# Patient Record
Sex: FEMALE | Race: WHITE | NOT HISPANIC OR LATINO | Employment: OTHER | URBAN - METROPOLITAN AREA
[De-identification: names, ages, dates, MRNs, and addresses within clinical notes are randomized per-mention and may not be internally consistent; named-entity substitution may affect disease eponyms.]

---

## 2017-07-25 ENCOUNTER — HOSPITAL ENCOUNTER (OUTPATIENT)
Facility: MEDICAL CENTER | Age: 52
End: 2017-07-25
Attending: PHYSICIAN ASSISTANT
Payer: MEDICARE

## 2017-07-25 ENCOUNTER — OFFICE VISIT (OUTPATIENT)
Dept: URGENT CARE | Facility: CLINIC | Age: 52
End: 2017-07-25
Payer: MEDICARE

## 2017-07-25 VITALS
OXYGEN SATURATION: 97 % | WEIGHT: 228 LBS | DIASTOLIC BLOOD PRESSURE: 78 MMHG | TEMPERATURE: 99.1 F | BODY MASS INDEX: 36.64 KG/M2 | SYSTOLIC BLOOD PRESSURE: 134 MMHG | HEART RATE: 90 BPM | RESPIRATION RATE: 20 BRPM | HEIGHT: 66 IN

## 2017-07-25 DIAGNOSIS — R30.0 DYSURIA: ICD-10-CM

## 2017-07-25 DIAGNOSIS — R09.81 SINUS CONGESTION: ICD-10-CM

## 2017-07-25 DIAGNOSIS — R35.0 URINARY FREQUENCY: ICD-10-CM

## 2017-07-25 DIAGNOSIS — T36.95XA ANTIBIOTIC-INDUCED YEAST INFECTION: ICD-10-CM

## 2017-07-25 DIAGNOSIS — B37.9 ANTIBIOTIC-INDUCED YEAST INFECTION: ICD-10-CM

## 2017-07-25 LAB
APPEARANCE UR: NORMAL
BILIRUB UR STRIP-MCNC: NEGATIVE MG/DL
COLOR UR AUTO: YELLOW
GLUCOSE UR STRIP.AUTO-MCNC: NEGATIVE MG/DL
KETONES UR STRIP.AUTO-MCNC: NEGATIVE MG/DL
LEUKOCYTE ESTERASE UR QL STRIP.AUTO: NEGATIVE
NITRITE UR QL STRIP.AUTO: NEGATIVE
PH UR STRIP.AUTO: 6 [PH] (ref 5–8)
PROT UR QL STRIP: NORMAL MG/DL
RBC UR QL AUTO: NORMAL
SP GR UR STRIP.AUTO: 1.02
UROBILINOGEN UR STRIP-MCNC: NEGATIVE MG/DL

## 2017-07-25 PROCEDURE — 87086 URINE CULTURE/COLONY COUNT: CPT

## 2017-07-25 PROCEDURE — 81002 URINALYSIS NONAUTO W/O SCOPE: CPT | Performed by: PHYSICIAN ASSISTANT

## 2017-07-25 PROCEDURE — 99204 OFFICE O/P NEW MOD 45 MIN: CPT | Performed by: PHYSICIAN ASSISTANT

## 2017-07-25 RX ORDER — FLUTICASONE PROPIONATE 50 MCG
2 SPRAY, SUSPENSION (ML) NASAL DAILY
Qty: 16 G | Refills: 0 | Status: SHIPPED | OUTPATIENT
Start: 2017-07-25 | End: 2018-09-17

## 2017-07-25 RX ORDER — CEPHALEXIN 500 MG/1
500 CAPSULE ORAL 3 TIMES DAILY
Qty: 21 CAP | Refills: 0 | Status: SHIPPED | OUTPATIENT
Start: 2017-07-25 | End: 2017-08-01

## 2017-07-25 RX ORDER — FLUCONAZOLE 150 MG/1
TABLET ORAL
Qty: 2 TAB | Refills: 0 | Status: SHIPPED | OUTPATIENT
Start: 2017-07-25 | End: 2017-08-28

## 2017-07-25 ASSESSMENT — ENCOUNTER SYMPTOMS
COUGH: 1
WHEEZING: 0
ABDOMINAL PAIN: 0
BACK PAIN: 0
POLYDIPSIA: 0
SPUTUM PRODUCTION: 1
NAUSEA: 0
VOMITING: 0
CHILLS: 0
HEADACHES: 1
FLANK PAIN: 0
SORE THROAT: 1
FEVER: 1
SHORTNESS OF BREATH: 0
DIARRHEA: 0

## 2017-07-25 NOTE — MR AVS SNAPSHOT
"        Sol Kern   2017 2:45 PM   Office Visit   MRN: [id number]    Department:  [facility]   Dept Phone:  [phone]    Description:  Female : 1965   Provider:  Cordell Skinner PA-C           Reason for Visit     Dysuria x1.5week, burns to urinate, frequent urination, bloated, fatigue      Allergies as of 2017     Allergen Noted Reactions    Sulfa Drugs 2017       Other Drug 2017       Any of the \"cets\" (percocet)    Vicodin [Hydrocodone-Acetaminophen] 2017         You were diagnosed with     Dysuria   [788.1.ICD-9-CM]       Urinary frequency   [788.41.ICD-9-CM]       Antibiotic-induced yeast infection   [547806]         Vital Signs     Blood Pressure Pulse Temperature Respirations Height Weight    134/78 mmHg 90 37.3 °C (99.1 °F) 20 1.676 m (5' 5.98\") 103.42 kg (228 lb)    Body Mass Index Oxygen Saturation                36.82 kg/m2 97%          Basic Information     Date Of Birth Sex Race Ethnicity Preferred Language    1965 Female White Non- English      Health Maintenance     Patient has no pending health maintenance at this time      Current Immunizations     No immunizations on file.      Below and/or attached are the medications your provider expects you to take. Review all of your home medications and newly ordered medications with your provider and/or pharmacist. Follow medication instructions as directed by your provider and/or pharmacist. Please keep your medication list with you and share with your provider. Update the information when medications are discontinued, doses are changed, or new medications (including over-the-counter products) are added; and carry medication information at all times in the event of emergency situations     Allergies:  SULFA DRUGS - (reactions not documented)     OTHER DRUG - (reactions not documented)     VICODIN - (reactions not documented)               Medications  Valid as of: 2017 -  3:22 PM   "    Generic Name Brand Name Tablet Size Instructions for use    Cephalexin (Cap) KEFLEX 500 MG Take 1 Cap by mouth 3 times a day for 7 days.        Fluconazole (Tab) DIFLUCAN 150 MG Take one tab PO day #1, wait 4 days, if still w/ s/sx take 2nd tab PO        Fluticasone Propionate (Suspension) FLONASE 50 MCG/ACT Spray 2 Sprays in nose every day.        .                 Medicines prescribed today were sent to:     [id number] - [place] - [address] [place] [address]    Phone: [phone] Fax: [phone]    Open 24 Hours?: No      Medication refill instructions:       If your prescription bottle indicates you have medication refills left, it is not necessary to call your provider’s office. Please contact your pharmacy and they will refill your medication.    If your prescription bottle indicates you do not have any refills left, you may request refills at any time through one of the following ways: The online [employer] system (except Urgent Care), by calling your provider’s office, or by asking your pharmacy to contact your provider’s office with a refill request. Medication refills are processed only during regular business hours and may not be available until the next business day. Your provider may request additional information or to have a follow-up visit with you prior to refilling your medication.   *Please Note: Medication refills are assigned a new Rx number when refilled electronically. Your pharmacy may indicate that no refills were authorized even though a new prescription for the same medication is available at the pharmacy. Please request the medicine by name with the pharmacy before contacting your provider for a refill.        Your To Do List     Future Labs/Procedures Complete By Expires    URINE CULTURE(NEW)  As directed 7/25/2018      Instructions      Urinary Tract Infection  A urinary tract infection (UTI) can occur any place along the urinary tract. The tract includes the  kidneys, ureters, bladder, and urethra. A type of germ called bacteria often causes a UTI. UTIs are often helped with antibiotic medicine.   HOME CARE   · If given, take antibiotics as told by your doctor. Finish them even if you start to feel better.  · Drink enough fluids to keep your pee (urine) clear or pale yellow.  · Avoid tea, drinks with caffeine, and bubbly (carbonated) drinks.  · Pee often. Avoid holding your pee in for a long time.  · Pee before and after having sex (intercourse).  · Wipe from front to back after you poop (bowel movement) if you are a woman. Use each tissue only once.  GET HELP RIGHT AWAY IF:   · You have back pain.  · You have lower belly (abdominal) pain.  · You have chills.  · You feel sick to your stomach (nauseous).  · You throw up (vomit).  · Your burning or discomfort with peeing does not go away.  · You have a fever.  · Your symptoms are not better in 3 days.  MAKE SURE YOU:   · Understand these instructions.  · Will watch your condition.  · Will get help right away if you are not doing well or get worse.     This information is not intended to replace advice given to you by your health care provider. Make sure you discuss any questions you have with your health care provider.     Document Released: 06/05/2009 Document Revised: 01/08/2016 Document Reviewed: 07/18/2013  [employer] Interactive Patient Education ©2016 Elsevier Inc.            CalAmp Access Code: [email or web address]  Expires: 8/24/2017  3:22 PM    CalAmp  A secure, online tool to manage your health information     [employer] CalAmp® is a secure, online tool that connects you to your personalized health information from the privacy of your home -- day or night - making it very easy for you to manage your healthcare. Once the activation process is completed, you can even access your medical information using the CalAmp sivan, which is available for free in the Apple Sivan store or Google Play store.     CalAmp  provides the following levels of access (as shown below):   My Chart Features   [facility] Doctor [facility]  Specialists [facility]  Urgent  Care Non-[facility]  Primary Care  Doctor   Email your healthcare team securely and privately 24/7 X X X    Manage appointments: schedule your next appointment; view details of past/upcoming appointments X      Request prescription refills. X      View recent personal medical records, including lab and immunizations X X X X   View health record, including health history, allergies, medications X X X X   Read reports about your outpatient visits, procedures, consult and ER notes X X X X   See your discharge summary, which is a recap of your hospital and/or ER visit that includes your diagnosis, lab results, and care plan. X X       How to register for [employer]:  1. Go to  [email or web address].  2. Click on the Sign Up Now box, which takes you to the New Member Sign Up page. You will need to provide the following information:  a. Enter your [employer] Access Code exactly as it appears at the top of this page. (You will not need to use this code after you’ve completed the sign-up process. If you do not sign up before the expiration date, you must request a new code.)   b. Enter your date of birth.   c. Enter your home email address.   d. Click Submit, and follow the next screen’s instructions.  3. Create a [employer] Vapor ID. This will be your [employer] login ID and cannot be changed, so think of one that is secure and easy to remember.  4. Create a [employer] password. You can change your password at any time.  5. Enter your Password Reset Question and Answer. This can be used at a later time if you forget your password.   6. Enter your e-mail address. This allows you to receive e-mail notifications when new information is available in [employer] Just Vapor.  7. Click Sign Up. You can now view your health information.    For assistance activating your [employer] V[employer] account, call [phone]

## 2017-07-25 NOTE — PROGRESS NOTES
"Subjective:      Sol Kern is a 52 y.o. female who presents with Dysuria            Dysuria   Associated symptoms include frequency, hematuria and urgency. Pertinent negatives include no chills, flank pain, nausea or vomiting.   dysuria, denies discharge, last one week of discomfort, freq urination, night time w/ increased urination, incomplete voiding, PMH of UTI, feels similar/ classic for her. S/p hysterectomy. Notes mildly cloudy urine. Tried otc cran apple juice    Also note sinus congestion and yellow colored mucous, sinus pressure pain, denies chills, possible subj fever, denies sore throat/ear pain/cough, c/o HA, feels out of it, sinus infection, denies asthma, PMH of sinusutis, denies PMH of bronchitis/pnuemonia, does have some seasonal allerg - recently moved to area - bad allerg now - no tx thus far.     Review of Systems   Constitutional: Positive for fever ( subj). Negative for chills.   HENT: Positive for congestion and sore throat. Negative for ear pain.    Respiratory: Positive for cough and sputum production. Negative for shortness of breath and wheezing.    Gastrointestinal: Negative for nausea, vomiting, abdominal pain and diarrhea.   Genitourinary: Positive for dysuria, urgency, frequency and hematuria. Negative for flank pain.   Musculoskeletal: Negative for back pain.   Skin: Negative for rash.   Neurological: Positive for headaches.   Endo/Heme/Allergies: Positive for environmental allergies. Negative for polydipsia.     PMH:  has no past medical history on file.  MEDS: No current outpatient prescriptions on file.  ALLERGIES:   Allergies   Allergen Reactions   • Sulfa Drugs    • Other Drug      Any of the \"cets\" (percocet)   • Vicodin [Hydrocodone-Acetaminophen]      SURGHX: No past surgical history on file.  SOCHX:    FH: Family history was reviewed, no pertinent findings to report       Objective:     /78 mmHg  Pulse 90  Temp(Src) 37.3 °C (99.1 °F)  Resp 20  Ht 1.676 m (5' " "5.98\")  Wt 103.42 kg (228 lb)  BMI 36.82 kg/m2  SpO2 97%     Physical Exam   Constitutional: She is oriented to person, place, and time. She appears well-developed and well-nourished. No distress.   HENT:   Head: Normocephalic and atraumatic.   Right Ear: External ear normal. Tympanic membrane is bulging. Tympanic membrane is not erythematous.   Left Ear: External ear normal. Tympanic membrane is bulging. Tympanic membrane is not erythematous.   Nose: Right sinus exhibits maxillary sinus tenderness. Right sinus exhibits no frontal sinus tenderness. Left sinus exhibits maxillary sinus tenderness. Left sinus exhibits no frontal sinus tenderness.   Mouth/Throat: Uvula is midline and mucous membranes are normal. Posterior oropharyngeal erythema ( PND) present. No oropharyngeal exudate, posterior oropharyngeal edema or tonsillar abscesses.   Eyes: Conjunctivae and lids are normal. Right eye exhibits no discharge. Left eye exhibits no discharge. No scleral icterus.   Neck: Neck supple.   Pulmonary/Chest: Effort normal. No respiratory distress.   Abdominal: Soft. Normal appearance. There is tenderness ( very mild suprapubic) in the suprapubic area. There is no rigidity, no guarding and no CVA tenderness.   Musculoskeletal: Normal range of motion.   Lymphadenopathy:     She has cervical adenopathy.   Neurological: She is alert and oriented to person, place, and time. She is not disoriented.   Skin: Skin is warm and dry. She is not diaphoretic. No erythema. No pallor.   Psychiatric: Her speech is normal and behavior is normal.   Nursing note and vitals reviewed.         Would like abx for sinusitis and UTI today (sulfa allergy)     Assessment/Plan:     1. Dysuria  Supportive care is reviewed with patient/caregiver - recommend to push PO fluids and electrolytes, nsaids/tylenol, rest, full course of abx, probiotics w/ abx, azo/cran, observe for resolution  Return to clinic with lack of resolution or progression of " symptoms.  Will call if change of abx is indicated by urine cx      Only fill diflucan if s/sx progress  Nsaids/tylenol, netti pot/saline irrig, humidifier in home, flonase, ponaris, antihistamines    - POCT Urinalysis  - cephALEXin (KEFLEX) 500 MG Cap; Take 1 Cap by mouth 3 times a day for 7 days.  Dispense: 21 Cap; Refill: 0  - URINE CULTURE(NEW); Future  - fluticasone (FLONASE) 50 MCG/ACT nasal spray; Spray 2 Sprays in nose every day.  Dispense: 16 g; Refill: 0    2. Urinary frequency    - POCT Urinalysis  - cephALEXin (KEFLEX) 500 MG Cap; Take 1 Cap by mouth 3 times a day for 7 days.  Dispense: 21 Cap; Refill: 0  - URINE CULTURE(NEW); Future  - fluticasone (FLONASE) 50 MCG/ACT nasal spray; Spray 2 Sprays in nose every day.  Dispense: 16 g; Refill: 0    3. Antibiotic-induced yeast infection  - fluconazole (DIFLUCAN) 150 MG tablet; Take one tab PO day #1, wait 4 days, if still w/ s/sx take 2nd tab PO  Dispense: 2 Tab; Refill: 0

## 2017-07-25 NOTE — PATIENT INSTRUCTIONS
Urinary Tract Infection  A urinary tract infection (UTI) can occur any place along the urinary tract. The tract includes the kidneys, ureters, bladder, and urethra. A type of germ called bacteria often causes a UTI. UTIs are often helped with antibiotic medicine.   HOME CARE   · If given, take antibiotics as told by your doctor. Finish them even if you start to feel better.  · Drink enough fluids to keep your pee (urine) clear or pale yellow.  · Avoid tea, drinks with caffeine, and bubbly (carbonated) drinks.  · Pee often. Avoid holding your pee in for a long time.  · Pee before and after having sex (intercourse).  · Wipe from front to back after you poop (bowel movement) if you are a woman. Use each tissue only once.  GET HELP RIGHT AWAY IF:   · You have back pain.  · You have lower belly (abdominal) pain.  · You have chills.  · You feel sick to your stomach (nauseous).  · You throw up (vomit).  · Your burning or discomfort with peeing does not go away.  · You have a fever.  · Your symptoms are not better in 3 days.  MAKE SURE YOU:   · Understand these instructions.  · Will watch your condition.  · Will get help right away if you are not doing well or get worse.     This information is not intended to replace advice given to you by your health care provider. Make sure you discuss any questions you have with your health care provider.     Document Released: 06/05/2009 Document Revised: 01/08/2016 Document Reviewed: 07/18/2013  [employer] Interactive Patient Education ©2016 [employer]

## 2017-07-28 LAB
BACTERIA UR CULT: ABNORMAL
BACTERIA UR CULT: ABNORMAL
SIGNIFICANT IND 70042: ABNORMAL
SOURCE SOURCE: ABNORMAL

## 2017-08-28 ENCOUNTER — OFFICE VISIT (OUTPATIENT)
Dept: MEDICAL GROUP | Facility: PHYSICIAN GROUP | Age: 52
End: 2017-08-28
Payer: MEDICARE

## 2017-08-28 VITALS
SYSTOLIC BLOOD PRESSURE: 102 MMHG | OXYGEN SATURATION: 91 % | TEMPERATURE: 98.3 F | RESPIRATION RATE: 16 BRPM | WEIGHT: 230 LBS | HEIGHT: 65 IN | DIASTOLIC BLOOD PRESSURE: 72 MMHG | HEART RATE: 95 BPM | BODY MASS INDEX: 38.32 KG/M2

## 2017-08-28 DIAGNOSIS — M54.2 NECK PAIN: ICD-10-CM

## 2017-08-28 DIAGNOSIS — F51.01 PRIMARY INSOMNIA: ICD-10-CM

## 2017-08-28 DIAGNOSIS — E66.9 OBESITY (BMI 35.0-39.9 WITHOUT COMORBIDITY): ICD-10-CM

## 2017-08-28 DIAGNOSIS — L20.84 INTRINSIC ECZEMA: ICD-10-CM

## 2017-08-28 DIAGNOSIS — G89.29 CHRONIC MIDLINE LOW BACK PAIN WITH RIGHT-SIDED SCIATICA: ICD-10-CM

## 2017-08-28 DIAGNOSIS — G47.33 OSA ON CPAP: ICD-10-CM

## 2017-08-28 DIAGNOSIS — M54.41 CHRONIC MIDLINE LOW BACK PAIN WITH RIGHT-SIDED SCIATICA: ICD-10-CM

## 2017-08-28 DIAGNOSIS — R21 RASH: ICD-10-CM

## 2017-08-28 DIAGNOSIS — Z88.9 MULTIPLE ALLERGIES: ICD-10-CM

## 2017-08-28 PROCEDURE — 99214 OFFICE O/P EST MOD 30 MIN: CPT | Performed by: INTERNAL MEDICINE

## 2017-08-28 RX ORDER — DIAZEPAM 10 MG/1
10 TABLET ORAL EVERY 6 HOURS PRN
COMMUNITY
End: 2017-08-28

## 2017-08-28 RX ORDER — TRAZODONE HYDROCHLORIDE 50 MG/1
50 TABLET ORAL NIGHTLY PRN
Qty: 30 TAB | Refills: 3 | Status: SHIPPED | OUTPATIENT
Start: 2017-08-28 | End: 2018-09-17

## 2017-08-28 RX ORDER — ZOLPIDEM TARTRATE 10 MG/1
10 TABLET ORAL NIGHTLY PRN
COMMUNITY
End: 2019-03-05 | Stop reason: SDUPTHER

## 2017-08-28 RX ORDER — CYCLOBENZAPRINE HCL 10 MG
10 TABLET ORAL 3 TIMES DAILY PRN
COMMUNITY
End: 2019-03-05

## 2017-08-28 RX ORDER — TRIAMCINOLONE ACETONIDE 1 MG/G
1 CREAM TOPICAL 2 TIMES DAILY
Qty: 1 TUBE | Refills: 0 | Status: SHIPPED | OUTPATIENT
Start: 2017-08-28 | End: 2018-09-17

## 2017-08-28 RX ORDER — DIAZEPAM 10 MG/1
10 TABLET ORAL
Qty: 30 TAB | Refills: 0 | Status: SHIPPED | OUTPATIENT
Start: 2017-08-28 | End: 2019-03-05

## 2017-08-28 ASSESSMENT — PAIN SCALES - GENERAL: PAINLEVEL: 5=MODERATE PAIN

## 2017-08-28 ASSESSMENT — PATIENT HEALTH QUESTIONNAIRE - PHQ9: CLINICAL INTERPRETATION OF PHQ2 SCORE: 0

## 2017-08-28 NOTE — ASSESSMENT & PLAN NOTE
She tells me that she tries to eat healthy, last month it has been tough because of the move. She tells me that she tries to have portion control. Not very good with

## 2017-08-28 NOTE — ASSESSMENT & PLAN NOTE
Started 4 months ago, got better after using neosporin, but still flares. There is erythematous with white flakes in her cresta. No fissures.

## 2017-08-28 NOTE — ASSESSMENT & PLAN NOTE
More in the Winter. She reports feeling congested on her head, running noise, headache. Cough sometimes. Stable. Uses flonase prn. Advise saline nasal spray BID.

## 2017-08-28 NOTE — ASSESSMENT & PLAN NOTE
Chronic. She tells me that she has chronic back pain due to DJD, posture. She has kyphosis. She has had MRI (4 years ago), epidural injections. She cannot use narcotic. She describes as spastic pain.she uses flexeril and valium and seems that's the only regimen that works for her. She tells me that she was evaluated by surgeon and she was not a surgical candidate. Will reevaluate imaging at next apt.

## 2017-08-28 NOTE — ASSESSMENT & PLAN NOTE
She tells me that she moves a lot at night. She has trouble falling a sleep and staying asleep. She is on Ambien, which she takes it only 3 times per week.  Discuss in length in regards to sleep hygiene, sleeping the same time, waking up at the same time, avoid electronics on the room, keeping the room dark, avoid exercising late, have a bath before sleeping. cogntitive therapy. Avoid stimulants as caffeine late afternoon. Discuss in length in regards to side effects of ambien as substance control. I reviewed side effects as memory loss for long term, addiction, tolerance. I advised to avoid alcohol, narcotic meds, benzo if taking ambien. Avoid driving if taking ambien.   Because she is on valium, I advised to try sth else for insomnia as trazodone. She agreed with the plan

## 2017-08-28 NOTE — LETTER
August 28, 2017        Sol Valentinn  [id number]  [place] [address]      Current Outpatient Prescriptions   Medication Sig Dispense Refill   • cyclobenzaprine (FLEXERIL) 10 MG Tab Take 10 mg by mouth 3 times a day as needed.     • zolpidem (AMBIEN) 10 MG Tab Take 10 mg by mouth at bedtime as needed for Sleep.     • diazepam (VALIUM) 10 MG tablet Take 1 Tab by mouth 1 time daily as needed. 30 Tab 0   • trazodone (DESYREL) 50 MG Tab Take 1 Tab by mouth at bedtime as needed for Sleep. 30 Tab 3   • triamcinolone acetonide (KENALOG) 0.1 % Cream Apply 1 Applicator to affected area(s) 2 times a day. 1 Tube 0   • fluticasone (FLONASE) 50 MCG/ACT nasal spray Spray 2 Sprays in nose every day. 16 g 0     No current facility-administered medications for this visit.

## 2017-08-29 NOTE — ASSESSMENT & PLAN NOTE
Neck pain, dull ache, sometimes associated with numbness and tingling on her hands. No trauma. MRI done in the past. Managed with pain management in the past

## 2017-08-29 NOTE — PROGRESS NOTES
New Patient to Establish    Reason to establish: rash, pain management     Sol Kern is a 52 y.o. female here today for evaluation and management of:    Primary insomnia  She tells me that she moves a lot at night. She has trouble falling a sleep and staying asleep. She is on Ambien, which she takes it only 3 times per week.  Discuss in length in regards to sleep hygiene, sleeping the same time, waking up at the same time, avoid electronics on the room, keeping the room dark, avoid exercising late, have a bath before sleeping. cogntitive therapy. Avoid stimulants as caffeine late afternoon. Discuss in length in regards to side effects of ambien as substance control. I reviewed side effects as memory loss for long term, addiction, tolerance. I advised to avoid alcohol, narcotic meds, benzo if taking ambien. Avoid driving if taking ambien.   Because she is on valium, I advised to try sth else for insomnia as trazodone. She agreed with the plan         Chronic midline low back pain with right-sided sciatica  Chronic. She tells me that she has chronic back pain due to DJD, posture. She has kyphosis. She has had MRI (4 years ago), epidural injections. She cannot use narcotic. She describes as spastic pain.she uses flexeril and valium and seems that's the only regimen that works for her. She tells me that she was evaluated by surgeon and she was not a surgical candidate. Will reevaluate imaging at next apt.     Rash  Started 4 months ago, got better after using neosporin, but still flares. There is erythematous with white flakes in her cresta. No fissures.     Multiple allergies  More in the Winter. She reports feeling congested on her head, running noise, headache. Cough sometimes. Stable. Uses flonase prn. Advise saline nasal spray BID.     Obesity (BMI 35.0-39.9 without comorbidity) ([facility])  She tells me that she tries to eat healthy, last month it has been tough because of the move. She tells me that she tries to  have portion control. Not very good with     YOANA on CPAP  Not very compliant with CPAP machine. Encourage compliance     Neck pain  Neck pain, dull ache, sometimes associated with numbness and tingling on her hands. No trauma. MRI done in the past. Managed with pain management in the past     Intrinsic eczema  As per above       Past Medical History:   Diagnosis Date   • Allergy    • Back pain    • Hypertension    • Neck pain    • Obesity        Current Outpatient Prescriptions   Medication Sig Dispense Refill   • cyclobenzaprine (FLEXERIL) 10 MG Tab Take 10 mg by mouth 3 times a day as needed.     • zolpidem (AMBIEN) 10 MG Tab Take 10 mg by mouth at bedtime as needed for Sleep.     • diazepam (VALIUM) 10 MG tablet Take 1 Tab by mouth 1 time daily as needed. 30 Tab 0   • trazodone (DESYREL) 50 MG Tab Take 1 Tab by mouth at bedtime as needed for Sleep. 30 Tab 3   • triamcinolone acetonide (KENALOG) 0.1 % Cream Apply 1 Applicator to affected area(s) 2 times a day. 1 Tube 0   • fluticasone (FLONASE) 50 MCG/ACT nasal spray Spray 2 Sprays in nose every day. 16 g 0     No current facility-administered medications for this visit.        Allergies as of 08/28/2017 - Reviewed 08/28/2017   Allergen Reaction Noted   • Sulfa drugs  07/25/2017   • Other drug  07/25/2017   • Vicodin [hydrocodone-acetaminophen]  07/25/2017       Social History     Social History   • Marital status:      Spouse name: N/A   • Number of children: N/A   • Years of education: N/A     Occupational History   • Not on file.     Social History Main Topics   • Smoking status: Never Smoker   • Smokeless tobacco: Never Used   • Alcohol use No   • Drug use: No   • Sexual activity: Yes     Partners: Male     Other Topics Concern   • Not on file     Social History Narrative   • No narrative on file       Family History   Problem Relation Age of Onset   • Cancer Mother      ovarian cancer   • Hypertension Mother    • Heart Disease Father    • Diabetes Neg  "Hx    • Alcohol/Drug Neg Hx        Past Surgical History:   Procedure Laterality Date   • ENDOMETRIAL ABLATION     • KNEE ARTHROSCOPY     • PRIMARY C SECTION      2        ROS: All systems reviewed are negative except for HPI      /72   Pulse 95   Temp 36.8 °C (98.3 °F)   Resp 16   Ht 1.651 m (5' 5\")   Wt 104.3 kg (230 lb)   SpO2 91%   Breastfeeding? No   BMI 38.27 kg/m²     Physical Exam  General:  Alert and oriented, No apparent distress.  Eyes: Pupils equal and reactive. No scleral icterus. EOMI  Throat: Clear no erythema or exudates noted. Oral mucosa moist, oral dental intact  Neck: Supple. No cervical or supraclavicular lymphadenopathy noted. Thyroid not enlarged, she has very huge cirmcumferential neck measurement, Mallampati score 3.  Lungs: normal effort,  Clear to auscultation  Cardiovascular: Regular rate and rhythm. No murmurs, rubs or gallops, pulses intact   Abdomen:  Soft, +BS, no tenderness. No rebound or guarding noted. No hepato or splenomegaly   Extremities: + clubbing, no cyanosis or edema.  Neuro: cranial nerves intact, sensation intact   Muscle skeletal:   SPINE: No significant spinal curvature on forward bend. Mild tenderness in paraspinous muscles lumbar spine without current spasm. SLT impaired bilateral , only 30 degree. bilaterally. Strength 5/5 proximal and distal LE.  No pain with stress of SI. Full hip ROM. Poor hamstring flexibility. No symptoms with axial loading.     Skin: Clear. No rash or suspicious skin lesions noted.  Other:       Assessment and Plan    1. Obesity (BMI 35.0-39.9 without comorbidity) (HCC)  She has plethoric red face, which she tells me that it is because rosacea. She has mild buffalo hump.   She tells me that she tries to look at her diet. I would like cushing syndrome, hypothyroidism as etiology of her age   - Patient identified as having weight management issue.  Appropriate orders and counseling given.  - CBC WITH DIFFERENTIAL; Future  - " COMP METABOLIC PANEL; Future  - LIPID PROFILE; Future  - TSH WITH REFLEX TO FT4; Future  - CORTISOL STIMULATION X3 SPECIMENS; Future    2. Primary insomnia  Discuss with pt as per above, trial of trazodone.   - CBC WITH DIFFERENTIAL; Future  - COMP METABOLIC PANEL; Future    3. YOANA on CPAP  Encourage compliance     4. Chronic midline low back pain with right-sided sciatica  5. Neck pain  Imaging at next apt   Substance control signed today  Urine drug test today   - COMP METABOLIC PANEL; Future  - CONTROLLED SUBSTANCE TREATMENT AGREEMENT  - [facility] PAIN MANAGEMENT SCREEN; Future      6. Multiple allergies  flonase prn   - CBC WITH DIFFERENTIAL; Future    7. Rash  8. Intrinsic eczema  Steroid cream prn   - triamcinolone acetonide (KENALOG) 0.1 % Cream; Apply 1 Applicator to affected area(s) 2 times a day.  Dispense: 1 Tube; Refill: 0      Followup: Return in about 4 weeks (around 9/25/2017) for Long.    Signed by: Deb Wood M.D.

## 2017-09-13 ENCOUNTER — HOSPITAL ENCOUNTER (OUTPATIENT)
Dept: LAB | Facility: MEDICAL CENTER | Age: 52
End: 2017-09-13
Attending: INTERNAL MEDICINE
Payer: MEDICARE

## 2017-09-13 DIAGNOSIS — F51.01 PRIMARY INSOMNIA: ICD-10-CM

## 2017-09-13 DIAGNOSIS — G89.29 CHRONIC MIDLINE LOW BACK PAIN WITH RIGHT-SIDED SCIATICA: ICD-10-CM

## 2017-09-13 DIAGNOSIS — Z88.9 MULTIPLE ALLERGIES: ICD-10-CM

## 2017-09-13 DIAGNOSIS — E66.9 OBESITY (BMI 35.0-39.9 WITHOUT COMORBIDITY): ICD-10-CM

## 2017-09-13 DIAGNOSIS — M54.41 CHRONIC MIDLINE LOW BACK PAIN WITH RIGHT-SIDED SCIATICA: ICD-10-CM

## 2017-09-13 LAB
ALBUMIN SERPL BCP-MCNC: 3.7 G/DL (ref 3.2–4.9)
ALBUMIN/GLOB SERPL: 1.2 G/DL
ALP SERPL-CCNC: 84 U/L (ref 30–99)
ALT SERPL-CCNC: 24 U/L (ref 2–50)
ANION GAP SERPL CALC-SCNC: 7 MMOL/L (ref 0–11.9)
AST SERPL-CCNC: 20 U/L (ref 12–45)
BASOPHILS # BLD AUTO: 0.5 % (ref 0–1.8)
BASOPHILS # BLD: 0.05 K/UL (ref 0–0.12)
BILIRUB SERPL-MCNC: 0.5 MG/DL (ref 0.1–1.5)
BUN SERPL-MCNC: 13 MG/DL (ref 8–22)
CALCIUM SERPL-MCNC: 9.2 MG/DL (ref 8.5–10.5)
CHLORIDE SERPL-SCNC: 108 MMOL/L (ref 96–112)
CHOLEST SERPL-MCNC: 187 MG/DL (ref 100–199)
CO2 SERPL-SCNC: 24 MMOL/L (ref 20–33)
CREAT SERPL-MCNC: 0.73 MG/DL (ref 0.5–1.4)
EOSINOPHIL # BLD AUTO: 0.2 K/UL (ref 0–0.51)
EOSINOPHIL NFR BLD: 2.1 % (ref 0–6.9)
ERYTHROCYTE [DISTWIDTH] IN BLOOD BY AUTOMATED COUNT: 45.4 FL (ref 35.9–50)
GFR SERPL CREATININE-BSD FRML MDRD: >60 ML/MIN/1.73 M 2
GLOBULIN SER CALC-MCNC: 3.2 G/DL (ref 1.9–3.5)
GLUCOSE SERPL-MCNC: 66 MG/DL (ref 65–99)
HCT VFR BLD AUTO: 48.7 % (ref 37–47)
HDLC SERPL-MCNC: 40 MG/DL
HGB BLD-MCNC: 16.3 G/DL (ref 12–16)
IMM GRANULOCYTES # BLD AUTO: 0.04 K/UL (ref 0–0.11)
IMM GRANULOCYTES NFR BLD AUTO: 0.4 % (ref 0–0.9)
LDLC SERPL CALC-MCNC: 119 MG/DL
LYMPHOCYTES # BLD AUTO: 2.75 K/UL (ref 1–4.8)
LYMPHOCYTES NFR BLD: 28.4 % (ref 22–41)
MCH RBC QN AUTO: 31.3 PG (ref 27–33)
MCHC RBC AUTO-ENTMCNC: 33.5 G/DL (ref 33.6–35)
MCV RBC AUTO: 93.5 FL (ref 81.4–97.8)
MONOCYTES # BLD AUTO: 0.7 K/UL (ref 0–0.85)
MONOCYTES NFR BLD AUTO: 7.2 % (ref 0–13.4)
NEUTROPHILS # BLD AUTO: 5.96 K/UL (ref 2–7.15)
NEUTROPHILS NFR BLD: 61.4 % (ref 44–72)
NRBC # BLD AUTO: 0 K/UL
NRBC BLD AUTO-RTO: 0 /100 WBC
PLATELET # BLD AUTO: 310 K/UL (ref 164–446)
PMV BLD AUTO: 10 FL (ref 9–12.9)
POTASSIUM SERPL-SCNC: 3.7 MMOL/L (ref 3.6–5.5)
PROT SERPL-MCNC: 6.9 G/DL (ref 6–8.2)
RBC # BLD AUTO: 5.21 M/UL (ref 4.2–5.4)
SODIUM SERPL-SCNC: 139 MMOL/L (ref 135–145)
TRIGL SERPL-MCNC: 139 MG/DL (ref 0–149)
TSH SERPL DL<=0.005 MIU/L-ACNC: 1 UIU/ML (ref 0.3–3.7)
WBC # BLD AUTO: 9.7 K/UL (ref 4.8–10.8)

## 2017-09-13 PROCEDURE — 85025 COMPLETE CBC W/AUTO DIFF WBC: CPT

## 2017-09-13 PROCEDURE — 84443 ASSAY THYROID STIM HORMONE: CPT

## 2017-09-13 PROCEDURE — 80061 LIPID PANEL: CPT

## 2017-09-13 PROCEDURE — 80053 COMPREHEN METABOLIC PANEL: CPT

## 2017-09-13 PROCEDURE — 36415 COLL VENOUS BLD VENIPUNCTURE: CPT

## 2017-09-14 NOTE — PROGRESS NOTES
Please let the patient know that the labs look good. We can discuss more at her next appointment. Thank you  Deb Wood M.D.

## 2017-11-21 ENCOUNTER — OFFICE VISIT (OUTPATIENT)
Dept: URGENT CARE | Facility: CLINIC | Age: 52
End: 2017-11-21
Payer: MEDICARE

## 2017-11-21 VITALS
WEIGHT: 228 LBS | HEART RATE: 86 BPM | SYSTOLIC BLOOD PRESSURE: 114 MMHG | BODY MASS INDEX: 37.99 KG/M2 | TEMPERATURE: 97.7 F | DIASTOLIC BLOOD PRESSURE: 70 MMHG | HEIGHT: 65 IN | OXYGEN SATURATION: 96 %

## 2017-11-21 DIAGNOSIS — J06.9 VIRAL UPPER RESPIRATORY ILLNESS: ICD-10-CM

## 2017-11-21 DIAGNOSIS — L03.818 CELLULITIS OF OTHER SPECIFIED SITE: ICD-10-CM

## 2017-11-21 PROCEDURE — 99214 OFFICE O/P EST MOD 30 MIN: CPT | Performed by: PHYSICIAN ASSISTANT

## 2017-11-21 RX ORDER — DOXYCYCLINE 100 MG/1
100 TABLET ORAL 2 TIMES DAILY
Qty: 14 TAB | Refills: 0 | Status: SHIPPED | OUTPATIENT
Start: 2017-11-21 | End: 2018-09-17

## 2017-11-21 RX ORDER — ONDANSETRON 4 MG/1
4 TABLET, ORALLY DISINTEGRATING ORAL EVERY 8 HOURS PRN
Qty: 15 TAB | Refills: 0 | Status: SHIPPED | OUTPATIENT
Start: 2017-11-21 | End: 2017-11-28

## 2017-11-21 ASSESSMENT — ENCOUNTER SYMPTOMS
CHILLS: 0
COUGH: 1
MYALGIAS: 1
VOMITING: 0
SORE THROAT: 1
FEVER: 0
WEIGHT LOSS: 0
RHINORRHEA: 1
EYES NEGATIVE: 1
ABDOMINAL PAIN: 0
NAUSEA: 1
NEUROLOGICAL NEGATIVE: 1

## 2017-11-21 NOTE — PROGRESS NOTES
"Subjective:      Sol Kern is a 52 y.o. female who presents with Cough (congestion x1week ) and Bug Bite (poss tick bite on L breast )            Cough   This is a new problem. Episode onset: 1 week ago. The problem has been unchanged. The problem occurs constantly. The cough is non-productive. Associated symptoms include myalgias, nasal congestion, postnasal drip, rhinorrhea and a sore throat. Pertinent negatives include no chest pain, chills, ear congestion, ear pain, fever or weight loss. Exacerbated by: talking. She has tried OTC cough suppressant for the symptoms. The treatment provided mild relief.   Left Breast Skin Change:  Patient developed a sore of the left breast a couple weeks ago while traveling in [place].  Patient mentions possible tick bite but did not see a tick.  Skin wound got worse over the past couple of weeks, slight improvement today.  Patient states last mammogram was 2+ years ago.      Review of Systems   Constitutional: Negative for chills, fever, malaise/fatigue and weight loss.   HENT: Positive for congestion, postnasal drip, rhinorrhea and sore throat. Negative for ear pain.    Eyes: Negative.    Respiratory: Positive for cough.    Cardiovascular: Negative for chest pain.   Gastrointestinal: Positive for nausea. Negative for abdominal pain and vomiting.   Musculoskeletal: Positive for myalgias.   Neurological: Negative.           Objective:     /70   Pulse 86   Temp 36.5 °C (97.7 °F)   Ht 1.651 m (5' 5\")   Wt 103.4 kg (228 lb)   SpO2 96%   BMI 37.94 kg/m²      Physical Exam   Constitutional: She is oriented to person, place, and time. She appears well-developed and well-nourished.   HENT:   Head: Normocephalic and atraumatic.   Right Ear: External ear normal.   Left Ear: External ear normal.   Mouth/Throat: Oropharynx is clear and moist.   Eyes: Conjunctivae and EOM are normal. Pupils are equal, round, and reactive to light.   Neck: Normal range of motion. Neck supple. "   Cardiovascular: Normal rate, regular rhythm and normal heart sounds.    Pulmonary/Chest: Effort normal and breath sounds normal.   Neurological: She is alert and oriented to person, place, and time.   Skin:   8cm area of erythema/ecchymosis of the left medial breast at 7 o'clock with central healing ulcerative lesion.  No induration, erythematous streaking or drainage at the time of exam.  No underlying palpable mass.     Psychiatric: She has a normal mood and affect. Her behavior is normal. Judgment and thought content normal.   Nursing note and vitals reviewed.              Assessment/Plan:     1. Cellulitis of other specified site  Will treat cellulitis Doxycycline as patient mentions possible tick exposure while in [place].  No additional areas of bullseye rash.  Patient mentions associated nausea, could be secondary to post nasal drainage but I have also discussed with patient that this could be a sign of severe cellulitis.  Infection appears limited to the left breast but I have asked her to monitor her symptoms closely.  She is over-due for a mammogram, I have placed an order for screening mammogram due to skin lesion of the breast, she is to follow-up with her PCP for results.  Monitor symptoms and follow-up if symptoms change, get worse or new symptoms develop.    - ondansetron (ZOFRAN ODT) 4 MG TABLET DISPERSIBLE; Take 1 Tab by mouth every 8 hours as needed for up to 7 days.  Dispense: 15 Tab; Refill: 0  - doxycycline monohydrate (ADOXA) 100 MG tablet; Take 1 Tab by mouth 2 times a day.  Dispense: 14 Tab; Refill: 0  - [id number]; Future    2. Viral upper respiratory illness  Rest, fluids.  OTC medication as needed.

## 2018-03-05 ENCOUNTER — PATIENT OUTREACH (OUTPATIENT)
Dept: HEALTH INFORMATION MANAGEMENT | Facility: OTHER | Age: 53
End: 2018-03-05

## 2018-03-05 ENCOUNTER — TELEPHONE (OUTPATIENT)
Dept: MEDICAL GROUP | Facility: PHYSICIAN GROUP | Age: 53
End: 2018-03-05

## 2018-03-05 DIAGNOSIS — Z12.11 SCREENING FOR COLON CANCER: ICD-10-CM

## 2018-06-05 NOTE — PROGRESS NOTES
Outcome: Left Message to schedule awv    Please transfer to Patient Outreach Team at [phone] when patient returns call.    WebIZ Checked & Epic Updated:  no    HealthConnect Verified: yes    Attempt # 1  scp outreach project

## 2018-09-17 ENCOUNTER — HOSPITAL ENCOUNTER (OUTPATIENT)
Dept: LAB | Facility: MEDICAL CENTER | Age: 53
End: 2018-09-17
Attending: FAMILY MEDICINE
Payer: MEDICARE

## 2018-09-17 ENCOUNTER — HOSPITAL ENCOUNTER (OUTPATIENT)
Dept: RADIOLOGY | Facility: MEDICAL CENTER | Age: 53
End: 2018-09-17
Attending: FAMILY MEDICINE
Payer: MEDICARE

## 2018-09-17 ENCOUNTER — OFFICE VISIT (OUTPATIENT)
Dept: MEDICAL GROUP | Facility: MEDICAL CENTER | Age: 53
End: 2018-09-17
Payer: MEDICARE

## 2018-09-17 ENCOUNTER — HOSPITAL ENCOUNTER (OUTPATIENT)
Facility: MEDICAL CENTER | Age: 53
End: 2018-09-17
Attending: FAMILY MEDICINE
Payer: MEDICARE

## 2018-09-17 VITALS
HEART RATE: 78 BPM | BODY MASS INDEX: 38.15 KG/M2 | OXYGEN SATURATION: 94 % | DIASTOLIC BLOOD PRESSURE: 82 MMHG | WEIGHT: 229 LBS | HEIGHT: 65 IN | SYSTOLIC BLOOD PRESSURE: 128 MMHG | RESPIRATION RATE: 16 BRPM | TEMPERATURE: 97.9 F

## 2018-09-17 DIAGNOSIS — Z12.31 ENCOUNTER FOR SCREENING MAMMOGRAM FOR MALIGNANT NEOPLASM OF BREAST: ICD-10-CM

## 2018-09-17 DIAGNOSIS — G47.33 OSA ON CPAP: ICD-10-CM

## 2018-09-17 DIAGNOSIS — F51.01 PRIMARY INSOMNIA: ICD-10-CM

## 2018-09-17 DIAGNOSIS — L57.0 ACTINIC KERATOSES: ICD-10-CM

## 2018-09-17 DIAGNOSIS — R31.29 MICROSCOPIC HEMATURIA: ICD-10-CM

## 2018-09-17 DIAGNOSIS — R30.0 DYSURIA: ICD-10-CM

## 2018-09-17 LAB
ALBUMIN SERPL BCP-MCNC: 4.2 G/DL (ref 3.2–4.9)
ALBUMIN/GLOB SERPL: 1.4 G/DL
ALP SERPL-CCNC: 74 U/L (ref 30–99)
ALT SERPL-CCNC: 27 U/L (ref 2–50)
ANION GAP SERPL CALC-SCNC: 6 MMOL/L (ref 0–11.9)
APPEARANCE UR: NORMAL
AST SERPL-CCNC: 23 U/L (ref 12–45)
BASOPHILS # BLD AUTO: 0.7 % (ref 0–1.8)
BASOPHILS # BLD: 0.07 K/UL (ref 0–0.12)
BILIRUB SERPL-MCNC: 0.5 MG/DL (ref 0.1–1.5)
BILIRUB UR STRIP-MCNC: NORMAL MG/DL
BUN SERPL-MCNC: 10 MG/DL (ref 8–22)
CALCIUM SERPL-MCNC: 9.3 MG/DL (ref 8.5–10.5)
CHLORIDE SERPL-SCNC: 105 MMOL/L (ref 96–112)
CO2 SERPL-SCNC: 25 MMOL/L (ref 20–33)
COLOR UR AUTO: NORMAL
CREAT SERPL-MCNC: 0.76 MG/DL (ref 0.5–1.4)
EOSINOPHIL # BLD AUTO: 0.26 K/UL (ref 0–0.51)
EOSINOPHIL NFR BLD: 2.5 % (ref 0–6.9)
ERYTHROCYTE [DISTWIDTH] IN BLOOD BY AUTOMATED COUNT: 47.8 FL (ref 35.9–50)
GLOBULIN SER CALC-MCNC: 3.1 G/DL (ref 1.9–3.5)
GLUCOSE SERPL-MCNC: 84 MG/DL (ref 65–99)
GLUCOSE UR STRIP.AUTO-MCNC: NORMAL MG/DL
HCT VFR BLD AUTO: 53.9 % (ref 37–47)
HGB BLD-MCNC: 17.5 G/DL (ref 12–16)
IMM GRANULOCYTES # BLD AUTO: 0.04 K/UL (ref 0–0.11)
IMM GRANULOCYTES NFR BLD AUTO: 0.4 % (ref 0–0.9)
KETONES UR STRIP.AUTO-MCNC: NORMAL MG/DL
LEUKOCYTE ESTERASE UR QL STRIP.AUTO: NORMAL
LYMPHOCYTES # BLD AUTO: 2.8 K/UL (ref 1–4.8)
LYMPHOCYTES NFR BLD: 26.8 % (ref 22–41)
MCH RBC QN AUTO: 30.9 PG (ref 27–33)
MCHC RBC AUTO-ENTMCNC: 32.5 G/DL (ref 33.6–35)
MCV RBC AUTO: 95.1 FL (ref 81.4–97.8)
MONOCYTES # BLD AUTO: 0.62 K/UL (ref 0–0.85)
MONOCYTES NFR BLD AUTO: 5.9 % (ref 0–13.4)
NEUTROPHILS # BLD AUTO: 6.64 K/UL (ref 2–7.15)
NEUTROPHILS NFR BLD: 63.7 % (ref 44–72)
NITRITE UR QL STRIP.AUTO: NORMAL
NRBC # BLD AUTO: 0 K/UL
NRBC BLD-RTO: 0 /100 WBC
PH UR STRIP.AUTO: 5.5 [PH] (ref 5–8)
PLATELET # BLD AUTO: 326 K/UL (ref 164–446)
PMV BLD AUTO: 9.7 FL (ref 9–12.9)
POTASSIUM SERPL-SCNC: 4.1 MMOL/L (ref 3.6–5.5)
PROT SERPL-MCNC: 7.3 G/DL (ref 6–8.2)
PROT UR QL STRIP: NORMAL MG/DL
RBC # BLD AUTO: 5.67 M/UL (ref 4.2–5.4)
RBC UR QL AUTO: NORMAL
SODIUM SERPL-SCNC: 136 MMOL/L (ref 135–145)
SP GR UR STRIP.AUTO: 1.02
UROBILINOGEN UR STRIP-MCNC: 0.2 MG/DL
WBC # BLD AUTO: 10.4 K/UL (ref 4.8–10.8)

## 2018-09-17 PROCEDURE — 80053 COMPREHEN METABOLIC PANEL: CPT

## 2018-09-17 PROCEDURE — 81002 URINALYSIS NONAUTO W/O SCOPE: CPT | Performed by: FAMILY MEDICINE

## 2018-09-17 PROCEDURE — 87186 SC STD MICRODIL/AGAR DIL: CPT

## 2018-09-17 PROCEDURE — 87086 URINE CULTURE/COLONY COUNT: CPT

## 2018-09-17 PROCEDURE — 17003 DESTRUCT PREMALG LES 2-14: CPT | Performed by: FAMILY MEDICINE

## 2018-09-17 PROCEDURE — 17000 DESTRUCT PREMALG LESION: CPT | Performed by: FAMILY MEDICINE

## 2018-09-17 PROCEDURE — 99213 OFFICE O/P EST LOW 20 MIN: CPT | Mod: 25 | Performed by: FAMILY MEDICINE

## 2018-09-17 PROCEDURE — 87077 CULTURE AEROBIC IDENTIFY: CPT

## 2018-09-17 PROCEDURE — 77067 SCR MAMMO BI INCL CAD: CPT

## 2018-09-17 PROCEDURE — 36415 COLL VENOUS BLD VENIPUNCTURE: CPT

## 2018-09-17 PROCEDURE — 85025 COMPLETE CBC W/AUTO DIFF WBC: CPT

## 2018-09-17 NOTE — PROGRESS NOTES
"cc:  UTI    Subjective:     Sol Kern is a 53 y.o. female presenting with a couple weeks of increased frequency of urination.  She is also complaining of lower back pain for the last 4 days.  This pain is different than her sciatica.  She denies any fevers or chills.  She denies any blood in her urine.  She is not on her period and mentions she has had an endometrial ablation.    She is also complaining of a right chin lesion for the last 4 months.  This is nontender and does not itch.  She has removed it herself but it has grown back.    She does also have a history of polyps and did have her initial colonoscopy 5 years ago.  She denies any abdominal pain or any blood in her stools.  She is due for another one and already has a referral.      Review of systems:  See above and all others are reviewed and negative.      Current Outpatient Prescriptions:   •  cyclobenzaprine (FLEXERIL) 10 MG Tab, Take 10 mg by mouth 3 times a day as needed., Disp: , Rfl:   •  zolpidem (AMBIEN) 10 MG Tab, Take 10 mg by mouth at bedtime as needed for Sleep., Disp: , Rfl:   •  diazepam (VALIUM) 10 MG tablet, Take 1 Tab by mouth 1 time daily as needed., Disp: 30 Tab, Rfl: 0    Allergies, past medical history, past surgical history, family history, social history reviewed and updated    Objective:     Vitals: /82   Pulse 78   Temp 36.6 °C (97.9 °F)   Resp 16   Ht 1.65 m (5' 4.96\")   Wt 103.9 kg (229 lb)   SpO2 94%   BMI 38.15 kg/m²   General: Alert, pleasant, NAD  HEENT: Normocephalic.  PERRL, EOMI, no icterus or pallor.  Conjunctivae and lids normal. External ears normal.  Heart: Regular rate and rhythm.  S1 and S2 normal.  No murmurs appreciated.  Respiratory: Normal respiratory effort.  Clear to auscultation bilaterally.  Abdomen: Non-distended, soft  Skin: Warm, dry, no rashes.  Right chin lesion which is nonpruritic and nontender, scaly papule approx 3mm in diameter.  Scaly papule on left upper cheek. "   Musculoskeletal: Gait is normal.  Moves all extremities well.  No CVA tenderness.  Psych:  Affect/mood is normal, judgement is good, memory is intact, grooming is appropriate.      CRYOTHERAPY:  Discussed risks and benefits of cryotherapy. Patient verbally agreed. 3 applications of cryotherapy were applied to the chin lesion and the left cheek lesion. Patient tolerated procedure well. Aftercare instructions given.      Assessment/Plan:     Sol was seen today for follow-up.    Diagnoses and all orders for this visit:    Microscopic hematuria   Dysuria   patient denies having a history of hematuria.  However there was blood seen on her urine exam. We will go ahead and send the urine for culture and schedule a CT of the abdomen and pelvis.  Will likely refer to urology after  -     URINE CULTURE(NEW); Future  -     CT-ABDOMEN-PELVIS WITH; Future  -     CBC WITH DIFFERENTIAL; Future  -     COMP METABOLIC PANEL; Future    Actinic keratoses  Cryo applied today to the two lesions    YOANA on CPAP   - encouraged to wear her CPAP at nighttime.    Primary insomnia   -Continue with her Ambien as needed.    Encounter for screening mammogram for malignant neoplasm of breast  -     MA-SCREENING MAMMO BILAT W/TOMOSYNTHESIS W/CAD; Future      Pt left before getting her flu shot      Return in about 2 months (around 11/17/2018) for routine follow up.  Recommended that she follow up with her regular provider in the near future for her other chronic issues

## 2018-09-18 DIAGNOSIS — R31.29 MICROSCOPIC HEMATURIA: ICD-10-CM

## 2018-09-18 DIAGNOSIS — R30.0 DYSURIA: ICD-10-CM

## 2018-09-20 LAB
BACTERIA UR CULT: ABNORMAL
BACTERIA UR CULT: ABNORMAL
SIGNIFICANT IND 70042: ABNORMAL
SITE SITE: ABNORMAL
SOURCE SOURCE: ABNORMAL

## 2018-09-20 RX ORDER — NITROFURANTOIN 25; 75 MG/1; MG/1
100 CAPSULE ORAL 2 TIMES DAILY
Qty: 10 CAP | Refills: 0 | Status: SHIPPED | OUTPATIENT
Start: 2018-09-20 | End: 2018-09-25

## 2018-09-21 RX ORDER — FLUCONAZOLE 150 MG/1
150 TABLET ORAL DAILY
Qty: 1 TAB | Refills: 1 | Status: SHIPPED | OUTPATIENT
Start: 2018-09-21 | End: 2018-09-22

## 2018-09-24 ENCOUNTER — TELEPHONE (OUTPATIENT)
Dept: MEDICAL GROUP | Facility: MEDICAL CENTER | Age: 53
End: 2018-09-24

## 2018-09-24 DIAGNOSIS — N64.9 BREAST LESION: ICD-10-CM

## 2018-09-26 ENCOUNTER — HOSPITAL ENCOUNTER (OUTPATIENT)
Dept: RADIOLOGY | Facility: MEDICAL CENTER | Age: 53
End: 2018-09-26
Attending: FAMILY MEDICINE
Payer: MEDICARE

## 2018-09-26 DIAGNOSIS — R31.29 MICROSCOPIC HEMATURIA: ICD-10-CM

## 2018-09-26 PROCEDURE — 700117 HCHG RX CONTRAST REV CODE 255: Performed by: FAMILY MEDICINE

## 2018-09-26 PROCEDURE — 74178 CT ABD&PLV WO CNTR FLWD CNTR: CPT

## 2018-09-26 RX ADMIN — IOHEXOL 100 ML: 350 INJECTION, SOLUTION INTRAVENOUS at 14:13

## 2018-09-27 PROBLEM — R31.29 MICROSCOPIC HEMATURIA: Status: ACTIVE | Noted: 2018-09-27

## 2018-10-02 ENCOUNTER — HOSPITAL ENCOUNTER (OUTPATIENT)
Dept: RADIOLOGY | Facility: MEDICAL CENTER | Age: 53
End: 2018-10-02
Attending: FAMILY MEDICINE
Payer: MEDICARE

## 2018-10-02 DIAGNOSIS — N64.9 BREAST LESION: ICD-10-CM

## 2018-10-02 PROCEDURE — 76642 ULTRASOUND BREAST LIMITED: CPT | Mod: RT

## 2018-10-02 PROCEDURE — 77065 DX MAMMO INCL CAD UNI: CPT | Mod: RT

## 2018-10-03 ENCOUNTER — HOSPITAL ENCOUNTER (OUTPATIENT)
Dept: RADIOLOGY | Facility: MEDICAL CENTER | Age: 53
End: 2018-10-03

## 2018-10-23 ENCOUNTER — PATIENT OUTREACH (OUTPATIENT)
Dept: HEALTH INFORMATION MANAGEMENT | Facility: OTHER | Age: 53
End: 2018-10-23

## 2018-10-23 NOTE — PROGRESS NOTES
Outcome: Requested call back    Please transfer to Patient Outreach Team at [phone] when patient returns call.    WebIZ Checked & Epic Updated:  yes  MMR   Tdap   Zoster David ([employer]     HealthConnect Verified: yes  Effective Date: 1/1/2018   Attempt # 1

## 2018-11-02 NOTE — PROGRESS NOTES
Outcome: Call back Tuesday or Wednesday     Please transfer to Patient Outreach Team at [phone] when patient returns call.    Attempt # 2

## 2018-11-08 NOTE — PROGRESS NOTES
1. Attempt #:3   Patient has an appointment on Tuesday for her colonoscopy, she's waitng to receive her kit in the mail. She also wants to see obgyn I let specialty schedulers to call her back.    2. HealthConnect Verified: no    3. Verify PCP: yes    4. Review Care Team: yes    · 5. WebIZ Checked & Epic Updated:no    6. Reviewed/Updated the following with patient:       •   Communication Preference Obtained? YES       •   Preferred Pharmacy? YES       •   Preferred Lab? YES       •   Family History (document living status of immediate family members and if + hx of cancer, diabetes, hypertension, hyperlipidemia, heart attack, stroke) YES    7. Annual Wellness Visit Scheduling  · Scheduling Status:Scheduled     8. Care Gap Scheduling (Attempt to Schedule EACH Overdue Care Gap!)     Health Maintenance Due   Topic Date Due   • Annual Wellness Visit  1965   • PAP SMEAR  03/15/1984   • COLONOSCOPY  03/15/2013   • IMM INFLUENZA (1) 09/01/2018        Scheduled patient for Annual Wellness Visit  Pended orders for Colonoscopy/FIT & pap     9. DxO Labs Activation: already active    10. DxO Labs Sivan: no    11. Virtual Visits: no    12. Opt In to Text Messages: yes    13. Patient was advised: “This is a free wellness visit. The provider will screen for medical conditions to help you stay healthy. If you have other concerns to address you may be asked to discuss these at a separate visit or there may be an additional fee.”     14. Patient was informed to arrive 15 min prior to their scheduled appointment and bring in their medication bottles.

## 2019-01-14 ENCOUNTER — TELEPHONE (OUTPATIENT)
Dept: MEDICAL GROUP | Facility: PHYSICIAN GROUP | Age: 54
End: 2019-01-14

## 2019-01-23 ENCOUNTER — APPOINTMENT (OUTPATIENT)
Dept: MEDICAL GROUP | Facility: PHYSICIAN GROUP | Age: 54
End: 2019-01-23
Payer: MEDICARE

## 2019-02-19 ENCOUNTER — HOSPITAL ENCOUNTER (EMERGENCY)
Facility: MEDICAL CENTER | Age: 54
End: 2019-02-19
Attending: EMERGENCY MEDICINE
Payer: MEDICARE

## 2019-02-19 ENCOUNTER — APPOINTMENT (OUTPATIENT)
Dept: RADIOLOGY | Facility: MEDICAL CENTER | Age: 54
End: 2019-02-19
Attending: EMERGENCY MEDICINE
Payer: MEDICARE

## 2019-02-19 VITALS
DIASTOLIC BLOOD PRESSURE: 102 MMHG | OXYGEN SATURATION: 93 % | HEART RATE: 68 BPM | SYSTOLIC BLOOD PRESSURE: 180 MMHG | WEIGHT: 231.92 LBS | BODY MASS INDEX: 38.64 KG/M2 | RESPIRATION RATE: 20 BRPM | TEMPERATURE: 97.9 F

## 2019-02-19 DIAGNOSIS — R05.9 COUGH: ICD-10-CM

## 2019-02-19 DIAGNOSIS — J06.9 UPPER RESPIRATORY TRACT INFECTION, UNSPECIFIED TYPE: ICD-10-CM

## 2019-02-19 LAB
ALBUMIN SERPL BCP-MCNC: 4.5 G/DL (ref 3.2–4.9)
ALBUMIN/GLOB SERPL: 1.4 G/DL
ALP SERPL-CCNC: 86 U/L (ref 30–99)
ALT SERPL-CCNC: 22 U/L (ref 2–50)
ANION GAP SERPL CALC-SCNC: 10 MMOL/L (ref 0–11.9)
AST SERPL-CCNC: 18 U/L (ref 12–45)
BASOPHILS # BLD AUTO: 0.3 % (ref 0–1.8)
BASOPHILS # BLD: 0.05 K/UL (ref 0–0.12)
BILIRUB SERPL-MCNC: 0.4 MG/DL (ref 0.1–1.5)
BUN SERPL-MCNC: 13 MG/DL (ref 8–22)
CALCIUM SERPL-MCNC: 10 MG/DL (ref 8.5–10.5)
CHLORIDE SERPL-SCNC: 106 MMOL/L (ref 96–112)
CO2 SERPL-SCNC: 25 MMOL/L (ref 20–33)
CREAT SERPL-MCNC: 0.76 MG/DL (ref 0.5–1.4)
D DIMER PPP IA.FEU-MCNC: <0.4 UG/ML (FEU) (ref 0–0.5)
EKG IMPRESSION: NORMAL
EOSINOPHIL # BLD AUTO: 0.25 K/UL (ref 0–0.51)
EOSINOPHIL NFR BLD: 1.7 % (ref 0–6.9)
ERYTHROCYTE [DISTWIDTH] IN BLOOD BY AUTOMATED COUNT: 45.7 FL (ref 35.9–50)
GLOBULIN SER CALC-MCNC: 3.2 G/DL (ref 1.9–3.5)
GLUCOSE SERPL-MCNC: 118 MG/DL (ref 65–99)
HCT VFR BLD AUTO: 54.1 % (ref 37–47)
HGB BLD-MCNC: 18.2 G/DL (ref 12–16)
IMM GRANULOCYTES # BLD AUTO: 0.06 K/UL (ref 0–0.11)
IMM GRANULOCYTES NFR BLD AUTO: 0.4 % (ref 0–0.9)
LYMPHOCYTES # BLD AUTO: 3.51 K/UL (ref 1–4.8)
LYMPHOCYTES NFR BLD: 24.5 % (ref 22–41)
MCH RBC QN AUTO: 31.9 PG (ref 27–33)
MCHC RBC AUTO-ENTMCNC: 33.6 G/DL (ref 33.6–35)
MCV RBC AUTO: 94.7 FL (ref 81.4–97.8)
MONOCYTES # BLD AUTO: 1.12 K/UL (ref 0–0.85)
MONOCYTES NFR BLD AUTO: 7.8 % (ref 0–13.4)
NEUTROPHILS # BLD AUTO: 9.36 K/UL (ref 2–7.15)
NEUTROPHILS NFR BLD: 65.3 % (ref 44–72)
NRBC # BLD AUTO: 0 K/UL
NRBC BLD-RTO: 0 /100 WBC
PLATELET # BLD AUTO: 334 K/UL (ref 164–446)
PMV BLD AUTO: 9.6 FL (ref 9–12.9)
POTASSIUM SERPL-SCNC: 3.7 MMOL/L (ref 3.6–5.5)
PROT SERPL-MCNC: 7.7 G/DL (ref 6–8.2)
RBC # BLD AUTO: 5.71 M/UL (ref 4.2–5.4)
SODIUM SERPL-SCNC: 141 MMOL/L (ref 135–145)
WBC # BLD AUTO: 14.4 K/UL (ref 4.8–10.8)

## 2019-02-19 PROCEDURE — 700111 HCHG RX REV CODE 636 W/ 250 OVERRIDE (IP): Performed by: EMERGENCY MEDICINE

## 2019-02-19 PROCEDURE — 71045 X-RAY EXAM CHEST 1 VIEW: CPT

## 2019-02-19 PROCEDURE — 80053 COMPREHEN METABOLIC PANEL: CPT

## 2019-02-19 PROCEDURE — 93005 ELECTROCARDIOGRAM TRACING: CPT | Performed by: EMERGENCY MEDICINE

## 2019-02-19 PROCEDURE — 85379 FIBRIN DEGRADATION QUANT: CPT

## 2019-02-19 PROCEDURE — 99285 EMERGENCY DEPT VISIT HI MDM: CPT

## 2019-02-19 PROCEDURE — 85025 COMPLETE CBC W/AUTO DIFF WBC: CPT

## 2019-02-19 PROCEDURE — 700102 HCHG RX REV CODE 250 W/ 637 OVERRIDE(OP): Performed by: EMERGENCY MEDICINE

## 2019-02-19 PROCEDURE — A9270 NON-COVERED ITEM OR SERVICE: HCPCS | Performed by: EMERGENCY MEDICINE

## 2019-02-19 PROCEDURE — 36415 COLL VENOUS BLD VENIPUNCTURE: CPT

## 2019-02-19 PROCEDURE — 93005 ELECTROCARDIOGRAM TRACING: CPT

## 2019-02-19 RX ORDER — ALBUTEROL SULFATE 90 UG/1
2 AEROSOL, METERED RESPIRATORY (INHALATION) EVERY 6 HOURS PRN
Qty: 8.5 G | Refills: 0 | Status: SHIPPED | OUTPATIENT
Start: 2019-02-19 | End: 2019-02-19

## 2019-02-19 RX ORDER — AZITHROMYCIN 250 MG/1
500 TABLET, FILM COATED ORAL ONCE
Status: COMPLETED | OUTPATIENT
Start: 2019-02-19 | End: 2019-02-19

## 2019-02-19 RX ORDER — IBUPROFEN 600 MG/1
600 TABLET ORAL ONCE
Status: COMPLETED | OUTPATIENT
Start: 2019-02-19 | End: 2019-02-19

## 2019-02-19 RX ORDER — ONDANSETRON 4 MG/1
4 TABLET, ORALLY DISINTEGRATING ORAL ONCE
Status: COMPLETED | OUTPATIENT
Start: 2019-02-19 | End: 2019-02-19

## 2019-02-19 RX ORDER — ALBUTEROL SULFATE 90 UG/1
2 AEROSOL, METERED RESPIRATORY (INHALATION) EVERY 6 HOURS PRN
Qty: 8.5 G | Refills: 0 | Status: SHIPPED
Start: 2019-02-19 | End: 2019-03-05

## 2019-02-19 RX ORDER — IBUPROFEN 800 MG/1
800 TABLET ORAL EVERY 8 HOURS PRN
Qty: 30 TAB | Refills: 0 | Status: ON HOLD
Start: 2019-02-19 | End: 2021-11-12

## 2019-02-19 RX ORDER — ONDANSETRON 4 MG/1
4 TABLET, ORALLY DISINTEGRATING ORAL ONCE
Qty: 10 TAB | Refills: 0 | Status: SHIPPED
Start: 2019-02-19 | End: 2019-02-19

## 2019-02-19 RX ORDER — GUAIFENESIN, PSEUDOEPHEDRINE HYDROCHLORIDE 600; 60 MG/1; MG/1
1 TABLET, EXTENDED RELEASE ORAL EVERY 12 HOURS
Qty: 15 TAB | Refills: 0 | Status: SHIPPED
Start: 2019-02-19 | End: 2019-03-05

## 2019-02-19 RX ORDER — AZITHROMYCIN 250 MG/1
TABLET, FILM COATED ORAL
Qty: 6 TAB | Refills: 0 | Status: SHIPPED
Start: 2019-02-19 | End: 2019-03-05

## 2019-02-19 RX ORDER — BENZONATATE 100 MG/1
100 CAPSULE ORAL 3 TIMES DAILY PRN
Qty: 60 CAP | Refills: 0 | Status: SHIPPED
Start: 2019-02-19 | End: 2019-03-05

## 2019-02-19 RX ADMIN — AZITHROMYCIN 500 MG: 250 TABLET, FILM COATED ORAL at 23:03

## 2019-02-19 RX ADMIN — ONDANSETRON 4 MG: 4 TABLET, ORALLY DISINTEGRATING ORAL at 23:03

## 2019-02-19 RX ADMIN — IBUPROFEN 600 MG: 600 TABLET ORAL at 23:03

## 2019-02-20 NOTE — ED NOTES
Pt attached to monitor, talking on cell phone unable to assess at this time. No acute distress apparent

## 2019-02-20 NOTE — ED PROVIDER NOTES
"ED Provider Note    CHIEF COMPLAINT  Chief Complaint   Patient presents with   • Cough     x 2 weeks, tan mucus   • Shortness of Breath     worse the last 2 days   • Headache     today   • Nausea     today       HPI  Sol Kern is a 53 y.o. female here for evaluation of cough and congestion.  The patient states that this is been ongoing over the last few weeks, and is not getting any better.  She has sinus congestion, ear pain, and a nonproductive cough.  Patient denies any current shortness of breath.  She denies any chest pain.  She denies any abdominal pain.  She states that she recently traveled a couple of weeks ago back from [place], and states that this is when she thought her symptoms started.  She denies any fever or chills.  She has no ill contacts.  Nothing alleviates or exacerbates her symptoms.  She has not seen a doctor for this yet.    PAST MEDICAL HISTORY   has a past medical history of Allergy; Back pain; Hypertension; Neck pain; and Obesity.    SOCIAL HISTORY  Social History     Social History Main Topics   • Smoking status: Former Smoker     Quit date: 1/1/2012   • Smokeless tobacco: Never Used   • Alcohol use No   • Drug use: No   • Sexual activity: Yes     Partners: Male      Comment:        Family History  No bleeding disorders    SURGICAL HISTORY   has a past surgical history that includes endometrial ablation; primary c section; knee arthroscopy; foot surgery (Right); and carpal tunnel release (Right).    CURRENT MEDICATIONS  Home Medications     Reviewed by Sonali Robertson R.N. (Registered Nurse) on 02/19/19 at 1851  Med List Status: Partial   Medication Last Dose Status   cyclobenzaprine (FLEXERIL) 10 MG Tab prn Active   diazepam (VALIUM) 10 MG tablet  Active   zolpidem (AMBIEN) 10 MG Tab 2/17/2019 Active                ALLERGIES  Allergies   Allergen Reactions   • Sulfa Drugs    • Other Drug      Any of the \"cets\" (percocet)   • Vicodin [Hydrocodone-Acetaminophen]  "       REVIEW OF SYSTEMS  See HPI for further details. Review of systems as above, otherwise all other systems are negative.     PHYSICAL EXAM  Constitutional: Well developed, well nourished. No acute distress.  HEENT: Normocephalic, atraumatic. Posterior pharynx clear and moist.  Bilateral TMs clear  Eyes:  EOMI. Normal sclera.  Neck: Supple, Full range of motion, nontender.  Chest/Pulmonary: clear to ausculation. Symmetrical expansion.   Cardio: Regular rate and rhythm with no murmur.   Abdomen: Soft, nontender. No peritoneal signs. No guarding. No palpable masses.  Musculoskeletal: No deformity, no edema, neurovascular intact.   Neuro: Clear speech, appropriate, cooperative, cranial nerves II-XII grossly intact.  Psych: Normal mood and affect    Results for orders placed or performed during the hospital encounter of 02/19/19   CBC WITH DIFFERENTIAL   Result Value Ref Range    WBC 14.4 (H) 4.8 - 10.8 K/uL    RBC 5.71 (H) 4.20 - 5.40 M/uL    Hemoglobin 18.2 (H) 12.0 - 16.0 g/dL    Hematocrit 54.1 (H) 37.0 - 47.0 %    MCV 94.7 81.4 - 97.8 fL    MCH 31.9 27.0 - 33.0 pg    MCHC 33.6 33.6 - 35.0 g/dL    RDW 45.7 35.9 - 50.0 fL    Platelet Count 334 164 - 446 K/uL    MPV 9.6 9.0 - 12.9 fL    Neutrophils-Polys 65.30 44.00 - 72.00 %    Lymphocytes 24.50 22.00 - 41.00 %    Monocytes 7.80 0.00 - 13.40 %    Eosinophils 1.70 0.00 - 6.90 %    Basophils 0.30 0.00 - 1.80 %    Immature Granulocytes 0.40 0.00 - 0.90 %    Nucleated RBC 0.00 /100 WBC    Neutrophils (Absolute) 9.36 (H) 2.00 - 7.15 K/uL    Lymphs (Absolute) 3.51 1.00 - 4.80 K/uL    Monos (Absolute) 1.12 (H) 0.00 - 0.85 K/uL    Eos (Absolute) 0.25 0.00 - 0.51 K/uL    Baso (Absolute) 0.05 0.00 - 0.12 K/uL    Immature Granulocytes (abs) 0.06 0.00 - 0.11 K/uL    NRBC (Absolute) 0.00 K/uL   COMP METABOLIC PANEL   Result Value Ref Range    Sodium 141 135 - 145 mmol/L    Potassium 3.7 3.6 - 5.5 mmol/L    Chloride 106 96 - 112 mmol/L    Co2 25 20 - 33 mmol/L    Anion Gap 10.0  0.0 - 11.9    Glucose 118 (H) 65 - 99 mg/dL    Bun 13 8 - 22 mg/dL    Creatinine 0.76 0.50 - 1.40 mg/dL    Calcium 10.0 8.5 - 10.5 mg/dL    AST(SGOT) 18 12 - 45 U/L    ALT(SGPT) 22 2 - 50 U/L    Alkaline Phosphatase 86 30 - 99 U/L    Total Bilirubin 0.4 0.1 - 1.5 mg/dL    Albumin 4.5 3.2 - 4.9 g/dL    Total Protein 7.7 6.0 - 8.2 g/dL    Globulin 3.2 1.9 - 3.5 g/dL    A-G Ratio 1.4 g/dL   ESTIMATED GFR   Result Value Ref Range    GFR If African American >60 >60 mL/min/1.73 m 2    GFR If Non African American >60 >60 mL/min/1.73 m 2   D-DIMER   Result Value Ref Range    D-Dimer Screen <0.40 0.00 - 0.50 ug/mL (FEU)   EKG   Result Value Ref Range    Report       [facility] Emergency Dept.    Test Date:  2019  Pt Name:    BRITNEY URBAN                   Department: ER  MRN:        [id number]                      Room:  Gender:     Female                       Technician: [id number]  :        1965                   Requested By:ER TRIAGE PROTOCOL  Order #:    [id number]                    Reading MD:    Measurements  Intervals                                Axis  Rate:       93                           P:          46  UT:         156                          QRS:        1  QRSD:       78                           T:          19  QT:         352  QTc:        438    Interpretive Statements  SINUS RHYTHM  BASELINE WANDER IN LEAD(S) II  No previous ECG available for comparison        DX-CHEST-LIMITED (1 VIEW)   Final Result         1.  No acute cardiopulmonary disease.        PROCEDURES     MEDICAL RECORD  I have reviewed patient's medical record and pertinent results are listed above.    COURSE & MEDICAL DECISION MAKING  I have reviewed any medical record information, laboratory studies and radiographic results as noted above.    10:57 PM  The pt is nontoxic appearing, comfortable, but has had persistent symptoms for two weeks.  I will start her on a z pack, and she will return here for any other  issues or concerns.      If you have had any blood pressure issues while here in the emergency department, please see your doctor for a further evaluation or work up.    Differential diagnoses include but not limited to: URI, PE, pneumonia    This patient presents with cough and URI.  At this time, I have counseled the patient/family regarding their medications, pain control, and follow up.  They will continue their medications, if any, as prescribed.  They will return immediately for any worsening symptoms and/or any other medical concerns.  They will see their doctor, or contact the doctor provided, in 1-2 days for follow up.       FINAL IMPRESSION  1. Cough    2. Upper respiratory tract infection, unspecified type       Electronically signed by: Eren De La Rosa, 2/19/2019 10:53 PM

## 2019-02-20 NOTE — ED TRIAGE NOTES
.  Chief Complaint   Patient presents with   • Cough     x 2 weeks, tan mucus   • Shortness of Breath     worse the last 2 days   • Headache     today   • Nausea     today   mask placed at triage. Pt having worsening cough and sob, nausea without vomiting. Pt hypertensive at triage. New onset headache.

## 2019-02-20 NOTE — ED NOTES
Pt resting in lobby.  No changes at this time.  Pt updated on place in line and wait time.  Pt verbalizes understanding.  V/S reassessed.

## 2019-03-05 ENCOUNTER — OFFICE VISIT (OUTPATIENT)
Dept: MEDICAL GROUP | Facility: PHYSICIAN GROUP | Age: 54
End: 2019-03-05
Payer: MEDICARE

## 2019-03-05 VITALS
TEMPERATURE: 98 F | HEART RATE: 88 BPM | HEIGHT: 64 IN | WEIGHT: 234 LBS | SYSTOLIC BLOOD PRESSURE: 144 MMHG | BODY MASS INDEX: 39.95 KG/M2 | RESPIRATION RATE: 14 BRPM | OXYGEN SATURATION: 96 % | DIASTOLIC BLOOD PRESSURE: 86 MMHG

## 2019-03-05 DIAGNOSIS — L40.9 PSORIASIS: ICD-10-CM

## 2019-03-05 DIAGNOSIS — I10 ESSENTIAL HYPERTENSION: ICD-10-CM

## 2019-03-05 DIAGNOSIS — E66.9 OBESITY (BMI 35.0-39.9 WITHOUT COMORBIDITY): ICD-10-CM

## 2019-03-05 DIAGNOSIS — G47.33 OSA ON CPAP: ICD-10-CM

## 2019-03-05 DIAGNOSIS — M54.2 NECK PAIN: ICD-10-CM

## 2019-03-05 DIAGNOSIS — R31.29 MICROSCOPIC HEMATURIA: ICD-10-CM

## 2019-03-05 DIAGNOSIS — E55.9 VITAMIN D DEFICIENCY: ICD-10-CM

## 2019-03-05 DIAGNOSIS — Z01.419 ENCOUNTER FOR GYNECOLOGICAL EXAMINATION: ICD-10-CM

## 2019-03-05 DIAGNOSIS — G89.29 CHRONIC MIDLINE LOW BACK PAIN WITH RIGHT-SIDED SCIATICA: ICD-10-CM

## 2019-03-05 DIAGNOSIS — M54.41 CHRONIC MIDLINE LOW BACK PAIN WITH RIGHT-SIDED SCIATICA: ICD-10-CM

## 2019-03-05 DIAGNOSIS — F51.01 PRIMARY INSOMNIA: ICD-10-CM

## 2019-03-05 DIAGNOSIS — Z23 NEED FOR VACCINATION: ICD-10-CM

## 2019-03-05 DIAGNOSIS — N64.9 BREAST LESION: ICD-10-CM

## 2019-03-05 DIAGNOSIS — Z12.31 ENCOUNTER FOR SCREENING MAMMOGRAM FOR BREAST CANCER: ICD-10-CM

## 2019-03-05 PROBLEM — R21 RASH: Status: RESOLVED | Noted: 2017-08-28 | Resolved: 2019-03-05

## 2019-03-05 PROCEDURE — 90472 IMMUNIZATION ADMIN EACH ADD: CPT | Performed by: INTERNAL MEDICINE

## 2019-03-05 PROCEDURE — 90732 PPSV23 VACC 2 YRS+ SUBQ/IM: CPT | Performed by: INTERNAL MEDICINE

## 2019-03-05 PROCEDURE — 90715 TDAP VACCINE 7 YRS/> IM: CPT | Performed by: INTERNAL MEDICINE

## 2019-03-05 PROCEDURE — G0009 ADMIN PNEUMOCOCCAL VACCINE: HCPCS | Performed by: INTERNAL MEDICINE

## 2019-03-05 PROCEDURE — 8041 PR SCP AHA: Performed by: INTERNAL MEDICINE

## 2019-03-05 PROCEDURE — 99215 OFFICE O/P EST HI 40 MIN: CPT | Mod: 25 | Performed by: INTERNAL MEDICINE

## 2019-03-05 RX ORDER — TRIAMCINOLONE ACETONIDE 1 MG/G
1 CREAM TOPICAL 2 TIMES DAILY
Qty: 1 TUBE | Refills: 0 | Status: SHIPPED | OUTPATIENT
Start: 2019-03-05 | End: 2019-10-22

## 2019-03-05 RX ORDER — ZOLPIDEM TARTRATE 10 MG/1
10 TABLET ORAL NIGHTLY PRN
Qty: 30 TAB | Refills: 0 | Status: SHIPPED | OUTPATIENT
Start: 2019-03-05 | End: 2019-06-03

## 2019-03-05 RX ORDER — HYDROCHLOROTHIAZIDE 12.5 MG/1
12.5 TABLET ORAL DAILY
Qty: 30 TAB | Refills: 3 | Status: SHIPPED | OUTPATIENT
Start: 2019-03-05 | End: 2019-03-12

## 2019-03-05 ASSESSMENT — PATIENT HEALTH QUESTIONNAIRE - PHQ9: CLINICAL INTERPRETATION OF PHQ2 SCORE: 0

## 2019-03-05 NOTE — ASSESSMENT & PLAN NOTE
She is asking for another sleep studies. She believes her current machine is not working properly for her. She does not feel refresh in the morning.

## 2019-03-05 NOTE — ASSESSMENT & PLAN NOTE
She has itchy, dry, scaly tissue between gluteal fold. It well marketed. It is typical for psoriasis. Father with similar lesion

## 2019-03-05 NOTE — ASSESSMENT & PLAN NOTE
She has chronic low back pain.  She has been seen by pain specialist at [place]. She was placed on flexeril and valium from them. She cannot take narcotic. Only medications that work are those two. She takes ambien for sleep. She does not take medications together, and valium with flexeril she takes them only as needed. She was told she has scoliosis, which cause her muscle cramps

## 2019-03-05 NOTE — ASSESSMENT & PLAN NOTE
Chronic, stable. Intermittent. Neck pain, dull ache, sometimes associated with numbness and tingling on her hands. No trauma. MRI done in the past. Managed with pain management in the past

## 2019-03-05 NOTE — PROGRESS NOTES
Subjective:     Sol Kern is a 53 y.o. female here today for referral, hypertension, repeat mammogram, skin lesions  and Annual Health Assessment.    Breast lesion  She did have an MRI. Reports did not show any lesions. But Dr. Torres and pt reports a lesion on the right one. She was advise another review of mammogram in 6 months. She denies changes, Unintentional weight loss, night sweats    Chronic midline low back pain with right-sided sciatica  She has chronic low back pain.  She has been seen by pain specialist at [place]. She was placed on flexeril and valium from them. She cannot take narcotic. Only medications that work are those two. She takes ambien for sleep. She does not take medications together, and valium with flexeril she takes them only as needed. She was told she has scoliosis, which cause her muscle cramps     Microscopic hematuria  From last lab work. She denies gross bleeding. No anemia from recent CBC. Continue to monitor     Neck pain  Chronic, stable. Intermittent. Neck pain, dull ache, sometimes associated with numbness and tingling on her hands. No trauma. MRI done in the past. Managed with pain management in the past     Obesity (BMI 35.0-39.9 without comorbidity) ([facility])  Counseling for a small portion, balanced diet, exercising for3-5 times per week.      YOANA on CPAP  She is asking for another sleep studies. She believes her current machine is not working properly for her. She does not feel refresh in the morning.     Primary insomnia  She tells me that she moves a lot at night. She has trouble falling a sleep and staying asleep. She is on Ambien, which she takes it only 3 times per week.  she had tried trazodone, and other medications, but she has interaction. Discuss in length in regards to sleep hygiene, sleeping the same time, waking up at the same time, avoid electronics on the room, keeping the room dark, avoid exercising late, have a bath before sleeping. cogntitive therapy.  Avoid stimulants as caffeine late afternoon. Discuss in length in regards to side effects of ambien as substance control. I reviewed side effects as memory loss for long term, addiction, tolerance. I advised to avoid alcohol, narcotic meds, benzo if taking ambien. Avoid driving if taking ambien.     Psoriasis  She has itchy, dry, scaly tissue between gluteal fold. It well marketed. It is typical for psoriasis. Father with similar lesion     Vitamin D deficiency  On supplement, continue to monitor        Health Maintenance Summary                Annual Wellness Visit Overdue 1965     IMM DTaP/Tdap/Td Vaccine Overdue 3/15/1982     PAP SMEAR Overdue 3/15/1984     IMM ZOSTER VACCINES Overdue 3/15/2013     MAMMOGRAM Next Due 10/2/2019      Done 10/2/2018 MA-MAMMO DIAGNOSTIC UNILAT W/TOMOSYNTHESIS W/O CAD     Patient has more history with this topic...    COLONOSCOPY Next Due 11/13/2023      Done 11/13/2018 REFERRAL TO GI FOR COLONOSCOPY           Annual Health Assessment Questions:     1.  Are you currently engaging in any exercise or physical activity? No    2.  How would you describe your mood or emotional well-being today? good    3.  Have you had any falls in the last year? No    4.  Have you noticed any problems with your balance or had difficulty walking? No    5.  In the last six months have you experienced any leakage of urine? Yes    6. DPA/Advanced Directive: Patient does not have an Advanced Directive.  A packet and workshop information was given on Advanced Directives.    Current medicines (including changes today)  Current Outpatient Prescriptions   Medication Sig Dispense Refill   • triamcinolone acetonide (KENALOG) 0.1 % Cream Apply 1 Application to affected area(s) 2 times a day. 1 Tube 0   • zolpidem (AMBIEN) 10 MG Tab Take 1 Tab by mouth at bedtime as needed for Sleep for up to 90 days. 30 Tab 0   • hydroCHLOROthiazide (HYDRODIURIL) 12.5 MG tablet Take 1 Tab by mouth every day. 30 Tab 3   •  "ibuprofen (MOTRIN) 800 MG Tab Take 1 Tab by mouth every 8 hours as needed. 30 Tab 0     No current facility-administered medications for this visit.        She  has a past medical history of Allergy; Back pain; Hypertension; Neck pain; and Obesity.    Sulfa drugs; Other drug; and Vicodin [hydrocodone-acetaminophen]    She  reports that she quit smoking about 7 years ago. She has never used smokeless tobacco. She reports that she does not drink alcohol or use drugs.  Counseling given: Not Answered      ROS   All systems reviewed are negative except for HPI       Objective:     Physical Exam:  Blood pressure 144/86, pulse 88, temperature 36.7 °C (98 °F), temperature source Temporal, resp. rate 14, height 1.626 m (5' 4\"), weight 106.1 kg (234 lb), SpO2 96 %, not currently breastfeeding. Body mass index is 40.17 kg/m².   Constitutional: Alert, no distress.  Skin: Warm, dry, good turgor, no rashes in visible areas. Psoriasis lesion as per above  Eye: Equal, round and reactive, conjunctiva clear, lids normal.  ENMT: Lips without lesions, good dentition, oropharynx clear.  Neck: Trachea midline, no masses, no thyromegaly. No cervical or supraclavicular lymphadenopathy.  Respiratory: Unlabored respiratory effort, lungs clear to auscultation, no wheezes, no rhonchi.  Cardiovascular: Normal S1, S2, no murmur, no edema.  Abdomen: Soft, non-tender, no masses, no hepatosplenomegaly.  Psych: Alert and oriented x3, normal affect and mood.    Assessment and Plan:     1. Obesity (BMI 35.0-39.9 without comorbidity)  Counseling as per above. Continue small portion, healthy diet. Continue to monitor   - CBC WITH DIFFERENTIAL; Future    2. YOANA on CPAP  Follow up with sleep studies   - REFERRAL TO SLEEP STUDIES    3. Primary insomnia  Refill provided on ambien. I did discuss with pt in length about interaction, side effects.,  I did advise to not mix with other substance control. I did advise to follow up with PCP if she needs it for " longer. UDS at next apt if she is requesting   - zolpidem (AMBIEN) 10 MG Tab; Take 1 Tab by mouth at bedtime as needed for Sleep for up to 90 days.  Dispense: 30 Tab; Refill: 0  - Comp Metabolic Panel; Future    4. Microscopic hematuria  Repeat UA for evaluation. Consider renal US and referral to urology if persistent   - CBC WITH DIFFERENTIAL; Future  - URINALYSIS; Future    5. Chronic midline low back pain with right-sided sciatica  Follow up with pain specialist   - Comp Metabolic Panel; Future    6. Breast lesion  I did review report. Order for repeat one on place   - MA-SCREEN MAMMO W/CAD-BILAT; Future    7. Encounter for screening mammogram for breast cancer  - MA-SCREEN MAMMO W/CAD-BILAT; Future    8. Psoriasis  Steroid cream trial. Follow up with dermatology per pt request.   - REFERRAL TO DERMATOLOGY  - triamcinolone acetonide (KENALOG) 0.1 % Cream; Apply 1 Application to affected area(s) 2 times a day.  Dispense: 1 Tube; Refill: 0    9. Encounter for gynecological examination  Follow up with GYN. She is concerned for ovarian cancer, because her mother had ovarian cancer   - REFERRAL TO GYNECOLOGY    10. Need for vaccination  - Tdap Vaccine =>8YO IM  - Pneumococal Polysaccharide Vaccine 23-Valent =>3YO SQ/IM    11. Essential hypertension  I will start patient on hydrochlorothiazide 2.5 mg daily.  Lab work to follow.  Follow up in 2 weeks  - Comp Metabolic Panel; Future  - Lipid Profile; Future    12. Vitamin D deficiency  continue supplements.  Continue to monitor  - VITAMIN D,25 HYDROXY; Future    13. Neck pain  Chronic, stable     Total 42 minutes face-to-face time spent with patient, with greater than 50% of the total time discussing patient's issues and symptoms as listed above in assessment and plan, as well as managing coordination of care for future evaluation and treatment.      Discussion today about general wellness and lifestyle habits:    · Engage in regular physical activity and social  activities.  · Prevent falls and reduce trip hazards; using ambulatory aides, hearing and vision testing if appropriate.  · Steps to improve urinary incontinence.  · Advanced care planning.    Follow-Up: Return in about 2 weeks (around 3/19/2019), or if symptoms worsen or fail to improve, for Short.         PLEASE NOTE: This dictation was created using voice recognition software. I have made every reasonable attempt to correct obvious errors, but I expect that there are errors of grammar and possibly content that I did not discover before finalizing the note.

## 2019-03-05 NOTE — ASSESSMENT & PLAN NOTE
She tells me that she moves a lot at night. She has trouble falling a sleep and staying asleep. She is on Ambien, which she takes it only 3 times per week.  she had tried trazodone, and other medications, but she has interaction. Discuss in length in regards to sleep hygiene, sleeping the same time, waking up at the same time, avoid electronics on the room, keeping the room dark, avoid exercising late, have a bath before sleeping. cogntitive therapy. Avoid stimulants as caffeine late afternoon. Discuss in length in regards to side effects of ambien as substance control. I reviewed side effects as memory loss for long term, addiction, tolerance. I advised to avoid alcohol, narcotic meds, benzo if taking ambien. Avoid driving if taking ambien.

## 2019-03-05 NOTE — ASSESSMENT & PLAN NOTE
She did have an MRI. Reports did not show any lesions. But Dr. Torres and pt reports a lesion on the right one. She was advise another review of mammogram in 6 months. She denies changes, Unintentional weight loss, night sweats

## 2019-03-11 ENCOUNTER — TELEPHONE (OUTPATIENT)
Dept: MEDICAL GROUP | Facility: PHYSICIAN GROUP | Age: 54
End: 2019-03-11

## 2019-03-12 RX ORDER — HYDROCHLOROTHIAZIDE 25 MG/1
25 TABLET ORAL DAILY
Qty: 90 TAB | Refills: 1 | Status: SHIPPED | OUTPATIENT
Start: 2019-03-12 | End: 2019-03-29

## 2019-03-12 NOTE — TELEPHONE ENCOUNTER
Pt called and states that her BP is continued being high,    150 /89  162/92    Pt had chest pain today. Across the whole chest. Advised pt to go to the ED. Pt states understanding.     Today pt would like to know if you would like to change her medication?

## 2019-03-12 NOTE — TELEPHONE ENCOUNTER
Please advise patient that I increase hydrochlorothiazide to 25 mg daily.  I did send a new prescription at [facility]. Please advise patient to continue checking blood pressure let us know.  Please advise to keep apt as scheduled   Thank you  Deb Wood M.D.

## 2019-03-19 ENCOUNTER — HOSPITAL ENCOUNTER (OUTPATIENT)
Dept: LAB | Facility: MEDICAL CENTER | Age: 54
End: 2019-03-19
Attending: INTERNAL MEDICINE
Payer: MEDICARE

## 2019-03-19 ENCOUNTER — OFFICE VISIT (OUTPATIENT)
Dept: MEDICAL GROUP | Facility: PHYSICIAN GROUP | Age: 54
End: 2019-03-19
Payer: MEDICARE

## 2019-03-19 VITALS
SYSTOLIC BLOOD PRESSURE: 122 MMHG | BODY MASS INDEX: 38.93 KG/M2 | DIASTOLIC BLOOD PRESSURE: 68 MMHG | OXYGEN SATURATION: 93 % | TEMPERATURE: 98 F | RESPIRATION RATE: 14 BRPM | WEIGHT: 228 LBS | HEART RATE: 92 BPM | HEIGHT: 64 IN

## 2019-03-19 DIAGNOSIS — I10 ESSENTIAL HYPERTENSION: ICD-10-CM

## 2019-03-19 DIAGNOSIS — M54.41 CHRONIC MIDLINE LOW BACK PAIN WITH RIGHT-SIDED SCIATICA: ICD-10-CM

## 2019-03-19 DIAGNOSIS — G89.29 CHRONIC MIDLINE LOW BACK PAIN WITH RIGHT-SIDED SCIATICA: ICD-10-CM

## 2019-03-19 DIAGNOSIS — E66.9 OBESITY (BMI 35.0-39.9 WITHOUT COMORBIDITY): ICD-10-CM

## 2019-03-19 DIAGNOSIS — F51.01 PRIMARY INSOMNIA: ICD-10-CM

## 2019-03-19 DIAGNOSIS — E55.9 VITAMIN D DEFICIENCY: ICD-10-CM

## 2019-03-19 DIAGNOSIS — M54.2 NECK PAIN: ICD-10-CM

## 2019-03-19 DIAGNOSIS — R31.29 MICROSCOPIC HEMATURIA: ICD-10-CM

## 2019-03-19 LAB
25(OH)D3 SERPL-MCNC: 10 NG/ML (ref 30–100)
ALBUMIN SERPL BCP-MCNC: 4.4 G/DL (ref 3.2–4.9)
ALBUMIN/GLOB SERPL: 1.3 G/DL
ALP SERPL-CCNC: 85 U/L (ref 30–99)
ALT SERPL-CCNC: 32 U/L (ref 2–50)
ANION GAP SERPL CALC-SCNC: 9 MMOL/L (ref 0–11.9)
APPEARANCE UR: ABNORMAL
AST SERPL-CCNC: 23 U/L (ref 12–45)
BACTERIA #/AREA URNS HPF: ABNORMAL /HPF
BASOPHILS # BLD AUTO: 0.8 % (ref 0–1.8)
BASOPHILS # BLD: 0.09 K/UL (ref 0–0.12)
BILIRUB SERPL-MCNC: 0.4 MG/DL (ref 0.1–1.5)
BILIRUB UR QL STRIP.AUTO: NEGATIVE
BUN SERPL-MCNC: 14 MG/DL (ref 8–22)
CALCIUM SERPL-MCNC: 10.1 MG/DL (ref 8.5–10.5)
CHLORIDE SERPL-SCNC: 101 MMOL/L (ref 96–112)
CHOLEST SERPL-MCNC: 208 MG/DL (ref 100–199)
CO2 SERPL-SCNC: 29 MMOL/L (ref 20–33)
COLOR UR: YELLOW
CREAT SERPL-MCNC: 0.76 MG/DL (ref 0.5–1.4)
EOSINOPHIL # BLD AUTO: 0.19 K/UL (ref 0–0.51)
EOSINOPHIL NFR BLD: 1.8 % (ref 0–6.9)
EPI CELLS #/AREA URNS HPF: ABNORMAL /HPF
ERYTHROCYTE [DISTWIDTH] IN BLOOD BY AUTOMATED COUNT: 45.3 FL (ref 35.9–50)
FASTING STATUS PATIENT QL REPORTED: NORMAL
GLOBULIN SER CALC-MCNC: 3.3 G/DL (ref 1.9–3.5)
GLUCOSE SERPL-MCNC: 91 MG/DL (ref 65–99)
GLUCOSE UR STRIP.AUTO-MCNC: NEGATIVE MG/DL
HCT VFR BLD AUTO: 53.8 % (ref 37–47)
HDLC SERPL-MCNC: 44 MG/DL
HGB BLD-MCNC: 18.2 G/DL (ref 12–16)
HYALINE CASTS #/AREA URNS LPF: ABNORMAL /LPF
IMM GRANULOCYTES # BLD AUTO: 0.06 K/UL (ref 0–0.11)
IMM GRANULOCYTES NFR BLD AUTO: 0.6 % (ref 0–0.9)
KETONES UR STRIP.AUTO-MCNC: NEGATIVE MG/DL
LDLC SERPL CALC-MCNC: 130 MG/DL
LEUKOCYTE ESTERASE UR QL STRIP.AUTO: NEGATIVE
LYMPHOCYTES # BLD AUTO: 2.77 K/UL (ref 1–4.8)
LYMPHOCYTES NFR BLD: 25.7 % (ref 22–41)
MCH RBC QN AUTO: 31.9 PG (ref 27–33)
MCHC RBC AUTO-ENTMCNC: 33.8 G/DL (ref 33.6–35)
MCV RBC AUTO: 94.4 FL (ref 81.4–97.8)
MICRO URNS: ABNORMAL
MONOCYTES # BLD AUTO: 0.8 K/UL (ref 0–0.85)
MONOCYTES NFR BLD AUTO: 7.4 % (ref 0–13.4)
NEUTROPHILS # BLD AUTO: 6.87 K/UL (ref 2–7.15)
NEUTROPHILS NFR BLD: 63.7 % (ref 44–72)
NITRITE UR QL STRIP.AUTO: NEGATIVE
NRBC # BLD AUTO: 0 K/UL
NRBC BLD-RTO: 0 /100 WBC
PH UR STRIP.AUTO: 6 [PH]
PLATELET # BLD AUTO: 329 K/UL (ref 164–446)
PMV BLD AUTO: 10 FL (ref 9–12.9)
POTASSIUM SERPL-SCNC: 3.2 MMOL/L (ref 3.6–5.5)
PROT SERPL-MCNC: 7.7 G/DL (ref 6–8.2)
PROT UR QL STRIP: NEGATIVE MG/DL
RBC # BLD AUTO: 5.7 M/UL (ref 4.2–5.4)
RBC # URNS HPF: ABNORMAL /HPF
RBC UR QL AUTO: ABNORMAL
SODIUM SERPL-SCNC: 139 MMOL/L (ref 135–145)
SP GR UR STRIP.AUTO: 1.02
TRIGL SERPL-MCNC: 168 MG/DL (ref 0–149)
UROBILINOGEN UR STRIP.AUTO-MCNC: 0.2 MG/DL
WBC # BLD AUTO: 10.8 K/UL (ref 4.8–10.8)
WBC #/AREA URNS HPF: ABNORMAL /HPF

## 2019-03-19 PROCEDURE — 82306 VITAMIN D 25 HYDROXY: CPT

## 2019-03-19 PROCEDURE — 80053 COMPREHEN METABOLIC PANEL: CPT

## 2019-03-19 PROCEDURE — 99214 OFFICE O/P EST MOD 30 MIN: CPT | Performed by: INTERNAL MEDICINE

## 2019-03-19 PROCEDURE — 85025 COMPLETE CBC W/AUTO DIFF WBC: CPT

## 2019-03-19 PROCEDURE — 80061 LIPID PANEL: CPT

## 2019-03-19 PROCEDURE — 36415 COLL VENOUS BLD VENIPUNCTURE: CPT

## 2019-03-19 PROCEDURE — 81001 URINALYSIS AUTO W/SCOPE: CPT

## 2019-03-19 NOTE — PROGRESS NOTES
Subjective:   Sol Kern is a 54 y.o. female here today for hypertension, medication management, headache     54-year-old female past medical history of hypertension, obesity, insomnia, sleep apnea on CPAP machine, chronic neck pain low back pain presented to follow-up in regards to hypertension. At last apt BP was elevated. I did start her on HCTZ which she is taking 25 mg daily. She is tolerating well. No side effects. BP well managed on current regime.  She denies chest pain, shortness of breath, leg swelling, blurry vision, palpitations.BP well managed at home too.   She reports that lately she is having more headaches.  She has chronic neck pain.  Denies exacerbation of her neck pain or trauma.  She reports more of the tension headache at the end of the day not associated with aura.  The pain is not exacerbated by noise or light. She takes ibuprofen prn. She is on menopause. She has had history of migraine early in life  She has not been able to get lab work.     Current medicines (including changes today)  Current Outpatient Prescriptions   Medication Sig Dispense Refill   • hydroCHLOROthiazide (HYDRODIURIL) 25 MG Tab Take 1 Tab by mouth every day. 90 Tab 1   • ibuprofen (MOTRIN) 800 MG Tab Take 1 Tab by mouth every 8 hours as needed. 30 Tab 0   • triamcinolone acetonide (KENALOG) 0.1 % Cream Apply 1 Application to affected area(s) 2 times a day. 1 Tube 0   • zolpidem (AMBIEN) 10 MG Tab Take 1 Tab by mouth at bedtime as needed for Sleep for up to 90 days. 30 Tab 0     No current facility-administered medications for this visit.      She  has a past medical history of Allergy; Back pain; Hypertension; Neck pain; and Obesity.    Current Outpatient Prescriptions   Medication Sig Dispense Refill   • hydroCHLOROthiazide (HYDRODIURIL) 25 MG Tab Take 1 Tab by mouth every day. 90 Tab 1   • ibuprofen (MOTRIN) 800 MG Tab Take 1 Tab by mouth every 8 hours as needed. 30 Tab 0   • triamcinolone acetonide (KENALOG) 0.1  "% Cream Apply 1 Application to affected area(s) 2 times a day. 1 Tube 0   • zolpidem (AMBIEN) 10 MG Tab Take 1 Tab by mouth at bedtime as needed for Sleep for up to 90 days. 30 Tab 0     No current facility-administered medications for this visit.        Allergies as of 2019 - Reviewed 2019   Allergen Reaction Noted   • Sulfa drugs  2017   • Other drug  2017   • Vicodin [hydrocodone-acetaminophen]  2017       Social History     Social History   • Marital status:      Spouse name: N/A   • Number of children: N/A   • Years of education: N/A     Occupational History   • Not on file.     Social History Main Topics   • Smoking status: Former Smoker     Quit date: 2012   • Smokeless tobacco: Never Used   • Alcohol use No   • Drug use: No   • Sexual activity: Yes     Partners: Male      Comment:      Other Topics Concern   • Not on file     Social History Narrative   • No narrative on file        Family History   Problem Relation Age of Onset   • Cancer Mother         ovarian cancer / malanoma   • Hypertension Mother    • Other Mother         THYROID   • Heart Disease Father         Heart Attack   • Heart Attack Father    • Other Father         psoriasis   • Hyperlipidemia Brother    • Other Brother         psoriasis   • Heart Attack Brother         massive heart attack    • Other Other         psoriasis   • Diabetes Neg Hx    • Alcohol/Drug Neg Hx        Past Surgical History:   Procedure Laterality Date   • CARPAL TUNNEL RELEASE Right    • ENDOMETRIAL ABLATION     • FOOT SURGERY Right    • KNEE ARTHROSCOPY     • PRIMARY C SECTION      2        ROS   All systems reviewed are negative except for HPI       Objective:     Blood pressure 122/68, pulse 92, temperature 36.7 °C (98 °F), temperature source Temporal, resp. rate 14, height 1.626 m (5' 4\"), weight 103.4 kg (228 lb), SpO2 93 %, not currently breastfeeding. Body mass index is 39.14 kg/m².   Physical " Exam:  Constitutional: Alert, no distress.  Skin: Warm, dry, good turgor, no rashes in visible areas.  Eye: Equal, round and reactive, conjunctiva clear, lids normal.  ENMT: Lips without lesions, good dentition, oropharynx clear.  Neck: Trachea midline, no masses, no thyromegaly. No cervical or supraclavicular lymphadenopathy  Respiratory: Unlabored respiratory effort, lungs clear to auscultation, no wheezes, no ronchi.  Cardiovascular: Normal S1, S2, no murmur, no edema.  Abdomen: Soft, non-tender, no masses, no hepatosplenomegaly.  Psych: Alert and oriented x3, normal affect and mood.        Assessment and Plan:   The following treatment plan was discussed    1. Essential hypertension  Continue same treatment.  Continue to monitor.  Lab work to follow-up    2. Microscopic hematuria  Lab work to follow-up.    3. Obesity (BMI 35.0-39.9 without comorbidity)  She has lost 6lbs from last apt. Continue to monitor. Counseling for a small portion, balanced diet, exercising for3-5 times per week.    4. Vitamin D deficiency  Lab work is pending. Continue to monitor     5. Chronic midline low back pain with right-sided sciatica  Stable. She still has intermittent back pain. See previous note. She has been seen by specialist at [place].  Placed on Flexeril and Valium because it is only combination that works for her    6. Neck pain  Chronic . Continue to monitor.       Followup: Return in about 3 months (around 6/19/2019), or if symptoms worsen or fail to improve, for Short.

## 2019-03-22 ENCOUNTER — TELEPHONE (OUTPATIENT)
Dept: MEDICAL GROUP | Facility: PHYSICIAN GROUP | Age: 54
End: 2019-03-22

## 2019-03-22 NOTE — TELEPHONE ENCOUNTER
----- Message from Deb Wood M.D. sent at 3/22/2019  7:09 AM PDT -----  Please advised patient I reviewed lab results. There is still blood in urine. Vitamin D is very low. Potassium is low too. Please schedule an office visit.  Thank you  Deb Wood M.D.

## 2019-03-22 NOTE — TELEPHONE ENCOUNTER
Phone Number Called: [phone] (home)     Message: Pt notified of results and appointment scheduled.     Left Message for patient to call back: N\A

## 2019-03-26 ENCOUNTER — APPOINTMENT (OUTPATIENT)
Dept: MEDICAL GROUP | Facility: PHYSICIAN GROUP | Age: 54
End: 2019-03-26
Payer: MEDICARE

## 2019-03-29 ENCOUNTER — OFFICE VISIT (OUTPATIENT)
Dept: MEDICAL GROUP | Facility: PHYSICIAN GROUP | Age: 54
End: 2019-03-29
Payer: MEDICARE

## 2019-03-29 VITALS
DIASTOLIC BLOOD PRESSURE: 78 MMHG | TEMPERATURE: 97.4 F | BODY MASS INDEX: 39.2 KG/M2 | RESPIRATION RATE: 16 BRPM | SYSTOLIC BLOOD PRESSURE: 130 MMHG | HEART RATE: 92 BPM | OXYGEN SATURATION: 93 % | WEIGHT: 229.6 LBS | HEIGHT: 64 IN

## 2019-03-29 DIAGNOSIS — I10 ESSENTIAL HYPERTENSION: ICD-10-CM

## 2019-03-29 DIAGNOSIS — E66.9 OBESITY (BMI 35.0-39.9 WITHOUT COMORBIDITY): ICD-10-CM

## 2019-03-29 DIAGNOSIS — E87.6 HYPOKALEMIA: ICD-10-CM

## 2019-03-29 DIAGNOSIS — R31.29 MICROSCOPIC HEMATURIA: ICD-10-CM

## 2019-03-29 DIAGNOSIS — E55.9 VITAMIN D DEFICIENCY: ICD-10-CM

## 2019-03-29 PROCEDURE — 99215 OFFICE O/P EST HI 40 MIN: CPT | Performed by: INTERNAL MEDICINE

## 2019-03-29 RX ORDER — ERGOCALCIFEROL 1.25 MG/1
50000 CAPSULE ORAL
Qty: 6 CAP | Refills: 0 | Status: SHIPPED | OUTPATIENT
Start: 2019-03-29 | End: 2019-05-07 | Stop reason: SDUPTHER

## 2019-03-29 RX ORDER — TRIAMTERENE AND HYDROCHLOROTHIAZIDE 37.5; 25 MG/1; MG/1
1 CAPSULE ORAL EVERY MORNING
Qty: 90 CAP | Refills: 3 | Status: SHIPPED | OUTPATIENT
Start: 2019-03-29 | End: 2019-05-14 | Stop reason: SINTOL

## 2019-03-30 NOTE — ASSESSMENT & PLAN NOTE
Review of UA showed again moderate blood. She has no symptoms of UTI. Present in the past. She has had CT abdomen 2018, renal small cyst. She is a former smoker.  She denies unintentional weight loss, night sweats, gross bleeding, lymphadenopathy

## 2019-03-30 NOTE — PROGRESS NOTES
Subjective:   Sol Kern is a 54 y.o. female here today for hypertension, lab work review, hematuria    Essential hypertension  BP better controled on HCTZ, however, she has hypokalemia from lab work.she denies chest pain, shortness of breath, leg swelling, blurry vision lightheadedness    Hypokalemia  As per above, asymptomatic at this time    Microscopic hematuria  Review of UA showed again moderate blood. She has no symptoms of UTI. Present in the past. She has had CT abdomen 2018, renal small cyst. She is a former smoker.  She denies unintentional weight loss, night sweats, gross bleeding, lymphadenopathy    Obesity (BMI 35.0-39.9 without comorbidity) ([facility])  Counseling for a small portion, balanced diet, exercising for3-5 times per week.      Vitamin D deficiency  Vitamin D is very low. She did start supplement couple of days ago.        Current medicines (including changes today)  Current Outpatient Prescriptions   Medication Sig Dispense Refill   • ergocalciferol (DRISDOL) 80699 UNIT capsule Take 1 Cap by mouth every 7 days. 6 Cap 0   • triamterene/hctz (MAXZIDE-25/DYAZIDE) 37.5-25 MG Cap Take 1 Cap by mouth every morning. 90 Cap 3   • triamcinolone acetonide (KENALOG) 0.1 % Cream Apply 1 Application to affected area(s) 2 times a day. 1 Tube 0   • zolpidem (AMBIEN) 10 MG Tab Take 1 Tab by mouth at bedtime as needed for Sleep for up to 90 days. 30 Tab 0   • ibuprofen (MOTRIN) 800 MG Tab Take 1 Tab by mouth every 8 hours as needed. 30 Tab 0     No current facility-administered medications for this visit.      She  has a past medical history of Allergy; Back pain; Hypertension; Neck pain; and Obesity.    Current Outpatient Prescriptions   Medication Sig Dispense Refill   • ergocalciferol (DRISDOL) 69549 UNIT capsule Take 1 Cap by mouth every 7 days. 6 Cap 0   • triamterene/hctz (MAXZIDE-25/DYAZIDE) 37.5-25 MG Cap Take 1 Cap by mouth every morning. 90 Cap 3   • triamcinolone acetonide (KENALOG) 0.1 % Cream  "Apply 1 Application to affected area(s) 2 times a day. 1 Tube 0   • zolpidem (AMBIEN) 10 MG Tab Take 1 Tab by mouth at bedtime as needed for Sleep for up to 90 days. 30 Tab 0   • ibuprofen (MOTRIN) 800 MG Tab Take 1 Tab by mouth every 8 hours as needed. 30 Tab 0     No current facility-administered medications for this visit.        Allergies as of 2019 - Reviewed 2019   Allergen Reaction Noted   • Sulfa drugs  2017   • Other drug  2017   • Vicodin [hydrocodone-acetaminophen]  2017       Social History     Social History   • Marital status:      Spouse name: N/A   • Number of children: N/A   • Years of education: N/A     Occupational History   • Not on file.     Social History Main Topics   • Smoking status: Former Smoker     Quit date: 2012   • Smokeless tobacco: Never Used   • Alcohol use No   • Drug use: No   • Sexual activity: Yes     Partners: Male      Comment:      Other Topics Concern   • Not on file     Social History Narrative   • No narrative on file        Family History   Problem Relation Age of Onset   • Cancer Mother         ovarian cancer / malanoma   • Hypertension Mother    • Other Mother         THYROID   • Heart Disease Father         Heart Attack   • Heart Attack Father    • Other Father         psoriasis   • Hyperlipidemia Brother    • Other Brother         psoriasis   • Heart Attack Brother         massive heart attack    • Other Other         psoriasis   • Diabetes Neg Hx    • Alcohol/Drug Neg Hx        Past Surgical History:   Procedure Laterality Date   • CARPAL TUNNEL RELEASE Right    • ENDOMETRIAL ABLATION     • FOOT SURGERY Right    • KNEE ARTHROSCOPY     • PRIMARY C SECTION      2        ROS   All systems reviewed are negative except for HPI       Objective:     Blood pressure 130/78, pulse 92, temperature 36.3 °C (97.4 °F), temperature source Temporal, resp. rate 16, height 1.626 m (5' 4\"), weight 104.1 kg (229 lb 9.6 oz), SpO2 " 93 %, not currently breastfeeding. Body mass index is 39.41 kg/m².   Physical Exam:  Constitutional: Alert, no distress.  Skin: Warm, dry, good turgor, no rashes in visible areas.  Eye: Equal, round and reactive, conjunctiva clear, lids normal.  ENMT: Lips without lesions, good dentition, oropharynx clear.  Neck: Trachea midline, no masses, no thyromegaly. No cervical or supraclavicular lymphadenopathy  Respiratory: Unlabored respiratory effort, lungs clear to auscultation, no wheezes, no ronchi.  Cardiovascular: Normal S1, S2, no murmur, no edema.  Abdomen: Soft, non-tender, no masses, no hepatosplenomegaly.  Psych: Alert and oriented x3, normal affect and mood.        Assessment and Plan:   The following treatment plan was discussed    1. Vitamin D deficiency  I will start supplement high dose ergocalciferol 50,000 units weekly for 6-week.  Continue to monitor.  Continue over-the-counter supplement 1000 units daily.  - ergocalciferol (DRISDOL) 17001 UNIT capsule; Take 1 Cap by mouth every 7 days.  Dispense: 6 Cap; Refill: 0    2. Essential hypertension  I will switch patient from hydrochlorothiazide to triamterene/hydrochlorothiazide because of hypokalemia. Continue to monitor   - Basic Metabolic Panel; Future    3. Hypokalemia  Continue to monitor. Repeat lab work in 3 weeks   - Basic Metabolic Panel; Future    4. Obesity (BMI 35.0-39.9 without comorbidity)  Counseling as per above   - Patient identified as having weight management issue.  Appropriate orders and counseling given.    5. Microscopic hematuria  Possible due to renal cyst?? History of smoking?? Possible she need US for further eval. I will defer further test to urology   - REFERRAL TO UROLOGY    Total 40 minutes face-to-face time spent with patient, with greater than 50% of the total time discussing patient's issues and symptoms as listed above in assessment and plan, as well as managing coordination of care for future evaluation and  treatment.    Followup: Return in about 6 months (around 9/29/2019), or if symptoms worsen or fail to improve, for Short.

## 2019-03-30 NOTE — ASSESSMENT & PLAN NOTE
BP better controled on HCTZ, however, she has hypokalemia from lab work.she denies chest pain, shortness of breath, leg swelling, blurry vision lightheadedness

## 2019-04-01 ENCOUNTER — HOSPITAL ENCOUNTER (OUTPATIENT)
Dept: RADIOLOGY | Facility: MEDICAL CENTER | Age: 54
End: 2019-04-01
Attending: INTERNAL MEDICINE
Payer: MEDICARE

## 2019-04-01 DIAGNOSIS — N64.9 BREAST LESION: ICD-10-CM

## 2019-04-01 PROCEDURE — G0279 TOMOSYNTHESIS, MAMMO: HCPCS | Mod: RT

## 2019-04-16 ENCOUNTER — HOSPITAL ENCOUNTER (OUTPATIENT)
Dept: LAB | Facility: MEDICAL CENTER | Age: 54
End: 2019-04-16
Attending: INTERNAL MEDICINE
Payer: MEDICARE

## 2019-04-16 DIAGNOSIS — E87.6 HYPOKALEMIA: ICD-10-CM

## 2019-04-16 DIAGNOSIS — I10 ESSENTIAL HYPERTENSION: ICD-10-CM

## 2019-04-16 LAB
ANION GAP SERPL CALC-SCNC: 8 MMOL/L (ref 0–11.9)
BUN SERPL-MCNC: 14 MG/DL (ref 8–22)
CALCIUM SERPL-MCNC: 9.7 MG/DL (ref 8.5–10.5)
CHLORIDE SERPL-SCNC: 102 MMOL/L (ref 96–112)
CO2 SERPL-SCNC: 28 MMOL/L (ref 20–33)
CREAT SERPL-MCNC: 0.74 MG/DL (ref 0.5–1.4)
FASTING STATUS PATIENT QL REPORTED: NORMAL
GLUCOSE SERPL-MCNC: 96 MG/DL (ref 65–99)
POTASSIUM SERPL-SCNC: 3.1 MMOL/L (ref 3.6–5.5)
SODIUM SERPL-SCNC: 138 MMOL/L (ref 135–145)

## 2019-04-16 PROCEDURE — 36415 COLL VENOUS BLD VENIPUNCTURE: CPT

## 2019-04-16 PROCEDURE — 80048 BASIC METABOLIC PNL TOTAL CA: CPT

## 2019-04-19 ENCOUNTER — TELEPHONE (OUTPATIENT)
Dept: MEDICAL GROUP | Facility: PHYSICIAN GROUP | Age: 54
End: 2019-04-19

## 2019-04-19 DIAGNOSIS — E87.6 HYPOKALEMIA: ICD-10-CM

## 2019-04-19 RX ORDER — POTASSIUM CHLORIDE 750 MG/1
10 TABLET, FILM COATED, EXTENDED RELEASE ORAL DAILY
Qty: 90 TAB | Refills: 1 | Status: SHIPPED | OUTPATIENT
Start: 2019-04-19 | End: 2019-05-14

## 2019-04-22 ENCOUNTER — TELEPHONE (OUTPATIENT)
Dept: MEDICAL GROUP | Facility: PHYSICIAN GROUP | Age: 54
End: 2019-04-22

## 2019-04-22 DIAGNOSIS — E87.6 HYPOKALEMIA: ICD-10-CM

## 2019-04-22 NOTE — TELEPHONE ENCOUNTER
----- Message from Deb Wood M.D. sent at 4/19/2019  8:16 PM PDT -----  Please let patient know that potassium still low even that we did switch diuretic.  I will start a potassium supplement 10 M EQ daily. Prescription sent to local pharmacy.  Again hypokalemia is due to diuretic. We will continue to monitor.  Advised patient to keep appointment with Dr. Lyn as scheduled in August  Thank you  Deb Wood M.D.

## 2019-04-22 NOTE — TELEPHONE ENCOUNTER
Phone Number Called: [phone] (home)       Message: Called and spoke to patient. No other questions at this time. Lm     Left Message for patient to call back: no

## 2019-04-22 NOTE — TELEPHONE ENCOUNTER
Phone Number Called: [phone] (home)       Message: Called and spoke to patient . Patient wanted to know if she can do another blood test in a month. Patient wants to check if its is under control at that time. Please Advise. Lm     Left Message for patient to call back: no

## 2019-05-07 DIAGNOSIS — E55.9 VITAMIN D DEFICIENCY: ICD-10-CM

## 2019-05-07 RX ORDER — ERGOCALCIFEROL 1.25 MG/1
50000 CAPSULE ORAL
Qty: 6 CAP | Refills: 0 | Status: SHIPPED | OUTPATIENT
Start: 2019-05-07 | End: 2019-10-22

## 2019-05-14 ENCOUNTER — OFFICE VISIT (OUTPATIENT)
Dept: MEDICAL GROUP | Facility: PHYSICIAN GROUP | Age: 54
End: 2019-05-14
Payer: MEDICARE

## 2019-05-14 VITALS
BODY MASS INDEX: 39.98 KG/M2 | RESPIRATION RATE: 16 BRPM | DIASTOLIC BLOOD PRESSURE: 80 MMHG | HEIGHT: 64 IN | SYSTOLIC BLOOD PRESSURE: 130 MMHG | OXYGEN SATURATION: 95 % | TEMPERATURE: 99.4 F | HEART RATE: 92 BPM | WEIGHT: 234.2 LBS

## 2019-05-14 DIAGNOSIS — Z23 NEED FOR VACCINATION: ICD-10-CM

## 2019-05-14 DIAGNOSIS — I10 ESSENTIAL HYPERTENSION: Primary | ICD-10-CM

## 2019-05-14 DIAGNOSIS — R07.9 CHEST PAIN, UNSPECIFIED TYPE: ICD-10-CM

## 2019-05-14 PROBLEM — M25.569 KNEE PAIN: Status: RESOLVED | Noted: 2019-05-14 | Resolved: 2019-05-14

## 2019-05-14 PROBLEM — M25.569 KNEE PAIN: Status: ACTIVE | Noted: 2019-05-14

## 2019-05-14 PROCEDURE — 99214 OFFICE O/P EST MOD 30 MIN: CPT | Mod: 25 | Performed by: FAMILY MEDICINE

## 2019-05-14 PROCEDURE — 90471 IMMUNIZATION ADMIN: CPT | Performed by: FAMILY MEDICINE

## 2019-05-14 PROCEDURE — 90707 MMR VACCINE SC: CPT | Performed by: FAMILY MEDICINE

## 2019-05-14 RX ORDER — LISINOPRIL 10 MG/1
10 TABLET ORAL DAILY
Qty: 30 TAB | Refills: 1 | Status: SHIPPED | OUTPATIENT
Start: 2019-05-14 | End: 2019-06-05 | Stop reason: SDUPTHER

## 2019-05-14 RX ORDER — ALBUTEROL SULFATE 90 UG/1
AEROSOL, METERED RESPIRATORY (INHALATION)
Refills: 0 | COMMUNITY
Start: 2019-03-14 | End: 2019-06-19 | Stop reason: SDUPTHER

## 2019-05-14 NOTE — ASSESSMENT & PLAN NOTE
This is a chronic condition.  Current Meds: triamterene/HCTZ 37.5-25 mg daily  Side effects: feels sluggish (not new), more winded, knee pain (intermittent), tingling in legs  Home BP Los/90s (off med 1 day); 120s/80s when on medication  Associated symptoms: + chest pain - heavy, + sob/winded, + fluttering    She hasn't had potassium in the last 1.5 days and the knees are feeling a little bit better.

## 2019-05-14 NOTE — PROGRESS NOTES
Subjective:     CC: f/u BP medication     HPI:   Sol presents today with hypertension.    Essential hypertension  This is a chronic condition.  Current Meds: triamterene/HCTZ 37.5-25 mg daily  Side effects: feels sluggish (not new), more winded, knee pain (intermittent), tingling in legs  Home BP Los/90s (off med 1 day); 120s/80s when on medication  Associated symptoms: + chest pain - heavy, + sob/winded, + fluttering    She hasn't had potassium in the last 1.5 days and the knees are feeling a little bit better.        Past Medical History:   Diagnosis Date   • Allergy    • Back pain    • Hypertension    • Neck pain    • Obesity        Social History   Substance Use Topics   • Smoking status: Former Smoker     Quit date: 2012   • Smokeless tobacco: Never Used   • Alcohol use No       Current Outpatient Prescriptions Ordered in [facility]   Medication Sig Dispense Refill   • lisinopril (PRINIVIL) 10 MG Tab Take 1 Tab by mouth every day. 30 Tab 1   • ergocalciferol (DRISDOL) 57946 UNIT capsule Take 1 Cap by mouth every 7 days. 6 Cap 0   • albuterol 108 (90 Base) MCG/ACT Aero Soln inhalation aerosol INHALE 2 PUFFS BY MOUTH EVERY 6 HOURS AS NEEDED FOR SHORTNESS OF BREATH  0   • triamcinolone acetonide (KENALOG) 0.1 % Cream Apply 1 Application to affected area(s) 2 times a day. (Patient not taking: Reported on 2019) 1 Tube 0   • zolpidem (AMBIEN) 10 MG Tab Take 1 Tab by mouth at bedtime as needed for Sleep for up to 90 days. 30 Tab 0   • ibuprofen (MOTRIN) 800 MG Tab Take 1 Tab by mouth every 8 hours as needed. 30 Tab 0     No current Epic-ordered facility-administered medications on file.        Allergies:  Sulfa drugs; Other drug; and Vicodin [hydrocodone-acetaminophen]    Health Maintenance: Completed    ROS:  Gen: no fevers/chill  Eyes: no changes in vision  Pulm: + sob  CV: + chest pain  : no dysuria      Objective:       Exam:  /80 (BP Location: Right arm, Patient Position: Sitting, BP Cuff Size:  "Adult)   Pulse 92   Temp 37.4 °C (99.4 °F) (Temporal)   Resp 16   Ht 1.626 m (5' 4\")   Wt 106.2 kg (234 lb 3.2 oz)   SpO2 95%   Breastfeeding? No   BMI 40.20 kg/m²  Body mass index is 40.2 kg/m².    Gen: Alert and oriented, No apparent distress.  Neck: Neck is supple without lymphadenopathy.  Lungs: Normal effort, CTA bilaterally, no wheezes, rhonchi, or rales  CV: Regular rate and rhythm. No murmurs, rubs, or gallops.  Chest: Mild TTP over left breast  Ext: No clubbing, cyanosis, edema.    Assessment & Plan:     54 y.o. female with the following -     1. Essential hypertension  This is a chronic condition, stable.  Her prior PCP started her on triamterene/hydrochlorothiazide at her last appointment because she was getting some hypokalemia.  Today she reports that she is feeling sluggish, more winded, getting knee pain and tingling in the legs.  She reports that off the medication her blood pressure was 130/90s but on the medication is 120/80s at home.  She also gets some heaviness or chest pressure, shortness of breath or feeling winded, and sometimes heart fluttering in the chest.  After discussion, she would like to try different blood pressure medication to see if she does not get the side effects.  - lisinopril (PRINIVIL) 10 MG Tab; Take 1 Tab by mouth every day.  Dispense: 30 Tab; Refill: 1  -Keep home blood pressure log    2. Chest pain, unspecified type  This is a new condition.  She reports that she is been getting chest heaviness or chest pressure.  Sometimes is a fairly with shortness of breath but sometimes it is not.  She has not noticed any diaphoresis.  She does get some heart fluttering.  She has a family history of heart attack or her father had an MI in her older brother recently  from an MI.  Her exam did show some mild tenderness to palpation over the left breast which is the location of the chest heaviness/pressure.  This is reassuring but with her family history and her risk factors " we have decided to do a stress test to evaluate further.  She cannot tolerate a treadmill stress test due to her knee pain and significant back pain I cannot guarantee should be able to make it through the whole test.  - EC-ECHOCARDIOGRAM DOBUTAMINE REST/STRESS W/O CONT; Future    3. Need for vaccination  She was vaccinated before 1968 and it is a CDC's recommendation anyone vaccinated before 1968 get a booster MMR.  The booster is being provided today.  - MMR SQ    Return in about 4 weeks (around 6/11/2019) for f/u BP.    Please note that this dictation was created using voice recognition software. I have made every reasonable attempt to correct obvious errors, but I expect that there are errors of grammar and possibly content that I did not discover before finalizing the note.

## 2019-05-15 ENCOUNTER — HOSPITAL ENCOUNTER (OUTPATIENT)
Dept: LAB | Facility: MEDICAL CENTER | Age: 54
End: 2019-05-15
Attending: FAMILY MEDICINE
Payer: MEDICARE

## 2019-05-15 DIAGNOSIS — E87.6 HYPOKALEMIA: ICD-10-CM

## 2019-05-15 LAB
ANION GAP SERPL CALC-SCNC: 9 MMOL/L (ref 0–11.9)
BUN SERPL-MCNC: 13 MG/DL (ref 8–22)
CALCIUM SERPL-MCNC: 9.7 MG/DL (ref 8.5–10.5)
CHLORIDE SERPL-SCNC: 103 MMOL/L (ref 96–112)
CO2 SERPL-SCNC: 28 MMOL/L (ref 20–33)
CREAT SERPL-MCNC: 0.73 MG/DL (ref 0.5–1.4)
FASTING STATUS PATIENT QL REPORTED: NORMAL
GLUCOSE SERPL-MCNC: 91 MG/DL (ref 65–99)
POTASSIUM SERPL-SCNC: 3.9 MMOL/L (ref 3.6–5.5)
SODIUM SERPL-SCNC: 140 MMOL/L (ref 135–145)

## 2019-05-15 PROCEDURE — 80048 BASIC METABOLIC PNL TOTAL CA: CPT

## 2019-05-15 PROCEDURE — 36415 COLL VENOUS BLD VENIPUNCTURE: CPT

## 2019-06-05 DIAGNOSIS — I10 ESSENTIAL HYPERTENSION: ICD-10-CM

## 2019-06-05 RX ORDER — LISINOPRIL 10 MG/1
10 TABLET ORAL DAILY
Qty: 90 TAB | Refills: 3 | Status: SHIPPED | OUTPATIENT
Start: 2019-06-05 | End: 2019-06-19

## 2019-06-05 NOTE — TELEPHONE ENCOUNTER
Was the patient seen in the last year in this department? Yes    Does patient have an active prescription for medications requested? No     Received Request Via: Pharmacy     90 day request - patient traveling

## 2019-06-17 ENCOUNTER — TELEPHONE (OUTPATIENT)
Dept: MEDICAL GROUP | Facility: PHYSICIAN GROUP | Age: 54
End: 2019-06-17

## 2019-06-17 NOTE — TELEPHONE ENCOUNTER
Received refill request for hctz 25mg.  Rx was d/c on 3/29/19 due to hypokalemia.      HARRISI sent to pharmacy [facility]/pharmacy #[id number] - [place], [place] - [phone] (Fax).

## 2019-06-18 ENCOUNTER — TELEPHONE (OUTPATIENT)
Dept: MEDICAL GROUP | Facility: PHYSICIAN GROUP | Age: 54
End: 2019-06-18

## 2019-06-18 NOTE — TELEPHONE ENCOUNTER
ESTABLISHED PATIENT PRE-VISIT PLANNING     Patient was NOT contacted to complete PVP.     Note: Patient will not be contacted if there is no indication to call.     1.  Reviewed notes from the last few office visits within the medical group: Yes    2.  If any orders were placed at last visit or intended to be done for this visit (i.e. 6 mos follow-up), do we have Results/Consult Notes?        •  Labs - Labs ordered, completed on 05/15/19 and results are in chart.   Note: If patient appointment is for lab review and patient did not complete labs, check with provider if OK to reschedule patient until labs completed.       •  Imaging - Imaging ordered, NOT completed. Patient advised to complete prior to next appointment.       •  Referrals - Referral ordered, patient has NOT been seen.    3. Is this appointment scheduled as a Hospital Follow-Up? No    4.  Immunizations were updated in Epic using WebIZ?: Epic matches WebIZ       •  Web Iz Recommendations: TD, VARICELLA (Chicken Pox)  and SHINGRIX (Shingles)    5.  Patient is due for the following Health Maintenance Topics:   Health Maintenance Due   Topic Date Due   • Annual Wellness Visit  1965   • HEPATITIS C SCREENING  1965   • PAP SMEAR  03/15/1984   • IMM ZOSTER VACCINES (1 of 2) 03/15/2013       - Patient has completed FLU, PNEUMOVAX (PPSV23) and TDAP Immunization(s) per WebIZ. Chart has been updated.    6. Orders for overdue Health Maintenance topics pended in Pre-Charting? NO    7.  AHA (MDX) form printed for Provider? No, already completed    8.  Patient was NOT informed to arrive 15 min prior to their scheduled appointment and bring in their medication bottles.

## 2019-06-19 ENCOUNTER — OFFICE VISIT (OUTPATIENT)
Dept: MEDICAL GROUP | Facility: PHYSICIAN GROUP | Age: 54
End: 2019-06-19
Payer: MEDICARE

## 2019-06-19 VITALS
HEIGHT: 64 IN | TEMPERATURE: 98 F | DIASTOLIC BLOOD PRESSURE: 66 MMHG | RESPIRATION RATE: 16 BRPM | BODY MASS INDEX: 39.4 KG/M2 | WEIGHT: 230.8 LBS | HEART RATE: 80 BPM | SYSTOLIC BLOOD PRESSURE: 108 MMHG

## 2019-06-19 DIAGNOSIS — L40.9 PSORIASIS: ICD-10-CM

## 2019-06-19 DIAGNOSIS — I10 ESSENTIAL HYPERTENSION: Primary | ICD-10-CM

## 2019-06-19 PROCEDURE — 99214 OFFICE O/P EST MOD 30 MIN: CPT | Performed by: FAMILY MEDICINE

## 2019-06-19 RX ORDER — ASPIRIN 81 MG/1
81 TABLET, CHEWABLE ORAL DAILY
COMMUNITY
End: 2019-10-29

## 2019-06-19 RX ORDER — LISINOPRIL 5 MG/1
5 TABLET ORAL DAILY
Qty: 30 TAB | Refills: 1 | Status: SHIPPED | OUTPATIENT
Start: 2019-06-19 | End: 2019-07-11 | Stop reason: SDUPTHER

## 2019-06-19 RX ORDER — ALBUTEROL SULFATE 90 UG/1
AEROSOL, METERED RESPIRATORY (INHALATION)
Qty: 2 INHALER | Refills: 2 | Status: SHIPPED | OUTPATIENT
Start: 2019-06-19 | End: 2019-10-22 | Stop reason: SDUPTHER

## 2019-06-19 NOTE — PROGRESS NOTES
"Subjective:     CC: f/u BP    HPI:   Sol presents today with hypertension.    Essential hypertension  This is a chronic condition. Medication changed to lisinopril due to side effects at last appointment. She hasn't had her lisinopril the last 2 days.   Current Meds: lisinopril 10 qd  Side effects: \"little tired\", legs tingling but better  Home BP Los-140/60s-90s  Associated symptoms: no cp, no sob        Past Medical History:   Diagnosis Date   • Allergy    • Back pain    • Hypertension    • Neck pain    • Obesity        Social History   Substance Use Topics   • Smoking status: Former Smoker     Quit date: 2012   • Smokeless tobacco: Never Used   • Alcohol use No       Current Outpatient Prescriptions Ordered in [facility]   Medication Sig Dispense Refill   • lisinopril (PRINIVIL) 5 MG Tab Take 1 Tab by mouth every day. 30 Tab 1   • aspirin (ASA) 81 MG Chew Tab chewable tablet Take 81 mg by mouth every day.     • albuterol 108 (90 Base) MCG/ACT Aero Soln inhalation aerosol INHALE 2 PUFFS BY MOUTH EVERY 6 HOURS AS NEEDED FOR SHORTNESS OF BREATH 2 Inhaler 2   • ergocalciferol (DRISDOL) 11011 UNIT capsule Take 1 Cap by mouth every 7 days. 6 Cap 0   • triamcinolone acetonide (KENALOG) 0.1 % Cream Apply 1 Application to affected area(s) 2 times a day. 1 Tube 0   • ibuprofen (MOTRIN) 800 MG Tab Take 1 Tab by mouth every 8 hours as needed. 30 Tab 0     No current Epic-ordered facility-administered medications on file.        Allergies:  Sulfa drugs; Other drug; and Vicodin [hydrocodone-acetaminophen]    Health Maintenance: Completed    ROS:  Gen: no fevers/chills  Pulm: no sob  CV: no chest pain  Neuro: + headaches - chronic, unchanged    Objective:     Exam:  /66 (BP Location: Right arm, Patient Position: Sitting, BP Cuff Size: Large adult)   Pulse 80   Temp 36.7 °C (98 °F) (Temporal)   Resp 16   Ht 1.626 m (5' 4\")   Wt 104.7 kg (230 lb 12.8 oz)   Breastfeeding? No   BMI 39.62 kg/m²  Body mass index is " "39.62 kg/m².    Gen: Alert and oriented, No apparent distress.  Neck: Neck is supple without lymphadenopathy.  Lungs: Normal effort, CTA bilaterally, no wheezes, rhonchi, or rales  CV: Regular rate and rhythm. No murmurs, rubs, or gallops.  Ext: No clubbing, cyanosis, edema.      Assessment & Plan:     54 y.o. female with the following -     1. Essential hypertension  This is a chronic condition, stable.  At last appointment we started lisinopril instead of the triamterene/hydrochlorothiazide because she developed side effects (sluggish, winded, and tingling in legs).  Today she reports that she is tolerating lisinopril much better but she still feels \"a little tired\".  She also still gets tingling in the legs but it is also better.  Her home blood pressure log is ranging 90s-140s/60s-90s.  She actually has not had the lisinopril for the last 2 days her blood pressure in the office is 108/66.  Based on this and some low readings in her home log we will decrease her lisinopril.  -Change lisinopril to 5 mg daily  -Continue home blood pressure log    2. Psoriasis  She would like a new referral to dermatology to be placed to a different practice.  - REFERRAL TO DERMATOLOGY    Return in about 4 weeks (around 7/17/2019) for f/u BP.    Please note that this dictation was created using voice recognition software. I have made every reasonable attempt to correct obvious errors, but I expect that there are errors of grammar and possibly content that I did not discover before finalizing the note.      "

## 2019-06-19 NOTE — ASSESSMENT & PLAN NOTE
"This is a chronic condition. Medication changed to lisinopril due to side effects at last appointment. She hasn't had her lisinopril the last 2 days.   Current Meds: lisinopril 10 qd  Side effects: \"little tired\", legs tingling but better  Home BP Los-140/60s-90s  Associated symptoms: no cp, no sob    "

## 2019-07-11 RX ORDER — LISINOPRIL 5 MG/1
TABLET ORAL
Qty: 30 TAB | Refills: 0 | Status: SHIPPED | OUTPATIENT
Start: 2019-07-11 | End: 2019-08-03 | Stop reason: SDUPTHER

## 2019-07-12 ENCOUNTER — TELEPHONE (OUTPATIENT)
Dept: MEDICAL GROUP | Facility: PHYSICIAN GROUP | Age: 54
End: 2019-07-12

## 2019-07-12 NOTE — TELEPHONE ENCOUNTER
ESTABLISHED PATIENT PRE-VISIT PLANNING     Patient was NOT contacted to complete PVP.     Note: Patient will not be contacted if there is no indication to call.     1.  Reviewed notes from the last few office visits within the medical group: Yes    2.  If any orders were placed at last visit or intended to be done for this visit (i.e. 6 mos follow-up), do we have Results/Consult Notes?        •  Labs - Labs were not ordered at last office visit.   Note: If patient appointment is for lab review and patient did not complete labs, check with provider if OK to reschedule patient until labs completed.       •  Imaging - Imaging ordered, NOT completed. Patient advised to complete prior to next appointment.       •  Referrals - Referral ordered, patient has NOT been seen.    3. Is this appointment scheduled as a Hospital Follow-Up? No    4.  Immunizations were updated in Epic using WebIZ?: Epic matches WebIZ       •  Web Iz Recommendations: VARICELLA (Chicken Pox)  and SHINGRIX (Shingles)    5.  Patient is due for the following Health Maintenance Topics:   Health Maintenance Due   Topic Date Due   • Annual Wellness Visit  1965   • HEPATITIS C SCREENING  1965   • PAP SMEAR  03/15/1984       - Patient has completed FLU, PNEUMOVAX (PPSV23) and TDAP Immunization(s) per WebIZ. Chart has been updated.    6. Orders for overdue Health Maintenance topics pended in Pre-Charting? NO    7.  AHA (MDX) form printed for Provider? No, already completed    8.  Patient was NOT informed to arrive 15 min prior to their scheduled appointment and bring in their medication bottles.

## 2019-07-30 ENCOUNTER — APPOINTMENT (OUTPATIENT)
Dept: MEDICAL GROUP | Facility: PHYSICIAN GROUP | Age: 54
End: 2019-07-30
Payer: MEDICARE

## 2019-08-05 RX ORDER — LISINOPRIL 5 MG/1
TABLET ORAL
Qty: 30 TAB | Refills: 0 | Status: SHIPPED | OUTPATIENT
Start: 2019-08-05 | End: 2019-08-27

## 2019-08-26 ENCOUNTER — APPOINTMENT (OUTPATIENT)
Dept: MEDICAL GROUP | Facility: PHYSICIAN GROUP | Age: 54
End: 2019-08-26
Payer: MEDICARE

## 2019-08-27 RX ORDER — LISINOPRIL 5 MG/1
TABLET ORAL
Qty: 30 TAB | Refills: 0 | Status: SHIPPED | OUTPATIENT
Start: 2019-08-27 | End: 2019-10-29

## 2019-09-13 ENCOUNTER — TELEPHONE (OUTPATIENT)
Dept: MEDICAL GROUP | Facility: PHYSICIAN GROUP | Age: 54
End: 2019-09-13

## 2019-09-17 ENCOUNTER — APPOINTMENT (OUTPATIENT)
Dept: MEDICAL GROUP | Facility: PHYSICIAN GROUP | Age: 54
End: 2019-09-17
Payer: MEDICARE

## 2019-10-21 ENCOUNTER — APPOINTMENT (RX ONLY)
Dept: URBAN - METROPOLITAN AREA CLINIC 31 | Facility: CLINIC | Age: 54
Setting detail: DERMATOLOGY
End: 2019-10-21

## 2019-10-21 DIAGNOSIS — L81.4 OTHER MELANIN HYPERPIGMENTATION: ICD-10-CM

## 2019-10-21 DIAGNOSIS — L40.0 PSORIASIS VULGARIS: ICD-10-CM

## 2019-10-21 DIAGNOSIS — L82.1 OTHER SEBORRHEIC KERATOSIS: ICD-10-CM

## 2019-10-21 DIAGNOSIS — L72.8 OTHER FOLLICULAR CYSTS OF THE SKIN AND SUBCUTANEOUS TISSUE: ICD-10-CM

## 2019-10-21 DIAGNOSIS — B07.8 OTHER VIRAL WARTS: ICD-10-CM

## 2019-10-21 DIAGNOSIS — D18.0 HEMANGIOMA: ICD-10-CM

## 2019-10-21 DIAGNOSIS — D22 MELANOCYTIC NEVI: ICD-10-CM

## 2019-10-21 DIAGNOSIS — L85.3 XEROSIS CUTIS: ICD-10-CM

## 2019-10-21 DIAGNOSIS — L91.8 OTHER HYPERTROPHIC DISORDERS OF THE SKIN: ICD-10-CM

## 2019-10-21 PROBLEM — D22.5 MELANOCYTIC NEVI OF TRUNK: Status: ACTIVE | Noted: 2019-10-21

## 2019-10-21 PROBLEM — D22.72 MELANOCYTIC NEVI OF LEFT LOWER LIMB, INCLUDING HIP: Status: ACTIVE | Noted: 2019-10-21

## 2019-10-21 PROBLEM — D18.01 HEMANGIOMA OF SKIN AND SUBCUTANEOUS TISSUE: Status: ACTIVE | Noted: 2019-10-21

## 2019-10-21 PROBLEM — D22.61 MELANOCYTIC NEVI OF RIGHT UPPER LIMB, INCLUDING SHOULDER: Status: ACTIVE | Noted: 2019-10-21

## 2019-10-21 PROBLEM — I10 ESSENTIAL (PRIMARY) HYPERTENSION: Status: ACTIVE | Noted: 2019-10-21

## 2019-10-21 PROBLEM — D22.71 MELANOCYTIC NEVI OF RIGHT LOWER LIMB, INCLUDING HIP: Status: ACTIVE | Noted: 2019-10-21

## 2019-10-21 PROBLEM — D22.62 MELANOCYTIC NEVI OF LEFT UPPER LIMB, INCLUDING SHOULDER: Status: ACTIVE | Noted: 2019-10-21

## 2019-10-21 PROBLEM — D48.5 NEOPLASM OF UNCERTAIN BEHAVIOR OF SKIN: Status: ACTIVE | Noted: 2019-10-21

## 2019-10-21 PROCEDURE — 11102 TANGNTL BX SKIN SINGLE LES: CPT

## 2019-10-21 PROCEDURE — 99203 OFFICE O/P NEW LOW 30 MIN: CPT | Mod: 25

## 2019-10-21 PROCEDURE — ? PRESCRIPTION

## 2019-10-21 PROCEDURE — ? ADDITIONAL NOTES

## 2019-10-21 PROCEDURE — ? BENIGN DESTRUCTION COSMETIC

## 2019-10-21 PROCEDURE — ? TREATMENT REGIMEN

## 2019-10-21 PROCEDURE — ? BIOPSY BY SHAVE METHOD

## 2019-10-21 PROCEDURE — ? COUNSELING

## 2019-10-21 RX ORDER — TRIAMCINOLONE ACETONIDE 1 MG/G
OINTMENT TOPICAL
Qty: 1 | Refills: 3 | Status: ERX | COMMUNITY
Start: 2019-10-21

## 2019-10-21 RX ADMIN — TRIAMCINOLONE ACETONIDE: 1 OINTMENT TOPICAL at 21:59

## 2019-10-21 ASSESSMENT — LOCATION DETAILED DESCRIPTION DERM
LOCATION DETAILED: LEFT CLAVICULAR NECK
LOCATION DETAILED: RIGHT RIB CAGE
LOCATION DETAILED: RIGHT INFERIOR LATERAL NECK
LOCATION DETAILED: INFERIOR THORACIC SPINE
LOCATION DETAILED: RIGHT MID-UPPER BACK
LOCATION DETAILED: LEFT INFERIOR LATERAL NECK
LOCATION DETAILED: RIGHT SUPERIOR UPPER BACK
LOCATION DETAILED: RIGHT CHIN
LOCATION DETAILED: LEFT INFERIOR MEDIAL MIDBACK
LOCATION DETAILED: RIGHT INFERIOR UPPER BACK
LOCATION DETAILED: LEFT ULNAR DORSAL HAND
LOCATION DETAILED: RIGHT LATERAL SUPERIOR CHEST
LOCATION DETAILED: LEFT INFERIOR MEDIAL MALAR CHEEK
LOCATION DETAILED: LEFT ANTERIOR DISTAL THIGH
LOCATION DETAILED: LEFT ANTERIOR PROXIMAL UPPER ARM
LOCATION DETAILED: RIGHT PROXIMAL POSTERIOR UPPER ARM
LOCATION DETAILED: RIGHT MEDIAL SUPERIOR CHEST
LOCATION DETAILED: LEFT PROXIMAL DORSAL FOREARM
LOCATION DETAILED: LEFT PROXIMAL CALF
LOCATION DETAILED: RIGHT DISTAL POSTERIOR THIGH
LOCATION DETAILED: RIGHT ANTERIOR PROXIMAL UPPER ARM
LOCATION DETAILED: SUPERIOR LUMBAR SPINE
LOCATION DETAILED: INFERIOR LUMBAR SPINE
LOCATION DETAILED: SUPERIOR THORACIC SPINE
LOCATION DETAILED: RIGHT CLAVICULAR NECK
LOCATION DETAILED: RIGHT INFERIOR ANTERIOR NECK
LOCATION DETAILED: LEFT INFERIOR ANTERIOR NECK
LOCATION DETAILED: EPIGASTRIC SKIN
LOCATION DETAILED: RIGHT SUPERIOR MEDIAL MIDBACK
LOCATION DETAILED: RIGHT MEDIAL BREAST 4-5:00 REGION
LOCATION DETAILED: RIGHT ULNAR DORSAL HAND
LOCATION DETAILED: RIGHT ANTERIOR PROXIMAL THIGH
LOCATION DETAILED: RIGHT PROXIMAL DORSAL FOREARM
LOCATION DETAILED: STERNAL NOTCH

## 2019-10-21 ASSESSMENT — LOCATION ZONE DERM
LOCATION ZONE: FACE
LOCATION ZONE: ARM
LOCATION ZONE: TRUNK
LOCATION ZONE: HAND
LOCATION ZONE: NECK
LOCATION ZONE: LEG

## 2019-10-21 ASSESSMENT — LOCATION SIMPLE DESCRIPTION DERM
LOCATION SIMPLE: LOWER BACK
LOCATION SIMPLE: LEFT CHEEK
LOCATION SIMPLE: RIGHT UPPER ARM
LOCATION SIMPLE: UPPER BACK
LOCATION SIMPLE: LEFT ANTERIOR NECK
LOCATION SIMPLE: LEFT UPPER ARM
LOCATION SIMPLE: LEFT HAND
LOCATION SIMPLE: CHEST
LOCATION SIMPLE: LEFT THIGH
LOCATION SIMPLE: RIGHT POSTERIOR UPPER ARM
LOCATION SIMPLE: LEFT FOREARM
LOCATION SIMPLE: RIGHT LOWER BACK
LOCATION SIMPLE: CHIN
LOCATION SIMPLE: RIGHT POSTERIOR THIGH
LOCATION SIMPLE: RIGHT UPPER BACK
LOCATION SIMPLE: LEFT CALF
LOCATION SIMPLE: LEFT LOWER BACK
LOCATION SIMPLE: RIGHT THIGH
LOCATION SIMPLE: RIGHT ANTERIOR NECK
LOCATION SIMPLE: ABDOMEN
LOCATION SIMPLE: RIGHT BREAST
LOCATION SIMPLE: RIGHT HAND
LOCATION SIMPLE: RIGHT FOREARM

## 2019-10-21 NOTE — PROCEDURE: BIOPSY BY SHAVE METHOD
Electrodesiccation Text: The wound bed was treated with electrodesiccation after the biopsy was performed.
Biopsy Type: H and E
Depth Of Biopsy: dermis
Consent: Written consent was obtained and risks were reviewed including but not limited to scarring, infection, bleeding, scabbing, incomplete removal, nerve damage and allergy to anesthesia.
Type Of Destruction Used: Curettage
X Size Of Lesion In Cm: 0
Notification Instructions: Patient will be notified of biopsy results. However, patient instructed to call the office if not contacted within 2 weeks.
Size Of Lesion In Cm: 0.4
Bill For Surgical Tray: no
Dressing: bandage
Electrodesiccation And Curettage Text: The wound bed was treated with electrodesiccation and curettage after the biopsy was performed.
Anesthesia Volume In Cc: 0.5
Biopsy Method: Dermablade
Billing Type: Third-Party Bill
Curettage Text: The wound bed was treated with curettage after the biopsy was performed.
Detail Level: Simple
Hemostasis: Drysol and Electrocautery
Silver Nitrate Text: The wound bed was treated with silver nitrate after the biopsy was performed.
Was A Bandage Applied: Yes
Cryotherapy Text: The wound bed was treated with cryotherapy after the biopsy was performed.
Anesthesia Type: 1% lidocaine with epinephrine
Wound Care: Aquaphor
Lab Facility: 
Lab: [id number]
Post-Care Instructions: I reviewed with the patient in detail post-care instructions. Patient is to keep the biopsy site dry overnight, and then apply bacitracin twice daily until healed. Patient may apply hydrogen peroxide soaks to remove any crusting.

## 2019-10-21 NOTE — PROCEDURE: ADDITIONAL NOTES
Detail Level: Detailed
Additional Notes: Excision with MD if removal is desired.
Additional Notes: Discussed possible mild seb derm. \\nRecommend pt begin with gentle hydrating cleanser and moisturizer BID. Contact our office in event of persistent or worsening sx. \\nSamples given: CeraVe Hydrating Cleanser and Lotion.
Detail Level: Simple
Additional Notes: Cosmetic fees were reviewed with patient prior to treatment. Patient voiced understanding and agreed to pay for this cosmetic service in full at time of appointment. The patient voiced understanding that these charges are for cosmetic treatment and that any medical assessment or treatment rendered during the visit is a wholly separate charge. Further, patient acknowledges that this is a cosmetic treatment and not covered by insurance. Further, risks of non-resolution, scarring, bleeding, pain, hypo/hyperpigmentation were reviewed with patient prior to procedure and he/she opted to proceed with treatment, accepting these risks.

## 2019-10-21 NOTE — PROCEDURE: BENIGN DESTRUCTION COSMETIC
Post-Care Instructions: I reviewed with the patient in detail post-care instructions. Patient is to wear sunprotection, and avoid picking at any of the treated lesions. Pt may apply Vaseline to crusted or scabbing areas.
Price (Use Numbers Only, No Special Characters Or $): 125
Anesthesia Volume In Cc: 0.5
Detail Level: Detailed
Consent: The patient's consent was obtained including but not limited to risks of crusting, scabbing, blistering, scarring, darker or lighter pigmentary change, recurrence, incomplete removal and infection.

## 2019-10-22 ENCOUNTER — OFFICE VISIT (OUTPATIENT)
Dept: MEDICAL GROUP | Facility: PHYSICIAN GROUP | Age: 54
End: 2019-10-22
Payer: MEDICARE

## 2019-10-22 VITALS
OXYGEN SATURATION: 93 % | SYSTOLIC BLOOD PRESSURE: 122 MMHG | TEMPERATURE: 98.1 F | DIASTOLIC BLOOD PRESSURE: 80 MMHG | BODY MASS INDEX: 39.09 KG/M2 | WEIGHT: 229 LBS | HEART RATE: 88 BPM | RESPIRATION RATE: 14 BRPM | HEIGHT: 64 IN

## 2019-10-22 DIAGNOSIS — R09.82 POSTNASAL DRIP: ICD-10-CM

## 2019-10-22 DIAGNOSIS — I10 ESSENTIAL HYPERTENSION: ICD-10-CM

## 2019-10-22 DIAGNOSIS — F51.01 PRIMARY INSOMNIA: ICD-10-CM

## 2019-10-22 DIAGNOSIS — Z23 NEED FOR VACCINATION: ICD-10-CM

## 2019-10-22 PROCEDURE — 90686 IIV4 VACC NO PRSV 0.5 ML IM: CPT | Performed by: FAMILY MEDICINE

## 2019-10-22 PROCEDURE — G0008 ADMIN INFLUENZA VIRUS VAC: HCPCS | Performed by: FAMILY MEDICINE

## 2019-10-22 PROCEDURE — 99214 OFFICE O/P EST MOD 30 MIN: CPT | Mod: 25 | Performed by: FAMILY MEDICINE

## 2019-10-22 RX ORDER — TRIAMCINOLONE ACETONIDE 1 MG/G
1 OINTMENT TOPICAL
COMMUNITY
Start: 2019-10-21 | End: 2022-01-31

## 2019-10-22 RX ORDER — LISINOPRIL 10 MG/1
10 TABLET ORAL
Refills: 3 | COMMUNITY
Start: 2019-09-12 | End: 2019-10-22

## 2019-10-22 RX ORDER — ALBUTEROL SULFATE 90 UG/1
AEROSOL, METERED RESPIRATORY (INHALATION)
Qty: 2 INHALER | Refills: 2 | Status: SHIPPED | OUTPATIENT
Start: 2019-10-22 | End: 2020-10-02 | Stop reason: SDUPTHER

## 2019-10-22 RX ORDER — ZOLPIDEM TARTRATE 10 MG/1
10 TABLET ORAL NIGHTLY PRN
Qty: 30 TAB | Refills: 0 | Status: SHIPPED | OUTPATIENT
Start: 2019-10-22 | End: 2022-07-15

## 2019-10-22 NOTE — ASSESSMENT & PLAN NOTE
This is a chronic condition.  She has trouble falling and staying asleep.  She was given Ambien by her previous provider in the last prescription was in March, 2019 and requires a refill today.  She is tried trazodone and other indications but had side effects.  Her last dose of Ambien was last night.  She is due for a drug screen.

## 2019-10-22 NOTE — PATIENT INSTRUCTIONS
For your cough/postnasal drip:  - sleep with a humidifier  - I want you to use Boy Med Sinus Rinses at least twice a day  - flonase 1 puff per nostril daily  - take zyrtec every day (not zyrtec-D)

## 2019-10-22 NOTE — ASSESSMENT & PLAN NOTE
This is a chronic condition.  Current Meds: lisinopril 5 mg  Side effects: none     She hasn't been taking the medication every day. Some days when she checks her BP, it isn't elevated and she doesn't take the medication. On average, she is taking it 2-3x/week.

## 2019-10-22 NOTE — PROGRESS NOTES
Subjective:     CC: BP, postnasal drip    HPI:   Sol presents today with     Essential hypertension  This is a chronic condition.  Current Meds: lisinopril 5 mg  Side effects: none     She hasn't been taking the medication every day. Some days when she checks her BP, it isn't elevated and she doesn't take the medication. On average, she is taking it 2-3x/week.       Postnasal drip  This is a chronic condition.  Onset: several years  [place]  She finds that she wake every morning with dry mouth and middle of the night with dry mouth. She has nasal congestion and postnasal drip. She has to clear the throat a lot with some cough.   She uses albuterol as needed for cough and is using it a couple times/week. It does help with her cough.     She doesn't sleep with a humidifier.    Primary insomnia  This is a chronic condition.  She has trouble falling and staying asleep.  She was given Ambien by her previous provider in the last prescription was in  and requires a refill today.  She is tried trazodone and other indications but had side effects.  Her last dose of Ambien was last night.  She is due for a drug screen.      Past Medical History:   Diagnosis Date   • Allergy    • Back pain    • Hypertension    • Neck pain    • Obesity        Social History     Tobacco Use   • Smoking status: Former Smoker     Last attempt to quit: 2012     Years since quittin.8   • Smokeless tobacco: Never Used   Substance Use Topics   • Alcohol use: No   • Drug use: No       Current Outpatient Medications Ordered in Epic   Medication Sig Dispense Refill   • triamcinolone acetonide (KENALOG) 0.1 % Ointment      • albuterol 108 (90 Base) MCG/ACT Aero Soln inhalation aerosol INHALE 2 PUFFS BY MOUTH EVERY 6 HOURS AS NEEDED FOR SHORTNESS OF BREATH 2 Inhaler 2   • zolpidem (AMBIEN) 10 MG Tab Take 1 Tab by mouth at bedtime as needed for Sleep for up to 30 days. 30 Tab 0   • lisinopril (PRINIVIL) 5 MG Tab  "TAKE 1 TABLET BY MOUTH EVERY DAY 30 Tab 0   • aspirin (ASA) 81 MG Chew Tab chewable tablet Take 81 mg by mouth every day.     • ibuprofen (MOTRIN) 800 MG Tab Take 1 Tab by mouth every 8 hours as needed. 30 Tab 0     No current Epic-ordered facility-administered medications on file.        Allergies:  Sulfa drugs; Codeine; Other drug; and Vicodin [hydrocodone-acetaminophen]    Health Maintenance: Completed    ROS:  Gen: no fevers/chills  Pulm: no sob  CV: no chest pain    Objective:     Exam:  /80 (BP Location: Left arm, Patient Position: Sitting, BP Cuff Size: Adult)   Pulse 88   Temp 36.7 °C (98.1 °F) (Temporal)   Resp 14   Ht 1.626 m (5' 4\")   Wt 103.9 kg (229 lb)   SpO2 93%   BMI 39.31 kg/m²  Body mass index is 39.31 kg/m².    Gen: Alert and oriented, No apparent distress.  Neck: Neck is supple without lymphadenopathy.  Lungs: Normal effort, CTA bilaterally, no wheezes, rhonchi, or rales  CV: Regular rate and rhythm. No murmurs, rubs, or gallops.  Ext: No clubbing, cyanosis, edema.    Assessment & Plan:     54 y.o. female with the following -     1. Essential hypertension  This is a chronic condition.  She reports that she had an ER visit earlier this year with elevated blood pressure and it led to this road of adjusting blood pressure medications.  She is currently taking lisinopril 5 mg 2 to 3 days/week.  She notes that when she checks her blood pressure and is not elevated she will not take the medication.  Sometimes she feels better off the medication.  Therefore, I wonder if she does not actually require blood pressure medication and were bringing her blood pressure too low which makes her feel poorly.  Therefore, after discussion we decided to stop the medication.  -Stop lisinopril  -Check blood pressure daily  -Reevaluate need for blood pressure medication at next visit    2. Postnasal drip  This is a chronic condition, new to me.  She states for several years she has had chronic postnasal " drip no matter what the season or time of year.  [place]  She wakes up every morning and wakes up in the middle night with a dry mouth.  She has nasal congestion with nasal drip where she has to clear her throat a lot with some cough.  At one point she was prescribed albuterol for the cough and it does help but is never been diagnosed with asthma or had lung function testing done.  She reports that she uses albuterol just a couple times a week and it does help with the cough.  Therefore, I wonder if there is an allergic component leading to her postnasal drip and cough which is why the albuterol helps with the cough.  She is not sleeping with humidifier which is probably worsening the dry mouth sensation.  -Sleep humidifier  -Twice daily sinus rinses  -Flonase once daily  -Zyrtec once daily    3. Primary insomnia  This is a chronic condition, stable.  For years she has had trouble falling and staying asleep.  She is tried multiple medications in the past without relief or she developed side effects.  She is on Ambien and she uses it sparingly.  Her last prescription by her prior PCP was in March, 2019 and this is the first refill she is required since then.  She does want a refill today.  She is due for urine drug screen which was collected.  Informed consent and controlled substance treatment agreement filled out today and scanned into the chart.Obtained and reviewed patient utilization report from [facility] pharmacy database on 10/22/2019 3:16 PM  prior to writing prescription for controlled substance II, III or IV per [place] bill . Based on assessment of the report, the prescription is medically necessary.   - Pain Management Screen; Future  - Controlled Substance Treatment Agreement  - zolpidem (AMBIEN) 10 MG Tab; Take 1 Tab by mouth at bedtime as needed for Sleep for up to 30 days.  Dispense: 30 Tab; Refill: 0    4. Need for vaccination  - Influenza Vaccine Quad  Injection (PF)    Return in about 4 weeks (around 11/19/2019) for f/u BP, postnasal drip.    Please note that this dictation was created using voice recognition software. I have made every reasonable attempt to correct obvious errors, but I expect that there are errors of grammar and possibly content that I did not discover before finalizing the note.

## 2019-10-22 NOTE — ASSESSMENT & PLAN NOTE
This is a chronic condition.  Onset: several years  [place]  She finds that she wake every morning with dry mouth and middle of the night with dry mouth. She has nasal congestion and postnasal drip. She has to clear the throat a lot with some cough.   She uses albuterol as needed for cough and is using it a couple times/week. It does help with her cough.     She doesn't sleep with a humidifier.

## 2019-10-28 ENCOUNTER — TELEPHONE (OUTPATIENT)
Dept: MEDICAL GROUP | Facility: PHYSICIAN GROUP | Age: 54
End: 2019-10-28

## 2019-10-28 NOTE — TELEPHONE ENCOUNTER
----- Message from Veronica Lyn M.D. sent at 10/28/2019  2:26 PM PDT -----  Please call with the following results  - the drug screen did show ambien in her system  - it is also showing diazepam in her system. This not on her medication list and it hasn't been prescribed to her in the state of [place] for the last 2 years. Where did she get this medicine from?

## 2019-10-28 NOTE — TELEPHONE ENCOUNTER
Phone Number Called: [phone] (home)     Call outcome: spoke to patient regarding message below    Message: I spoke with patient and she states that she took diazepam 3-4 days before UDS that was prescribed in [place] by pain management MD  whom she no longer sees.  States this relaxes her as she has problems with her back and lower legs as it keeps her up at night.  She does not sleep a full 8 hours and this has been able to help her. States she has about 10 pills left and if PCP would like to speak with her personally she would be happy to discuss.

## 2019-10-28 NOTE — TELEPHONE ENCOUNTER
Phone Number Called: [phone] (home)     Call outcome: left message for patient to call back regarding message below    Message: LVM to call back.

## 2019-10-28 NOTE — TELEPHONE ENCOUNTER
Phone Number Called: [phone] (home)     Call outcome: spoke to patient regarding message below    Message: Spoke with patient and let them know Veronica Lyn M.D.'s message.  Patient requested appt to discuss further - appt scheduled for 10/29/19 @ 3 PM with .

## 2019-10-28 NOTE — TELEPHONE ENCOUNTER
Phone Number Called: [phone] (home)     Call outcome: left message for patient to call back regarding message below    Message: results

## 2019-10-29 ENCOUNTER — OFFICE VISIT (OUTPATIENT)
Dept: MEDICAL GROUP | Facility: PHYSICIAN GROUP | Age: 54
End: 2019-10-29
Payer: MEDICARE

## 2019-10-29 VITALS
TEMPERATURE: 98.4 F | SYSTOLIC BLOOD PRESSURE: 130 MMHG | OXYGEN SATURATION: 93 % | HEART RATE: 86 BPM | RESPIRATION RATE: 16 BRPM | BODY MASS INDEX: 39.64 KG/M2 | HEIGHT: 64 IN | WEIGHT: 232.2 LBS | DIASTOLIC BLOOD PRESSURE: 86 MMHG

## 2019-10-29 DIAGNOSIS — F51.01 PRIMARY INSOMNIA: Primary | ICD-10-CM

## 2019-10-29 DIAGNOSIS — G89.29 CHRONIC MIDLINE LOW BACK PAIN WITH RIGHT-SIDED SCIATICA: ICD-10-CM

## 2019-10-29 DIAGNOSIS — M54.41 CHRONIC MIDLINE LOW BACK PAIN WITH RIGHT-SIDED SCIATICA: ICD-10-CM

## 2019-10-29 PROCEDURE — 99213 OFFICE O/P EST LOW 20 MIN: CPT | Performed by: FAMILY MEDICINE

## 2019-10-29 RX ORDER — DIAZEPAM 10 MG/1
10 TABLET ORAL
COMMUNITY
End: 2023-10-13 | Stop reason: SDUPTHER

## 2019-10-29 NOTE — ASSESSMENT & PLAN NOTE
This is a chronic condition. She has a h/o scoliosis that causes significant back pain and some radiculopathy. She has been to PT without much relief. She saw [facility] and was told to avoid surgery unless she cannot walk because the surgery is very invasive. She saw a pain management specialist in [place] (Dr. Stephon Alvarado in [place]) who finally narrowed her regimen down to diazepam as needed. She reports she has tried multiple non-controlled substances but they either didn't work or she didn't tolerate them. She uses it maybe once/month. She hasn't any medications filled in [place] for the last 2 years.

## 2019-10-29 NOTE — PROGRESS NOTES
Subjective:     CC: discuss insomnia and meds    HPI:   Sol presents today with     Primary insomnia  This is a chronic condition.  She reports having trouble falling and staying asleep.  At a previous appointment I prescribed her Ambien.  We got a urine drug screen that showed diazepam in her system.  We were not aware that she had this medication. She had received this medication from her pain management physician. She has some leftover,  diazepam (it was filled in 2017). She had used it instead of ambien the night she took it.     Chronic midline low back pain with right-sided sciatica  This is a chronic condition. She has a h/o scoliosis that causes significant back pain and some radiculopathy. She has been to PT without much relief. She saw [facility] and was told to avoid surgery unless she cannot walk because the surgery is very invasive. She saw a pain management specialist in [place] (Dr. Stephon Alvarado in [place]) who finally narrowed her regimen down to diazepam as needed. She reports she has tried multiple non-controlled substances but they either didn't work or she didn't tolerate them. She uses it maybe once/month. She hasn't any medications filled in [place] for the last year.       Past Medical History:   Diagnosis Date   • Allergy    • Back pain    • Hypertension    • Neck pain    • Obesity        Social History     Tobacco Use   • Smoking status: Former Smoker     Last attempt to quit: 2012     Years since quittin.8   • Smokeless tobacco: Never Used   Substance Use Topics   • Alcohol use: No   • Drug use: No       Current Outpatient Medications Ordered in Epic   Medication Sig Dispense Refill   • diazepam (VALIUM) 10 MG tablet Take 10 mg by mouth 1 time daily as needed (muscle spasms, back pain).     • triamcinolone acetonide (KENALOG) 0.1 % Ointment Apply 1 Application to affected area(s) every 48 hours.     • albuterol 108 (90 Base) MCG/ACT Aero Soln inhalation aerosol INHALE 2  "PUFFS BY MOUTH EVERY 6 HOURS AS NEEDED FOR SHORTNESS OF BREATH 2 Inhaler 2   • zolpidem (AMBIEN) 10 MG Tab Take 1 Tab by mouth at bedtime as needed for Sleep for up to 30 days. 30 Tab 0   • ibuprofen (MOTRIN) 800 MG Tab Take 1 Tab by mouth every 8 hours as needed. 30 Tab 0     No current Epic-ordered facility-administered medications on file.        Allergies:  Sulfa drugs; Codeine; Other drug; and Vicodin [hydrocodone-acetaminophen]    Health Maintenance: deferred until next appointment    ROS:  Gen: no fevers/chills  Pulm: no sob  CV: no chest pain    Objective:     Exam:  /86 (BP Location: Left arm, Patient Position: Sitting, BP Cuff Size: Large adult)   Pulse 86   Temp 36.9 °C (98.4 °F) (Temporal)   Resp 16   Ht 1.626 m (5' 4\")   Wt 105.3 kg (232 lb 3.2 oz)   SpO2 93%   BMI 39.86 kg/m²  Body mass index is 39.86 kg/m².    Constitutional: Alert, no distress, well-groomed.  Skin: Warm, dry, good turgor, no rashes in visible areas.  Eye: Equal, round and reactive, conjunctiva clear, lids normal.  ENMT: Lips without lesions, good dentition, moist mucous membranes.  Neck: Trachea midline, no masses, no thyromegaly.  Respiratory: Unlabored respiratory effort, no cough.  MSK: Normal gait, moves all extremities.  Neuro: Grossly non-focal.   Psych: Alert and oriented x3, normal affect and mood.    Assessment & Plan:     54 y.o. female with the following -     1. Primary insomnia  This is a chronic condition, stable.  She reports having trouble falling and staying asleep.  At a previous appointment I had prescribed Ambien and we got a urine drug screen.  The urine injection showed diazepam in her system.  I was not aware she was taking diazepam.  She is here today to explain that it came from her pain management physician for her back pain.  She is using diazepam from a prescription was filled in June, 2017 and uses it maybe once a month.  She reports that she never uses it with the Ambien together and the " Ambien is used infrequently as well.  -Continue Ambien as needed for sleep    2. Chronic midline low back pain with right-sided sciatica  This is a chronic condition, stable.  She has a long-standing history of chronic back pain related to scoliosis.  She saw a specialist at [facility] for about 5 years to manage the pain.  She was told to avoid surgery unless she cannot walk because the surgery is so invasive.  She then transferred her care to a different pain management specialist in [place], [place], Dr. Stephon Alvarado.  She reports with him she had tried multiple noncontrolled substances but the either did not work for her pain or she could not tolerate them.  She was on many different medications but eventually her regimen was narrowed down to diazepam only as needed.  She has used Flexeril in the past and might be willing to try Flexeril before we wean diazepam in the future.  She reports that she has not had any prescriptions from [place] in the last 2 years.  -When she is low on diazepam she will come back and we will figure out our next steps      Return in about 7 weeks (around 12/16/2019) for f/u BP, postnasal drip.    Please note that this dictation was created using voice recognition software. I have made every reasonable attempt to correct obvious errors, but I expect that there are errors of grammar and possibly content that I did not discover before finalizing the note.

## 2019-10-29 NOTE — ASSESSMENT & PLAN NOTE
This is a chronic condition.  She reports having trouble falling and staying asleep.  At a previous appointment I prescribed her Ambien.  We got a urine drug screen that showed diazepam in her system.  We were not aware that she had this medication. She had received this medication from her pain management physician. She has some leftover,  diazepam (it was filled in 2017). She had used it instead of ambien the night she took it.

## 2019-11-13 ENCOUNTER — HOSPITAL ENCOUNTER (OUTPATIENT)
Dept: CARDIOLOGY | Facility: MEDICAL CENTER | Age: 54
End: 2019-11-13
Attending: FAMILY MEDICINE
Payer: MEDICARE

## 2019-11-13 DIAGNOSIS — R07.9 CHEST PAIN, UNSPECIFIED TYPE: ICD-10-CM

## 2019-11-13 LAB
LV EJECT FRACT  99904: 60
LV EJECT FRACT  99904: 60

## 2019-11-13 PROCEDURE — 700117 HCHG RX CONTRAST REV CODE 255: Performed by: FAMILY MEDICINE

## 2019-11-13 PROCEDURE — 93350 STRESS TTE ONLY: CPT

## 2019-11-13 PROCEDURE — 700111 HCHG RX REV CODE 636 W/ 250 OVERRIDE (IP)

## 2019-11-13 PROCEDURE — 93350 STRESS TTE ONLY: CPT | Mod: 26 | Performed by: INTERNAL MEDICINE

## 2019-11-13 RX ORDER — NITROGLYCERIN 0.4 MG/1
TABLET SUBLINGUAL
Status: COMPLETED
Start: 2019-11-13 | End: 2019-11-13

## 2019-11-13 RX ORDER — LABETALOL HYDROCHLORIDE 5 MG/ML
INJECTION, SOLUTION INTRAVENOUS
Status: COMPLETED
Start: 2019-11-13 | End: 2019-11-13

## 2019-11-13 RX ADMIN — HUMAN ALBUMIN MICROSPHERES AND PERFLUTREN 3 ML: 10; .22 INJECTION, SOLUTION INTRAVENOUS at 19:50

## 2019-11-13 RX ADMIN — DOBUTAMINE HYDROCHLORIDE: 100 INJECTION INTRAVENOUS at 14:15

## 2019-11-13 NOTE — PROGRESS NOTES
Dobutamine Stress Test    PRE-PROCEDURE:   Clinical History: Completed  Medical Diagnosis: Chest Pain  Pertinent Previous Physical Findings: Obese  History of Symptoms: Discomfort (Chest, Jaw, Arm, Back)  Recent Health: None  Other Habits: Caffeine, occasional tobacco use  Family History: Not Applicable  NPO Status: 0930   Medications Taken Day of Test: None  Consent: Completed  Time Out: Completed    ASSESSMENT:  Neuro: Within Normal Limits  Pulse Rate: Regular   Resting Blood Pressure: 150/55  Lung Auscultation: Clear to Auscultation, Good Aeration, No Wheezes, Rales or Rhonchi  Palpation and Auscultation of Carotid Arteries: done  Auscultation of Heart Sounds: Within Normal Limits  Palpation and Inspection of Lower Extremities: No Edema    CONTRAINDICATIONS: None    PROCEDURE PREPARATION:  IV started: # 20 gauge IV started in R Antecubital   Explanation of procedure to patient: done  Crash cart: present  Labetalol, atropine, and nitro at bedside: Present    PRE-PROCEDURE:  HR: 79  BP: 150/88    5 mcg at 3 minutes:  HR:77  BP:152/91    10 mcg at 6 minutes:  HR:99  BP: 146/84    20 mcg at 9 minutes:  HR: 128  BP: 122/74    30 mcg at 12 minutes:  HR: 117  BP: 120/61    40 mcg at 15 minutes:(Stopped at 15 minutes)  HR: 140  BP: 123/61      Please refer to MAR for medications.     POST PROCEDURE (RECOVERY):  HR: 98  BP: 126/74  IV Discontinued: Completed    TEST TERMINATION: Achieved 85% of Maximum Predicted Heart Rate. Patient tolerated test well. No complaints of chest pain.

## 2019-11-15 ENCOUNTER — HOSPITAL ENCOUNTER (OUTPATIENT)
Dept: RADIOLOGY | Facility: MEDICAL CENTER | Age: 54
End: 2019-11-15
Attending: FAMILY MEDICINE
Payer: MEDICARE

## 2019-11-15 DIAGNOSIS — N63.10 BREAST MASS, RIGHT: ICD-10-CM

## 2019-11-15 DIAGNOSIS — Z12.31 SCREENING MAMMOGRAM FOR HIGH-RISK PATIENT: ICD-10-CM

## 2019-11-15 PROCEDURE — G0279 TOMOSYNTHESIS, MAMMO: HCPCS

## 2019-11-15 PROCEDURE — 76642 ULTRASOUND BREAST LIMITED: CPT | Mod: RT

## 2019-11-21 ENCOUNTER — HOSPITAL ENCOUNTER (OUTPATIENT)
Dept: RADIOLOGY | Facility: MEDICAL CENTER | Age: 54
End: 2019-11-21
Attending: FAMILY MEDICINE
Payer: MEDICARE

## 2019-11-21 DIAGNOSIS — R92.8 ABNORMAL FINDING ON BREAST IMAGING: ICD-10-CM

## 2019-11-21 LAB — PATHOLOGY CONSULT NOTE: NORMAL

## 2019-11-21 PROCEDURE — 19083 BX BREAST 1ST LESION US IMAG: CPT

## 2019-11-21 PROCEDURE — 88305 TISSUE EXAM BY PATHOLOGIST: CPT

## 2019-11-22 ENCOUNTER — TELEPHONE (OUTPATIENT)
Dept: RADIOLOGY | Facility: MEDICAL CENTER | Age: 54
End: 2019-11-22

## 2019-11-27 ENCOUNTER — OFFICE VISIT (OUTPATIENT)
Dept: URGENT CARE | Facility: MEDICAL CENTER | Age: 54
End: 2019-11-27
Payer: MEDICARE

## 2019-11-27 VITALS
OXYGEN SATURATION: 96 % | RESPIRATION RATE: 20 BRPM | SYSTOLIC BLOOD PRESSURE: 130 MMHG | DIASTOLIC BLOOD PRESSURE: 78 MMHG | TEMPERATURE: 98.7 F | HEART RATE: 87 BPM

## 2019-11-27 DIAGNOSIS — L03.313 CELLULITIS OF CHEST WALL: Primary | ICD-10-CM

## 2019-11-27 DIAGNOSIS — R11.0 NAUSEA: ICD-10-CM

## 2019-11-27 DIAGNOSIS — Z98.890 H/O RIGHT BREAST BIOPSY: ICD-10-CM

## 2019-11-27 PROCEDURE — 99214 OFFICE O/P EST MOD 30 MIN: CPT | Mod: 25 | Performed by: PHYSICIAN ASSISTANT

## 2019-11-27 PROCEDURE — 96372 THER/PROPH/DIAG INJ SC/IM: CPT | Performed by: PHYSICIAN ASSISTANT

## 2019-11-27 RX ORDER — ONDANSETRON 4 MG/1
4 TABLET, ORALLY DISINTEGRATING ORAL EVERY 8 HOURS PRN
Qty: 10 TAB | Refills: 0 | Status: SHIPPED | OUTPATIENT
Start: 2019-11-27 | End: 2019-11-30

## 2019-11-27 RX ORDER — METHYLPREDNISOLONE 4 MG/1
TABLET ORAL
Qty: 21 TAB | Refills: 0 | Status: SHIPPED | OUTPATIENT
Start: 2019-11-27 | End: 2019-12-09

## 2019-11-27 RX ORDER — CEPHALEXIN 500 MG/1
500 CAPSULE ORAL 4 TIMES DAILY
Qty: 28 CAP | Refills: 0 | Status: SHIPPED | OUTPATIENT
Start: 2019-11-27 | End: 2019-12-04

## 2019-11-27 RX ORDER — TRAMADOL HYDROCHLORIDE 50 MG/1
50-100 TABLET ORAL EVERY 6 HOURS PRN
Qty: 20 TAB | Refills: 0 | Status: SHIPPED | OUTPATIENT
Start: 2019-11-27 | End: 2019-12-02

## 2019-11-27 NOTE — LETTER
November 27, 2019                             To Whom it May Concern:    Kayleen Kern was seen in my clinic on 11/27/2019. She may return to school on 11/28.    If you have any questions or concerns, please don't hesitate to call.        Sincerely,           Alok Richards P.A.-C.  Electronically Signed

## 2019-11-27 NOTE — PROGRESS NOTES
"Subjective:      Pt is a 54 y.o. female who presents with right breast cellulitis          HPI  This is a new problem. Pt notes right breast mass biopsy last week and placement of \"titanium stent marker\" and now notes redness and swelling or biopsy site x 3 days with pain and nausea. Pt notes biopsy results were benign. Pt has not taken any Rx medications for this condition. Pt states the pain is a 7/10, aching in nature and worse at night. Pt denies CP, SOB, diarrhea, paresthesias, headaches, dizziness, change in vision, hives, or other joint pain. The pt's medication list, problem list, and allergies have been evaluated and reviewed during today's visit.    PMH:  Past Medical History:   Diagnosis Date   • Allergy    • Back pain    • Hypertension    • Neck pain    • Obesity        PSH:  Past Surgical History:   Procedure Laterality Date   • CARPAL TUNNEL RELEASE Right    • ENDOMETRIAL ABLATION     • FOOT SURGERY Right    • KNEE ARTHROSCOPY     • PRIMARY C SECTION      2        Fam Hx:    family history includes Cancer in her mother; Heart Attack in her brother and father; Heart Disease in her brother, father, maternal grandfather, and mother; Hyperlipidemia in her brother; Hypertension in her mother; Other in her brother, father, mother, and another family member.  Family Status   Relation Name Status   • Mo     • Fa     • Bro     • Bro  Alive   • OTHER Niece Alive   • MGFa  (Not Specified)   • Neg Hx  (Not Specified)       Soc HX:  Social History     Socioeconomic History   • Marital status:      Spouse name: Not on file   • Number of children: Not on file   • Years of education: Not on file   • Highest education level: Not on file   Occupational History   • Not on file   Social Needs   • Financial resource strain: Not on file   • Food insecurity:     Worry: Not on file     Inability: Not on file   • Transportation needs:     Medical: Not on file     Non-medical: Not on file "   Tobacco Use   • Smoking status: Former Smoker     Packs/day: 0.00     Last attempt to quit: 2012     Years since quittin.9   • Smokeless tobacco: Never Used   Substance and Sexual Activity   • Alcohol use: No   • Drug use: No   • Sexual activity: Yes     Partners: Male     Comment:    Lifestyle   • Physical activity:     Days per week: Not on file     Minutes per session: Not on file   • Stress: Not on file   Relationships   • Social connections:     Talks on phone: Not on file     Gets together: Not on file     Attends Tenriism service: Not on file     Active member of club or organization: Not on file     Attends meetings of clubs or organizations: Not on file     Relationship status: Not on file   • Intimate partner violence:     Fear of current or ex partner: Not on file     Emotionally abused: Not on file     Physically abused: Not on file     Forced sexual activity: Not on file   Other Topics Concern   • Not on file   Social History Narrative   • Not on file         Medications:    Current Outpatient Medications:   •  cephALEXin (KEFLEX) 500 MG Cap, Take 1 Cap by mouth 4 times a day for 7 days., Disp: 28 Cap, Rfl: 0  •  methylPREDNISolone (MEDROL DOSEPAK) 4 MG Tablet Therapy Pack, Follow schedule on package instructions., Disp: 21 Tab, Rfl: 0  •  tramadol (ULTRAM) 50 MG Tab, Take 1-2 Tabs by mouth every 6 hours as needed for Moderate Pain or Severe Pain for up to 5 days., Disp: 20 Tab, Rfl: 0  •  ondansetron (ZOFRAN ODT) 4 MG TABLET DISPERSIBLE, Take 1 Tab by mouth every 8 hours as needed for up to 3 days., Disp: 10 Tab, Rfl: 0  •  diazepam (VALIUM) 10 MG tablet, Take 10 mg by mouth 1 time daily as needed (muscle spasms, back pain)., Disp: , Rfl:   •  triamcinolone acetonide (KENALOG) 0.1 % Ointment, Apply 1 Application to affected area(s) every 48 hours., Disp: , Rfl:   •  albuterol 108 (90 Base) MCG/ACT Aero Soln inhalation aerosol, INHALE 2 PUFFS BY MOUTH EVERY 6 HOURS AS NEEDED FOR  SHORTNESS OF BREATH, Disp: 2 Inhaler, Rfl: 2  •  ibuprofen (MOTRIN) 800 MG Tab, Take 1 Tab by mouth every 8 hours as needed., Disp: 30 Tab, Rfl: 0    Current Facility-Administered Medications:   •  cefTRIAXone (ROCEPHIN) 1 g, lidocaine (XYLOCAINE) 1 % 3.6 mL for IM use, 1 g, Intramuscular, Once, Alok Richards P.A.-C.      Allergies:  Sulfa drugs; Codeine; Other drug; and Vicodin [hydrocodone-acetaminophen]    ROS  Constitutional: Negative for fever, chills and malaise/fatigue.   HENT: Negative for congestion and sore throat.    Eyes: Negative for blurred vision, double vision and photophobia.   Respiratory: Negative for cough and shortness of breath.  Cardiovascular: Negative for chest pain and palpitations.   Gastrointestinal: POS nausea, NEG vomiting, abdominal pain, diarrhea and constipation.   Genitourinary: Negative for dysuria and flank pain.   Musculoskeletal: Negative for joint pain and myalgias.   Skin: +right breast biopsy site infection  Neurological: Negative for dizziness, tingling and headaches.   Endo/Heme/Allergies: Does not bruise/bleed easily.   Psychiatric/Behavioral: Negative for depression. The patient is not nervous/anxious.           Objective:     Vitals:    11/27/19 1549   BP: 130/78   Pulse: 87   Resp: 20   Temp: 37.1 °C (98.7 °F)   SpO2: 96%           Physical Exam  Skin:     General: Skin is warm.      Capillary Refill: Capillary refill takes less than 2 seconds.      Findings: Erythema present.                   Constitutional: PT is oriented to person, place, and time. PT appears well-developed and well-nourished. No distress.   HENT:   Head: Normocephalic and atraumatic.   Mouth/Throat: Oropharynx is clear and moist. No oropharyngeal exudate.   Eyes: Conjunctivae normal and EOM are normal. Pupils are equal, round, and reactive to light.   Neck: Normal range of motion. Neck supple. No thyromegaly present.   Cardiovascular: Normal rate, regular rhythm, normal heart sounds and intact  distal pulses.  Exam reveals no gallop and no friction rub.    No murmur heard.  Pulmonary/Chest: Effort normal and breath sounds normal. No respiratory distress. PT has no wheezes. PT has no rales. Pt exhibits no tenderness.   Abdominal: Soft. Bowel sounds are normal. PT exhibits no distension and no mass. There is no tenderness. There is no rebound and no guarding.   Musculoskeletal: Normal range of motion. PT exhibits no edema and no tenderness.   Neurological: PT is alert and oriented to person, place, and time. PT has normal reflexes. No cranial nerve deficit.       Psychiatric: PT has a normal mood and affect. PT behavior is normal. Judgment and thought content normal.        Assessment/Plan:       1. Cellulitis of chest wall    - cefTRIAXone (ROCEPHIN) 1 g, lidocaine (XYLOCAINE) 1 % 3.6 mL for IM use  - cephALEXin (KEFLEX) 500 MG Cap; Take 1 Cap by mouth 4 times a day for 7 days.  Dispense: 28 Cap; Refill: 0  - methylPREDNISolone (MEDROL DOSEPAK) 4 MG Tablet Therapy Pack; Follow schedule on package instructions.  Dispense: 21 Tab; Refill: 0  - Consent for Opiate Prescription  - tramadol (ULTRAM) 50 MG Tab; Take 1-2 Tabs by mouth every 6 hours as needed for Moderate Pain or Severe Pain for up to 5 days.  Dispense: 20 Tab; Refill: 0    2. H/O right breast biopsy    - methylPREDNISolone (MEDROL DOSEPAK) 4 MG Tablet Therapy Pack; Follow schedule on package instructions.  Dispense: 21 Tab; Refill: 0    3. Nausea    - ondansetron (ZOFRAN ODT) 4 MG TABLET DISPERSIBLE; Take 1 Tab by mouth every 8 hours as needed for up to 3 days.  Dispense: 10 Tab; Refill: 0      Rest, fluids encouraged.  AVS with medical info given.  Pt was in full understanding and agreement with the plan.  Differential diagnosis, natural history, supportive care, and indications for immediate follow-up discussed. All questions answered. Patient agrees with the plan of care.  Follow-up as needed if symptoms worsen or fail to improve to PCP, Urgent  care or Emergency Room.

## 2019-12-09 ENCOUNTER — OFFICE VISIT (OUTPATIENT)
Dept: MEDICAL GROUP | Facility: PHYSICIAN GROUP | Age: 54
End: 2019-12-09
Payer: MEDICARE

## 2019-12-09 VITALS
WEIGHT: 231.6 LBS | OXYGEN SATURATION: 96 % | HEART RATE: 84 BPM | BODY MASS INDEX: 39.54 KG/M2 | DIASTOLIC BLOOD PRESSURE: 88 MMHG | RESPIRATION RATE: 16 BRPM | SYSTOLIC BLOOD PRESSURE: 152 MMHG | HEIGHT: 64 IN | TEMPERATURE: 97.9 F

## 2019-12-09 DIAGNOSIS — N64.9 BREAST LESION: Primary | ICD-10-CM

## 2019-12-09 DIAGNOSIS — L03.818 CELLULITIS OF OTHER SPECIFIED SITE: ICD-10-CM

## 2019-12-09 PROCEDURE — 99214 OFFICE O/P EST MOD 30 MIN: CPT | Performed by: FAMILY MEDICINE

## 2019-12-09 RX ORDER — CLINDAMYCIN HYDROCHLORIDE 300 MG/1
300 CAPSULE ORAL 4 TIMES DAILY
Qty: 28 CAP | Refills: 0 | Status: SHIPPED | OUTPATIENT
Start: 2019-12-09 | End: 2019-12-16 | Stop reason: SDUPTHER

## 2019-12-09 RX ORDER — FLUCONAZOLE 150 MG/1
150 TABLET ORAL ONCE
Qty: 1 TAB | Refills: 0 | Status: SHIPPED | OUTPATIENT
Start: 2019-12-09 | End: 2019-12-09

## 2019-12-09 NOTE — PROGRESS NOTES
"Subjective:     CC: breast issue    HPI:   Sol presents today with     Breast lesion  She recently had a biopsy taken and developed cellulitis of her breast.  She was seen in urgent care on 2019 and given ceftriaxone in the office as well as a Keflex prescription of methylprednisolone.  She was told by the urgent care physician that the marker was left behind and that is usually removed and that it may continue to cause issues. She did develop a \"ball\" which sounds like a possible abscess. It is better but still present. She continues to get sharp pain. It is better after the antibiotic but still oozing, especially at night.       Past Medical History:   Diagnosis Date   • Allergy    • Back pain    • Hypertension    • Neck pain    • Obesity        Social History     Tobacco Use   • Smoking status: Former Smoker     Packs/day: 0.00     Last attempt to quit: 2012     Years since quittin.9   • Smokeless tobacco: Never Used   Substance Use Topics   • Alcohol use: No   • Drug use: No       Current Outpatient Medications Ordered in Epic   Medication Sig Dispense Refill   • clindamycin (CLEOCIN) 300 MG Cap Take 1 Cap by mouth 4 times a day for 7 days. 28 Cap 0   • fluconazole (DIFLUCAN) 150 MG tablet Take 1 Tab by mouth Once for 1 dose. 1 Tab 0   • diazepam (VALIUM) 10 MG tablet Take 10 mg by mouth 1 time daily as needed (muscle spasms, back pain).     • triamcinolone acetonide (KENALOG) 0.1 % Ointment Apply 1 Application to affected area(s) every 48 hours.     • albuterol 108 (90 Base) MCG/ACT Aero Soln inhalation aerosol INHALE 2 PUFFS BY MOUTH EVERY 6 HOURS AS NEEDED FOR SHORTNESS OF BREATH 2 Inhaler 2   • ibuprofen (MOTRIN) 800 MG Tab Take 1 Tab by mouth every 8 hours as needed. 30 Tab 0     No current Epic-ordered facility-administered medications on file.        Allergies:  Sulfa drugs; Codeine; Other drug; and Vicodin [hydrocodone-acetaminophen]    Health Maintenance: deferred for future " "appointment    ROS:  Gen: no fevers/chills  Pulm: no sob  CV: no chest pain    Objective:     Exam:  /88 (BP Location: Right arm, Patient Position: Sitting, BP Cuff Size: Adult)   Pulse 84   Temp 36.6 °C (97.9 °F) (Temporal)   Resp 16   Ht 1.626 m (5' 4\")   Wt 105.1 kg (231 lb 9.6 oz)   SpO2 96%   BMI 39.75 kg/m²  Body mass index is 39.75 kg/m².    Constitutional: Alert, no distress, well-groomed.  Skin: Warm, dry, good turgor, no rashes in visible areas.  Eye: Equal, round and reactive, conjunctiva clear, lids normal.  ENMT: Lips without lesions, good dentition, moist mucous membranes.  Neck: Trachea midline, no masses, no thyromegaly.  Respiratory: Unlabored respiratory effort, no cough.  Breast:  Bilaterally symmetrical, no nipple discharge. R breast with dusky skin color by biopsy site and area of induration. No fluctuance noted and no drainage noted.  MSK: Normal gait, moves all extremities.  Neuro: Grossly non-focal.   Psych: Alert and oriented x3, normal affect and mood.    A chaperone was offered to the patient during today's exam. Chaperone name: Serena Gooden was present.      Assessment & Plan:     54 y.o. female with the following -     1. Breast lesion  2. Cellulitis of other specified site  This is a new condition.  She had a biopsy of a breast mass in late November, 2019.  On 11/21/2019 she was called by radiology with the pathology results and at that time she had no issues.  She was then seen in urgent care on 11/20 7/19 foot appears to be cellulitis of the biopsy site in her right breast.  She stated that she developed a big \"ball\" over the breast that was extremely painful.  She was treated with ceftriaxone in the office and then given prescriptions for Keflex and methylprednisolone.  She reports that significantly improved but she continues to have pain over the area and some drainage periodically when she wakes up in the morning.  On exam I could not elicit any drainage I could not " culture the fluid.  She does however have dusky skin color changes over the area with an induration.  We will try different antibiotic to see if it helps completely resolve the situation.  -Clindamycin 300 mg 4 times daily x7 days  -If no improvement may need to consider surgery referral or ultrasound to evaluate further    Return in about 1 week (around 12/16/2019) for f/u breast infection.    Please note that this dictation was created using voice recognition software. I have made every reasonable attempt to correct obvious errors, but I expect that there are errors of grammar and possibly content that I did not discover before finalizing the note.

## 2019-12-09 NOTE — ASSESSMENT & PLAN NOTE
"She recently had a biopsy taken and developed cellulitis of her breast.  She was seen in urgent care on 11/27/2019 and given ceftriaxone in the office as well as a Keflex prescription of methylprednisolone.  She was told by the urgent care physician that the marker was left behind and that is usually removed and that it may continue to cause issues. She did develop a \"ball\" which sounds like a possible abscess. It is better but still present. She continues to get sharp pain. It is better after the antibiotic but still oozing, especially at night.   "

## 2019-12-13 ENCOUNTER — APPOINTMENT (OUTPATIENT)
Dept: MEDICAL GROUP | Facility: PHYSICIAN GROUP | Age: 54
End: 2019-12-13
Payer: MEDICARE

## 2019-12-16 ENCOUNTER — OFFICE VISIT (OUTPATIENT)
Dept: MEDICAL GROUP | Facility: PHYSICIAN GROUP | Age: 54
End: 2019-12-16
Payer: MEDICARE

## 2019-12-16 VITALS
OXYGEN SATURATION: 92 % | WEIGHT: 233.2 LBS | RESPIRATION RATE: 16 BRPM | HEART RATE: 66 BPM | TEMPERATURE: 98.6 F | DIASTOLIC BLOOD PRESSURE: 74 MMHG | BODY MASS INDEX: 39.81 KG/M2 | HEIGHT: 64 IN | SYSTOLIC BLOOD PRESSURE: 124 MMHG

## 2019-12-16 DIAGNOSIS — L03.818 CELLULITIS OF OTHER SPECIFIED SITE: ICD-10-CM

## 2019-12-16 PROBLEM — L03.90 CELLULITIS: Status: ACTIVE | Noted: 2018-09-24

## 2019-12-16 PROCEDURE — 99214 OFFICE O/P EST MOD 30 MIN: CPT | Performed by: FAMILY MEDICINE

## 2019-12-16 RX ORDER — CLINDAMYCIN HYDROCHLORIDE 300 MG/1
300 CAPSULE ORAL 4 TIMES DAILY
Qty: 20 CAP | Refills: 0 | Status: SHIPPED | OUTPATIENT
Start: 2019-12-18 | End: 2019-12-23

## 2019-12-16 NOTE — ASSESSMENT & PLAN NOTE
This is an acute condition.  I saw her a week ago after she had finished a Keflex prescription for cellulitis of her right breast.  She continued to have discharge with induration so I gave her clindamycin. So no longer having discharge but still indurated. It is now only uncomfortable. She denies any fevers/chills.

## 2019-12-16 NOTE — PROGRESS NOTES
"Subjective:     CC: f/u cellulitis    HPI:   Sol presents today with     Cellulitis  This is an acute condition.  I saw her a week ago after she had finished a Keflex prescription for cellulitis of her right breast.  She continued to have discharge with induration so I gave her clindamycin. So no longer having discharge but still indurated. It is now only uncomfortable. She denies any fevers/chills.      Past Medical History:   Diagnosis Date   • Allergy    • Back pain    • Hypertension    • Neck pain    • Obesity        Social History     Tobacco Use   • Smoking status: Former Smoker     Packs/day: 0.00     Last attempt to quit: 2012     Years since quittin.9   • Smokeless tobacco: Never Used   Substance Use Topics   • Alcohol use: No   • Drug use: No       Current Outpatient Medications Ordered in Epic   Medication Sig Dispense Refill   • [START ON 2019] clindamycin (CLEOCIN) 300 MG Cap Take 1 Cap by mouth 4 times a day for 5 days. 20 Cap 0   • diazepam (VALIUM) 10 MG tablet Take 10 mg by mouth 1 time daily as needed (muscle spasms, back pain).     • triamcinolone acetonide (KENALOG) 0.1 % Ointment Apply 1 Application to affected area(s) every 48 hours.     • albuterol 108 (90 Base) MCG/ACT Aero Soln inhalation aerosol INHALE 2 PUFFS BY MOUTH EVERY 6 HOURS AS NEEDED FOR SHORTNESS OF BREATH 2 Inhaler 2   • ibuprofen (MOTRIN) 800 MG Tab Take 1 Tab by mouth every 8 hours as needed. 30 Tab 0     No current Epic-ordered facility-administered medications on file.        Allergies:  Sulfa drugs; Codeine; Other drug; and Vicodin [hydrocodone-acetaminophen]    Health Maintenance: deferred for next appt    ROS:  Gen: no fevers/chills  Pulm: no sob  CV: no chest pain  GI: no nausea/vomiting, + diarrhea - antibiotic associated    Objective:     Exam:  /74 (BP Location: Left arm, Patient Position: Sitting, BP Cuff Size: Adult)   Pulse 66   Temp 37 °C (98.6 °F) (Temporal)   Resp 16   Ht 1.626 m (5' 4\") "   Wt 105.8 kg (233 lb 3.2 oz)   SpO2 92%   BMI 40.03 kg/m²  Body mass index is 40.03 kg/m².    Constitutional: Alert, no distress, well-groomed.  Skin: Warm, dry, good turgor, no rashes in visible areas.  Eye: Equal, round and reactive, conjunctiva clear, lids normal.  ENMT: Lips without lesions, good dentition, moist mucous membranes.  Neck: Trachea midline, no masses, no thyromegaly.  Chest: Area of erythema on right breast smaller and induration is smaller and softer.  Respiratory: Unlabored respiratory effort, no cough.  MSK: Normal gait, moves all extremities.  Neuro: Grossly non-focal.   Psych: Alert and oriented x3, normal affect and mood.    Assessment & Plan:     54 y.o. female with the following -     1. Cellulitis of other specified site  This is an acute condition.  I saw her a week ago with acute cellulitis of the right breast following a breast biopsy.  She had already completed a course of Keflex but continued to have an induration and drainage.  I gave her clindamycin and today she reports that she no longer has any drainage.  On exam the area of erythema induration is smaller and the induration also feels slightly softer.  I think she will continue to improve but because she is leaving on vacation a couple weeks I did provide a printed prescription for 5 more days of clindamycin that she can fill if she feels like is not getting better while on vacation.  Additionally, she mentions that she did started getting some diarrhea which I think is antibiotic associated diarrhea.  I strongly recommended she take a probiotic.  I also gave her strict urgent care/ER precautions for symptoms of C. difficile colitis.    Return if symptoms worsen or fail to improve.    Please note that this dictation was created using voice recognition software. I have made every reasonable attempt to correct obvious errors, but I expect that there are errors of grammar and possibly content that I did not discover before  finalizing the note.

## 2019-12-18 ENCOUNTER — APPOINTMENT (OUTPATIENT)
Dept: MEDICAL GROUP | Facility: PHYSICIAN GROUP | Age: 54
End: 2019-12-18
Payer: MEDICARE

## 2020-02-11 ASSESSMENT — ENCOUNTER SYMPTOMS: SLEEP DISTURBANCE: 1

## 2020-02-18 ENCOUNTER — APPOINTMENT (OUTPATIENT)
Dept: SLEEP MEDICINE | Facility: MEDICAL CENTER | Age: 55
End: 2020-02-18
Payer: MEDICARE

## 2020-03-31 DIAGNOSIS — I10 ESSENTIAL HYPERTENSION: ICD-10-CM

## 2020-03-31 RX ORDER — LISINOPRIL 10 MG/1
TABLET ORAL
Qty: 100 TAB | Refills: 0 | OUTPATIENT
Start: 2020-03-31

## 2020-04-09 ENCOUNTER — PATIENT OUTREACH (OUTPATIENT)
Dept: HEALTH INFORMATION MANAGEMENT | Facility: OTHER | Age: 55
End: 2020-04-09

## 2020-04-09 NOTE — PROGRESS NOTES
Outcome: Left Message- Confirm PCP     Please transfer to Patient Outreach Team at [phone] when patient returns call.    [facility] Verified: yes    Attempt # 1

## 2020-05-12 ENCOUNTER — HOSPITAL ENCOUNTER (OUTPATIENT)
Dept: LAB | Facility: MEDICAL CENTER | Age: 55
End: 2020-05-12
Attending: FAMILY MEDICINE
Payer: MEDICARE

## 2020-05-12 DIAGNOSIS — R30.0 DYSURIA: ICD-10-CM

## 2020-05-12 DIAGNOSIS — F51.01 PRIMARY INSOMNIA: ICD-10-CM

## 2020-05-12 LAB
APPEARANCE UR: CLEAR
BACTERIA #/AREA URNS HPF: ABNORMAL /HPF
BILIRUB UR QL STRIP.AUTO: NEGATIVE
CAOX CRY #/AREA URNS HPF: ABNORMAL /HPF
COLOR UR: YELLOW
EPI CELLS #/AREA URNS HPF: NEGATIVE /HPF
GLUCOSE UR STRIP.AUTO-MCNC: 500 MG/DL
HYALINE CASTS #/AREA URNS LPF: ABNORMAL /LPF
KETONES UR STRIP.AUTO-MCNC: NEGATIVE MG/DL
LEUKOCYTE ESTERASE UR QL STRIP.AUTO: NEGATIVE
MICRO URNS: ABNORMAL
NITRITE UR QL STRIP.AUTO: NEGATIVE
PH UR STRIP.AUTO: 5.5 [PH] (ref 5–8)
PROT UR QL STRIP: NEGATIVE MG/DL
RBC # URNS HPF: ABNORMAL /HPF
RBC UR QL AUTO: ABNORMAL
SP GR UR STRIP.AUTO: 1.02
UROBILINOGEN UR STRIP.AUTO-MCNC: 0.2 MG/DL
WBC #/AREA URNS HPF: ABNORMAL /HPF

## 2020-05-12 PROCEDURE — 81001 URINALYSIS AUTO W/SCOPE: CPT

## 2020-05-12 RX ORDER — DIAZEPAM 10 MG/1
10 TABLET ORAL
Qty: 30 TAB | Refills: 0 | OUTPATIENT
Start: 2020-05-12

## 2020-05-12 RX ORDER — ZOLPIDEM TARTRATE 10 MG/1
10 TABLET ORAL NIGHTLY PRN
Qty: 30 TAB | Refills: 0 | OUTPATIENT
Start: 2020-05-12 | End: 2020-06-11

## 2020-05-12 NOTE — TELEPHONE ENCOUNTER
----- Message from oSl Kern sent at 5/12/2020  4:00 PM PDT -----  Regarding: RE: Non-Urgent Medical Question  Contact: [phone]  Thanks Dr Lyn. I will need Zolpidem 10 mg and Diazpam 10 mg. I am on my way to do lab test now and I'll see you tomorrow online.  Thank you,  Sol

## 2020-05-12 NOTE — TELEPHONE ENCOUNTER
Received request via: Patient    Was the patient seen in the last year in this department? Yes    Does the patient have an active prescription (recently filled or refills available) for medication(s) requested? No          BRITNEY URBAN     Age: 55   demographics   Data as of: 05/12/2020         NARCOTIC 110    SEDATIVE 100    STIMULANT 000    NARxSCORES can range from 000 to 999. The first two digits represent the composite percentile risk based on an overall analysis of prescription drug use. The third digit represents the number of active prescriptions. The distribution of scores in the population is such that approximately 75% fall below 200, 95% fall below 500 and 99% fall below 650. The information on this report is not warranted as accurate or complete. This report is based on the search criteria supplied and the data entered by the dispensing pharmacy. For more information about any prescription, please contact the dispensing pharmacy or the prescriber. NARxSCORES and Reports are intended to aid, not replace medical decision making. None of the information presented should be used as sole justification for providing or refusing to provide medications.   [id number]  Rx Graph    Grayed out drugs could not be included in score calculations.  [x]  Narcotic  [x]  Buprenorphine  [x]  Sedative  [x]  Stimulant  [x]  Other    All PrescribersPrescribers3 - Alok Richards2 - Veronica Lyn1 - Deb [id number]  Morphine MgEq (MME)  [id number]  Buprenorphine mg  [id number]  Lorazepam MgEq (LME)  [id number]  *Per CDC guidance, the MME conversion factors prescribed or provided as part of the medication-assisted treatment for opioid use disorder should not be used to benchmark against dosage thresholds meant for opioids prescribed for pain. Buprenorphine products have no agreed upon morphine equivalency, and as partial opioid  agonists, are not expected to be associated with overdose risk in the same dose-dependent manner as doses for full agonist opioids. MME = morphine milligram equivalents. LME = Lorazepam milligram equivalents. MG = dose in milligrams.   Data Analysis                                                                                                             Summary  Total Prescriptions:    3    Total Prescribers:    3    Total Pharmacies:    1    Narcotics* (excluding Buprenorphine)  Current Qty:   0   Current MME/day:   0.00   30 Day Avg MME/day:   0.00   Sedatives*  Current Qty:   0   Current LME/day:   0.00   30 Day Avg LME/day:   0.00   Buprenorphine*  Current Qty:   0   Current mg/day:   0.00   30 Day Avg mg/day:   0.00   *Highlighted drugs could not be included in score calculations   Prescriptions  Total Prescriptions: 3    Total Private Pay: 1    Fill Date ID   Written Drug Qty Days Prescriber Rx # Pharmacy Refill   Daily Dose* Pymt Type      11/27/2019  1   11/27/2019  Tramadol Hcl 50 MG Tablet  20.00 5 Da Sy   [id number]   [place] (1317)   0  20.00 MME  Medicare   [place]   10/24/2019  1   10/22/2019  Zolpidem Tartrate 10 MG Tablet  30.00 30 Steven Be   [id number]   [place] (1317)   0  0.50 LME  Medicare   [place]   03/05/2019  1   03/05/2019  Zolpidem Tartrate 10 MG Tablet  30.00 90 So Ziu   [id number]   [place] (1317)   0  0.17 LME  Private Pay   [place]   *Per CDC guidance, the MME conversion factors prescribed or provided as part of the medication-assisted treatment for opioid use disorder should not be used to benchmark against dosage thresholds meant for opioids prescribed for pain. Buprenorphine products have no agreed upon morphine equivalency, and as partial opioid agonists, are not expected to be associated with overdose risk in the same dose-dependent manner as doses for full agonist opioids. MME = morphine milligram equivalents. LME = Lorazepam milligram equivalents. MG = dose in milligrams.   Providers  Total Providers: 3    Name Address City State Zipcode Phone   Deb Israel [address]   [place] [address]    Veronica Lyn [address]   [place] [address]    Alok Richards [address] [facility] [address]   [place] [address]    Pharmacies  Total Pharmacies: 1   Name Address City State Zipcode Phone   [facility] [phone] [place] [address] [phone]   This Graph is Interactive  Patient Demographics                      Score History  Drug Details  Prescriber Details

## 2020-05-13 ENCOUNTER — TELEMEDICINE (OUTPATIENT)
Dept: MEDICAL GROUP | Facility: PHYSICIAN GROUP | Age: 55
End: 2020-05-13
Payer: MEDICARE

## 2020-05-13 VITALS
SYSTOLIC BLOOD PRESSURE: 164 MMHG | HEIGHT: 64 IN | BODY MASS INDEX: 39.78 KG/M2 | DIASTOLIC BLOOD PRESSURE: 105 MMHG | WEIGHT: 233 LBS | TEMPERATURE: 96.7 F

## 2020-05-13 DIAGNOSIS — M54.41 CHRONIC MIDLINE LOW BACK PAIN WITH RIGHT-SIDED SCIATICA: ICD-10-CM

## 2020-05-13 DIAGNOSIS — E11.9 TYPE 2 DIABETES MELLITUS WITHOUT COMPLICATION, WITHOUT LONG-TERM CURRENT USE OF INSULIN (HCC): ICD-10-CM

## 2020-05-13 DIAGNOSIS — R30.0 DYSURIA: Primary | ICD-10-CM

## 2020-05-13 DIAGNOSIS — F51.01 PRIMARY INSOMNIA: ICD-10-CM

## 2020-05-13 DIAGNOSIS — G89.29 CHRONIC MIDLINE LOW BACK PAIN WITH RIGHT-SIDED SCIATICA: ICD-10-CM

## 2020-05-13 PROBLEM — L03.90 CELLULITIS: Status: RESOLVED | Noted: 2018-09-24 | Resolved: 2020-05-13

## 2020-05-13 PROCEDURE — 99214 OFFICE O/P EST MOD 30 MIN: CPT | Mod: 95,CR | Performed by: FAMILY MEDICINE

## 2020-05-13 PROCEDURE — 8041 PR SCP AHA: Performed by: FAMILY MEDICINE

## 2020-05-13 RX ORDER — ZOLPIDEM TARTRATE 10 MG/1
10 TABLET ORAL NIGHTLY PRN
COMMUNITY
Start: 2014-01-01 | End: 2020-05-13 | Stop reason: SDUPTHER

## 2020-05-13 RX ORDER — ZOLPIDEM TARTRATE 10 MG/1
10 TABLET ORAL NIGHTLY PRN
Qty: 30 TAB | Refills: 1 | Status: SHIPPED | OUTPATIENT
Start: 2020-05-13 | End: 2021-09-15 | Stop reason: SDUPTHER

## 2020-05-13 RX ORDER — CYCLOBENZAPRINE HCL 5 MG
5-10 TABLET ORAL 3 TIMES DAILY PRN
Qty: 90 TAB | Refills: 1 | Status: SHIPPED | OUTPATIENT
Start: 2020-05-13 | End: 2023-09-28

## 2020-05-13 ASSESSMENT — FIBROSIS 4 INDEX: FIB4 SCORE: 0.7

## 2020-05-13 NOTE — ASSESSMENT & PLAN NOTE
"This is a new condition. She has noticed in the last 2 weeks she has noticed an intermittent of pungent odor and sometimes \"specks\". No blood. She is having increased urgency. There is a little bit of burning. She does have increased frequency and polydipsia.  "

## 2020-05-13 NOTE — PROGRESS NOTES
"Telemedicine Visit: Established Patient     This encounter was conducted via Zoom .   Verbal consent was obtained. Patient's identity was verified.    Subjective:   CC: urine issue, med refill  Sol Kern is a 55 y.o. female presenting for evaluation and management of:    Primary insomnia  This is a chronic condition.  She reports having trouble falling and staying asleep.  At a previous appointment I prescribed her Ambien.  Her last prescription was in 2019 and her last dose was ~2 weeks ago. She uses 10 mg as needed. It does help her sleep. She would like a refill today.      Dysuria  This is a new condition. She has noticed in the last 2 weeks she has noticed an intermittent of pungent odor and sometimes \"specks\". No blood. She is having increased urgency. There is a little bit of burning. She does have increased frequency and polydipsia.    Chronic midline low back pain with right-sided sciatica  This is a chronic condition.  She has a history of scoliosis that causes significant back pain and some radiculopathy.  She has been to PT in the past without much relief.  She did see [facility] and was told to avoid surgery unless she cannot walk as the surgery is invasive.  She did follow the pain management specialist in [place] and would use diazepam as needed for the pain.  She has tried Flexeril in the past and has found it helpful for her so she is willing to use that instead of diazepam for now.  She does note that for the last 3 days she has had trouble with walking because her legs and back feel very stiff which makes walking painful and slow.  If she sits it feels okay.        ROS   Denies any recent fevers or chills. No nausea or vomiting. No chest pains or shortness of breath.     Allergies   Allergen Reactions   • Sulfa Drugs      Reaction unknown    • Codeine Vomiting and Nausea   • Other Drug Vomiting and Nausea     Any of the \"cets\" (percocet)   • Vicodin [Hydrocodone-Acetaminophen] Vomiting and " Nausea       Current medicines (including changes today)  Current Outpatient Medications   Medication Sig Dispense Refill   • zolpidem (AMBIEN) 10 MG Tab Take 1 Tab by mouth at bedtime as needed for up to 30 days. 30 Tab 1   • cyclobenzaprine (FLEXERIL) 5 MG tablet Take 1-2 Tabs by mouth 3 times a day as needed. 90 Tab 1   • metFORMIN (GLUCOPHAGE) 500 MG Tab Take 2 Tabs by mouth 2 times a day, with meals. 120 Tab 1   • diazepam (VALIUM) 10 MG tablet Take 10 mg by mouth 1 time daily as needed (muscle spasms, back pain).     • triamcinolone acetonide (KENALOG) 0.1 % Ointment Apply 1 Application to affected area(s) every 48 hours.     • albuterol 108 (90 Base) MCG/ACT Aero Soln inhalation aerosol INHALE 2 PUFFS BY MOUTH EVERY 6 HOURS AS NEEDED FOR SHORTNESS OF BREATH 2 Inhaler 2   • ibuprofen (MOTRIN) 800 MG Tab Take 1 Tab by mouth every 8 hours as needed. 30 Tab 0     No current facility-administered medications for this visit.        Patient Active Problem List    Diagnosis Date Noted   • Dysuria 05/13/2020   • Postnasal drip 10/22/2019   • Hypokalemia 03/29/2019   • Essential hypertension 03/19/2019   • Vitamin D deficiency 03/05/2019   • Microscopic hematuria 09/27/2018   • Obesity (BMI 35.0-39.9 without comorbidity) 08/28/2017   • Primary insomnia 08/28/2017   • YOANA on CPAP 08/28/2017   • Chronic midline low back pain with right-sided sciatica 08/28/2017   • Neck pain 08/28/2017   • Multiple allergies 08/28/2017   • Psoriasis 08/28/2017       Family History   Problem Relation Age of Onset   • Cancer Mother         ovarian cancer / malanoma   • Hypertension Mother    • Other Mother         THYROID   • Heart Disease Mother         valvular disease   • Heart Disease Father         Heart Attack   • Heart Attack Father    • Other Father         psoriasis   • Hyperlipidemia Brother    • Other Brother         psoriasis   • Heart Disease Brother         MI   • Heart Attack Brother         massive heart attack    • Other  "Other         psoriasis   • Heart Disease Maternal Grandfather         MI   • Diabetes Neg Hx    • Alcohol/Drug Neg Hx        She  has a past medical history of Allergy, Back pain, Hypertension, Neck pain, and Obesity.  She  has a past surgical history that includes endometrial ablation; primary c section; knee arthroscopy; foot surgery (Right); and carpal tunnel release (Right).       Objective:   Vitals obtained by patient:  Blood pressure: 164/105, Temp: 96.7, Height: 5' 4\" and Weight: 233 lbs    Physical Exam:  Constitutional: Alert, no distress, well-groomed.  Skin: No rashes in visible areas.  Eye: Round. Conjunctiva clear, lids normal. No icterus.   ENMT: Lips pink without lesions, good dentition, moist mucous membranes. Phonation normal.  Neck: No masses, no thyromegaly. Moves freely without pain.  CV: Blood pressure as reported by patient  Respiratory: Unlabored respiratory effort, no cough or audible wheeze  Psych: Alert and oriented x3, normal affect and mood.       Assessment and Plan:   The following treatment plan was discussed:     1. Dysuria  This is a new condition.  For last 10 days she has had dysuria, increased frequency, increased urgency.  No vaginal discharge, fever/chills, nausea/vomiting.  Urinalysis shows no leukoesterase, nitrites, white blood cells but there was moderate bacteria so urine culture is pending.  We will treat based on urine culture.    2. Primary insomnia  This is a chronic condition, stable.  She has trouble falling and staying asleep and her last prescription of Ambien was filled in October, 2019.  She states her last dose was approximately 2 weeks ago.  She will use 10 mg as needed which does help her sleep and she would like a refill today which was provided.  Informed consent and controlled substance treatment agreement on file.  Urine drug screen up-to-date.Obtained and reviewed patient utilization report from [facility] pharmacy database on 5/13/2020 9:14 AM  prior " to writing prescription for controlled substance II, III or IV per [place] bill . Based on assessment of the report, the prescription is medically necessary.   - zolpidem (AMBIEN) 10 MG Tab; Take 1 Tab by mouth at bedtime as needed for up to 30 days.  Dispense: 30 Tab; Refill: 1    3. Chronic midline low back pain with right-sided sciatica  This is a chronic condition, unchanged.  She has chronic back pain secondary to sciatica and radiculopathy.  She has followed with specialist at [facility] and pain management.  She is to use diazepam as needed approximately once a month for muscle spasms and pain.  We did discuss that we like to avoid diazepam as much as possible and she agrees so she is willing to try Flexeril.  She thinks it did help her in the past so I prescribed Flexeril today.    4. Type 2 diabetes mellitus without complication, without long-term current use of insulin (HCC)  This is a new, presumptive diagnosis based on urinalysis.  Urinalysis done yesterday does show 500 glucose.  I suspect that this is the true source for her urinary symptoms but we will await the urine culture results.  I have ordered hemoglobin A1c so we can evaluate how uncontrolled her diabetes is and see if insulin is required.  In the meantime, she will start metformin.  -Start metformin 500 mg twice daily and can increase to 1000 mg twice daily after a week  - HEMOGLOBIN A1C; Future    Follow-up: No follow-ups on file.             Annual Health Assessment Questions:    1.  Are you currently engaging in any exercise or physical activity? No    2.  How would you describe your mood or emotional well-being today? good    3.  Have you had any falls in the last year? No    4.  Have you noticed any problems with your balance or had difficulty walking? No    5.  In the last six months have you experienced any leakage of urine? Yes    6. DPA/Advanced Directive: Patient does not have an Advanced Directive.  A packet and workshop  information was given on Advanced Directives. Will provide at next appt

## 2020-05-13 NOTE — ASSESSMENT & PLAN NOTE
This is a chronic condition.  She reports having trouble falling and staying asleep.  At a previous appointment I prescribed her Ambien.  Her last prescription was in 2019 and her last dose was ~2 weeks ago. She uses 10 mg as needed. It does help her sleep. She would like a refill today.

## 2020-05-13 NOTE — ASSESSMENT & PLAN NOTE
This is a chronic condition.  She has a history of scoliosis that causes significant back pain and some radiculopathy.  She has been to PT in the past without much relief.  She did see [facility] and was told to avoid surgery unless she cannot walk as the surgery is invasive.  She did follow the pain management specialist in [place] and would use diazepam as needed for the pain.  She has tried Flexeril in the past and has found it helpful for her so she is willing to use that instead of diazepam for now.  She does note that for the last 3 days she has had trouble with walking because her legs and back feel very stiff which makes walking painful and slow.  If she sits it feels okay.

## 2020-05-14 ENCOUNTER — HOSPITAL ENCOUNTER (OUTPATIENT)
Dept: LAB | Facility: MEDICAL CENTER | Age: 55
End: 2020-05-14
Attending: FAMILY MEDICINE
Payer: MEDICARE

## 2020-05-14 DIAGNOSIS — E11.9 TYPE 2 DIABETES MELLITUS WITHOUT COMPLICATION, WITHOUT LONG-TERM CURRENT USE OF INSULIN (HCC): ICD-10-CM

## 2020-05-14 PROCEDURE — 83036 HEMOGLOBIN GLYCOSYLATED A1C: CPT

## 2020-05-14 PROCEDURE — 36415 COLL VENOUS BLD VENIPUNCTURE: CPT

## 2020-05-15 LAB
EST. AVERAGE GLUCOSE BLD GHB EST-MCNC: 157 MG/DL
HBA1C MFR BLD: 7.1 % (ref 0–5.6)

## 2020-05-26 PROBLEM — Z87.891 FORMER SMOKER: Status: ACTIVE | Noted: 2020-05-26

## 2020-09-23 ENCOUNTER — TELEPHONE (OUTPATIENT)
Dept: MEDICAL GROUP | Facility: PHYSICIAN GROUP | Age: 55
End: 2020-09-23

## 2020-09-23 NOTE — TELEPHONE ENCOUNTER
ESTABLISHED PATIENT PRE-VISIT PLANNING     Patient was NOT contacted to complete PVP.     Note: Patient will not be contacted if there is no indication to call.     1.  Reviewed notes from the last few office visits within the medical group: Yes    2.  If any orders were placed at last visit or intended to be done for this visit (i.e. 6 mos follow-up), do we have Results/Consult Notes?        •  Labs - Labs ordered, completed on 05/14/2020 and results are in chart.   Note: If patient appointment is for lab review and patient did not complete labs, check with provider if OK to reschedule patient until labs completed.       •  Imaging - Imaging was not ordered at last office visit.       •  Referrals - No referrals were ordered at last office visit.    3. Is this appointment scheduled as a Hospital Follow-Up? No    4.  Immunizations were updated in Epic using WebIZ?: Epic matches WebIZ       •  Web Iz Recommendations: FLU and HEPATITIS B    5.  Patient is due for the following Health Maintenance Topics:   Health Maintenance Due   Topic Date Due   • Annual Wellness Visit  1965   • HEPATITIS C SCREENING  1965   • URINE ACR / MICROALBUMIN  1965   • DIABETES MONOFILAMENT / LE EXAM  1965   • RETINAL SCREENING  03/15/1981   • IMM HEP B VACCINE (1 of 3 - Risk 3-dose series) 03/15/1982   • PAP SMEAR  03/15/1984   • IMM ZOSTER VACCINES (1 of 2) 03/15/2013   • FASTING LIPID PROFILE  03/19/2020   • SERUM CREATININE  05/15/2020   • IMM INFLUENZA (1) 09/01/2020       - Patient plans to schedule appointment for Annual Wellness Visit (AWV) and Retinal Eye Exam.    6. Orders for overdue Health Maintenance topics pended in Pre-Charting? N\A    7.  AHA (MDX) form printed for Provider? YES    8.  Patient was NOT informed to arrive 15 min prior to their scheduled appointment and bring in their medication bottles.

## 2020-10-01 ENCOUNTER — HOSPITAL ENCOUNTER (OUTPATIENT)
Dept: LAB | Facility: MEDICAL CENTER | Age: 55
End: 2020-10-01
Attending: FAMILY MEDICINE
Payer: MEDICARE

## 2020-10-01 ENCOUNTER — OFFICE VISIT (OUTPATIENT)
Dept: MEDICAL GROUP | Facility: PHYSICIAN GROUP | Age: 55
End: 2020-10-01
Payer: MEDICARE

## 2020-10-01 VITALS
TEMPERATURE: 97.5 F | BODY MASS INDEX: 37.39 KG/M2 | SYSTOLIC BLOOD PRESSURE: 130 MMHG | WEIGHT: 219 LBS | RESPIRATION RATE: 16 BRPM | HEIGHT: 64 IN | DIASTOLIC BLOOD PRESSURE: 84 MMHG | HEART RATE: 75 BPM | OXYGEN SATURATION: 98 %

## 2020-10-01 DIAGNOSIS — F51.01 PRIMARY INSOMNIA: ICD-10-CM

## 2020-10-01 DIAGNOSIS — E11.9 TYPE 2 DIABETES MELLITUS WITHOUT COMPLICATION, WITHOUT LONG-TERM CURRENT USE OF INSULIN (HCC): ICD-10-CM

## 2020-10-01 DIAGNOSIS — E66.9 OBESITY (BMI 35.0-39.9 WITHOUT COMORBIDITY): ICD-10-CM

## 2020-10-01 DIAGNOSIS — Z11.59 NEED FOR HEPATITIS C SCREENING TEST: ICD-10-CM

## 2020-10-01 DIAGNOSIS — F13.20 SEDATIVE HYPNOTIC OR ANXIOLYTIC DEPENDENCE (HCC): ICD-10-CM

## 2020-10-01 DIAGNOSIS — E11.9 TYPE 2 DIABETES MELLITUS WITHOUT COMPLICATION, WITHOUT LONG-TERM CURRENT USE OF INSULIN (HCC): Primary | ICD-10-CM

## 2020-10-01 DIAGNOSIS — Z23 NEED FOR VACCINATION: ICD-10-CM

## 2020-10-01 LAB
ALBUMIN SERPL BCP-MCNC: 4.1 G/DL (ref 3.2–4.9)
ALBUMIN/GLOB SERPL: 1.4 G/DL
ALP SERPL-CCNC: 85 U/L (ref 30–99)
ALT SERPL-CCNC: 22 U/L (ref 2–50)
ANION GAP SERPL CALC-SCNC: 11 MMOL/L (ref 7–16)
AST SERPL-CCNC: 16 U/L (ref 12–45)
BILIRUB SERPL-MCNC: 0.3 MG/DL (ref 0.1–1.5)
BUN SERPL-MCNC: 9 MG/DL (ref 8–22)
CALCIUM SERPL-MCNC: 9.5 MG/DL (ref 8.5–10.5)
CHLORIDE SERPL-SCNC: 104 MMOL/L (ref 96–112)
CHOLEST SERPL-MCNC: 198 MG/DL (ref 100–199)
CO2 SERPL-SCNC: 23 MMOL/L (ref 20–33)
CREAT SERPL-MCNC: 0.68 MG/DL (ref 0.5–1.4)
FASTING STATUS PATIENT QL REPORTED: NORMAL
GLOBULIN SER CALC-MCNC: 2.9 G/DL (ref 1.9–3.5)
GLUCOSE SERPL-MCNC: 80 MG/DL (ref 65–99)
HDLC SERPL-MCNC: 47 MG/DL
LDLC SERPL CALC-MCNC: 125 MG/DL
POTASSIUM SERPL-SCNC: 3.9 MMOL/L (ref 3.6–5.5)
PROT SERPL-MCNC: 7 G/DL (ref 6–8.2)
SODIUM SERPL-SCNC: 138 MMOL/L (ref 135–145)
TRIGL SERPL-MCNC: 132 MG/DL (ref 0–149)

## 2020-10-01 PROCEDURE — 83036 HEMOGLOBIN GLYCOSYLATED A1C: CPT

## 2020-10-01 PROCEDURE — 90686 IIV4 VACC NO PRSV 0.5 ML IM: CPT | Performed by: FAMILY MEDICINE

## 2020-10-01 PROCEDURE — 80061 LIPID PANEL: CPT

## 2020-10-01 PROCEDURE — 82043 UR ALBUMIN QUANTITATIVE: CPT

## 2020-10-01 PROCEDURE — G0472 HEP C SCREEN HIGH RISK/OTHER: HCPCS

## 2020-10-01 PROCEDURE — 82570 ASSAY OF URINE CREATININE: CPT

## 2020-10-01 PROCEDURE — G0008 ADMIN INFLUENZA VIRUS VAC: HCPCS | Performed by: FAMILY MEDICINE

## 2020-10-01 PROCEDURE — 99214 OFFICE O/P EST MOD 30 MIN: CPT | Mod: 25 | Performed by: FAMILY MEDICINE

## 2020-10-01 PROCEDURE — 90472 IMMUNIZATION ADMIN EACH ADD: CPT | Performed by: FAMILY MEDICINE

## 2020-10-01 PROCEDURE — 36415 COLL VENOUS BLD VENIPUNCTURE: CPT

## 2020-10-01 PROCEDURE — 90750 HZV VACC RECOMBINANT IM: CPT | Performed by: FAMILY MEDICINE

## 2020-10-01 PROCEDURE — 80053 COMPREHEN METABOLIC PANEL: CPT

## 2020-10-01 RX ORDER — ZOLPIDEM TARTRATE 10 MG/1
10 TABLET ORAL NIGHTLY PRN
Qty: 30 TAB | Refills: 0 | Status: SHIPPED | OUTPATIENT
Start: 2020-10-01 | End: 2020-10-31

## 2020-10-01 ASSESSMENT — FIBROSIS 4 INDEX: FIB4 SCORE: 0.7

## 2020-10-01 NOTE — ASSESSMENT & PLAN NOTE
This is a chronic condition. She had been drinking a lot of soda and eating fruit pops. She reports she stopped a lot of her sugar intake. She did start a keto diet, not last few weeks due to travel, but still trying.  Current medications: none - stopped metformin due to diarrhea  Last A1c: 7.1% (5/2020)  Last Microalb/Cr ratio: due  Last diabetic foot exam: due  Last retinal eye exam: due  ACEi/ARB: TBD based on microalbumin/Cr ratio  Statin: no - The 10-year ASCVD risk score (Scrantonjim NATHAN Jr., et al., 2013) is: 6%  Aspirin: no  Concomitant HTN: yes - at goal  Nightly foot checks:

## 2020-10-01 NOTE — ASSESSMENT & PLAN NOTE
This is a chronic condition.  Max weight: 233 lbs (5/2020)  Current weight: 219 lbs  Weight change: -14 lbs  Diet: keto diet  Exercise: none

## 2020-10-01 NOTE — PROGRESS NOTES
Subjective:     CC: f/u DM    HPI:   Sol presents today with     Diabetes (HCC)  This is a chronic condition. She had been drinking a lot of soda and eating fruit pops. She reports she stopped a lot of her sugar intake. She did start a keto diet, not last few weeks due to travel, but still trying.  Current medications: none - stopped metformin due to diarrhea  Last A1c: 7.1% (5/2020)  Last Microalb/Cr ratio: due  Last diabetic foot exam: due  Last retinal eye exam: due  ACEi/ARB: TBD based on microalbumin/Cr ratio  Statin: no - The 10-year ASCVD risk score (Hamiltonjim NATHAN JrKellie, et al., 2013) is: 6%  Aspirin: no  Concomitant HTN: yes - at goal  Nightly foot checks:      Obesity (BMI 35.0-39.9 without comorbidity) ([facility])  This is a chronic condition.  Max weight:  Current weight:   Weight change: -14 lbs  Diet: keto diet  Exercise:      Primary insomnia  This is a chronic condition.  She reports having trouble falling and staying asleep.  She use ambien 10 mg as needed. It does help her sleep. She would like a refill today. Her last fill in 5/2020 was for 30 tablets.     Is the medication improving the patient’s symptoms: Yes  Any adverse effects: No    Alcohol or illicit drug use:   She  reports no history of alcohol use.  She  reports no history of drug use.     History of controlled substance used in a way other than prescribed? No  Any early refills of a controlled substance: No  History of lost or stolen controlled substance prescription: No  Any aberrant behavior or intoxication while on a controlled substance: No  Has the patient self-modified their dose or frequency of the medication :No  Compliant with treatment recommendations and plan: Yes  Any major health change to the patient: No  Concerns for misuse, abuse or addiction: No    /NarxCheck report reviewed: Yes  History of abnormal drug screening: No          Past Medical History:   Diagnosis Date   • Allergy    • Back pain    • Hypertension    • Neck pain    •  "Obesity        Social History     Tobacco Use   • Smoking status: Former Smoker     Packs/day: 0.00     Quit date: 2012     Years since quittin.7   • Smokeless tobacco: Never Used   Substance Use Topics   • Alcohol use: No   • Drug use: No       Current Outpatient Medications Ordered in Epic   Medication Sig Dispense Refill   • zolpidem (AMBIEN) 10 MG Tab Take 1 Tab by mouth at bedtime as needed for Sleep for up to 30 days. 30 Tab 0   • cyclobenzaprine (FLEXERIL) 5 MG tablet Take 1-2 Tabs by mouth 3 times a day as needed. 90 Tab 1   • diazepam (VALIUM) 10 MG tablet Take 10 mg by mouth 1 time daily as needed (muscle spasms, back pain).     • triamcinolone acetonide (KENALOG) 0.1 % Ointment Apply 1 Application to affected area(s) every 48 hours.     • albuterol 108 (90 Base) MCG/ACT Aero Soln inhalation aerosol INHALE 2 PUFFS BY MOUTH EVERY 6 HOURS AS NEEDED FOR SHORTNESS OF BREATH 2 Inhaler 2   • ibuprofen (MOTRIN) 800 MG Tab Take 1 Tab by mouth every 8 hours as needed. 30 Tab 0     No current Epic-ordered facility-administered medications on file.        Allergies:  Sulfa drugs, Codeine, Other drug, and Vicodin [hydrocodone-acetaminophen]    Health Maintenance: Completed    ROS:  Gen: no fevers/chills  Pulm: no sob  CV: no chest pain    Objective:     Exam:  /84 (BP Location: Right arm, Patient Position: Sitting, BP Cuff Size: Adult)   Pulse 75   Temp 36.4 °C (97.5 °F)   Resp 16   Ht 1.626 m (5' 4\")   Wt 99.3 kg (219 lb)   SpO2 98%   BMI 37.59 kg/m²  Body mass index is 37.59 kg/m².    Gen: Alert and oriented, No apparent distress.  Neck: Neck is supple without lymphadenopathy.  Lungs: Normal effort, CTA bilaterally, no wheezes, rhonchi, or rales  CV: Regular rate and rhythm. No murmurs, rubs, or gallops.  Ext: No clubbing, cyanosis, edema.  Diabetic foot exam: No lesions or calluses noted. 2+ pedal pulses. Sensation intact with 10 out of 10 on monofilament test.    Assessment & Plan:     55 y.o. " female with the following -     1. Type 2 diabetes mellitus without complication, without long-term current use of insulin (HCC)  This is a new condition.  Labs in May, 2020 showed an elevated hemoglobin A1c of 7.1%.  I started her on metformin but she stopped because it caused significant diarrhea.  In the meantime, she has made some drastic changes to her diet.  She went on a keto diet and cut out sugar and carbohydrates entirely.  She has had some weight loss due to this.  Therefore, she wonders if she still needs a medication which I think is a reasonable question.  She is due for retinal eye exam and I recommended she make an eye appointment.  Diabetic foot exam was performed today within normal limits.  We will hold off on starting an ACE inhibitor until we check her microalbumin to creatinine ratio.  We will hold off on starting a statin until we check her cholesterol values.  - Diabetic Monofilament Lower Extremity Exam  - Comp Metabolic Panel; Future  - HEMOGLOBIN A1C; Future  - Lipid Profile; Future  - MICROALBUMIN CREAT RATIO URINE (LAB COLLECT); Future    2. Obesity (BMI 35.0-39.9 without comorbidity)  This is a chronic condition, improved.  Since her new diagnosis of diabetes she switched to a keto diet and has cut out all carbohydrates.  As a result, she has lost 14 pounds since her last appointment, which is more than 5% of her body weight.  She has not been exercising at all but states that she is going to get back on her palatine bike.  - Patient identified as having weight management issue.  Appropriate orders and counseling given.    3. Primary insomnia  4. Sedative hypnotic or anxiolytic dependence (HCC)  This is a chronic condition, stable.  She continues to have difficulty with sleep will use Ambien as needed to help her sleep.  She states that she wished it would keep her sleep longer but she is tried multiple other medications and this works the best.  Her last fill was in May, 2020 with 30  tablets.  She would like a refill today.  Informed consent and controlled substance treatment agreement was updated today.  Urine drug screen was ordered.Obtained and reviewed patient utilization report from [facility] pharmacy database on 10/1/2020 11:51 AM  prior to writing prescription for controlled substance II, III or IV per [place] bill . Based on assessment of the report, the prescription is medically necessary.   - Pain Management Screen; Future  - Controlled Substance Treatment Agreement  - zolpidem (AMBIEN) 10 MG Tab; Take 1 Tab by mouth at bedtime as needed for Sleep for up to 30 days.  Dispense: 30 Tab; Refill: 0    5. Need for hepatitis C screening test  She qualifies for hepatitis C screening per CDC guidelines.  - HCV Scrn ( [id number] 1xLife); Future    6. Need for vaccination  - Influenza Vaccine Quad Injection (PF)  - Shingles Vaccine (Shingrix)      Return in about 4 weeks (around 10/29/2020) for f/u labs.    Please note that this dictation was created using voice recognition software. I have made every reasonable attempt to correct obvious errors, but I expect that there are errors of grammar and possibly content that I did not discover before finalizing the note.

## 2020-10-01 NOTE — ASSESSMENT & PLAN NOTE
This is a chronic condition.  She reports having trouble falling and staying asleep.  She use ambien 10 mg as needed. It does help her sleep. She would like a refill today. Her last fill in 5/2020 was for 30 tablets.     Is the medication improving the patient’s symptoms: Yes  Any adverse effects: No    Alcohol or illicit drug use:   She  reports no history of alcohol use.  She  reports no history of drug use.     History of controlled substance used in a way other than prescribed? No  Any early refills of a controlled substance: No  History of lost or stolen controlled substance prescription: No  Any aberrant behavior or intoxication while on a controlled substance: No  Has the patient self-modified their dose or frequency of the medication :No  Compliant with treatment recommendations and plan: Yes  Any major health change to the patient: No  Concerns for misuse, abuse or addiction: No    /NarxCheck report reviewed: Yes  History of abnormal drug screening: No

## 2020-10-02 LAB
CREAT UR-MCNC: 117.16 MG/DL
EST. AVERAGE GLUCOSE BLD GHB EST-MCNC: 137 MG/DL
HBA1C MFR BLD: 6.4 % (ref 0–5.6)
HCV AB SER QL: NORMAL
MICROALBUMIN UR-MCNC: <1.2 MG/DL
MICROALBUMIN/CREAT UR: NORMAL MG/G (ref 0–30)

## 2020-10-02 RX ORDER — ALBUTEROL SULFATE 90 UG/1
AEROSOL, METERED RESPIRATORY (INHALATION)
Qty: 2 EACH | Refills: 2 | Status: SHIPPED | OUTPATIENT
Start: 2020-10-02 | End: 2021-12-15 | Stop reason: SDUPTHER

## 2020-11-09 ENCOUNTER — HOSPITAL ENCOUNTER (OUTPATIENT)
Dept: LAB | Facility: MEDICAL CENTER | Age: 55
End: 2020-11-09
Attending: FAMILY MEDICINE
Payer: MEDICARE

## 2020-11-09 PROCEDURE — 80307 DRUG TEST PRSMV CHEM ANLYZR: CPT

## 2020-11-12 LAB
6MAM UR QL: NOT DETECTED
7AMINOCLONAZEPAM UR QL: NOT DETECTED
A-OH ALPRAZ UR QL: NOT DETECTED
ALPRAZ UR QL: NOT DETECTED
AMPHET UR QL SCN: NOT DETECTED
ANNOTATION COMMENT IMP: NORMAL
ANNOTATION COMMENT IMP: NORMAL
BARBITURATES UR QL: NOT DETECTED
BUPRENORPHINE UR QL: NOT DETECTED
BZE UR QL: NOT DETECTED
CARBOXYTHC UR QL: NOT DETECTED
CARISOPRODOL UR QL: NOT DETECTED
CLONAZEPAM UR QL: NOT DETECTED
CODEINE UR QL: NOT DETECTED
DIAZEPAM UR QL: NOT DETECTED
ETHYL GLUCURONIDE UR QL: NOT DETECTED
FENTANYL UR QL: NOT DETECTED
HYDROCODONE UR QL: NOT DETECTED
HYDROMORPHONE UR QL: NOT DETECTED
LORAZEPAM UR QL: NOT DETECTED
MDA UR QL: NOT DETECTED
MDEA UR QL: NOT DETECTED
MDMA UR QL: NOT DETECTED
MEPERIDINE UR QL: NOT DETECTED
METHADONE UR QL: NOT DETECTED
METHAMPHET UR QL: NOT DETECTED
MIDAZOLAM UR QL SCN: NOT DETECTED
MORPHINE UR QL: NOT DETECTED
NORBUPRENORPHINE UR QL CFM: NOT DETECTED
NORDIAZEPAM UR QL: NOT DETECTED
NORFENTANYL UR QL: NOT DETECTED
NORHYDROCODONE UR QL CFM: NOT DETECTED
NOROXYCODONE UR QL CFM: NOT DETECTED
NOROXYMORPH CO100 Q0458: NOT DETECTED
OXAZEPAM UR QL: NOT DETECTED
OXYCODONE UR QL: NOT DETECTED
OXYMORPHONE UR QL: NOT DETECTED
PATHOLOGY STUDY: NORMAL
PCP UR QL: NOT DETECTED
PHENTERMINE UR QL: NOT DETECTED
PPAA UR QL: NOT DETECTED
PROPOXYPH UR QL: NOT DETECTED
SERVICE CMNT-IMP: NORMAL
TAPENADOL OSULF CO200 Q0473: NOT DETECTED
TAPENTADOL UR QL SCN: NOT DETECTED
TEMAZEPAM UR QL: NOT DETECTED
TRAMADOL UR QL: NOT DETECTED
ZOLPIDEM UR QL: NOT DETECTED

## 2021-03-15 DIAGNOSIS — Z23 NEED FOR VACCINATION: ICD-10-CM

## 2021-03-19 ENCOUNTER — IMMUNIZATION (OUTPATIENT)
Dept: FAMILY PLANNING/WOMEN'S HEALTH CLINIC | Facility: IMMUNIZATION CENTER | Age: 56
End: 2021-03-19
Attending: INTERNAL MEDICINE
Payer: MEDICARE

## 2021-03-19 DIAGNOSIS — Z23 NEED FOR VACCINATION: ICD-10-CM

## 2021-03-19 DIAGNOSIS — Z23 ENCOUNTER FOR VACCINATION: Primary | ICD-10-CM

## 2021-03-19 PROCEDURE — 91301 MODERNA SARS-COV-2 VACCINE: CPT

## 2021-03-19 PROCEDURE — 0011A MODERNA SARS-COV-2 VACCINE: CPT

## 2021-04-17 ENCOUNTER — IMMUNIZATION (OUTPATIENT)
Dept: FAMILY PLANNING/WOMEN'S HEALTH CLINIC | Facility: IMMUNIZATION CENTER | Age: 56
End: 2021-04-17
Attending: INTERNAL MEDICINE
Payer: MEDICARE

## 2021-04-17 DIAGNOSIS — Z23 ENCOUNTER FOR VACCINATION: Primary | ICD-10-CM

## 2021-04-17 PROCEDURE — 0012A MODERNA SARS-COV-2 VACCINE: CPT | Performed by: INTERNAL MEDICINE

## 2021-04-17 PROCEDURE — 91301 MODERNA SARS-COV-2 VACCINE: CPT | Performed by: INTERNAL MEDICINE

## 2021-04-26 ENCOUNTER — PATIENT MESSAGE (OUTPATIENT)
Dept: HEALTH INFORMATION MANAGEMENT | Facility: OTHER | Age: 56
End: 2021-04-26

## 2021-04-27 ENCOUNTER — TELEPHONE (OUTPATIENT)
Dept: MEDICAL GROUP | Facility: PHYSICIAN GROUP | Age: 56
End: 2021-04-27

## 2021-04-27 NOTE — TELEPHONE ENCOUNTER
ESTABLISHED PATIENT PRE-VISIT PLANNING     Patient was NOT contacted to complete PVP.     Note: Patient will not be contacted if there is no indication to call.     1.  Reviewed notes from the last few office visits within the medical group: Yes    2.  If any orders were placed at last visit or intended to be done for this visit (i.e. 6 mos follow-up), do we have Results/Consult Notes?         •  Labs - Labs ordered, completed on 10/01/2020 and results are in chart.  Note: If patient appointment is for lab review and patient did not complete labs, check with provider if OK to reschedule patient until labs completed.       •  Imaging - Imaging was not ordered at last office visit.       •  Referrals - No referrals were ordered at last office visit.    3. Is this appointment scheduled as a Hospital Follow-Up? No    4.  Immunizations were updated in Epic using Reconcile Outside Information activity? Yes    5.  Patient is due for the following Health Maintenance Topics:   Health Maintenance Due   Topic Date Due   • Annual Wellness Visit  Never done   • RETINAL SCREENING  Never done   • PAP SMEAR  Never done   • MAMMOGRAM  11/15/2020   • IMM ZOSTER VACCINES (2 of 2) 11/26/2020   • A1C SCREENING  04/01/2021       6.  AHA (Pulse8) form printed for Provider? Email sent to [facility] requesting form

## 2021-05-10 ENCOUNTER — NON-PROVIDER VISIT (OUTPATIENT)
Dept: MEDICAL GROUP | Facility: PHYSICIAN GROUP | Age: 56
End: 2021-05-10
Payer: MEDICARE

## 2021-05-10 DIAGNOSIS — Z23 NEED FOR VACCINATION: ICD-10-CM

## 2021-05-10 PROCEDURE — 90750 HZV VACC RECOMBINANT IM: CPT | Performed by: FAMILY MEDICINE

## 2021-05-10 PROCEDURE — 90471 IMMUNIZATION ADMIN: CPT | Performed by: FAMILY MEDICINE

## 2021-05-10 NOTE — PROGRESS NOTES
"Sol Kern is a 56 y.o. female here for a non-provider visit for:   SHINGRIX (Shingles)    Reason for immunization: continue or complete series started at the office  Immunization records indicate need for vaccine: Yes, confirmed with Epic  Minimum interval has been met for this vaccine: Yes  ABN completed: Not Indicated    Order and dose verified by: Eboni MUNOZ Dated  10/31/2019 was given to patient: Yes  All IAC Questionnaire questions were answered \"No.\"    Patient tolerated injection and no adverse effects were observed or reported: Yes    Pt scheduled for next dose in series: No      Pt did state during MA visit, that she thinks she has hemorrhoids and would like a refill on her ambien.  Advised pt that she would need an appt with her provider for both these issues as it is out of my scope of practice. Pt stated understanding and an appt was made for her to see her PCP prior to the end of the appt.       "

## 2021-08-20 ENCOUNTER — TELEPHONE (OUTPATIENT)
Dept: MEDICAL GROUP | Facility: PHYSICIAN GROUP | Age: 56
End: 2021-08-20

## 2021-08-20 NOTE — TELEPHONE ENCOUNTER
Phone Number Called: [phone] (home)     Call outcome: Did not leave a detailed message. Requested patient to call back.    Message: VM box full, patient needs 6 mo follow up, med check

## 2021-08-23 NOTE — TELEPHONE ENCOUNTER
Phone Number Called: [phone] (home)      Call outcome:  Unable to LVM - voicemail box is full.

## 2021-09-15 ENCOUNTER — OFFICE VISIT (OUTPATIENT)
Dept: MEDICAL GROUP | Facility: PHYSICIAN GROUP | Age: 56
End: 2021-09-15
Payer: MEDICARE

## 2021-09-15 ENCOUNTER — HOSPITAL ENCOUNTER (OUTPATIENT)
Dept: LAB | Facility: MEDICAL CENTER | Age: 56
End: 2021-09-15
Attending: FAMILY MEDICINE
Payer: MEDICARE

## 2021-09-15 VITALS
SYSTOLIC BLOOD PRESSURE: 140 MMHG | OXYGEN SATURATION: 94 % | DIASTOLIC BLOOD PRESSURE: 86 MMHG | TEMPERATURE: 98.3 F | BODY MASS INDEX: 39.34 KG/M2 | HEART RATE: 84 BPM | RESPIRATION RATE: 16 BRPM | HEIGHT: 64 IN | WEIGHT: 230.4 LBS

## 2021-09-15 DIAGNOSIS — G25.81 RESTLESS LEG SYNDROME: ICD-10-CM

## 2021-09-15 DIAGNOSIS — D49.3 NEOPLASM OF UNSPECIFIED BEHAVIOR OF BREAST: ICD-10-CM

## 2021-09-15 DIAGNOSIS — N63.0 BREAST LUMP: ICD-10-CM

## 2021-09-15 DIAGNOSIS — N63.21 UNSPECIFIED LUMP IN THE LEFT BREAST, UPPER OUTER QUADRANT: ICD-10-CM

## 2021-09-15 DIAGNOSIS — F51.01 PRIMARY INSOMNIA: ICD-10-CM

## 2021-09-15 DIAGNOSIS — G62.9 NEUROPATHY: ICD-10-CM

## 2021-09-15 LAB — FERRITIN SERPL-MCNC: 194 NG/ML (ref 10–291)

## 2021-09-15 PROCEDURE — 36415 COLL VENOUS BLD VENIPUNCTURE: CPT

## 2021-09-15 PROCEDURE — 99214 OFFICE O/P EST MOD 30 MIN: CPT | Performed by: FAMILY MEDICINE

## 2021-09-15 PROCEDURE — 82728 ASSAY OF FERRITIN: CPT

## 2021-09-15 RX ORDER — ZOLPIDEM TARTRATE 10 MG/1
10 TABLET ORAL NIGHTLY PRN
Qty: 30 TABLET | Refills: 0 | Status: SHIPPED | OUTPATIENT
Start: 2021-09-15 | End: 2022-12-09

## 2021-09-15 ASSESSMENT — PATIENT HEALTH QUESTIONNAIRE - PHQ9: CLINICAL INTERPRETATION OF PHQ2 SCORE: 0

## 2021-09-15 NOTE — ASSESSMENT & PLAN NOTE
This is a chronic condition, stable.  She continues to have trouble falling and staying asleep.  She would like a refill of Ambien which has not been filled in 11 months.  Informed consent and controlled substance treatment agreement on file.Obtained and reviewed patient utilization report from [facility] pharmacy database on 9/15/2021 1:23 PM  prior to writing prescription for controlled substance II, III or IV per [place] bill . Based on assessment of the report, the prescription is medically necessary.

## 2021-09-15 NOTE — PROGRESS NOTES
Subjective:     CC: breast lump, ambien refill    HPI:   Sol presents today with    Problem   Breast Lump    Onset: 1.5 weeks  Location: L breast  Quality: lump  Duration: constant  Timing: seems smaller during the day than at night  Associated symptoms: no nipple drainage, no skin changes, +discomfort, no weight loss, no night sweats       Neuropathy    She reports being diagnosed with neuropathy in legs and arms a couple decades ago at [facility]. However, she has noticed whenever she lays in bed, the symptoms in her legs get worse. She has noticed with different beds, she doesn't notice the issue. She will get tingling down to toes. She gets sensation she has to move and if she gets out of bed it significantly improves.      Primary Insomnia    This is a chronic condition.  She reports having trouble falling and staying asleep.  She use ambien 10 mg as needed. It does help her sleep. She would like a refill today. Her last fill in 10/2020 was for 30 tablets. She never uses it with the diazepam.     Is the medication improving the patient’s symptoms: Yes  Any adverse effects: No    Alcohol or illicit drug use:   She  reports no history of alcohol use.  She  reports no history of drug use.     History of controlled substance used in a way other than prescribed? No  Any early refills of a controlled substance: No  History of lost or stolen controlled substance prescription: No  Any aberrant behavior or intoxication while on a controlled substance: No  Has the patient self-modified their dose or frequency of the medication :No  Compliant with treatment recommendations and plan: Yes  Any major health change to the patient: No  Concerns for misuse, abuse or addiction: No    /NarxCheck report reviewed: Yes  History of abnormal drug screening: No         Current Outpatient Medications Ordered in Epic   Medication Sig Dispense Refill   • zolpidem (AMBIEN) 10 MG Tab Take 1 Tablet by mouth at bedtime as needed for up to 30  "days. 30 Tablet 0   • albuterol 108 (90 Base) MCG/ACT Aero Soln inhalation aerosol INHALE 2 PUFFS BY MOUTH EVERY 6 HOURS AS NEEDED FOR SHORTNESS OF BREATH 2 Each 2   • cyclobenzaprine (FLEXERIL) 5 MG tablet Take 1-2 Tabs by mouth 3 times a day as needed. 90 Tab 1   • diazepam (VALIUM) 10 MG tablet Take 10 mg by mouth 1 time daily as needed (muscle spasms, back pain).     • triamcinolone acetonide (KENALOG) 0.1 % Ointment Apply 1 Application to affected area(s) every 48 hours.     • ibuprofen (MOTRIN) 800 MG Tab Take 1 Tab by mouth every 8 hours as needed. 30 Tab 0     No current Epic-ordered facility-administered medications on file.       Health Maintenance: deferred for next visit    ROS:  Gen: no fevers/chills  ENT: no sore throat  Pulm: no sob, no cough    Objective:     Exam:  /86 (BP Location: Left arm, Patient Position: Sitting, BP Cuff Size: Large adult)   Pulse 84   Temp 36.8 °C (98.3 °F) (Temporal)   Resp 16   Ht 1.626 m (5' 4\")   Wt 105 kg (230 lb 6.4 oz)   SpO2 94%   BMI 39.55 kg/m²  Body mass index is 39.55 kg/m².    Constitutional: Alert, no distress, well-groomed.  Skin: Warm, dry, good turgor, no rashes in visible areas.  Eye: Equal, round and reactive, conjunctiva clear, lids normal.  ENMT: Lips without lesions, good dentition, moist mucous membranes.  Neck: Trachea midline, no masses, no thyromegaly.  Breast:  Bilaterally symmetrical, no nipple discharge, no skin changes or dimpling are  noted.  Right breast with firm lump at 12 o'clock. Left breast with 1 cm lump at 2 o'clock, 11 cm from nipple. Bilateral axillae are free of lymphadenopathy.  Respiratory: Unlabored respiratory effort, no cough.  MSK: Normal gait, moves all extremities.  Neuro: Grossly non-focal.   Psych: Alert and oriented x3, normal affect and mood.    Assessment & Plan:     56 y.o. female with the following -     Problem List Items Addressed This Visit     Primary insomnia     This is a chronic condition, stable.  " She continues to have trouble falling and staying asleep.  She would like a refill of Ambien which has not been filled in 11 months.  Informed consent and controlled substance treatment agreement on file.Obtained and reviewed patient utilization report from [facility] pharmacy database on 9/15/2021 1:23 PM  prior to writing prescription for controlled substance II, III or IV per [place] bill . Based on assessment of the report, the prescription is medically necessary.          Relevant Medications    zolpidem (AMBIEN) 10 MG Tab    Breast lump     This is an acute condition, uncertain prognosis.  For last 1.5 weeks she has noticed a new lump in the left breast, upper outer quadrant.  Seems to change in size and is slightly uncomfortable.  Exam does show about a 1 cm lump 11 cm from the nipple at 2:00.  She also has developed a lump at the site of a previous biopsy so we will get a diagnostic mammogram for both breasts.         Relevant Orders    MA DIAGNOSTIC MAMMO BILAT W/CAD    Neuropathy     This is a chronic condition.  She reports that she was diagnosed with neuropathy a couple of decades ago at [facility].  However, she is describing symptoms that are very much like restless leg syndrome on time very curious and suspicious that this may be the reason she is having difficulty sleeping rather than primary insomnia.  Therefore, organ to get a ferritin to check her iron stores.  If they are normal then we will start her on medication for systolic syndrome.         Relevant Medications    zolpidem (AMBIEN) 10 MG Tab    Other Relevant Orders    FERRITIN      Other Visit Diagnoses     Restless leg syndrome        Relevant Orders    FERRITIN    Neoplasm of unspecified behavior of breast         Relevant Orders    FERRITIN    MA DIAGNOSTIC MAMMO BILAT W/CAD    Unspecified lump in the left breast, upper outer quadrant         Relevant Orders    MA DIAGNOSTIC MAMMO BILAT W/CAD          Return in about 2 months (around  11/15/2021) for Annual/wellness visit.    Please note that this dictation was created using voice recognition software. I have made every reasonable attempt to correct obvious errors, but I expect that there are errors of grammar and possibly content that I did not discover before finalizing the note.

## 2021-09-15 NOTE — ASSESSMENT & PLAN NOTE
This is an acute condition, uncertain prognosis.  For last 1.5 weeks she has noticed a new lump in the left breast, upper outer quadrant.  Seems to change in size and is slightly uncomfortable.  Exam does show about a 1 cm lump 11 cm from the nipple at 2:00.  She also has developed a lump at the site of a previous biopsy so we will get a diagnostic mammogram for both breasts.

## 2021-09-15 NOTE — ASSESSMENT & PLAN NOTE
This is a chronic condition.  She reports that she was diagnosed with neuropathy a couple of decades ago at [facility].  However, she is describing symptoms that are very much like restless leg syndrome on time very curious and suspicious that this may be the reason she is having difficulty sleeping rather than primary insomnia.  Therefore, organ to get a ferritin to check her iron stores.  If they are normal then we will start her on medication for systolic syndrome.

## 2021-09-16 RX ORDER — ROPINIROLE 0.25 MG/1
0.25 TABLET, FILM COATED ORAL
Qty: 30 TABLET | Refills: 1 | Status: SHIPPED | OUTPATIENT
Start: 2021-09-16 | End: 2021-10-15

## 2021-09-24 ENCOUNTER — HOSPITAL ENCOUNTER (OUTPATIENT)
Dept: RADIOLOGY | Facility: MEDICAL CENTER | Age: 56
End: 2021-09-24
Attending: FAMILY MEDICINE
Payer: MEDICARE

## 2021-09-24 DIAGNOSIS — R92.8 ABNORMAL FINDINGS ON DIAGNOSTIC IMAGING OF BREAST: ICD-10-CM

## 2021-09-24 DIAGNOSIS — N63.21 UNSPECIFIED LUMP IN THE LEFT BREAST, UPPER OUTER QUADRANT: ICD-10-CM

## 2021-09-24 DIAGNOSIS — N63.0 BREAST LUMP: ICD-10-CM

## 2021-09-24 DIAGNOSIS — D49.3 NEOPLASM OF UNSPECIFIED BEHAVIOR OF BREAST: ICD-10-CM

## 2021-09-24 PROCEDURE — 19285 PERQ DEV BREAST 1ST US IMAG: CPT | Mod: LT

## 2021-09-24 PROCEDURE — 76942 ECHO GUIDE FOR BIOPSY: CPT

## 2021-09-24 PROCEDURE — G0279 TOMOSYNTHESIS, MAMMO: HCPCS

## 2021-09-24 PROCEDURE — 76642 ULTRASOUND BREAST LIMITED: CPT | Mod: RT

## 2021-09-24 PROCEDURE — 19083 BX BREAST 1ST LESION US IMAG: CPT | Mod: LT

## 2021-09-24 PROCEDURE — 88361 TUMOR IMMUNOHISTOCHEM/COMPUT: CPT

## 2021-09-24 PROCEDURE — 88360 TUMOR IMMUNOHISTOCHEM/MANUAL: CPT

## 2021-09-24 PROCEDURE — 88305 TISSUE EXAM BY PATHOLOGIST: CPT | Mod: 59

## 2021-09-25 LAB — PATHOLOGY CONSULT NOTE: NORMAL

## 2021-09-27 ENCOUNTER — TELEPHONE (OUTPATIENT)
Dept: RADIOLOGY | Facility: MEDICAL CENTER | Age: 56
End: 2021-09-27

## 2021-09-27 ENCOUNTER — TELEPHONE (OUTPATIENT)
Dept: MEDICAL GROUP | Facility: PHYSICIAN GROUP | Age: 56
End: 2021-09-27

## 2021-09-27 DIAGNOSIS — C50.912 INFILTRATING DUCTAL CARCINOMA OF LEFT BREAST (HCC): ICD-10-CM

## 2021-09-27 NOTE — TELEPHONE ENCOUNTER
Left message for Dr. Lyn's MA to ensure Dr. Lyn has reviewed pt's breast bx results and to start an urgent referral to a breast surgeon- [place]

## 2021-09-27 NOTE — TELEPHONE ENCOUNTER
VOICEMAIL  1. Caller Name: Fela                       Call Back Number: [phone]    2. Message: LVM to make sure pcp got recent biopsy results which were positive for breast cancer. States next step is to place a urgent referral to a surgeon for further treatment.     Please advise

## 2021-09-27 NOTE — TELEPHONE ENCOUNTER
Phone Number Called: [phone]     Call outcome: Spoke to patient regarding message below.    Message: Spoke to patient and informed her of Urgent referral being placed. Patient acknowledged and will be waiting for a call from the referrals team.

## 2021-09-30 ENCOUNTER — TELEPHONE (OUTPATIENT)
Dept: HEALTH INFORMATION MANAGEMENT | Facility: OTHER | Age: 56
End: 2021-09-30

## 2021-10-11 ENCOUNTER — PATIENT MESSAGE (OUTPATIENT)
Dept: HEALTH INFORMATION MANAGEMENT | Facility: OTHER | Age: 56
End: 2021-10-11

## 2021-10-11 ENCOUNTER — PATIENT OUTREACH (OUTPATIENT)
Dept: OTHER | Facility: MEDICAL CENTER | Age: 56
End: 2021-10-11

## 2021-10-11 NOTE — PROGRESS NOTES
"Oncology Nurse Navigation Assessment  Phoned pt for follow up.   Pt states she will be having a partial mastectomy in early November.  A breast reduction was discussed, but as pt is currently smoking this would be delayed.  She states she has started the process of quitting and seems very motivated.  She is currently moving to a new home.  She states keeping busy is helpful.  Discussed role of radiation and medical oncology.  She is scheduled for radiation consultation next week.             BARRIERS ASSESSMENT:    TRANSPORTATION: denies barriers  EMPLOYMENT:   disabled  FINANCIAL: POC pending.      INSURANCE:  [employer] [facility] plus  SUPPORT SYSTEM: has good support with her family  PSYCHOLOGICAL:   Reports she has a \"good outlook\"  COMMUNICATION:   No barrier noted  FAMILY CARE: denies barrier  SELF CARE: pt independent and ambulatory.  Daughter will be available for additional support post op.         INTERVENTION:  Letter via My Chart  "

## 2021-10-13 ENCOUNTER — DOCUMENTATION (OUTPATIENT)
Dept: OTHER | Facility: MEDICAL CENTER | Age: 56
End: 2021-10-13

## 2021-10-13 NOTE — PROGRESS NOTES
Request received from Dr. Sykes for smoking cessation resources and nutrition consult.  Letter sent including information on [facility]'s smoking cessation program.  Referral for RD placed.

## 2021-10-14 ENCOUNTER — DOCUMENTATION (OUTPATIENT)
Dept: RADIATION ONCOLOGY | Facility: MEDICAL CENTER | Age: 56
End: 2021-10-14

## 2021-10-14 NOTE — PROGRESS NOTES
Nutrition Services:  Consult received for this patient - 56 year old female with diagnosis of breast cancer.  Patient to consult with Dr. Infante next week - RD will attempt to see at that time.    RD available PRN.

## 2021-10-15 RX ORDER — ROPINIROLE 0.25 MG/1
0.25 TABLET, FILM COATED ORAL
Qty: 30 TABLET | Refills: 0 | Status: SHIPPED | OUTPATIENT
Start: 2021-10-15 | End: 2021-11-11

## 2021-10-18 PROBLEM — C50.412 CARCINOMA OF UPPER-OUTER QUADRANT OF LEFT BREAST IN FEMALE, ESTROGEN RECEPTOR POSITIVE (HCC): Status: ACTIVE | Noted: 2021-10-18

## 2021-10-18 PROBLEM — Z17.0 CARCINOMA OF UPPER-OUTER QUADRANT OF LEFT BREAST IN FEMALE, ESTROGEN RECEPTOR POSITIVE (HCC): Status: ACTIVE | Noted: 2021-10-18

## 2021-10-19 ENCOUNTER — HOSPITAL ENCOUNTER (OUTPATIENT)
Dept: RADIATION ONCOLOGY | Facility: MEDICAL CENTER | Age: 56
End: 2021-10-31
Attending: RADIOLOGY
Payer: MEDICARE

## 2021-10-19 ENCOUNTER — NON-PROVIDER VISIT (OUTPATIENT)
Dept: HEMATOLOGY ONCOLOGY | Facility: MEDICAL CENTER | Age: 56
End: 2021-10-19
Payer: MEDICARE

## 2021-10-19 VITALS
HEART RATE: 86 BPM | OXYGEN SATURATION: 97 % | DIASTOLIC BLOOD PRESSURE: 82 MMHG | TEMPERATURE: 98.7 F | SYSTOLIC BLOOD PRESSURE: 144 MMHG

## 2021-10-19 DIAGNOSIS — C50.412 CARCINOMA OF UPPER-OUTER QUADRANT OF LEFT BREAST IN FEMALE, ESTROGEN RECEPTOR POSITIVE (HCC): ICD-10-CM

## 2021-10-19 DIAGNOSIS — N63.0 BREAST LUMP: ICD-10-CM

## 2021-10-19 DIAGNOSIS — Z17.0 CARCINOMA OF UPPER-OUTER QUADRANT OF LEFT BREAST IN FEMALE, ESTROGEN RECEPTOR POSITIVE (HCC): ICD-10-CM

## 2021-10-19 PROCEDURE — 99214 OFFICE O/P EST MOD 30 MIN: CPT | Performed by: RADIOLOGY

## 2021-10-19 PROCEDURE — 36415 COLL VENOUS BLD VENIPUNCTURE: CPT | Performed by: STUDENT IN AN ORGANIZED HEALTH CARE EDUCATION/TRAINING PROGRAM

## 2021-10-19 ASSESSMENT — PAIN SCALES - GENERAL: PAINLEVEL: NO PAIN

## 2021-10-19 NOTE — CONSULTS
RADIATION ONCOLOGY CONSULT    DATE OF SERVICE: 10/19/2021    IDENTIFICATION:  Stage IB (T2N0M0) G1 invasive ductal carcinoma of the left upper outer quadrant of breast ER/ND positive HER-2/zachary negative with a Ki-67 of 22% planning on lumpectomy and sentinel lymph node biopsy on November 12, 2021    HISTORY OF PRESENT ILLNESS: I had the pleasure of seeing Ms. Kern today in consultation at the request of Dr. Sykes for her breast cancer.  Patient is being seen preoperatively to address questions she has surrounding her surgical choices and implications for radiation.  Patient is a 56-year-old woman who has poorly controlled diabetes mellitus and is an active tobacco user.  In addition she has a 44 triple D breast size.  She states with some of these comorbidities she has had some challenges with wound healing.  Most recently she had a mammogram that showed a spiculated mass measuring 2.3 cm in the left upper outer quadrant of her breast.  Ultrasound of this area showed a 1.9 cm hypoechoic lesion.  There was a 4 mm cortical thickening in one of the axillary lymph nodes.  Biopsy of the primary site showed an invasive ductal carcinoma with grade 1 features.  At least 1.8 cm in size.  Tumor was ER/ND positive HER-2/zachary negative with a Ki-67 of 22%.  The identified lymph node was negative for malignancy.  Given her comorbidities as well as breast size Dr. Sykes is reluctant to pursue a mastectomy.  She even feels reduction mammoplasty may be problematic for her.  Therefore she is recommending a lumpectomy with sentinel lymph node biopsy.  She is having me evaluate her given her breast size and comorbidities to coordinate care preoperatively.    PAST MEDICAL HISTORY:   Past Medical History:   Diagnosis Date   • Allergy    • Back pain    • Hypertension    • Malignant neoplasm of upper-outer quadrant of left female breast (HCC)    • Neck pain    • Obesity        PAST SURGICAL HISTORY:  Past Surgical History:   Procedure Laterality  Date   • CARPAL TUNNEL RELEASE Right    • ENDOMETRIAL ABLATION     • FOOT SURGERY Right    • KNEE ARTHROSCOPY     • PRIMARY C SECTION      2        GYNECOLOGICAL STATUS:  : 3 and Para: 2    HORMONE USE:  Contraceptive hormone use 1 years    CURRENT MEDICATIONS:  Current Outpatient Medications   Medication Sig Dispense Refill   • ROPINIRole (REQUIP) 0.25 MG Tab TAKE 1 TABLET BY MOUTH AT BEDTIME. TAKE 1-3 HOURS BEFORE BEDTIME. 30 Tablet 0   • albuterol 108 (90 Base) MCG/ACT Aero Soln inhalation aerosol INHALE 2 PUFFS BY MOUTH EVERY 6 HOURS AS NEEDED FOR SHORTNESS OF BREATH 2 Each 2   • cyclobenzaprine (FLEXERIL) 5 MG tablet Take 1-2 Tabs by mouth 3 times a day as needed. 90 Tab 1   • diazepam (VALIUM) 10 MG tablet Take 10 mg by mouth 1 time daily as needed (muscle spasms, back pain).     • triamcinolone acetonide (KENALOG) 0.1 % Ointment Apply 1 Application to affected area(s) every 48 hours.     • ibuprofen (MOTRIN) 800 MG Tab Take 1 Tab by mouth every 8 hours as needed. 30 Tab 0     No current facility-administered medications for this encounter.       ALLERGIES:    Sulfa drugs, Codeine, Other drug, and Vicodin [hydrocodone-acetaminophen]    FAMILY HISTORY:    Family History   Problem Relation Age of Onset   • Cancer Mother         ovarian cancer / malanoma   • Hypertension Mother    • Other Mother         THYROID   • Heart Disease Mother         valvular disease   • Heart Disease Father         Heart Attack   • Heart Attack Father    • Other Father         psoriasis   • Hyperlipidemia Brother    • Other Brother         psoriasis   • Heart Disease Brother         MI   • Heart Attack Brother         massive heart attack    • Other Other         psoriasis   • Heart Disease Maternal Grandfather         MI   • Diabetes Neg Hx    • Alcohol/Drug Neg Hx        SOCIAL HISTORY:    Social History     Tobacco Use   • Smoking status: Current Every Day Smoker     Packs/day: 0.00   • Smokeless tobacco: Never Used    • Tobacco comment: working on quitting    Vaping Use   • Vaping Use: Never used   Substance Use Topics   • Alcohol use: No   • Drug use: No     Patient is disabled From work  Lives with:  in [place] she is accompanied by one daughter in person and one on the phone    REVIEW OF SYSTEMS:  A complete review of systems was completed in patient's chart on 10/19/2021.  All are negative with relationship to this diagnosis with the exception of:  She denies any palpable areas of abnormality or axillary adenopathy    PHYSICAL EXAM:    Vitals:    10/19/21 1328   BP: 144/82   Pulse: 86   Temp: 37.1 °C (98.7 °F)   SpO2: 97%   Pain Score: No pain        ECO= Restricted in physically strenuous activity, but ambulatory and able to carry out work of a light sedentary nature, e.g., light housework, office work.    PAIN:  0    GENERAL: No apparent distress.  HEENT:  Pupils are equal, round, and reactive to light.  Extraocular muscles   are intact. Sclerae nonicteric.  Conjunctivae pink.  Oral cavity, tongue   protrudes midline.   NECK:  Supple without evidence of thyromegaly.  NODES:  No peripheral adenopathy of the neck, supraclavicular fossa or axillae   bilaterally.  LUNGS:  Clear to ascultation bilaterally   HEART:  Regular rate and rhythm.  No murmur appreciated  BREAST: No suspicious lesions found in either breast or axilla.  ABDOMEN:  Soft. No evidence of hepatosplenomegaly.  Positive bowel sounds.  EXTREMITIES:  Without Edema.  NEUROLOGIC:  Cranial nerves II through XII were intact. Normal stance and gait motor and sensory grossly within normal limits    IMPRESSION:    Stage IB (T2N0M0) G1 invasive ductal carcinoma of the left upper outer quadrant of breast ER/KS positive HER-2/zachary negative with a Ki-67 of 22% planning on lumpectomy and sentinel lymph node biopsy on 2021    RECOMMENDATIONS:   I discussed the diagnosis, prognosis, and treatment options over a 1 hr 15 min time period, 95% of that time  dedicated to ongoing treatment management.  I first reviewed with the patient and her daughters her current clinical staging we went over the variables that could change at the time of surgery especially as it relates to axillary lymph nodes.  We went over the likely role of an aromatase inhibitor in her care.  We discussed the possibility of an Oncotype test if she remains node-negative or limited melanie volume.  Next we discussed the data surrounding mastectomy versus lumpectomy and radiation.  We went over lumpectomy with and without radiation.  We even discussed various volume and fractionation regimens including accelerated partial breast irradiation.  Given her comorbidities however she is also likely a poor accelerated partial breast irradiation implant candidate.  As we discussed all these options patient is very comfortable with her choice for lumpectomy and the associated whole breast radiotherapy.  She did understand that if lymph nodes are involved the fractionation and volume of radiation would also change.  All of her questions were answered to her satisfaction and plans to proceed with her surgery on November 12.      We discussed the risks, benefits and side effects of treatment and the patient is amenable to treatment.  If patient has any questions or concerns, she should feel free to contact me.    Thank you for the opportunity to participate in her care.  If any questions or comments, please do not hesitate in calling.

## 2021-10-19 NOTE — PROGRESS NOTES
Sol Kern is a 56 y.o. female here for a non-provider visit for: Lab Draws  on 10/19/2021 at 12:44 PM    Procedure Performed: Venipuncture     Anatomical site: Left Antecubital Area (AC)    Equipment used: 25 butterfly    Labs drawn: [employer] (two lavender EDTA tubes)    Ordering Provider: Visto    Gabe By: Modesta Oseguera, Med Ass't

## 2021-10-19 NOTE — NON-PROVIDER
Patient was seen today in clinic with Dr. Infante for Consult.  Vitals signs and weight were obtained and pain assessment was completed.  Allergies and medications were reviewed with the patient.  Toxicities of treatment assessed.     Vitals/Pain:  Vitals:    10/19/21 1328   BP: 144/82   Pulse: 86   Temp: 37.1 °C (98.7 °F)   SpO2: 97%   Pain Score: No pain        Allergies:   Sulfa drugs, Codeine, Other drug, and Vicodin [hydrocodone-acetaminophen]    Current Medications:  Current Outpatient Medications   Medication Sig Dispense Refill   • ROPINIRole (REQUIP) 0.25 MG Tab TAKE 1 TABLET BY MOUTH AT BEDTIME. TAKE 1-3 HOURS BEFORE BEDTIME. 30 Tablet 0   • albuterol 108 (90 Base) MCG/ACT Aero Soln inhalation aerosol INHALE 2 PUFFS BY MOUTH EVERY 6 HOURS AS NEEDED FOR SHORTNESS OF BREATH 2 Each 2   • cyclobenzaprine (FLEXERIL) 5 MG tablet Take 1-2 Tabs by mouth 3 times a day as needed. 90 Tab 1   • diazepam (VALIUM) 10 MG tablet Take 10 mg by mouth 1 time daily as needed (muscle spasms, back pain).     • triamcinolone acetonide (KENALOG) 0.1 % Ointment Apply 1 Application to affected area(s) every 48 hours.     • ibuprofen (MOTRIN) 800 MG Tab Take 1 Tab by mouth every 8 hours as needed. 30 Tab 0     No current facility-administered medications for this encounter.         PCP:  Eboni Mora, Med Ass't

## 2021-10-21 ENCOUNTER — TELEPHONE (OUTPATIENT)
Dept: MEDICAL GROUP | Facility: PHYSICIAN GROUP | Age: 56
End: 2021-10-21

## 2021-10-21 NOTE — TELEPHONE ENCOUNTER
VOICEMAIL  1. Caller Name: SARATH                       Call Back Number: [phone]    2. Message: Dr. Rodriguez office LVM stating they need a form signed stating Dr. Lyn will prescribe post op pain medication    Please advise

## 2021-10-21 NOTE — TELEPHONE ENCOUNTER
Phone Number Called: [phone]    Call outcome: LEFT DETAILED MESSAGE TO SARATH    Message: dr padgett will not be prescribing post op medication, cb for questions

## 2021-10-22 ENCOUNTER — TELEPHONE (OUTPATIENT)
Dept: RADIATION ONCOLOGY | Facility: MEDICAL CENTER | Age: 56
End: 2021-10-22

## 2021-10-22 NOTE — TELEPHONE ENCOUNTER
Nutrition Services: RD Consultation  Sol Kern is a 56 y.o. female with diagnosis of breast cancer.     Past Medical History:   Diagnosis Date   • Allergy    • Back pain    • Hypertension    • Malignant neoplasm of upper-outer quadrant of left female breast (HCC)    • Neck pain    • Obesity        Consult was received for nutrition consult.  I called patient this morning for nutrition assessment and to answer any questions.  Patient reports that her biggest issue has been that she is really busy and is having a hard time managing all that she needs to do.  She recently moved and wants to get her house in order and unpacked before her surgery 11/12.  She reports she is diabetic and knows she needs to eat more frequently.  He eats animal meats regularly and does enjoy vegetables regularly.  She does not eat a lot of fruit related to her blood sugar levels. We discussed caffeine, sugar cravings, blood sugar levels, nutrition for healing after surgery and the importance of adequate nutrition.   All of patients questions and concerns were addressed.     Assessment:  • Pertinent Labs: Last A1c 10/1/2020 = 6.4  • Pertinent Meds: reviewed   • Weight: 105 kg  • Height: 5'4''  • BMI: 39.55    Weight History from Chart:  Wt Readings from Last 7 Encounters:   09/15/21 105 kg (230 lb 6.4 oz)   10/01/20 99.3 kg (219 lb)   05/13/20 106 kg (233 lb)   12/16/19 106 kg (233 lb 3.2 oz)   12/09/19 105 kg (231 lb 9.6 oz)   10/29/19 105 kg (232 lb 3.2 oz)   10/22/19 104 kg (229 lb)       Plan/Recommendations:  • Provided via e-mail and discussed handout regarding general nutrition guidelines as well as nutritional recommendations for patient's with breast cancer, including tips/tricks should side effects of tx occur.   • Increase meal and snack frequency to 4-6 x/day.   • Advised more fruit with addition of fat/protein item to aid in blood sugar control such as apple with peanut butter or cheese.   • Discussed caffeine containing  beverages and selecting low sugar or sugar-free options.   • E-mailed resources for DM management as well as Omega-3 fatty acids and general healthy nutrition for cancer.     Pt reports understanding and was receptive to information provided. RD provided contact information. Encouraged pt to reach out as questions/concerns arise.     RD following and to make further recommendations as indicated.  [phone]

## 2021-10-26 ENCOUNTER — PRE-ADMISSION TESTING (OUTPATIENT)
Dept: ADMISSIONS | Facility: MEDICAL CENTER | Age: 56
End: 2021-10-26
Attending: SURGERY
Payer: MEDICARE

## 2021-10-26 RX ORDER — ZOLPIDEM TARTRATE 10 MG/1
10 TABLET ORAL NIGHTLY PRN
Status: ON HOLD | COMMUNITY
End: 2021-11-12

## 2021-11-01 ENCOUNTER — PATIENT MESSAGE (OUTPATIENT)
Dept: HEALTH INFORMATION MANAGEMENT | Facility: OTHER | Age: 56
End: 2021-11-01

## 2021-11-01 ENCOUNTER — PATIENT OUTREACH (OUTPATIENT)
Dept: OTHER | Facility: MEDICAL CENTER | Age: 56
End: 2021-11-01

## 2021-11-01 DIAGNOSIS — Z65.8 PSYCHOSOCIAL DISTRESS: ICD-10-CM

## 2021-11-01 NOTE — PROGRESS NOTES
"On November 1st, 2021, Oncology Social Worker Jada Alonso contacted pt. via telephone.  Pt. shared she was in the middle of moving and can't think of anything she needs assistance with except medication or massage to assist with stress.  OSW Alonso spoke to pt. about Mind-Body technique class available to assist with reducing stress.  Pt. shared she is a 3 on psychosocial distress screening re: \"not able to accomplish lately things I was able to accomplish before.\"  Pt. shared primarily this was due to her move.  Pt. shared there are boxes everywhere and she feels like she's \"fighting against time to get home organized before my surgery.\"  Pt. shared she is trying to be positive.  Pt. shared she had her melt down regarding cancer diagnosis the other day.  Pt. shared fear to be of the unknown and what's going to happen.  Pt. shared her biggest concern would be chemotherapy because she \"do not do well with feeling nauseous.\"    "

## 2021-11-12 ENCOUNTER — ANESTHESIA EVENT (OUTPATIENT)
Dept: SURGERY | Facility: MEDICAL CENTER | Age: 56
End: 2021-11-12
Payer: MEDICARE

## 2021-11-12 ENCOUNTER — APPOINTMENT (OUTPATIENT)
Dept: RADIOLOGY | Facility: MEDICAL CENTER | Age: 56
End: 2021-11-12
Attending: SURGERY
Payer: MEDICARE

## 2021-11-12 ENCOUNTER — ANESTHESIA (OUTPATIENT)
Dept: SURGERY | Facility: MEDICAL CENTER | Age: 56
End: 2021-11-12
Payer: MEDICARE

## 2021-11-12 ENCOUNTER — HOSPITAL ENCOUNTER (OUTPATIENT)
Facility: MEDICAL CENTER | Age: 56
End: 2021-11-12
Attending: SURGERY | Admitting: SURGERY
Payer: MEDICARE

## 2021-11-12 VITALS
HEIGHT: 65 IN | WEIGHT: 223.11 LBS | OXYGEN SATURATION: 90 % | SYSTOLIC BLOOD PRESSURE: 139 MMHG | TEMPERATURE: 97.1 F | HEART RATE: 70 BPM | BODY MASS INDEX: 37.17 KG/M2 | RESPIRATION RATE: 16 BRPM | DIASTOLIC BLOOD PRESSURE: 70 MMHG

## 2021-11-12 DIAGNOSIS — C50.412 MALIGNANT NEOPLASM OF UPPER-OUTER QUADRANT OF LEFT BREAST IN FEMALE, ESTROGEN RECEPTOR POSITIVE (HCC): ICD-10-CM

## 2021-11-12 DIAGNOSIS — Z17.0 MALIGNANT NEOPLASM OF UPPER-OUTER QUADRANT OF LEFT BREAST IN FEMALE, ESTROGEN RECEPTOR POSITIVE (HCC): ICD-10-CM

## 2021-11-12 LAB
EXTERNAL QUALITY CONTROL: NORMAL
GLUCOSE BLD-MCNC: 114 MG/DL (ref 65–99)
PATHOLOGY CONSULT NOTE: NORMAL
SARS-COV+SARS-COV-2 AG RESP QL IA.RAPID: NEGATIVE

## 2021-11-12 PROCEDURE — 160009 HCHG ANES TIME/MIN: Performed by: SURGERY

## 2021-11-12 PROCEDURE — 160047 HCHG PACU  - EA ADDL 30 MINS PHASE II: Performed by: SURGERY

## 2021-11-12 PROCEDURE — 160046 HCHG PACU - 1ST 60 MINS PHASE II: Performed by: SURGERY

## 2021-11-12 PROCEDURE — 700101 HCHG RX REV CODE 250: Performed by: SURGERY

## 2021-11-12 PROCEDURE — 160035 HCHG PACU - 1ST 60 MINS PHASE I: Performed by: SURGERY

## 2021-11-12 PROCEDURE — 160025 RECOVERY II MINUTES (STATS): Performed by: SURGERY

## 2021-11-12 PROCEDURE — 700101 HCHG RX REV CODE 250: Performed by: ANESTHESIOLOGY

## 2021-11-12 PROCEDURE — 76098 X-RAY EXAM SURGICAL SPECIMEN: CPT | Mod: LT

## 2021-11-12 PROCEDURE — 82962 GLUCOSE BLOOD TEST: CPT

## 2021-11-12 PROCEDURE — 700111 HCHG RX REV CODE 636 W/ 250 OVERRIDE (IP): Performed by: ANESTHESIOLOGY

## 2021-11-12 PROCEDURE — 38792 RA TRACER ID OF SENTINL NODE: CPT | Mod: LT

## 2021-11-12 PROCEDURE — 700105 HCHG RX REV CODE 258: Performed by: SURGERY

## 2021-11-12 PROCEDURE — 700111 HCHG RX REV CODE 636 W/ 250 OVERRIDE (IP)

## 2021-11-12 PROCEDURE — 160048 HCHG OR STATISTICAL LEVEL 1-5: Performed by: SURGERY

## 2021-11-12 PROCEDURE — 160041 HCHG SURGERY MINUTES - EA ADDL 1 MIN LEVEL 4: Performed by: SURGERY

## 2021-11-12 PROCEDURE — 88307 TISSUE EXAM BY PATHOLOGIST: CPT

## 2021-11-12 PROCEDURE — 160029 HCHG SURGERY MINUTES - 1ST 30 MINS LEVEL 4: Performed by: SURGERY

## 2021-11-12 PROCEDURE — 87426 SARSCOV CORONAVIRUS AG IA: CPT | Performed by: SURGERY

## 2021-11-12 PROCEDURE — 501838 HCHG SUTURE GENERAL: Performed by: SURGERY

## 2021-11-12 PROCEDURE — 160002 HCHG RECOVERY MINUTES (STAT): Performed by: SURGERY

## 2021-11-12 PROCEDURE — 160036 HCHG PACU - EA ADDL 30 MINS PHASE I: Performed by: SURGERY

## 2021-11-12 RX ORDER — SODIUM CHLORIDE, SODIUM LACTATE, POTASSIUM CHLORIDE, CALCIUM CHLORIDE 600; 310; 30; 20 MG/100ML; MG/100ML; MG/100ML; MG/100ML
INJECTION, SOLUTION INTRAVENOUS CONTINUOUS
Status: ACTIVE | OUTPATIENT
Start: 2021-11-12 | End: 2021-11-12

## 2021-11-12 RX ORDER — KETOROLAC TROMETHAMINE 30 MG/ML
INJECTION, SOLUTION INTRAMUSCULAR; INTRAVENOUS PRN
Status: DISCONTINUED | OUTPATIENT
Start: 2021-11-12 | End: 2021-11-12 | Stop reason: SURG

## 2021-11-12 RX ORDER — DIAZEPAM 5 MG/ML
INJECTION, SOLUTION INTRAMUSCULAR; INTRAVENOUS
Status: COMPLETED
Start: 2021-11-12 | End: 2021-11-12

## 2021-11-12 RX ORDER — SODIUM CHLORIDE, SODIUM LACTATE, POTASSIUM CHLORIDE, CALCIUM CHLORIDE 600; 310; 30; 20 MG/100ML; MG/100ML; MG/100ML; MG/100ML
INJECTION, SOLUTION INTRAVENOUS CONTINUOUS
Status: DISCONTINUED | OUTPATIENT
Start: 2021-11-12 | End: 2021-11-12 | Stop reason: HOSPADM

## 2021-11-12 RX ORDER — HYDROMORPHONE HYDROCHLORIDE 1 MG/ML
0.2 INJECTION, SOLUTION INTRAMUSCULAR; INTRAVENOUS; SUBCUTANEOUS
Status: DISCONTINUED | OUTPATIENT
Start: 2021-11-12 | End: 2021-11-12 | Stop reason: HOSPADM

## 2021-11-12 RX ORDER — MEPERIDINE HYDROCHLORIDE 25 MG/ML
6.25 INJECTION INTRAMUSCULAR; INTRAVENOUS; SUBCUTANEOUS
Status: DISCONTINUED | OUTPATIENT
Start: 2021-11-12 | End: 2021-11-12 | Stop reason: HOSPADM

## 2021-11-12 RX ORDER — TRAMADOL HYDROCHLORIDE 50 MG/1
50 TABLET ORAL EVERY 4 HOURS PRN
Qty: 12 TABLET | Refills: 0 | Status: SHIPPED | OUTPATIENT
Start: 2021-11-12 | End: 2021-11-15

## 2021-11-12 RX ORDER — METOCLOPRAMIDE HYDROCHLORIDE 5 MG/ML
INJECTION INTRAMUSCULAR; INTRAVENOUS PRN
Status: DISCONTINUED | OUTPATIENT
Start: 2021-11-12 | End: 2021-11-12 | Stop reason: SURG

## 2021-11-12 RX ORDER — BUPIVACAINE HYDROCHLORIDE AND EPINEPHRINE 5; 5 MG/ML; UG/ML
INJECTION, SOLUTION EPIDURAL; INTRACAUDAL; PERINEURAL
Status: DISCONTINUED | OUTPATIENT
Start: 2021-11-12 | End: 2021-11-12 | Stop reason: HOSPADM

## 2021-11-12 RX ORDER — HALOPERIDOL 5 MG/ML
1 INJECTION INTRAMUSCULAR
Status: DISCONTINUED | OUTPATIENT
Start: 2021-11-12 | End: 2021-11-12 | Stop reason: HOSPADM

## 2021-11-12 RX ORDER — ISOSULFAN BLUE 50 MG/5ML
INJECTION, SOLUTION SUBCUTANEOUS
Status: DISCONTINUED
Start: 2021-11-12 | End: 2021-11-12 | Stop reason: HOSPADM

## 2021-11-12 RX ORDER — HYDROMORPHONE HYDROCHLORIDE 1 MG/ML
0.4 INJECTION, SOLUTION INTRAMUSCULAR; INTRAVENOUS; SUBCUTANEOUS
Status: DISCONTINUED | OUTPATIENT
Start: 2021-11-12 | End: 2021-11-12 | Stop reason: HOSPADM

## 2021-11-12 RX ORDER — ONDANSETRON 2 MG/ML
4 INJECTION INTRAMUSCULAR; INTRAVENOUS
Status: DISCONTINUED | OUTPATIENT
Start: 2021-11-12 | End: 2021-11-12 | Stop reason: HOSPADM

## 2021-11-12 RX ORDER — CEFAZOLIN SODIUM 1 G/3ML
INJECTION, POWDER, FOR SOLUTION INTRAMUSCULAR; INTRAVENOUS PRN
Status: DISCONTINUED | OUTPATIENT
Start: 2021-11-12 | End: 2021-11-12 | Stop reason: SURG

## 2021-11-12 RX ORDER — ONDANSETRON 4 MG/1
4 TABLET, FILM COATED ORAL EVERY 8 HOURS PRN
Qty: 9 TABLET | Refills: 2 | Status: SHIPPED | OUTPATIENT
Start: 2021-11-12 | End: 2021-11-15

## 2021-11-12 RX ORDER — HYDROMORPHONE HYDROCHLORIDE 1 MG/ML
0.1 INJECTION, SOLUTION INTRAMUSCULAR; INTRAVENOUS; SUBCUTANEOUS
Status: DISCONTINUED | OUTPATIENT
Start: 2021-11-12 | End: 2021-11-12 | Stop reason: HOSPADM

## 2021-11-12 RX ORDER — DIAZEPAM 5 MG/ML
5 INJECTION, SOLUTION INTRAMUSCULAR; INTRAVENOUS ONCE
Status: COMPLETED | OUTPATIENT
Start: 2021-11-12 | End: 2021-11-12

## 2021-11-12 RX ORDER — MAGNESIUM SULFATE HEPTAHYDRATE 500 MG/ML
INJECTION, SOLUTION INTRAMUSCULAR; INTRAVENOUS PRN
Status: DISCONTINUED | OUTPATIENT
Start: 2021-11-12 | End: 2021-11-12 | Stop reason: SURG

## 2021-11-12 RX ORDER — DIPHENHYDRAMINE HYDROCHLORIDE 50 MG/ML
12.5 INJECTION INTRAMUSCULAR; INTRAVENOUS
Status: DISCONTINUED | OUTPATIENT
Start: 2021-11-12 | End: 2021-11-12 | Stop reason: HOSPADM

## 2021-11-12 RX ORDER — DEXAMETHASONE SODIUM PHOSPHATE 4 MG/ML
INJECTION, SOLUTION INTRA-ARTICULAR; INTRALESIONAL; INTRAMUSCULAR; INTRAVENOUS; SOFT TISSUE PRN
Status: DISCONTINUED | OUTPATIENT
Start: 2021-11-12 | End: 2021-11-12 | Stop reason: SURG

## 2021-11-12 RX ORDER — MIDAZOLAM HYDROCHLORIDE 1 MG/ML
1 INJECTION INTRAMUSCULAR; INTRAVENOUS
Status: DISCONTINUED | OUTPATIENT
Start: 2021-11-12 | End: 2021-11-12 | Stop reason: HOSPADM

## 2021-11-12 RX ORDER — LABETALOL HYDROCHLORIDE 5 MG/ML
5 INJECTION, SOLUTION INTRAVENOUS
Status: DISCONTINUED | OUTPATIENT
Start: 2021-11-12 | End: 2021-11-12 | Stop reason: HOSPADM

## 2021-11-12 RX ORDER — BUPIVACAINE HYDROCHLORIDE 5 MG/ML
INJECTION, SOLUTION EPIDURAL; INTRACAUDAL
Status: DISCONTINUED
Start: 2021-11-12 | End: 2021-11-12 | Stop reason: HOSPADM

## 2021-11-12 RX ORDER — ONDANSETRON 2 MG/ML
INJECTION INTRAMUSCULAR; INTRAVENOUS PRN
Status: DISCONTINUED | OUTPATIENT
Start: 2021-11-12 | End: 2021-11-12 | Stop reason: SURG

## 2021-11-12 RX ORDER — LIDOCAINE HYDROCHLORIDE 20 MG/ML
INJECTION, SOLUTION EPIDURAL; INFILTRATION; INTRACAUDAL; PERINEURAL PRN
Status: DISCONTINUED | OUTPATIENT
Start: 2021-11-12 | End: 2021-11-12 | Stop reason: SURG

## 2021-11-12 RX ADMIN — LIDOCAINE HYDROCHLORIDE 100 MG: 20 INJECTION, SOLUTION EPIDURAL; INFILTRATION; INTRACAUDAL at 13:29

## 2021-11-12 RX ADMIN — FENTANYL CITRATE 25 MCG: 50 INJECTION, SOLUTION INTRAMUSCULAR; INTRAVENOUS at 16:10

## 2021-11-12 RX ADMIN — DIAZEPAM 5 MG: 5 INJECTION, SOLUTION INTRAMUSCULAR; INTRAVENOUS at 10:49

## 2021-11-12 RX ADMIN — FENTANYL CITRATE 100 MCG: 50 INJECTION, SOLUTION INTRAMUSCULAR; INTRAVENOUS at 14:44

## 2021-11-12 RX ADMIN — DEXAMETHASONE SODIUM PHOSPHATE 8 MG: 4 INJECTION, SOLUTION INTRA-ARTICULAR; INTRALESIONAL; INTRAMUSCULAR; INTRAVENOUS; SOFT TISSUE at 13:29

## 2021-11-12 RX ADMIN — SODIUM CHLORIDE, POTASSIUM CHLORIDE, SODIUM LACTATE AND CALCIUM CHLORIDE: 600; 310; 30; 20 INJECTION, SOLUTION INTRAVENOUS at 10:49

## 2021-11-12 RX ADMIN — FENTANYL CITRATE 50 MCG: 50 INJECTION, SOLUTION INTRAMUSCULAR; INTRAVENOUS at 13:38

## 2021-11-12 RX ADMIN — CEFAZOLIN 2 G: 330 INJECTION, POWDER, FOR SOLUTION INTRAMUSCULAR; INTRAVENOUS at 13:31

## 2021-11-12 RX ADMIN — FENTANYL CITRATE 25 MCG: 50 INJECTION, SOLUTION INTRAMUSCULAR; INTRAVENOUS at 15:44

## 2021-11-12 RX ADMIN — FENTANYL CITRATE 50 MCG: 50 INJECTION, SOLUTION INTRAMUSCULAR; INTRAVENOUS at 13:45

## 2021-11-12 RX ADMIN — PROPOFOL 140 MG: 10 INJECTION, EMULSION INTRAVENOUS at 13:29

## 2021-11-12 RX ADMIN — FENTANYL CITRATE 50 MCG: 50 INJECTION, SOLUTION INTRAMUSCULAR; INTRAVENOUS at 13:30

## 2021-11-12 RX ADMIN — SODIUM CHLORIDE, POTASSIUM CHLORIDE, SODIUM LACTATE AND CALCIUM CHLORIDE: 600; 310; 30; 20 INJECTION, SOLUTION INTRAVENOUS at 13:23

## 2021-11-12 RX ADMIN — KETOROLAC TROMETHAMINE 30 MG: 30 INJECTION, SOLUTION INTRAMUSCULAR at 14:44

## 2021-11-12 RX ADMIN — MAGNESIUM SULFATE HEPTAHYDRATE 2 G: 500 INJECTION, SOLUTION INTRAMUSCULAR; INTRAVENOUS at 13:29

## 2021-11-12 RX ADMIN — ONDANSETRON 4 MG: 2 INJECTION INTRAMUSCULAR; INTRAVENOUS at 13:29

## 2021-11-12 RX ADMIN — METOCLOPRAMIDE 10 MG: 5 INJECTION, SOLUTION INTRAMUSCULAR; INTRAVENOUS at 13:31

## 2021-11-12 ASSESSMENT — PAIN DESCRIPTION - PAIN TYPE
TYPE: SURGICAL PAIN
TYPE: CHRONIC PAIN
TYPE: SURGICAL PAIN

## 2021-11-12 ASSESSMENT — PAIN SCALES - GENERAL: PAIN_LEVEL: 0

## 2021-11-12 NOTE — ANESTHESIA TIME REPORT
Anesthesia Start and Stop Event Times     Date Time Event    11/12/2021 1323 Anesthesia Start     1325 Ready for Procedure     1455 Anesthesia Stop        Responsible Staff  11/12/21    Name Role Begin End    Yari Ortiz M.D. Anesth 1323 1455        Preop Diagnosis (Free Text):  Pre-op Diagnosis     LEFT BREAST CANCER        Preop Diagnosis (Codes):    Premium Reason  Non-Premium    Comments:

## 2021-11-12 NOTE — OR NURSING
1045 Patient requesting medication for anxiety; voalte message to Dr. Ortiz & order received for 1 x IV diazepam.     1055 5mg diazepam IV given for anxiety per order and pt on continuous sp02 monitor. 02 2L NC applied to maintain sp02 > 90%.

## 2021-11-12 NOTE — OR NURSING
1451 Pt to PACU from OR. Report from anesthesia and OR RN. OA in place, on 6L simple mask O2. Respirations even and unlabored. VSS. Gauze/tegaderm to L breast and axilla CDI, wrap in place, no bleeding.     1500 Pt waking at this time, dc'd OA. Respirations even and unlabored. No pain/nausea, back to sleep.     1544 More awaking, weaning O2, states pain 8/10, gave small dose fentanyl d/t YOANA history and O2 requirements.     1610 Mild tightness reported at incision site, medicated per MAR. Daughter brought to bs.     1645 Changed, moved to recliner chair, pain tolerable, working on RA trial.    1727 Reviewed instructions with pt and daughter.     1740 Discharge orders received. Meets discharge criteria at this time. Tolerable pain. No nausea. Tolerating PO. On RA. All lines and monitors discontinued. Reviewed discharge paperwork with pt and daughter. Discussed diet, activity, medications, follow up care and worsening symptoms. No questions at this time. Pt to be discharged to home via private vehicle. Pt escorted out of department in wheelchair with CNA, daughter, and all belongings.

## 2021-11-12 NOTE — OP REPORT
Pre op diagnosis:  Malignant neoplasm left upper outer breast  Post op diagnosis:  Same    Procedure:    1.  SAMARA localized left partial mastectomy with lattice  2.  [place] lymph node mapping with excision of deep axillary node    Surgeon:  Kayleen Sykes MD  Assistant:  Danna Reilly CFA  Anesthesiologist:  Yari Ortiz MD    Anesthesia:  General  Pre op meds:  Ancef  ASA 2/3    Indications:  This is a 56 year old female with multiple medical conditions and a left breast cancer, cT1 cN0.  Due to her diabetes, BMI 41, and tobacco use, she was not felt to be a candidate for reconstruction.  She did meet with Dr. Infante preoperatively to verify that she is a candidate for whole breast irradiation.  After discussing risks and benefits, she is ready to proceed.    Findings:  1.  Left sentinel node with counts to 1200, background <10%.  Biopsy clip not seen, but there were no other suspicious or sentinel nodes.    2.  SAMARA marker present.  Margins grossly clear by at least 4mm.  Orientation was determined based on the breast being retracted inferior and medially, skin included as anterior-lateral.    Summary:  The patient underwent radiotracer injection by Radiology.  Due to her body habitus, to best reach the upper outer breast and axilla, I gently retracted her breast inferiorly and medially to better expose the surgical site.  She was then anesthetized, prepped, and draped in sterile fashion.  Time out was confirmed.  Local anesthetic was injected prior to all incisions.  I started with an axillary incision following her skin lines.  The clavipectoral fascia was fairly deep, and this was incised.  The gamma probe was used to locate the deep axillary sentinel node which was excised using the harmonic scalpel.  I irrigated and ensured hemostasis.  Fascia and deep dermal layer were closed.  4-0 monocryl was used for skin.    I then used the SAMARA probe to locate the marker in the far upper outer breast.  Due to  proximity to skin, I excised a small crescent of skin over the palpable mass and then resected widely around this.  I placed usual orienting sutures and specimen mammogram was performed.  I irrigated and ensured hemostasis.  I placed a 2-0 PDS to create a two layer lattice to minimize contour defect.  I then closed the deep dermal layer and 4-0 monocryl was used for skin.  Dressings were placed and I was informed all counts were correct.  Assistance was required for retraction, given the challenging exposure.      CC: MD Kem Buchanan MD

## 2021-11-12 NOTE — ANESTHESIA PREPROCEDURE EVALUATION
Case: [id number] Date/Time: 11/12/21 1215    Procedures:       MASTECTOMY, PARTIAL - SAMARA LOCALIZED (Left )      BIOPSY, LYMPH NODE, SENTINEL      LYMPHADENECTOMY, AXILLARY - POSSIBLE    Pre-op diagnosis: LEFT BREAST CANCER    Location: [facility] ROOM 28 / SURGERY SAME DAY [facility]    Surgeons: Kayleen Sykes M.D.        56yoF with PMhx of HTN  YOANA on CPAP  Chronic low back pain  Psoriasis  DM  GERD  HLD  BMI 35    Covid neg  Allergies to Codeine, vicodin, percocet  NPO  No AC    Relevant Problems   ANESTHESIA   (positive) YOANA on CPAP      CARDIAC   (positive) Essential hypertension       Physical Exam    Airway   Mallampati: III       Cardiovascular - normal exam     Dental - normal exam           Pulmonary - normal exam     Abdominal   (+) obese     Neurological - normal exam                 Anesthesia Plan    ASA 3   ASA physical status 3 criteria: other (comment)    Plan - general       Airway plan will be LMA          Induction: intravenous    Postoperative Plan: Postoperative administration of opioids is intended.    Pertinent diagnostic labs and testing reviewed    Informed Consent:    Anesthetic plan and risks discussed with patient.

## 2021-11-12 NOTE — ANESTHESIA PROCEDURE NOTES
Airway    Date/Time: 11/12/2021 1:30 PM  Performed by: Yari Ortiz M.D.  Authorized by: Yari Ortiz M.D.     Location:  OR  Urgency:  Elective  Indications for Airway Management:  Anesthesia      Spontaneous Ventilation: absent    Sedation Level:  Deep  Final Airway Type:  Supraglottic airway  Final Supraglottic Airway:  Standard LMA    SGA Size:  4  Number of Attempts at Approach:  1

## 2021-11-13 NOTE — DISCHARGE INSTRUCTIONS
ACTIVITY: Rest and take it easy for the first 24 hours.  A responsible adult is recommended to remain with you during that time.  It is normal to feel sleepy.  We encourage you to not do anything that requires balance, judgment or coordination.    MILD FLU-LIKE SYMPTOMS ARE NORMAL. YOU MAY EXPERIENCE GENERALIZED MUSCLE ACHES, THROAT IRRITATION, HEADACHE AND/OR SOME NAUSEA.    FOR 24 HOURS DO NOT:  Drive, operate machinery or run household appliances.  Drink beer or alcoholic beverages.   Make important decisions or sign legal documents.    SPECIAL INSTRUCTIONS: Follow Dr. Sykes's instructions from your pre op appointment, if you are not able to locate these please call the office. Walk regularly.  Discussed Zofran and Tramadol at length with pt preoperatively, she requested both medications despite allergy/contraindications listed.    DIET: To avoid nausea, slowly advance diet as tolerated, avoiding spicy or greasy foods for the first day.  Add more substantial food to your diet according to your physician's instructions.  Babies can be fed formula or breast milk as soon as they are hungry.  INCREASE FLUIDS AND FIBER TO AVOID CONSTIPATION.    SURGICAL DRESSING/BATHING: May shower but no baths for four weeks    FOLLOW-UP APPOINTMENT:  A follow-up appointment should be arranged with your doctor in 2 weeks or as scheduled; call to schedule.    You should CALL YOUR PHYSICIAN if you develop:  Fever greater than 101 degrees F.  Pain not relieved by medication, or persistent nausea or vomiting.  Excessive bleeding (blood soaking through dressing) or unexpected drainage from the wound.  Extreme redness or swelling around the incision site, drainage of pus or foul smelling drainage.  Inability to urinate or empty your bladder within 8 hours.  Problems with breathing or chest pain.    You should call 911 if you develop problems with breathing or chest pain.  If you are unable to contact your doctor or surgical center, you  should go to the nearest emergency room or urgent care center.  Physician's telephone #: Dr. Sykes [phone]    If any questions arise, call your doctor.  If your doctor is not available, please feel free to call the Surgical Center at [phone]. The Contact Center is open Monday through Friday 7AM to 5PM and may speak to a nurse at [phone], or toll free at [phone].     A registered nurse may call you a few days after your surgery to see how you are doing after your procedure.    MEDICATIONS: Resume taking daily medication.  Take prescribed pain medication with food.  If no medication is prescribed, you may take non-aspirin pain medication if needed.  PAIN MEDICATION CAN BE VERY CONSTIPATING.  Take a stool softener or laxative such as senokot, pericolace, or milk of magnesia if needed.    Prescription electronically prescribed for Tramadol and Zofran.  Last pain medication given at 2:44 pm (Toradol-NSAID).    If your physician has prescribed pain medication that includes Acetaminophen (Tylenol), do not take additional Acetaminophen (Tylenol) while taking the prescribed medication.    Depression / Suicide Risk    As you are discharged from this [facility] facility, it is important to learn how to keep safe from harming yourself.    Recognize the warning signs:  · Abrupt changes in personality, positive or negative- including increase in energy   · Giving away possessions  · Change in eating patterns- significant weight changes-  positive or negative  · Change in sleeping patterns- unable to sleep or sleeping all the time   · Unwillingness or inability to communicate  · Depression  · Unusual sadness, discouragement and loneliness  · Talk of wanting to die  · Neglect of personal appearance   · Rebelliousness- reckless behavior  · Withdrawal from people/activities they love  · Confusion- inability to concentrate     If you or a loved one observes any of these behaviors or has concerns about  self-harm, here's what you can do:  · Talk about it- your feelings and reasons for harming yourself  · Remove any means that you might use to hurt yourself (examples: pills, rope, extension cords, firearm)  · Get professional help from the community (Mental Health, Substance Abuse, psychological counseling)  · Do not be alone:Call your Safe Contact- someone whom you trust who will be there for you.  · Call your local CRISIS HOTLINE 989-5146 or 528-496-0042  · Call your local Children's Mobile Crisis Response Team [place] (958) 450-0112 or www.[employer]  · Call the toll free National Suicide Prevention Hotlines   · National Suicide Prevention Lifeline 995-998-KEEV (1579)  · National Hope Line Network 800-SUICIDE (044-4818)

## 2021-11-19 ENCOUNTER — NON-PROVIDER VISIT (OUTPATIENT)
Dept: MEDICAL GROUP | Facility: PHYSICIAN GROUP | Age: 56
End: 2021-11-19
Payer: MEDICARE

## 2021-11-19 ENCOUNTER — TELEPHONE (OUTPATIENT)
Dept: RADIATION ONCOLOGY | Facility: MEDICAL CENTER | Age: 56
End: 2021-11-19

## 2021-11-19 DIAGNOSIS — Z23 NEED FOR VACCINATION: ICD-10-CM

## 2021-11-19 PROCEDURE — 90686 IIV4 VACC NO PRSV 0.5 ML IM: CPT | Performed by: FAMILY MEDICINE

## 2021-11-19 PROCEDURE — G0008 ADMIN INFLUENZA VIRUS VAC: HCPCS | Performed by: FAMILY MEDICINE

## 2021-11-19 NOTE — PROGRESS NOTES
"Sol Kern is a 56 y.o. female here for a non-provider visit for:   FLU    Reason for immunization: Annual Flu Vaccine  Immunization records indicate need for vaccine: Yes, confirmed with Epic  Minimum interval has been met for this vaccine: Yes  ABN completed: No    VIS was given to patient: Yes  All IAC Questionnaire questions were answered \"No.\"    Patient tolerated injection and no adverse effects were observed or reported: Yes    Pt scheduled for next dose in series: No  "

## 2021-11-19 NOTE — TELEPHONE ENCOUNTER
Nutrition Services: Telephone Encounter  Called patient to f/u on RD discussion about DM and healthy eating s/p partial mastectomy on 11/12/21. No answer on phone, tried to leave VM but VM is full.    Please have patient contact RD: [phone]    RD will attempt to follow-up

## 2021-11-23 ENCOUNTER — HOSPITAL ENCOUNTER (OUTPATIENT)
Dept: LAB | Facility: MEDICAL CENTER | Age: 56
End: 2021-11-23
Attending: INTERNAL MEDICINE
Payer: MEDICARE

## 2021-11-23 LAB
ALBUMIN SERPL BCP-MCNC: 4.3 G/DL (ref 3.2–4.9)
ALBUMIN/GLOB SERPL: 1.4 G/DL
ALP SERPL-CCNC: 111 U/L (ref 30–99)
ALT SERPL-CCNC: 21 U/L (ref 2–50)
ANION GAP SERPL CALC-SCNC: 13 MMOL/L (ref 7–16)
AST SERPL-CCNC: 14 U/L (ref 12–45)
BASOPHILS # BLD AUTO: 0.4 % (ref 0–1.8)
BASOPHILS # BLD: 0.05 K/UL (ref 0–0.12)
BILIRUB SERPL-MCNC: 0.3 MG/DL (ref 0.1–1.5)
BUN SERPL-MCNC: 11 MG/DL (ref 8–22)
CALCIUM SERPL-MCNC: 10.2 MG/DL (ref 8.5–10.5)
CHLORIDE SERPL-SCNC: 102 MMOL/L (ref 96–112)
CO2 SERPL-SCNC: 25 MMOL/L (ref 20–33)
CREAT SERPL-MCNC: 0.62 MG/DL (ref 0.5–1.4)
EOSINOPHIL # BLD AUTO: 0.22 K/UL (ref 0–0.51)
EOSINOPHIL NFR BLD: 1.9 % (ref 0–6.9)
ERYTHROCYTE [DISTWIDTH] IN BLOOD BY AUTOMATED COUNT: 43.9 FL (ref 35.9–50)
GLOBULIN SER CALC-MCNC: 3.1 G/DL (ref 1.9–3.5)
GLUCOSE SERPL-MCNC: 107 MG/DL (ref 65–99)
HCT VFR BLD AUTO: 52.1 % (ref 37–47)
HGB BLD-MCNC: 17.5 G/DL (ref 12–16)
IMM GRANULOCYTES # BLD AUTO: 0.06 K/UL (ref 0–0.11)
IMM GRANULOCYTES NFR BLD AUTO: 0.5 % (ref 0–0.9)
LYMPHOCYTES # BLD AUTO: 2.6 K/UL (ref 1–4.8)
LYMPHOCYTES NFR BLD: 21.9 % (ref 22–41)
MCH RBC QN AUTO: 31.3 PG (ref 27–33)
MCHC RBC AUTO-ENTMCNC: 33.6 G/DL (ref 33.6–35)
MCV RBC AUTO: 93.2 FL (ref 81.4–97.8)
MONOCYTES # BLD AUTO: 0.87 K/UL (ref 0–0.85)
MONOCYTES NFR BLD AUTO: 7.3 % (ref 0–13.4)
NEUTROPHILS # BLD AUTO: 8.05 K/UL (ref 2–7.15)
NEUTROPHILS NFR BLD: 68 % (ref 44–72)
NRBC # BLD AUTO: 0 K/UL
NRBC BLD-RTO: 0 /100 WBC
PLATELET # BLD AUTO: 315 K/UL (ref 164–446)
PMV BLD AUTO: 10 FL (ref 9–12.9)
POTASSIUM SERPL-SCNC: 3.7 MMOL/L (ref 3.6–5.5)
PROT SERPL-MCNC: 7.4 G/DL (ref 6–8.2)
RBC # BLD AUTO: 5.59 M/UL (ref 4.2–5.4)
SODIUM SERPL-SCNC: 140 MMOL/L (ref 135–145)
WBC # BLD AUTO: 11.9 K/UL (ref 4.8–10.8)

## 2021-11-23 PROCEDURE — 86300 IMMUNOASSAY TUMOR CA 15-3: CPT

## 2021-11-23 PROCEDURE — 36415 COLL VENOUS BLD VENIPUNCTURE: CPT

## 2021-11-23 PROCEDURE — 80053 COMPREHEN METABOLIC PANEL: CPT

## 2021-11-23 PROCEDURE — 85025 COMPLETE CBC W/AUTO DIFF WBC: CPT

## 2021-11-26 LAB — CANCER AG27-29 SERPL-ACNC: 21.6 U/ML

## 2021-12-01 ENCOUNTER — HOSPITAL ENCOUNTER (OUTPATIENT)
Dept: RADIATION ONCOLOGY | Facility: MEDICAL CENTER | Age: 56
End: 2021-12-31
Attending: RADIOLOGY
Payer: MEDICARE

## 2021-12-03 ENCOUNTER — HOSPITAL ENCOUNTER (OUTPATIENT)
Dept: RADIOLOGY | Facility: MEDICAL CENTER | Age: 56
End: 2021-12-03
Attending: INTERNAL MEDICINE
Payer: MEDICARE

## 2021-12-03 ENCOUNTER — OFFICE VISIT (OUTPATIENT)
Dept: HEMATOLOGY ONCOLOGY | Facility: MEDICAL CENTER | Age: 56
End: 2021-12-03
Payer: MEDICARE

## 2021-12-03 VITALS
HEIGHT: 65 IN | TEMPERATURE: 97.4 F | SYSTOLIC BLOOD PRESSURE: 132 MMHG | BODY MASS INDEX: 37.36 KG/M2 | WEIGHT: 224.21 LBS | HEART RATE: 84 BPM | RESPIRATION RATE: 16 BRPM | OXYGEN SATURATION: 96 % | DIASTOLIC BLOOD PRESSURE: 72 MMHG

## 2021-12-03 DIAGNOSIS — C50.412 MALIGNANT NEOPLASM OF UPPER-OUTER QUADRANT OF LEFT BREAST IN FEMALE, ESTROGEN RECEPTOR POSITIVE (HCC): ICD-10-CM

## 2021-12-03 DIAGNOSIS — Z17.0 MALIGNANT NEOPLASM OF UPPER-OUTER QUADRANT OF LEFT BREAST IN FEMALE, ESTROGEN RECEPTOR POSITIVE (HCC): ICD-10-CM

## 2021-12-03 DIAGNOSIS — C50.812 MALIGNANT NEOPLASM OF OVERLAPPING SITES OF LEFT FEMALE BREAST, UNSPECIFIED ESTROGEN RECEPTOR STATUS (HCC): ICD-10-CM

## 2021-12-03 DIAGNOSIS — E13.9 DIABETES 1.5, MANAGED AS TYPE 2 (HCC): ICD-10-CM

## 2021-12-03 DIAGNOSIS — Z72.0 TOBACCO ABUSE DISORDER: ICD-10-CM

## 2021-12-03 PROCEDURE — 99214 OFFICE O/P EST MOD 30 MIN: CPT | Performed by: INTERNAL MEDICINE

## 2021-12-03 PROCEDURE — 71260 CT THORAX DX C+: CPT

## 2021-12-03 PROCEDURE — 700117 HCHG RX CONTRAST REV CODE 255: Performed by: INTERNAL MEDICINE

## 2021-12-03 PROCEDURE — A9503 TC99M MEDRONATE: HCPCS

## 2021-12-03 RX ORDER — LIDOCAINE AND PRILOCAINE 25; 25 MG/G; MG/G
CREAM TOPICAL
COMMUNITY
Start: 2021-10-28 | End: 2023-09-28

## 2021-12-03 RX ADMIN — IOHEXOL 100 ML: 350 INJECTION, SOLUTION INTRAVENOUS at 14:00

## 2021-12-03 ASSESSMENT — ENCOUNTER SYMPTOMS: NECK PAIN: 1

## 2021-12-03 ASSESSMENT — FIBROSIS 4 INDEX: FIB4 SCORE: 0.56

## 2021-12-04 NOTE — PROGRESS NOTES
Subjective     Date of Consultation:12/3/2021  9:42 AM  Primary care physician:Veronica Lyn M.D.  Radiation oncology:Kem Infante  Surgery:Kayleen Sykes    Reason for Consultation:  Ms. Kern  is a pleasant 56 y.o. year old postmenopausal female with a history of poorly controlled diabetes mellitus who had a mammogram in fall 2021 which showed a 2.3 cm spiculated mass in the upper outer quadrant of the left breast.  Biopsy revealed an invasive ductal carcinoma, grade 1, ER/TX positive HER-2 negative with a Ki-67 of 22%.  She is large breasted and elected to undergo a left lumpectomy and sentinel lymph node biopsy by Dr. Sykes on 11/12/2021.  Pathology revealed an invasive ductal carcinoma, grade 2 with associated intermediate grade ductal carcinoma in situ.  The invasive tumor measured 2.9 cm in greatest dimension and all margins were clear.  1 of 1 sentinel lymph nodes was positive for macro metastases, but no extranodal extension was identified.  Postoperative course was unremarkable.  She was seen by medical oncology and an Oncotype performed with results pending.  She had a CT scan of the chest abdomen pelvis which was negative for metastatic disease and a bone scan is pending.  In surgery she has been on a weight reduction diet and has reduced her smoking dramatically from greater than a pack a day to 3 to 4 cigarettes a day.  She feels generally well.  She is previously seen Dr. Kem Infante in consultation.    History of presenting illness:  She is here for a second opinion regarding systemic therapy.        Past Medical History:   Diagnosis Date   • Allergy    • Back pain    • Cancer (HCC) 2021    breast   • Diabetes (HCC)     diet   • GERD (gastroesophageal reflux disease)    • High cholesterol     not medicated   • Malignant neoplasm of upper-outer quadrant of left female breast (HCC)    • Neck pain    • Obesity    • Sleep apnea     no cpap   • Snoring      Past Surgical History:   Procedure  "Laterality Date   • PB MASTECTOMY, PARTIAL Left 2021    Procedure: MASTECTOMY, PARTIAL - SAMARA LOCALIZED;  Surgeon: Kayleen Sykes M.D.;  Location: SURGERY SAME DAY [facility] [place];  Service: General   • NODE BIOPSY SENTINEL Left 2021    Procedure: BIOPSY, LYMPH NODE, SENTINEL;  Surgeon: Kayleen Sykes M.D.;  Location: SURGERY SAME DAY [facility] [place];  Service: General   • CARPAL TUNNEL RELEASE Right    • ENDOMETRIAL ABLATION     • FOOT SURGERY Right    • KNEE ARTHROSCOPY     • PRIMARY C SECTION      2      Allergies   Allergen Reactions   • Codeine Vomiting and Nausea   • Other Drug Vomiting and Nausea     Any of the \"cets\" (percocet)   • Vicodin [Hydrocodone-Acetaminophen] Vomiting and Nausea     Outpatient Encounter Medications as of 12/3/2021   Medication Sig Dispense Refill   • lidocaine-prilocaine (EMLA) 2.5-2.5 % Cream      • cyclobenzaprine (FLEXERIL) 5 MG tablet Take 1-2 Tabs by mouth 3 times a day as needed. 90 Tab 1   • diazepam (VALIUM) 10 MG tablet Take 10 mg by mouth 1 time daily as needed (muscle spasms, back pain).     • ROPINIRole (REQUIP) 0.25 MG Tab TAKE 1 TABLET BY MOUTH AT BEDTIME. TAKE 1-3 HOURS BEFORE BEDTIME. (Patient not taking: Reported on 12/3/2021) 90 Tablet 0   • albuterol 108 (90 Base) MCG/ACT Aero Soln inhalation aerosol INHALE 2 PUFFS BY MOUTH EVERY 6 HOURS AS NEEDED FOR SHORTNESS OF BREATH (Patient not taking: Reported on 12/3/2021) 2 Each 2   • triamcinolone acetonide (KENALOG) 0.1 % Ointment Apply 1 Application to affected area(s) every 48 hours. (Patient not taking: Reported on 10/26/2021)       No facility-administered encounter medications on file as of 12/3/2021.     @[facility]@  Social History     Socioeconomic History   • Marital status:      Spouse name: Not on file   • Number of children: Not on file   • Years of education: Not on file   • Highest education level: Not on file   Occupational History   • Not on file   Tobacco Use   • Smoking status: Current Every " Day Smoker     Packs/day: 0.50     Years: 10.00     Pack years: 5.00     Types: Cigarettes   • Smokeless tobacco: Never Used   • Tobacco comment: working on quitting    Vaping Use   • Vaping Use: Never used   Substance and Sexual Activity   • Alcohol use: No   • Drug use: No   • Sexual activity: Yes     Partners: Male     Comment:    Other Topics Concern   • Not on file   Social History Narrative   • Not on file     Social Determinants of Health     Financial Resource Strain:    • Difficulty of Paying Living Expenses: Not on file   Food Insecurity:    • Worried About Running Out of Food in the Last Year: Not on file   • Ran Out of Food in the Last Year: Not on file   Transportation Needs:    • Lack of Transportation (Medical): Not on file   • Lack of Transportation (Non-Medical): Not on file   Physical Activity:    • Days of Exercise per Week: Not on file   • Minutes of Exercise per Session: Not on file   Stress:    • Feeling of Stress : Not on file   Social Connections:    • Frequency of Communication with Friends and Family: Not on file   • Frequency of Social Gatherings with Friends and Family: Not on file   • Attends Islam Services: Not on file   • Active Member of Clubs or Organizations: Not on file   • Attends Club or Organization Meetings: Not on file   • Marital Status: Not on file   Intimate Partner Violence:    • Fear of Current or Ex-Partner: Not on file   • Emotionally Abused: Not on file   • Physically Abused: Not on file   • Sexually Abused: Not on file   Housing Stability:    • Unable to Pay for Housing in the Last Year: Not on file   • Number of Places Lived in the Last Year: Not on file   • Unstable Housing in the Last Year: Not on file      Social History     Tobacco Use   Smoking Status Current Every Day Smoker   • Packs/day: 0.50   • Years: 10.00   • Pack years: 5.00   • Types: Cigarettes   Smokeless Tobacco Never Used   Tobacco Comment    working on quitting      Social History  "    Substance and Sexual Activity   Alcohol Use No     Social History     Substance and Sexual Activity   Drug Use No      OB History    Para Term  AB Living   5       2     SAB IAB Ectopic Molar Multiple Live Births   2                # Outcome Date GA Lbr Valentin/2nd Weight Sex Delivery Anes PTL Lv   5             4             3             2 SAB            1 SAB               No LMP recorded. Patient has had an ablation.    G P A L     Family History   Problem Relation Age of Onset   • Cancer Mother         ovarian cancer / malanoma   • Hypertension Mother    • Other Mother         THYROID   • Heart Disease Mother         valvular disease   • Heart Disease Father         Heart Attack   • Heart Attack Father    • Other Father         psoriasis   • Hyperlipidemia Brother    • Other Brother         psoriasis   • Heart Disease Brother         MI   • Heart Attack Brother         massive heart attack    • Other Other         psoriasis   • Heart Disease Maternal Grandfather         MI   • Diabetes Neg Hx    • Alcohol/Drug Neg Hx          Review of Systems   Musculoskeletal: Positive for neck pain.   All other systems reviewed and are negative.           Objective     /72   Pulse 84   Temp 36.3 °C (97.4 °F) (Temporal)   Resp 16   Ht 1.64 m (5' 4.57\")   Wt 102 kg (224 lb 3.3 oz)   SpO2 96%   BMI 37.81 kg/m²     Physical Exam  Vitals reviewed.   Constitutional:       Appearance: Normal appearance.   Eyes:      Extraocular Movements: Extraocular movements intact.      Pupils: Pupils are equal, round, and reactive to light.   Cardiovascular:      Rate and Rhythm: Normal rate and regular rhythm.   Pulmonary:      Effort: Pulmonary effort is normal.      Breath sounds: Normal breath sounds.   Chest:          Comments: Healing incision in the upper outer quadrant of the left breast; no masses appreciated  Abdominal:      General: Bowel sounds are normal.      Palpations: Abdomen is " soft.   Musculoskeletal:         General: Normal range of motion.   Skin:     General: Skin is warm and dry.   Neurological:      General: No focal deficit present.      Mental Status: She is alert.              Assessment   1.  Infiltrating ductal carcinoma of the left breast, grade 2, stage anatomic to be (pT2, PN 1A) status post left lumpectomy, ER positive, ME positive, HER-2 negative, Ki-67 22%, Oncotype DX pending  2.  Diabetes mellitus under fair control  3.  Tobacco abuse improving    Plan:  Obtain Oncotype DX recurrence score and make a decision regarding chemotherapy she will need a consult with radiation as well.  Labs:  }   Imaging:    Pathology:             Medical Decision Making: Today's Assessment / Status / Plan:          She agreed and verbalized her agreement and understanding with the current plan. I answered all questions and concerns she has at this time and advised her to call at any time in there interim with questions or concerns in regards to her care.    Thank you for allowing me to participate in her care, I will continue to follow closely.

## 2021-12-10 ENCOUNTER — TELEPHONE (OUTPATIENT)
Dept: RADIATION ONCOLOGY | Facility: MEDICAL CENTER | Age: 56
End: 2021-12-10

## 2021-12-10 NOTE — TELEPHONE ENCOUNTER
Nutrition Services:  Called patient for nutrition update. She answered and reports she would rather talk after the 1st of the year. Schedule phone appointment per patients request for 1/14 @ 11:00 am.

## 2021-12-13 DIAGNOSIS — C50.412 CARCINOMA OF UPPER-OUTER QUADRANT OF LEFT BREAST IN FEMALE, ESTROGEN RECEPTOR POSITIVE (HCC): ICD-10-CM

## 2021-12-13 DIAGNOSIS — Z17.0 CARCINOMA OF UPPER-OUTER QUADRANT OF LEFT BREAST IN FEMALE, ESTROGEN RECEPTOR POSITIVE (HCC): ICD-10-CM

## 2021-12-13 NOTE — CT SIMULATION
PATIENT NAME Sol Kern   PRIMARY PHYSICIAN Veronica Lyn [id number]   REFERRING PHYSICIAN No ref. provider found 1965     Carcinoma of upper-outer quadrant of left breast in female, estrogen receptor positive (HCC)  Staging form: Breast, AJCC 8th Edition  - Clinical: Stage IB (cT2, cN0, cM0, G1, ER+, FL+, HER2-) - Signed by Kem Infante M.D. on 10/18/2021  Histologic grading system: 3 grade system         Treatment Planning CT Simulation      Order Questions     Question Answer Comment    Is this for a new course of treatment? Yes     Is this an Addendum? No     Implanted Device/Pacemaker No     Simulation Status Initial     Treatment Site Breast     Laterality Left     Treatment Technique 3D CRT     Other Technique(s) 3D     Treatment Pattern/Frequency Daily     Concurrent Chemotherapy No     CT Technique 3D DIBH possible     DIBH     Slice Thickness 3mm     Scan Extent Chest     Bowel Preparation No     Treatment Device(s) Vac Elena      Wing Board     Treatment Marker Scar     Patient Attire Gown     Patient Position Supine     Patient Orientation Head First     Arm Position Up     Treatment Machine No preference     Treatment Image Guidance Ports     Frequency (ports) Weekly     Other Orders Weekly Physics Check

## 2021-12-15 ENCOUNTER — APPOINTMENT (OUTPATIENT)
Dept: HEMATOLOGY ONCOLOGY | Facility: MEDICAL CENTER | Age: 56
End: 2021-12-15
Payer: MEDICARE

## 2021-12-15 ENCOUNTER — OFFICE VISIT (OUTPATIENT)
Dept: MEDICAL GROUP | Facility: PHYSICIAN GROUP | Age: 56
End: 2021-12-15
Payer: MEDICARE

## 2021-12-15 VITALS
RESPIRATION RATE: 18 BRPM | HEIGHT: 65 IN | OXYGEN SATURATION: 96 % | TEMPERATURE: 97.7 F | DIASTOLIC BLOOD PRESSURE: 80 MMHG | SYSTOLIC BLOOD PRESSURE: 124 MMHG | HEART RATE: 85 BPM | BODY MASS INDEX: 37.82 KG/M2 | WEIGHT: 227 LBS

## 2021-12-15 DIAGNOSIS — I10 ESSENTIAL HYPERTENSION: ICD-10-CM

## 2021-12-15 DIAGNOSIS — F17.210 CIGARETTE NICOTINE DEPENDENCE WITHOUT COMPLICATION: ICD-10-CM

## 2021-12-15 DIAGNOSIS — F13.20 SEDATIVE HYPNOTIC OR ANXIOLYTIC DEPENDENCE (HCC): ICD-10-CM

## 2021-12-15 DIAGNOSIS — C50.412 CARCINOMA OF UPPER-OUTER QUADRANT OF LEFT BREAST IN FEMALE, ESTROGEN RECEPTOR POSITIVE (HCC): ICD-10-CM

## 2021-12-15 DIAGNOSIS — E66.01 CLASS 2 SEVERE OBESITY WITH BODY MASS INDEX (BMI) OF 35 TO 39.9 WITH SERIOUS COMORBIDITY (HCC): ICD-10-CM

## 2021-12-15 DIAGNOSIS — Z17.0 CARCINOMA OF UPPER-OUTER QUADRANT OF LEFT BREAST IN FEMALE, ESTROGEN RECEPTOR POSITIVE (HCC): ICD-10-CM

## 2021-12-15 DIAGNOSIS — E11.69 TYPE 2 DIABETES MELLITUS WITH OTHER SPECIFIED COMPLICATION, WITHOUT LONG-TERM CURRENT USE OF INSULIN (HCC): ICD-10-CM

## 2021-12-15 DIAGNOSIS — Z00.00 ENCOUNTER FOR MEDICARE ANNUAL WELLNESS EXAM: Primary | ICD-10-CM

## 2021-12-15 DIAGNOSIS — G47.33 OSA ON CPAP: ICD-10-CM

## 2021-12-15 DIAGNOSIS — J01.00 ACUTE NON-RECURRENT MAXILLARY SINUSITIS: ICD-10-CM

## 2021-12-15 DIAGNOSIS — F51.01 PRIMARY INSOMNIA: ICD-10-CM

## 2021-12-15 PROBLEM — N63.0 BREAST LUMP: Status: RESOLVED | Noted: 2021-09-15 | Resolved: 2021-12-15

## 2021-12-15 PROBLEM — Z87.891 FORMER SMOKER: Status: RESOLVED | Noted: 2020-05-26 | Resolved: 2021-12-15

## 2021-12-15 PROBLEM — R31.29 MICROSCOPIC HEMATURIA: Status: RESOLVED | Noted: 2018-09-27 | Resolved: 2021-12-15

## 2021-12-15 PROBLEM — E66.812 CLASS 2 SEVERE OBESITY WITH BODY MASS INDEX (BMI) OF 35 TO 39.9 WITH SERIOUS COMORBIDITY (HCC): Status: ACTIVE | Noted: 2017-08-28

## 2021-12-15 PROBLEM — E87.6 HYPOKALEMIA: Status: RESOLVED | Noted: 2019-03-29 | Resolved: 2021-12-15

## 2021-12-15 PROCEDURE — G0439 PPPS, SUBSEQ VISIT: HCPCS | Performed by: FAMILY MEDICINE

## 2021-12-15 RX ORDER — AMOXICILLIN AND CLAVULANATE POTASSIUM 875; 125 MG/1; MG/1
1 TABLET, FILM COATED ORAL 2 TIMES DAILY
Qty: 10 TABLET | Refills: 0 | Status: SHIPPED | OUTPATIENT
Start: 2021-12-15 | End: 2021-12-20

## 2021-12-15 RX ORDER — ALBUTEROL SULFATE 90 UG/1
AEROSOL, METERED RESPIRATORY (INHALATION)
Qty: 2 EACH | Refills: 2 | Status: SHIPPED | OUTPATIENT
Start: 2021-12-15 | End: 2022-06-30

## 2021-12-15 RX ORDER — POLYETHYLENE GLYCOL 3350 17 G
2 POWDER IN PACKET (EA) ORAL
Qty: 360 LOZENGE | Refills: 0 | Status: SHIPPED | OUTPATIENT
Start: 2021-12-15 | End: 2022-07-15

## 2021-12-15 ASSESSMENT — PATIENT HEALTH QUESTIONNAIRE - PHQ9: CLINICAL INTERPRETATION OF PHQ2 SCORE: 0

## 2021-12-15 ASSESSMENT — ENCOUNTER SYMPTOMS: GENERAL WELL-BEING: GOOD

## 2021-12-15 ASSESSMENT — ACTIVITIES OF DAILY LIVING (ADL): BATHING_REQUIRES_ASSISTANCE: 0

## 2021-12-15 ASSESSMENT — FIBROSIS 4 INDEX: FIB4 SCORE: 0.56

## 2021-12-15 NOTE — PROGRESS NOTES
Chief Complaint   Patient presents with   • Annual Exam     AWV     HPI:  Sol is a 56 y.o. here for Medicare Annual Wellness Visit      Patient Active Problem List    Diagnosis Date Noted   • Cigarette nicotine dependence without complication 12/15/2021   • Carcinoma of upper-outer quadrant of left breast in female, estrogen receptor positive (HCC) 10/18/2021   • Neuropathy 09/15/2021   • Diabetes (HCC) 10/01/2020   • Sedative hypnotic or anxiolytic dependence ([facility]) 10/01/2020   • Dysuria 05/13/2020   • Postnasal drip 10/22/2019   • Essential hypertension 03/19/2019   • Vitamin D deficiency 03/05/2019   • Class 2 severe obesity with body mass index (BMI) of 35 to 39.9 with serious comorbidity ([facility]) 08/28/2017   • Primary insomnia 08/28/2017   • YOANA on CPAP 08/28/2017   • Chronic midline low back pain with right-sided sciatica 08/28/2017   • Neck pain 08/28/2017   • Multiple allergies 08/28/2017   • Psoriasis 08/28/2017       Current Outpatient Medications   Medication Sig Dispense Refill   • amoxicillin-clavulanate (AUGMENTIN) 875-125 MG Tab Take 1 Tablet by mouth 2 times a day for 5 days. 10 Tablet 0   • albuterol 108 (90 Base) MCG/ACT Aero Soln inhalation aerosol INHALE 2 PUFFS BY MOUTH EVERY 6 HOURS AS NEEDED FOR SHORTNESS OF BREATH 2 Each 2   • nicotine polacrilex (NICOTINE MINI) 2 MG lozenge Place 1 Lozenge into mouth between cheek and gum every 1 hour as needed. 360 Lozenge 0   • cyclobenzaprine (FLEXERIL) 5 MG tablet Take 1-2 Tabs by mouth 3 times a day as needed. 90 Tab 1   • diazepam (VALIUM) 10 MG tablet Take 10 mg by mouth 1 time daily as needed (muscle spasms, back pain).     • lidocaine-prilocaine (EMLA) 2.5-2.5 % Cream      • ROPINIRole (REQUIP) 0.25 MG Tab TAKE 1 TABLET BY MOUTH AT BEDTIME. TAKE 1-3 HOURS BEFORE BEDTIME. (Patient not taking: Reported on 12/3/2021) 90 Tablet 0   • triamcinolone acetonide (KENALOG) 0.1 % Ointment Apply 1 Application to affected area(s) every 48 hours. (Patient not  taking: Reported on 10/26/2021)       No current facility-administered medications for this visit.        Patient is taking medications as noted in medication list.  Current supplements as per medication list.     Allergies: Codeine, Other drug, and Vicodin [hydrocodone-acetaminophen]    Current social contact/activities:  Family, friends    Is patient current with immunizations? Yes.    She  reports that she has been smoking cigarettes. She has a 2.50 pack-year smoking history. She has never used smokeless tobacco. She reports that she does not drink alcohol and does not use drugs.  Ready to quit: Yes  Counseling given: Yes  Comment: working on quitting     DPA/Advanced directive: Patient does not have an Advanced Directive.  A packet and workshop information was given on Advanced Directives.    ROS:    Gait: Uses no assistive device   Ostomy: No   Other tubes: No   Amputations: No   Chronic oxygen use No   Last eye exam 2019   Wears hearing aids: No   : Reports urinary leakage during the last 6 months that has somewhat interfered with their daily activities or sleep.    Screening:    Depression Screening    Little interest or pleasure in doing things?  0 - not at all  Feeling down, depressed, or hopeless? 0 - not at all  Patient Health Questionnaire Score: 0    If depressive symptoms identified deferred to follow up visit unless specifically addressed in assessment and plan.    Interpretation of PHQ-9 Total Score   Score Severity   1-4 No Depression   5-9 Mild Depression   10-14 Moderate Depression   15-19 Moderately Severe Depression   20-27 Severe Depression    Screening for Cognitive Impairment    Three Minute Recall (captain, garden, picture)  2/3    Gabe clock face with all 12 numbers and set the hands to show 5 past 8.  Yes    If cognitive concerns identified, deferred for follow up unless specifically addressed in assessment and plan.    Fall Risk Assessment    Has the patient had two or more falls in the  last year or any fall with injury in the last year?  No  If fall risk identified, deferred for follow up unless specifically addressed in assessment and plan.    Safety Assessment    Throw rugs on floor.  Yes  Handrails on all stairs.  Yes  Good lighting in all hallways.  Yes  Difficulty hearing.  No  Patient counseled about all safety risks that were identified.    Functional Assessment ADLs    Are there any barriers preventing you from cooking for yourself or meeting nutritional needs?  No.    Are there any barriers preventing you from driving safely or obtaining transportation?  No.    Are there any barriers preventing you from using a telephone or calling for help?  No.    Are there any barriers preventing you from shopping?  No.    Are there any barriers preventing you from taking care of your own finances?  No.    Are there any barriers preventing you from managing your medications?  No.    Are there any barriers preventing you from showering, bathing or dressing yourself?  No.    Are you currently engaging in any exercise or physical activity?  No.     What is your perception of your health?  Good.    Health Maintenance Summary          Overdue - RETINAL SCREENING (Yearly) Overdue - never done    No completion history exists for this topic.          Overdue - PAP SMEAR (Every 3 Years) Overdue - never done    No completion history exists for this topic.          Ordered - A1C SCREENING (Every 6 Months) Ordered on 12/15/2021    10/01/2020  HEMOGLOBIN A1C    05/14/2020  HEMOGLOBIN A1C          Ordered - FASTING LIPID PROFILE (Yearly) Ordered on 12/15/2021    10/01/2020  Lipid Profile    03/19/2019  Lipid Profile    09/13/2017  LIPID PROFILE          Ordered - URINE ACR / MICROALBUMIN (Yearly) Ordered on 12/15/2021    10/01/2020  MICROALBUMIN CREAT RATIO URINE (LAB COLLECT)          MAMMOGRAM (Yearly) Next due on 9/24/2022 09/24/2021  MA-DIAGNOSTIC MAMMO BILAT W/TOMOSYNTHESIS W/CAD    11/15/2019  MA-MAMMO  DIAGNOSTIC BILAT W/ESTEFANY W/CAD    2019  MA-MAMMO DIAGNOSTIC UNILAT W/ESTEFANY W/CAD RIGHT    10/02/2018  MA-MAMMO DIAGNOSTIC UNILAT W/ESTEFANY W/CAD RIGHT    2018  MA-SCREENING MAMMO BILAT W/TOMOSYNTHESIS W/CAD          SERUM CREATININE (Yearly) Next due on 2022  Comp Metabolic Panel    10/01/2020  Comp Metabolic Panel    05/15/2019  Basic Metabolic Panel    2019  Basic Metabolic Panel    2019  Comp Metabolic Panel    Only the first 5 history entries have been loaded, but more history exists.          DIABETES MONOFILAMENT / LE EXAM (Yearly) Next due on 12/15/2022    12/15/2021  Diabetic Monofilament Lower Extremity Exam    10/01/2020  Diabetic Monofilament Lower Extremity Exam          COLORECTAL CANCER SCREENING (COLONOSCOPY - Every 5 Years) Tentatively due on 2023  REFERRAL TO GI FOR COLONOSCOPY          IMM PNEUMOCOCCAL VACCINE: 0-64 Years (2 of 2 - PPSV23) Next due on 3/15/2028    2019  Imm Admin: Pneumococcal polysaccharide vaccine (PPSV-23)          IMM DTaP/Tdap/Td Vaccine (2 - Td or Tdap) Next due on 3/5/2029    2019  Imm Admin: Tdap Vaccine          HEPATITIS C SCREENING  Completed    10/01/2020  HCV Scrn ( 2132-2178 1xLife)          IMM ZOSTER VACCINES (Series Information) Completed    05/10/2021  Imm Admin: Zoster Vaccine Recombinant (RZV) (SHINGRIX)    10/01/2020  Imm Admin: Zoster Vaccine Recombinant (RZV) (SHINGRIX)          IMM INFLUENZA (Series Information) Completed    2021  Imm Admin: Influenza Vaccine Quad Inj (Pf)    10/01/2020  Imm Admin: Influenza Vaccine Quad Inj (Pf)    10/22/2019  Imm Admin: Influenza Vaccine Quad Inj (Pf)    10/15/2018  Imm Admin: Influenza Vac Subunit Quad Inj (Pf)    10/23/2017  Imm Admin: Influenza Vac Subunit Quad Inj (Pf)          COVID-19 Vaccine (Series Information) Completed    2021  Imm Admin: Moderna SARS-CoV-2 Vaccine    2021  Imm Admin: Moderna SARS-CoV-2 Vaccine     03/19/2021  Imm Admin: Moderna SARS-CoV-2 Vaccine          Annual Wellness Visit  Completed    12/15/2021  Visit Dx: Encounter for Medicare annual wellness exam          IMM MENINGOCOCCAL VACCINE (MCV4) (Series Information) Aged Out    No completion history exists for this topic.          Discontinued - IMM HEP B VACCINE  Discontinued    No completion history exists for this topic.                Patient Care Team:  Veronica Lyn M.D. as PCP - General (Family Medicine)  Feliciano Espinal M.D. as Consulting Physician (Gastroenterology)  Erich Shaw Ass't as      Social History     Tobacco Use   • Smoking status: Current Every Day Smoker     Packs/day: 0.25     Years: 10.00     Pack years: 2.50     Types: Cigarettes   • Smokeless tobacco: Never Used   • Tobacco comment: working on quitting    Vaping Use   • Vaping Use: Never used   Substance Use Topics   • Alcohol use: No   • Drug use: No     Family History   Problem Relation Age of Onset   • Cancer Mother         ovarian cancer / malanoma   • Hypertension Mother    • Other Mother         THYROID   • Heart Disease Mother         valvular disease   • Heart Disease Father         Heart Attack   • Heart Attack Father    • Other Father         psoriasis   • Hyperlipidemia Brother    • Other Brother         psoriasis   • Heart Disease Brother         MI   • Heart Attack Brother         massive heart attack    • Other Other         psoriasis   • Heart Disease Maternal Grandfather         MI   • Diabetes Neg Hx    • Alcohol/Drug Neg Hx      She  has a past medical history of Allergy, Back pain, Cancer (HCC) (2021), Diabetes (HCC), GERD (gastroesophageal reflux disease), High cholesterol, Malignant neoplasm of upper-outer quadrant of left female breast (HCC), Neck pain, Obesity, Sleep apnea, and Snoring.   Past Surgical History:   Procedure Laterality Date   • PB MASTECTOMY, PARTIAL Left 11/12/2021    Procedure: MASTECTOMY, PARTIAL -  "SAMARA LOCALIZED;  Surgeon: Kayleen Sykes M.D.;  Location: SURGERY SAME DAY [facility];  Service: General   • NODE BIOPSY SENTINEL Left 2021    Procedure: BIOPSY, LYMPH NODE, SENTINEL;  Surgeon: Kayleen Sykes M.D.;  Location: SURGERY SAME DAY [facility];  Service: General   • CARPAL TUNNEL RELEASE Right    • ENDOMETRIAL ABLATION     • FOOT SURGERY Right    • KNEE ARTHROSCOPY     • PRIMARY C SECTION      2      Exam:     /80 (BP Location: Left arm, Patient Position: Sitting, BP Cuff Size: Adult)   Pulse 85   Temp 36.5 °C (97.7 °F) (Temporal)   Resp 18   Ht 1.64 m (5' 4.57\")   Wt 103 kg (227 lb)   SpO2 96%  Body mass index is 38.28 kg/m².    Hearing good.    Dentition good  Alert, oriented in no acute distress.  Eye contact is good, speech goal directed, affect calm    Diabetic foot exam: No lesions or calluses noted. 2+ pedal pulses. Sensation intact with 10 out of 10 on monofilament test.    Assessment and Plan. The following treatment and monitoring plan is recommended:      1. Encounter for Medicare annual wellness exam  Sol is a pleasant 56-year-old woman here today for Medicare wellness visit.  She will return for a Pap smear.  Yearly diabetic labs have been ordered.    2. Type 2 diabetes mellitus with other specified complication, without long-term current use of insulin (HCC)  Chronic, status unknown.  Labs not been checked in nearly a year.  We will check labs and then determine if treatment is necessary.  Diabetic foot exam performed today and normal.  Ophthalmology referral placed for retinal screening.  - Lipid Profile; Future  - HEMOGLOBIN A1C; Future  - MICROALBUMIN CREAT RATIO URINE; Future  - Referral to Ophthalmology  - Diabetic Monofilament Lower Extremity Exam    3. Sedative hypnotic or anxiolytic dependence (HCC)  Chronic, stable.  She gets Valium from her pain management provider to help manage her neck and back pain.  She uses infrequently.  She will continue to follow " with pain management.    4. Carcinoma of upper-outer quadrant of left breast in female, estrogen receptor positive (HCC)  Chronic, stable.  She was diagnosed with breast cancer 3 months ago.  She underwent a partial mastectomy just a few weeks ago and plans to start radiation after the new year.  She will continue to follow with oncology as directed.    5. Class 2 severe obesity with body mass index (BMI) of 35 to 39.9 with serious comorbidity (HCC)  Chronic, stable.  Weight is largely unchanged.  She was working diligently on healthy lifestyle changes but admits with the holiday season she stopped.  She plans to restart in the new year.  - Patient identified as having weight management issue.  Appropriate orders and counseling given.    6. Cigarette nicotine dependence without complication  Chronic, stable.  She reports that she is now down to 3-4 cigarettes/day instead of 10.  She is not interested in any medications except for some nicotine replacement to help manage cravings.  We will continue to monitor.  - nicotine polacrilex (NICOTINE MINI) 2 MG lozenge; Place 1 Lozenge into mouth between cheek and gum every 1 hour as needed.  Dispense: 360 Lozenge; Refill: 0    7. Essential hypertension  Chronic, stable.  Blood pressure is well controlled in the office today and she is not currently on medication.  We will continue to monitor.    8. YOANA on CPAP  Chronic, uncontrolled.  She used to be on CPAP before she moved to [place] but has not been on CPAP for last few years.  After discussion today she is willing to go back to sleep medicine but she would like to get a handle on the breast cancer stuff first which I think is reasonable.    9. Primary insomnia  Chronic, stable.  She uses Ambien infrequently to help manage insomnia.  At a previous appointment I suspected restless leg syndrome was a large contributor to her insomnia so I prescribed ropinirole.  She has not started it yet due to the breast cancer situation but  she reports that she is good to try it soon to see if it helps.    10. Acute non-recurrent maxillary sinusitis  Acute.  For last 7-10 days she has had symptoms of a sinus infection.  I provided a pocket prescription for her to use if symptoms do not improve in the next few days.  - amoxicillin-clavulanate (AUGMENTIN) 875-125 MG Tab; Take 1 Tablet by mouth 2 times a day for 5 days.  Dispense: 10 Tablet; Refill: 0    Services suggested: No services needed at this time  Health Care Screening recommendations as per orders if indicated.  Referrals offered: PT/OT/Nutrition counseling/Behavioral Health/Smoking cessation as per orders if indicated.    Discussion today about general wellness and lifestyle habits:    · Prevent falls and reduce trip hazards; Cautioned about securing or removing rugs.  · Have a working fire alarm and carbon monoxide detector;   · Engage in regular physical activity and social activities       Follow-up: Return in about 2 months (around 2/15/2022) for Medicare Pap/Breast Exam.

## 2021-12-20 ENCOUNTER — HOSPITAL ENCOUNTER (OUTPATIENT)
Dept: RADIATION ONCOLOGY | Facility: MEDICAL CENTER | Age: 56
End: 2021-12-20

## 2021-12-20 PROCEDURE — 77290 THER RAD SIMULAJ FIELD CPLX: CPT | Mod: 26 | Performed by: RADIOLOGY

## 2021-12-20 PROCEDURE — 77334 RADIATION TREATMENT AID(S): CPT | Performed by: RADIOLOGY

## 2021-12-20 PROCEDURE — 77334 RADIATION TREATMENT AID(S): CPT | Mod: 26 | Performed by: RADIOLOGY

## 2021-12-20 PROCEDURE — 77263 THER RADIOLOGY TX PLNG CPLX: CPT | Performed by: RADIOLOGY

## 2021-12-20 PROCEDURE — 77290 THER RAD SIMULAJ FIELD CPLX: CPT | Performed by: RADIOLOGY

## 2022-01-03 ENCOUNTER — HOSPITAL ENCOUNTER (OUTPATIENT)
Dept: RADIATION ONCOLOGY | Facility: MEDICAL CENTER | Age: 57
End: 2022-01-31
Attending: RADIOLOGY
Payer: MEDICARE

## 2022-01-10 ENCOUNTER — OFFICE VISIT (OUTPATIENT)
Dept: MEDICAL GROUP | Facility: PHYSICIAN GROUP | Age: 57
End: 2022-01-10
Payer: MEDICARE

## 2022-01-10 VITALS
BODY MASS INDEX: 37.43 KG/M2 | DIASTOLIC BLOOD PRESSURE: 80 MMHG | WEIGHT: 224.65 LBS | HEIGHT: 65 IN | TEMPERATURE: 97.3 F | HEART RATE: 79 BPM | OXYGEN SATURATION: 94 % | SYSTOLIC BLOOD PRESSURE: 136 MMHG

## 2022-01-10 DIAGNOSIS — L08.9 INFECTED SEBACEOUS CYST: ICD-10-CM

## 2022-01-10 DIAGNOSIS — L72.3 INFECTED SEBACEOUS CYST: ICD-10-CM

## 2022-01-10 DIAGNOSIS — B37.31 YEAST VAGINITIS: ICD-10-CM

## 2022-01-10 DIAGNOSIS — F41.8 SITUATIONAL ANXIETY: ICD-10-CM

## 2022-01-10 DIAGNOSIS — N60.81 SEBACEOUS CYST OF BREAST, RIGHT: ICD-10-CM

## 2022-01-10 PROCEDURE — 77300 RADIATION THERAPY DOSE PLAN: CPT | Mod: 26 | Performed by: RADIOLOGY

## 2022-01-10 PROCEDURE — 99214 OFFICE O/P EST MOD 30 MIN: CPT | Performed by: FAMILY MEDICINE

## 2022-01-10 PROCEDURE — 77334 RADIATION TREATMENT AID(S): CPT | Mod: 26 | Performed by: RADIOLOGY

## 2022-01-10 PROCEDURE — 77295 3-D RADIOTHERAPY PLAN: CPT | Mod: 26 | Performed by: RADIOLOGY

## 2022-01-10 PROCEDURE — 77300 RADIATION THERAPY DOSE PLAN: CPT | Performed by: RADIOLOGY

## 2022-01-10 PROCEDURE — 77295 3-D RADIOTHERAPY PLAN: CPT | Performed by: RADIOLOGY

## 2022-01-10 PROCEDURE — 77334 RADIATION TREATMENT AID(S): CPT | Performed by: RADIOLOGY

## 2022-01-10 RX ORDER — FLUCONAZOLE 150 MG/1
150 TABLET ORAL DAILY
Qty: 1 TABLET | Refills: 0 | Status: SHIPPED | OUTPATIENT
Start: 2022-01-10 | End: 2022-01-31

## 2022-01-10 RX ORDER — DIAZEPAM 10 MG/1
10 TABLET ORAL
Qty: 15 TABLET | Refills: 0 | Status: SHIPPED | OUTPATIENT
Start: 2022-01-10 | End: 2022-01-25

## 2022-01-10 RX ORDER — SULFAMETHOXAZOLE AND TRIMETHOPRIM 800; 160 MG/1; MG/1
1 TABLET ORAL 2 TIMES DAILY
Qty: 20 TABLET | Refills: 0 | Status: SHIPPED | OUTPATIENT
Start: 2022-01-10 | End: 2022-01-31

## 2022-01-10 ASSESSMENT — PATIENT HEALTH QUESTIONNAIRE - PHQ9: CLINICAL INTERPRETATION OF PHQ2 SCORE: 0

## 2022-01-10 ASSESSMENT — FIBROSIS 4 INDEX: FIB4 SCORE: 0.56

## 2022-01-10 NOTE — PROGRESS NOTES
Subjective     Sol Kern is a 56 y.o. female who presents with Pain (right breast inflamation and bump)            HPI     This is a 56-year-old white female who is a regular patient of Dr. Lyn.      She is here because of a red sore area in the right breast which she started to notice this morning.  Denies any trauma.  Per patient she had infection in this area in 2019 after she had ultrasound-guided needle biopsy of the right breast for right breast mass.  Pathology report at that time came back benign epidermal inclusion cyst and adipose tissue with no atypia or malignancy.  Infection cleared with 1 dose of ceftriaxone and oral cephalexin given at urgent care.  She said she is already on Augmentin that her PCP prescribed her last month for sinusitis.  She did not take the antibiotic right away.  She has 3 tablets left.    She would like to have Diflucan in case she develops yeast infection from the antibiotics.    She was subsequently diagnosed with left breast cancer in September 2021 and underwent lumpectomy and lymph node dissection.  This is stage Ib invasive ductal carcinoma.  She is under the care of her oncologist and radiation oncologist.  She will start radiation treatment in 2 days.    She is asking for Valium to take when she goes for radiation treatment to manage her anxiety.  She said she will only take it during her radiation treatment sessions.  She said she has to go daily  5 days a week for total of 15 sessions.  She said even when they did the mapping for the radiation treatment she was having some anxiety.  She took Valium in the past prescribed by another PCP Dr. Wood and she would like to take the same medication.  She does not want to try other benzodiazepines as she said she is very sensitive them with medication and she would rather stick to one that worked for her without any side effects.      Past medical history, past surgical history, family history reviewed-no changes    Social  "history reviewed-no changes    Allergies reviewed-no changes    Medications reviewed-no changes      ROS     As per HPI, the rest are negative.           Objective     /80 (BP Location: Left arm, Patient Position: Sitting, BP Cuff Size: Adult)   Pulse 79   Temp 36.3 °C (97.3 °F) (Temporal)   Ht 1.638 m (5' 4.5\")   Wt 102 kg (224 lb 10.4 oz)   SpO2 94%   BMI 37.97 kg/m²      Physical Exam     Examined alert, awake, oriented, not in distress    Breasts-pendulous large breasts, surgical scar outer quadrant left breast from lumpectomy and left axilla from lymph node dissection, right breast-2.5 cm hard round nodule upper inner quadrant same level as the right fourth rib with surrounding erythema of the skin about 6 mm in widest diameter, no fluctuance, no pointing                      Assessment & Plan        1. Infected sebaceous cyst  Area in the right breast consistent with infected sebaceous cyst with cellulitis of the skin around it.  She is already on Augmentin and she has 3 pills left.  This is not working for her infection.  I will add Bactrim DS 1 tablet twice a day for total of 10 days.  Advised warm compresses 15 minutes 4 times a day.  Reevaluate in 3 days.  Made aware this may start to drain in the next few days and if this happens to allow it to continue to drain.    - sulfamethoxazole-trimethoprim (BACTRIM DS) 800-160 MG tablet; Take 1 Tablet by mouth 2 times a day.  Dispense: 20 Tablet; Refill: 0    2. Sebaceous cyst of breast, right  This may need to be completely excised to avoid recurrence of infection.  She wants to be referred back to her breast surgeon Dr. Sykes for excision and referral was placed.  This can be done after the infection is resolved.  - Referral to General Surgery    3. Yeast vaginitis  I gave her prescription for Diflucan 150 mg x 1 dose to manage possible yeast vaginitis that can happen with taking antibiotics.  - fluconazole (DIFLUCAN) 150 MG tablet; Take 1 Tablet by " mouth every day.  Dispense: 1 Tablet; Refill: 0    4. Situational anxiety  I told the patient that I would rather put her on alprazolam or lorazepam which are short acting benzodiazepines.  She prefers to be on diazepam because she has tried it in the past and this has worked without any side effects.  She said she is sensitive to medications.  I discussed her case with her PCP Dr. Lyn.  Dr. Lyn is in agreement.  Patient is aware that she only has to take the diazepam for her radiation therapy sessions total of 15 sessions and no further refills will be given.  Made aware of potential for abuse, dependence, tolerance.  Patient voiced understanding.I have reviewed the medical records, the prescription monitoring program and I have determined that the controlled prescription medication is medically indicated. I have advised the patient to keep the medication in a safe place and not to drive while taking the medication.  Prescription sent electronically to the pharmacy.  - diazepam (VALIUM) 10 MG tablet; Take 1 Tablet by mouth 1 time a day as needed for Anxiety for up to 15 days.  Dispense: 15 Tablet; Refill: 0                  Please note that this dictation was created using voice recognition software. I have worked with consultants from the vendor as well as technical experts from [facility] to optimize the interface. I have made every reasonable attempt to correct obvious errors, but I expect that there are errors of grammar and possibly content I did not discover before finalizing the note.

## 2022-01-11 ENCOUNTER — TELEPHONE (OUTPATIENT)
Dept: MEDICAL GROUP | Facility: PHYSICIAN GROUP | Age: 57
End: 2022-01-11

## 2022-01-11 NOTE — TELEPHONE ENCOUNTER
[place]on was asking Dr. Sykes's office abour whether they take SCP. She stated that she was told during her appointment that Dr. Sykes didn't take SCP.

## 2022-01-12 ENCOUNTER — HOSPITAL ENCOUNTER (OUTPATIENT)
Dept: RADIATION ONCOLOGY | Facility: MEDICAL CENTER | Age: 57
End: 2022-01-12
Payer: MEDICARE

## 2022-01-12 LAB
CHEMOTHERAPY INFUSION START DATE: NORMAL
CHEMOTHERAPY RECORDS: 2.67
CHEMOTHERAPY RECORDS: 2.67
CHEMOTHERAPY RECORDS: 4005
CHEMOTHERAPY RECORDS: 4005
CHEMOTHERAPY RECORDS: NORMAL
CHEMOTHERAPY RX CANCER: NORMAL
DATE 1ST CHEMO CANCER: NORMAL
RAD ONC ARIA COURSE LAST TREATMENT DATE: NORMAL
RAD ONC ARIA COURSE TREATMENT ELAPSED DAYS: NORMAL
RAD ONC ARIA REFERENCE POINT DOSAGE GIVEN TO DATE: 2.67
RAD ONC ARIA REFERENCE POINT ID: NORMAL
RAD ONC ARIA REFERENCE POINT SESSION DOSAGE GIVEN: 2.67

## 2022-01-12 PROCEDURE — 77412 RADIATION TX DELIVERY LVL 3: CPT | Performed by: RADIOLOGY

## 2022-01-12 PROCEDURE — 77280 THER RAD SIMULAJ FIELD SMPL: CPT | Mod: 26 | Performed by: RADIOLOGY

## 2022-01-12 PROCEDURE — 77280 THER RAD SIMULAJ FIELD SMPL: CPT | Performed by: RADIOLOGY

## 2022-01-12 NOTE — ON TREATMENT VISIT
ON TREATMENT NOTE  RADIATION ONCOLOGY DEPARTMENT    Patient name:  Sol Kern    Primary Physician:  Veronica Lyn M.D. MRN: [id number]  CSN: [id number]   Referring physician:  No ref. provider found : 1965, 56 y.o.     ENCOUNTER DATE:  22    DIAGNOSIS:    Carcinoma of upper-outer quadrant of left breast in female, estrogen receptor positive (HCC)  Staging form: Breast, AJCC 8th Edition  - Clinical: Stage IB (cT2, cN0, cM0, G1, ER+, ID+, HER2-) - Signed by Kem Infante M.D. on 10/18/2021  Histologic grading system: 3 grade system  - Pathologic stage from 12/3/2021: Stage IB (pT2, pN1, cM0, G2, ER+, ID+, HER2-) - Signed by Kem Infante M.D. on 2021  Histopathologic type: Infiltrating ductular carcinoma  Stage prefix: Initial diagnosis  Nuclear grade: G2  Multigene prognostic tests performed: Oncotype DX  Histologic grading system: 3 grade system  Residual tumor (R): R0 - None  Ki-67 (%): 22      TREATMENT SUMMARY:  [facility] Treatment Information        Some values may be hidden. Unless noted otherwise, only the newest values recorded on each date are displayed.         [facility] Treatment Summary 22   Course First Treatment Date 2022   Course Last Treatment Date 2022   L_Breast Plan from Course C1_LBreast   Fraction 1 of 15   Elapsed Course Days 0 @    Prescribed Fraction Dose 267 cGy   Prescribed Total Dose 4,005 cGy   L_Breast [L_SCV] Plan from Course C1_LBreast   Fraction 1 of 15   Elapsed Course Days 0 @    Prescribed Fraction Dose 267 cGy   Prescribed Total Dose 4,005 cGy   L_Breast Reference Point from Course C1_LBreast   Elapsed Course Days 0 @    Session Dose 267 cGy   Total Dose 267 cGy   L_Breast CP Reference Point from Course C1_LBreast   Elapsed Course Days 0 @    Session Dose 267 cGy   Total Dose 267 cGy   L_SCV Reference Point from Course C1_LBreast   Elapsed Course Days 0 @    Session Dose 267 cGy    Total Dose 267 cGy   L_SCV CP Reference Point from Course C1_LBreast   Elapsed Course Days 0 @ [id number]   Session Dose 267 cGy   Total Dose 267 cGy              SUBJECTIVE:   Patient tolerated her first day of treatment well.  She does not have any questions regarding her radiation.    VITAL SIGNS:       Pain Assessment 10/19/2021   Pain Score 0   Some recent data might be hidden          PHYSICAL EXAM:  No erythema    TOXICITY  No flowsheet data found.      IMPRESSION:  Cancer Staging  Carcinoma of upper-outer quadrant of left breast in female, estrogen receptor positive (HCC)  Staging form: Breast, AJCC 8th Edition  - Clinical: Stage IB (cT2, cN0, cM0, G1, ER+, MT+, HER2-) - Signed by Kem Infante M.D. on 10/18/2021  - Pathologic stage from 12/3/2021: Stage IB (pT2, pN1, cM0, G2, ER+, MT+, HER2-) - Signed by Kem Infante M.D. on 12/13/2021      PLAN:  No change in treatment plan    Disposition:  Treatment plan reviewed. Questions answered. Continue therapy outlined.     Kem Infante M.D.    No orders of the defined types were placed in this encounter.

## 2022-01-13 ENCOUNTER — HOSPITAL ENCOUNTER (OUTPATIENT)
Dept: RADIATION ONCOLOGY | Facility: MEDICAL CENTER | Age: 57
End: 2022-01-13
Payer: MEDICARE

## 2022-01-13 LAB
CHEMOTHERAPY INFUSION START DATE: NORMAL
CHEMOTHERAPY RECORDS: 2.67
CHEMOTHERAPY RECORDS: 2.67
CHEMOTHERAPY RECORDS: 4005
CHEMOTHERAPY RECORDS: 4005
CHEMOTHERAPY RECORDS: NORMAL
CHEMOTHERAPY RX CANCER: NORMAL
DATE 1ST CHEMO CANCER: NORMAL
RAD ONC ARIA COURSE LAST TREATMENT DATE: NORMAL
RAD ONC ARIA COURSE TREATMENT ELAPSED DAYS: NORMAL
RAD ONC ARIA REFERENCE POINT DOSAGE GIVEN TO DATE: 5.34
RAD ONC ARIA REFERENCE POINT ID: NORMAL
RAD ONC ARIA REFERENCE POINT SESSION DOSAGE GIVEN: 2.67

## 2022-01-13 PROCEDURE — 77412 RADIATION TX DELIVERY LVL 3: CPT | Performed by: RADIOLOGY

## 2022-01-13 PROCEDURE — 77417 THER RADIOLOGY PORT IMAGE(S): CPT | Performed by: RADIOLOGY

## 2022-01-14 ENCOUNTER — OFFICE VISIT (OUTPATIENT)
Dept: MEDICAL GROUP | Facility: PHYSICIAN GROUP | Age: 57
End: 2022-01-14
Payer: MEDICARE

## 2022-01-14 ENCOUNTER — HOSPITAL ENCOUNTER (OUTPATIENT)
Dept: RADIATION ONCOLOGY | Facility: MEDICAL CENTER | Age: 57
End: 2022-01-14

## 2022-01-14 ENCOUNTER — APPOINTMENT (OUTPATIENT)
Dept: RADIOLOGY | Facility: MEDICAL CENTER | Age: 57
End: 2022-01-14
Attending: EMERGENCY MEDICINE
Payer: MEDICARE

## 2022-01-14 ENCOUNTER — TELEPHONE (OUTPATIENT)
Dept: RADIATION ONCOLOGY | Facility: MEDICAL CENTER | Age: 57
End: 2022-01-14

## 2022-01-14 ENCOUNTER — HOSPITAL ENCOUNTER (EMERGENCY)
Facility: MEDICAL CENTER | Age: 57
End: 2022-01-14
Attending: EMERGENCY MEDICINE
Payer: MEDICARE

## 2022-01-14 VITALS
RESPIRATION RATE: 16 BRPM | SYSTOLIC BLOOD PRESSURE: 144 MMHG | TEMPERATURE: 97.9 F | BODY MASS INDEX: 38.99 KG/M2 | DIASTOLIC BLOOD PRESSURE: 82 MMHG | WEIGHT: 228.4 LBS | OXYGEN SATURATION: 96 % | HEART RATE: 83 BPM | HEIGHT: 64 IN

## 2022-01-14 VITALS
BODY MASS INDEX: 37.89 KG/M2 | DIASTOLIC BLOOD PRESSURE: 68 MMHG | TEMPERATURE: 99.4 F | HEART RATE: 88 BPM | SYSTOLIC BLOOD PRESSURE: 134 MMHG | RESPIRATION RATE: 18 BRPM | OXYGEN SATURATION: 97 % | WEIGHT: 227.4 LBS | HEIGHT: 65 IN

## 2022-01-14 DIAGNOSIS — E11.69 TYPE 2 DIABETES MELLITUS WITH OTHER SPECIFIED COMPLICATION, WITHOUT LONG-TERM CURRENT USE OF INSULIN (HCC): ICD-10-CM

## 2022-01-14 DIAGNOSIS — F13.20 SEDATIVE HYPNOTIC OR ANXIOLYTIC DEPENDENCE (HCC): ICD-10-CM

## 2022-01-14 DIAGNOSIS — L02.91 ABSCESS: ICD-10-CM

## 2022-01-14 DIAGNOSIS — L03.90 CELLULITIS, UNSPECIFIED CELLULITIS SITE: ICD-10-CM

## 2022-01-14 DIAGNOSIS — N61.1 BREAST ABSCESS: ICD-10-CM

## 2022-01-14 LAB
ANION GAP SERPL CALC-SCNC: 11 MMOL/L (ref 7–16)
BUN SERPL-MCNC: 13 MG/DL (ref 8–22)
CALCIUM SERPL-MCNC: 9.2 MG/DL (ref 8.4–10.2)
CHEMOTHERAPY INFUSION START DATE: NORMAL
CHEMOTHERAPY RECORDS: 2.67
CHEMOTHERAPY RECORDS: 2.67
CHEMOTHERAPY RECORDS: 4005
CHEMOTHERAPY RECORDS: 4005
CHEMOTHERAPY RECORDS: NORMAL
CHEMOTHERAPY RX CANCER: NORMAL
CHLORIDE SERPL-SCNC: 102 MMOL/L (ref 96–112)
CO2 SERPL-SCNC: 22 MMOL/L (ref 20–33)
CREAT SERPL-MCNC: 0.67 MG/DL (ref 0.5–1.4)
DATE 1ST CHEMO CANCER: NORMAL
GLUCOSE SERPL-MCNC: 155 MG/DL (ref 65–99)
POTASSIUM SERPL-SCNC: 3.9 MMOL/L (ref 3.6–5.5)
RAD ONC ARIA COURSE LAST TREATMENT DATE: NORMAL
RAD ONC ARIA COURSE TREATMENT ELAPSED DAYS: NORMAL
RAD ONC ARIA REFERENCE POINT DOSAGE GIVEN TO DATE: 8.01
RAD ONC ARIA REFERENCE POINT ID: NORMAL
RAD ONC ARIA REFERENCE POINT SESSION DOSAGE GIVEN: 2.67
SODIUM SERPL-SCNC: 135 MMOL/L (ref 135–145)

## 2022-01-14 PROCEDURE — 76604 US EXAM CHEST: CPT

## 2022-01-14 PROCEDURE — 87205 SMEAR GRAM STAIN: CPT

## 2022-01-14 PROCEDURE — 700111 HCHG RX REV CODE 636 W/ 250 OVERRIDE (IP): Performed by: EMERGENCY MEDICINE

## 2022-01-14 PROCEDURE — 700105 HCHG RX REV CODE 258: Performed by: EMERGENCY MEDICINE

## 2022-01-14 PROCEDURE — 99212 OFFICE O/P EST SF 10 MIN: CPT | Performed by: FAMILY MEDICINE

## 2022-01-14 PROCEDURE — 700101 HCHG RX REV CODE 250: Performed by: EMERGENCY MEDICINE

## 2022-01-14 PROCEDURE — 99284 EMERGENCY DEPT VISIT MOD MDM: CPT

## 2022-01-14 PROCEDURE — 77417 THER RADIOLOGY PORT IMAGE(S): CPT | Performed by: RADIOLOGY

## 2022-01-14 PROCEDURE — 96365 THER/PROPH/DIAG IV INF INIT: CPT | Mod: XU

## 2022-01-14 PROCEDURE — 77336 RADIATION PHYSICS CONSULT: CPT | Performed by: RADIOLOGY

## 2022-01-14 PROCEDURE — 36415 COLL VENOUS BLD VENIPUNCTURE: CPT

## 2022-01-14 PROCEDURE — 304217 HCHG IRRIGATION SYSTEM

## 2022-01-14 PROCEDURE — 80048 BASIC METABOLIC PNL TOTAL CA: CPT

## 2022-01-14 PROCEDURE — 77412 RADIATION TX DELIVERY LVL 3: CPT | Performed by: RADIOLOGY

## 2022-01-14 PROCEDURE — 96375 TX/PRO/DX INJ NEW DRUG ADDON: CPT | Mod: XU

## 2022-01-14 PROCEDURE — 87070 CULTURE OTHR SPECIMN AEROBIC: CPT

## 2022-01-14 PROCEDURE — 303977 HCHG I & D

## 2022-01-14 RX ORDER — CLINDAMYCIN HYDROCHLORIDE 300 MG/1
300 CAPSULE ORAL 3 TIMES DAILY
Qty: 21 CAPSULE | Refills: 0 | Status: SHIPPED | OUTPATIENT
Start: 2022-01-14 | End: 2022-01-21

## 2022-01-14 RX ORDER — ONDANSETRON 2 MG/ML
4 INJECTION INTRAMUSCULAR; INTRAVENOUS ONCE
Status: COMPLETED | OUTPATIENT
Start: 2022-01-14 | End: 2022-01-14

## 2022-01-14 RX ORDER — ONDANSETRON 4 MG/1
4 TABLET, ORALLY DISINTEGRATING ORAL EVERY 6 HOURS PRN
Qty: 10 TABLET | Refills: 0 | Status: SHIPPED | OUTPATIENT
Start: 2022-01-14 | End: 2023-06-28

## 2022-01-14 RX ORDER — OXYCODONE HYDROCHLORIDE 5 MG/1
5 TABLET ORAL EVERY 6 HOURS PRN
Qty: 5 TABLET | Refills: 0 | Status: SHIPPED | OUTPATIENT
Start: 2022-01-14 | End: 2022-01-18

## 2022-01-14 RX ORDER — LIDOCAINE HYDROCHLORIDE AND EPINEPHRINE BITARTRATE 20; .01 MG/ML; MG/ML
10 INJECTION, SOLUTION SUBCUTANEOUS ONCE
Status: COMPLETED | OUTPATIENT
Start: 2022-01-14 | End: 2022-01-14

## 2022-01-14 RX ORDER — MORPHINE SULFATE 4 MG/ML
4 INJECTION INTRAVENOUS ONCE
Status: COMPLETED | OUTPATIENT
Start: 2022-01-14 | End: 2022-01-14

## 2022-01-14 RX ADMIN — FENTANYL CITRATE 100 MCG: 50 INJECTION, SOLUTION INTRAMUSCULAR; INTRAVENOUS at 21:56

## 2022-01-14 RX ADMIN — ONDANSETRON 4 MG: 2 INJECTION INTRAMUSCULAR; INTRAVENOUS at 21:55

## 2022-01-14 RX ADMIN — MORPHINE SULFATE 4 MG: 4 INJECTION INTRAVENOUS at 21:13

## 2022-01-14 RX ADMIN — AMPICILLIN SODIUM AND SULBACTAM SODIUM 3 G: 2; 1 INJECTION, POWDER, FOR SOLUTION INTRAMUSCULAR; INTRAVENOUS at 21:11

## 2022-01-14 RX ADMIN — LIDOCAINE HYDROCHLORIDE,EPINEPHRINE BITARTRATE 10 ML: 20; .01 INJECTION, SOLUTION INFILTRATION; PERINEURAL at 22:00

## 2022-01-14 ASSESSMENT — FIBROSIS 4 INDEX
FIB4 SCORE: 0.56
FIB4 SCORE: 0.56

## 2022-01-14 ASSESSMENT — ENCOUNTER SYMPTOMS
SHORTNESS OF BREATH: 0
PALPITATIONS: 0
FEVER: 0
COUGH: 0
CHILLS: 0

## 2022-01-14 ASSESSMENT — PAIN DESCRIPTION - PAIN TYPE
TYPE: ACUTE PAIN
TYPE: ACUTE PAIN

## 2022-01-14 NOTE — TELEPHONE ENCOUNTER
Nutrition Services: Telephone Encounter     RD called for scheduled nutrition follow-up. Pt states is unable to talk at this time as has XRT today. RD discusses can provide in-person visit following XRT if preferred. Pt states preference for phone visits, and is agreeable to follow-up once completes Radiation Therapy. RD confirms, pt scheduled for 2/4/22 at 11:30am via phone call. Pt verbalizes understanding and appreciation.     RD to follow-up as scheduled  Please contact [phone]

## 2022-01-15 LAB
GRAM STN SPEC: NORMAL
SIGNIFICANT IND 70042: NORMAL
SITE SITE: NORMAL
SOURCE SOURCE: NORMAL

## 2022-01-15 NOTE — ED TRIAGE NOTES
Pt comes in w/ daughter c/o R breast abscess  Has seen PCP for same and given med's  abscess getting worse   Having pain rads into arm wound getting redder and more swollen   Was to have I&D done however is here for worsening symptoms  Is also being treated for L breast CA  Starting radiation soon

## 2022-01-15 NOTE — ED PROVIDER NOTES
ED Provider Note    CHIEF COMPLAINT  Chief Complaint   Patient presents with   • Breast Pain     wound on R breast  the last couple of days          HPI  Sol Kern is a 56 y.o. female who presents with a chief complaint of right-sided breast pain that has been ongoing for the past 4 days.  Prior to onset of symptoms she had been on Augmentin for sinusitis.  She initially developed a tender, swollen area on the right breast.  She was seen by her primary care physician and started on Bactrim.  Over the course of the past 4 days the area has worsened.  She currently is undergoing radiation therapy for cancer on her left breast.  She is seen by Dr. Sykes and Dr. Infante.  She is not currently on chemotherapy or immunosuppressed.  She denies any fevers although notes a temperature of 99.7.  The area is very painful.  The pain is radiating to her right arm and axilla.  There has been no nipple drainage.    REVIEW OF SYSTEMS  See HPI for further details.  Right breast pain and infection.  All other systems are negative.     PAST MEDICAL HISTORY   has a past medical history of Allergy, Back pain, Cancer (HCC) (2021), Diabetes (HCC), GERD (gastroesophageal reflux disease), High cholesterol, Malignant neoplasm of upper-outer quadrant of left female breast (HCC), Neck pain, Obesity, Sleep apnea, and Snoring.    SOCIAL HISTORY  Social History     Tobacco Use   • Smoking status: Current Every Day Smoker     Packs/day: 0.25     Years: 10.00     Pack years: 2.50     Types: Cigarettes   • Smokeless tobacco: Never Used   • Tobacco comment: working on quitting    Vaping Use   • Vaping Use: Never used   Substance and Sexual Activity   • Alcohol use: No   • Drug use: No   • Sexual activity: Yes     Partners: Male     Comment:        SURGICAL HISTORY   has a past surgical history that includes endometrial ablation; primary c section; knee arthroscopy; foot surgery (Right); carpal tunnel release (Right); mastectomy, partial  "(Left, 11/12/2021); and node biopsy sentinel (Left, 11/12/2021).    CURRENT MEDICATIONS  Home Medications     Reviewed by Layla Cerda R.N. (Registered Nurse) on 01/14/22 at 1915  Med List Status: <None>   Medication Last Dose Status   albuterol 108 (90 Base) MCG/ACT Aero Soln inhalation aerosol  Active   cyclobenzaprine (FLEXERIL) 5 MG tablet  Active   diazepam (VALIUM) 10 MG tablet  Active   diazepam (VALIUM) 10 MG tablet  Active   fluconazole (DIFLUCAN) 150 MG tablet  Active   lidocaine-prilocaine (EMLA) 2.5-2.5 % Cream  Active   mupirocin (BACTROBAN) 2 % Ointment  Active   nicotine polacrilex (NICOTINE MINI) 2 MG lozenge  Active   ROPINIRole (REQUIP) 0.25 MG Tab  Active   sulfamethoxazole-trimethoprim (BACTRIM DS) 800-160 MG tablet  Active   triamcinolone acetonide (KENALOG) 0.1 % Ointment  Active                ALLERGIES  Allergies   Allergen Reactions   • Codeine Vomiting and Nausea   • Other Drug Vomiting and Nausea     Any of the \"cets\" (percocet)   • Vicodin [Hydrocodone-Acetaminophen] Vomiting and Nausea       PHYSICAL EXAM  VITAL SIGNS: /82   Pulse 83   Temp 36.6 °C (97.9 °F) (Temporal)   Resp 16   Ht 1.626 m (5' 4\")   Wt 104 kg (228 lb 6.3 oz)   SpO2 96%   BMI 39.20 kg/m²    Pulse ox interpretation: I interpret this pulse ox as normal.  Constitutional: Alert in no apparent distress.  HENT: No signs of trauma, Bilateral external ears normal, Nose normal.  Moist mucous membranes.  Eyes: Pupils are equal and reactive, Conjunctiva normal, Non-icteric.   Neck: Normal range of motion, No tenderness, Supple, No stridor.   Lymphatic: No lymphadenopathy noted.   Cardiovascular: Regular rate and rhythm, no murmurs. Pulses symmetrical.  Thorax & Lungs: Normal breath sounds, No respiratory distress, No wheezing.  Approximately 3.5 cm area of tender, erythematous, fluctuance on the right chest wall just superior to the right breast.  There is surrounding erythema extending down into the breast.  No " nipple discharge or inversion.  No induration of the skin of the breast or nipple.  Abdomen: Bowel sounds normal, Soft, No tenderness, No masses, No pulsatile masses. No peritoneal signs.  Skin: Warm, Dry, No erythema, No rash.   Back: Normal alignment.  Extremities: No cyanosis.  Musculoskeletal: No major deformities noted.   Neurologic: Alert, No focal deficits noted.   Psychiatric: Affect normal, Judgment normal, Mood normal.     DIAGNOSTIC STUDIES / PROCEDURES    LABS  Results for orders placed or performed during the hospital encounter of 01/14/22   Basic Metabolic Panel   Result Value Ref Range    Sodium 135 135 - 145 mmol/L    Potassium 3.9 3.6 - 5.5 mmol/L    Chloride 102 96 - 112 mmol/L    Co2 22 20 - 33 mmol/L    Glucose 155 (H) 65 - 99 mg/dL    Bun 13 8 - 22 mg/dL    Creatinine 0.67 0.50 - 1.40 mg/dL    Calcium 9.2 8.4 - 10.2 mg/dL    Anion Gap 11.0 7.0 - 16.0   ESTIMATED GFR   Result Value Ref Range    GFR If African American >60 >60 mL/min/1.73 m 2    GFR If Non African American >60 >60 mL/min/1.73 m 2     RADIOLOGY  US-CHEST   Final Result      1.  Probable complicated fluid collection/abscess versus complex cystic mass in the area of concern in the right chest. Breast cancer cannot be excluded.        PROCEDURE  INCISION AND DRAINAGE PROCEDURE NOTE:  Patient identification was confirmed and consent was obtained.  This procedure was performed by Dr. Ricci.  Site: Right breast  Sterile procedures observed  Needle size: 27-gauge  Anesthetic used (type and amt): 4 cc 2% lidocaine with epinephrine  Blade size: 11  Drainage: Approximately 15 cc of purulent material  Packing used  Site anesthetized, incision made over site, wound drained and explored loculations, rinsed with copious amounts of normal saline, wound packed with sterile gauze, covered with dry, sterile dressing. Pt tolerated procedure well without complications. Instructions for care discussed verbally and pt provided with additional written  instructions for homecare and f/u.    COURSE & MEDICAL DECISION MAKING  Pertinent Labs & Imaging studies reviewed. (See chart for details)  Records obtained and reviewed: Patient was most recently seen at her primary care physician's office earlier today for right breast abscess/cellulitis.  She was initially seen on January 10 and started on Bactrim with Augmentin prior to this.  She has had worsening of the redness and warmth but no fever or drainage.    This is a 56-year-old female with a history of left sided breast cancer currently undergoing radiation therapy who is here with what appears to be a right-sided breast abscess.  Patient is afebrile with normal vital signs and overall well-appearing.  She does have tenderness and erythema as well as fluctuance in the right breast with erythema extending down the breast itself but no nipple drainage or inversion.  An ultrasound of the area demonstrated a probable complicated fluid collection/abscess versus complex cystic mass but felt that breast cancer could not be excluded.    Patient was started on Unasyn via IV pending return phone call from breast surgeon.    I spoke with the patient's breast surgeon, Dr. Sykes, who reviewed the imaging herself and the patient's prior imaging.  Her suspicion is that this is an infected sebaceous cyst as she has had a cyst in that same area noted on multiple ultrasounds.  She has requested that I drain the abscess in the emergency department, start her on antibiotics, and have her follow-up as an outpatient.    The patient's abscess was drained without complications.  A wound culture was sent.  Unfortunately the CBC that had been ordered was canceled by lab and not redrawn prior to the patient's discharge.    Patient felt significantly improved following the drainage.  She was given a prescription for clindamycin.  She was also given a small prescription for pain medication.  She will follow-up with Dr. Sykes as an outpatient next  week and was given strict return precautions.  Discharged in good and stable condition.    The patient will not drink alcohol nor drive with prescribed medications. The patient will return for worsening symptoms and is stable at the time of discharge. The patient verbalizes understanding and will comply.    In prescribing controlled substances to this patient, I certify that I have obtained and reviewed the medical history of Sol Kern. I have also made a good jovi effort to obtain applicable records from other providers who have treated the patient and records did not demonstrate any increased risk of substance abuse that would prevent me from prescribing controlled substances.     I have conducted a physical exam and documented it. I have reviewed Ms. Kern’s prescription history as maintained by the [place] Prescription Monitoring Program.     I have assessed the patient’s risk for abuse, dependency, and addiction using the validated Opioid Risk Tool available at https://www.mdcalc.com/btawmh-tklu-eyxs-ort-narcotic-abuse.     Given the above, I believe the benefits of controlled substance therapy outweigh the risks. The reasons for prescribing controlled substances include in my professional opinion, controlled substances are the only reasonable choice for this patient because Of the nature of the injury. Accordingly, I have discussed the risk and benefits, treatment plan, and alternative therapies with the patient.     FINAL IMPRESSION  1. Breast abscess  clindamycin (CLEOCIN) 300 MG Cap    oxyCODONE immediate-release (ROXICODONE) 5 MG Tab    ondansetron (ZOFRAN ODT) 4 MG TABLET DISPERSIBLE         Electronically signed by: Robin Ricci M.D., 1/14/2022 7:58 PM

## 2022-01-15 NOTE — PROGRESS NOTES
Subjective:     CC: R ant breast abscess/cellulitis    HPI:   Sol presents today with daughter for reeval of cellulitis/abscess R breast  Seen 1/10, Bactrim x4 days  augmentin prior  Redness spread, warmth  No fever  Tender   No drainage      Problem   Cellulitis   Abscess       Current Outpatient Medications Ordered in Epic   Medication Sig Dispense Refill   • mupirocin (BACTROBAN) 2 % Ointment Apply 1 Application topically 2 times a day. 22 g 0   • sulfamethoxazole-trimethoprim (BACTRIM DS) 800-160 MG tablet Take 1 Tablet by mouth 2 times a day. 20 Tablet 0   • diazepam (VALIUM) 10 MG tablet Take 1 Tablet by mouth 1 time a day as needed for Anxiety for up to 15 days. 15 Tablet 0   • albuterol 108 (90 Base) MCG/ACT Aero Soln inhalation aerosol INHALE 2 PUFFS BY MOUTH EVERY 6 HOURS AS NEEDED FOR SHORTNESS OF BREATH 2 Each 2   • cyclobenzaprine (FLEXERIL) 5 MG tablet Take 1-2 Tabs by mouth 3 times a day as needed. 90 Tab 1   • diazepam (VALIUM) 10 MG tablet Take 10 mg by mouth 1 time daily as needed (muscle spasms, back pain).     • fluconazole (DIFLUCAN) 150 MG tablet Take 1 Tablet by mouth every day. (Patient not taking: Reported on 1/14/2022) 1 Tablet 0   • nicotine polacrilex (NICOTINE MINI) 2 MG lozenge Place 1 Lozenge into mouth between cheek and gum every 1 hour as needed. (Patient not taking: Reported on 1/14/2022) 360 Lozenge 0   • lidocaine-prilocaine (EMLA) 2.5-2.5 % Cream  (Patient not taking: Reported on 1/10/2022)     • ROPINIRole (REQUIP) 0.25 MG Tab TAKE 1 TABLET BY MOUTH AT BEDTIME. TAKE 1-3 HOURS BEFORE BEDTIME. (Patient not taking: Reported on 12/3/2021) 90 Tablet 0   • triamcinolone acetonide (KENALOG) 0.1 % Ointment Apply 1 Application topically every 48 hours. (Patient not taking: Reported on 1/14/2022)       No current [facility]-ordered facility-administered medications on file.     ROS:  Review of Systems   Constitutional: Negative for chills and fever.   Respiratory: Negative for cough and  "shortness of breath.    Cardiovascular: Negative for chest pain and palpitations.       Objective:     Exam:  /68 (BP Location: Right arm, Patient Position: Sitting, BP Cuff Size: Adult)   Pulse 88   Temp 37.4 °C (99.4 °F) (Temporal)   Resp 18   Ht 1.651 m (5' 5\")   Wt 103 kg (227 lb 6.4 oz)   SpO2 97%   BMI 37.84 kg/m²  Body mass index is 37.84 kg/m².    Physical Exam  Constitutional:       Appearance: Normal appearance.   Skin:         Neurological:      Mental Status: She is alert.         Assessment & Plan:     56 y.o. female with the following -     Problem List Items Addressed This Visit     Cellulitis     Failed x2 outpatient abx  ER for I/D and IV abx and eval         Abscess     I/D deferred to ER for concurrent IV Abx as failed outpt po abx              Return in about 4 days (around 1/18/2022), or if symptoms worsen or fail to improve, for er f/u.    Please note that this dictation was created using voice recognition software. I have made every reasonable attempt to correct obvious errors, but I expect that there are errors of grammar and possibly content that I did not discover before finalizing the note.      "

## 2022-01-15 NOTE — DISCHARGE INSTRUCTIONS
You were seen in the ER for a breast abscess which I drained.  We placed packing in the wound and this should come out about several millimeters per day.  If it falls out on its own do not worry and do not try to replace it or put anything else in the wound cavity.  Keep the area clean and dry.  I have given you a prescription for antibiotics and you received a dose prior to discharge.  Please take these medications as directed.  I did give you a small prescription for pain medication but you should also take Tylenol and Motrin for discomfort and only take the oxycodone for breakthrough pain.  You cannot drink alcohol or drive after taking the medication as it will make you sleepy.  I spoke with Dr. Sykes and she would like to see you in her office next week.  He is contact their office to make a follow-up appointment.  If you develop any new or worsening symptoms please return immediately to the ER.  Good luck, I hope you feel better soon!

## 2022-01-15 NOTE — ED NOTES
AIDET performed with patient & visitor. Patient placed into gown. Warm blanket provided. Patient updated on plan of care. Awaiting MD evaluation.

## 2022-01-15 NOTE — ED NOTES
Discharge instructions reviewed with patient. AVS signed by patient. PIV removed. Prescriptions electronically sent to pharmacy of choice. One paper script handed to patient for antibiotic. Patient understands need for follow-up appointment with Dr. Sykes and to return to ED for worsening symptoms. All questions answered at this time. Patient ambulated to exit with belongings & daughter. Patient in stable condition with no signs of distress. Patient agreeable to discharge instructions.

## 2022-01-17 ENCOUNTER — HOSPITAL ENCOUNTER (OUTPATIENT)
Dept: RADIATION ONCOLOGY | Facility: MEDICAL CENTER | Age: 57
End: 2022-01-17
Payer: MEDICARE

## 2022-01-17 LAB
BACTERIA WND AEROBE CULT: ABNORMAL
BACTERIA WND AEROBE CULT: ABNORMAL
CHEMOTHERAPY INFUSION START DATE: NORMAL
CHEMOTHERAPY RECORDS: 2.67
CHEMOTHERAPY RECORDS: 2.67
CHEMOTHERAPY RECORDS: 4005
CHEMOTHERAPY RECORDS: 4005
CHEMOTHERAPY RECORDS: NORMAL
CHEMOTHERAPY RX CANCER: NORMAL
DATE 1ST CHEMO CANCER: NORMAL
GRAM STN SPEC: ABNORMAL
RAD ONC ARIA COURSE LAST TREATMENT DATE: NORMAL
RAD ONC ARIA COURSE TREATMENT ELAPSED DAYS: NORMAL
RAD ONC ARIA REFERENCE POINT DOSAGE GIVEN TO DATE: 10.68
RAD ONC ARIA REFERENCE POINT ID: NORMAL
RAD ONC ARIA REFERENCE POINT SESSION DOSAGE GIVEN: 2.67
SIGNIFICANT IND 70042: ABNORMAL
SITE SITE: ABNORMAL
SOURCE SOURCE: ABNORMAL

## 2022-01-17 PROCEDURE — 77417 THER RADIOLOGY PORT IMAGE(S): CPT | Performed by: RADIOLOGY

## 2022-01-17 PROCEDURE — 77412 RADIATION TX DELIVERY LVL 3: CPT | Performed by: RADIOLOGY

## 2022-01-18 ENCOUNTER — TELEPHONE (OUTPATIENT)
Dept: MEDICAL GROUP | Facility: PHYSICIAN GROUP | Age: 57
End: 2022-01-18

## 2022-01-18 ENCOUNTER — APPOINTMENT (OUTPATIENT)
Dept: MEDICAL GROUP | Facility: PHYSICIAN GROUP | Age: 57
End: 2022-01-18
Payer: MEDICARE

## 2022-01-18 ENCOUNTER — HOSPITAL ENCOUNTER (OUTPATIENT)
Dept: RADIATION ONCOLOGY | Facility: MEDICAL CENTER | Age: 57
End: 2022-01-18

## 2022-01-18 LAB
CHEMOTHERAPY INFUSION START DATE: NORMAL
CHEMOTHERAPY RECORDS: 2.67
CHEMOTHERAPY RECORDS: 2.67
CHEMOTHERAPY RECORDS: 4005
CHEMOTHERAPY RECORDS: 4005
CHEMOTHERAPY RECORDS: NORMAL
CHEMOTHERAPY RX CANCER: NORMAL
DATE 1ST CHEMO CANCER: NORMAL
RAD ONC ARIA COURSE LAST TREATMENT DATE: NORMAL
RAD ONC ARIA COURSE TREATMENT ELAPSED DAYS: NORMAL
RAD ONC ARIA REFERENCE POINT DOSAGE GIVEN TO DATE: 13.35
RAD ONC ARIA REFERENCE POINT ID: NORMAL
RAD ONC ARIA REFERENCE POINT SESSION DOSAGE GIVEN: 2.67

## 2022-01-18 PROCEDURE — 77427 RADIATION TX MANAGEMENT X5: CPT | Performed by: RADIOLOGY

## 2022-01-18 PROCEDURE — 77412 RADIATION TX DELIVERY LVL 3: CPT | Performed by: RADIOLOGY

## 2022-01-18 NOTE — ED NOTES
"ED Positive Culture Follow-up/Notification Note:    Date: 1/17/2022     Patient seen in the ED on 1/14/2022 for right sided breast pain. I and D performed in the ER.     1. Breast abscess       Discharge Medication List as of 1/14/2022 10:40 PM      START taking these medications    Details   clindamycin (CLEOCIN) 300 MG Cap Take 1 Capsule by mouth 3 times a day for 7 days., Disp-21 Capsule, R-0, Print Rx Paper      oxyCODONE immediate-release (ROXICODONE) 5 MG Tab Take 1 Tablet by mouth every 6 hours as needed for Severe Pain for up to 5 doses., Disp-5 Tablet, R-0, Normal      ondansetron (ZOFRAN ODT) 4 MG TABLET DISPERSIBLE Take 1 Tablet by mouth every 6 hours as needed., Disp-10 Tablet, R-0, Normal             Allergies: Codeine, Other drug, and Vicodin [hydrocodone-acetaminophen]     Vitals:    01/14/22 1901 01/14/22 1910   BP: 144/82    Pulse: 83    Resp: 16    Temp: 36.6 °C (97.9 °F)    TempSrc: Temporal    SpO2: 96%    Weight:  104 kg (228 lb 6.3 oz)   Height:  1.626 m (5' 4\")       Final cultures:   Results     Procedure Component Value Units Date/Time    CULTURE WOUND W/ GRAM STAIN [[id number]]  (Abnormal) Collected: 01/14/22 2220    Order Status: Completed Specimen: Wound from Abscess Updated: 01/17/22 0821     Significant Indicator POS     Source WND     Site Right Chest     Culture Result Rare growth usual skin kimmie.     Gram Stain Result Moderate WBCs.  Few Gram positive cocci.       Culture Result Peptostreptococcus sp.  Moderate growth  Mixed morphologies      Narrative:      Collected By:  Collected By:    GRAM STAIN [[id number]] Collected: 01/14/22 2220    Order Status: Completed Specimen: Wound Updated: 01/15/22 1745     Significant Indicator .     Source WND     Site Right Chest     Gram Stain Result Moderate WBCs.  Few Gram positive cocci.      Narrative:      Collected By:  Collected By:          Plan:   Appropriate antibiotic therapy prescribed. Patient currently taking clindamycin for her " infection. No changes required based upon culture result. Encouraged compliance with prescribed antibiotics.     Beatriz Encinas, PharmD, BCPS   PGY2 Infectious Diseases Pharmacy Resident

## 2022-01-18 NOTE — TELEPHONE ENCOUNTER
Patient denies need to follow-up with primary care provider at this time, she has appointment on 1/19/22 with breast surgeon.

## 2022-01-19 ENCOUNTER — HOSPITAL ENCOUNTER (OUTPATIENT)
Dept: RADIATION ONCOLOGY | Facility: MEDICAL CENTER | Age: 57
End: 2022-01-19
Payer: MEDICARE

## 2022-01-19 VITALS — TEMPERATURE: 98.1 F | OXYGEN SATURATION: 98 % | HEART RATE: 96 BPM

## 2022-01-19 LAB
CHEMOTHERAPY INFUSION START DATE: NORMAL
CHEMOTHERAPY RECORDS: 2.67
CHEMOTHERAPY RECORDS: 2.67
CHEMOTHERAPY RECORDS: 4005
CHEMOTHERAPY RECORDS: 4005
CHEMOTHERAPY RECORDS: NORMAL
CHEMOTHERAPY RX CANCER: NORMAL
DATE 1ST CHEMO CANCER: NORMAL
RAD ONC ARIA COURSE LAST TREATMENT DATE: NORMAL
RAD ONC ARIA COURSE TREATMENT ELAPSED DAYS: NORMAL
RAD ONC ARIA REFERENCE POINT DOSAGE GIVEN TO DATE: 16.02
RAD ONC ARIA REFERENCE POINT ID: NORMAL
RAD ONC ARIA REFERENCE POINT SESSION DOSAGE GIVEN: 2.67

## 2022-01-19 PROCEDURE — 77417 THER RADIOLOGY PORT IMAGE(S): CPT | Performed by: RADIOLOGY

## 2022-01-19 PROCEDURE — 77412 RADIATION TX DELIVERY LVL 3: CPT | Performed by: RADIOLOGY

## 2022-01-19 ASSESSMENT — PAIN SCALES - GENERAL: PAINLEVEL: NO PAIN

## 2022-01-19 NOTE — ON TREATMENT VISIT
ON TREATMENT NOTE  RADIATION ONCOLOGY DEPARTMENT    Patient name:  Sol Kern    Primary Physician:  Veronica Lyn M.D. MRN: [id number]  CSN: [id number]   Referring physician:  No ref. provider found : 1965, 56 y.o.     ENCOUNTER DATE:  22    DIAGNOSIS:    Carcinoma of upper-outer quadrant of left breast in female, estrogen receptor positive (HCC)  Staging form: Breast, AJCC 8th Edition  - Clinical: Stage IB (cT2, cN0, cM0, G1, ER+, VA+, HER2-) - Signed by Kem Infante M.D. on 10/18/2021  Histologic grading system: 3 grade system  - Pathologic stage from 12/3/2021: Stage IB (pT2, pN1, cM0, G2, ER+, VA+, HER2-) - Signed by Kem Infante M.D. on 2021  Histopathologic type: Infiltrating ductular carcinoma  Stage prefix: Initial diagnosis  Nuclear grade: G2  Multigene prognostic tests performed: Oncotype DX  Histologic grading system: 3 grade system  Residual tumor (R): R0 - None  Ki-67 (%): 22      TREATMENT SUMMARY:  [facility] Treatment Information        Some values may be hidden. Unless noted otherwise, only the newest values recorded on each date are displayed.         [facility] Treatment Summary 22   Course First Treatment Date 2022   Course Last Treatment Date 2022   L_Breast Plan from Course C1_LBreast   Fraction 6 of 15   Elapsed Course Days  @    Prescribed Fraction Dose 267 cGy   Prescribed Total Dose 4,005 cGy   L_Breast [L_SCV] Plan from Course C1_LBreast   Fraction 6 of 15   Elapsed Course Days  @    Prescribed Fraction Dose 267 cGy   Prescribed Total Dose 4,005 cGy   L_Breast Reference Point from Course C1_LBreast   Elapsed Course Days  @    Session Dose 267 cGy   Total Dose 1,602 cGy   L_Breast CP Reference Point from Course C1_LBreast   Elapsed Course Days  @    Session Dose 267 cGy   Total Dose 1,602 cGy   L_SCV Reference Point from Course C1_LBreast   Elapsed Course Days  @    Session Dose  267 cGy   Total Dose 1,602 cGy   L_SCV CP Reference Point from Course C1_LBreast   Elapsed Course Days 7 @ [id number]   Session Dose 267 cGy   Total Dose 1,602 cGy              SUBJECTIVE:   Patient is doing well. She does not have any changes she would attribute to her radiation.    VITAL SIGNS:    Encounter Vitals  Temperature: 36.7 °C (98.1 °F)  Pulse: 96  Pulse Oximetry: 98 %  Pain Score: No pain  Pain Assessment 1/19/2022 10/19/2021   Pain Score 0 0   Some recent data might be hidden          PHYSICAL EXAM:  No erythema    TOXICITY  Toxicity Assessment 1/19/2022   Toxicity Assessment Breast   Fatigue (lethargy, malaise, asthenia) Increased fatigue over baseline, but not altering normal activities   Fever (in the absence of neutropenia) None   Radiation Dermatitis None   Lymphatics Normal   RT - Pain due to RT None   Dyspnea Normal         IMPRESSION:  Cancer Staging  Carcinoma of upper-outer quadrant of left breast in female, estrogen receptor positive (HCC)  Staging form: Breast, AJCC 8th Edition  - Clinical: Stage IB (cT2, cN0, cM0, G1, ER+, SD+, HER2-) - Signed by Kem Infante M.D. on 10/18/2021  - Pathologic stage from 12/3/2021: Stage IB (pT2, pN1, cM0, G2, ER+, SD+, HER2-) - Signed by Kem Infante M.D. on 12/13/2021      PLAN:  No change in treatment plan    Disposition:  Treatment plan reviewed. Questions answered. Continue therapy outlined.     Kem Infante M.D.    No orders of the defined types were placed in this encounter.

## 2022-01-19 NOTE — ASSESSMENT & PLAN NOTE
Chronic, stable  Cont to monitor a1c  Last 7.1  Stopped Metformin  Pt will return for A1c testing  Cont strong DM diet control  Cont CDC recommended exercise

## 2022-01-20 ENCOUNTER — HOSPITAL ENCOUNTER (OUTPATIENT)
Dept: RADIATION ONCOLOGY | Facility: MEDICAL CENTER | Age: 57
End: 2022-01-20
Payer: MEDICARE

## 2022-01-20 LAB
CHEMOTHERAPY INFUSION START DATE: NORMAL
CHEMOTHERAPY RECORDS: 2.67
CHEMOTHERAPY RECORDS: 2.67
CHEMOTHERAPY RECORDS: 4005
CHEMOTHERAPY RECORDS: 4005
CHEMOTHERAPY RECORDS: NORMAL
CHEMOTHERAPY RX CANCER: NORMAL
DATE 1ST CHEMO CANCER: NORMAL
RAD ONC ARIA COURSE LAST TREATMENT DATE: NORMAL
RAD ONC ARIA COURSE TREATMENT ELAPSED DAYS: NORMAL
RAD ONC ARIA REFERENCE POINT DOSAGE GIVEN TO DATE: 18.69
RAD ONC ARIA REFERENCE POINT ID: NORMAL
RAD ONC ARIA REFERENCE POINT SESSION DOSAGE GIVEN: 2.67

## 2022-01-20 PROCEDURE — 77412 RADIATION TX DELIVERY LVL 3: CPT | Performed by: RADIOLOGY

## 2022-01-21 ENCOUNTER — HOSPITAL ENCOUNTER (OUTPATIENT)
Dept: RADIATION ONCOLOGY | Facility: MEDICAL CENTER | Age: 57
End: 2022-01-21
Payer: MEDICARE

## 2022-01-21 LAB
CHEMOTHERAPY INFUSION START DATE: NORMAL
CHEMOTHERAPY RECORDS: 2.67
CHEMOTHERAPY RECORDS: 2.67
CHEMOTHERAPY RECORDS: 4005
CHEMOTHERAPY RECORDS: 4005
CHEMOTHERAPY RECORDS: NORMAL
CHEMOTHERAPY RX CANCER: NORMAL
DATE 1ST CHEMO CANCER: NORMAL
RAD ONC ARIA COURSE LAST TREATMENT DATE: NORMAL
RAD ONC ARIA COURSE TREATMENT ELAPSED DAYS: NORMAL
RAD ONC ARIA REFERENCE POINT DOSAGE GIVEN TO DATE: 21.36
RAD ONC ARIA REFERENCE POINT ID: NORMAL
RAD ONC ARIA REFERENCE POINT SESSION DOSAGE GIVEN: 2.67

## 2022-01-21 PROCEDURE — 77336 RADIATION PHYSICS CONSULT: CPT | Performed by: RADIOLOGY

## 2022-01-21 PROCEDURE — 77412 RADIATION TX DELIVERY LVL 3: CPT | Performed by: RADIOLOGY

## 2022-01-24 ENCOUNTER — HOSPITAL ENCOUNTER (OUTPATIENT)
Dept: RADIATION ONCOLOGY | Facility: MEDICAL CENTER | Age: 57
End: 2022-01-24
Payer: MEDICARE

## 2022-01-24 LAB
CHEMOTHERAPY INFUSION START DATE: NORMAL
CHEMOTHERAPY RECORDS: 2.67
CHEMOTHERAPY RECORDS: 2.67
CHEMOTHERAPY RECORDS: 4005
CHEMOTHERAPY RECORDS: 4005
CHEMOTHERAPY RECORDS: NORMAL
CHEMOTHERAPY RX CANCER: NORMAL
DATE 1ST CHEMO CANCER: NORMAL
RAD ONC ARIA COURSE LAST TREATMENT DATE: NORMAL
RAD ONC ARIA COURSE TREATMENT ELAPSED DAYS: NORMAL
RAD ONC ARIA REFERENCE POINT DOSAGE GIVEN TO DATE: 24.03
RAD ONC ARIA REFERENCE POINT ID: NORMAL
RAD ONC ARIA REFERENCE POINT SESSION DOSAGE GIVEN: 2.67

## 2022-01-24 PROCEDURE — 77412 RADIATION TX DELIVERY LVL 3: CPT | Performed by: RADIOLOGY

## 2022-01-25 ENCOUNTER — HOSPITAL ENCOUNTER (OUTPATIENT)
Dept: RADIATION ONCOLOGY | Facility: MEDICAL CENTER | Age: 57
End: 2022-01-25
Payer: MEDICARE

## 2022-01-25 LAB
CHEMOTHERAPY INFUSION START DATE: NORMAL
CHEMOTHERAPY RECORDS: 2.67
CHEMOTHERAPY RECORDS: 2.67
CHEMOTHERAPY RECORDS: 4005
CHEMOTHERAPY RECORDS: 4005
CHEMOTHERAPY RECORDS: NORMAL
CHEMOTHERAPY RX CANCER: NORMAL
DATE 1ST CHEMO CANCER: NORMAL
RAD ONC ARIA COURSE LAST TREATMENT DATE: NORMAL
RAD ONC ARIA COURSE TREATMENT ELAPSED DAYS: NORMAL
RAD ONC ARIA REFERENCE POINT DOSAGE GIVEN TO DATE: 26.7
RAD ONC ARIA REFERENCE POINT ID: NORMAL
RAD ONC ARIA REFERENCE POINT SESSION DOSAGE GIVEN: 2.67

## 2022-01-25 PROCEDURE — 77412 RADIATION TX DELIVERY LVL 3: CPT | Performed by: RADIOLOGY

## 2022-01-25 PROCEDURE — 77427 RADIATION TX MANAGEMENT X5: CPT | Performed by: RADIOLOGY

## 2022-01-26 ENCOUNTER — HOSPITAL ENCOUNTER (OUTPATIENT)
Dept: RADIATION ONCOLOGY | Facility: MEDICAL CENTER | Age: 57
End: 2022-01-26
Payer: MEDICARE

## 2022-01-26 VITALS
DIASTOLIC BLOOD PRESSURE: 79 MMHG | HEART RATE: 89 BPM | SYSTOLIC BLOOD PRESSURE: 149 MMHG | OXYGEN SATURATION: 98 % | TEMPERATURE: 97.3 F

## 2022-01-26 LAB
CHEMOTHERAPY INFUSION START DATE: NORMAL
CHEMOTHERAPY RECORDS: 2.67
CHEMOTHERAPY RECORDS: 2.67
CHEMOTHERAPY RECORDS: 4005
CHEMOTHERAPY RECORDS: 4005
CHEMOTHERAPY RECORDS: NORMAL
CHEMOTHERAPY RX CANCER: NORMAL
DATE 1ST CHEMO CANCER: NORMAL
RAD ONC ARIA COURSE LAST TREATMENT DATE: NORMAL
RAD ONC ARIA COURSE TREATMENT ELAPSED DAYS: NORMAL
RAD ONC ARIA REFERENCE POINT DOSAGE GIVEN TO DATE: 29.37
RAD ONC ARIA REFERENCE POINT ID: NORMAL
RAD ONC ARIA REFERENCE POINT SESSION DOSAGE GIVEN: 2.67

## 2022-01-26 PROCEDURE — 77417 THER RADIOLOGY PORT IMAGE(S): CPT | Performed by: RADIOLOGY

## 2022-01-26 PROCEDURE — 77412 RADIATION TX DELIVERY LVL 3: CPT | Performed by: RADIOLOGY

## 2022-01-26 ASSESSMENT — PAIN SCALES - GENERAL: PAINLEVEL: NO PAIN

## 2022-01-26 NOTE — ON TREATMENT VISIT
ON TREATMENT NOTE  RADIATION ONCOLOGY DEPARTMENT    Patient name:  Sol Kern    Primary Physician:  Veronica Lyn M.D. MRN: [id number]  CSN: [id number]   Referring physician:  No ref. provider found : 1965, 56 y.o.     ENCOUNTER DATE:  22    DIAGNOSIS:    Carcinoma of upper-outer quadrant of left breast in female, estrogen receptor positive (HCC)  Staging form: Breast, AJCC 8th Edition  - Clinical: Stage IB (cT2, cN0, cM0, G1, ER+, DC+, HER2-) - Signed by Kem Infante M.D. on 10/18/2021  Histologic grading system: 3 grade system  - Pathologic stage from 12/3/2021: Stage IB (pT2, pN1, cM0, G2, ER+, DC+, HER2-) - Signed by Kem Infante M.D. on 2021  Histopathologic type: Infiltrating ductular carcinoma  Stage prefix: Initial diagnosis  Nuclear grade: G2  Multigene prognostic tests performed: Oncotype DX  Histologic grading system: 3 grade system  Residual tumor (R): R0 - None  Ki-67 (%): 22      TREATMENT SUMMARY:  [facility] Treatment Information        Some values may be hidden. Unless noted otherwise, only the newest values recorded on each date are displayed.         [facility] Treatment Summary 22   Course First Treatment Date 2022   Course Last Treatment Date 2022   L_Breast Plan from Course C1_LBreast   Fraction  of 15   Elapsed Course Days  @    Prescribed Fraction Dose 267 cGy   Prescribed Total Dose 4,005 cGy   L_Breast [L_SCV] Plan from Course C1_LBreast   Fraction 11 of 15   Elapsed Course Days  @    Prescribed Fraction Dose 267 cGy   Prescribed Total Dose 4,005 cGy   L_Breast Reference Point from Course C1_LBreast   Elapsed Course Days  @    Session Dose 267 cGy   Total Dose 2,937 cGy   L_Breast CP Reference Point from Course C1_LBreast   Elapsed Course Days  @    Session Dose 267 cGy   Total Dose 2,937 cGy   L_SCV Reference Point from Course C1_LBreast   Elapsed Course Days  @     Session Dose 267 cGy   Total Dose 2,937 cGy   L_SCV CP Reference Point from Course C1_LBreast   Elapsed Course Days 14 @ [id number]   Session Dose 267 cGy   Total Dose 2,937 cGy              SUBJECTIVE:   Patient is doing well.  She is starting to experience some erythema especially in the inframammary fold.  In addition she has some fatigue.    VITAL SIGNS:    Encounter Vitals  Temperature: 36.3 °C (97.3 °F)  Blood Pressure: 149/79  Pulse: 89  Pulse Oximetry: 98 %  Pain Score: No pain  Pain Assessment 1/26/2022 1/19/2022 10/19/2021   Pain Score 0 0 0   Some recent data might be hidden          PHYSICAL EXAM:  Erythema in the inframammary fold    TOXICITY  Toxicity Assessment 1/26/2022 1/19/2022   Toxicity Assessment Breast Breast   Fatigue (lethargy, malaise, asthenia) Increased fatigue over baseline, but not altering normal activities Increased fatigue over baseline, but not altering normal activities   Fever (in the absence of neutropenia) None None   Radiation Dermatitis None None   Lymphatics Mild lymphedema Normal   RT - Pain due to RT Mild pain not interfering with function None   Dyspnea Normal Normal         IMPRESSION:  Cancer Staging  Carcinoma of upper-outer quadrant of left breast in female, estrogen receptor positive (HCC)  Staging form: Breast, AJCC 8th Edition  - Clinical: Stage IB (cT2, cN0, cM0, G1, ER+, NH+, HER2-) - Signed by Kem Infante M.D. on 10/18/2021  - Pathologic stage from 12/3/2021: Stage IB (pT2, pN1, cM0, G2, ER+, NH+, HER2-) - Signed by Kem Infante M.D. on 12/13/2021      PLAN:  No change in treatment plan    Disposition:  Treatment plan reviewed. Questions answered. Continue therapy outlined.     Kem Infante M.D.    No orders of the defined types were placed in this encounter.

## 2022-01-27 ENCOUNTER — HOSPITAL ENCOUNTER (OUTPATIENT)
Dept: RADIATION ONCOLOGY | Facility: MEDICAL CENTER | Age: 57
End: 2022-01-27
Payer: MEDICARE

## 2022-01-27 LAB
CHEMOTHERAPY INFUSION START DATE: NORMAL
CHEMOTHERAPY RECORDS: 2.67
CHEMOTHERAPY RECORDS: 2.67
CHEMOTHERAPY RECORDS: 4005
CHEMOTHERAPY RECORDS: 4005
CHEMOTHERAPY RECORDS: NORMAL
CHEMOTHERAPY RX CANCER: NORMAL
DATE 1ST CHEMO CANCER: NORMAL
RAD ONC ARIA COURSE LAST TREATMENT DATE: NORMAL
RAD ONC ARIA COURSE TREATMENT ELAPSED DAYS: NORMAL
RAD ONC ARIA REFERENCE POINT DOSAGE GIVEN TO DATE: 32.04
RAD ONC ARIA REFERENCE POINT ID: NORMAL
RAD ONC ARIA REFERENCE POINT SESSION DOSAGE GIVEN: 2.67

## 2022-01-27 PROCEDURE — 77417 THER RADIOLOGY PORT IMAGE(S): CPT | Performed by: RADIOLOGY

## 2022-01-27 PROCEDURE — 77412 RADIATION TX DELIVERY LVL 3: CPT | Performed by: RADIOLOGY

## 2022-01-28 ENCOUNTER — HOSPITAL ENCOUNTER (OUTPATIENT)
Dept: RADIATION ONCOLOGY | Facility: MEDICAL CENTER | Age: 57
End: 2022-01-28
Payer: MEDICARE

## 2022-01-28 LAB
CHEMOTHERAPY INFUSION START DATE: NORMAL
CHEMOTHERAPY RECORDS: 2.67
CHEMOTHERAPY RECORDS: 2.67
CHEMOTHERAPY RECORDS: 4005
CHEMOTHERAPY RECORDS: 4005
CHEMOTHERAPY RECORDS: NORMAL
CHEMOTHERAPY RX CANCER: NORMAL
DATE 1ST CHEMO CANCER: NORMAL
RAD ONC ARIA COURSE LAST TREATMENT DATE: NORMAL
RAD ONC ARIA COURSE TREATMENT ELAPSED DAYS: NORMAL
RAD ONC ARIA REFERENCE POINT DOSAGE GIVEN TO DATE: 34.71
RAD ONC ARIA REFERENCE POINT ID: NORMAL
RAD ONC ARIA REFERENCE POINT SESSION DOSAGE GIVEN: 2.67

## 2022-01-28 PROCEDURE — 77336 RADIATION PHYSICS CONSULT: CPT | Performed by: RADIOLOGY

## 2022-01-28 PROCEDURE — 77412 RADIATION TX DELIVERY LVL 3: CPT | Performed by: RADIOLOGY

## 2022-01-31 ENCOUNTER — HOSPITAL ENCOUNTER (OUTPATIENT)
Dept: RADIATION ONCOLOGY | Facility: MEDICAL CENTER | Age: 57
End: 2022-01-31

## 2022-01-31 ENCOUNTER — OFFICE VISIT (OUTPATIENT)
Dept: MEDICAL GROUP | Facility: PHYSICIAN GROUP | Age: 57
End: 2022-01-31
Payer: MEDICARE

## 2022-01-31 VITALS
HEIGHT: 65 IN | SYSTOLIC BLOOD PRESSURE: 126 MMHG | DIASTOLIC BLOOD PRESSURE: 72 MMHG | OXYGEN SATURATION: 94 % | BODY MASS INDEX: 37.72 KG/M2 | WEIGHT: 226.4 LBS | TEMPERATURE: 97.8 F | RESPIRATION RATE: 24 BRPM | HEART RATE: 96 BPM

## 2022-01-31 DIAGNOSIS — B37.31 YEAST INFECTION OF THE VAGINA: ICD-10-CM

## 2022-01-31 DIAGNOSIS — B37.0 THRUSH, ORAL: ICD-10-CM

## 2022-01-31 DIAGNOSIS — M67.879 ACHILLES TENDON MASS: ICD-10-CM

## 2022-01-31 DIAGNOSIS — Z17.0 CARCINOMA OF UPPER-OUTER QUADRANT OF LEFT BREAST IN FEMALE, ESTROGEN RECEPTOR POSITIVE (HCC): ICD-10-CM

## 2022-01-31 DIAGNOSIS — L03.90 CELLULITIS, UNSPECIFIED CELLULITIS SITE: ICD-10-CM

## 2022-01-31 DIAGNOSIS — C50.412 CARCINOMA OF UPPER-OUTER QUADRANT OF LEFT BREAST IN FEMALE, ESTROGEN RECEPTOR POSITIVE (HCC): ICD-10-CM

## 2022-01-31 LAB
CHEMOTHERAPY INFUSION START DATE: NORMAL
CHEMOTHERAPY RECORDS: 2.67
CHEMOTHERAPY RECORDS: 2.67
CHEMOTHERAPY RECORDS: 4005
CHEMOTHERAPY RECORDS: 4005
CHEMOTHERAPY RECORDS: NORMAL
CHEMOTHERAPY RX CANCER: NORMAL
DATE 1ST CHEMO CANCER: NORMAL
RAD ONC ARIA COURSE LAST TREATMENT DATE: NORMAL
RAD ONC ARIA COURSE TREATMENT ELAPSED DAYS: NORMAL
RAD ONC ARIA REFERENCE POINT DOSAGE GIVEN TO DATE: 37.38
RAD ONC ARIA REFERENCE POINT ID: NORMAL
RAD ONC ARIA REFERENCE POINT SESSION DOSAGE GIVEN: 2.67

## 2022-01-31 PROCEDURE — 77412 RADIATION TX DELIVERY LVL 3: CPT | Performed by: RADIOLOGY

## 2022-01-31 PROCEDURE — 99214 OFFICE O/P EST MOD 30 MIN: CPT | Performed by: NURSE PRACTITIONER

## 2022-01-31 RX ORDER — FLUCONAZOLE 150 MG/1
150 TABLET ORAL DAILY
Qty: 3 TABLET | Refills: 0 | Status: SHIPPED | OUTPATIENT
Start: 2022-01-31 | End: 2022-02-01

## 2022-01-31 RX ORDER — CLINDAMYCIN HYDROCHLORIDE 300 MG/1
CAPSULE ORAL
COMMUNITY
Start: 2022-01-21 | End: 2022-01-31

## 2022-01-31 ASSESSMENT — ENCOUNTER SYMPTOMS
VOMITING: 0
HEARTBURN: 0
CHILLS: 0
FEVER: 0
NAUSEA: 0

## 2022-01-31 ASSESSMENT — FIBROSIS 4 INDEX: FIB4 SCORE: 0.56

## 2022-02-01 ENCOUNTER — HOSPITAL ENCOUNTER (OUTPATIENT)
Dept: RADIATION ONCOLOGY | Facility: MEDICAL CENTER | Age: 57
End: 2022-02-28
Attending: RADIOLOGY
Payer: MEDICARE

## 2022-02-01 ENCOUNTER — HOSPITAL ENCOUNTER (OUTPATIENT)
Dept: RADIATION ONCOLOGY | Facility: MEDICAL CENTER | Age: 57
End: 2022-02-01

## 2022-02-01 LAB
CHEMOTHERAPY INFUSION START DATE: NORMAL
CHEMOTHERAPY INFUSION START DATE: NORMAL
CHEMOTHERAPY INFUSION STOP DATE: NORMAL
CHEMOTHERAPY RECORDS: 2.67
CHEMOTHERAPY RECORDS: 4005
CHEMOTHERAPY RECORDS: NORMAL
CHEMOTHERAPY RX CANCER: NORMAL
CHEMOTHERAPY RX CANCER: NORMAL
DATE 1ST CHEMO CANCER: NORMAL
DATE 1ST CHEMO CANCER: NORMAL
RAD ONC ARIA COURSE LAST TREATMENT DATE: NORMAL
RAD ONC ARIA COURSE LAST TREATMENT DATE: NORMAL
RAD ONC ARIA COURSE TREATMENT ELAPSED DAYS: NORMAL
RAD ONC ARIA COURSE TREATMENT ELAPSED DAYS: NORMAL
RAD ONC ARIA REFERENCE POINT DOSAGE GIVEN TO DATE: 40.05
RAD ONC ARIA REFERENCE POINT ID: NORMAL
RAD ONC ARIA REFERENCE POINT SESSION DOSAGE GIVEN: 2.67

## 2022-02-01 PROCEDURE — 77427 RADIATION TX MANAGEMENT X5: CPT | Performed by: RADIOLOGY

## 2022-02-01 PROCEDURE — 77412 RADIATION TX DELIVERY LVL 3: CPT | Performed by: RADIOLOGY

## 2022-02-01 NOTE — PROGRESS NOTES
Subjective:     Chief Complaint   Patient presents with   • Thrush     possible from antibiotics        HPI:   Sol presents today with     Problem   Thrush, Oral    This is a new issue.  Patient states that symptoms started approximately 1 week ago.  She states that she developed the thrush after taking multiple antibiotics for her sebaceous cyst that became infected.  She states she has noticed burning, discomfort in her mouth as well as several white patchy areas.  Patient is requesting medication as she has not found anything that makes this better or worse.     Yeast Infection of The Vagina    Is a new issue.  Patient states she noticed some white chunky discharge from her vagina after taking multiple antibiotics for infected sebaceous cyst.  States that she has often gotten a yeast infection after antibiotics in the past.  States that she did try over-the-counter medication which was overall ineffective.  She is still having some discharge and itching.     Achilles Tendon Mass    This is a new problem with uncertain prognosis.  Patient states that over the summer she noticed some mild tenderness in her Achilles tendon on her left foot.  She stated she noticed that there was some swelling that has never seemed to resolve.  She continues to notice that the tendon feels thicker.  Have not noticed any bruising or difficulty walking.  States that she did not injure her leg or heel.     Cellulitis    Resolved.  Patient saw Dr. Sykes weekly the last 2 weeks and they do plan to potentially open and remove the cyst.  She does still note firmness to the area but states that it is no longer red hot or oozing.         Current Outpatient Medications Ordered in Epic   Medication Sig Dispense Refill   • silver sulfADIAZINE (SILVADENE) 1 % Cream Apply 1/8 inch layer to affected area  g 2   • fluconazole (DIFLUCAN) 150 MG tablet Take 1 Tablet by mouth every day for 1 day. 3 Tablet 0   • nystatin (MYCOSTATIN) 071090  "UNIT/ML Suspension Take 5 mL by mouth 4 times a day for 14 days. 280 mL 0   • ondansetron (ZOFRAN ODT) 4 MG TABLET DISPERSIBLE Take 1 Tablet by mouth every 6 hours as needed. 10 Tablet 0   • albuterol 108 (90 Base) MCG/ACT Aero Soln inhalation aerosol INHALE 2 PUFFS BY MOUTH EVERY 6 HOURS AS NEEDED FOR SHORTNESS OF BREATH 2 Each 2   • nicotine polacrilex (NICOTINE MINI) 2 MG lozenge Place 1 Lozenge into mouth between cheek and gum every 1 hour as needed. 360 Lozenge 0   • lidocaine-prilocaine (EMLA) 2.5-2.5 % Cream      • ROPINIRole (REQUIP) 0.25 MG Tab TAKE 1 TABLET BY MOUTH AT BEDTIME. TAKE 1-3 HOURS BEFORE BEDTIME. 90 Tablet 0   • cyclobenzaprine (FLEXERIL) 5 MG tablet Take 1-2 Tabs by mouth 3 times a day as needed. 90 Tab 1   • diazepam (VALIUM) 10 MG tablet Take 10 mg by mouth 1 time daily as needed (muscle spasms, back pain).       No current Epic-ordered facility-administered medications on file.       Review of Systems   Constitutional: Negative for chills, fever and malaise/fatigue.   Gastrointestinal: Negative for heartburn, nausea and vomiting.   Genitourinary: Negative for dysuria, frequency and urgency.   Skin: Negative for itching and rash.         Objective:     Exam:  /72 (BP Location: Right arm, Patient Position: Sitting, BP Cuff Size: Large adult)   Pulse 96   Temp 36.6 °C (97.8 °F) (Temporal)   Resp (!) 24   Ht 1.638 m (5' 4.5\")   Wt 103 kg (226 lb 6.4 oz)   SpO2 94%   BMI 38.26 kg/m²  Body mass index is 38.26 kg/m².    Physical Exam  Constitutional:       Appearance: Normal appearance.   HENT:      Mouth/Throat:      Mouth: Mucous membranes are moist.      Pharynx: Oropharyngeal exudate and posterior oropharyngeal erythema present.      Comments: Positive white patchy area toward the posterior oropharynx on the left as well as a smaller white patch mid right cheek  Pulmonary:      Effort: Pulmonary effort is normal.   Skin:     General: Skin is warm and dry.          Neurological:    "   General: No focal deficit present.      Mental Status: She is alert and oriented to person, place, and time.   Psychiatric:         Mood and Affect: Mood normal.         Behavior: Behavior normal.         Thought Content: Thought content normal.         Judgment: Judgment normal.       Assessment & Plan:     56 y.o. female with the following -     Problem List Items Addressed This Visit     Achilles tendon mass     -Ultrasound of Achilles tendon is ordered at this time.         Relevant Orders    US-EXTREMITY NON VASCULAR UNILATERAL LEFT    Cellulitis     -Continue follow-up with Dr. Sykes  -Counseled patient regarding reasons to follow-up for urgent evaluation including return of heat, notable drainage, fever or chills         Thrush, oral     -Nystatin 5 mL swish and swallow 4 times daily for 14 days         Relevant Medications    nystatin (MYCOSTATIN) 702760 UNIT/ML Suspension    Yeast infection of the vagina     -Diflucan 150 mg nightly once         Relevant Medications    fluconazole (DIFLUCAN) 150 MG tablet              Return in about 1 month (around 2/28/2022), or if symptoms worsen or fail to improve.    I have placed the below orders and discussed them with an approved delegating provider. The MA is performing the below orders under the direction of Dr. Lyn.     Please note that this dictation was created using voice recognition software. I have made every reasonable attempt to correct obvious errors, but I expect that there are errors of grammar and possibly content that I did not discover before finalizing the note.

## 2022-02-02 RX ORDER — ROPINIROLE 0.25 MG/1
0.25 TABLET, FILM COATED ORAL
Qty: 90 TABLET | Refills: 0 | Status: SHIPPED | OUTPATIENT
Start: 2022-02-02 | End: 2022-05-09

## 2022-02-03 ENCOUNTER — PATIENT MESSAGE (OUTPATIENT)
Dept: HEALTH INFORMATION MANAGEMENT | Facility: OTHER | Age: 57
End: 2022-02-03
Payer: MEDICARE

## 2022-02-04 ENCOUNTER — DOCUMENTATION (OUTPATIENT)
Dept: RADIATION ONCOLOGY | Facility: MEDICAL CENTER | Age: 57
End: 2022-02-04

## 2022-02-04 NOTE — PROGRESS NOTES
Nutrition Services: Brief Update:    Called pt and discussed diabetes education and general healthy eating diet over the phone. Pt reports eating 2 good-sized meals per day with breakfast including 1 egg, 1 mancuso slice, and potatoes or oatmeal with hint of brown sugar and nuts/cranberrie son the top. Dinner is typically pasta with vegetables (spinach/zucchini) in a tomato sauce or small portion steak with salad or veggies. RD discussed that breakfast is well balanced and that pt could add more food in at dinner time to make sure there is a CHO source, protein source, and vegetables at dinner. Pt is drinking 5 water bottles of 16.9 oz per day, adequate. Discussed healthy snack ideas that include CHO and protein. All questions answered.    No further nutrition intervention needed at this time, RD remains available prn at [phone] and confirmed pt has RD contact info.

## 2022-02-16 ENCOUNTER — OFFICE VISIT (OUTPATIENT)
Dept: HEMATOLOGY ONCOLOGY | Facility: MEDICAL CENTER | Age: 57
End: 2022-02-16
Payer: MEDICARE

## 2022-02-16 VITALS
RESPIRATION RATE: 18 BRPM | BODY MASS INDEX: 37.52 KG/M2 | HEIGHT: 65 IN | TEMPERATURE: 97.8 F | DIASTOLIC BLOOD PRESSURE: 100 MMHG | WEIGHT: 225.2 LBS | OXYGEN SATURATION: 95 % | SYSTOLIC BLOOD PRESSURE: 150 MMHG | HEART RATE: 97 BPM

## 2022-02-16 DIAGNOSIS — Z17.0 CARCINOMA OF UPPER-OUTER QUADRANT OF LEFT BREAST IN FEMALE, ESTROGEN RECEPTOR POSITIVE (HCC): ICD-10-CM

## 2022-02-16 DIAGNOSIS — Z79.811 LONG TERM (CURRENT) USE OF AROMATASE INHIBITORS: ICD-10-CM

## 2022-02-16 DIAGNOSIS — I10 ESSENTIAL HYPERTENSION: ICD-10-CM

## 2022-02-16 DIAGNOSIS — C50.412 CARCINOMA OF UPPER-OUTER QUADRANT OF LEFT BREAST IN FEMALE, ESTROGEN RECEPTOR POSITIVE (HCC): ICD-10-CM

## 2022-02-16 PROCEDURE — 99214 OFFICE O/P EST MOD 30 MIN: CPT | Performed by: INTERNAL MEDICINE

## 2022-02-16 RX ORDER — ANASTROZOLE 1 MG/1
1 TABLET ORAL DAILY
Qty: 90 TABLET | Refills: 1 | Status: SHIPPED | OUTPATIENT
Start: 2022-02-16 | End: 2022-08-08 | Stop reason: SDUPTHER

## 2022-02-16 ASSESSMENT — ENCOUNTER SYMPTOMS
VOMITING: 0
MEMORY LOSS: 0
CARDIOVASCULAR NEGATIVE: 1
WEAKNESS: 0
RESPIRATORY NEGATIVE: 1
WEIGHT LOSS: 0
NAUSEA: 0
MUSCULOSKELETAL NEGATIVE: 1
EYES NEGATIVE: 1
DIARRHEA: 0
DIZZINESS: 0

## 2022-02-16 ASSESSMENT — PAIN SCALES - GENERAL: PAINLEVEL: NO PAIN

## 2022-02-16 ASSESSMENT — FIBROSIS 4 INDEX: FIB4 SCORE: 0.56

## 2022-02-16 NOTE — PROGRESS NOTES
Subjective     Date of Followup:2/16/2022 12:24 PM  Primary care physician:Veronica Lyn M.D.  Radiation oncology:Kem Infante  Surgery:Kayleen Sykes    Reason for Consultation:  Ms. Kern  is a pleasant 56 y.o. year old postmenopausal female with a history of poorly controlled diabetes mellitus who had a mammogram in fall 2021 which showed a 2.3 cm spiculated mass in the upper outer quadrant of the left breast.  Biopsy revealed an invasive ductal carcinoma, grade 1, ER/ME positive HER-2 negative with a Ki-67 of 22%.  She is large breasted and elected to undergo a left lumpectomy and sentinel lymph node biopsy by Dr. Sykes on 11/12/2021.  Pathology revealed an invasive ductal carcinoma, grade 2 with associated intermediate grade ductal carcinoma in situ.  The invasive tumor measured 2.9 cm in greatest dimension and all margins were clear.  1 of 1 sentinel lymph nodes was positive for macro metastases, but no extranodal extension was identified.  Postoperative course was unremarkable.  She was seen by medical oncology and an Oncotype performed with results pending.  She had a CT scan of the chest abdomen pelvis which was negative for metastatic disease and a bone scan is pending.  In surgery she has been on a weight reduction diet and has reduced her smoking dramatically from greater than a pack a day to 3 to 4 cigarettes a day.  She feels generally well.  She is previously seen Dr. Kem Infante in consultation.    History of presenting illness:  Her Oncotype came back with a recurrence score of 20 therefore there was no indication for adjuvant chemotherapy.  She had a previously scheduled CT scan of the chest abdomen pelvis and bone scan and these were either for metastatic disease.  She underwent radiation therapy with 40 Gray over 20 doses completed this 1 February 2022.  She did develop moderate formation and erythema and is being treated for this.  She also started postoperative physical therapy for  "prevention of lymphedema and is doing well from that perspective.  She is ready to initiate adjuvant endocrine therapy.  Past Medical History:   Diagnosis Date   • Allergy    • Back pain    • Cancer (HCC)     breast   • Diabetes (HCC)     diet   • GERD (gastroesophageal reflux disease)    • High cholesterol     not medicated   • Malignant neoplasm of upper-outer quadrant of left female breast (HCC)    • Neck pain    • Obesity    • Sleep apnea     no cpap   • Snoring      Past Surgical History:   Procedure Laterality Date   • PB MASTECTOMY, PARTIAL Left 2021    Procedure: MASTECTOMY, PARTIAL - [facility]ARA LOCALIZED;  Surgeon: Kayleen Sykes M.D.;  Location: SURGERY SAME DAY [facility];  Service: General   • NODE BIOPSY SENTINEL Left 2021    Procedure: BIOPSY, LYMPH NODE, SENTINEL;  Surgeon: Kayleen Sykes M.D.;  Location: SURGERY SAME DAY [facility];  Service: General   • CARPAL TUNNEL RELEASE Right    • ENDOMETRIAL ABLATION     • FOOT SURGERY Right    • KNEE ARTHROSCOPY     • PRIMARY C SECTION      2      Allergies   Allergen Reactions   • Codeine Vomiting and Nausea   • Other Drug Vomiting and Nausea     Any of the \"cets\" (percocet)   • Vicodin [Hydrocodone-Acetaminophen] Vomiting and Nausea     Outpatient Encounter Medications as of 2022   Medication Sig Dispense Refill   • ROPINIRole (REQUIP) 0.25 MG Tab TAKE 1 TABLET BY MOUTH AT BEDTIME. TAKE 1-3 HOURS BEFORE BEDTIME. 90 Tablet 0   • silver sulfADIAZINE (SILVADENE) 1 % Cream Apply 1/8 inch layer to affected area  g 2   • ondansetron (ZOFRAN ODT) 4 MG TABLET DISPERSIBLE Take 1 Tablet by mouth every 6 hours as needed. 10 Tablet 0   • albuterol 108 (90 Base) MCG/ACT Aero Soln inhalation aerosol INHALE 2 PUFFS BY MOUTH EVERY 6 HOURS AS NEEDED FOR SHORTNESS OF BREATH 2 Each 2   • nicotine polacrilex (NICOTINE MINI) 2 MG lozenge Place 1 Lozenge into mouth between cheek and gum every 1 hour as needed. 360 Lozenge 0   • lidocaine-prilocaine " (EMLA) 2.5-2.5 % Cream      • cyclobenzaprine (FLEXERIL) 5 MG tablet Take 1-2 Tabs by mouth 3 times a day as needed. 90 Tab 1   • diazepam (VALIUM) 10 MG tablet Take 10 mg by mouth 1 time daily as needed (muscle spasms, back pain).       No facility-administered encounter medications on file as of 2022.     @IMS@  Social History     Socioeconomic History   • Marital status:      Spouse name: Not on file   • Number of children: Not on file   • Years of education: Not on file   • Highest education level: Not on file   Occupational History   • Not on file   Tobacco Use   • Smoking status: Current Every Day Smoker     Packs/day: 0.25     Years: 10.00     Pack years: 2.50     Types: Cigarettes   • Smokeless tobacco: Never Used   • Tobacco comment: working on quitting    Vaping Use   • Vaping Use: Never used   Substance and Sexual Activity   • Alcohol use: No   • Drug use: No   • Sexual activity: Yes     Partners: Male     Comment:    Other Topics Concern   • Not on file   Social History Narrative   • Not on file     Social Determinants of Health     Financial Resource Strain: Not on file   Food Insecurity: Not on file   Transportation Needs: Not on file   Physical Activity: Not on file   Stress: Not on file   Social Connections: Not on file   Intimate Partner Violence: Not on file   Housing Stability: Not on file      Social History     Tobacco Use   Smoking Status Current Every Day Smoker   • Packs/day: 0.25   • Years: 10.00   • Pack years: 2.50   • Types: Cigarettes   Smokeless Tobacco Never Used   Tobacco Comment    working on quitting      Social History     Substance and Sexual Activity   Alcohol Use No     Social History     Substance and Sexual Activity   Drug Use No      OB History    Para Term  AB Living   5       2     SAB IAB Ectopic Molar Multiple Live Births   2                # Outcome Date GA Lbr Valentin/2nd Weight Sex Delivery Anes PTL Lv   5             4              3             2 SAB            1 SAB               No LMP recorded. Patient has had an ablation.    G P A L     Family History   Problem Relation Age of Onset   • Cancer Mother         ovarian cancer / malanoma   • Hypertension Mother    • Other Mother         THYROID   • Heart Disease Mother         valvular disease   • Heart Disease Father         Heart Attack   • Heart Attack Father    • Other Father         psoriasis   • Hyperlipidemia Brother    • Other Brother         psoriasis   • Heart Disease Brother         MI   • Heart Attack Brother         massive heart attack    • Other Other         psoriasis   • Heart Disease Maternal Grandfather         MI   • Diabetes Neg Hx    • Alcohol/Drug Neg Hx          Review of Systems   Constitutional: Negative for malaise/fatigue and weight loss.   HENT: Negative.    Eyes: Negative.    Respiratory: Negative.    Cardiovascular: Negative.    Gastrointestinal: Negative for diarrhea, nausea and vomiting.   Genitourinary: Negative.    Musculoskeletal: Negative.    Skin: Negative.    Neurological: Negative for dizziness and weakness.   Endo/Heme/Allergies: Negative.    Psychiatric/Behavioral: Negative for memory loss.   All other systems reviewed and are negative.           Objective     Physical Exam  Vitals (Pretension noted) reviewed.   Constitutional:       Appearance: Normal appearance.   Eyes:      Extraocular Movements: Extraocular movements intact.      Pupils: Pupils are equal, round, and reactive to light.   Cardiovascular:      Rate and Rhythm: Normal rate and regular rhythm.   Pulmonary:      Effort: Pulmonary effort is normal.      Breath sounds: Normal breath sounds.   Chest:   Breasts:      Left: Inverted nipple:               Comments: Left breast shows dry desquamation in the upper outer quadrant/axillary tail with 2-3+ erythema.  No masses are noted there is mild tenderness.  Right breast shows a cystic lesion in the upper inner quadrant which is  slightly larger than previously.  Abdominal:      General: Bowel sounds are normal.      Palpations: Abdomen is soft.   Musculoskeletal:         General: Normal range of motion.   Skin:     General: Skin is warm and dry.   Neurological:      General: No focal deficit present.      Mental Status: She is alert.              Assessment   1.  Infiltrating ductal carcinoma of the left breast, grade 2, stage anatomic to be (pT2, PN 1A) status post left lumpectomy, ER positive, VT positive, HER-2 negative, Ki-67 22%, Oncotype DX current score 20.  Status post whole breast radiation therapy to begin endocrine therapy  2.  Diabetes mellitus under fair control  3.  Tobacco abuse improving  #4.  Hypertension    Plan:  She will initiate anastrozole 1 mg daily today.  We will obtain a baseline DEXA scan and baseline labs before her next visit.  I will see her back in 8 weeks for routine follow-up.  Appointment to see her primary care physician the next 3 days to recheck her blood pressure.      he agreed and verbalized her agreement and understanding with the current plan. I answered all questions and concerns she has at this time and advised her to call at any time in there interim with questions or concerns in regards to her care.    Thank you for allowing me to participate in her care, I will continue to follow closely.

## 2022-02-23 ENCOUNTER — APPOINTMENT (OUTPATIENT)
Dept: MEDICAL GROUP | Facility: PHYSICIAN GROUP | Age: 57
End: 2022-02-23
Payer: MEDICARE

## 2022-03-17 ENCOUNTER — HOSPITAL ENCOUNTER (OUTPATIENT)
Dept: RADIATION ONCOLOGY | Facility: MEDICAL CENTER | Age: 57
End: 2022-03-31
Attending: RADIOLOGY
Payer: MEDICARE

## 2022-03-17 NOTE — PROGRESS NOTES
"Telephone Appointment Visit   This telephone visit was initiated by the patient and they verbally consented.    Reason for Call:  Symptom Follow-up    HPI:    Stage IB ([id number]) G1 invasive ductal carcinoma of the left upper outer quadrant of breast ER/CO positive HER-2/zachary negative with a Ki-67 of 22%  lumpectomy and sentinel lymph node biopsy on November 12, 2021 Oncotype 20 completing breast and lymphatic hypofractionated radiation to 4005 cGy in February 2022 planning on starting anastrozole    Patient states after completing her radiation she did have some hyperpigmentation and a small area in the inframammary fold of moist desquamation.  At this point she feels she is approaching her baseline without any untoward effects.  She still has some \"twinges\" of pain or discomfort in the surgical bed.  She does work with physical therapy on this area.  She has yet to start her aromatase inhibitor.  She had some family events that she needed to take care of first and did not want to be dealing with the side effects until some of these things were taken care of.  She is planning on starting her anastrozole very soon.       Labs / Images Reviewed:   none     Assessment and Plan:     While patient continues to follow in medical oncology with Dr. Kline I will release her from active follow-up in radiation oncology.  She knows should she have any questions or concerns at any time she can reach out.    Follow-up: as needed    Total Time Spent (mins): 10 minutes    Kem Infante M.D.  "

## 2022-03-21 ENCOUNTER — OFFICE VISIT (OUTPATIENT)
Dept: MEDICAL GROUP | Facility: PHYSICIAN GROUP | Age: 57
End: 2022-03-21
Payer: MEDICARE

## 2022-03-21 ENCOUNTER — HOSPITAL ENCOUNTER (OUTPATIENT)
Facility: MEDICAL CENTER | Age: 57
End: 2022-03-21
Attending: FAMILY MEDICINE
Payer: MEDICARE

## 2022-03-21 VITALS
SYSTOLIC BLOOD PRESSURE: 144 MMHG | RESPIRATION RATE: 16 BRPM | TEMPERATURE: 98.1 F | BODY MASS INDEX: 35.56 KG/M2 | WEIGHT: 213.4 LBS | OXYGEN SATURATION: 98 % | DIASTOLIC BLOOD PRESSURE: 82 MMHG | HEART RATE: 90 BPM | HEIGHT: 65 IN

## 2022-03-21 DIAGNOSIS — N63.0 BREAST LUMP: ICD-10-CM

## 2022-03-21 DIAGNOSIS — I10 ESSENTIAL HYPERTENSION: ICD-10-CM

## 2022-03-21 DIAGNOSIS — N89.8 VAGINAL ITCHING: ICD-10-CM

## 2022-03-21 DIAGNOSIS — E11.69 TYPE 2 DIABETES MELLITUS WITH OTHER SPECIFIED COMPLICATION, WITHOUT LONG-TERM CURRENT USE OF INSULIN (HCC): ICD-10-CM

## 2022-03-21 PROBLEM — B37.0 THRUSH, ORAL: Status: RESOLVED | Noted: 2022-01-31 | Resolved: 2022-03-21

## 2022-03-21 PROBLEM — L02.91 ABSCESS: Status: RESOLVED | Noted: 2022-01-14 | Resolved: 2022-03-21

## 2022-03-21 PROBLEM — B37.31 YEAST INFECTION OF THE VAGINA: Status: RESOLVED | Noted: 2022-01-31 | Resolved: 2022-03-21

## 2022-03-21 PROBLEM — L03.90 CELLULITIS: Status: RESOLVED | Noted: 2022-01-14 | Resolved: 2022-03-21

## 2022-03-21 LAB
HBA1C MFR BLD: 12.2 % (ref 0–5.6)
INT CON NEG: ABNORMAL
INT CON POS: ABNORMAL

## 2022-03-21 PROCEDURE — 87660 TRICHOMONAS VAGIN DIR PROBE: CPT

## 2022-03-21 PROCEDURE — 99214 OFFICE O/P EST MOD 30 MIN: CPT | Performed by: FAMILY MEDICINE

## 2022-03-21 PROCEDURE — 87480 CANDIDA DNA DIR PROBE: CPT

## 2022-03-21 PROCEDURE — 83036 HEMOGLOBIN GLYCOSYLATED A1C: CPT | Performed by: FAMILY MEDICINE

## 2022-03-21 PROCEDURE — 87510 GARDNER VAG DNA DIR PROBE: CPT

## 2022-03-21 RX ORDER — LISINOPRIL 10 MG/1
10 TABLET ORAL DAILY
Qty: 30 TABLET | Refills: 0 | Status: SHIPPED | OUTPATIENT
Start: 2022-03-21 | End: 2022-04-13

## 2022-03-21 RX ORDER — ATORVASTATIN CALCIUM 10 MG/1
10 TABLET, FILM COATED ORAL NIGHTLY
Qty: 90 TABLET | Refills: 3 | Status: SHIPPED | OUTPATIENT
Start: 2022-03-21 | End: 2023-01-31

## 2022-03-21 ASSESSMENT — ENCOUNTER SYMPTOMS
SORE THROAT: 0
FEVER: 0
CHILLS: 0

## 2022-03-21 ASSESSMENT — FIBROSIS 4 INDEX: FIB4 SCORE: 0.57

## 2022-03-21 NOTE — ASSESSMENT & PLAN NOTE
Chronic, uncontrolled.  Blood pressure is above 140/90 in the office today.  -Start lisinopril 10 mg daily

## 2022-03-21 NOTE — PATIENT INSTRUCTIONS
"For the blood pressure  - Start lisinopril 10 mg every day (morning or night)    Home blood pressure monitoring:  - check your blood pressure every day and put it in a log  - pick a different time each day so we can see how it varies throughout the day  - when you check your blood pressure: make sure you sit quietly for 5-10 minutes beforehand, keep both feet flat on the ground, and make sure you use an arm cuff at heart height    Lifestyle factors to help manage blood pressure:  1) reduce stress with daily activity, consider yoga, breathing exercises (like 4-7-8 breathing technique)   2) reduce Na to <2000 mg/day  3) DASH diet     4) 200-300min/week cardio, which you can build up to.       Please check out \"life's simple 7\" at https://www.heart.org/en/professional/workplace-health/lifes-simple-7      For the diabetes  - Start metformin, 1 pill morning and night for 2 weeks. Then increase to 2 pills morning and night until we recheck labs  - Start atorvstatin to lower cholesterol and protect the heart from diabetes  - The lisinopril will protect the kidneys from diabetes  - Work on cutting back on carbs and simple sugars. 5% weight loss (10.5 lbs) can have a positive impact  "

## 2022-03-21 NOTE — ASSESSMENT & PLAN NOTE
Chronic, uncontrolled.  Hemoglobin A1c is well above 7%.  She is symptomatic.  She is not interested in starting injectable medications at this time though I do strongly recommend it.  -Start Metformin 500 mg twice daily, then increase to 1000 mg twice daily after 2 weeks  -Start lisinopril  -Start atorvastatin 10 mg daily  -Work on healthy diet is low in carbohydrates and work on weight loss

## 2022-03-21 NOTE — ASSESSMENT & PLAN NOTE
Chronic, unchanged.  Despite treatment with Diflucan and Monistat she still having vaginal burning/itching.  BD affirm obtained today to confirm if there truly is a vaginal infection.  We will base treatment on results.

## 2022-03-21 NOTE — PROGRESS NOTES
Subjective:     CC: DM    HPI:   Sol presents today with    Problem   Vaginal Itching     She has been having vaginal burning and itching for about a month.  She did take Diflucan 3 doses 3 days apart with some improvement but not complete resolution.  She tried over-the-counter Monistat which helped little bit better but still no complete resolution.     Breast Lump    Noticed new lump on underside of left breast in last couple of days. Slightly tender to palpation.     Diabetes (Hcc)    This is a chronic condition.  Current medications: None  Insulin: -  Biguanide: -   GLP1-RA: -   SGLT-2i: -  DPP4-I: -  TZD: -  Maged: -  Sulfonyluria: -      Last A1c: 12.2% (3/2022)  Last Microalb/Cr ratio: ordered  Fasting sugars: n/a  Last diabetic foot exam: 12/2021  Last retinal eye exam: 2/2022 - no DM retinopathy  ACEi/ARB: no  Statin: no  Aspirin: no  Concomitant HTN: yes - above goal  Nightly foot checks: yes     Essential Hypertension    This is a chronic condition.  Current Meds: none  Side effects: n/a  Home BP Log: none  Associated symptoms: no cp, no sob       Thrush, Oral (Resolved)    This is a new issue.  Patient states that symptoms started approximately 1 week ago.  She states that she developed the thrush after taking multiple antibiotics for her sebaceous cyst that became infected.  She states she has noticed burning, discomfort in her mouth as well as several white patchy areas.  Patient is requesting medication as she has not found anything that makes this better or worse.     Yeast Infection of The Vagina (Resolved)    Is a new issue.  Patient states she noticed some white chunky discharge from her vagina after taking multiple antibiotics for infected sebaceous cyst.  States that she has often gotten a yeast infection after antibiotics in the past.  States that she did try over-the-counter medication which was overall ineffective.  She is still having some discharge and itching.     Cellulitis (Resolved)     "Resolved.  Patient saw Dr. Sykes weekly the last 2 weeks and they do plan to potentially open and remove the cyst.  She does still note firmness to the area but states that it is no longer red hot or oozing.     Abscess (Resolved)       Health Maintenance: Completed    ROS:  Review of Systems   Constitutional: Negative for chills and fever.   HENT: Negative for sore throat.        Objective:     Exam:  /82 (BP Location: Right arm, Patient Position: Sitting, BP Cuff Size: Adult)   Pulse 90   Temp 36.7 °C (98.1 °F) (Temporal)   Resp 16   Ht 1.638 m (5' 4.5\")   Wt 96.8 kg (213 lb 6.4 oz)   SpO2 98%   BMI 36.06 kg/m²  Body mass index is 36.06 kg/m².    Physical Exam  Constitutional:       Appearance: Normal appearance.   Cardiovascular:      Rate and Rhythm: Normal rate and regular rhythm.      Heart sounds: Normal heart sounds.   Pulmonary:      Effort: Pulmonary effort is normal.      Breath sounds: Normal breath sounds.   Chest:      Comments: L breast: small cyst on underside, ~5 o'clock. No erythema, warmth, or drainage.    Musculoskeletal:      Cervical back: Normal range of motion and neck supple.   Neurological:      Mental Status: She is alert.         Assessment & Plan:     57 y.o. female with the following -     Problem List Items Addressed This Visit     Essential hypertension     Chronic, uncontrolled.  Blood pressure is above 140/90 in the office today.  -Start lisinopril 10 mg daily         Relevant Medications    lisinopril (PRINIVIL) 10 MG Tab    atorvastatin (LIPITOR) 10 MG Tab    Diabetes (HCC)     Chronic, uncontrolled.  Hemoglobin A1c is well above 7%.  She is symptomatic.  She is not interested in starting injectable medications at this time though I do strongly recommend it.  -Start Metformin 500 mg twice daily, then increase to 1000 mg twice daily after 2 weeks  -Start lisinopril  -Start atorvastatin 10 mg daily  -Work on healthy diet is low in carbohydrates and work on weight loss   "       Relevant Medications    metFORMIN (GLUCOPHAGE) 500 MG Tab    Other Relevant Orders    POCT Hemoglobin A1C (Completed)    Lipid Profile    HEMOGLOBIN A1C    MICROALBUMIN CREAT RATIO URINE    Breast lump     Acute.  New lump of left breast.  Is on the underside of her left breast at approximately 5 o'clock..  She has a fluid-filled cyst with no other evidence of infection at this time.  I recommended warm compresses to see if she can get it opened up and drained before it does become infected.         Vaginal itching     Chronic, unchanged.  Despite treatment with Diflucan and Monistat she still having vaginal burning/itching.  BD affirm obtained today to confirm if there truly is a vaginal infection.  We will base treatment on results.         Relevant Orders    VAGINAL PATHOGENS DNA PANEL          Referral for genetic research was offered. Patient declined.    Return in about 4 weeks (around 4/18/2022) for F/u BP.    Please note that this dictation was created using voice recognition software. I have made every reasonable attempt to correct obvious errors, but I expect that there are errors of grammar and possibly content that I did not discover before finalizing the note.

## 2022-03-21 NOTE — ASSESSMENT & PLAN NOTE
Acute.  New lump of left breast.  Is on the underside of her left breast at approximately 5 o'clock..  She has a fluid-filled cyst with no other evidence of infection at this time.  I recommended warm compresses to see if she can get it opened up and drained before it does become infected.

## 2022-03-22 DIAGNOSIS — N89.8 VAGINAL ITCHING: ICD-10-CM

## 2022-03-22 LAB
CANDIDA DNA VAG QL PROBE+SIG AMP: NEGATIVE
G VAGINALIS DNA VAG QL PROBE+SIG AMP: NEGATIVE
T VAGINALIS DNA VAG QL PROBE+SIG AMP: NEGATIVE

## 2022-03-23 RX ORDER — LISINOPRIL 10 MG/1
10 TABLET ORAL DAILY
Qty: 100 TABLET | Refills: 0 | OUTPATIENT
Start: 2022-03-23

## 2022-03-23 NOTE — TELEPHONE ENCOUNTER
Received request via: Pharmacy    Was the patient seen in the last year in this department? Yes    Does the patient have an active prescription (recently filled or refills available) for medication(s) requested? No     [facility] is requesting 100 day supplies

## 2022-04-13 RX ORDER — LISINOPRIL 10 MG/1
10 TABLET ORAL DAILY
Qty: 30 TABLET | Refills: 0 | Status: SHIPPED | OUTPATIENT
Start: 2022-04-13 | End: 2022-04-21 | Stop reason: SDUPTHER

## 2022-04-21 ENCOUNTER — OFFICE VISIT (OUTPATIENT)
Dept: MEDICAL GROUP | Facility: PHYSICIAN GROUP | Age: 57
End: 2022-04-21
Payer: MEDICARE

## 2022-04-21 VITALS
RESPIRATION RATE: 16 BRPM | HEIGHT: 65 IN | BODY MASS INDEX: 36.39 KG/M2 | OXYGEN SATURATION: 96 % | TEMPERATURE: 98.4 F | HEART RATE: 84 BPM | SYSTOLIC BLOOD PRESSURE: 126 MMHG | DIASTOLIC BLOOD PRESSURE: 82 MMHG | WEIGHT: 218.4 LBS

## 2022-04-21 DIAGNOSIS — M54.9 CHRONIC BACK PAIN, UNSPECIFIED BACK LOCATION, UNSPECIFIED BACK PAIN LATERALITY: ICD-10-CM

## 2022-04-21 DIAGNOSIS — Z13.79 GENETIC SCREENING: ICD-10-CM

## 2022-04-21 DIAGNOSIS — I10 ESSENTIAL HYPERTENSION: Primary | ICD-10-CM

## 2022-04-21 DIAGNOSIS — R42 VERTIGO: ICD-10-CM

## 2022-04-21 DIAGNOSIS — G89.29 CHRONIC BACK PAIN, UNSPECIFIED BACK LOCATION, UNSPECIFIED BACK PAIN LATERALITY: ICD-10-CM

## 2022-04-21 DIAGNOSIS — M54.2 NECK PAIN: ICD-10-CM

## 2022-04-21 PROBLEM — R30.0 DYSURIA: Status: RESOLVED | Noted: 2020-05-13 | Resolved: 2022-04-21

## 2022-04-21 PROBLEM — N63.0 BREAST LUMP: Status: RESOLVED | Noted: 2021-09-15 | Resolved: 2022-04-21

## 2022-04-21 PROBLEM — N89.8 VAGINAL ITCHING: Status: RESOLVED | Noted: 2022-03-21 | Resolved: 2022-04-21

## 2022-04-21 PROCEDURE — 99214 OFFICE O/P EST MOD 30 MIN: CPT | Performed by: FAMILY MEDICINE

## 2022-04-21 RX ORDER — LISINOPRIL 10 MG/1
10 TABLET ORAL DAILY
Qty: 90 TABLET | Refills: 3 | Status: SHIPPED | OUTPATIENT
Start: 2022-04-21 | End: 2023-02-23

## 2022-04-21 ASSESSMENT — FIBROSIS 4 INDEX: FIB4 SCORE: 0.57

## 2022-04-21 NOTE — PROGRESS NOTES
"Subjective:     CC: f/u BP    HPI:   Sol presents today with    Problem   Vertigo   Essential Hypertension    This is a chronic condition.  Current Meds: none  Side effects: lisinopril 10 mg daily  Home BP Los-120s/70s  Associated symptoms: no cp, no sob       Neck Pain    Chronic. Seeing PT currently for breast cancer. She'd like to see them for her neck pain and back pain. They've done a few exercises which has been helpful.      Vaginal Itching (Resolved)     She has been having vaginal burning and itching for about a month.  She did take Diflucan 3 doses 3 days apart with some improvement but not complete resolution.  She tried over-the-counter Monistat which helped little bit better but still no complete resolution.     Breast Lump (Resolved)    Noticed new lump on underside of left breast in last couple of days. Slightly tender to palpation.     Dysuria (Resolved)       Health Maintenance: Completed    ROS:  See HPI    Objective:     Exam:  /82 (BP Location: Right arm, Patient Position: Sitting, BP Cuff Size: Adult)   Pulse 84   Temp 36.9 °C (98.4 °F) (Temporal)   Resp 16   Ht 1.638 m (5' 4.5\")   Wt 99.1 kg (218 lb 6.4 oz)   SpO2 96%   BMI 36.91 kg/m²  Body mass index is 36.91 kg/m².    Physical Exam  Constitutional:       Appearance: Normal appearance.   Cardiovascular:      Rate and Rhythm: Normal rate and regular rhythm.      Heart sounds: Normal heart sounds.   Pulmonary:      Effort: Pulmonary effort is normal.      Breath sounds: Normal breath sounds.   Musculoskeletal:      Cervical back: Normal range of motion and neck supple.   Neurological:      Mental Status: She is alert.         Assessment & Plan:     57 y.o. female with the following -     1. Essential hypertension  Chronic, controlled.  Blood pressure is at goal under 140/90 in the office today.  We will continue the current regimen.  -Lisinopril 10 mg daily    2. Chronic back pain, unspecified back location, unspecified " back pain laterality  3. Neck pain  4. Vertigo  Chronic, uncontrolled.  For years she is struggled with chronic back pain that is diffuse as well as neck pain.  She is currently seeing physical therapy for her breast cancer surgery and they have helped a little bit with the neck and back and she would like further help so new referral has been placed.  She also reports that once a few weeks ago she developed vertigo after physical therapy so she would like vertigo included on the physical therapy order in case it happens again which I think is reasonable.  - Referral to Physical Therapy    5. Genetic screening  Referral for genetic research was offered. Patient accepted.  - Referral to Genetic Research Studies    Return in about 2 months (around 6/21/2022) for f/u DM, get A1c.    Please note that this dictation was created using voice recognition software. I have made every reasonable attempt to correct obvious errors, but I expect that there are errors of grammar and possibly content that I did not discover before finalizing the note.

## 2022-05-04 ENCOUNTER — APPOINTMENT (OUTPATIENT)
Dept: HEMATOLOGY ONCOLOGY | Facility: MEDICAL CENTER | Age: 57
End: 2022-05-04
Payer: MEDICARE

## 2022-05-09 RX ORDER — ROPINIROLE 0.25 MG/1
0.25 TABLET, FILM COATED ORAL
Qty: 90 TABLET | Refills: 1 | Status: SHIPPED | OUTPATIENT
Start: 2022-05-09 | End: 2022-07-15

## 2022-06-28 ENCOUNTER — PATIENT MESSAGE (OUTPATIENT)
Dept: MEDICAL GROUP | Facility: PHYSICIAN GROUP | Age: 57
End: 2022-06-28
Payer: MEDICARE

## 2022-06-28 DIAGNOSIS — U07.1 COVID-19 VIRUS INFECTION: ICD-10-CM

## 2022-06-29 ENCOUNTER — PHARMACY VISIT (OUTPATIENT)
Dept: PHARMACY | Facility: MEDICAL CENTER | Age: 57
End: 2022-06-29
Payer: COMMERCIAL

## 2022-06-29 PROCEDURE — RXMED WILLOW AMBULATORY MEDICATION CHARGE: Performed by: FAMILY MEDICINE

## 2022-06-29 NOTE — PROGRESS NOTES
"I spoke with the patient regarding their COVID-19 positivity.      Symptom onset: 6/26  Symptoms: fatigue, congestion, rhinorrhea, myalgia, nausea, cough, sneezing  Positive COVID-19 test: 6/28  High risk factors:  Age >55  T2DM  Breast cancer  Obesity  HTN  YOANA    PRIOR TO administering Paxlovid, I informed the patient or parent/caregiver of the following information:   · I provided them the  \"Fact Sheet for Patients and Parents/Caregivers\"    · I informed them of therapeutic alternatives to Paxlovid and the risks and benefits of those alternatives   · I informed them that they have the option to accept or refuse Paxlovid,   · I informed them that Paxlovid is not FDA approved, but that it is authorized for use under emergency by the FDA   · I informed them of the known risks and benefits of Paxlovid and discussed the extent to which such risks and benefits are unknown      After reviewing all of this, she would like to proceed with the medication.      "

## 2022-06-30 RX ORDER — ALBUTEROL SULFATE 90 UG/1
AEROSOL, METERED RESPIRATORY (INHALATION)
Qty: 13.4 EACH | Refills: 0 | Status: SHIPPED | OUTPATIENT
Start: 2022-06-30 | End: 2022-08-15

## 2022-07-11 ENCOUNTER — TELEPHONE (OUTPATIENT)
Dept: HEMATOLOGY ONCOLOGY | Facility: MEDICAL CENTER | Age: 57
End: 2022-07-11
Payer: MEDICARE

## 2022-07-11 NOTE — TELEPHONE ENCOUNTER
Pt called to tell us that she attempted to call and schedule her bone scan that Dr. Kline wanted done before her appt with him on the 28th, however, they were not able to find the order for it. Pt wanted to know if Dr. Kline could put the order in so that she can try to get this scheduled for her appt with him.

## 2022-07-12 DIAGNOSIS — Z17.0 CARCINOMA OF UPPER-OUTER QUADRANT OF LEFT BREAST IN FEMALE, ESTROGEN RECEPTOR POSITIVE (HCC): ICD-10-CM

## 2022-07-12 DIAGNOSIS — C50.412 CARCINOMA OF UPPER-OUTER QUADRANT OF LEFT BREAST IN FEMALE, ESTROGEN RECEPTOR POSITIVE (HCC): ICD-10-CM

## 2022-07-15 ENCOUNTER — OFFICE VISIT (OUTPATIENT)
Dept: MEDICAL GROUP | Facility: PHYSICIAN GROUP | Age: 57
End: 2022-07-15
Payer: MEDICARE

## 2022-07-15 ENCOUNTER — HOSPITAL ENCOUNTER (OUTPATIENT)
Facility: MEDICAL CENTER | Age: 57
End: 2022-07-15
Attending: FAMILY MEDICINE
Payer: MEDICARE

## 2022-07-15 VITALS
DIASTOLIC BLOOD PRESSURE: 66 MMHG | WEIGHT: 222 LBS | OXYGEN SATURATION: 95 % | TEMPERATURE: 97.7 F | BODY MASS INDEX: 36.99 KG/M2 | SYSTOLIC BLOOD PRESSURE: 128 MMHG | HEIGHT: 65 IN | HEART RATE: 92 BPM

## 2022-07-15 DIAGNOSIS — U07.1 COVID-19 VIRUS INFECTION: Primary | ICD-10-CM

## 2022-07-15 DIAGNOSIS — E11.69 TYPE 2 DIABETES MELLITUS WITH OTHER SPECIFIED COMPLICATION, WITHOUT LONG-TERM CURRENT USE OF INSULIN (HCC): ICD-10-CM

## 2022-07-15 DIAGNOSIS — R09.02 HYPOXIA: ICD-10-CM

## 2022-07-15 LAB
HBA1C MFR BLD: 8.5 % (ref 0–5.6)
INT CON NEG: NEGATIVE
INT CON POS: POSITIVE

## 2022-07-15 PROCEDURE — 82043 UR ALBUMIN QUANTITATIVE: CPT

## 2022-07-15 PROCEDURE — 83036 HEMOGLOBIN GLYCOSYLATED A1C: CPT | Performed by: FAMILY MEDICINE

## 2022-07-15 PROCEDURE — 82570 ASSAY OF URINE CREATININE: CPT

## 2022-07-15 PROCEDURE — 99214 OFFICE O/P EST MOD 30 MIN: CPT | Performed by: FAMILY MEDICINE

## 2022-07-15 RX ORDER — BENZONATATE 100 MG/1
100-200 CAPSULE ORAL 3 TIMES DAILY PRN
Qty: 100 CAPSULE | Refills: 0 | Status: SHIPPED | OUTPATIENT
Start: 2022-07-15 | End: 2022-11-17

## 2022-07-15 ASSESSMENT — ENCOUNTER SYMPTOMS
COUGH: 1
SHORTNESS OF BREATH: 1

## 2022-07-15 ASSESSMENT — FIBROSIS 4 INDEX: FIB4 SCORE: 0.57

## 2022-07-15 NOTE — PROGRESS NOTES
"Subjective:     CC: diabetes, COVID, decreased oxygen    HPI:   Sol presents today with    Problem   Covid-19 Virus Infection    Sx onset: 6/26/2022  Positive test: 6/29/2022    S/p paxlovid treatment    Feeling much better, but some lingering congestion and a nasty cough. Albuterol only has slight benefit.     Diabetes (Hcc)    This is a chronic condition.  Current medications:   Insulin: -  Biguanide: metformin 500 mg bid   GLP1-RA: -   SGLT-2i: -  DPP4-I: -  TZD: -  Maged: -  Sulfonyluria: -      Last A1c: 8.5% (7/2022); 12.2% (3/2022)  Last Microalb/Cr ratio: ordered  Fasting sugars: n/a  Last diabetic foot exam: 12/2021  Last retinal eye exam: 2/2022 - no DM retinopathy  ACEi/ARB: lisinopril 10 mg  Statin: atorvastatin 10 mg  Aspirin: no  Concomitant HTN: yes - above goal  Nightly foot checks: yes         Health Maintenance: Completed    ROS:  Review of Systems   Respiratory: Positive for cough and shortness of breath.    Cardiovascular: Negative for chest pain.       Objective:     Exam:  /66 (BP Location: Left arm, Patient Position: Sitting, BP Cuff Size: Adult)   Pulse 92   Temp 36.5 °C (97.7 °F) (Temporal)   Ht 1.638 m (5' 4.5\")   Wt 101 kg (222 lb)   SpO2 95%   BMI 37.52 kg/m²  Body mass index is 37.52 kg/m².    Physical Exam  Constitutional:       Appearance: Normal appearance.   Cardiovascular:      Rate and Rhythm: Normal rate and regular rhythm.      Heart sounds: Normal heart sounds.   Pulmonary:      Effort: Pulmonary effort is normal.      Breath sounds: Normal breath sounds.   Musculoskeletal:      Cervical back: Normal range of motion and neck supple.   Neurological:      Mental Status: She is alert.       Assessment & Plan:     57 y.o. female with the following -     1. COVID-19 virus infection  2. Hypoxia  Acute.  Tested positive on 6/28/2022 when she is completed a Paxlovid course.  She reports significant improvement overall but still having residual cough, shortness of breath with " coughing fits.  She is also reporting that her home pulse ox will drop to 89%.  She never did see a provider for these low pulse ox readings. Resting O2 was 95%. 6-minute walk test on RA, oxygen dropped to 89% at the lowest. 6-minute walk test on 1L O2, lowest oxygen was 94%..  - Oxygen ordered  - We will repeat the 6-min walk test in 1 month to see if oxygen is still necessary  - benzonatate (TESSALON) 100 MG Cap; Take 1-2 Capsules by mouth 3 times a day as needed for Cough.  Dispense: 100 Capsule; Refill: 0    3. Type 2 diabetes mellitus with other specified complication, without long-term current use of insulin (HCC)  Chronic, uncontrolled, improved.  Hemoglobin A1c has come down to 8.5% and that is despite only doing half the max dose of the metformin when I asked her to max it out.  We will increase the metformin to the maximum dose and reevaluate her A1c in 3 months.  -Increase metformin to 1000 mg twice daily  - POCT Hemoglobin A1C  - MICROALBUMIN CREAT RATIO URINE; Future    Return in about 3 months (around 10/15/2022) for f/u DM, get A1c.    Please note that this dictation was created using voice recognition software. I have made every reasonable attempt to correct obvious errors, but I expect that there are errors of grammar and possibly content that I did not discover before finalizing the note.

## 2022-07-16 LAB
CREAT UR-MCNC: 78.16 MG/DL
MICROALBUMIN UR-MCNC: <1.2 MG/DL
MICROALBUMIN/CREAT UR: NORMAL MG/G (ref 0–30)

## 2022-07-20 ENCOUNTER — TELEPHONE (OUTPATIENT)
Dept: MEDICAL GROUP | Facility: PHYSICIAN GROUP | Age: 57
End: 2022-07-20
Payer: MEDICARE

## 2022-07-20 DIAGNOSIS — R09.02 HYPOXIA: ICD-10-CM

## 2022-07-20 NOTE — TELEPHONE ENCOUNTER
Phone Number Called: [phone] (home)       Call outcome: Spoke to patient regarding message below.    Message: pt notified

## 2022-07-20 NOTE — TELEPHONE ENCOUNTER
She can fly, but she may need oxygen in the airplane. She should bring her pulse oximeter and if it drops under 90% she should wear the oxygen on the flight.    Prescription is printed and up front, ready for her to .

## 2022-07-20 NOTE — TELEPHONE ENCOUNTER
VOICEMAIL  1. Caller Name: Sol Shauna Dion                        Call Back Number: [phone] (home)       2. Message: pt requesting a paper script for oxygen so she can take it with her when she travels. Pt also wanted to know if it was okay to fly.    3. Patient approves office to leave a detailed voicemail/MyChart message: no

## 2022-07-22 ENCOUNTER — HOSPITAL ENCOUNTER (OUTPATIENT)
Dept: RADIOLOGY | Facility: MEDICAL CENTER | Age: 57
End: 2022-07-22
Attending: INTERNAL MEDICINE
Payer: MEDICARE

## 2022-07-22 DIAGNOSIS — C50.412 CARCINOMA OF UPPER-OUTER QUADRANT OF LEFT BREAST IN FEMALE, ESTROGEN RECEPTOR POSITIVE (HCC): ICD-10-CM

## 2022-07-22 DIAGNOSIS — Z17.0 CARCINOMA OF UPPER-OUTER QUADRANT OF LEFT BREAST IN FEMALE, ESTROGEN RECEPTOR POSITIVE (HCC): ICD-10-CM

## 2022-07-22 PROCEDURE — 77080 DXA BONE DENSITY AXIAL: CPT

## 2022-07-28 ENCOUNTER — HOSPITAL ENCOUNTER (OUTPATIENT)
Dept: HEMATOLOGY ONCOLOGY | Facility: MEDICAL CENTER | Age: 57
End: 2022-07-28
Attending: INTERNAL MEDICINE
Payer: MEDICARE

## 2022-07-28 VITALS
WEIGHT: 228.4 LBS | RESPIRATION RATE: 19 BRPM | BODY MASS INDEX: 38.99 KG/M2 | HEIGHT: 64 IN | SYSTOLIC BLOOD PRESSURE: 184 MMHG | TEMPERATURE: 98.1 F | HEART RATE: 93 BPM | OXYGEN SATURATION: 95 % | DIASTOLIC BLOOD PRESSURE: 80 MMHG

## 2022-07-28 DIAGNOSIS — Z17.0 CARCINOMA OF UPPER-OUTER QUADRANT OF LEFT BREAST IN FEMALE, ESTROGEN RECEPTOR POSITIVE (HCC): ICD-10-CM

## 2022-07-28 DIAGNOSIS — C50.412 CARCINOMA OF UPPER-OUTER QUADRANT OF LEFT BREAST IN FEMALE, ESTROGEN RECEPTOR POSITIVE (HCC): ICD-10-CM

## 2022-07-28 PROCEDURE — 99213 OFFICE O/P EST LOW 20 MIN: CPT | Performed by: INTERNAL MEDICINE

## 2022-07-28 PROCEDURE — 99212 OFFICE O/P EST SF 10 MIN: CPT

## 2022-07-28 PROCEDURE — 99212 OFFICE O/P EST SF 10 MIN: CPT | Performed by: INTERNAL MEDICINE

## 2022-07-28 ASSESSMENT — FIBROSIS 4 INDEX: FIB4 SCORE: 0.57

## 2022-07-28 ASSESSMENT — PAIN SCALES - GENERAL: PAINLEVEL: NO PAIN

## 2022-07-28 NOTE — PROGRESS NOTES
Date of Followup: 7/28/2022  Primary care physician:Veronica Lyn M.D.  Radiation oncology:Kem Infante  Surgery:Kayleen Sykes     Reason for Consultation:  Ms. Kern  is a pleasant 56 y.o. year old postmenopausal female with a history of poorly controlled diabetes mellitus who had a mammogram in fall 2021 which showed a 2.3 cm spiculated mass in the upper outer quadrant of the left breast.  Biopsy revealed an invasive ductal carcinoma, grade 1, ER/NH positive HER-2 negative with a Ki-67 of 22%.  She is large breasted and elected to undergo a left lumpectomy and sentinel lymph node biopsy by Dr. Sykes on 11/12/2021.  Pathology revealed an invasive ductal carcinoma, grade 2 with associated intermediate grade ductal carcinoma in situ.  The invasive tumor measured 2.9 cm in greatest dimension and all margins were clear.  1 of 1 sentinel lymph nodes was positive for macro metastases, but no extranodal extension was identified.  Postoperative course was unremarkable.  She was seen by medical oncology and an Oncotype performed with results pending.  She had a CT scan of the chest abdomen pelvis which was negative for metastatic disease and a bone scan is pending.  In surgery she has been on a weight reduction diet and has reduced her smoking dramatically from greater than a pack a day to 3 to 4 cigarettes a day.  She feels generally well.  She is previously seen Dr. Kem Infante in consultation.     History of presenting illness:  Her Oncotype came back with a recurrence score of 20 therefore there was no indication for adjuvant chemotherapy.  She had a previously scheduled CT scan of the chest abdomen pelvis and bone scan and these were either for metastatic disease.  She underwent radiation therapy with 40 Gray over 20 doses completed this 1 February 2022.  She did develop moderate formation and erythema and is being treated for this.  She also started postoperative physical therapy for prevention of  "lymphedema and is doing well from that perspective.  She is ready to initiate adjuvant endocrine therapy.    Interval history 2022: Started anastrozole in 2022 and has been moderately adherent with it although she admits that she misses doses not infrequently.  She still has some tenderness in the left breast after radiation therapy but no nodularity.  She developed relatively severe COVID at the end of  that required Paxlovid and she was given oxygen 1 L/min but was never admitted to the hospital.  Her O2 sats apparently still vary somewhat.  She still has a dry cough but no sputum production or fever currently.  She has no sore throat.  Her energy level remains a little low.  Bone density was done recently and was within normal limits.      Past Medical History   Past Medical History:   Diagnosis Date   • Allergy     • Back pain     • Cancer (HCC)      breast   • Diabetes (HCC)       diet   • GERD (gastroesophageal reflux disease)     • High cholesterol       not medicated   • Malignant neoplasm of upper-outer quadrant of left female breast (HCC)     • Neck pain     • Obesity     • Sleep apnea       no cpap   • Snoring           Past Surgical History   Past Surgical History:   Procedure Laterality Date   • PB MASTECTOMY, PARTIAL Left 2021     Procedure: MASTECTOMY, PARTIAL - SAMARA LOCALIZED;  Surgeon: Kayleen Sykes M.D.;  Location: SURGERY SAME DAY [facility];  Service: General   • NODE BIOPSY SENTINEL Left 2021     Procedure: BIOPSY, LYMPH NODE, SENTINEL;  Surgeon: Kayleen Sykes M.D.;  Location: SURGERY SAME DAY [facility];  Service: General   • CARPAL TUNNEL RELEASE Right     • ENDOMETRIAL ABLATION       • FOOT SURGERY Right     • KNEE ARTHROSCOPY       • PRIMARY C SECTION         2                Allergies   Allergen Reactions   • Codeine Vomiting and Nausea   • Other Drug Vomiting and Nausea       Any of the \"cets\" (percocet)   • Vicodin [Hydrocodone-Acetaminophen] " Vomiting and Nausea      Encounter Medications          Outpatient Encounter Medications as of 2/16/2022   Medication Sig Dispense Refill   • ROPINIRole (REQUIP) 0.25 MG Tab TAKE 1 TABLET BY MOUTH AT BEDTIME. TAKE 1-3 HOURS BEFORE BEDTIME. 90 Tablet 0   • silver sulfADIAZINE (SILVADENE) 1 % Cream Apply 1/8 inch layer to affected area  g 2   • ondansetron (ZOFRAN ODT) 4 MG TABLET DISPERSIBLE Take 1 Tablet by mouth every 6 hours as needed. 10 Tablet 0   • albuterol 108 (90 Base) MCG/ACT Aero Soln inhalation aerosol INHALE 2 PUFFS BY MOUTH EVERY 6 HOURS AS NEEDED FOR SHORTNESS OF BREATH 2 Each 2   • nicotine polacrilex (NICOTINE MINI) 2 MG lozenge Place 1 Lozenge into mouth between cheek and gum every 1 hour as needed. 360 Lozenge 0   • lidocaine-prilocaine (EMLA) 2.5-2.5 % Cream         • cyclobenzaprine (FLEXERIL) 5 MG tablet Take 1-2 Tabs by mouth 3 times a day as needed. 90 Tab 1   • diazepam (VALIUM) 10 MG tablet Take 10 mg by mouth 1 time daily as needed (muscle spasms, back pain).          No facility-administered encounter medications on file as of 2/16/2022.         @[facility]@  Social History               Socioeconomic History   • Marital status:        Spouse name: Not on file   • Number of children: Not on file   • Years of education: Not on file   • Highest education level: Not on file   Occupational History   • Not on file   Tobacco Use   • Smoking status: Current Every Day Smoker       Packs/day: 0.25       Years: 10.00       Pack years: 2.50       Types: Cigarettes   • Smokeless tobacco: Never Used   • Tobacco comment: working on quitting    Vaping Use   • Vaping Use: Never used   Substance and Sexual Activity   • Alcohol use: No   • Drug use: No   • Sexual activity: Yes       Partners: Male       Comment:    Other Topics Concern   • Not on file   Social History Narrative   • Not on file      Social Determinants of Health      Financial Resource Strain: Not on file   Food Insecurity: Not  on file   Transportation Needs: Not on file   Physical Activity: Not on file   Stress: Not on file   Social Connections: Not on file   Intimate Partner Violence: Not on file   Housing Stability: Not on file         Social History           Tobacco Use   Smoking Status Current Every Day Smoker   • Packs/day: 0.25   • Years: 10.00   • Pack years: 2.50   • Types: Cigarettes   Smokeless Tobacco Never Used   Tobacco Comment     working on quitting       Social History          Substance and Sexual Activity   Alcohol Use No      Social History          Substance and Sexual Activity   Drug Use No                        OB History    Para Term  AB Living   5       2     SAB IAB Ectopic Molar Multiple Live Births    2                 # Outcome Date GA Lbr Valentin/2nd Weight Sex Delivery Anes PTL Lv   5                      4                      3                      2 SAB                     1 SAB                        No LMP recorded. Patient has had an ablation.     G P A L      Family History         Family History   Problem Relation Age of Onset   • Cancer Mother           ovarian cancer / malanoma   • Hypertension Mother     • Other Mother           THYROID   • Heart Disease Mother           valvular disease   • Heart Disease Father           Heart Attack   • Heart Attack Father     • Other Father           psoriasis   • Hyperlipidemia Brother     • Other Brother           psoriasis   • Heart Disease Brother           MI   • Heart Attack Brother           massive heart attack    • Other Other           psoriasis   • Heart Disease Maternal Grandfather           MI   • Diabetes Neg Hx     • Alcohol/Drug Neg Hx                 Review of Systems   Constitutional: Negative for malaise/fatigue and weight loss.   HENT: Negative.    Eyes: Negative.    Respiratory: Negative.    Cardiovascular: Negative.    Gastrointestinal: Negative for diarrhea, nausea and vomiting.   Genitourinary: Negative.     Musculoskeletal: Negative.    Skin: Negative.    Neurological: Negative for dizziness and weakness.   Endo/Heme/Allergies: Negative.    Psychiatric/Behavioral: Negative for memory loss.   All other systems reviewed and are negative.                          Objective         Physical Exam  Vitals  reviewed.   Constitutional:       Appearance: Normal appearance.   Eyes:      Extraocular Movements: Extraocular movements intact.      Pupils: Pupils are equal, round, and reactive to light.   Cardiovascular:      Rate and Rhythm: Normal rate and regular rhythm.   Pulmonary:      Effort: Pulmonary effort is normal.      Breath sounds: Normal breath sounds.   Chest:   Breasts:      Left: Inverted nipple:         Comments: Left breast shows 1+ hyperpigmentation from radiation and some mild peau d'orange from radiation without masses nipple discharge or tenderness..  Right breast shows a stable cystic lesion in the upper inner quadrant skin no masses nipple discharge or tenderness noted.  Abdominal:      General: Bowel sounds are normal.      Palpations: Abdomen is soft.   Musculoskeletal:         General: Normal range of motion.   Skin:     General: Skin is warm and dry.   Neurological:      General: No focal deficit present.      Mental Status: She is alert.                            Assessment      1.  Infiltrating ductal carcinoma of the left breast, grade 2, stage anatomic to be (pT2, PN 1A) status post left lumpectomy, ER positive, CT positive, HER-2 negative, Ki-67 22%, Oncotype DX current score 20.  Status post whole breast radiation therapy on endocrine therapy with anastrozole since March 2022 with moderate adherence.  2.  Diabetes mellitus under fair control  3.  Tobacco abuse improving  4.  Hypertension  5.  COVID-19 infection June 2022 with need for oxygen at home slowly improving.     Plan:  She will continue anastrozole on a regular basis.  We will see her back in 6 months for routine follow-up.  Her  ordering a mammogram for December 2022.  She agreed and verbalized her agreement and understanding with the current plan. I answered all questions and concerns she has at this time and advised her to call at any time in there interim with questions or concerns in regards to her care.

## 2022-08-01 ENCOUNTER — TELEPHONE (OUTPATIENT)
Dept: HEALTH INFORMATION MANAGEMENT | Facility: OTHER | Age: 57
End: 2022-08-01

## 2022-08-08 ENCOUNTER — TELEPHONE (OUTPATIENT)
Dept: HEMATOLOGY ONCOLOGY | Facility: MEDICAL CENTER | Age: 57
End: 2022-08-08
Payer: MEDICARE

## 2022-08-08 DIAGNOSIS — Z17.0 CARCINOMA OF UPPER-OUTER QUADRANT OF LEFT BREAST IN FEMALE, ESTROGEN RECEPTOR POSITIVE (HCC): ICD-10-CM

## 2022-08-08 DIAGNOSIS — C50.412 CARCINOMA OF UPPER-OUTER QUADRANT OF LEFT BREAST IN FEMALE, ESTROGEN RECEPTOR POSITIVE (HCC): ICD-10-CM

## 2022-08-08 RX ORDER — ANASTROZOLE 1 MG/1
1 TABLET ORAL DAILY
Qty: 90 TABLET | Refills: 1 | Status: SHIPPED | OUTPATIENT
Start: 2022-08-08 | End: 2023-09-28

## 2022-08-08 NOTE — TELEPHONE ENCOUNTER
Received request via: Pharmacy    Was the patient seen in the last year in this department? Yes    Does the patient have an active prescription (recently filled or refills available) for medication(s) requested? No     Initially filled 02/16  Qty 90 w/1 refill

## 2022-08-08 NOTE — TELEPHONE ENCOUNTER
Received request via [facility] for Anastrozole.     Ran test claim, copay $0. Sent msg to patient to onboard.

## 2022-08-15 RX ORDER — ALBUTEROL SULFATE 90 UG/1
AEROSOL, METERED RESPIRATORY (INHALATION)
Qty: 13.4 EACH | Refills: 0 | Status: SHIPPED | OUTPATIENT
Start: 2022-08-15 | End: 2022-12-01

## 2022-08-18 NOTE — ADDENDUM NOTE
Encounter addended by: Kate Dooley, Med Ass't on: 8/18/2022 8:35 AM   Actions taken: Charge Capture section accepted

## 2022-08-18 NOTE — ADDENDUM NOTE
Encounter addended by: Kate Dooley, Med Ass't on: 8/18/2022 8:41 AM   Actions taken: Charge Capture section accepted

## 2022-10-13 ENCOUNTER — HOSPITAL ENCOUNTER (OUTPATIENT)
Dept: RADIOLOGY | Facility: MEDICAL CENTER | Age: 57
End: 2022-10-13
Attending: FAMILY MEDICINE
Payer: MEDICARE

## 2022-10-13 DIAGNOSIS — Z12.31 VISIT FOR SCREENING MAMMOGRAM: ICD-10-CM

## 2022-10-13 PROCEDURE — 77063 BREAST TOMOSYNTHESIS BI: CPT

## 2022-11-02 ENCOUNTER — RESEARCH ENCOUNTER (OUTPATIENT)
Dept: RESEARCH | Facility: WORKSITE | Age: 57
End: 2022-11-02
Attending: FAMILY MEDICINE
Payer: MEDICARE

## 2022-11-02 DIAGNOSIS — Z00.6 RESEARCH STUDY PATIENT: Primary | ICD-10-CM

## 2022-11-03 DIAGNOSIS — Z00.6 RESEARCH STUDY PATIENT: ICD-10-CM

## 2022-11-09 ENCOUNTER — HOSPITAL ENCOUNTER (OUTPATIENT)
Facility: MEDICAL CENTER | Age: 57
End: 2022-11-09
Attending: PATHOLOGY
Payer: COMMERCIAL

## 2022-11-09 DIAGNOSIS — Z00.6 RESEARCH STUDY PATIENT: ICD-10-CM

## 2022-11-15 LAB
ELF SCORE: 10.05
RELATIVE RISK: ABNORMAL
RISK GROUP: ABNORMAL
RISK: 23.6 %

## 2022-11-17 ENCOUNTER — OFFICE VISIT (OUTPATIENT)
Dept: MEDICAL GROUP | Facility: PHYSICIAN GROUP | Age: 57
End: 2022-11-17
Payer: MEDICARE

## 2022-11-17 ENCOUNTER — HOSPITAL ENCOUNTER (OUTPATIENT)
Facility: MEDICAL CENTER | Age: 57
End: 2022-11-17
Attending: FAMILY MEDICINE
Payer: MEDICARE

## 2022-11-17 VITALS
HEART RATE: 98 BPM | BODY MASS INDEX: 37.05 KG/M2 | HEIGHT: 64 IN | WEIGHT: 217 LBS | DIASTOLIC BLOOD PRESSURE: 74 MMHG | SYSTOLIC BLOOD PRESSURE: 136 MMHG | OXYGEN SATURATION: 97 % | TEMPERATURE: 98.6 F

## 2022-11-17 DIAGNOSIS — Z23 NEED FOR VACCINATION: ICD-10-CM

## 2022-11-17 DIAGNOSIS — J06.9 ACUTE URI: Primary | ICD-10-CM

## 2022-11-17 LAB
EXTERNAL QUALITY CONTROL: NORMAL
FLUAV+FLUBV AG SPEC QL IA: NEGATIVE
INT CON NEG: NORMAL
INT CON NEG: NORMAL
INT CON POS: NORMAL
INT CON POS: NORMAL
SARS-COV+SARS-COV-2 AG RESP QL IA.RAPID: NEGATIVE

## 2022-11-17 PROCEDURE — 99213 OFFICE O/P EST LOW 20 MIN: CPT | Mod: CS,25 | Performed by: FAMILY MEDICINE

## 2022-11-17 PROCEDURE — U0003 INFECTIOUS AGENT DETECTION BY NUCLEIC ACID (DNA OR RNA); SEVERE ACUTE RESPIRATORY SYNDROME CORONAVIRUS 2 (SARS-COV-2) (CORONAVIRUS DISEASE [COVID-19]), AMPLIFIED PROBE TECHNIQUE, MAKING USE OF HIGH THROUGHPUT TECHNOLOGIES AS DESCRIBED BY CMS-2020-01-R: HCPCS

## 2022-11-17 PROCEDURE — 87426 SARSCOV CORONAVIRUS AG IA: CPT | Performed by: FAMILY MEDICINE

## 2022-11-17 PROCEDURE — U0005 INFEC AGEN DETEC AMPLI PROBE: HCPCS

## 2022-11-17 PROCEDURE — 87804 INFLUENZA ASSAY W/OPTIC: CPT | Performed by: FAMILY MEDICINE

## 2022-11-17 PROCEDURE — G0008 ADMIN INFLUENZA VIRUS VAC: HCPCS | Performed by: FAMILY MEDICINE

## 2022-11-17 PROCEDURE — 90686 IIV4 VACC NO PRSV 0.5 ML IM: CPT | Performed by: FAMILY MEDICINE

## 2022-11-17 RX ORDER — AMOXICILLIN AND CLAVULANATE POTASSIUM 875; 125 MG/1; MG/1
1 TABLET, FILM COATED ORAL 2 TIMES DAILY
Qty: 10 TABLET | Refills: 0 | Status: SHIPPED | OUTPATIENT
Start: 2022-11-20 | End: 2022-11-25

## 2022-11-17 ASSESSMENT — FIBROSIS 4 INDEX: FIB4 SCORE: 0.57

## 2022-11-17 NOTE — PROGRESS NOTES
Six Minute Walk:       MA: Erich Badillo Ass't      Sol Kern is an 57 y.o. female   See Vital Signs  Normal ambulates {w-w/o:5700} O2    O2 Sat on room Air: *** %    Excercise (walking):    1 Minute: SaO2: ***   HR: ***      2 Minute: SaO2: ***   HR: ***      3 Minute: SaO2: ***   HR: ***      4 Minute: SaO2: ***   HR: ***      5 Minute: SaO2: ***   HR: ***      6 Minute: SaO2: ***   HR: ***      Recovery:     Room Air:  SaO2: ***   HR:        On O2 *** LPM for *** minutes:  SaO2: ***   HR: ***    MA Comments:  ***

## 2022-11-17 NOTE — PROGRESS NOTES
Chief Complaint   Patient presents with    Facial Pain     X 4 days    Runny Nose     Green/yellow mucus    Breast Mass     Right breast; cyst        HPI: Patient is a 57 y.o. female complaining of 7 days of illness including: ear pain, facial pain, nasal congestion, rhinorrhea.   Mucus is: green.  Similarly ill exposures: no.  Treatments tried: flonase, advil  She  reports that she has quit smoking. Her smoking use included cigarettes. She has a 2.50 pack-year smoking history. She has never used smokeless tobacco.    No Home COVID-19 tests.  No flu shot this season  No bivalent COVID booster     ROS:  No fever, cough, nausea, changes in bowel movements or skin rash.     I reviewed the patient's medications, allergies and medical history:  Current Outpatient Medications   Medication Sig Dispense Refill    metformin (GLUCOPHAGE) 1000 MG tablet Take 1 Tablet by mouth 2 times a day with meals. 200 Tablet 1    albuterol 108 (90 Base) MCG/ACT Aero Soln inhalation aerosol INHALE 2 PUFFS BY MOUTH EVERY 6 HOURS AS NEEDED FOR SHORTNESS OF BREATH 13.4 Each 0    anastrozole (ARIMIDEX) 1 MG Tab Take 1 Tablet by mouth every day. 90 Tablet 1    Misc. Devices Misc Oxygen. 1 L/min with exertion and on flight if Pulse Ox <90%. 1 Each 0    lisinopril (PRINIVIL) 10 MG Tab Take 1 Tablet by mouth every day. 90 Tablet 3    atorvastatin (LIPITOR) 10 MG Tab Take 1 Tablet by mouth every evening. 90 Tablet 3    ondansetron (ZOFRAN ODT) 4 MG TABLET DISPERSIBLE Take 1 Tablet by mouth every 6 hours as needed. 10 Tablet 0    lidocaine-prilocaine (EMLA) 2.5-2.5 % Cream       cyclobenzaprine (FLEXERIL) 5 MG tablet Take 1-2 Tabs by mouth 3 times a day as needed. 90 Tab 1    diazepam (VALIUM) 10 MG tablet Take 1 Tablet by mouth 1 time a day as needed (muscle spasms, back pain).      benzonatate (TESSALON) 100 MG Cap Take 1-2 Capsules by mouth 3 times a day as needed for Cough. (Patient not taking: Reported on 11/17/2022) 100 Capsule 0     No current  "facility-administered medications for this visit.     Codeine, Other drug, and Vicodin [hydrocodone-acetaminophen]  Past Medical History:   Diagnosis Date    Allergy     Back pain     Cancer (HCC) 2021    breast    Diabetes (HCC)     diet    GERD (gastroesophageal reflux disease)     High cholesterol     not medicated    Malignant neoplasm of upper-outer quadrant of left female breast (HCC)     Neck pain     Obesity     Sleep apnea     no cpap    Snoring         EXAM:  /74 (BP Location: Right arm, Patient Position: Sitting, BP Cuff Size: Adult)   Pulse 98   Temp 37 °C (98.6 °F) (Temporal)   Ht 1.626 m (5' 4\")   Wt 98.4 kg (217 lb)   SpO2 97%   General: Alert, no conversational dyspnea or audible wheeze, non-toxic appearance.  Eyes: PERRL, conjunctiva slightly injected, no eye discharge.  Ears: Normal pinnae,TM's normal bilaterally.  Nares: Patent with thin mucus.  Sinuses: tender over bilateral maxillary and frontal sinuses.  Throat: Erythematous injection without exudate.   Neck: Supple, with no adenopathy.  Lungs: Clear to auscultation bilaterally, no wheeze, crackles or rhonchi.   Heart: Regular rate without murmur.  Skin: Warm and dry without rash.    POC Testing  Flu A/B: neg  COVID: neg     ASSESSMENT:   1. Acute URI         PLAN:  1. Educated patient that majority of upper respiratory infections are viral and do not need antibiotics. Pocket prescription for augmentin provided if not improved by day 10 of illness.  2. COVID swab sent for PCR confirmation.  3. Twice daily use of nasal saline rinse or Neti-Pot.  4. OTC anti-pyretics and decongestants as needed.  5. Follow-up in office or urgent care for worsening symptoms, difficulty breathing, lack of expected recovery, or should new symptoms or problems arise.    "

## 2022-11-18 DIAGNOSIS — J06.9 ACUTE URI: ICD-10-CM

## 2022-11-18 LAB
SARS-COV-2 RNA RESP QL NAA+PROBE: NOTDETECTED
SPECIMEN SOURCE: NORMAL

## 2022-12-01 RX ORDER — ALBUTEROL SULFATE 90 UG/1
AEROSOL, METERED RESPIRATORY (INHALATION)
Qty: 6.7 EACH | Refills: 1 | Status: SHIPPED | OUTPATIENT
Start: 2022-12-01 | End: 2023-08-23 | Stop reason: SDUPTHER

## 2022-12-09 ENCOUNTER — OFFICE VISIT (OUTPATIENT)
Dept: MEDICAL GROUP | Facility: PHYSICIAN GROUP | Age: 57
End: 2022-12-09
Payer: MEDICARE

## 2022-12-09 VITALS
BODY MASS INDEX: 36.37 KG/M2 | HEIGHT: 64 IN | DIASTOLIC BLOOD PRESSURE: 64 MMHG | OXYGEN SATURATION: 96 % | TEMPERATURE: 98.8 F | HEART RATE: 85 BPM | SYSTOLIC BLOOD PRESSURE: 134 MMHG | WEIGHT: 213 LBS

## 2022-12-09 DIAGNOSIS — E66.01 CLASS 2 SEVERE OBESITY WITH BODY MASS INDEX (BMI) OF 35 TO 39.9 WITH SERIOUS COMORBIDITY (HCC): ICD-10-CM

## 2022-12-09 DIAGNOSIS — E11.69 TYPE 2 DIABETES MELLITUS WITH OTHER SPECIFIED COMPLICATION, WITHOUT LONG-TERM CURRENT USE OF INSULIN (HCC): ICD-10-CM

## 2022-12-09 DIAGNOSIS — Z86.16 HISTORY OF COVID-19: Primary | ICD-10-CM

## 2022-12-09 DIAGNOSIS — K76.0 HEPATIC STEATOSIS: ICD-10-CM

## 2022-12-09 DIAGNOSIS — N60.01 SOLITARY CYST OF RIGHT BREAST: ICD-10-CM

## 2022-12-09 PROBLEM — N60.09 CYST (SOLITARY) OF BREAST: Status: ACTIVE | Noted: 2022-12-09

## 2022-12-09 PROBLEM — F13.20 SEDATIVE HYPNOTIC OR ANXIOLYTIC DEPENDENCE (HCC): Status: RESOLVED | Noted: 2020-10-01 | Resolved: 2022-12-09

## 2022-12-09 PROCEDURE — 99214 OFFICE O/P EST MOD 30 MIN: CPT | Performed by: FAMILY MEDICINE

## 2022-12-09 ASSESSMENT — ENCOUNTER SYMPTOMS
SHORTNESS OF BREATH: 0
FEVER: 0
CHILLS: 0

## 2022-12-09 ASSESSMENT — FIBROSIS 4 INDEX: FIB4 SCORE: 0.57

## 2022-12-09 NOTE — PROGRESS NOTES
Six Minute Walk:       MA:PRASANNA    Sol Kern is an 57 y.o. female   See Vital Signs  Normal ambulates without O2    O2 Sat on room Air: 97 %    Excercise (walking):    1 Minute: SaO2: 97   HR: 99      2 Minute: SaO2: 93   HR: 102      3 Minute: SaO2: 94   HR: 104      4 Minute: SaO2: 93   HR: 103      5 Minute: SaO2: 92   HR: 102      6 Minute: SaO2: 94   HR: 106

## 2022-12-09 NOTE — PROGRESS NOTES
"Subjective:     CC: COVID, ELF results, breast cyst    HPI:   Sol presents today with    Problem   Cyst (Solitary) of Breast    Showed Dr. Sykes who stated it will likely need to be surgically removed.  2 months  Right breast  Cyst, trying to express fluid twice daily  Reports the discharge is milky, sometimes clear fluid     Hepatic Steatosis    Chronic. Since at least 2021  EtOH: rare  ELF: 10.05, 23.6%, moderate risk  CT Abd: hepatic steatosis (2021)     History of Covid-19    6/2022 - s/p paxlovid     Class 2 Severe Obesity With Body Mass Index (Bmi) of 35 to 39.9 With Serious Comorbidity (Hcc)    This is a chronic condition.  Max weight: 233 lbs (5/2020)  Current weight: 213 lbs  Weight change: - 15 lbs since 7/2022  BMI: 36.56  Diet: healthier choices  Exercise: none       Sedative Hypnotic Or Anxiolytic Dependence (Hcc) (Resolved)       Health Maintenance: Completed    ROS:  Review of Systems   Constitutional:  Negative for chills and fever.   Respiratory:  Negative for shortness of breath.    Cardiovascular:  Negative for chest pain.     Objective:     Exam:  /64 (BP Location: Right arm, Patient Position: Sitting, BP Cuff Size: Large adult)   Pulse 85   Temp 37.1 °C (98.8 °F) (Temporal)   Ht 1.626 m (5' 4\")   Wt 96.6 kg (213 lb)   SpO2 96%   BMI 36.56 kg/m²  Body mass index is 36.56 kg/m².    Physical Exam  Constitutional:       Appearance: Normal appearance.   Cardiovascular:      Rate and Rhythm: Normal rate and regular rhythm.      Heart sounds: Normal heart sounds.   Pulmonary:      Effort: Pulmonary effort is normal.      Breath sounds: Normal breath sounds.   Chest:       Musculoskeletal:      Cervical back: Normal range of motion and neck supple.   Neurological:      Mental Status: She is alert.       Assessment & Plan:     57 y.o. female with the following -     1. History of COVID-19  Resolved.  6-minute walk test in the office today showed her oxygen level did not drop below 90% so she " no longer requires oxygen.    2. Class 2 severe obesity with body mass index (BMI) of 35 to 39.9 with serious comorbidity (HCC)  Chronic, stable.  She is lost 15 pounds since July, 2022.  She has been working much harder on making healthier dietary choices but admits she is not exercising at all.  I have congratulated her on her current progress and encouraged to continue working on healthy habits.    3. Solitary cyst of right breast  Chronic, stable.  She has a cyst of the right breast that continually drains fluid.  Its been there for months.  She did show her breast surgeon who stated that it does need to be removed either by surgery or dermatology.  Exam does show a firm lump in the area of concern draining serosanguineous fluid.  It feels very close to the surface and based on the description of the milky discharge, I suspect this is a sebaceous cyst.  She does admit that the discharge can be quite foul-smelling.  There is no other evidence of infection on exam today so I do not think she needs an antibiotic.  We will get her to dermatology so that cyst can be removed.  - Referral to Dermatology    4. Hepatic steatosis  Chronic, stable.  Noted on a CT scan in 2021.  Recent health score showed moderate risk so we will go ahead and do a work-up to look for other causes of liver dysfunction.  We will send her to GI so they can get elastography/FibroScan.  - HEPATIC FUNCTION PANEL; Future  - HEPATITIS PANEL ACUTE(4 COMPONENTS); Future  - Alpha-1-Antitrypsin Genotype w/ Reflex to Phenotype; Future  - IRON/TOTAL IRON BIND; Future  - FERRITIN; Future  - BRYNN REFLEXIVE PROFILE; Future  - CBC WITHOUT DIFFERENTIAL; Future  - Referral to Gastroenterology    5. Type 2 diabetes mellitus with other specified complication, without long-term current use of insulin (HCC)  Chronic, status unknown.  She is due for an A1c so this has been added to her blood work.  - HEMOGLOBIN A1C; Future    Referral for genetic research was  offered. Patient is a participant.    Return in about 3 months (around 3/9/2023) for Annual/wellness visit, Pap.    Please note that this dictation was created using voice recognition software. I have made every reasonable attempt to correct obvious errors, but I expect that there are errors of grammar and possibly content that I did not discover before finalizing the note.

## 2022-12-12 ENCOUNTER — DOCUMENTATION (OUTPATIENT)
Dept: HEALTH INFORMATION MANAGEMENT | Facility: OTHER | Age: 57
End: 2022-12-12
Payer: MEDICARE

## 2022-12-14 LAB
APOB+LDLR+PCSK9 GENE MUT ANL BLD/T: NOT DETECTED
BRCA1+BRCA2 DEL+DUP + FULL MUT ANL BLD/T: NOT DETECTED
MLH1+MSH2+MSH6+PMS2 GN DEL+DUP+FUL M: NOT DETECTED

## 2023-01-20 ENCOUNTER — OFFICE VISIT (OUTPATIENT)
Dept: DERMATOLOGY | Facility: IMAGING CENTER | Age: 58
End: 2023-01-20
Payer: MEDICARE

## 2023-01-20 DIAGNOSIS — L30.4 INTERTRIGO: ICD-10-CM

## 2023-01-20 DIAGNOSIS — L72.3 SEBACEOUS CYST: ICD-10-CM

## 2023-01-20 PROCEDURE — 99203 OFFICE O/P NEW LOW 30 MIN: CPT | Performed by: DERMATOLOGY

## 2023-01-20 RX ORDER — NYSTATIN 100000 U/G
1 CREAM TOPICAL 2 TIMES DAILY
Qty: 30 G | Refills: 2 | Status: SHIPPED | OUTPATIENT
Start: 2023-01-20 | End: 2023-03-20

## 2023-01-20 RX ORDER — AMOXICILLIN 500 MG/1
500 CAPSULE ORAL 2 TIMES DAILY
Qty: 20 CAPSULE | Refills: 0 | Status: SHIPPED | OUTPATIENT
Start: 2023-01-20 | End: 2023-03-20

## 2023-01-20 RX ORDER — FLUCONAZOLE 150 MG/1
TABLET ORAL
Qty: 3 TABLET | Refills: 0 | Status: SHIPPED | OUTPATIENT
Start: 2023-01-20 | End: 2023-06-28

## 2023-01-20 NOTE — PROGRESS NOTES
CC: Establish Care       Subjective: New patient here for cyst and scar concern    HPI/location: cyst under rt breast and on on inner rt thigh   Time present: years  Painful lesion: No  Itching lesion: No  Enlarging lesion: No  Anything make it better or worse?  Squeezes contents out to treat. Smell bothersome to patient.    Has rash on lower abdomen. Does not like powders, stinking.     Hx of sebaceous cyst removed     ROS: no fevers/chills. No itch.  No cough  Relevant PMH: obesity; hx breast cancer  Social: FS    PE: Gen:WDWN female in NAD. Skin: focal exam: illdefined <1cm subcut nodule with hyperpigmentation of infra right large pendulous breast.  Unable to well visualize labial subcut nodule in  dist. intertrigo of lower pannus    A/P: intertrigo:   -reviewed dx/tx on [facility]  -topical nystatin BID  -keep cool/dry  -diflucan 150mg PO X 3 doses, se reviewed    Cysts: not suspicious in appearance  -reviewed dx/tx  -warm compresses and self drainage reviewed  -reviewed anticipatory guidance  -surgery may be difficult due to sites - risk of infection and problematic positioning for good visualization in outpatient setting. May benefit from OR - sterility and positioning/lighting  -amoxicillin 500mg POBID, se reviewed      Obesity:  Chronic, stable. Encouraged healthy diet and physical activity changes with a goal of weight loss. Follow up at least annually. The comorbid condition is unchanged based on today's assessment. Treatment plan as follows: f/u PCP for dm management and possible weight loss medication. Follow up with specialist - PCP.    I have reviewed medications relevant to my specialty.

## 2023-01-24 ENCOUNTER — DOCUMENTATION (OUTPATIENT)
Dept: HEALTH INFORMATION MANAGEMENT | Facility: OTHER | Age: 58
End: 2023-01-24
Payer: MEDICARE

## 2023-01-24 ENCOUNTER — PATIENT MESSAGE (OUTPATIENT)
Dept: HEALTH INFORMATION MANAGEMENT | Facility: OTHER | Age: 58
End: 2023-01-24

## 2023-02-15 ENCOUNTER — OFFICE VISIT (OUTPATIENT)
Dept: MEDICAL GROUP | Facility: PHYSICIAN GROUP | Age: 58
End: 2023-02-15
Payer: MEDICARE

## 2023-02-15 VITALS
OXYGEN SATURATION: 100 % | HEART RATE: 98 BPM | BODY MASS INDEX: 37.22 KG/M2 | DIASTOLIC BLOOD PRESSURE: 68 MMHG | WEIGHT: 218 LBS | SYSTOLIC BLOOD PRESSURE: 140 MMHG | HEIGHT: 64 IN | TEMPERATURE: 98 F

## 2023-02-15 DIAGNOSIS — Z23 NEED FOR VACCINATION: ICD-10-CM

## 2023-02-15 DIAGNOSIS — F32.9 REACTIVE DEPRESSION: ICD-10-CM

## 2023-02-15 DIAGNOSIS — K76.0 HEPATIC STEATOSIS: ICD-10-CM

## 2023-02-15 DIAGNOSIS — E11.65 TYPE 2 DIABETES MELLITUS WITH HYPERGLYCEMIA, WITHOUT LONG-TERM CURRENT USE OF INSULIN (HCC): Primary | ICD-10-CM

## 2023-02-15 PROBLEM — F32.A DEPRESSION: Status: ACTIVE | Noted: 2023-02-15

## 2023-02-15 LAB
HBA1C MFR BLD: 9.7 % (ref ?–5.8)
POCT INT CON NEG: NEGATIVE
POCT INT CON POS: POSITIVE

## 2023-02-15 PROCEDURE — 99214 OFFICE O/P EST MOD 30 MIN: CPT | Mod: 25 | Performed by: FAMILY MEDICINE

## 2023-02-15 PROCEDURE — 90677 PCV20 VACCINE IM: CPT | Performed by: FAMILY MEDICINE

## 2023-02-15 PROCEDURE — G0009 ADMIN PNEUMOCOCCAL VACCINE: HCPCS | Performed by: FAMILY MEDICINE

## 2023-02-15 PROCEDURE — 83036 HEMOGLOBIN GLYCOSYLATED A1C: CPT | Performed by: FAMILY MEDICINE

## 2023-02-15 ASSESSMENT — FIBROSIS 4 INDEX: FIB4 SCORE: 0.57

## 2023-02-15 ASSESSMENT — PATIENT HEALTH QUESTIONNAIRE - PHQ9
SUM OF ALL RESPONSES TO PHQ QUESTIONS 1-9: 14
5. POOR APPETITE OR OVEREATING: 3 - NEARLY EVERY DAY
CLINICAL INTERPRETATION OF PHQ2 SCORE: 4

## 2023-02-15 ASSESSMENT — ENCOUNTER SYMPTOMS
FEVER: 0
CHILLS: 0
SHORTNESS OF BREATH: 0

## 2023-02-15 NOTE — PROGRESS NOTES
Subjective:     CC: DM, depression    HPI:   Sol presents today with    Problem   Depression    Onset: 12/2022 - triggered by discrete event  Duration: intermittent  PHQ screen:  Associated symptoms:  Suicidal ideation/homicidal ideation: no/no  Therapy/counseling: no - not interested  Medications: none  Improving/worsening: worse    Lots of stress, in the middle of a lawsuit regarding her home in the [facility]    Depression Screening    Little interest or pleasure in doing things?  2 - more than half the days   Feeling down, depressed , or hopeless? 2 - more than half the days   Trouble falling or staying asleep, or sleeping too much?  3 - nearly every day   Feeling tired or having little energy?  3 - nearly every day   Poor appetite or overeating?  3 - nearly every day   Feeling bad about yourself - or that you are a failure or have let yourself or your family down? 0 - not at all   Trouble concentrating on things, such as reading the newspaper or watching television? 1 - several days   Moving or speaking so slowly that other people could have noticed.  Or the opposite - being so fidgety or restless that you have been moving around a lot more than usual?  0 - not at all   Thoughts that you would be better off dead, or of hurting yourself?  0 - not at all   Patient Health Questionnaire Score: 14       If depressive symptoms identified deferred to follow up visit unless specifically addressed in assesment and plan.    Interpretation of PHQ-9 Total Score   Score Severity   1-4 No Depression   5-9 Mild Depression   10-14 Moderate Depression   15-19 Moderately Severe Depression   20-27 Severe Depression           Hepatic Steatosis    Chronic. Since at least 2021  EtOH: rare  ELF: 10.05, 23.6%, moderate risk  CT Abd: hepatic steatosis (2021)    LFTs, Hep B, Hep C, alpha-1 antitrypsin, iron panel, BRYNN, and CBC ordered. Not yet completed.       Diabetes (Hcc)    This is a chronic condition.  Current medications:  "  Insulin: -  Biguanide: metformin 1000 mg bid   GLP1-RA: -   SGLT-2i: -  DPP4-I: -  TZD: -  Maged: -  Sulfonyluria: -      Last A1c: 9.7% (2/2023); 8.5% (7/2022); 12.2% (3/2022)  Last Microalb/Cr ratio: <30 (7/2022)  Fasting sugars: n/a  Last diabetic foot exam: 2/2023  Last retinal eye exam: 2/2022 - no DM retinopathy  ACEi/ARB: lisinopril 10 mg  Statin: atorvastatin 10 mg  Aspirin: no  Concomitant HTN: yes - above goal  Nightly foot checks: yes         Health Maintenance: Completed    ROS:  Review of Systems   Constitutional:  Negative for chills and fever.   Respiratory:  Negative for shortness of breath.    Cardiovascular:  Negative for chest pain.     Objective:     Exam:  BP (!) 140/72 (BP Location: Right arm, Patient Position: Sitting, BP Cuff Size: Large adult)   Pulse 98   Temp 36.7 °C (98 °F) (Temporal)   Ht 1.626 m (5' 4\")   Wt 98.9 kg (218 lb)   SpO2 100%   BMI 37.42 kg/m²  Body mass index is 37.42 kg/m².    Physical Exam  Constitutional:       Appearance: Normal appearance.   Cardiovascular:      Rate and Rhythm: Normal rate and regular rhythm.      Heart sounds: Normal heart sounds.   Pulmonary:      Effort: Pulmonary effort is normal.      Breath sounds: Normal breath sounds.   Musculoskeletal:      Cervical back: Normal range of motion and neck supple.   Feet:      Comments: Diabetic foot exam: No lesions or calluses noted. 2+ pedal pulses. Sensation intact with 10 out of 10 on monofilament test.  Neurological:      Mental Status: She is alert.     Assessment & Plan:     57 y.o. female with the following -     1. Type 2 diabetes mellitus with hyperglycemia, without long-term current use of insulin (HCC)  Chronic, uncontrolled.  A1c is above 8.0%.  She is due for retinal eye exam and I recommended she make an appointment.  Diabetic foot exam was performed today.  She is also due for some lab work and the lab slips have been printed for her.  We will adjust her regimen.  - Continue metformin 1000 " mg twice daily  - Start Trulicity or Ozempic, depending on what she can get filled  - POCT Hemoglobin A1C  - Diabetic Monofilament Lower Extremity Exam    2. Reactive depression  Chronic, stable.  Since December, 2022 she has been struggling with a lot of stress and depression.  She owns a home in the [place] area and went to go sell it and there has been some sort of legal issue where now there is a lawsuit involved.  This has been extremely stressful.  PHQ-9 did show moderate depression but she is really not interested in medication or therapy at this time.  She is hoping it will get better once the lawsuit stuff resolved so we will monitor closely.  - Patient has been identified as having a positive depression screening. Appropriate orders and counseling have been given.    3. Hepatic steatosis  Chronic, stable.  I ordered labs back in December, 2022 that she has not had yet a chance to complete.  I have reprinted those lab slips and asked her to complete them within the next 2 weeks.    4. Need for vaccination  - Pneumococcal Conjugate Vaccine 20-Valent (19 yrs+)    Referral for genetic research was offered. Patient is a participant.    Return in about 3 months (around 5/15/2023) for f/u DM, get A1c, Depression, PHQ9.    Please note that this dictation was created using voice recognition software. I have made every reasonable attempt to correct obvious errors, but I expect that there are errors of grammar and possibly content that I did not discover before finalizing the note.

## 2023-02-15 NOTE — PATIENT INSTRUCTIONS
TO DO LIST  - Schedule your eye appointment and make sure they send me the note  - Get your labs done by 3/1/2023  - Start either Trulicity or Ozempic, which ever you can get, once per week  - Keep taking the metformin twice per day

## 2023-02-22 ENCOUNTER — APPOINTMENT (OUTPATIENT)
Dept: HEMATOLOGY ONCOLOGY | Facility: MEDICAL CENTER | Age: 58
End: 2023-02-22
Payer: MEDICARE

## 2023-02-23 ENCOUNTER — HOSPITAL ENCOUNTER (OUTPATIENT)
Dept: LAB | Facility: MEDICAL CENTER | Age: 58
End: 2023-02-23
Attending: FAMILY MEDICINE
Payer: MEDICARE

## 2023-02-23 DIAGNOSIS — K76.0 HEPATIC STEATOSIS: ICD-10-CM

## 2023-02-23 DIAGNOSIS — E11.69 TYPE 2 DIABETES MELLITUS WITH OTHER SPECIFIED COMPLICATION, WITHOUT LONG-TERM CURRENT USE OF INSULIN (HCC): ICD-10-CM

## 2023-02-23 LAB
ERYTHROCYTE [DISTWIDTH] IN BLOOD BY AUTOMATED COUNT: 46.3 FL (ref 35.9–50)
HCT VFR BLD AUTO: 53 % (ref 37–47)
HGB BLD-MCNC: 17.7 G/DL (ref 12–16)
MCH RBC QN AUTO: 31.9 PG (ref 27–33)
MCHC RBC AUTO-ENTMCNC: 33.4 G/DL (ref 33.6–35)
MCV RBC AUTO: 95.7 FL (ref 81.4–97.8)
PLATELET # BLD AUTO: 292 K/UL (ref 164–446)
PMV BLD AUTO: 9.8 FL (ref 9–12.9)
RBC # BLD AUTO: 5.54 M/UL (ref 4.2–5.4)
WBC # BLD AUTO: 9 K/UL (ref 4.8–10.8)

## 2023-02-23 PROCEDURE — 80061 LIPID PANEL: CPT

## 2023-02-23 PROCEDURE — 82104 ALPHA-1-ANTITRYPSIN PHENO: CPT

## 2023-02-23 PROCEDURE — 81332 SERPINA1 GENE: CPT

## 2023-02-23 PROCEDURE — 82728 ASSAY OF FERRITIN: CPT

## 2023-02-23 PROCEDURE — 86038 ANTINUCLEAR ANTIBODIES: CPT

## 2023-02-23 PROCEDURE — 36415 COLL VENOUS BLD VENIPUNCTURE: CPT

## 2023-02-23 PROCEDURE — 85027 COMPLETE CBC AUTOMATED: CPT

## 2023-02-23 PROCEDURE — 80076 HEPATIC FUNCTION PANEL: CPT

## 2023-02-23 PROCEDURE — 83550 IRON BINDING TEST: CPT

## 2023-02-23 PROCEDURE — 83540 ASSAY OF IRON: CPT

## 2023-02-23 PROCEDURE — 82103 ALPHA-1-ANTITRYPSIN TOTAL: CPT

## 2023-02-23 PROCEDURE — 80074 ACUTE HEPATITIS PANEL: CPT

## 2023-02-23 RX ORDER — LISINOPRIL 10 MG/1
10 TABLET ORAL DAILY
Qty: 100 TABLET | Refills: 0 | Status: SHIPPED | OUTPATIENT
Start: 2023-02-23 | End: 2023-09-28

## 2023-02-23 NOTE — TELEPHONE ENCOUNTER
Received request via: Pharmacy    Was the patient seen in the last year in this department? Yes    Does the patient have an active prescription (recently filled or refills available) for medication(s) requested? No    Does the patient have senior living Plus and need 100 day supply (blood pressure, diabetes and cholesterol meds only)? Yes, quantity updated to 100 days

## 2023-02-24 LAB
ALBUMIN SERPL BCP-MCNC: 4.4 G/DL (ref 3.2–4.9)
ALP SERPL-CCNC: 96 U/L (ref 30–99)
ALT SERPL-CCNC: 46 U/L (ref 2–50)
AST SERPL-CCNC: 32 U/L (ref 12–45)
BILIRUB CONJ SERPL-MCNC: <0.2 MG/DL (ref 0.1–0.5)
BILIRUB INDIRECT SERPL-MCNC: NORMAL MG/DL (ref 0–1)
BILIRUB SERPL-MCNC: 0.3 MG/DL (ref 0.1–1.5)
CHOLEST SERPL-MCNC: 197 MG/DL (ref 100–199)
FASTING STATUS PATIENT QL REPORTED: NORMAL
FERRITIN SERPL-MCNC: 444 NG/ML (ref 10–291)
HAV IGM SERPL QL IA: NORMAL
HBV CORE IGM SER QL: NORMAL
HBV SURFACE AG SER QL: NORMAL
HCV AB SER QL: NORMAL
HDLC SERPL-MCNC: 47 MG/DL
IRON SATN MFR SERPL: 31 % (ref 15–55)
IRON SERPL-MCNC: 87 UG/DL (ref 40–170)
LDLC SERPL CALC-MCNC: 115 MG/DL
PROT SERPL-MCNC: 7.6 G/DL (ref 6–8.2)
TIBC SERPL-MCNC: 285 UG/DL (ref 250–450)
TRIGL SERPL-MCNC: 176 MG/DL (ref 0–149)
UIBC SERPL-MCNC: 198 UG/DL (ref 110–370)

## 2023-02-26 LAB — NUCLEAR IGG SER QL IA: NORMAL

## 2023-02-28 LAB
A1AT PHENOTYP SERPL-IMP: NORMAL
A1AT SERPL-MCNC: 148 MG/DL (ref 90–200)
A1AT SS SERPL-MCNC: NEGATIVE G/L
A1AT SZ SERPL-MCNC: NORMAL G/L
A1AT ZZ SERPL-MCNC: NEGATIVE G/L
SPECIMEN SOURCE: NORMAL

## 2023-03-02 ENCOUNTER — TELEPHONE (OUTPATIENT)
Dept: MEDICAL GROUP | Facility: PHYSICIAN GROUP | Age: 58
End: 2023-03-02
Payer: MEDICARE

## 2023-03-02 NOTE — TELEPHONE ENCOUNTER
FINAL PRIOR AUTHORIZATION STATUS:    1.  Name of Medication & Dose: Ozempic     2. Prior Auth Status: Approved through 3/1/24     3. Action Taken: Pharmacy Notified: no Patient Notified: no

## 2023-03-03 ENCOUNTER — APPOINTMENT (OUTPATIENT)
Dept: MEDICAL GROUP | Facility: PHYSICIAN GROUP | Age: 58
End: 2023-03-03
Payer: MEDICARE

## 2023-03-20 ENCOUNTER — OFFICE VISIT (OUTPATIENT)
Dept: MEDICAL GROUP | Facility: PHYSICIAN GROUP | Age: 58
End: 2023-03-20
Payer: MEDICARE

## 2023-03-20 VITALS
RESPIRATION RATE: 16 BRPM | BODY MASS INDEX: 36.43 KG/M2 | SYSTOLIC BLOOD PRESSURE: 126 MMHG | HEART RATE: 76 BPM | OXYGEN SATURATION: 98 % | HEIGHT: 64 IN | TEMPERATURE: 98.7 F | WEIGHT: 213.4 LBS | DIASTOLIC BLOOD PRESSURE: 78 MMHG

## 2023-03-20 DIAGNOSIS — M54.41 CHRONIC MIDLINE LOW BACK PAIN WITH RIGHT-SIDED SCIATICA: ICD-10-CM

## 2023-03-20 DIAGNOSIS — E11.65 TYPE 2 DIABETES MELLITUS WITH HYPERGLYCEMIA, WITHOUT LONG-TERM CURRENT USE OF INSULIN (HCC): ICD-10-CM

## 2023-03-20 DIAGNOSIS — I10 ESSENTIAL HYPERTENSION: ICD-10-CM

## 2023-03-20 DIAGNOSIS — E66.01 CLASS 2 SEVERE OBESITY WITH BODY MASS INDEX (BMI) OF 35 TO 39.9 WITH SERIOUS COMORBIDITY (HCC): ICD-10-CM

## 2023-03-20 DIAGNOSIS — Z23 NEED FOR VACCINATION: ICD-10-CM

## 2023-03-20 DIAGNOSIS — G89.29 CHRONIC MIDLINE LOW BACK PAIN WITH RIGHT-SIDED SCIATICA: ICD-10-CM

## 2023-03-20 DIAGNOSIS — L40.9 PSORIASIS: ICD-10-CM

## 2023-03-20 DIAGNOSIS — G62.9 NEUROPATHY: ICD-10-CM

## 2023-03-20 DIAGNOSIS — Z00.00 ENCOUNTER FOR MEDICARE ANNUAL WELLNESS EXAM: Primary | ICD-10-CM

## 2023-03-20 DIAGNOSIS — N39.46 MIXED STRESS AND URGE URINARY INCONTINENCE: ICD-10-CM

## 2023-03-20 DIAGNOSIS — Z17.0 CARCINOMA OF UPPER-OUTER QUADRANT OF LEFT BREAST IN FEMALE, ESTROGEN RECEPTOR POSITIVE (HCC): ICD-10-CM

## 2023-03-20 DIAGNOSIS — K76.0 HEPATIC STEATOSIS: ICD-10-CM

## 2023-03-20 DIAGNOSIS — M54.2 NECK PAIN: ICD-10-CM

## 2023-03-20 DIAGNOSIS — C50.412 CARCINOMA OF UPPER-OUTER QUADRANT OF LEFT BREAST IN FEMALE, ESTROGEN RECEPTOR POSITIVE (HCC): ICD-10-CM

## 2023-03-20 DIAGNOSIS — G47.33 OSA (OBSTRUCTIVE SLEEP APNEA): ICD-10-CM

## 2023-03-20 PROBLEM — F32.A DEPRESSION: Status: RESOLVED | Noted: 2023-02-15 | Resolved: 2023-03-20

## 2023-03-20 PROBLEM — F17.211 CIGARETTE NICOTINE DEPENDENCE IN REMISSION: Status: ACTIVE | Noted: 2021-12-15

## 2023-03-20 PROCEDURE — 90746 HEPB VACCINE 3 DOSE ADULT IM: CPT | Performed by: FAMILY MEDICINE

## 2023-03-20 PROCEDURE — 90632 HEPA VACCINE ADULT IM: CPT | Performed by: FAMILY MEDICINE

## 2023-03-20 PROCEDURE — 90472 IMMUNIZATION ADMIN EACH ADD: CPT | Performed by: FAMILY MEDICINE

## 2023-03-20 PROCEDURE — G0439 PPPS, SUBSEQ VISIT: HCPCS | Performed by: FAMILY MEDICINE

## 2023-03-20 PROCEDURE — 99214 OFFICE O/P EST MOD 30 MIN: CPT | Mod: 25 | Performed by: FAMILY MEDICINE

## 2023-03-20 PROCEDURE — G0010 ADMIN HEPATITIS B VACCINE: HCPCS | Performed by: FAMILY MEDICINE

## 2023-03-20 RX ORDER — ROPINIROLE 0.25 MG/1
.25-.5 TABLET, FILM COATED ORAL NIGHTLY
Qty: 90 TABLET | Refills: 0 | Status: SHIPPED | OUTPATIENT
Start: 2023-03-20 | End: 2023-09-20

## 2023-03-20 SDOH — ECONOMIC STABILITY: TRANSPORTATION INSECURITY
IN THE PAST 12 MONTHS, HAS LACK OF TRANSPORTATION KEPT YOU FROM MEETINGS, WORK, OR FROM GETTING THINGS NEEDED FOR DAILY LIVING?: NO

## 2023-03-20 SDOH — ECONOMIC STABILITY: HOUSING INSECURITY
IN THE LAST 12 MONTHS, WAS THERE A TIME WHEN YOU DID NOT HAVE A STEADY PLACE TO SLEEP OR SLEPT IN A SHELTER (INCLUDING NOW)?: NO

## 2023-03-20 SDOH — ECONOMIC STABILITY: INCOME INSECURITY: IN THE LAST 12 MONTHS, WAS THERE A TIME WHEN YOU WERE NOT ABLE TO PAY THE MORTGAGE OR RENT ON TIME?: NO

## 2023-03-20 SDOH — HEALTH STABILITY: MENTAL HEALTH
STRESS IS WHEN SOMEONE FEELS TENSE, NERVOUS, ANXIOUS, OR CAN'T SLEEP AT NIGHT BECAUSE THEIR MIND IS TROUBLED. HOW STRESSED ARE YOU?: TO SOME EXTENT

## 2023-03-20 SDOH — ECONOMIC STABILITY: FOOD INSECURITY: WITHIN THE PAST 12 MONTHS, YOU WORRIED THAT YOUR FOOD WOULD RUN OUT BEFORE YOU GOT MONEY TO BUY MORE.: NEVER TRUE

## 2023-03-20 SDOH — ECONOMIC STABILITY: FOOD INSECURITY: WITHIN THE PAST 12 MONTHS, THE FOOD YOU BOUGHT JUST DIDN'T LAST AND YOU DIDN'T HAVE MONEY TO GET MORE.: NEVER TRUE

## 2023-03-20 SDOH — ECONOMIC STABILITY: INCOME INSECURITY: HOW HARD IS IT FOR YOU TO PAY FOR THE VERY BASICS LIKE FOOD, HOUSING, MEDICAL CARE, AND HEATING?: NOT VERY HARD

## 2023-03-20 SDOH — ECONOMIC STABILITY: TRANSPORTATION INSECURITY
IN THE PAST 12 MONTHS, HAS THE LACK OF TRANSPORTATION KEPT YOU FROM MEDICAL APPOINTMENTS OR FROM GETTING MEDICATIONS?: NO

## 2023-03-20 SDOH — HEALTH STABILITY: PHYSICAL HEALTH: ON AVERAGE, HOW MANY DAYS PER WEEK DO YOU ENGAGE IN MODERATE TO STRENUOUS EXERCISE (LIKE A BRISK WALK)?: 0 DAYS

## 2023-03-20 SDOH — HEALTH STABILITY: PHYSICAL HEALTH: ON AVERAGE, HOW MANY MINUTES DO YOU ENGAGE IN EXERCISE AT THIS LEVEL?: 0 MIN

## 2023-03-20 SDOH — ECONOMIC STABILITY: HOUSING INSECURITY

## 2023-03-20 SDOH — ECONOMIC STABILITY: TRANSPORTATION INSECURITY
IN THE PAST 12 MONTHS, HAS LACK OF RELIABLE TRANSPORTATION KEPT YOU FROM MEDICAL APPOINTMENTS, MEETINGS, WORK OR FROM GETTING THINGS NEEDED FOR DAILY LIVING?: NO

## 2023-03-20 ASSESSMENT — SOCIAL DETERMINANTS OF HEALTH (SDOH)
HOW MANY DRINKS CONTAINING ALCOHOL DO YOU HAVE ON A TYPICAL DAY WHEN YOU ARE DRINKING: PATIENT DOES NOT DRINK
HOW OFTEN DO YOU GET TOGETHER WITH FRIENDS OR RELATIVES?: ONCE A WEEK
DO YOU BELONG TO ANY CLUBS OR ORGANIZATIONS SUCH AS CHURCH GROUPS UNIONS, FRATERNAL OR ATHLETIC GROUPS, OR SCHOOL GROUPS?: NO
HOW OFTEN DO YOU GET TOGETHER WITH FRIENDS OR RELATIVES?: ONCE A WEEK
HOW OFTEN DO YOU HAVE A DRINK CONTAINING ALCOHOL: NEVER
IN A TYPICAL WEEK, HOW MANY TIMES DO YOU TALK ON THE PHONE WITH FAMILY, FRIENDS, OR NEIGHBORS?: MORE THAN THREE TIMES A WEEK
HOW OFTEN DO YOU HAVE SIX OR MORE DRINKS ON ONE OCCASION: NEVER
DO YOU BELONG TO ANY CLUBS OR ORGANIZATIONS SUCH AS CHURCH GROUPS UNIONS, FRATERNAL OR ATHLETIC GROUPS, OR SCHOOL GROUPS?: NO
WITHIN THE PAST 12 MONTHS, YOU WORRIED THAT YOUR FOOD WOULD RUN OUT BEFORE YOU GOT THE MONEY TO BUY MORE: NEVER TRUE
HOW OFTEN DO YOU ATTEND CHURCH OR RELIGIOUS SERVICES?: 1 TO 4 TIMES PER YEAR
HOW OFTEN DO YOU ATTENT MEETINGS OF THE CLUB OR ORGANIZATION YOU BELONG TO?: NEVER
HOW OFTEN DO YOU ATTENT MEETINGS OF THE CLUB OR ORGANIZATION YOU BELONG TO?: NEVER
HOW OFTEN DO YOU ATTEND CHURCH OR RELIGIOUS SERVICES?: 1 TO 4 TIMES PER YEAR
IN A TYPICAL WEEK, HOW MANY TIMES DO YOU TALK ON THE PHONE WITH FAMILY, FRIENDS, OR NEIGHBORS?: MORE THAN THREE TIMES A WEEK
HOW HARD IS IT FOR YOU TO PAY FOR THE VERY BASICS LIKE FOOD, HOUSING, MEDICAL CARE, AND HEATING?: NOT VERY HARD

## 2023-03-20 ASSESSMENT — LIFESTYLE VARIABLES
HOW MANY STANDARD DRINKS CONTAINING ALCOHOL DO YOU HAVE ON A TYPICAL DAY: PATIENT DOES NOT DRINK
AUDIT-C TOTAL SCORE: 0
SKIP TO QUESTIONS 9-10: 1
HOW OFTEN DO YOU HAVE A DRINK CONTAINING ALCOHOL: NEVER
HOW OFTEN DO YOU HAVE SIX OR MORE DRINKS ON ONE OCCASION: NEVER

## 2023-03-20 ASSESSMENT — ACTIVITIES OF DAILY LIVING (ADL): BATHING_REQUIRES_ASSISTANCE: 0

## 2023-03-20 ASSESSMENT — ENCOUNTER SYMPTOMS: GENERAL WELL-BEING: FAIR

## 2023-03-20 ASSESSMENT — FIBROSIS 4 INDEX: FIB4 SCORE: 0.97

## 2023-03-20 ASSESSMENT — PATIENT HEALTH QUESTIONNAIRE - PHQ9: CLINICAL INTERPRETATION OF PHQ2 SCORE: 0

## 2023-03-20 NOTE — PROGRESS NOTES
Chief Complaint   Patient presents with    Annual Exam     SCP        HPI:  Sol is a 58 y.o. here for Medicare Annual Wellness Visit    Patient Active Problem List    Diagnosis Date Noted    Mixed stress and urge urinary incontinence 03/20/2023    Cyst (solitary) of breast 12/09/2022    Hepatic steatosis 12/09/2022    History of COVID-19 07/15/2022    Vertigo 04/21/2022    Achilles tendon mass 01/31/2022    Cigarette nicotine dependence in remission 12/15/2021    Carcinoma of upper-outer quadrant of left breast in female, estrogen receptor positive (HCC) 10/18/2021    Neuropathy 09/15/2021    Diabetes (HCC) 10/01/2020    Postnasal drip 10/22/2019    Essential hypertension 03/19/2019    Vitamin D deficiency 03/05/2019    Class 2 severe obesity with body mass index (BMI) of 35 to 39.9 with serious comorbidity (HCC) 08/28/2017    Primary insomnia 08/28/2017    YOANA (obstructive sleep apnea) 08/28/2017    Chronic midline low back pain with right-sided sciatica 08/28/2017    Neck pain 08/28/2017    Multiple allergies 08/28/2017    Psoriasis 08/28/2017       Current Outpatient Medications   Medication Sig Dispense Refill    ROPINIRole (REQUIP) 0.25 MG Tab Take 1-2 Tablets by mouth every evening. Take 1-3 hours before bed 90 Tablet 0    lisinopril (PRINIVIL) 10 MG Tab TAKE 1 TABLET BY MOUTH EVERY  Tablet 0    Semaglutide,0.25 or 0.5MG/DOS, 2 MG/1.5ML Solution Pen-injector Inject 0.25 mg under the skin every 7 days for 28 days, THEN 0.5 mg every 7 days for 62 days. 3 Each 0    atorvastatin (LIPITOR) 10 MG Tab TAKE 1 TABLET BY MOUTH EVERY DAY IN THE EVENING (Patient taking differently: 20 mg.) 100 Tablet 3    fluconazole (DIFLUCAN) 150 MG tablet Take 1 pill PO Q Mon/Thurs/Mon, then stop 3 Tablet 0    albuterol 108 (90 Base) MCG/ACT Aero Soln inhalation aerosol INHALE 2 PUFFS BY MOUTH EVERY 6 HOURS AS NEEDED FOR SHORTNESS OF BREATH 6.7 Each 1    metformin (GLUCOPHAGE) 1000 MG tablet Take 1 Tablet by mouth 2 times a  day with meals. 200 Tablet 1    Misc. Devices Misc Oxygen. 1 L/min with exertion and on flight if Pulse Ox <90%. 1 Each 0    ondansetron (ZOFRAN ODT) 4 MG TABLET DISPERSIBLE Take 1 Tablet by mouth every 6 hours as needed. 10 Tablet 0    lidocaine-prilocaine (EMLA) 2.5-2.5 % Cream       cyclobenzaprine (FLEXERIL) 5 MG tablet Take 1-2 Tabs by mouth 3 times a day as needed. 90 Tab 1    diazepam (VALIUM) 10 MG tablet Take 1 Tablet by mouth 1 time a day as needed (muscle spasms, back pain).      anastrozole (ARIMIDEX) 1 MG Tab Take 1 Tablet by mouth every day. 90 Tablet 1     No current facility-administered medications for this visit.        Patient is taking medications as noted in medication list.  Current supplements as per medication list.     Allergies: Codeine, Other drug, and Vicodin [hydrocodone-acetaminophen]    Current social contact/activities:  calls daughter by via phone, errands with      Is patient current with immunizations? No, due for COVID. Patient is interested in receiving NONE today.    She  reports that she has quit smoking. Her smoking use included cigarettes. She has a 5.00 pack-year smoking history. She has never used smokeless tobacco. She reports current drug use. Drugs: Marijuana and Oral. She reports that she does not drink alcohol.  Counseling given: Yes  Tobacco comments: working on quitting       ROS:    Gait: Uses no assistive device   Ostomy: No   Other tubes: No   Amputations: No   Chronic oxygen use No   Last eye exam  3/2022  Wears hearing aids: No   : Reports urinary leakage during the last 6 months that has somewhat interfered with their daily activities or sleep.    Screening:    Depression Screening  Little interest or pleasure in doing things?  0 - not at all  Feeling down, depressed, or hopeless? 0 - not at all  Trouble falling or staying asleep, or sleeping too much?     Feeling tired or having little energy?     Poor appetite or overeating?     Feeling bad about  yourself - or that you are a failure or have let yourself or your family down?    Trouble concentrating on things, such as reading the newspaper or watching television?    Moving or speaking so slowly that other people could have noticed.  Or the opposite - being so fidgety or restless that you have been moving around a lot more than usual?     Thoughts that you would be better off dead, or of hurting yourself?     Patient Health Questionnaire Score:      If depressive symptoms identified deferred to follow up visit unless specifically addressed in assessment and plan.    Interpretation of PHQ-9 Total Score   Score Severity   1-4 No Depression   5-9 Mild Depression   10-14 Moderate Depression   15-19 Moderately Severe Depression   20-27 Severe Depression    Screening for Cognitive Impairment  Three Minute Recall (daughter, heaven, miky)  3/3    Draw clock face with all 12 numbers and set the hands to show 10 past 11.  Yes    If cognitive concerns identified, deferred for follow up unless specifically addressed in assessment and plan.    Fall Risk Assessment  Has the patient had two or more falls in the last year or any fall with injury in the last year?  No  If fall risk identified, deferred for follow up unless specifically addressed in assessment and plan.    Safety Assessment  Throw rugs on floor.  Yes  Handrails on all stairs.  No - no stairs  Good lighting in all hallways.  Yes  Difficulty hearing.  Yes  Patient counseled about all safety risks that were identified.    Functional Assessment ADLs  Are there any barriers preventing you from cooking for yourself or meeting nutritional needs?  No.    Are there any barriers preventing you from driving safely or obtaining transportation?  No.    Are there any barriers preventing you from using a telephone or calling for help?  No.    Are there any barriers preventing you from shopping?  No.    Are there any barriers preventing you from taking care of your own  finances?  No.    Are there any barriers preventing you from managing your medications?  No.    Are there any barriers preventing you from showering, bathing or dressing yourself?  No.    Are you currently engaging in any exercise or physical activity?  No.     What is your perception of your health?  Fair.    Advance Care Planning  Do you have an Advance Directive, Living Will, Durable Power of , or POLST? No               Health Maintenance Summary            Overdue - CERVICAL CANCER SCREENING (Every 3 Years) Overdue - never done      No completion history exists for this topic.              Overdue - COVID-19 Vaccine (4 - Booster for Moderna series) Overdue since 1/25/2022 11/30/2021  Imm Admin: MODERNA SARS-COV-2 VACCINE (12+)    04/17/2021  Imm Admin: MODERNA SARS-COV-2 VACCINE (12+)    03/19/2021  Imm Admin: MODERNA SARS-COV-2 VACCINE (12+)              Overdue - SERUM CREATININE (Yearly) Overdue since 1/14/2023 01/14/2022  Basic Metabolic Panel    11/23/2021  Comp Metabolic Panel    10/01/2020  Comp Metabolic Panel    05/15/2019  Basic Metabolic Panel    04/16/2019  Basic Metabolic Panel    Only the first 5 history entries have been loaded, but more history exists.              Overdue - RETINAL SCREENING (Yearly) Overdue since 2/10/2023      02/10/2022  REFERRAL FOR RETINAL SCREENING EXAM              A1C SCREENING (Every 3 Months) Next due on 5/15/2023      02/15/2023  POCT Hemoglobin A1C    07/15/2022  POCT Hemoglobin A1C    03/21/2022  POCT Hemoglobin A1C    10/01/2020  HEMOGLOBIN A1C    05/14/2020  HEMOGLOBIN A1C              URINE ACR / MICROALBUMIN (Yearly) Next due on 7/15/2023      07/15/2022  MICROALBUMIN CREAT RATIO URINE    10/01/2020  MICROALBUMIN CREAT RATIO URINE (LAB COLLECT)              COLORECTAL CANCER SCREENING (COLONOSCOPY - Every 5 Years) Next due on 11/13/2023 11/13/2018  REFERRAL TO GI FOR COLONOSCOPY              DIABETES MONOFILAMENT / LE EXAM (Yearly) Next  due on 2/15/2024      02/15/2023  Diabetic Monofilament Lower Extremity Exam    12/15/2021  Diabetic Monofilament Lower Extremity Exam    10/01/2020  Diabetic Monofilament Lower Extremity Exam              FASTING LIPID PROFILE (Yearly) Next due on 2/23/2024 02/23/2023  Lipid Profile    10/01/2020  Lipid Profile    03/19/2019  Lipid Profile    09/13/2017  LIPID PROFILE              Annual Wellness Visit (Every 366 Days) Next due on 3/20/2024      03/20/2023  Visit Dx: Encounter for Medicare annual wellness exam    12/15/2021  Level of Service: ANNUAL WELLNESS VISIT-INCLUDES PPPS SUBSEQUE*    12/15/2021  Visit Dx: Encounter for Medicare annual wellness exam              MAMMOGRAM (Every 2 Years) Next due on 10/13/2024      10/13/2022  MA-SCREENING MAMMO BILAT W/TOMOSYNTHESIS W/CAD    09/24/2021  MA-DIAGNOSTIC MAMMO BILAT W/TOMOSYNTHESIS W/CAD    11/15/2019  MA-MAMMO DIAGNOSTIC BILAT W/ESTEFANY W/CAD    04/01/2019  MA-MAMMO DIAGNOSTIC UNILAT W/ESTEFANY W/CAD RIGHT    10/02/2018  MA-MAMMO DIAGNOSTIC UNILAT W/ESTEFANY W/CAD RIGHT    Only the first 5 history entries have been loaded, but more history exists.              IMM DTaP/Tdap/Td Vaccine (2 - Td or Tdap) Next due on 3/5/2029      03/05/2019  Imm Admin: Tdap Vaccine              IMM ZOSTER VACCINES (Series Information) Completed      05/10/2021  Imm Admin: Zoster Vaccine Recombinant (RZV) (SHINGRIX)    10/01/2020  Imm Admin: Zoster Vaccine Recombinant (RZV) (SHINGRIX)              IMM INFLUENZA (Series Information) Completed      11/17/2022  Imm Admin: Influenza Vaccine Quad Inj (Pf)    11/19/2021  Imm Admin: Influenza Vaccine Quad Inj (Pf)    10/01/2020  Imm Admin: Influenza Vaccine Quad Inj (Pf)    10/22/2019  Imm Admin: Influenza Vaccine Quad Inj (Pf)    10/15/2018  Imm Admin: Influenza Vac Subunit Quad Inj (Pf)    Only the first 5 history entries have been loaded, but more history exists.              IMM PNEUMOCOCCAL VACCINE: 0-64 Years (Series Information)  Completed      02/15/2023  Imm Admin: Pneumococcal Conjugate Vaccine (PCV20)    2019  Imm Admin: Pneumococcal polysaccharide vaccine (PPSV-23)              HEPATITIS C SCREENING  Completed      2023  HEPATITIS PANEL ACUTE(4 COMPONENTS)    10/01/2020  HCV Scrn ( [id number] 1xLife)              IMM MENINGOCOCCAL ACWY VACCINE (Series Information) Aged Out      No completion history exists for this topic.              Discontinued - IMM HEP B VACCINE  Discontinued      2023  Imm Admin: Hepatitis B Vaccine (Adol/Adult)                    Patient Care Team:  Veronica Lyn M.D. as PCP - General (Family Medicine)  Feliciano Espinal M.D. as Consulting Physician (Gastroenterology)  Erich Shaw Ass't as    Magdi Kline M.D. (Hematology & Oncology)  Kayleen Sykes M.D. (Surgery)    Social History     Tobacco Use    Smoking status: Former     Packs/day: 0.25     Years: 20.00     Pack years: 5.00     Types: Cigarettes    Smokeless tobacco: Never    Tobacco comments:     working on quitting    Vaping Use    Vaping Use: Never used   Substance Use Topics    Alcohol use: No    Drug use: Yes     Types: Marijuana, Oral     Comment: gummies     Family History   Problem Relation Age of Onset    Cancer Mother         melanoma    Hypertension Mother     Other Mother         THYROID    Heart Disease Mother         valvular disease    Ovarian Cancer Mother     Heart Disease Father         Heart Attack    Heart Attack Father     Other Father         psoriasis    Hyperlipidemia Brother     Other Brother         psoriasis    Heart Disease Brother         MI    Heart Attack Brother         massive heart attack     Other Other         psoriasis    Heart Disease Maternal Grandfather         MI    Diabetes Neg Hx     Alcohol/Drug Neg Hx     Tubal Cancer Neg Hx     Peritoneal Cancer Neg Hx     Colorectal Cancer Neg Hx     Breast Cancer Neg Hx      She  has a past medical history of  "Allergy, Back pain, Cancer (HCC) (), Diabetes (HCC), GERD (gastroesophageal reflux disease), High cholesterol, Hypertension, Malignant neoplasm of upper-outer quadrant of left female breast (HCC), Neck pain, Obesity, Sleep apnea, and Snoring.   Past Surgical History:   Procedure Laterality Date    PB MASTECTOMY, PARTIAL Left 2021    Procedure: MASTECTOMY, PARTIAL - SAMARA LOCALIZED;  Surgeon: Kayleen Sykes M.D.;  Location: SURGERY SAME DAY [facility];  Service: General    NODE BIOPSY SENTINEL Left 2021    Procedure: BIOPSY, LYMPH NODE, SENTINEL;  Surgeon: Kayleen Sykes M.D.;  Location: SURGERY SAME DAY [facility];  Service: General    CARPAL TUNNEL RELEASE Right     ENDOMETRIAL ABLATION      FOOT SURGERY Right     KNEE ARTHROSCOPY      PRIMARY C SECTION      2     TUBAL COAGULATION LAPAROSCOPIC BILATERAL         Exam:   /78 (BP Location: Left arm, Patient Position: Sitting, BP Cuff Size: Large adult)   Pulse 76   Temp 37.1 °C (98.7 °F) (Temporal)   Resp 16   Ht 1.626 m (5' 4\")   Wt 96.8 kg (213 lb 6.4 oz)   SpO2 98%  Body mass index is 36.63 kg/m².    Hearing good.    Dentition good  Alert, oriented in no acute distress  Eye contact is good, speech goal directed, affect calm    Assessment and Plan. The following treatment and monitoring plan is recommended:      1. Encounter for Medicare annual wellness exam  2. Need for vaccination  Pepper is a pleasant 58-year-old woman here today for her Medicare wellness visit.  She has no acute concerns but is good to be traveling to [place] both hepatitis A and hepatitis B vaccines will be recommended for her.  She is due for both and she would like to start the series.  - Hep A Adult 19+  - Hep B Adult 20+    3. Mixed stress and urge urinary incontinence  Chronic, stable.  She reports that she gets mild stress and urge incontinence symptoms.  She reports is very tolerable.  We did discuss some pelvic floor exercises and scheduled bathroom " breaks.  She knows to make an appointment if symptoms become intolerable.    4. Carcinoma of upper-outer quadrant of left breast in female, estrogen receptor positive (HCC)  Chronic, stable.  Diagnosed in September, 2021.  She underwent surgical resection in November, 2021 and had radiation.  Now currently following with oncology and on anastrozole daily.  She will follow-up with oncology as directed.    5. Class 2 severe obesity with body mass index (BMI) of 35 to 39.9 with serious comorbidity (HCC)  Chronic, stable.  She is lost 5 pounds in September, 2022.  This may be related to new Ozempic use for her diabetes.  She will continue working on healthy lifestyle we will continue to monitor.  - Patient identified as having weight management issue.  Appropriate orders and counseling given.    6. Type 2 diabetes mellitus with hyperglycemia, without long-term current use of insulin (HCC)  Chronic, status unknown.  Was uncontrolled at her last check.  We added Ozempic to her regimen and we will reevaluate her A1c in May, 2023.  She will continue her current regimen.  - Metformin 1000 mg twice daily  - Ozempic 0.5 mg every 7 days    7. Essential hypertension  Chronic, controlled.  Blood pressure is at goal under 140/90 in the office today.  We will continue the current regimen.  - Lisinopril 10 mg daily    8. YOANA (obstructive sleep apnea)  Chronic, uncontrolled.  She does have a history of YOANA and admits that she has not been using a CPAP for last 6 years when she first moved to [place].  Her labs do show polycythemia which I think is consistent with her untreated sleep apnea.  Once we get her neuropathy or restless leg symptoms under control we will consider sleep medicine referral in the future to get the sleep apnea controlled.    9. Psoriasis  Chronic, stable.  She reports the psoriasis has been doing just fine recently.  We will continue to monitor.    Services suggested: No services needed at this time  Health Care  Screening recommendations as per orders if indicated.  Referrals offered: PT/OT/Nutrition counseling/Behavioral Health/Smoking cessation as per orders if indicated.    Discussion today about general wellness and lifestyle habits:    Prevent falls and reduce trip hazards; Cautioned about securing or removing rugs.  Have a working fire alarm and carbon monoxide detector;   Engage in regular physical activity and social activities.     ACUTE VISIT  10. Neuropathy  Chronic, uncontrolled.  She reports for about 20 years she has been having tingling sensations in her legs.  She was diagnosed with neuropathy at 1 point but they never found the etiology.  She states the symptoms are most apparent when she is at rest or about to go to sleep.  She sometimes has to get up and walking around will relieve the symptoms.  I am suspicious this is actually restless leg symptoms and not neuropathy.  We already have an iron panel that was normal so after discussion she would like to try medicine.  - Ropinirole 0.25-0.5 mg 1 to 3 hours before bed    11. Neck pain  12. Chronic midline low back pain with right-sided sciatica  Chronic, uncontrolled.  She has a longstanding history of neck pain and chronic low back pain.  She has disc herniations in both locations.  She is not following with any physiatrist currently.  In the last couple months has been getting shooting pain in the neck intermittently.  She like to see physical therapy so referral has been placed.  - Referral to Physical Therapy    13. Hepatic steatosis  Chronic, stable.  ELF score indicates moderate risk and she has known steatosis on imaging.  We have done a work-up to look for other causes for hepatic steatosis which has been unremarkable.  [facility] to have her steatosis evaluated further.  - Referral to Gastroenterology    Follow-up: Return if symptoms worsen or fail to improve.

## 2023-03-20 NOTE — PATIENT INSTRUCTIONS
TO DO LIST  - Start taking 2 of the atorvastatins per day (total dose 20 mg) - cholesterol  - Start taking 1 of the ropinirole 1-3 hours before bed. If it doesn't help enough, start taking 2 pills - Restless Leg

## 2023-04-02 ENCOUNTER — APPOINTMENT (OUTPATIENT)
Dept: RADIOLOGY | Facility: MEDICAL CENTER | Age: 58
End: 2023-04-02
Attending: EMERGENCY MEDICINE
Payer: MEDICARE

## 2023-04-02 ENCOUNTER — HOSPITAL ENCOUNTER (EMERGENCY)
Facility: MEDICAL CENTER | Age: 58
End: 2023-04-02
Attending: EMERGENCY MEDICINE
Payer: MEDICARE

## 2023-04-02 VITALS
RESPIRATION RATE: 17 BRPM | WEIGHT: 217.15 LBS | BODY MASS INDEX: 34.9 KG/M2 | OXYGEN SATURATION: 95 % | HEART RATE: 82 BPM | SYSTOLIC BLOOD PRESSURE: 162 MMHG | HEIGHT: 66 IN | TEMPERATURE: 98.1 F | DIASTOLIC BLOOD PRESSURE: 89 MMHG

## 2023-04-02 DIAGNOSIS — M25.562 ACUTE PAIN OF LEFT KNEE: ICD-10-CM

## 2023-04-02 DIAGNOSIS — M79.652 PAIN OF LEFT THIGH: ICD-10-CM

## 2023-04-02 PROCEDURE — A9270 NON-COVERED ITEM OR SERVICE: HCPCS | Performed by: EMERGENCY MEDICINE

## 2023-04-02 PROCEDURE — 73552 X-RAY EXAM OF FEMUR 2/>: CPT | Mod: LT

## 2023-04-02 PROCEDURE — 700102 HCHG RX REV CODE 250 W/ 637 OVERRIDE(OP): Performed by: EMERGENCY MEDICINE

## 2023-04-02 PROCEDURE — 99284 EMERGENCY DEPT VISIT MOD MDM: CPT

## 2023-04-02 PROCEDURE — 73564 X-RAY EXAM KNEE 4 OR MORE: CPT | Mod: LT

## 2023-04-02 RX ORDER — CYCLOBENZAPRINE HCL 5 MG
5-10 TABLET ORAL 3 TIMES DAILY PRN
Qty: 30 TABLET | Refills: 0 | Status: SHIPPED | OUTPATIENT
Start: 2023-04-02 | End: 2023-10-05 | Stop reason: DRUGHIGH

## 2023-04-02 RX ORDER — CYCLOBENZAPRINE HCL 10 MG
10 TABLET ORAL ONCE
Status: COMPLETED | OUTPATIENT
Start: 2023-04-02 | End: 2023-04-02

## 2023-04-02 RX ADMIN — CYCLOBENZAPRINE 10 MG: 10 TABLET, FILM COATED ORAL at 21:17

## 2023-04-02 ASSESSMENT — FIBROSIS 4 INDEX: FIB4 SCORE: 0.97

## 2023-04-03 NOTE — ED NOTES
Knee immobilizer applied. Patient discharged with prescription and instruction. Verbalized understanding. Wheeled to lobby.

## 2023-04-03 NOTE — ED PROVIDER NOTES
"ED Provider Note    CHIEF COMPLAINT  Chief Complaint   Patient presents with    Knee Pain     L knee, 4 days ago pt's foot became tangled in blanket in bed and she rolled and \"something happened.\" Pt endorses hearing a pop and it became painful       EXTERNAL RECORDS REVIEWED  Outpatient Notes PCP visits    HPI/ROS  LIMITATION TO HISTORY   Select: : None    Sol Kern is a 58 y.o. female who presents to the ED secondary to left knee pain.  The patient states a couple nights ago she was in bed and felt a pop in her knee, she has sharp severe pain on the left knee, decreased range of motion, the pain is now radiating up into the left thigh and left hip area.  It is a dull aching pain.  She has been taking ibuprofen without any improvement.  The patient denies any numbness, tingling, weakness.    PAST MEDICAL HISTORY   has a past medical history of Allergy, Back pain, Cancer ([facility]) (2021), Diabetes (HCC), GERD (gastroesophageal reflux disease), High cholesterol, Hypertension, Malignant neoplasm of upper-outer quadrant of left female breast (HCC), Neck pain, Obesity, Sleep apnea, and Snoring.    SURGICAL HISTORY   has a past surgical history that includes endometrial ablation; primary c section; knee arthroscopy; foot surgery (Right); carpal tunnel release (Right); mastectomy, partial (Left, 11/12/2021); node biopsy sentinel (Left, 11/12/2021); and tubal coagulation laparoscopic bilateral.    FAMILY HISTORY  Family History   Problem Relation Age of Onset    Cancer Mother         melanoma    Hypertension Mother     Other Mother         THYROID    Heart Disease Mother         valvular disease    Ovarian Cancer Mother     Heart Disease Father         Heart Attack    Heart Attack Father     Other Father         psoriasis    Hyperlipidemia Brother     Other Brother         psoriasis    Heart Disease Brother         MI    Heart Attack Brother         massive heart attack     Other Other         psoriasis    Heart Disease " "Maternal Grandfather         MI    Diabetes Neg Hx     Alcohol/Drug Neg Hx     Tubal Cancer Neg Hx     Peritoneal Cancer Neg Hx     Colorectal Cancer Neg Hx     Breast Cancer Neg Hx        SOCIAL HISTORY  Social History     Tobacco Use    Smoking status: Former     Packs/day: 0.25     Years: 20.00     Pack years: 5.00     Types: Cigarettes    Smokeless tobacco: Never    Tobacco comments:     working on quitting    Vaping Use    Vaping Use: Never used   Substance and Sexual Activity    Alcohol use: No    Drug use: Yes     Types: Marijuana, Oral     Comment: gummies    Sexual activity: Yes     Partners: Male     Comment:        CURRENT MEDICATIONS  Home Medications       Reviewed by Endy Tapia R.N. (Registered Nurse) on 04/02/23 at 2027  Med List Status: Not Addressed     Medication Last Dose Status   albuterol 108 (90 Base) MCG/ACT Aero Soln inhalation aerosol  Active   anastrozole (ARIMIDEX) 1 MG Tab  Active   atorvastatin (LIPITOR) 10 MG Tab  Active   cyclobenzaprine (FLEXERIL) 5 MG tablet  Active   diazepam (VALIUM) 10 MG tablet  Active   fluconazole (DIFLUCAN) 150 MG tablet  Active   lidocaine-prilocaine (EMLA) 2.5-2.5 % Cream  Active   lisinopril (PRINIVIL) 10 MG Tab  Active   metformin (GLUCOPHAGE) 1000 MG tablet  Active   Misc. Devices Misc  Active   ondansetron (ZOFRAN ODT) 4 MG TABLET DISPERSIBLE  Active   ROPINIRole (REQUIP) 0.25 MG Tab  Active   Semaglutide,0.25 or 0.5MG/DOS, 2 MG/1.5ML Solution Pen-injector  Active                    ALLERGIES  Allergies   Allergen Reactions    Codeine Vomiting and Nausea    Other Drug Vomiting and Nausea     Any of the \"cets\" (percocet)    Vicodin [Hydrocodone-Acetaminophen] Vomiting and Nausea       PHYSICAL EXAM  VITAL SIGNS: BP (!) 162/89   Pulse 82   Temp 36.7 °C (98.1 °F) (Temporal)   Resp 17   Ht 1.664 m (5' 5.5\")   Wt 98.5 kg (217 lb 2.5 oz)   SpO2 95%   BMI 35.59 kg/m²    Constitutional: Mild distress   Extremity: Left knee with tenderness " palpation, decreased range of motion, no effusion, the patient does have some tenderness palpation over the left thigh  Skin: Warm, Dry, No erythema, No rash.    Neurologic: Normal sensation      RADIOLOGY  I have independently interpreted the diagnostic imaging associated with this visit and am waiting the final reading from the radiologist.   My preliminary interpretation is as follows: No fracture of the knee  Radiologist interpretation:   DX-FEMUR-2+ LEFT   Final Result         1.  No radiographic evidence of acute traumatic injury.      DX-KNEE COMPLETE 4+ LEFT   Final Result         1.  No acute traumatic bony injury.            COURSE & MEDICAL DECISION MAKING    INITIAL ASSESSMENT, COURSE AND PLAN  Care Narrative: Patient with left knee pain, bleeding Galea and is likely a strain.  X-rays are unremarkable, the patient also has femur and hip pain.  Patient has full range of motion.  X-rays are unremarkable, will put the patient in a knee immobilizer, have the patient follow-up with Dr. Paulino.  The patient will take Tylenol ibuprofen, will give the patient a prescription for Flexeril, have the patient return with worsening symptoms.DISPOSITION AND DISCUSSIONS  Escalation of care considered, and ultimately not performed:blood analysis    Decision tools and prescription drugs considered including, but not limited to: Pain Medications   .    FINAL DIAGNOSIS  1. Acute pain of left knee    2. Pain of left thigh           Electronically signed by: Conrad Gonzalez M.D., 4/2/2023 9:18 PM

## 2023-04-03 NOTE — ED TRIAGE NOTES
"Chief Complaint   Patient presents with    Knee Pain     L knee, 4 days ago pt's foot became tangled in blanket in bed and she rolled and \"something happened.\" Pt endorses hearing a pop and it became painful     Pt ambulatory to triage for above complaint.      Pt is alert/oriented and follows commands. Pt speaking in full sentences and responds appropriately to questions. No acute distress noted in triage and respirations are even and unlabored.     Pt placed in lobby and educated on triage process. Pt encouraged to alert staff for any changes in condition.    "

## 2023-04-06 PROBLEM — F32.A DEPRESSION: Chronic | Status: RESOLVED | Noted: 2023-02-15 | Resolved: 2023-03-20

## 2023-04-20 NOTE — ASSESSMENT & PLAN NOTE
-Continue follow-up with Dr. Sykes  -Counseled patient regarding reasons to follow-up for urgent evaluation including return of heat, notable drainage, fever or chills   Detail Level: Detailed Procedure To Be Performed At Next Visit: Excision X Size Of Lesion In Cm (Optional): 0 Introduction Text (Please End With A Colon): The following procedure was deferred:

## 2023-04-26 ENCOUNTER — TELEPHONE (OUTPATIENT)
Dept: HEALTH INFORMATION MANAGEMENT | Facility: OTHER | Age: 58
End: 2023-04-26
Payer: MEDICARE

## 2023-04-26 RX ORDER — SEMAGLUTIDE 0.68 MG/ML
0.5 INJECTION, SOLUTION SUBCUTANEOUS
Qty: 3 ML | Refills: 1 | Status: SHIPPED | OUTPATIENT
Start: 2023-04-26 | End: 2023-05-24

## 2023-04-26 NOTE — TELEPHONE ENCOUNTER
1. Caller Name: Sol Shauna Dion                          Call Back Number: [phone]        How would the patient prefer to be contacted with a response: Phone call do NOT leave a detailed message    Pt called to refill Ozempic 0.5 medication but I did not see on med list.    Please adv,      Thank you

## 2023-06-20 RX ORDER — SEMAGLUTIDE 0.68 MG/ML
0.5 INJECTION, SOLUTION SUBCUTANEOUS
Qty: 9 ML | Refills: 0 | Status: SHIPPED | OUTPATIENT
Start: 2023-06-20 | End: 2023-06-28

## 2023-06-20 RX ORDER — SEMAGLUTIDE 0.68 MG/ML
INJECTION, SOLUTION SUBCUTANEOUS
COMMUNITY
Start: 2023-06-16 | End: 2023-06-20 | Stop reason: SDUPTHER

## 2023-06-20 NOTE — TELEPHONE ENCOUNTER
Received request via: Pharmacy    Was the patient seen in the last year in this department? Yes    Does the patient have an active prescription (recently filled or refills available) for medication(s) requested? No    Does the patient have snf Plus and need 100 day supply (blood pressure, diabetes and cholesterol meds only)? Medication is not for cholesterol, blood pressure or diabetes

## 2023-06-28 ENCOUNTER — OFFICE VISIT (OUTPATIENT)
Dept: MEDICAL GROUP | Facility: PHYSICIAN GROUP | Age: 58
End: 2023-06-28
Payer: MEDICARE

## 2023-06-28 ENCOUNTER — HOSPITAL ENCOUNTER (OUTPATIENT)
Dept: LAB | Facility: MEDICAL CENTER | Age: 58
End: 2023-06-28
Attending: FAMILY MEDICINE
Payer: MEDICARE

## 2023-06-28 VITALS
BODY MASS INDEX: 34.36 KG/M2 | HEIGHT: 66 IN | DIASTOLIC BLOOD PRESSURE: 78 MMHG | SYSTOLIC BLOOD PRESSURE: 120 MMHG | WEIGHT: 213.8 LBS | TEMPERATURE: 98 F | HEART RATE: 85 BPM | OXYGEN SATURATION: 97 %

## 2023-06-28 DIAGNOSIS — N60.81 SEBACEOUS CYST OF BREAST, RIGHT: Primary | ICD-10-CM

## 2023-06-28 DIAGNOSIS — E11.65 TYPE 2 DIABETES MELLITUS WITH HYPERGLYCEMIA, WITHOUT LONG-TERM CURRENT USE OF INSULIN (HCC): ICD-10-CM

## 2023-06-28 DIAGNOSIS — Z23 NEED FOR VACCINATION: ICD-10-CM

## 2023-06-28 DIAGNOSIS — Z12.83 SKIN CANCER SCREENING: ICD-10-CM

## 2023-06-28 DIAGNOSIS — C50.412 CARCINOMA OF UPPER-OUTER QUADRANT OF LEFT BREAST IN FEMALE, ESTROGEN RECEPTOR POSITIVE (HCC): ICD-10-CM

## 2023-06-28 DIAGNOSIS — Z17.0 CARCINOMA OF UPPER-OUTER QUADRANT OF LEFT BREAST IN FEMALE, ESTROGEN RECEPTOR POSITIVE (HCC): ICD-10-CM

## 2023-06-28 DIAGNOSIS — M54.2 NECK PAIN: ICD-10-CM

## 2023-06-28 DIAGNOSIS — M54.41 CHRONIC MIDLINE LOW BACK PAIN WITH RIGHT-SIDED SCIATICA: ICD-10-CM

## 2023-06-28 DIAGNOSIS — G89.29 CHRONIC MIDLINE LOW BACK PAIN WITH RIGHT-SIDED SCIATICA: ICD-10-CM

## 2023-06-28 PROBLEM — N64.59 BREAST SYMPTOM: Status: ACTIVE | Noted: 2023-06-28

## 2023-06-28 LAB
ALBUMIN SERPL BCP-MCNC: 4.1 G/DL (ref 3.2–4.9)
ALBUMIN/GLOB SERPL: 1.3 G/DL
ALP SERPL-CCNC: 97 U/L (ref 30–99)
ALT SERPL-CCNC: 28 U/L (ref 2–50)
ANION GAP SERPL CALC-SCNC: 14 MMOL/L (ref 7–16)
AST SERPL-CCNC: 19 U/L (ref 12–45)
BILIRUB SERPL-MCNC: 0.3 MG/DL (ref 0.1–1.5)
BUN SERPL-MCNC: 10 MG/DL (ref 8–22)
CALCIUM ALBUM COR SERPL-MCNC: 9.8 MG/DL (ref 8.5–10.5)
CALCIUM SERPL-MCNC: 9.9 MG/DL (ref 8.5–10.5)
CHLORIDE SERPL-SCNC: 102 MMOL/L (ref 96–112)
CO2 SERPL-SCNC: 24 MMOL/L (ref 20–33)
CREAT SERPL-MCNC: 0.85 MG/DL (ref 0.5–1.4)
CREAT UR-MCNC: 163.97 MG/DL
GFR SERPLBLD CREATININE-BSD FMLA CKD-EPI: 78 ML/MIN/1.73 M 2
GLOBULIN SER CALC-MCNC: 3.1 G/DL (ref 1.9–3.5)
GLUCOSE SERPL-MCNC: 89 MG/DL (ref 65–99)
HBA1C MFR BLD: 6 % (ref ?–5.8)
MICROALBUMIN UR-MCNC: <1.2 MG/DL
MICROALBUMIN/CREAT UR: NORMAL MG/G (ref 0–30)
POCT INT CON NEG: NEGATIVE
POCT INT CON POS: POSITIVE
POTASSIUM SERPL-SCNC: 4.5 MMOL/L (ref 3.6–5.5)
PROT SERPL-MCNC: 7.2 G/DL (ref 6–8.2)
SODIUM SERPL-SCNC: 140 MMOL/L (ref 135–145)

## 2023-06-28 PROCEDURE — 82043 UR ALBUMIN QUANTITATIVE: CPT

## 2023-06-28 PROCEDURE — 3074F SYST BP LT 130 MM HG: CPT | Performed by: FAMILY MEDICINE

## 2023-06-28 PROCEDURE — 90746 HEPB VACCINE 3 DOSE ADULT IM: CPT | Performed by: FAMILY MEDICINE

## 2023-06-28 PROCEDURE — 80053 COMPREHEN METABOLIC PANEL: CPT

## 2023-06-28 PROCEDURE — 82570 ASSAY OF URINE CREATININE: CPT

## 2023-06-28 PROCEDURE — 83036 HEMOGLOBIN GLYCOSYLATED A1C: CPT | Performed by: FAMILY MEDICINE

## 2023-06-28 PROCEDURE — G0010 ADMIN HEPATITIS B VACCINE: HCPCS | Performed by: FAMILY MEDICINE

## 2023-06-28 PROCEDURE — 3078F DIAST BP <80 MM HG: CPT | Performed by: FAMILY MEDICINE

## 2023-06-28 PROCEDURE — 36415 COLL VENOUS BLD VENIPUNCTURE: CPT

## 2023-06-28 PROCEDURE — 99214 OFFICE O/P EST MOD 30 MIN: CPT | Mod: 25 | Performed by: FAMILY MEDICINE

## 2023-06-28 RX ORDER — COVID-19 MOLECULAR TEST ASSAY
KIT MISCELLANEOUS
COMMUNITY
Start: 2023-05-04 | End: 2023-08-24

## 2023-06-28 RX ORDER — SEMAGLUTIDE 0.68 MG/ML
0.5 INJECTION, SOLUTION SUBCUTANEOUS
Qty: 9 ML | Refills: 3 | Status: SHIPPED | OUTPATIENT
Start: 2023-06-28 | End: 2024-01-17 | Stop reason: SDUPTHER

## 2023-06-28 ASSESSMENT — ENCOUNTER SYMPTOMS
FEVER: 0
CHILLS: 0
SHORTNESS OF BREATH: 0

## 2023-06-28 ASSESSMENT — FIBROSIS 4 INDEX: FIB4 SCORE: 0.97

## 2023-06-28 NOTE — PROGRESS NOTES
"Subjective:     CC: breast symptom, DM, Hep B vaccine    HPI:   Sol presents today with    Problem   Breast Symptom    R breast  In the area of the biopsy marker  2 weeks ago, noticed a smell  There is a bump located at the biopsy site, getting bigger  Last night, popped it and white material  Got slightly smaller     Diabetes (Hcc)    This is a chronic condition.  Current medications:   Insulin: -  Biguanide: metformin 1000 mg bid (tingling in legs)  GLP1-RA: Ozempic 0.5 mg every 7 days  SGLT-2i: -  DPP4-I: -  TZD: -  Maged: -  Sulfonyluria: -      Last A1c: 6.0% (6/2023);  9.7% (2/2023); 8.5% (7/2022); 12.2% (3/2022)  Last Microalb/Cr ratio: <30 (7/2022)  Fasting sugars: n/a  Last diabetic foot exam: 2/2023  Last retinal eye exam: 2/2022 - no DM retinopathy  ACEi/ARB: lisinopril 10 mg  Statin: atorvastatin 10 mg  Aspirin: no  Concomitant HTN: yes - above goal  Nightly foot checks: yes         Health Maintenance: Completed    ROS:  Review of Systems   Constitutional:  Negative for chills and fever.   Respiratory:  Negative for shortness of breath.    Cardiovascular:  Negative for chest pain.       Objective:     Exam:  /78 (BP Location: Right arm, Patient Position: Sitting, BP Cuff Size: Adult)   Pulse 85   Temp 36.7 °C (98 °F) (Temporal)   Ht 1.664 m (5' 5.5\")   Wt 97 kg (213 lb 12.8 oz)   SpO2 97%   BMI 35.04 kg/m²  Body mass index is 35.04 kg/m².    Physical Exam  Constitutional:       Appearance: Normal appearance.   Cardiovascular:      Rate and Rhythm: Normal rate and regular rhythm.      Heart sounds: Normal heart sounds.   Pulmonary:      Effort: Pulmonary effort is normal.      Breath sounds: Normal breath sounds.   Chest:       Musculoskeletal:      Cervical back: Normal range of motion and neck supple.   Neurological:      Mental Status: She is alert.               Assessment & Plan:     58 y.o. female with the following -     1. Sebaceous cyst of breast, right  Chronic, stable.  Near where " she had her right breast biopsy she has had a cyst.  About 2 weeks ago she started noticing an odor associated with that cyst and last night she was able to expel white material.  I suspect this is a sebaceous cyst.  Exam does show a firm cyst of the skin in the area of concern on the right breast.  There is no evidence of infection today.  I recommended she discuss this with dermatology to get the cyst removed.  - If dermatology will remove the cyst, she will contact me for a surgery referral    2. Type 2 diabetes mellitus with hyperglycemia, without long-term current use of insulin (HCC)  Chronic, controlled.  Hemoglobin A1c is under 7.0%.  She is up-to-date with all diabetic screenings except for retinal screening, on an ACE inhibitor for renal protection, and is on a statin for cardiovascular protection.  We will continue the current regimen.  - Metformin 500 mg twice daily (decreased because she is noticing tingling in the legs after each dose)  - Ozempic 0.5 mg every 7 days  - POCT Hemoglobin A1C  - Comp Metabolic Panel; Future  - MICROALBUMIN CREAT RATIO URINE; Future    3. Chronic midline low back pain with right-sided sciatica  4. Neck pain  Chronic, stable.  She has been following with physical therapy to manage chronic back and neck pain.  She does need a renewal of that referral which has been placed today.  - Referral to Physical Therapy    5. Carcinoma of upper-outer quadrant of left breast in female, estrogen receptor positive (HCC)  Chronic, stable.  Diagnosed in September, 2020 one of the left breast.  She is currently on anastrozole but occasionally will get associated pain in the left breast.  Physical therapy has helped with this so I placed a referral for this breast pain as well.  - Referral to Physical Therapy    6. Skin cancer screening  - Referral to Dermatology    7. Need for vaccination  Dose #2 today.  - Hep B Adult 20+    Referral for genetic research was offered. Patient is a  participant.    Return in about 3 months (around 9/28/2023) for Non-medicare annual/wellness visit, Pap, f/u DM, get A1c.    Please note that this dictation was created using voice recognition software. I have made every reasonable attempt to correct obvious errors, but I expect that there are errors of grammar and possibly content that I did not discover before finalizing the note.

## 2023-06-30 NOTE — TELEPHONE ENCOUNTER
Received request via: Pharmacy    Was the patient seen in the last year in this department? Yes    Does the patient have an active prescription (recently filled or refills available) for medication(s) requested?  Pharm Advised we filled 50 day supply, Pt insurance allows for 100 day supply for the medication listed. Can we consider changing the PT prescript to a 100 day supply to increase convenience .     Does the patient have detention Plus and need 100 day supply (blood pressure, diabetes and cholesterol meds only)? Yes, quantity updated to 100 days

## 2023-08-23 ENCOUNTER — PATIENT MESSAGE (OUTPATIENT)
Dept: MEDICAL GROUP | Facility: PHYSICIAN GROUP | Age: 58
End: 2023-08-23
Payer: MEDICARE

## 2023-08-23 RX ORDER — ALBUTEROL SULFATE 90 UG/1
2 AEROSOL, METERED RESPIRATORY (INHALATION) EVERY 6 HOURS PRN
Qty: 6.7 EACH | Refills: 1 | Status: SHIPPED | OUTPATIENT
Start: 2023-08-23 | End: 2023-10-10

## 2023-08-23 NOTE — PATIENT COMMUNICATION
Requested Prescriptions     Pending Prescriptions Disp Refills    albuterol 108 (90 Base) MCG/ACT Aero Soln inhalation aerosol 6.7 Each 1     Sig: Inhale 2 Puffs every 6 hours as needed for Shortness of Breath.       Received request via: Patient    Was the patient seen in the last year in this department? Yes. 6/28/23    Does the patient have an active prescription (recently filled or refills available) for medication(s) requested? No    Does the patient have long-term Plus and need 100 day supply (blood pressure, diabetes and cholesterol meds only)? Medication is not for cholesterol, blood pressure or diabetes

## 2023-08-24 ENCOUNTER — OFFICE VISIT (OUTPATIENT)
Dept: DERMATOLOGY | Facility: IMAGING CENTER | Age: 58
End: 2023-08-24
Payer: MEDICARE

## 2023-08-24 DIAGNOSIS — L90.8 SKIN AGING: ICD-10-CM

## 2023-08-24 DIAGNOSIS — Z12.83 SKIN CANCER SCREENING: ICD-10-CM

## 2023-08-24 DIAGNOSIS — D22.9 NEVUS: ICD-10-CM

## 2023-08-24 DIAGNOSIS — D18.01 CHERRY ANGIOMA: ICD-10-CM

## 2023-08-24 DIAGNOSIS — L81.4 LENTIGO: ICD-10-CM

## 2023-08-24 DIAGNOSIS — L85.3 XEROSIS OF SKIN: ICD-10-CM

## 2023-08-24 DIAGNOSIS — L72.3 SEBACEOUS CYST: ICD-10-CM

## 2023-08-24 DIAGNOSIS — L40.9 PSORIASIS: ICD-10-CM

## 2023-08-24 DIAGNOSIS — L82.1 SEBORRHEIC KERATOSIS: ICD-10-CM

## 2023-08-24 PROCEDURE — 99214 OFFICE O/P EST MOD 30 MIN: CPT | Performed by: DERMATOLOGY

## 2023-08-24 RX ORDER — FLUCONAZOLE 150 MG/1
TABLET ORAL
Qty: 3 TABLET | Refills: 0 | Status: SHIPPED | OUTPATIENT
Start: 2023-08-24 | End: 2023-09-28

## 2023-08-24 RX ORDER — DOXYCYCLINE HYCLATE 100 MG
100 TABLET ORAL 2 TIMES DAILY
Qty: 20 TABLET | Refills: 0 | Status: SHIPPED | OUTPATIENT
Start: 2023-08-24 | End: 2023-09-28

## 2023-08-24 NOTE — PROGRESS NOTES
CC: JEY     Subjective: Previously seen patient here for skin check.     Still having issues with cyst right breast   HPI/location: cyst under rt breast    Time present: years  Painful lesion: No  Itching lesion: No  Enlarging lesion: No  Anything make it better or worse?  Squeezes contents out to treat. Smell bothersome to patient.     History of skin cancer: no  History of biopsies:Yes, Details: mole right arm results benign   History of blistering/severe sunburns:No  Family history of skin cancer: mother melanoma   Family history of atypical moles:No    ROS: no fevers/chills. No itch.  No cough  Relevant PMH: many med comorbidities inc breast cancer mets to lymph node - Dec 2021  Social:smoker    PE: Gen:WDWN female in NAD. Skin: Scalp/face/eyes/lips/neck/chest/back/arms/legs/hands/feet/buttocks - without suspicious lesions noted.  Genitals exam declined  -scalp - hair thinning  -face - alvaro complexion and dilated telangiectasias. Tan lines on sunglass dist  -scattered hyperpigmented macules/papules appearing on torso/extremities, appearing benign without suspicious features. Hypopigmented macules on arms and legs.   -cystic nodule, scarred on right chest - 2cm  -left axilla- banding, thickening palpable. Consider possible scarring from past breast cancer-lymph node excision  -lichenified plaque on buttocks/lower back  -feet - tan lines on sandal dist  -extreme xerosis noted, particularly extremities    Labs: AST/ALT/Alk phos/Tbili - WNL June 2023    A/P: left axillary thickening: advised f/u PCP/surgery for possible FNA, particularly if worsening, progressing    Nevi: benign appearing:  -Reviewed skin cancer detection/prevention  -RTC PRN growth/changes/concerning features    Lentigos/SKs: benign  -reassurance  -reviewed skin cancer detection/prevention  -counseled on cosmetic treatment PRN    Cherry angioma: benign  -counseled on cosmetic treatment PRN    Seb cyst, right breast: chronic persistent  -PA for  surgery PRN completed  -doxycycline 100mg PO BID X 10 days back up Rx, se reviewed  -requests backup Rx for diflucan 3 pills PRN yeast infection- labs reviewed and se reviewed / risks of liver /other     Xerosis, diffuse: handout supplied  -counseled re: dx/tx  -OTC moisturizers recommended    PSO, likely, vs LSC buttocks: chronic persistent  -lidex cream BID-->PRN    Advised caution with outdoor tanning and sun exposure for risks of skin damage and skin cancer    F/u 1 year/PRN    I have reviewed medications relevant to my specialty.

## 2023-09-07 ENCOUNTER — APPOINTMENT (OUTPATIENT)
Dept: DERMATOLOGY | Facility: IMAGING CENTER | Age: 58
End: 2023-09-07
Payer: MEDICARE

## 2023-09-28 ENCOUNTER — OFFICE VISIT (OUTPATIENT)
Dept: MEDICAL GROUP | Facility: PHYSICIAN GROUP | Age: 58
End: 2023-09-28
Payer: MEDICARE

## 2023-09-28 ENCOUNTER — APPOINTMENT (OUTPATIENT)
Dept: RADIOLOGY | Facility: MEDICAL CENTER | Age: 58
DRG: 186 | End: 2023-09-28
Attending: EMERGENCY MEDICINE
Payer: MEDICARE

## 2023-09-28 ENCOUNTER — APPOINTMENT (OUTPATIENT)
Dept: RADIOLOGY | Facility: MEDICAL CENTER | Age: 58
DRG: 186 | End: 2023-09-28
Payer: MEDICARE

## 2023-09-28 ENCOUNTER — HOSPITAL ENCOUNTER (OUTPATIENT)
Dept: RADIOLOGY | Facility: MEDICAL CENTER | Age: 58
DRG: 186 | End: 2023-09-28
Attending: FAMILY MEDICINE
Payer: MEDICARE

## 2023-09-28 ENCOUNTER — TELEPHONE (OUTPATIENT)
Dept: MEDICAL GROUP | Facility: PHYSICIAN GROUP | Age: 58
End: 2023-09-28

## 2023-09-28 ENCOUNTER — HOSPITAL ENCOUNTER (INPATIENT)
Facility: MEDICAL CENTER | Age: 58
LOS: 3 days | DRG: 186 | End: 2023-10-01
Attending: EMERGENCY MEDICINE | Admitting: STUDENT IN AN ORGANIZED HEALTH CARE EDUCATION/TRAINING PROGRAM
Payer: MEDICARE

## 2023-09-28 VITALS
OXYGEN SATURATION: 93 % | HEIGHT: 66 IN | TEMPERATURE: 97.8 F | SYSTOLIC BLOOD PRESSURE: 140 MMHG | BODY MASS INDEX: 33.82 KG/M2 | WEIGHT: 210.4 LBS | HEART RATE: 93 BPM | DIASTOLIC BLOOD PRESSURE: 80 MMHG

## 2023-09-28 DIAGNOSIS — R06.02 SOB (SHORTNESS OF BREATH): Primary | ICD-10-CM

## 2023-09-28 DIAGNOSIS — G47.09 OTHER INSOMNIA: ICD-10-CM

## 2023-09-28 DIAGNOSIS — R06.02 SOB (SHORTNESS OF BREATH): ICD-10-CM

## 2023-09-28 DIAGNOSIS — R06.00 DYSPNEA, UNSPECIFIED TYPE: ICD-10-CM

## 2023-09-28 PROBLEM — E66.811 CLASS 1 OBESITY IN ADULT: Status: ACTIVE | Noted: 2017-08-28

## 2023-09-28 PROBLEM — G47.00 INSOMNIA: Status: ACTIVE | Noted: 2017-08-28

## 2023-09-28 PROBLEM — J90 PLEURAL EFFUSION: Status: ACTIVE | Noted: 2023-09-28

## 2023-09-28 PROBLEM — J96.01 ACUTE RESPIRATORY FAILURE WITH HYPOXIA (HCC): Status: ACTIVE | Noted: 2023-09-28

## 2023-09-28 PROBLEM — I16.0 HYPERTENSIVE URGENCY: Status: ACTIVE | Noted: 2023-09-28

## 2023-09-28 LAB
ALBUMIN SERPL BCP-MCNC: 4.1 G/DL (ref 3.2–4.9)
ALBUMIN/GLOB SERPL: 1.2 G/DL
ALP SERPL-CCNC: 103 U/L (ref 30–99)
ALT SERPL-CCNC: 23 U/L (ref 2–50)
ANION GAP SERPL CALC-SCNC: 14 MMOL/L (ref 7–16)
AST SERPL-CCNC: 29 U/L (ref 12–45)
BASOPHILS # BLD AUTO: 0.5 % (ref 0–1.8)
BASOPHILS # BLD: 0.04 K/UL (ref 0–0.12)
BILIRUB SERPL-MCNC: 0.4 MG/DL (ref 0.1–1.5)
BUN SERPL-MCNC: 11 MG/DL (ref 8–22)
CALCIUM ALBUM COR SERPL-MCNC: 10.1 MG/DL (ref 8.5–10.5)
CALCIUM SERPL-MCNC: 10.2 MG/DL (ref 8.5–10.5)
CHLORIDE SERPL-SCNC: 103 MMOL/L (ref 96–112)
CO2 SERPL-SCNC: 21 MMOL/L (ref 20–33)
CREAT SERPL-MCNC: 0.72 MG/DL (ref 0.5–1.4)
CRP SERPL HS-MCNC: 1.74 MG/DL (ref 0–0.75)
D DIMER PPP IA.FEU-MCNC: 0.7 UG/ML (FEU) (ref 0–0.5)
EKG IMPRESSION: NORMAL
EOSINOPHIL # BLD AUTO: 0.12 K/UL (ref 0–0.51)
EOSINOPHIL NFR BLD: 1.5 % (ref 0–6.9)
ERYTHROCYTE [DISTWIDTH] IN BLOOD BY AUTOMATED COUNT: 44.5 FL (ref 35.9–50)
FLUAV RNA SPEC QL NAA+PROBE: NEGATIVE
FLUBV RNA SPEC QL NAA+PROBE: NEGATIVE
GFR SERPLBLD CREATININE-BSD FMLA CKD-EPI: 95 ML/MIN/1.73 M 2
GLOBULIN SER CALC-MCNC: 3.4 G/DL (ref 1.9–3.5)
GLUCOSE SERPL-MCNC: 87 MG/DL (ref 65–99)
HCT VFR BLD AUTO: 54.7 % (ref 37–47)
HGB BLD-MCNC: 18.3 G/DL (ref 12–16)
IMM GRANULOCYTES # BLD AUTO: 0.03 K/UL (ref 0–0.11)
IMM GRANULOCYTES NFR BLD AUTO: 0.4 % (ref 0–0.9)
LYMPHOCYTES # BLD AUTO: 1.17 K/UL (ref 1–4.8)
LYMPHOCYTES NFR BLD: 14.7 % (ref 22–41)
MCH RBC QN AUTO: 30.6 PG (ref 27–33)
MCHC RBC AUTO-ENTMCNC: 33.5 G/DL (ref 32.2–35.5)
MCV RBC AUTO: 91.3 FL (ref 81.4–97.8)
MONOCYTES # BLD AUTO: 0.93 K/UL (ref 0–0.85)
MONOCYTES NFR BLD AUTO: 11.7 % (ref 0–13.4)
NEUTROPHILS # BLD AUTO: 5.68 K/UL (ref 1.82–7.42)
NEUTROPHILS NFR BLD: 71.2 % (ref 44–72)
NRBC # BLD AUTO: 0 K/UL
NRBC BLD-RTO: 0 /100 WBC (ref 0–0.2)
NT-PROBNP SERPL IA-MCNC: 47 PG/ML (ref 0–125)
PLATELET # BLD AUTO: 258 K/UL (ref 164–446)
PMV BLD AUTO: 9.6 FL (ref 9–12.9)
POTASSIUM SERPL-SCNC: 4.2 MMOL/L (ref 3.6–5.5)
PROCALCITONIN SERPL-MCNC: 0.12 NG/ML
PROT SERPL-MCNC: 7.5 G/DL (ref 6–8.2)
RBC # BLD AUTO: 5.99 M/UL (ref 4.2–5.4)
RSV RNA SPEC QL NAA+PROBE: NEGATIVE
SARS-COV-2 RNA RESP QL NAA+PROBE: NEGATIVE
SODIUM SERPL-SCNC: 138 MMOL/L (ref 135–145)
WBC # BLD AUTO: 8 K/UL (ref 4.8–10.8)

## 2023-09-28 PROCEDURE — 700105 HCHG RX REV CODE 258: Performed by: EMERGENCY MEDICINE

## 2023-09-28 PROCEDURE — 80053 COMPREHEN METABOLIC PANEL: CPT

## 2023-09-28 PROCEDURE — 700101 HCHG RX REV CODE 250: Performed by: EMERGENCY MEDICINE

## 2023-09-28 PROCEDURE — 36415 COLL VENOUS BLD VENIPUNCTURE: CPT

## 2023-09-28 PROCEDURE — 71046 X-RAY EXAM CHEST 2 VIEWS: CPT

## 2023-09-28 PROCEDURE — 700102 HCHG RX REV CODE 250 W/ 637 OVERRIDE(OP): Performed by: STUDENT IN AN ORGANIZED HEALTH CARE EDUCATION/TRAINING PROGRAM

## 2023-09-28 PROCEDURE — 770006 HCHG ROOM/CARE - MED/SURG/GYN SEMI*

## 2023-09-28 PROCEDURE — 85025 COMPLETE CBC W/AUTO DIFF WBC: CPT

## 2023-09-28 PROCEDURE — 96375 TX/PRO/DX INJ NEW DRUG ADDON: CPT

## 2023-09-28 PROCEDURE — 99285 EMERGENCY DEPT VISIT HI MDM: CPT

## 2023-09-28 PROCEDURE — 86140 C-REACTIVE PROTEIN: CPT

## 2023-09-28 PROCEDURE — 700111 HCHG RX REV CODE 636 W/ 250 OVERRIDE (IP): Mod: JZ | Performed by: EMERGENCY MEDICINE

## 2023-09-28 PROCEDURE — 99215 OFFICE O/P EST HI 40 MIN: CPT | Performed by: FAMILY MEDICINE

## 2023-09-28 PROCEDURE — 85379 FIBRIN DEGRADATION QUANT: CPT

## 2023-09-28 PROCEDURE — 96365 THER/PROPH/DIAG IV INF INIT: CPT

## 2023-09-28 PROCEDURE — 93005 ELECTROCARDIOGRAM TRACING: CPT | Performed by: EMERGENCY MEDICINE

## 2023-09-28 PROCEDURE — 700117 HCHG RX CONTRAST REV CODE 255: Performed by: EMERGENCY MEDICINE

## 2023-09-28 PROCEDURE — 3079F DIAST BP 80-89 MM HG: CPT | Performed by: FAMILY MEDICINE

## 2023-09-28 PROCEDURE — 3077F SYST BP >= 140 MM HG: CPT | Performed by: FAMILY MEDICINE

## 2023-09-28 PROCEDURE — 700111 HCHG RX REV CODE 636 W/ 250 OVERRIDE (IP): Mod: JZ | Performed by: STUDENT IN AN ORGANIZED HEALTH CARE EDUCATION/TRAINING PROGRAM

## 2023-09-28 PROCEDURE — 83880 ASSAY OF NATRIURETIC PEPTIDE: CPT

## 2023-09-28 PROCEDURE — 71275 CT ANGIOGRAPHY CHEST: CPT

## 2023-09-28 PROCEDURE — 0241U POCT CEPHEID COV-2, FLU A/B, RSV - PCR: CPT | Performed by: FAMILY MEDICINE

## 2023-09-28 PROCEDURE — 84145 PROCALCITONIN (PCT): CPT

## 2023-09-28 PROCEDURE — 99223 1ST HOSP IP/OBS HIGH 75: CPT | Performed by: STUDENT IN AN ORGANIZED HEALTH CARE EDUCATION/TRAINING PROGRAM

## 2023-09-28 PROCEDURE — A9270 NON-COVERED ITEM OR SERVICE: HCPCS | Performed by: STUDENT IN AN ORGANIZED HEALTH CARE EDUCATION/TRAINING PROGRAM

## 2023-09-28 RX ORDER — AZITHROMYCIN 500 MG/5ML
500 INJECTION, POWDER, LYOPHILIZED, FOR SOLUTION INTRAVENOUS EVERY 24 HOURS
Status: COMPLETED | OUTPATIENT
Start: 2023-09-28 | End: 2023-09-28

## 2023-09-28 RX ORDER — METRONIDAZOLE 500 MG/100ML
500 INJECTION, SOLUTION INTRAVENOUS EVERY 6 HOURS
Status: DISCONTINUED | OUTPATIENT
Start: 2023-09-28 | End: 2023-09-28

## 2023-09-28 RX ORDER — ONDANSETRON 4 MG/1
4 TABLET, ORALLY DISINTEGRATING ORAL EVERY 4 HOURS PRN
Status: DISCONTINUED | OUTPATIENT
Start: 2023-09-28 | End: 2023-10-01 | Stop reason: HOSPADM

## 2023-09-28 RX ORDER — ZOLPIDEM TARTRATE 5 MG/1
5 TABLET ORAL NIGHTLY PRN
Status: DISCONTINUED | OUTPATIENT
Start: 2023-09-28 | End: 2023-10-01 | Stop reason: HOSPADM

## 2023-09-28 RX ORDER — ONDANSETRON 2 MG/ML
4 INJECTION INTRAMUSCULAR; INTRAVENOUS EVERY 4 HOURS PRN
Status: DISCONTINUED | OUTPATIENT
Start: 2023-09-28 | End: 2023-10-01 | Stop reason: HOSPADM

## 2023-09-28 RX ORDER — PROMETHAZINE HYDROCHLORIDE 25 MG/1
12.5-25 SUPPOSITORY RECTAL EVERY 4 HOURS PRN
Status: DISCONTINUED | OUTPATIENT
Start: 2023-09-28 | End: 2023-10-01 | Stop reason: HOSPADM

## 2023-09-28 RX ORDER — FUROSEMIDE 10 MG/ML
20 INJECTION INTRAMUSCULAR; INTRAVENOUS
Status: DISCONTINUED | OUTPATIENT
Start: 2023-09-28 | End: 2023-09-28

## 2023-09-28 RX ORDER — ACETAMINOPHEN 325 MG/1
650 TABLET ORAL EVERY 6 HOURS PRN
Status: DISCONTINUED | OUTPATIENT
Start: 2023-09-28 | End: 2023-09-29

## 2023-09-28 RX ORDER — PROMETHAZINE HYDROCHLORIDE 25 MG/1
12.5-25 TABLET ORAL EVERY 4 HOURS PRN
Status: DISCONTINUED | OUTPATIENT
Start: 2023-09-28 | End: 2023-10-01 | Stop reason: HOSPADM

## 2023-09-28 RX ORDER — CYCLOBENZAPRINE HCL 10 MG
5-10 TABLET ORAL 3 TIMES DAILY PRN
Status: DISCONTINUED | OUTPATIENT
Start: 2023-09-28 | End: 2023-10-01 | Stop reason: HOSPADM

## 2023-09-28 RX ORDER — ALBUTEROL SULFATE 90 UG/1
2 AEROSOL, METERED RESPIRATORY (INHALATION) EVERY 6 HOURS PRN
Status: DISCONTINUED | OUTPATIENT
Start: 2023-09-28 | End: 2023-10-01 | Stop reason: HOSPADM

## 2023-09-28 RX ORDER — ACETAMINOPHEN 500 MG
1000 TABLET ORAL 2 TIMES DAILY PRN
COMMUNITY
End: 2024-02-22

## 2023-09-28 RX ORDER — ATORVASTATIN CALCIUM 20 MG/1
20 TABLET, FILM COATED ORAL EVERY EVENING
Status: DISCONTINUED | OUTPATIENT
Start: 2023-09-28 | End: 2023-10-01 | Stop reason: HOSPADM

## 2023-09-28 RX ORDER — PROCHLORPERAZINE EDISYLATE 5 MG/ML
5-10 INJECTION INTRAMUSCULAR; INTRAVENOUS EVERY 4 HOURS PRN
Status: DISCONTINUED | OUTPATIENT
Start: 2023-09-28 | End: 2023-10-01 | Stop reason: HOSPADM

## 2023-09-28 RX ORDER — BISACODYL 10 MG
10 SUPPOSITORY, RECTAL RECTAL
Status: DISCONTINUED | OUTPATIENT
Start: 2023-09-28 | End: 2023-10-01 | Stop reason: HOSPADM

## 2023-09-28 RX ORDER — CEFTRIAXONE 1 G/1
1000 INJECTION, POWDER, FOR SOLUTION INTRAMUSCULAR; INTRAVENOUS ONCE
Status: COMPLETED | OUTPATIENT
Start: 2023-09-28 | End: 2023-09-28

## 2023-09-28 RX ORDER — IPRATROPIUM BROMIDE AND ALBUTEROL SULFATE 2.5; .5 MG/3ML; MG/3ML
3 SOLUTION RESPIRATORY (INHALATION)
Status: DISPENSED | OUTPATIENT
Start: 2023-09-28 | End: 2023-09-30

## 2023-09-28 RX ORDER — ASCORBIC ACID 125 MG
5000 TABLET,CHEWABLE ORAL EVERY EVENING
Status: DISCONTINUED | OUTPATIENT
Start: 2023-09-28 | End: 2023-09-28

## 2023-09-28 RX ORDER — AMOXICILLIN 250 MG
2 CAPSULE ORAL 2 TIMES DAILY
Status: DISCONTINUED | OUTPATIENT
Start: 2023-09-29 | End: 2023-10-01 | Stop reason: HOSPADM

## 2023-09-28 RX ORDER — DIAZEPAM 5 MG/1
10 TABLET ORAL
Status: DISCONTINUED | OUTPATIENT
Start: 2023-09-28 | End: 2023-09-29

## 2023-09-28 RX ORDER — LABETALOL HYDROCHLORIDE 5 MG/ML
10 INJECTION, SOLUTION INTRAVENOUS EVERY 4 HOURS PRN
Status: DISCONTINUED | OUTPATIENT
Start: 2023-09-28 | End: 2023-10-01 | Stop reason: HOSPADM

## 2023-09-28 RX ORDER — POLYETHYLENE GLYCOL 3350 17 G/17G
1 POWDER, FOR SOLUTION ORAL
Status: DISCONTINUED | OUTPATIENT
Start: 2023-09-28 | End: 2023-10-01 | Stop reason: HOSPADM

## 2023-09-28 RX ORDER — DOXYCYCLINE HYCLATE 100 MG
100 TABLET ORAL 2 TIMES DAILY
Status: ON HOLD | COMMUNITY
End: 2023-10-01

## 2023-09-28 RX ORDER — FUROSEMIDE 10 MG/ML
40 INJECTION INTRAMUSCULAR; INTRAVENOUS
Status: DISCONTINUED | OUTPATIENT
Start: 2023-09-28 | End: 2023-09-30

## 2023-09-28 RX ORDER — ASCORBIC ACID 125 MG
5000 TABLET,CHEWABLE ORAL EVERY EVENING
COMMUNITY

## 2023-09-28 RX ADMIN — ZOLPIDEM TARTRATE 5 MG: 5 TABLET ORAL at 22:01

## 2023-09-28 RX ADMIN — CEFTRIAXONE SODIUM 1000 MG: 1 INJECTION, POWDER, FOR SOLUTION INTRAMUSCULAR; INTRAVENOUS at 21:08

## 2023-09-28 RX ADMIN — FUROSEMIDE 40 MG: 10 INJECTION INTRAMUSCULAR; INTRAVENOUS at 22:00

## 2023-09-28 RX ADMIN — ATORVASTATIN CALCIUM 20 MG: 20 TABLET, FILM COATED ORAL at 22:01

## 2023-09-28 RX ADMIN — ONDANSETRON 4 MG: 2 INJECTION INTRAMUSCULAR; INTRAVENOUS at 21:50

## 2023-09-28 RX ADMIN — AZITHROMYCIN 500 MG: 500 INJECTION, POWDER, LYOPHILIZED, FOR SOLUTION INTRAVENOUS at 21:13

## 2023-09-28 RX ADMIN — IOHEXOL 40 ML: 350 INJECTION, SOLUTION INTRAVENOUS at 19:15

## 2023-09-28 ASSESSMENT — ENCOUNTER SYMPTOMS
DEPRESSION: 0
HEADACHES: 0
CHILLS: 1
COUGH: 0
MYALGIAS: 0
DOUBLE VISION: 0
WEAKNESS: 1
FEVER: 0
BRUISES/BLEEDS EASILY: 0
FALLS: 0
VOMITING: 0
SHORTNESS OF BREATH: 1
BLURRED VISION: 0
ORTHOPNEA: 1
HEARTBURN: 0
ABDOMINAL PAIN: 0
DIZZINESS: 0
NAUSEA: 0

## 2023-09-28 ASSESSMENT — PATIENT HEALTH QUESTIONNAIRE - PHQ9
SUM OF ALL RESPONSES TO PHQ9 QUESTIONS 1 AND 2: 0
2. FEELING DOWN, DEPRESSED, IRRITABLE, OR HOPELESS: NOT AT ALL
1. LITTLE INTEREST OR PLEASURE IN DOING THINGS: NOT AT ALL

## 2023-09-28 ASSESSMENT — COPD QUESTIONNAIRES
DURING THE PAST 4 WEEKS HOW MUCH DID YOU FEEL SHORT OF BREATH: SOME OF THE TIME
DO YOU EVER COUGH UP ANY MUCUS OR PHLEGM?: YES, A FEW DAYS A WEEK OR MONTH
COPD SCREENING SCORE: 6
HAVE YOU SMOKED AT LEAST 100 CIGARETTES IN YOUR ENTIRE LIFE: YES

## 2023-09-28 ASSESSMENT — COGNITIVE AND FUNCTIONAL STATUS - GENERAL
MOBILITY SCORE: 24
SUGGESTED CMS G CODE MODIFIER DAILY ACTIVITY: CH
DAILY ACTIVITIY SCORE: 24
SUGGESTED CMS G CODE MODIFIER MOBILITY: CH

## 2023-09-28 ASSESSMENT — FIBROSIS 4 INDEX
FIB4 SCORE: 0.74
FIB4 SCORE: 0.74
FIB4 SCORE: 1.41

## 2023-09-28 ASSESSMENT — LIFESTYLE VARIABLES: SUBSTANCE_ABUSE: 0

## 2023-09-28 ASSESSMENT — PAIN DESCRIPTION - PAIN TYPE: TYPE: ACUTE PAIN

## 2023-09-28 NOTE — PROGRESS NOTES
Chief Complaint   Patient presents with    Back Pain    Shortness of Breath     X2 weeks     Nasal Congestion    Fatigue    Tired    Chest Pain        HPI: Patient is a 58 y.o. female complaining of 14 days of illness including: chills, productive cough, shortness of breath, nasal congestion, rhinorrhea, sore throat, fatigue, brain fog, myalgia .   Mucus is: green, then clear.  Similarly ill exposures: no -  recent travel  Treatments tried: albuterol - helps slightly  She  reports that she has quit smoking. Her smoking use included cigarettes. She has a 5.0 pack-year smoking history. She has never used smokeless tobacco.    Reports last night, her pulse ox reported 77% at 2-3 am  11:00 AM 81%    Home COVID this morning negative     ROS:  No fever, nausea, changes in bowel movements or skin rash.     I reviewed the patient's medications, allergies and medical history:  Current Outpatient Medications   Medication Sig Dispense Refill    ROPINIRole (REQUIP) 0.25 MG Tab TAKE 1-2 TABLETS BY MOUTH EVERY EVENING. TAKE 1-3 HOURS BEFORE BED 90 Tablet 3    fluocinonide (LIDEX) 0.05 % Cream AAA buttocks/Back BID PRN itching, rash.  Do not use on face, axilla, groin. 60 g 1    fluconazole (DIFLUCAN) 150 MG tablet Take 1 tab PO QM/Th/M only if signs/symptoms of yeast infection. 3 Tablet 0    albuterol 108 (90 Base) MCG/ACT Aero Soln inhalation aerosol Inhale 2 Puffs every 6 hours as needed for Shortness of Breath. 6.7 Each 1    metFORMIN (GLUCOPHAGE) 500 MG Tab Take 2 Tablets by mouth 2 times a day with meals. 200 Tablet 0    Semaglutide,0.25 or 0.5MG/DOS, (OZEMPIC, 0.25 OR 0.5 MG/DOSE,) 2 MG/3ML Solution Pen-injector Inject 0.5 mg under the skin every 7 days. 9 mL 3    cyclobenzaprine (FLEXERIL) 5 mg tablet Take 1-2 Tablets by mouth 3 times a day as needed for Moderate Pain. (Patient taking differently: Take 10 mg by mouth 3 times a day as needed for Moderate Pain.) 30 Tablet 0    lisinopril (PRINIVIL) 10 MG Tab TAKE 1 TABLET BY  "MOUTH EVERY  Tablet 0    atorvastatin (LIPITOR) 10 MG Tab TAKE 1 TABLET BY MOUTH EVERY DAY IN THE EVENING (Patient taking differently: 20 mg.) 100 Tablet 3    anastrozole (ARIMIDEX) 1 MG Tab Take 1 Tablet by mouth every day. 90 Tablet 1    Misc. Devices Misc Oxygen. 1 L/min with exertion and on flight if Pulse Ox <90%. 1 Each 0    lidocaine-prilocaine (EMLA) 2.5-2.5 % Cream       diazepam (VALIUM) 10 MG tablet Take 1 Tablet by mouth 1 time a day as needed (muscle spasms, back pain).       No current facility-administered medications for this visit.     Codeine, Other drug, and Vicodin [hydrocodone-acetaminophen]  Past Medical History:   Diagnosis Date    Allergy     Back pain     Cancer (HCC) 2021    breast    Diabetes (HCC)     diet    GERD (gastroesophageal reflux disease)     High cholesterol     not medicated    Hypertension     Malignant neoplasm of upper-outer quadrant of left female breast (HCC)     Neck pain     Obesity     Sleep apnea     no cpap    Snoring         EXAM:  BP (!) 140/80 (BP Location: Left arm, Patient Position: Sitting, BP Cuff Size: Adult)   Pulse 93   Temp 36.6 °C (97.8 °F) (Temporal)   Ht 1.664 m (5' 5.5\")   Wt 95.4 kg (210 lb 6.4 oz)   SpO2 93%   General: Alert, no conversational dyspnea or audible wheeze, non-toxic appearance.  Eyes: PERRL, conjunctiva slightly injected, no eye discharge.  Ears: Normal pinnae,TM's normal bilaterally.  Nares: Patent with thin mucus.  Sinuses: tender over maxillary / frontal sinuses.  Throat: Erythematous injection without exudate.   Neck: Supple, with no adenopathy.  Lungs: Clear to auscultation bilaterally except diminished breath sounds in R base, no wheezes/rhonchi/rales  Heart: Regular rate without murmur.  Skin: Warm and dry without rash.    Results for orders placed or performed in visit on 09/28/23   POCT Cepheid CoV-2, Flu A/B, RSV - PCR   Result Value Ref Range    SARS-CoV-2 by PCR Negative Negative, Invalid    Influenza virus A RNA " Negative Negative, Invalid    Influenza virus B, PCR Negative Negative, Invalid    RSV, PCR Negative Negative, Invalid     ASSESSMENT:   1. SOB (shortness of breath)  Acute.  For 2-week she has been having symptoms of respiratory infection along with shortness of breath.  She reports that this morning her home pulse ox reported 77%.  We checked it during her visit today it was above 90% about 3 points lower than ours.  We discussed that if her oxygen gets below 90% she must go to the emergency room and she should have gone earlier today when her oxygen was that low.  In the meantime, we checked for COVID, flu, and RSV which were negative.  Additionally, she has diminished breath sounds in the right base and I am concerned about an underlying pneumonia so I have ordered a chest x-ray to have that evaluated as well.  - POCT Cepheid CoV-2, Flu A/B, RSV - PCR  - DX-CHEST-2 VIEWS; Future  - Twice daily use of nasal saline rinse or Neti-Pot.  - OTC anti-pyretics and decongestants as needed.  - Follow-up in office or urgent care for worsening symptoms, difficulty breathing, lack of expected recovery, or should new symptoms or problems arise.    ADDENDUM  Received the results salts from her chest x-ray.  Extensive opacity of the right lower hemithorax.  Radiology thinks it is likely due to a combination of pleural fluid and underlying airspace disease versus mass and they recommended a CT chest with IV contrast for further evaluation.  Therefore, I have referred her to the ER to get this imaging as soon as possible and to get the appropriate treatment started.

## 2023-09-28 NOTE — TELEPHONE ENCOUNTER
Spoke with patient about CXR results - doesn't look like a typical pneumonia. Concern for fluid or mass and needs CT with IV contrast. Told her to go to the ER. I've called [facility] and informed them of her impending arrival and need for CT chest w/contrast.

## 2023-09-28 NOTE — PATIENT INSTRUCTIONS
My recommendations for an upper respiratory infection:  - Use a humidifier, especially at night. Cold or warm water humidifiers have the same effect.  - Boy Med squeeze bottle sinus rinses twice a day.  - Hot tea + honey + fresh lemon juice  - Throat Coat herbal tea  - Honey by itself has been shown to help fight bugs and provide cough relief  - Chicken noodle soup has also been shown to help fight bugs    Effective treatments for an upper respiratory infection:    Acetaminophen 500-1000  mg Single dose Improves nasal congestion & runny nose   Antihistamine + decongestant Varies Varies Improves congestion   NSAIDs (ibuprofen, advil, motrin, aleve, etc) Varies Single dose to 7 day course Improves sneezing, headache, ear pain, muscle pain, joint pain   Zinc acetate or gluconate 80-92 mg per day Start within 3 days & continue as long as symptoms persist Can shorten duration of symptoms

## 2023-09-29 ENCOUNTER — APPOINTMENT (OUTPATIENT)
Dept: RADIOLOGY | Facility: MEDICAL CENTER | Age: 58
DRG: 186 | End: 2023-09-29
Attending: STUDENT IN AN ORGANIZED HEALTH CARE EDUCATION/TRAINING PROGRAM
Payer: MEDICARE

## 2023-09-29 PROBLEM — G89.29 CHRONIC BACK PAIN: Status: ACTIVE | Noted: 2023-09-29

## 2023-09-29 PROBLEM — M54.9 CHRONIC BACK PAIN: Status: ACTIVE | Noted: 2023-09-29

## 2023-09-29 LAB
ALBUMIN SERPL BCP-MCNC: 3.8 G/DL (ref 3.2–4.9)
ALBUMIN/GLOB SERPL: 1.2 G/DL
ALP SERPL-CCNC: 101 U/L (ref 30–99)
ALT SERPL-CCNC: 21 U/L (ref 2–50)
AMYLASE FLD-CCNC: 38 U/L
ANION GAP SERPL CALC-SCNC: 13 MMOL/L (ref 7–16)
APPEARANCE FLD: NORMAL
AST SERPL-CCNC: 25 U/L (ref 12–45)
BASOPHILS # BLD AUTO: 0.4 % (ref 0–1.8)
BASOPHILS # BLD: 0.03 K/UL (ref 0–0.12)
BILIRUB SERPL-MCNC: 0.3 MG/DL (ref 0.1–1.5)
BODY FLD TYPE: NORMAL
BUN SERPL-MCNC: 13 MG/DL (ref 8–22)
CALCIUM ALBUM COR SERPL-MCNC: 9.5 MG/DL (ref 8.5–10.5)
CALCIUM SERPL-MCNC: 9.3 MG/DL (ref 8.5–10.5)
CHLORIDE SERPL-SCNC: 102 MMOL/L (ref 96–112)
CO2 SERPL-SCNC: 24 MMOL/L (ref 20–33)
COLOR FLD: YELLOW
CREAT SERPL-MCNC: 0.76 MG/DL (ref 0.5–1.4)
CYTOLOGY REG CYTOL: NORMAL
EOSINOPHIL # BLD AUTO: 0.11 K/UL (ref 0–0.51)
EOSINOPHIL NFR BLD: 1.5 % (ref 0–6.9)
ERYTHROCYTE [DISTWIDTH] IN BLOOD BY AUTOMATED COUNT: 45.3 FL (ref 35.9–50)
FUNGUS SPEC FUNGUS STN: NORMAL
GFR SERPLBLD CREATININE-BSD FMLA CKD-EPI: 89 ML/MIN/1.73 M 2
GLOBULIN SER CALC-MCNC: 3.3 G/DL (ref 1.9–3.5)
GLUCOSE BLD STRIP.AUTO-MCNC: 123 MG/DL (ref 65–99)
GLUCOSE BLD STRIP.AUTO-MCNC: 123 MG/DL (ref 65–99)
GLUCOSE FLD-MCNC: 120 MG/DL
GLUCOSE SERPL-MCNC: 118 MG/DL (ref 65–99)
GRAM STN SPEC: NORMAL
HCT VFR BLD AUTO: 51 % (ref 37–47)
HGB BLD-MCNC: 17.2 G/DL (ref 12–16)
IMM GRANULOCYTES # BLD AUTO: 0.03 K/UL (ref 0–0.11)
IMM GRANULOCYTES NFR BLD AUTO: 0.4 % (ref 0–0.9)
INR PPP: 1.1 (ref 0.87–1.13)
LDH FLD L TO P-CCNC: 144 U/L
LDH SERPL L TO P-CCNC: 243 U/L (ref 107–266)
LYMPHOCYTES # BLD AUTO: 0.98 K/UL (ref 1–4.8)
LYMPHOCYTES NFR BLD: 13.4 % (ref 22–41)
LYMPHOCYTES NFR FLD: 89 %
MAGNESIUM SERPL-MCNC: 2.3 MG/DL (ref 1.5–2.5)
MCH RBC QN AUTO: 31.3 PG (ref 27–33)
MCHC RBC AUTO-ENTMCNC: 33.7 G/DL (ref 32.2–35.5)
MCV RBC AUTO: 92.7 FL (ref 81.4–97.8)
MONOCYTES # BLD AUTO: 0.88 K/UL (ref 0–0.85)
MONOCYTES NFR BLD AUTO: 12.1 % (ref 0–13.4)
MONOS+MACROS NFR FLD MANUAL: 6 %
NEUTROPHILS # BLD AUTO: 5.26 K/UL (ref 1.82–7.42)
NEUTROPHILS NFR BLD: 72.2 % (ref 44–72)
NEUTROPHILS NFR FLD: 5 %
NRBC # BLD AUTO: 0 K/UL
NRBC BLD-RTO: 0 /100 WBC (ref 0–0.2)
NUC CELL # FLD: 895 CELLS/UL
PH FLD: 8 [PH]
PHOSPHATE SERPL-MCNC: 4.9 MG/DL (ref 2.5–4.5)
PLATELET # BLD AUTO: 251 K/UL (ref 164–446)
PMV BLD AUTO: 9.5 FL (ref 9–12.9)
POTASSIUM SERPL-SCNC: 4 MMOL/L (ref 3.6–5.5)
PROT FLD-MCNC: 4.3 G/DL
PROT SERPL-MCNC: 7.1 G/DL (ref 6–8.2)
PROTHROMBIN TIME: 14.4 SEC (ref 12–14.6)
RBC # BLD AUTO: 5.5 M/UL (ref 4.2–5.4)
RBC # FLD: <2000 CELLS/UL
SIGNIFICANT IND 70042: NORMAL
SIGNIFICANT IND 70042: NORMAL
SITE SITE: NORMAL
SITE SITE: NORMAL
SODIUM SERPL-SCNC: 139 MMOL/L (ref 135–145)
SOURCE SOURCE: NORMAL
SOURCE SOURCE: NORMAL
WBC # BLD AUTO: 7.3 K/UL (ref 4.8–10.8)

## 2023-09-29 PROCEDURE — 94640 AIRWAY INHALATION TREATMENT: CPT

## 2023-09-29 PROCEDURE — 88112 CYTOPATH CELL ENHANCE TECH: CPT

## 2023-09-29 PROCEDURE — 87015 SPECIMEN INFECT AGNT CONCNTJ: CPT

## 2023-09-29 PROCEDURE — 87206 SMEAR FLUORESCENT/ACID STAI: CPT

## 2023-09-29 PROCEDURE — 83986 ASSAY PH BODY FLUID NOS: CPT

## 2023-09-29 PROCEDURE — 80053 COMPREHEN METABOLIC PANEL: CPT

## 2023-09-29 PROCEDURE — 88305 TISSUE EXAM BY PATHOLOGIST: CPT

## 2023-09-29 PROCEDURE — 700102 HCHG RX REV CODE 250 W/ 637 OVERRIDE(OP): Performed by: STUDENT IN AN ORGANIZED HEALTH CARE EDUCATION/TRAINING PROGRAM

## 2023-09-29 PROCEDURE — 36415 COLL VENOUS BLD VENIPUNCTURE: CPT

## 2023-09-29 PROCEDURE — 94760 N-INVAS EAR/PLS OXIMETRY 1: CPT

## 2023-09-29 PROCEDURE — 700101 HCHG RX REV CODE 250: Performed by: STUDENT IN AN ORGANIZED HEALTH CARE EDUCATION/TRAINING PROGRAM

## 2023-09-29 PROCEDURE — 700102 HCHG RX REV CODE 250 W/ 637 OVERRIDE(OP): Performed by: FAMILY MEDICINE

## 2023-09-29 PROCEDURE — 0W993ZZ DRAINAGE OF RIGHT PLEURAL CAVITY, PERCUTANEOUS APPROACH: ICD-10-PCS | Performed by: RADIOLOGY

## 2023-09-29 PROCEDURE — 82150 ASSAY OF AMYLASE: CPT

## 2023-09-29 PROCEDURE — 87102 FUNGUS ISOLATION CULTURE: CPT

## 2023-09-29 PROCEDURE — 88360 TUMOR IMMUNOHISTOCHEM/MANUAL: CPT | Mod: 91

## 2023-09-29 PROCEDURE — 84157 ASSAY OF PROTEIN OTHER: CPT

## 2023-09-29 PROCEDURE — 770006 HCHG ROOM/CARE - MED/SURG/GYN SEMI*

## 2023-09-29 PROCEDURE — 71045 X-RAY EXAM CHEST 1 VIEW: CPT

## 2023-09-29 PROCEDURE — 87116 MYCOBACTERIA CULTURE: CPT

## 2023-09-29 PROCEDURE — C1729 CATH, DRAINAGE: HCPCS

## 2023-09-29 PROCEDURE — 87205 SMEAR GRAM STAIN: CPT | Mod: 91

## 2023-09-29 PROCEDURE — 82962 GLUCOSE BLOOD TEST: CPT

## 2023-09-29 PROCEDURE — 82945 GLUCOSE OTHER FLUID: CPT

## 2023-09-29 PROCEDURE — 99233 SBSQ HOSP IP/OBS HIGH 50: CPT | Performed by: FAMILY MEDICINE

## 2023-09-29 PROCEDURE — 85025 COMPLETE CBC W/AUTO DIFF WBC: CPT

## 2023-09-29 PROCEDURE — 700111 HCHG RX REV CODE 636 W/ 250 OVERRIDE (IP): Performed by: STUDENT IN AN ORGANIZED HEALTH CARE EDUCATION/TRAINING PROGRAM

## 2023-09-29 PROCEDURE — 83735 ASSAY OF MAGNESIUM: CPT

## 2023-09-29 PROCEDURE — 84100 ASSAY OF PHOSPHORUS: CPT

## 2023-09-29 PROCEDURE — A9270 NON-COVERED ITEM OR SERVICE: HCPCS | Performed by: FAMILY MEDICINE

## 2023-09-29 PROCEDURE — 87070 CULTURE OTHR SPECIMN AEROBIC: CPT

## 2023-09-29 PROCEDURE — 88361 TUMOR IMMUNOHISTOCHEM/COMPUT: CPT

## 2023-09-29 PROCEDURE — 83615 LACTATE (LD) (LDH) ENZYME: CPT

## 2023-09-29 PROCEDURE — 85610 PROTHROMBIN TIME: CPT

## 2023-09-29 PROCEDURE — A9270 NON-COVERED ITEM OR SERVICE: HCPCS | Performed by: STUDENT IN AN ORGANIZED HEALTH CARE EDUCATION/TRAINING PROGRAM

## 2023-09-29 PROCEDURE — 89051 BODY FLUID CELL COUNT: CPT

## 2023-09-29 RX ORDER — LIDOCAINE 50 MG/G
1 PATCH TOPICAL EVERY 24 HOURS
Status: DISCONTINUED | OUTPATIENT
Start: 2023-09-29 | End: 2023-10-01 | Stop reason: HOSPADM

## 2023-09-29 RX ORDER — ACETAMINOPHEN 500 MG
1000 TABLET ORAL EVERY 6 HOURS PRN
Status: DISCONTINUED | OUTPATIENT
Start: 2023-09-29 | End: 2023-10-01 | Stop reason: HOSPADM

## 2023-09-29 RX ORDER — DIAZEPAM 5 MG/1
5 TABLET ORAL EVERY 8 HOURS PRN
Status: DISCONTINUED | OUTPATIENT
Start: 2023-09-29 | End: 2023-10-01 | Stop reason: HOSPADM

## 2023-09-29 RX ADMIN — SENNOSIDES AND DOCUSATE SODIUM 2 TABLET: 50; 8.6 TABLET ORAL at 05:10

## 2023-09-29 RX ADMIN — IPRATROPIUM BROMIDE AND ALBUTEROL SULFATE 3 ML: 2.5; .5 SOLUTION RESPIRATORY (INHALATION) at 18:51

## 2023-09-29 RX ADMIN — ZOLPIDEM TARTRATE 5 MG: 5 TABLET ORAL at 20:27

## 2023-09-29 RX ADMIN — ONDANSETRON 4 MG: 4 TABLET, ORALLY DISINTEGRATING ORAL at 17:42

## 2023-09-29 RX ADMIN — IPRATROPIUM BROMIDE AND ALBUTEROL SULFATE 3 ML: 2.5; .5 SOLUTION RESPIRATORY (INHALATION) at 07:22

## 2023-09-29 RX ADMIN — DIAZEPAM 5 MG: 5 TABLET ORAL at 18:03

## 2023-09-29 ASSESSMENT — ENCOUNTER SYMPTOMS
BLURRED VISION: 0
NAUSEA: 0
SHORTNESS OF BREATH: 1
WHEEZING: 0
DIZZINESS: 0
PALPITATIONS: 0
MYALGIAS: 0
SENSORY CHANGE: 0
NECK PAIN: 1
NERVOUS/ANXIOUS: 0
VOMITING: 0
DIARRHEA: 0
FEVER: 0
ABDOMINAL PAIN: 0
WEAKNESS: 1
HEARTBURN: 0
HEADACHES: 0
SORE THROAT: 0
DIAPHORESIS: 0
COUGH: 0
FLANK PAIN: 0
SPEECH CHANGE: 0
BACK PAIN: 1
CHILLS: 0
FOCAL WEAKNESS: 0

## 2023-09-29 ASSESSMENT — PAIN DESCRIPTION - PAIN TYPE: TYPE: ACUTE PAIN

## 2023-09-29 NOTE — ED TRIAGE NOTES
"Chief Complaint   Patient presents with    Sent from Urgent Care    Shortness of Breath     Pt sent from  after abnormal CXR, outpatient MD told pt that she needs a CT scan.  Pt reports hypoxia during a coughing fit last down with sats down to 77%.  Pt with noted SOB with exertion and at rest.    BP (!) 182/99   Pulse 88   Temp 37.6 °C (99.7 °F) (Temporal)   Resp 20   Ht 1.676 m (5' 6\")   Wt 95.5 kg (210 lb 8.6 oz)   SpO2 94%   Pt informed of wait times. Educated on triage process.  Asked to return to triage RN for any new or worsening of symptoms. Thanked for patience.      "

## 2023-09-29 NOTE — PROGRESS NOTES
Hospital Medicine Daily Progress Note    Date of Service  9/29/2023    Chief Complaint  Sol Kern is a 58 y.o. female admitted 9/28/2023 with pleural effusion    Hospital Course  Admitted with large right pleural effusion, respite failure with hypoxia.    Interval Problem Update  Pleural effusion - shortness of breath on exertion  Resp failure - O2 2 lpm  HTN - sbp   Diabetes -     Updates given and plan of care discussed with patient's daughter who was at bedside.    I have discussed this patient's plan of care and discharge plan at IDT rounds today with Case Management, Nursing, Nursing leadership, and other members of the IDT team.    Consultants/Specialty  None    Code Status  Full Code    Disposition  The patient is not medically cleared for discharge to home or a post-acute facility.  Anticipate discharge to: home with close outpatient follow-up    I have placed the appropriate orders for post-discharge needs.    Review of Systems  Review of Systems   Constitutional:  Positive for malaise/fatigue. Negative for chills, diaphoresis and fever.   HENT:  Negative for congestion, hearing loss and sore throat.    Eyes:  Negative for blurred vision.   Respiratory:  Positive for shortness of breath. Negative for cough and wheezing.    Cardiovascular:  Negative for chest pain, palpitations and leg swelling.   Gastrointestinal:  Negative for abdominal pain, diarrhea, heartburn, nausea and vomiting.   Genitourinary:  Negative for dysuria, flank pain and hematuria.   Musculoskeletal:  Positive for back pain and neck pain. Negative for joint pain and myalgias.   Skin:  Negative for rash.   Neurological:  Positive for weakness. Negative for dizziness, sensory change, speech change, focal weakness and headaches.   Psychiatric/Behavioral:  The patient is not nervous/anxious.         Physical Exam  Temp:  [36 °C (96.8 °F)-37.6 °C (99.7 °F)] 36.3 °C (97.4 °F)  Pulse:  [75-94] 75  Resp:  [16-22] 18  BP:  ()/() 127/82  SpO2:  [88 %-98 %] 98 %    Physical Exam  Vitals and nursing note reviewed.   Constitutional:       Appearance: She is obese.   HENT:      Head: Normocephalic and atraumatic.      Nose: No congestion.      Mouth/Throat:      Mouth: Mucous membranes are moist.   Eyes:      Extraocular Movements: Extraocular movements intact.      Conjunctiva/sclera: Conjunctivae normal.   Cardiovascular:      Rate and Rhythm: Normal rate and regular rhythm.   Pulmonary:      Effort: Pulmonary effort is normal.      Breath sounds: Examination of the right-middle field reveals decreased breath sounds. Examination of the right-lower field reveals decreased breath sounds. Decreased breath sounds present.   Abdominal:      General: There is no distension.      Tenderness: There is no abdominal tenderness. There is no guarding or rebound.   Musculoskeletal:      Cervical back: No tenderness.      Right lower leg: No edema.      Left lower leg: No edema.   Skin:     General: Skin is warm and dry.   Neurological:      General: No focal deficit present.      Mental Status: She is alert and oriented to person, place, and time.      Cranial Nerves: No cranial nerve deficit.         Fluids  No intake or output data in the 24 hours ending 09/29/23 1042    Laboratory  Recent Labs     09/28/23  1737 09/29/23  0601   WBC 8.0 7.3   RBC 5.99* 5.50*   HEMOGLOBIN 18.3* 17.2*   HEMATOCRIT 54.7* 51.0*   MCV 91.3 92.7   MCH 30.6 31.3   MCHC 33.5 33.7   RDW 44.5 45.3   PLATELETCT 258 251   MPV 9.6 9.5     Recent Labs     09/28/23  1737 09/29/23  0601   SODIUM 138 139   POTASSIUM 4.2 4.0   CHLORIDE 103 102   CO2 21 24   GLUCOSE 87 118*   BUN 11 13   CREATININE 0.72 0.76   CALCIUM 10.2 9.3                   Imaging  CT-CTA CHEST PULMONARY ARTERY W/ RECONS   Final Result      1.  No CT evidence of pulmonary emboli.      2.  Large right pleural effusion with partial collapse of the right lower lobe.            US-THORACENTESIS PUNCTURE  RIGHT    (Results Pending)        Assessment/Plan  * Large pleural effusion- (present on admission)  Assessment & Plan  For US guided thoracentesis  IV Lasix    Chronic back pain- (present on admission)  Assessment & Plan  Pain control    Acute respiratory failure with hypoxia (HCC)- (present on admission)  Assessment & Plan  RT protocol    Carcinoma of upper-outer quadrant of left breast in female, estrogen receptor positive (HCC)- (present on admission)  Assessment & Plan  History of - with surgical resection and Radiation treatments    Type 2 diabetes mellitus without complication, without long-term current use of insulin (HCC)- (present on admission)  Assessment & Plan  Hold Metformin  Start SSI    Essential hypertension- (present on admission)  Assessment & Plan  IV Labetalol as needed with parameters    YOANA (obstructive sleep apnea)- (present on admission)  Assessment & Plan  Noncompliant with CPAP    Class 1 obesity in adult- (present on admission)  Assessment & Plan  Body mass index is 33.98 kg/m².           VTE prophylaxis:   SCDs/TEDs      I have performed a physical exam and reviewed and updated ROS and Plan today (9/29/2023). In review of yesterday's note (9/28/2023), there are no changes except as documented above.

## 2023-09-29 NOTE — DISCHARGE PLANNING
Care Transition Team Assessment    Information Source  Who is responsible for making decisions for patient? : Patient    Readmission Evaluation  Is this a readmission?: No    Elopement Risk  Legal Hold: No  Ambulatory or Self Mobile in Wheelchair: No-Not an Elopement Risk  Elopement Risk: Not at Risk for Elopement    Discharge Preparedness  What is your plan after discharge?: Home with help  What are your discharge supports?: Spouse, Child  Prior Functional Level: Ambulatory, Independent with Activities of Daily Living, Independent with Medication Management    Functional Assesment  Prior Functional Level: Ambulatory, Independent with Activities of Daily Living, Independent with Medication Management    Finances  Financial Barriers to Discharge: No  Prescription Coverage: Yes    Advance Directive  Advance Directive?: None  Advance Directive offered?: AD Booklet refused    Domestic Abuse  Have you ever been the victim of abuse or violence?: No  Physical Abuse or Sexual Abuse: No  Verbal Abuse or Emotional Abuse: No  Possible Abuse/Neglect Reported to:: Not Applicable    Psychological Assessment  History of Substance Abuse: Marijuana, Other (comment), Alcohol (hx of cigarettes)  Date Last Used - Alcohol: not currently using  Date Last Used - Marijuana: currently using inhaled and gummies  Substance Abuse Comments: Patient reports that she has quit smoking. Her smoking use included cigarettes. She has a 5.0 pack-year smoking history. She has never used smokeless tobacco. She reports that she does not currently use alcohol. She reports current drug use. Drugs: Marijuana and Oral.  History of Psychiatric Problems: No  Non-compliant with Treatment: No  Newly Diagnosed Illness: No    Discharge Risks or Barriers  Discharge risks or barriers?: Substance abuse, Complex medical needs  Patient risk factors: Bariatric, Complex medical needs, Polypharmacy (>7 prescriptions), Substance abuse    Anticipated Discharge  Information  Discharge Disposition: Discharged to home/self care (01)  Discharge Address: [address] [place] [address]  Discharge Contact Phone Number: [phone]          Case Management Discharge Planning    Admission Date: 9/28/2023  GMLOS: 4.3  ALOS: 1    6-Clicks ADL Score: 24  6-Clicks Mobility Score: 24      Anticipated Discharge Dispo: Discharge Disposition: Discharged to home/self care (01)  Discharge Address: [address] [place] [address]  Discharge Contact Phone Number: [phone]    DME Needed: No    Action(s) Taken: Updated Provider/Nurse on Discharge Plan    Escalations Completed: Provider and Bedside RN    Medically Clear: No    Next Steps: Follow-up with the Attending and Bedside RN for any issues/changes and update the discharge plan accordingly.    Barriers to Discharge: Medical clearance and Pending Procedures    Is the patient up for discharge tomorrow: No

## 2023-09-29 NOTE — ED NOTES
Sats 87-90% on room air. Placed on 2L O2 by nasal cannula. IV inserted & pt flagged as ready for CT.

## 2023-09-29 NOTE — CARE PLAN
The patient is Stable - Low risk of patient condition declining or worsening         Progress made toward(s) clinical / shift goals:    Problem: Respiratory  Goal: Patient will achieve/maintain optimum respiratory ventilation and gas exchange  Outcome: Progressing     Problem: Urinary - Renal Perfusion  Goal: Ability to achieve and maintain adequate renal perfusion and functioning will improve  Outcome: Progressing       Patient is not progressing towards the following goals:

## 2023-09-29 NOTE — PROGRESS NOTES
4 Eyes Skin Assessment Completed by JAMAICA delatorre and JAMAICA Xavier.    Head WDL  Ears WDL  Nose WDL  Mouth WDL  Neck WDL  Breast/Chest WDL  Shoulder Blades WDL  Spine WDL  (R) Arm/Elbow/Hand WDL  (L) Arm/Elbow/Hand WDL  Abdomen WDL  Groin WDL  Scrotum/Coccyx/Buttocks WDL  (R) Leg WDL  (L) Leg WDL  (R) Heel/Foot/Toe WDL  (L) Heel/Foot/Toe WDL          Devices In Places Nasal Cannula      Interventions In Place Pillows    Possible Skin Injury No    Pictures Uploaded Into Epic N/A  Wound Consult Placed N/A  RN Wound Prevention Protocol Ordered Yes    The [place] Prescription Drug Monitoring Program was reviewed today and patient is compliant with controlled medication refills.  There are no irregularities in refills noted.

## 2023-09-29 NOTE — H&P
Hospital Medicine History & Physical Note    Date of Service  9/28/2023    Primary Care Physician  Veronica Lyn M.D.    Consultants  IR - thoracocentesis    Code Status  Full Code    Chief Complaint  Chief Complaint   Patient presents with    Sent from Urgent Care    Shortness of Breath       History of Presenting Illness  Sol Kern is a 58 y.o. female with history of diabetes, asthma, no history of left sided breast cancer for 2 years who presented 9/28/2023 with evaluation for shortness of breath.  Patient reported not feeling well, short of breath for least the past month.  She stated visiting [place], reported URI-like symptoms, as well as shortness of breath with exertion.  She is recently took doxycycline for 10 days, reported minimal symptomatic relief.  She was seen in urgent care earlier today, referred to ER for further evaluation.  She tested negative for COVID, influenza A/B and RSV.  In ER, she is requiring 2 L of oxygen support.  She had CTA chest in ER which did not show PE, however, noted to have large right-sided pleural effusion.  Admitted to medicine service for further evaluation and treatment.    I discussed the plan of care with patient, family, and bedside RN.    Review of Systems  Review of Systems   Constitutional:  Positive for chills and malaise/fatigue. Negative for fever.   HENT:  Negative for hearing loss and tinnitus.    Eyes:  Negative for blurred vision and double vision.   Respiratory:  Positive for shortness of breath. Negative for cough.    Cardiovascular:  Positive for orthopnea. Negative for chest pain and leg swelling.   Gastrointestinal:  Negative for abdominal pain, heartburn, nausea and vomiting.   Genitourinary:  Negative for dysuria and hematuria.   Musculoskeletal:  Negative for falls and myalgias.   Skin:  Negative for itching and rash.   Neurological:  Positive for weakness. Negative for dizziness and headaches.   Endo/Heme/Allergies:  Negative for  "environmental allergies. Does not bruise/bleed easily.   Psychiatric/Behavioral:  Negative for depression and substance abuse.    All other systems reviewed and are negative.      Past Medical History   has a past medical history of Allergy, Back pain, Cancer (HCC) (2021), Diabetes (HCC), GERD (gastroesophageal reflux disease), High cholesterol, Hypertension, Malignant neoplasm of upper-outer quadrant of left female breast (HCC), Neck pain, Obesity, Sleep apnea, and Snoring.    Surgical History   has a past surgical history that includes endometrial ablation; primary c section; knee arthroscopy; foot surgery (Right); carpal tunnel release (Right); pr mastectomy, partial (Left, 11/12/2021); node biopsy sentinel (Left, 11/12/2021); and tubal coagulation laparoscopic bilateral.     Family History  family history includes Cancer in her mother; Heart Attack in her brother and father; Heart Disease in her brother, father, maternal grandfather, and mother; Hyperlipidemia in her brother; Hypertension in her mother; Other in her brother, father, mother, and another family member; Ovarian Cancer in her mother.   Family history reviewed with patient. There is family history that is pertinent to the chief complaint.     Social History   reports that she has quit smoking. Her smoking use included cigarettes. She has a 5.0 pack-year smoking history. She has never used smokeless tobacco. She reports that she does not currently use alcohol. She reports current drug use. Drugs: Marijuana and Oral.    Allergies  Allergies   Allergen Reactions    Codeine Vomiting and Nausea    Hydrocodone Vomiting and Nausea    Other Drug Vomiting and Nausea     Any of the \"cets\" (percocet)       Medications  Prior to Admission Medications   Prescriptions Last Dose Informant Patient Reported? Taking?   Misc. Devices Misc   No No   Sig: Oxygen. 1 L/min with exertion and on flight if Pulse Ox <90%.   ROPINIRole (REQUIP) 0.25 MG Tab   No No   Sig: TAKE " 1-2 TABLETS BY MOUTH EVERY EVENING. TAKE 1-3 HOURS BEFORE BED   Semaglutide,0.25 or 0.5MG/DOS, (OZEMPIC, 0.25 OR 0.5 MG/DOSE,) 2 MG/3ML Solution Pen-injector   No No   Sig: Inject 0.5 mg under the skin every 7 days.   albuterol 108 (90 Base) MCG/ACT Aero Soln inhalation aerosol   No No   Sig: Inhale 2 Puffs every 6 hours as needed for Shortness of Breath.   anastrozole (ARIMIDEX) 1 MG Tab   No No   Sig: Take 1 Tablet by mouth every day.   atorvastatin (LIPITOR) 10 MG Tab   No No   Sig: TAKE 1 TABLET BY MOUTH EVERY DAY IN THE EVENING   Patient taking differently: 20 mg.   cyclobenzaprine (FLEXERIL) 5 mg tablet   No No   Sig: Take 1-2 Tablets by mouth 3 times a day as needed for Moderate Pain.   Patient taking differently: Take 10 mg by mouth 3 times a day as needed for Moderate Pain.   diazepam (VALIUM) 10 MG tablet  Patient Yes No   Sig: Take 1 Tablet by mouth 1 time a day as needed (muscle spasms, back pain).   fluconazole (DIFLUCAN) 150 MG tablet   No No   Sig: Take 1 tab PO QM/Th/M only if signs/symptoms of yeast infection.   fluocinonide (LIDEX) 0.05 % Cream   No No   Sig: AAA buttocks/Back BID PRN itching, rash.  Do not use on face, axilla, groin.   lidocaine-prilocaine (EMLA) 2.5-2.5 % Cream   Yes No   lisinopril (PRINIVIL) 10 MG Tab   No No   Sig: TAKE 1 TABLET BY MOUTH EVERY DAY   metFORMIN (GLUCOPHAGE) 500 MG Tab   No No   Sig: Take 2 Tablets by mouth 2 times a day with meals.      Facility-Administered Medications: None       Physical Exam  Temp:  [36.6 °C (97.8 °F)-37.6 °C (99.7 °F)] 37.6 °C (99.7 °F)  Pulse:  [81-93] 81  Resp:  [20-22] 20  BP: (140-214)/() 177/84  SpO2:  [88 %-94 %] 94 %  Blood Pressure: (!) 177/84   Temperature: 37.6 °C (99.7 °F)   Pulse: 81   Respiration: 20   Pulse Oximetry: 94 %       Physical Exam  Vitals and nursing note reviewed.   Constitutional:       General: She is not in acute distress.  HENT:      Head: Normocephalic and atraumatic.      Nose: Nose normal.       "Mouth/Throat:      Mouth: Mucous membranes are moist.      Pharynx: Oropharynx is clear.   Eyes:      General: No scleral icterus.     Extraocular Movements: Extraocular movements intact.   Cardiovascular:      Rate and Rhythm: Normal rate and regular rhythm.      Pulses: Normal pulses.      Heart sounds:      No friction rub.   Pulmonary:      Effort: No respiratory distress.      Breath sounds: No stridor. Rales present. No wheezing.      Comments: Crackles in right base  Chest:      Chest wall: No tenderness.   Abdominal:      General: There is distension.      Tenderness: There is no abdominal tenderness. There is no guarding or rebound.   Musculoskeletal:         General: No tenderness. Normal range of motion.      Cervical back: Neck supple. No tenderness.      Comments: Trace LE edema   Skin:     General: Skin is warm and dry.      Capillary Refill: Capillary refill takes less than 2 seconds.   Neurological:      General: No focal deficit present.      Mental Status: She is alert and oriented to person, place, and time.   Psychiatric:         Mood and Affect: Mood normal.         Laboratory:  Recent Labs     09/28/23  1737   WBC 8.0   RBC 5.99*   HEMOGLOBIN 18.3*   HEMATOCRIT 54.7*   MCV 91.3   MCH 30.6   MCHC 33.5   RDW 44.5   PLATELETCT 258   MPV 9.6     Recent Labs     09/28/23  1737   SODIUM 138   POTASSIUM 4.2   CHLORIDE 103   CO2 21   GLUCOSE 87   BUN 11   CREATININE 0.72   CALCIUM 10.2     Recent Labs     09/28/23  1737   ALTSGPT 23   ASTSGOT 29   ALKPHOSPHAT 103*   TBILIRUBIN 0.4   GLUCOSE 87         Recent Labs     09/28/23  1737   NTPROBNP 47         No results for input(s): \"TROPONINT\" in the last 72 hours.    Imaging:  CT-CTA CHEST PULMONARY ARTERY W/ RECONS   Final Result      1.  No CT evidence of pulmonary emboli.      2.  Large right pleural effusion with partial collapse of the right lower lobe.            IR-CONSULT AND TREAT    (Results Pending)   US-THORACENTESIS PUNCTURE RIGHT    (Results " Pending)   EC-ECHOCARDIOGRAM COMPLETE W/O CONT    (Results Pending)               X-Ray:  I have personally reviewed the images and compared with prior images.  EKG:  I have personally reviewed the images and compared with prior images.    Assessment/Plan:  Justification for Admission Status  I anticipate this patient will require at least 2 midnights hospitalization, therefore appropriate for inpatient status.      * Large pleural effusion  Assessment & Plan  Large right-sided pleural effusion noted on CTA chest  Wean off oxygen support as tolerated  Gentle diuresis  Ordered thoracocentesis    Acute respiratory failure with hypoxia (HCC)  Assessment & Plan  On 2L --wean off as tolerated  Suspect secondary to large right-sided pleural effusion  No PE seen on CTA chest  Doubt pneumonia-check procalcitonin-antibiotic if clinically warranted  Nebs  Diuresis  Check echo    Hypertensive urgency  Assessment & Plan  SBP>140 in ER  Lasix 40mg IV daily  PRN labetalol, hydralazine    Diabetes (HCC)- (present on admission)  Assessment & Plan  ISS, metformin  statin    Carcinoma of upper-outer quadrant of left breast in female, estrogen receptor positive (HCC)- (present on admission)  Assessment & Plan  Per history  S/p surgical resection and radiation therapy  No chemotherapy    YOANA (obstructive sleep apnea)- (present on admission)  Assessment & Plan  Previously on CPAP  Stated she has not use CPAP for 7years  CPAP at bedtime, will need outpt sleep study    Insomnia  Assessment & Plan  PRN ambien    Class 1 obesity in adult  Assessment & Plan  Diet and lifestyle modification  Body mass index is 33.98 kg/m².          VTE prophylaxis: SCDs/TEDs

## 2023-09-29 NOTE — ED PROVIDER NOTES
ED Provider Note    CHIEF COMPLAINT  Chief Complaint   Patient presents with    Sent from Urgent Care    Shortness of Breath       EXTERNAL RECORDS REVIEWED  Other reviewed the chest x-ray that was performed today by the primary care provider that does show opacification in fluid collection in the right lung base    HPI/JENNIFER    Sol Kern is a 58 y.o. female who presents with progressive shortness of breath over the last couple of weeks.  The patient went to her primary care provider today and an x-ray was performed.  She was found to have a pleural effusion as well as consolidation versus a mass in the right lung.  The patient was instructed to come here to the emergency department for further work-up.  She has not had any pain or swelling to her lower extremities.  She did recently return from [place] to the renal area.  Patient does describe orthopnea and states she also has increased work of breathing with exertion.  She has a pulse oximeter at home and her oxygen at night is been dropping down into the 70s.  She quit smoking in the past.  She does have a history of malignant neoplasm of the breast.  She has not had any night sweats nor weight loss.    PAST MEDICAL HISTORY   has a past medical history of Allergy, Back pain, Cancer (HCC) (2021), Diabetes (HCC), GERD (gastroesophageal reflux disease), High cholesterol, Hypertension, Malignant neoplasm of upper-outer quadrant of left female breast (HCC), Neck pain, Obesity, Sleep apnea, and Snoring.    SURGICAL HISTORY   has a past surgical history that includes endometrial ablation; primary c section; knee arthroscopy; foot surgery (Right); carpal tunnel release (Right); mastectomy, partial (Left, 11/12/2021); node biopsy sentinel (Left, 11/12/2021); and tubal coagulation laparoscopic bilateral.    FAMILY HISTORY  Family History   Problem Relation Age of Onset    Cancer Mother         melanoma    Hypertension Mother     Other Mother         THYROID    Heart  "Disease Mother         valvular disease    Ovarian Cancer Mother     Heart Disease Father         Heart Attack    Heart Attack Father     Other Father         psoriasis    Hyperlipidemia Brother     Other Brother         psoriasis    Heart Disease Brother         MI    Heart Attack Brother         massive heart attack     Other Other         psoriasis    Heart Disease Maternal Grandfather         MI    Diabetes Neg Hx     Alcohol/Drug Neg Hx     Tubal Cancer Neg Hx     Peritoneal Cancer Neg Hx     Colorectal Cancer Neg Hx     Breast Cancer Neg Hx        SOCIAL HISTORY  Social History     Tobacco Use    Smoking status: Former     Current packs/day: 0.25     Average packs/day: 0.3 packs/day for 20.0 years (5.0 ttl pk-yrs)     Types: Cigarettes    Smokeless tobacco: Never    Tobacco comments:     working on quitting    Vaping Use    Vaping Use: Never used   Substance and Sexual Activity    Alcohol use: Not Currently     Comment: rarely    Drug use: Yes     Types: Marijuana, Oral     Comment: gummies    Sexual activity: Yes     Partners: Male     Comment:        CURRENT MEDICATIONS  Home Medications    **Home medications have not yet been reviewed for this encounter**         ALLERGIES  Allergies   Allergen Reactions    Codeine Vomiting and Nausea    Other Drug Vomiting and Nausea     Any of the \"cets\" (percocet)    Vicodin [Hydrocodone-Acetaminophen] Vomiting and Nausea       PHYSICAL EXAM  VITAL SIGNS: BP (!) 182/99   Pulse 88   Temp 37.6 °C (99.7 °F) (Temporal)   Resp 20   Ht 1.676 m (5' 6\")   Wt 95.5 kg (210 lb 8.6 oz)   SpO2 94%   BMI 33.98 kg/m²    In general the patient appears ill and uncomfortable    HEENT unremarkable except for hyperemia of the face    Neck is supple without adenopathy    Pulmonary the patient does have tachypnea and significantly diminished breath sounds in the lower right part of her pulmonary field    Cardiovascular S1-S2 with a regular rate and rhythm    GI abdomen " soft    Skin no pallor or cyanosis she does have the facial hyperemia described above    Extremities no edema    Neurologic examination is grossly intact    DIAGNOSTIC STUDIES   Results for orders placed or performed during the hospital encounter of 09/28/23   CBC with Differential   Result Value Ref Range    WBC 8.0 4.8 - 10.8 K/uL    RBC 5.99 (H) 4.20 - 5.40 M/uL    Hemoglobin 18.3 (H) 12.0 - 16.0 g/dL    Hematocrit 54.7 (H) 37.0 - 47.0 %    MCV 91.3 81.4 - 97.8 fL    MCH 30.6 27.0 - 33.0 pg    MCHC 33.5 32.2 - 35.5 g/dL    RDW 44.5 35.9 - 50.0 fL    Platelet Count 258 164 - 446 K/uL    MPV 9.6 9.0 - 12.9 fL    Neutrophils-Polys 71.20 44.00 - 72.00 %    Lymphocytes 14.70 (L) 22.00 - 41.00 %    Monocytes 11.70 0.00 - 13.40 %    Eosinophils 1.50 0.00 - 6.90 %    Basophils 0.50 0.00 - 1.80 %    Immature Granulocytes 0.40 0.00 - 0.90 %    Nucleated RBC 0.00 0.00 - 0.20 /100 WBC    Neutrophils (Absolute) 5.68 1.82 - 7.42 K/uL    Lymphs (Absolute) 1.17 1.00 - 4.80 K/uL    Monos (Absolute) 0.93 (H) 0.00 - 0.85 K/uL    Eos (Absolute) 0.12 0.00 - 0.51 K/uL    Baso (Absolute) 0.04 0.00 - 0.12 K/uL    Immature Granulocytes (abs) 0.03 0.00 - 0.11 K/uL    NRBC (Absolute) 0.00 K/uL   Comp Metabolic Panel   Result Value Ref Range    Sodium 138 135 - 145 mmol/L    Potassium 4.2 3.6 - 5.5 mmol/L    Chloride 103 96 - 112 mmol/L    Co2 21 20 - 33 mmol/L    Anion Gap 14.0 7.0 - 16.0    Glucose 87 65 - 99 mg/dL    Bun 11 8 - 22 mg/dL    Creatinine 0.72 0.50 - 1.40 mg/dL    Calcium 10.2 8.5 - 10.5 mg/dL    Correct Calcium 10.1 8.5 - 10.5 mg/dL    AST(SGOT) 29 12 - 45 U/L    ALT(SGPT) 23 2 - 50 U/L    Alkaline Phosphatase 103 (H) 30 - 99 U/L    Total Bilirubin 0.4 0.1 - 1.5 mg/dL    Albumin 4.1 3.2 - 4.9 g/dL    Total Protein 7.5 6.0 - 8.2 g/dL    Globulin 3.4 1.9 - 3.5 g/dL    A-G Ratio 1.2 g/dL   ESTIMATED GFR   Result Value Ref Range    GFR (CKD-EPI) 95 >60 mL/min/1.73 m 2   EKG   Result Value Ref Range    Report       [facility] Emergency Dept.    Test Date:  2023  Pt Name:    BRITNEY URBAN                   Department: ER  MRN:        [id number]                      Room:  Gender:     Female                       Technician: [id number]  :        1965                   Requested By:ER TRIAGE PROTOCOL  Order #:    [id number]                    Reading MD: XIAO CHAKRABORTY MD    Measurements  Intervals                                Axis  Rate:       82                           P:          -15  FL:         150                          QRS:        -7  QRSD:       80                           T:          12  QT:         385  QTc:        450    Interpretive Statements  Twelve-lead EKG shows a normal sinus rhythm with a ventricular to 82, QRS has  poor progression, no ST segment elevation or depression, normal T waves.  Electronically Signed On 2023 19:00:54 PDT by XIAO CHAKRABORTY MD           RADIOLOGY  CT-CTA CHEST PULMONARY ARTERY W/ RECONS   Final Result      1.  No CT evidence of pulmonary emboli.      2.  Large right pleural effusion with partial collapse of the right lower lobe.                COURSE & MEDICAL DECISION MAKING    This is a 58-year-old female who presents to the emergency department acutely ill in appearance.  Clinically she does have significant decreased breath sounds on the right and I did review her x-ray that showed a pleural effusion.  CT scan was ordered and does show the large pleural effusion with some collapse of the right lower lobe of the lung.  The patient does feel significantly better with supplemental oxygenation.  Clinically she does not appear septic nor toxic.  She does not have a fever nor leukocytosis to support an infectious etiology.  I will go ahead and administer Rocephin and azithromycin in case this does turn out to be from pneumonia.  A malignant effusion would be in the differential secondary to her history of breast cancer.  Laboratory and Alysis does not  show any evidence of hepatic nor renal insufficiency.  Clinically heart failure would be unlikely based on the unilateral pleural effusion.  The patient will be admitted to the hospitalist and most likely undergo diagnostic and therapeutic thoracentesis.  She appears stable at the time of admission.    FINAL DIAGNOSIS  1.  Dyspnea  2.  Right pleural effusion  3.  Critical care time 30 minutes    Disposition  The patient will be admitted in stable condition.       Electronically signed by: Cm Rapp M.D., 9/28/2023 6:38 PM

## 2023-09-29 NOTE — DISCHARGE PLANNING
[facility] TCN chart review completed. Collaborated with MARITA Wilson prior to meeting with the pt. The most current review of medical record, knowledge of pt's PLOF and social support, LACE+ score of 74 and 6 clicks scores of 24 for ADLs and 24 for mobility were considered. Per chart review, patient currently on 2L O2 and scheduled for thoracentesis today.     Attempted to visit patient, but she was in thoracentesis procedure. TCN spoke with patient's dtr Kayleen over the phone. HIPPA verified. Introduced TCN program. Provided education regarding post acute levels of care. Discussed [facility] plan benefits (Meds to Beds, medical uber and [facility] transitional care). Dtr verbalizes understanding. Patient lives with spouse Stephen in an apt in [place], [place]. Dtr reports patient is independent with mobility, ADLs, IADLs, and driving. Patient owns her own [employer] O2 concentrator at home. Dtr said pt has needed home O2 in the past, but has not used home O2 for months now. Dtr amenable to TCN help scheduling f/u with PCP Dr. Veronica Lyn. She is also amenable to [facility] referral.    TCN will continue to follow and collaborate with discharge planning team as additional post acute needs arise. Thank you.     Completed today:   VM left to call pt/dtr back and schedule hospital f/u appt with PCP  Choice obtained: none (see above)  [facility] referral sent 9/29/23

## 2023-09-29 NOTE — ED NOTES
Med rec completed per patient with daughter at bedside.  Allergies reviewed with patient.  Patient's preferred pharmacy: [facility] on [address].    On 8/24/2023 patient was prescribed a 10 day course of doxycycline hyclate, which she states that she finished.    Patient states no longer using anastrozole, atorvastatin, lisinopril, or ropinirole, and last doses of these medications were over 30 days ago; these medications have been removed from the med rec at this time.

## 2023-09-30 LAB
ANION GAP SERPL CALC-SCNC: 13 MMOL/L (ref 7–16)
BUN SERPL-MCNC: 15 MG/DL (ref 8–22)
CALCIUM SERPL-MCNC: 10.1 MG/DL (ref 8.5–10.5)
CHLORIDE SERPL-SCNC: 102 MMOL/L (ref 96–112)
CO2 SERPL-SCNC: 22 MMOL/L (ref 20–33)
CREAT SERPL-MCNC: 0.81 MG/DL (ref 0.5–1.4)
ERYTHROCYTE [DISTWIDTH] IN BLOOD BY AUTOMATED COUNT: 46.5 FL (ref 35.9–50)
GFR SERPLBLD CREATININE-BSD FMLA CKD-EPI: 83 ML/MIN/1.73 M 2
GLUCOSE BLD STRIP.AUTO-MCNC: 100 MG/DL (ref 65–99)
GLUCOSE BLD STRIP.AUTO-MCNC: 134 MG/DL (ref 65–99)
GLUCOSE SERPL-MCNC: 144 MG/DL (ref 65–99)
HCT VFR BLD AUTO: 58 % (ref 37–47)
HGB BLD-MCNC: 18.5 G/DL (ref 12–16)
MCH RBC QN AUTO: 30.2 PG (ref 27–33)
MCHC RBC AUTO-ENTMCNC: 31.9 G/DL (ref 32.2–35.5)
MCV RBC AUTO: 94.6 FL (ref 81.4–97.8)
PLATELET # BLD AUTO: 287 K/UL (ref 164–446)
PMV BLD AUTO: 9.9 FL (ref 9–12.9)
POTASSIUM SERPL-SCNC: 4.3 MMOL/L (ref 3.6–5.5)
RBC # BLD AUTO: 6.13 M/UL (ref 4.2–5.4)
RHODAMINE-AURAMINE STN SPEC: NORMAL
SIGNIFICANT IND 70042: NORMAL
SITE SITE: NORMAL
SODIUM SERPL-SCNC: 137 MMOL/L (ref 135–145)
SOURCE SOURCE: NORMAL
WBC # BLD AUTO: 11.2 K/UL (ref 4.8–10.8)

## 2023-09-30 PROCEDURE — 700111 HCHG RX REV CODE 636 W/ 250 OVERRIDE (IP): Mod: JZ | Performed by: STUDENT IN AN ORGANIZED HEALTH CARE EDUCATION/TRAINING PROGRAM

## 2023-09-30 PROCEDURE — 770006 HCHG ROOM/CARE - MED/SURG/GYN SEMI*

## 2023-09-30 PROCEDURE — 700102 HCHG RX REV CODE 250 W/ 637 OVERRIDE(OP): Performed by: FAMILY MEDICINE

## 2023-09-30 PROCEDURE — 99232 SBSQ HOSP IP/OBS MODERATE 35: CPT | Performed by: FAMILY MEDICINE

## 2023-09-30 PROCEDURE — 36415 COLL VENOUS BLD VENIPUNCTURE: CPT

## 2023-09-30 PROCEDURE — 82962 GLUCOSE BLOOD TEST: CPT

## 2023-09-30 PROCEDURE — 85027 COMPLETE CBC AUTOMATED: CPT

## 2023-09-30 PROCEDURE — A9270 NON-COVERED ITEM OR SERVICE: HCPCS | Performed by: STUDENT IN AN ORGANIZED HEALTH CARE EDUCATION/TRAINING PROGRAM

## 2023-09-30 PROCEDURE — 700102 HCHG RX REV CODE 250 W/ 637 OVERRIDE(OP): Performed by: STUDENT IN AN ORGANIZED HEALTH CARE EDUCATION/TRAINING PROGRAM

## 2023-09-30 PROCEDURE — A9270 NON-COVERED ITEM OR SERVICE: HCPCS | Performed by: FAMILY MEDICINE

## 2023-09-30 PROCEDURE — 80048 BASIC METABOLIC PNL TOTAL CA: CPT

## 2023-09-30 PROCEDURE — 700101 HCHG RX REV CODE 250: Performed by: FAMILY MEDICINE

## 2023-09-30 RX ORDER — GUAIFENESIN 600 MG/1
600 TABLET, EXTENDED RELEASE ORAL EVERY 12 HOURS
Status: DISCONTINUED | OUTPATIENT
Start: 2023-09-30 | End: 2023-10-01 | Stop reason: HOSPADM

## 2023-09-30 RX ADMIN — LIDOCAINE PATCH 5% 1 PATCH: 700 PATCH TOPICAL at 16:46

## 2023-09-30 RX ADMIN — GUAIFENESIN 600 MG: 600 TABLET, EXTENDED RELEASE ORAL at 11:17

## 2023-09-30 RX ADMIN — ZOLPIDEM TARTRATE 5 MG: 5 TABLET ORAL at 20:17

## 2023-09-30 RX ADMIN — FUROSEMIDE 40 MG: 10 INJECTION INTRAMUSCULAR; INTRAVENOUS at 05:26

## 2023-09-30 ASSESSMENT — ENCOUNTER SYMPTOMS
WEAKNESS: 1
BLURRED VISION: 0
FEVER: 0
WHEEZING: 0
BACK PAIN: 1
SPEECH CHANGE: 0
SENSORY CHANGE: 0
HEADACHES: 0
FOCAL WEAKNESS: 0
COUGH: 0
DIAPHORESIS: 0
ABDOMINAL PAIN: 0
DIARRHEA: 0
PALPITATIONS: 0
FLANK PAIN: 0
NERVOUS/ANXIOUS: 1
VOMITING: 0
NECK PAIN: 1
DIZZINESS: 0
SORE THROAT: 0
CHILLS: 0
SHORTNESS OF BREATH: 0
NAUSEA: 0
HEARTBURN: 0
MYALGIAS: 0

## 2023-09-30 ASSESSMENT — PAIN DESCRIPTION - PAIN TYPE: TYPE: ACUTE PAIN

## 2023-09-30 NOTE — PROGRESS NOTES
Hospital Medicine Daily Progress Note    Date of Service  9/30/2023    Chief Complaint  Sol Kern is a 58 y.o. female admitted 9/28/2023 with pleural effusion    Hospital Course  Admitted with large right pleural effusion, respiratory failure with hypoxia.     Interval Problem Update  Pleural effusion - thoracentesis done yesterday, cultures negative so far, cytology pending  Resp failure - O2 2 lpm  HTN - sbp 119-162  Diabetes - -134    Updates given and plan of care discussed with patient's daughter who was at bedside.    I have discussed this patient's plan of care and discharge plan at IDT rounds today with Case Management, Nursing, Nursing leadership, and other members of the IDT team.    Consultants/Specialty  None    Code Status  Full Code    Disposition  The patient is not medically cleared for discharge to home or a post-acute facility.  Anticipate discharge to: home with close outpatient follow-up    I have placed the appropriate orders for post-discharge needs.    Review of Systems  Review of Systems   Constitutional:  Negative for chills, diaphoresis, fever and malaise/fatigue.   HENT:  Negative for congestion, hearing loss and sore throat.    Eyes:  Negative for blurred vision.   Respiratory:  Negative for cough, shortness of breath and wheezing.    Cardiovascular:  Negative for chest pain, palpitations and leg swelling.   Gastrointestinal:  Negative for abdominal pain, diarrhea, heartburn, nausea and vomiting.   Genitourinary:  Negative for dysuria, flank pain and hematuria.   Musculoskeletal:  Positive for back pain and neck pain. Negative for joint pain and myalgias.   Skin:  Negative for rash.   Neurological:  Positive for weakness. Negative for dizziness, sensory change, speech change, focal weakness and headaches.   Psychiatric/Behavioral:  The patient is nervous/anxious.         Physical Exam  Temp:  [36 °C (96.8 °F)-36.6 °C (97.9 °F)] 36.3 °C (97.4 °F)  Pulse:  [] 87  Resp:   [16-18] 16  BP: (119-162)/(73-89) 119/80  SpO2:  [87 %-97 %] 96 %    Physical Exam  Vitals and nursing note reviewed.   Constitutional:       Appearance: She is obese.   HENT:      Head: Normocephalic and atraumatic.      Nose: No congestion.      Mouth/Throat:      Mouth: Mucous membranes are moist.   Eyes:      Extraocular Movements: Extraocular movements intact.      Conjunctiva/sclera: Conjunctivae normal.   Cardiovascular:      Rate and Rhythm: Normal rate and regular rhythm.   Pulmonary:      Effort: Pulmonary effort is normal.      Breath sounds: Examination of the right-lower field reveals decreased breath sounds. Decreased breath sounds present.   Abdominal:      General: There is no distension.      Tenderness: There is no abdominal tenderness. There is no guarding or rebound.   Musculoskeletal:      Cervical back: No tenderness.      Right lower leg: No edema.      Left lower leg: No edema.   Skin:     General: Skin is warm and dry.   Neurological:      General: No focal deficit present.      Mental Status: She is alert and oriented to person, place, and time.      Cranial Nerves: No cranial nerve deficit.   Psychiatric:         Mood and Affect: Mood is anxious.         Fluids    Intake/Output Summary (Last 24 hours) at 9/30/2023 1442  Last data filed at 9/30/2023 1000  Gross per 24 hour   Intake 480 ml   Output --   Net 480 ml       Laboratory  Recent Labs     09/28/23 1737 09/29/23  0601 09/30/23  0717   WBC 8.0 7.3 11.2*   RBC 5.99* 5.50* 6.13*   HEMOGLOBIN 18.3* 17.2* 18.5*   HEMATOCRIT 54.7* 51.0* 58.0*   MCV 91.3 92.7 94.6   MCH 30.6 31.3 30.2   MCHC 33.5 33.7 31.9*   RDW 44.5 45.3 46.5   PLATELETCT 258 251 287   MPV 9.6 9.5 9.9       Recent Labs     09/28/23 1737 09/29/23  0601 09/30/23  0717   SODIUM 138 139 137   POTASSIUM 4.2 4.0 4.3   CHLORIDE 103 102 102   CO2 21 24 22   GLUCOSE 87 118* 144*   BUN 11 13 15   CREATININE 0.72 0.76 0.81   CALCIUM 10.2 9.3 10.1       Recent Labs      09/29/23  1125   INR 1.10               Imaging  DX-CHEST-PORTABLE (1 VIEW)   Final Result      No evidence of pneumothorax status post right thoracentesis.      CT-CTA CHEST PULMONARY ARTERY W/ RECONS   Final Result      1.  No CT evidence of pulmonary emboli.      2.  Large right pleural effusion with partial collapse of the right lower lobe.            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, estrogen receptor positive (HCC)- (present on admission)  Assessment & Plan  History of - with surgical resection and Radiation treatments    Type 2 diabetes mellitus without complication, without long-term current use of insulin (HCC)- (present on admission)  Assessment & Plan  Hold Metformin  stop SSI    Essential hypertension- (present on admission)  Assessment & Plan  IV Labetalol as needed with parameters    YOANA (obstructive sleep apnea)- (present on admission)  Assessment & Plan  Noncompliant with CPAP    Class 1 obesity in adult- (present on admission)  Assessment & Plan  Body mass index is 33.98 kg/m².           VTE prophylaxis:   SCDs/TEDs      I have performed a physical exam and reviewed and updated ROS and Plan today (9/30/2023). In review of yesterday's note (9/29/2023), there are no changes except as documented above.

## 2023-09-30 NOTE — PROGRESS NOTES
Pt. A&Ox4, VSS. Pt. Went for thoracentesis today. When pt. Arrived back on unit, pain was reported in shoulder and side. PRN medication and heat packs applied. Pt. Using call light appropriately, resting comfortably in bed. Will continue to monitor.

## 2023-10-01 ENCOUNTER — APPOINTMENT (OUTPATIENT)
Dept: RADIOLOGY | Facility: MEDICAL CENTER | Age: 58
DRG: 186 | End: 2023-10-01
Attending: FAMILY MEDICINE
Payer: MEDICARE

## 2023-10-01 ENCOUNTER — PHARMACY VISIT (OUTPATIENT)
Dept: PHARMACY | Facility: MEDICAL CENTER | Age: 58
End: 2023-10-01
Payer: COMMERCIAL

## 2023-10-01 VITALS
DIASTOLIC BLOOD PRESSURE: 88 MMHG | HEIGHT: 66 IN | RESPIRATION RATE: 18 BRPM | TEMPERATURE: 96.9 F | WEIGHT: 210.54 LBS | SYSTOLIC BLOOD PRESSURE: 142 MMHG | OXYGEN SATURATION: 96 % | BODY MASS INDEX: 33.84 KG/M2 | HEART RATE: 81 BPM

## 2023-10-01 LAB
ANION GAP SERPL CALC-SCNC: 9 MMOL/L (ref 7–16)
BUN SERPL-MCNC: 14 MG/DL (ref 8–22)
CALCIUM SERPL-MCNC: 10 MG/DL (ref 8.5–10.5)
CHLORIDE SERPL-SCNC: 100 MMOL/L (ref 96–112)
CO2 SERPL-SCNC: 29 MMOL/L (ref 20–33)
CREAT SERPL-MCNC: 0.8 MG/DL (ref 0.5–1.4)
ERYTHROCYTE [DISTWIDTH] IN BLOOD BY AUTOMATED COUNT: 45.4 FL (ref 35.9–50)
GFR SERPLBLD CREATININE-BSD FMLA CKD-EPI: 84 ML/MIN/1.73 M 2
GLUCOSE SERPL-MCNC: 109 MG/DL (ref 65–99)
HCT VFR BLD AUTO: 58.6 % (ref 37–47)
HGB BLD-MCNC: 19.3 G/DL (ref 12–16)
MCH RBC QN AUTO: 30.7 PG (ref 27–33)
MCHC RBC AUTO-ENTMCNC: 32.9 G/DL (ref 32.2–35.5)
MCV RBC AUTO: 93.2 FL (ref 81.4–97.8)
PLATELET # BLD AUTO: 309 K/UL (ref 164–446)
PMV BLD AUTO: 9.5 FL (ref 9–12.9)
POTASSIUM SERPL-SCNC: 4.4 MMOL/L (ref 3.6–5.5)
RBC # BLD AUTO: 6.29 M/UL (ref 4.2–5.4)
SODIUM SERPL-SCNC: 138 MMOL/L (ref 135–145)
WBC # BLD AUTO: 11 K/UL (ref 4.8–10.8)

## 2023-10-01 PROCEDURE — 700102 HCHG RX REV CODE 250 W/ 637 OVERRIDE(OP): Performed by: FAMILY MEDICINE

## 2023-10-01 PROCEDURE — 80048 BASIC METABOLIC PNL TOTAL CA: CPT

## 2023-10-01 PROCEDURE — RXMED WILLOW AMBULATORY MEDICATION CHARGE: Performed by: FAMILY MEDICINE

## 2023-10-01 PROCEDURE — 85027 COMPLETE CBC AUTOMATED: CPT

## 2023-10-01 PROCEDURE — 36415 COLL VENOUS BLD VENIPUNCTURE: CPT

## 2023-10-01 PROCEDURE — 71046 X-RAY EXAM CHEST 2 VIEWS: CPT

## 2023-10-01 PROCEDURE — A9270 NON-COVERED ITEM OR SERVICE: HCPCS | Performed by: FAMILY MEDICINE

## 2023-10-01 PROCEDURE — 99239 HOSP IP/OBS DSCHRG MGMT >30: CPT | Performed by: FAMILY MEDICINE

## 2023-10-01 RX ORDER — ZOLPIDEM TARTRATE 5 MG/1
5 TABLET ORAL NIGHTLY PRN
Qty: 5 TABLET | Refills: 0 | Status: SHIPPED | OUTPATIENT
Start: 2023-10-01 | End: 2023-10-06

## 2023-10-01 RX ORDER — GUAIFENESIN 600 MG/1
600 TABLET, EXTENDED RELEASE ORAL EVERY 12 HOURS
Qty: 30 TABLET | Refills: 0 | Status: SHIPPED | OUTPATIENT
Start: 2023-10-01 | End: 2023-12-04

## 2023-10-01 RX ADMIN — GUAIFENESIN 600 MG: 600 TABLET, EXTENDED RELEASE ORAL at 05:29

## 2023-10-01 NOTE — DISCHARGE SUMMARY
Discharge Summary    CHIEF COMPLAINT ON ADMISSION  Chief Complaint   Patient presents with    Sent from Urgent Care    Shortness of Breath       Reason for Admission  SENT BY MD      Admission Date  2023    CODE STATUS  Prior    HPI & HOSPITAL COURSE  This is a 58 y.o. female here with  large right pleural effusion, respiratory failure with hypoxia.  Ultrasound-guided thoracentesis was done by radiology, fluid was sent for analysis.  Patient was able to wean off oxygen supplementation, she did not require home O2 arrangements.  Fluid Gram stain showed no organisms seen, cultures are negative so far.  Fluid cytology is still pending.  Patient with history of breast CA underwent surgical resection and radiation treatments in the past.  She is being followed by oncology.  She also has known history of sleep apnea, noncompliant with CPAP.  She was highly encouraged to follow-up with pulmonary medicine and to resume CPAP.  She will need to follow-up with her oncologist with regards to the cytology results.  Repeat chest x-ray showed trace bilateral pleural effusions.  Plan of care and discharge plan was discussed with the patient's daughter who was at bedside.      Therefore, she is discharged in good and stable condition to home with close outpatient follow-up.    The patient met 2-midnight criteria for an inpatient stay at the time of discharge.    Discharge Date  10/1/2023    FOLLOW UP ITEMS POST DISCHARGE  Follow up as below    DISCHARGE DIAGNOSES  Principal Problem:    Large pleural effusion (POA: Yes)  Active Problems:    YOANA (obstructive sleep apnea) (POA: Yes)    Essential hypertension (POA: Yes)      Overview: This is a chronic condition.      Current Meds: none      Side effects: lisinopril 10 mg daily      Home BP Los-120s/70s      Associated symptoms: no cp, no sob          Type 2 diabetes mellitus without complication, without long-term current use of insulin (HCC) (POA: Yes)      Overview: This  is a chronic condition.      Current medications:       Insulin: -      Biguanide: metformin 1000 mg bid (tingling in legs)      GLP1-RA: Ozempic 0.5 mg every 7 days      SGLT-2i: -      DPP4-I: -      TZD: -      Maged: -      Sulfonyluria: -              Last A1c: 6.0% (6/2023);  9.7% (2/2023); 8.5% (7/2022); 12.2% (3/2022)      Last Microalb/Cr ratio: <30 (7/2022)      Fasting sugars: n/a      Last diabetic foot exam: 2/2023      Last retinal eye exam: 2/2022 - no DM retinopathy      ACEi/ARB: lisinopril 10 mg      Statin: atorvastatin 10 mg      Aspirin: no      Concomitant HTN: yes - above goal      Nightly foot checks: yes    Carcinoma of upper-outer quadrant of left breast in female, estrogen receptor positive (HCC) (POA: Yes)      Overview: 9/2021: L breast, s/p surgical resection + radiation, no chemo       Anastrozole 2/2022 -     Acute respiratory failure with hypoxia (HCC) (POA: Yes)    Chronic back pain (POA: Yes)    Class 1 obesity in adult (POA: Yes)      Overview: This is a chronic condition.      Max weight: 233 lbs (5/2020)      Current weight: 213 lbs      Weight change: - 15 lbs since 7/2022      BMI: 36.56      Diet: healthier choices      Exercise: none        Resolved Problems:    * No resolved hospital problems. *      FOLLOW UP  Future Appointments   Date Time Provider Department Center   10/12/2023  1:00 PM Zaina Porter M.D. [facility]   12/14/2023 10:40 AM Veronica Lyn M.D. [facility] M.D.  [address]  [place] [id number] [phone]    Follow up      Magdi Kline M.D.  [address]  [facility] [address]  [place] [address] [phone]    Follow up      [facility]    Follow up        MEDICATIONS ON DISCHARGE     Medication List        START taking these medications        Instructions   Mucinex 600 MG Tb12  Generic drug: guaiFENesin ER   Take 1 Tablet by mouth every 12 hours.  Dose: 600 mg     zolpidem 5 MG  "Tabs  Commonly known as: Ambien   Take 1 Tablet by mouth at bedtime as needed for Sleep for up to 5 days.  Dose: 5 mg            CONTINUE taking these medications        Instructions   acetaminophen 500 MG Tabs  Commonly known as: Tylenol   Take 1,000 mg by mouth 2 times a day as needed for Mild Pain. 2 tablets = 1,000 mg.  Dose: 1,000 mg     albuterol 108 (90 Base) MCG/ACT Aers inhalation aerosol   Inhale 2 Puffs every 6 hours as needed for Shortness of Breath.  Dose: 2 Puff     B-12 5000 MCG Caps   Take 5,000 mcg by mouth every evening.  Dose: 5,000 mcg     cyclobenzaprine 5 mg tablet  Commonly known as: Flexeril   Take 1-2 Tablets by mouth 3 times a day as needed for Moderate Pain.  Dose: 5-10 mg     diazepam 10 MG tablet  Commonly known as: Valium   Take 10 mg by mouth 1 time a day as needed (Muscle Spasms).  Dose: 10 mg     fluocinonide 0.05 % Crea  Commonly known as: Lidex   Apply 1 Application topically 2 times a day as needed (Rash, Itching). Apply to affected areas of back or buttocks. Do not use on face, armpit, or groin.  Dose: 1 Application     metFORMIN 500 MG Tabs  Commonly known as: Glucophage   Take 500 mg by mouth 2 times a day with meals.  Dose: 500 mg     Ozempic (0.25 or 0.5 MG/DOSE) 2 MG/3ML Sopn  Generic drug: Semaglutide(0.25 or 0.5MG/DOS)   Inject 0.5 mg under the skin every 7 days.  Dose: 0.5 mg            STOP taking these medications      doxycycline 100 MG Tabs  Commonly known as: Vibramycin              Allergies  Allergies   Allergen Reactions    Codeine Vomiting and Nausea    Hydrocodone Vomiting and Nausea    Other Drug Vomiting and Nausea     Any of the \"cets\" (percocet)       DIET  No orders of the defined types were placed in this encounter.      ACTIVITY  As tolerated.  Weight bearing as tolerated    CONSULTATIONS  None    PROCEDURES  Ultrasound-guided thoracenteses    LABORATORY  Lab Results   Component Value Date    SODIUM 138 10/01/2023    POTASSIUM 4.4 10/01/2023    CHLORIDE " 100 10/01/2023    CO2 29 10/01/2023    GLUCOSE 109 (H) 10/01/2023    BUN 14 10/01/2023    CREATININE 0.80 10/01/2023        Lab Results   Component Value Date    WBC 11.0 (H) 10/01/2023    HEMOGLOBIN 19.3 (H) 10/01/2023    HEMATOCRIT 58.6 (H) 10/01/2023    PLATELETCT 309 10/01/2023        Total time of the discharge process exceeds 40 minutes.

## 2023-10-01 NOTE — CARE PLAN
The patient is Stable - Low risk of patient condition declining or worsening    Shift Goals  Clinical Goals: monitor O2 and increase comfort  Patient Goals: getting discharged    Progress made toward(s) clinical / shift goals:  Patient reported discomfort at the thoracen    Patient is not progressing towards the following goals:

## 2023-10-01 NOTE — FACE TO FACE
"Face to Face Note  -  Durable Medical Equipment    Braxton Neil M.D. - NPI: [id number]  I certify that this patient is under my care and that they had a durable medical equipment(DME)face to face encounter by myself that meets the physician DME face-to-face encounter requirements with this patient on:    Date of encounter:   Patient:                    MRN:                       YOB: 2025  Slo Kenr  [id number]  1965     The encounter with the patient was in whole, or in part, for the following medical condition, which is the primary reason for durable medical equipment:  Other - Pleural effusion    I certify that, based on my findings, the following durable medical equipment is medically necessary:    Oxygen   HOME O2 Saturation Measurements:(Values must be present for Home Oxygen orders)  Room air sat at rest: 96  Room air sat with amb: 89  With liters of O2: 2, O2 sat at rest with O2: 96  With Liters of O2: na, O2 sat with amb with O2 : na  Is the patient mobile?: Yes  If patient feels more short of breath, they can go up to 6 liters per minute and contact healthcare provider.    Supporting Symptoms: The patient requires supplemental oxygen, as the following interventions have been tried with limited or no improvement: \"Incentive spirometry.    My Clinical findings support the need for the above equipment due to:  Hypoxia  "

## 2023-10-01 NOTE — CARE PLAN
The patient is Stable - Low risk of patient condition declining or worsening    Shift Goals  Clinical Goals: monitor O2 and increase comfort  Patient Goals: getting discharged    Progress made toward(s) clinical / shift goals:  Patient's O2 level was stable in 12 hours with her sleep apnea. Patient denied breathing problem and declined pain interventions during 12 hour shift    Patient is not progressing towards the following goals:

## 2023-10-02 LAB — CYTOLOGY REG CYTOL: NORMAL

## 2023-10-04 LAB
BACTERIA FLD AEROBE CULT: NORMAL
GRAM STN SPEC: NORMAL
SIGNIFICANT IND 70042: NORMAL
SITE SITE: NORMAL
SOURCE SOURCE: NORMAL

## 2023-10-06 PROBLEM — D58.2 ELEVATED HEMOGLOBIN (HCC): Status: ACTIVE | Noted: 2023-10-06

## 2023-10-06 PROBLEM — D72.829 LEUKOCYTOSIS: Status: ACTIVE | Noted: 2023-10-06

## 2023-10-10 ENCOUNTER — HOSPITAL ENCOUNTER (OUTPATIENT)
Dept: HEMATOLOGY ONCOLOGY | Facility: MEDICAL CENTER | Age: 58
End: 2023-10-10
Attending: INTERNAL MEDICINE
Payer: MEDICARE

## 2023-10-10 ENCOUNTER — PATIENT MESSAGE (OUTPATIENT)
Dept: RADIOLOGY | Facility: MEDICAL CENTER | Age: 58
End: 2023-10-10

## 2023-10-10 ENCOUNTER — HOSPITAL ENCOUNTER (OUTPATIENT)
Facility: MEDICAL CENTER | Age: 58
End: 2023-10-10
Attending: NURSE PRACTITIONER
Payer: MEDICARE

## 2023-10-10 VITALS
DIASTOLIC BLOOD PRESSURE: 78 MMHG | WEIGHT: 208.22 LBS | BODY MASS INDEX: 33.46 KG/M2 | OXYGEN SATURATION: 92 % | TEMPERATURE: 97.8 F | HEIGHT: 66 IN | HEART RATE: 98 BPM | SYSTOLIC BLOOD PRESSURE: 116 MMHG

## 2023-10-10 DIAGNOSIS — D75.1 POLYCYTHEMIA: ICD-10-CM

## 2023-10-10 DIAGNOSIS — R71.8 OTHER ABNORMALITY OF RED BLOOD CELLS: ICD-10-CM

## 2023-10-10 DIAGNOSIS — C50.412 CARCINOMA OF UPPER-OUTER QUADRANT OF LEFT BREAST IN FEMALE, ESTROGEN RECEPTOR POSITIVE (HCC): ICD-10-CM

## 2023-10-10 DIAGNOSIS — Z17.0 CARCINOMA OF UPPER-OUTER QUADRANT OF LEFT BREAST IN FEMALE, ESTROGEN RECEPTOR POSITIVE (HCC): ICD-10-CM

## 2023-10-10 LAB
CEA SERPL-MCNC: 1.4 NG/ML (ref 0–3)
FERRITIN SERPL-MCNC: 385 NG/ML (ref 10–291)
IRON SATN MFR SERPL: 23 % (ref 15–55)
IRON SERPL-MCNC: 62 UG/DL (ref 40–170)
TIBC SERPL-MCNC: 266 UG/DL (ref 250–450)
UIBC SERPL-MCNC: 204 UG/DL (ref 110–370)

## 2023-10-10 PROCEDURE — 83540 ASSAY OF IRON: CPT

## 2023-10-10 PROCEDURE — 82728 ASSAY OF FERRITIN: CPT

## 2023-10-10 PROCEDURE — 81338 MPL GENE COMMON VARIANTS: CPT

## 2023-10-10 PROCEDURE — 99212 OFFICE O/P EST SF 10 MIN: CPT | Performed by: INTERNAL MEDICINE

## 2023-10-10 PROCEDURE — 81219 CALR GENE COM VARIANTS: CPT

## 2023-10-10 PROCEDURE — 86300 IMMUNOASSAY TUMOR CA 15-3: CPT

## 2023-10-10 PROCEDURE — 81270 JAK2 GENE: CPT

## 2023-10-10 PROCEDURE — 99215 OFFICE O/P EST HI 40 MIN: CPT | Performed by: INTERNAL MEDICINE

## 2023-10-10 PROCEDURE — 82378 CARCINOEMBRYONIC ANTIGEN: CPT

## 2023-10-10 PROCEDURE — 83550 IRON BINDING TEST: CPT

## 2023-10-10 RX ORDER — ALBUTEROL SULFATE 90 UG/1
2 AEROSOL, METERED RESPIRATORY (INHALATION) EVERY 6 HOURS PRN
Qty: 6.7 EACH | Refills: 1 | Status: SHIPPED | OUTPATIENT
Start: 2023-10-10 | End: 2023-10-26 | Stop reason: SDUPTHER

## 2023-10-10 ASSESSMENT — PAIN SCALES - GENERAL: PAINLEVEL: 8=MODERATE-SEVERE PAIN

## 2023-10-10 ASSESSMENT — FIBROSIS 4 INDEX: FIB4 SCORE: 1.06

## 2023-10-10 NOTE — PROGRESS NOTES
Date of Followup: 10/10/23  Primary care physician:Veronica Lyn M.D.  Radiation oncology:Kem Infante  Surgery:Kayleen Sykes     Reason for Consultation:  Ms. Kern  is a pleasant 56 y.o. year old postmenopausal female with a history of poorly controlled diabetes mellitus who had a mammogram in fall 2021 which showed a 2.3 cm spiculated mass in the upper outer quadrant of the left breast.  Biopsy revealed an invasive ductal carcinoma, grade 1, ER/AZ positive HER-2 negative with a Ki-67 of 22%.  She is large breasted and elected to undergo a left lumpectomy and sentinel lymph node biopsy by Dr. Sykes on 11/12/2021.  Pathology revealed an invasive ductal carcinoma, grade 2 with associated intermediate grade ductal carcinoma in situ.  The invasive tumor measured 2.9 cm in greatest dimension and all margins were clear.  1 of 1 sentinel lymph nodes was positive for macro metastases, but no extranodal extension was identified.  Postoperative course was unremarkable.  She was seen by medical oncology and an Oncotype performed with results pending.  She had a CT scan of the chest abdomen pelvis which was negative for metastatic disease and a bone scan is pending.  In surgery she has been on a weight reduction diet and has reduced her smoking dramatically from greater than a pack a day to 3 to 4 cigarettes a day.  She feels generally well.  She is previously seen Dr. Kem Infante in consultation.     History of presenting illness:  Her Oncotype came back with a recurrence score of 20 therefore there was no indication for adjuvant chemotherapy.  She had a previously scheduled CT scan of the chest abdomen pelvis and bone scan and these were either for metastatic disease.  She underwent radiation therapy with 40 Gray over 20 doses completed this 1 February 2022.  She did develop moderate formation and erythema and is being treated for this.  She also started postoperative physical therapy for prevention of  lymphedema and is doing well from that perspective.  She is ready to initiate adjuvant endocrine therapy.     Interval history 7/28/2022: Started anastrozole in March 2022 and has been moderately adherent with it although she admits that she misses doses not infrequently.  She still has some tenderness in the left breast after radiation therapy but no nodularity.  She developed relatively severe COVID at the end of June that required Paxlovid and she was given oxygen 1 L/min but was never admitted to the hospital.  Her O2 sats apparently still vary somewhat.  She still has a dry cough but no sputum production or fever currently.  She has no sore throat.  Her energy level remains a little low.  Bone density was done recently and was within normal limits.    Interval history 10/10/2023: She has missed a couple of appointments with us.  She took anastrozole intermittently till March and has not had any since then.  Her diabetes was out of control but she started Ozempic a few months ago and this has helped her substantially.  A1c has come down to 6.  She was admitted to the hospital on 9/28/2023 with increasing shortness of breath and hypoxemia.  She was found to have a large right pleural effusion and an ultrasound-guided thoracentesis was done.  This showed an exudate with cytology positive for metastatic breast cancer ER +7080%, AL +10 to 20%, HER2 0.  She has a history of sleep apnea and had CPAP prescribed but had been previously noncompliant.  She is using it now.  CT scan of the chest showed a large pleural effusion but no other evidence of disease.  She has not noted any masses in her breasts or under her arms but does have low back pain.  She denies any neurologic symptoms.        Past Medical History        Past Medical History:   Diagnosis Date    Allergy      Back pain      Cancer (HCC) 2021     breast    Diabetes (HCC)       diet    GERD (gastroesophageal reflux disease)      High cholesterol       not  "medicated    Malignant neoplasm of upper-outer quadrant of left female breast (HCC)      Neck pain      Obesity      Sleep apnea       no cpap    Snoring           Past Surgical History         Past Surgical History:   Procedure Laterality Date    PB MASTECTOMY, PARTIAL Left 2021     Procedure: MASTECTOMY, PARTIAL - SAMARA LOCALIZED;  Surgeon: Kayleen Sykes M.D.;  Location: SURGERY SAME DAY [facility];  Service: General    NODE BIOPSY SENTINEL Left 2021     Procedure: BIOPSY, LYMPH NODE, SENTINEL;  Surgeon: Kayleen Sykes M.D.;  Location: SURGERY SAME DAY [facility];  Service: General    CARPAL TUNNEL RELEASE Right      ENDOMETRIAL ABLATION        FOOT SURGERY Right      KNEE ARTHROSCOPY        PRIMARY C SECTION         2                    Allergies   Allergen Reactions    Codeine Vomiting and Nausea    Other Drug Vomiting and Nausea       Any of the \"cets\" (percocet)    Vicodin [Hydrocodone-Acetaminophen] Vomiting and Nausea      Encounter Medications               Outpatient Encounter Medications as of 2022   Medication Sig Dispense Refill    ROPINIRole (REQUIP) 0.25 MG Tab TAKE 1 TABLET BY MOUTH AT BEDTIME. TAKE 1-3 HOURS BEFORE BEDTIME. 90 Tablet 0    silver sulfADIAZINE (SILVADENE) 1 % Cream Apply 1/8 inch layer to affected area  g 2    ondansetron (ZOFRAN ODT) 4 MG TABLET DISPERSIBLE Take 1 Tablet by mouth every 6 hours as needed. 10 Tablet 0    albuterol 108 (90 Base) MCG/ACT Aero Soln inhalation aerosol INHALE 2 PUFFS BY MOUTH EVERY 6 HOURS AS NEEDED FOR SHORTNESS OF BREATH 2 Each 2    nicotine polacrilex (NICOTINE MINI) 2 MG lozenge Place 1 Lozenge into mouth between cheek and gum every 1 hour as needed. 360 Lozenge 0    lidocaine-prilocaine (EMLA) 2.5-2.5 % Cream          cyclobenzaprine (FLEXERIL) 5 MG tablet Take 1-2 Tabs by mouth 3 times a day as needed. 90 Tab 1    diazepam (VALIUM) 10 MG tablet Take 10 mg by mouth 1 time daily as needed (muscle spasms, back pain).        "   No facility-administered encounter medications on file as of 2022.         @[facility]@  Social History                   Socioeconomic History    Marital status:        Spouse name: Not on file    Number of children: Not on file    Years of education: Not on file    Highest education level: Not on file   Occupational History    Not on file   Tobacco Use    Smoking status: Current Every Day Smoker       Packs/day: 0.25       Years: 10.00       Pack years: 2.50       Types: Cigarettes    Smokeless tobacco: Never Used    Tobacco comment: working on quitting    Vaping Use    Vaping Use: Never used   Substance and Sexual Activity    Alcohol use: No    Drug use: No    Sexual activity: Yes       Partners: Male       Comment:    Other Topics Concern    Not on file   Social History Narrative    Not on file      Social Determinants of Health      Financial Resource Strain: Not on file   Food Insecurity: Not on file   Transportation Needs: Not on file   Physical Activity: Not on file   Stress: Not on file   Social Connections: Not on file   Intimate Partner Violence: Not on file   Housing Stability: Not on file         Social History              Tobacco Use   Smoking Status Current Every Day Smoker    Packs/day: 0.25    Years: 10.00    Pack years: 2.50    Types: Cigarettes   Smokeless Tobacco Never Used   Tobacco Comment     working on quitting       Social History            Substance and Sexual Activity   Alcohol Use No      Social History            Substance and Sexual Activity   Drug Use No                                        OB History    Para Term  AB Living   5       2     SAB IAB Ectopic Molar Multiple Live Births     2                 # Outcome Date GA Lbr Valentin/2nd Weight Sex Delivery Anes PTL Lv   5                      4                      3                      2 SAB                     1 SAB                        No LMP recorded. Patient has had an  ablation.     G P A L      Family History             Family History   Problem Relation Age of Onset    Cancer Mother           ovarian cancer / malanoma    Hypertension Mother      Other Mother           THYROID    Heart Disease Mother           valvular disease    Heart Disease Father           Heart Attack    Heart Attack Father      Other Father           psoriasis    Hyperlipidemia Brother      Other Brother           psoriasis    Heart Disease Brother           MI    Heart Attack Brother           massive heart attack     Other Other           psoriasis    Heart Disease Maternal Grandfather           MI    Diabetes Neg Hx      Alcohol/Drug Neg Hx                 Review of Systems   Constitutional: Negative for malaise/fatigue and weight loss.   HENT: Negative.    Eyes: Negative.    Respiratory: Shortness of breath mild cough and mild right-sided chest pain where she had paracentesis  Cardiovascular: Negative.    Gastrointestinal: Negative for diarrhea, nausea and vomiting.   Genitourinary: Negative.    Musculoskeletal: Negative.    Skin: Negative.    Neurological: Negative for dizziness and weakness.   Endo/Heme/Allergies: Negative.    Psychiatric/Behavioral: Negative for memory loss.   All other systems reviewed and are negative.                            Objective         Physical Exam  Vitals  reviewed.   Constitutional:       Appearance: Her face is extremely alvaro  Eyes:      Extraocular Movements: Extraocular movements intact.      Pupils: Pupils are equal, round, and reactive to light.   Cardiovascular:      Rate and Rhythm: Normal rate and regular rhythm.   Pulmonary:      Effort: Pulmonary effort is normal.      Breath sounds: Normal breath sounds.   Chest:   Breasts:      Left: Inverted nipple:         Comments: 10/10/2028.  Both breasts without masses nipple discharge or tenderness.  Cystic lesion in the upper inner quadrant of the skin on the right breast is stable.  Left breast shows 1+  hyperpigmentation from radiation and some mild peau d'orange from radiation without masses nipple discharge or tenderness..  Right breast shows a stable cystic lesion in the upper inner quadrant skin no masses nipple discharge or tenderness noted.  Abdominal:      General: Bowel sounds are normal.      Palpations: Abdomen is soft.   Musculoskeletal:         General: Normal range of motion.   Skin:     General: Skin is warm and dry.   Neurological:      General: No focal deficit present.      Mental Status: She is alert.                     Assessment      1.  Infiltrating ductal carcinoma of the left breast, grade 2, stage anatomic to be (pT2, PN 1A) status post left lumpectomy, ER positive, KS positive, HER-2 negative, Ki-67 22%, Oncotype DX current score 20.  Status post whole breast radiation therapy on endocrine therapy with anastrozole since March 2022 with moderate adherence, off completely since March 2023  2.  Diabetes mellitus under fair control  3.  Tobacco abuse improving  4.  Hypertension  5.  COVID-19 infection June 2022 resolved  6.  Recurrent/metastatic breast cancer to right pleura, ER positive, KS slightly positive HER2 negative  7.  Long history of polycythemia for at least the past 5 years.  Now with hemoglobin greater than 19 and hematocrit of 58.6 with a white count of 11,000 strongly suggestive of myeloproliferative disorder.    Plan: We are going to complete her metastatic staging work-up with a PET scan.  We will draw define MBC and foundation 1 testing today.  Also checking JAK2 with reflex testing if necessary to rule out an MPN she will have 1 unit phlebotomy either by transfusion or coming back to infusion center.  I will see her back in 2 weeks to review the results and likely start fulvestrant and ribociclib.

## 2023-10-10 NOTE — PROGRESS NOTES
Date of Followup: 10/10/2023  Primary care physician:Veronica Lyn M.D.  Radiation oncology:Kem Infante  Surgery:Kayleen Sykes     Reason for Consultation:  Ms. Kern  is a pleasant 56 y.o. year old postmenopausal female with a history of poorly controlled diabetes mellitus who had a mammogram in fall 2021 which showed a 2.3 cm spiculated mass in the upper outer quadrant of the left breast.  Biopsy revealed an invasive ductal carcinoma, grade 1, ER/LA positive HER-2 negative with a Ki-67 of 22%.  She is large breasted and elected to undergo a left lumpectomy and sentinel lymph node biopsy by Dr. Sykes on 11/12/2021.  Pathology revealed an invasive ductal carcinoma, grade 2 with associated intermediate grade ductal carcinoma in situ.  The invasive tumor measured 2.9 cm in greatest dimension and all margins were clear.  1 of 1 sentinel lymph nodes was positive for macro metastases, but no extranodal extension was identified.  Postoperative course was unremarkable.  She was seen by medical oncology and an Oncotype performed with results pending.  She had a CT scan of the chest abdomen pelvis which was negative for metastatic disease and a bone scan is pending.  In surgery she has been on a weight reduction diet and has reduced her smoking dramatically from greater than a pack a day to 3 to 4 cigarettes a day.  She feels generally well.  She is previously seen Dr. Kem Infante in consultation.     History of presenting illness:  Her Oncotype came back with a recurrence score of 20 therefore there was no indication for adjuvant chemotherapy.  She had a previously scheduled CT scan of the chest abdomen pelvis and bone scan and these were either for metastatic disease.  She underwent radiation therapy with 40 Gray over 20 doses completed this 1 February 2022.  She did develop moderate formation and erythema and is being treated for this.  She also started postoperative physical therapy for prevention of  lymphedema and is doing well from that perspective.  She is ready to initiate adjuvant endocrine therapy.     Interval history 7/28/2022: Started anastrozole in March 2022 and has been moderately adherent with it although she admits that she misses doses not infrequently.  She still has some tenderness in the left breast after radiation therapy but no nodularity.  She developed relatively severe COVID at the end of June that required Paxlovid and she was given oxygen 1 L/min but was never admitted to the hospital.  Her O2 sats apparently still vary somewhat.  She still has a dry cough but no sputum production or fever currently.  She has no sore throat.  Her energy level remains a little low.  Bone density was done recently and was within normal limits.    Interval history 10/10/2023: She continued on anastrozole intermittently but discontinued it completely in March 2023.  Her diabetes was out of control at that time but it is gotten much better on Ozempic with her A1c coming down from 9-6.0.  She developed shortness of breath and was admitted to the hospital on 9/28/2023 with a large right pleural effusion.  Thoracentesis showed an exudate with metastatic breast cancer, ER positive TX slightly positive HER2 negative.  She felt better after she had her tap.  CT scan of the chest was negative except for the pleural effusion.  She has a history of struct of sleep apnea and was urged to use her CPAP again.  O2 sats are running low 90s at home now.  She denies any breast masses or other nodularity.  She has some low back pain.  No neurologic symptoms.        Past Medical History        Past Medical History:   Diagnosis Date    Allergy      Back pain      Cancer (HCC) 2021     breast    Diabetes (HCC)       diet    GERD (gastroesophageal reflux disease)      High cholesterol       not medicated    Malignant neoplasm of upper-outer quadrant of left female breast (HCC)      Neck pain      Obesity      Sleep apnea       no  "cpap    Snoring           Past Surgical History         Past Surgical History:   Procedure Laterality Date    PB MASTECTOMY, PARTIAL Left 2021     Procedure: MASTECTOMY, PARTIAL - SAMARA LOCALIZED;  Surgeon: Kayleen Sykes M.D.;  Location: SURGERY SAME DAY [facility];  Service: General    NODE BIOPSY SENTINEL Left 2021     Procedure: BIOPSY, LYMPH NODE, SENTINEL;  Surgeon: Kayleen Sykes M.D.;  Location: SURGERY SAME DAY [facility];  Service: General    CARPAL TUNNEL RELEASE Right      ENDOMETRIAL ABLATION        FOOT SURGERY Right      KNEE ARTHROSCOPY        PRIMARY C SECTION         2                    Allergies   Allergen Reactions    Codeine Vomiting and Nausea    Other Drug Vomiting and Nausea       Any of the \"cets\" (percocet)    Vicodin [Hydrocodone-Acetaminophen] Vomiting and Nausea      Encounter Medications               Outpatient Encounter Medications as of 2022   Medication Sig Dispense Refill    ROPINIRole (REQUIP) 0.25 MG Tab TAKE 1 TABLET BY MOUTH AT BEDTIME. TAKE 1-3 HOURS BEFORE BEDTIME. 90 Tablet 0    silver sulfADIAZINE (SILVADENE) 1 % Cream Apply 1/8 inch layer to affected area  g 2    ondansetron (ZOFRAN ODT) 4 MG TABLET DISPERSIBLE Take 1 Tablet by mouth every 6 hours as needed. 10 Tablet 0    albuterol 108 (90 Base) MCG/ACT Aero Soln inhalation aerosol INHALE 2 PUFFS BY MOUTH EVERY 6 HOURS AS NEEDED FOR SHORTNESS OF BREATH 2 Each 2    nicotine polacrilex (NICOTINE MINI) 2 MG lozenge Place 1 Lozenge into mouth between cheek and gum every 1 hour as needed. 360 Lozenge 0    lidocaine-prilocaine (EMLA) 2.5-2.5 % Cream          cyclobenzaprine (FLEXERIL) 5 MG tablet Take 1-2 Tabs by mouth 3 times a day as needed. 90 Tab 1    diazepam (VALIUM) 10 MG tablet Take 10 mg by mouth 1 time daily as needed (muscle spasms, back pain).          No facility-administered encounter medications on file as of 2022.         @[facility]@  Social History                   Socioeconomic " History    Marital status:        Spouse name: Not on file    Number of children: Not on file    Years of education: Not on file    Highest education level: Not on file   Occupational History    Not on file   Tobacco Use    Smoking status: Current Every Day Smoker       Packs/day: 0.25       Years: 10.00       Pack years: 2.50       Types: Cigarettes    Smokeless tobacco: Never Used    Tobacco comment: working on quitting    Vaping Use    Vaping Use: Never used   Substance and Sexual Activity    Alcohol use: No    Drug use: No    Sexual activity: Yes       Partners: Male       Comment:    Other Topics Concern    Not on file   Social History Narrative    Not on file      Social Determinants of Health      Financial Resource Strain: Not on file   Food Insecurity: Not on file   Transportation Needs: Not on file   Physical Activity: Not on file   Stress: Not on file   Social Connections: Not on file   Intimate Partner Violence: Not on file   Housing Stability: Not on file         Social History              Tobacco Use   Smoking Status Current Every Day Smoker    Packs/day: 0.25    Years: 10.00    Pack years: 2.50    Types: Cigarettes   Smokeless Tobacco Never Used   Tobacco Comment     working on quitting       Social History            Substance and Sexual Activity   Alcohol Use No      Social History            Substance and Sexual Activity   Drug Use No                                        OB History    Para Term  AB Living   5       2     SAB IAB Ectopic Molar Multiple Live Births     2                 # Outcome Date GA Lbr Valentin/2nd Weight Sex Delivery Anes PTL Lv   5                      4                      3                      2 SAB                     1 SAB                        No LMP recorded. Patient has had an ablation.     G P A L      Family History             Family History   Problem Relation Age of Onset    Cancer Mother           ovarian cancer /  malanoma    Hypertension Mother      Other Mother           THYROID    Heart Disease Mother           valvular disease    Heart Disease Father           Heart Attack    Heart Attack Father      Other Father           psoriasis    Hyperlipidemia Brother      Other Brother           psoriasis    Heart Disease Brother           MI    Heart Attack Brother           massive heart attack     Other Other           psoriasis    Heart Disease Maternal Grandfather           MI    Diabetes Neg Hx      Alcohol/Drug Neg Hx                 Review of Systems   Constitutional: Negative for malaise/fatigue and weight loss.   HENT: Negative.    Eyes: Negative.    Respiratory: Negative.    Cardiovascular: Negative.    Gastrointestinal: Negative for diarrhea, nausea and vomiting.   Genitourinary: Negative.    Musculoskeletal: Negative.    Skin: Negative.    Neurological: Negative for dizziness and weakness.   Endo/Heme/Allergies: Negative.    Psychiatric/Behavioral: Negative for memory loss.   All other systems reviewed and are negative.                            Objective         Physical Exam  Vitals  reviewed.   Constitutional:       Appearance: Normal appearance.   Eyes:      Extraocular Movements: Extraocular movements intact.      Pupils: Pupils are equal, round, and reactive to light.   Cardiovascular:      Rate and Rhythm: Normal rate and regular rhythm.   Pulmonary:      Effort: Pulmonary effort is normal.      Breath sounds: Normal breath sounds.   Chest:   Breasts:      Left: Inverted nipple:         Comments: Left breast shows 1+ hyperpigmentation from radiation and some mild peau d'orange from radiation without masses nipple discharge or tenderness..  Right breast shows a stable cystic lesion in the upper inner quadrant skin no masses nipple discharge or tenderness noted.  Abdominal:      General: Bowel sounds are normal.      Palpations: Abdomen is soft.   Musculoskeletal:         General: Normal range of motion.    Skin:     General: Skin is warm and dry.   Neurological:      General: No focal deficit present.      Mental Status: She is alert.                  Assessment      1.  Infiltrating ductal carcinoma of the left breast, grade 2, stage anatomic to be (pT2, PN 1A) status post left lumpectomy, ER positive, HI positive, HER-2 negative, Ki-67 22%, Oncotype DX current score 20.  Status post whole breast radiation therapy on endocrine therapy with anastrozole from March 2022 through March 2020 through with moderate adherence  2.  Diabetes mellitus under fair control  3.  Tobacco abuse improving  4.  Hypertension  5.  COVID-19 infection June 2022 resolved  6.  Malignant right pleural effusion from breast cancer, ER positive, HI slightly positive, HER2 negative  7.  Longstanding polycythemia dating back at least 5-year.  Now worse with hemoglobin 19 hematocrit 57    Plan: We are going to complete staging work-up with a PET CT scan.  We will send defined MBC and foundation 1 today.  I am checking her for JAK2 and MPN.  We will do a 1 unit phlebotomy.  I will see her back in 2 weeks and initiate therapy likely with fulvestrant and ribociclib.  Magdi Kline MD

## 2023-10-12 ENCOUNTER — OFFICE VISIT (OUTPATIENT)
Dept: DERMATOLOGY | Facility: IMAGING CENTER | Age: 58
End: 2023-10-12
Payer: MEDICARE

## 2023-10-12 VITALS
WEIGHT: 206 LBS | HEIGHT: 66 IN | BODY MASS INDEX: 33.11 KG/M2 | TEMPERATURE: 97.9 F | SYSTOLIC BLOOD PRESSURE: 128 MMHG | DIASTOLIC BLOOD PRESSURE: 78 MMHG

## 2023-10-12 DIAGNOSIS — L72.3 SEBACEOUS CYST: ICD-10-CM

## 2023-10-12 LAB — CANCER AG27-29 SERPL-ACNC: 235.5 U/ML

## 2023-10-12 PROCEDURE — 3078F DIAST BP <80 MM HG: CPT | Performed by: DERMATOLOGY

## 2023-10-12 PROCEDURE — 12032 INTMD RPR S/A/T/EXT 2.6-7.5: CPT | Performed by: DERMATOLOGY

## 2023-10-12 PROCEDURE — 3074F SYST BP LT 130 MM HG: CPT | Performed by: DERMATOLOGY

## 2023-10-12 PROCEDURE — 11402 EXC TR-EXT B9+MARG 1.1-2 CM: CPT | Performed by: DERMATOLOGY

## 2023-10-12 RX ORDER — SODIUM CHLORIDE 9 MG/ML
500 INJECTION, SOLUTION INTRAVENOUS ONCE
Status: CANCELLED | OUTPATIENT
Start: 2023-10-12 | End: 2023-10-12

## 2023-10-12 ASSESSMENT — FIBROSIS 4 INDEX: FIB4 SCORE: 1.06

## 2023-10-12 NOTE — PROGRESS NOTES
BENIGN EXCISION PROCEDURE NOTE:    Risks, benefits and alternatives of procedure, including, but not limited to scar/poor cosmetic outcome, bleeding, pain, infection, nerve damage, recurrence of lesion, failed surgery, and need for further surgery, were discussed and written informed consent obtained. Verbal time out completed.     Allergies reviewed: Yes  Pacemaker/defibrillator: No  Artificial joints: No  Antibiotics given: No  Blood thinners/anticoagulants: No    Pre-op diagnosis: cyst/R20.8 (disturbance of skin)  Post-op diagnosis: Same  Indication: symptomatic, odor    Site:chest  Pre-op size:2cm  Post-op antibiotics: no    There were no vitals taken for this visit.    Procedure: Area of surgery was prepped with alcohol, marked with a sterile marking pen. Anesthesia with 1% lidocaine with epinephrine administered with 30 gauge needle. The area was again cleaned with chlorhexidine swab. With sterile technique, a 15 blade scalpel was used to make a elliptical incision around/over the lesion to the level of the subcutaneous fat. Dissection was completed with iris/tissue scissors, and the mass was removed. Hemostasis was achieved with electrodessication.  Specimen was placed into biopsy container and sent to pathology by staff.    Intermediate closure note:  4.0 monocryl buried vertical mattress sutures were placed to close dead space. 4.0 prolene superficial running sutures were placed to approximate wound edge.  Vaseline applied to wound with bandage. Patient tolerated procedure well and there were no complications, blood loss was minimal.     Final wound size: 6cm    Bandage was placed with vaseline, telfa, gauze and tape. Wound care was discussed with the patient, and written instructions were provided. Patient to return to clinic in 10-14 days for suture removal. Patient to call us if any problems or concerns with the procedure site arise prior to scheduled appointment.    Zaina Porter MD

## 2023-10-13 ENCOUNTER — OUTPATIENT INFUSION SERVICES (OUTPATIENT)
Dept: ONCOLOGY | Facility: MEDICAL CENTER | Age: 58
End: 2023-10-13
Attending: STUDENT IN AN ORGANIZED HEALTH CARE EDUCATION/TRAINING PROGRAM
Payer: MEDICARE

## 2023-10-13 ENCOUNTER — HOSPITAL ENCOUNTER (OUTPATIENT)
Dept: RADIOLOGY | Facility: MEDICAL CENTER | Age: 58
End: 2023-10-13
Attending: PHYSICIAN ASSISTANT
Payer: MEDICARE

## 2023-10-13 VITALS
BODY MASS INDEX: 36.02 KG/M2 | DIASTOLIC BLOOD PRESSURE: 71 MMHG | WEIGHT: 210.98 LBS | HEIGHT: 64 IN | HEART RATE: 89 BPM | SYSTOLIC BLOOD PRESSURE: 110 MMHG | TEMPERATURE: 97.4 F | RESPIRATION RATE: 18 BRPM | OXYGEN SATURATION: 91 %

## 2023-10-13 DIAGNOSIS — D58.2 ELEVATED HEMOGLOBIN (HCC): ICD-10-CM

## 2023-10-13 PROBLEM — J96.11 CHRONIC RESPIRATORY FAILURE WITH HYPOXIA (HCC): Status: ACTIVE | Noted: 2023-10-13

## 2023-10-13 LAB
FERRITIN SERPL-MCNC: 368 NG/ML (ref 10–291)
HCT VFR BLD AUTO: 54.8 % (ref 37–47)
HGB BLD-MCNC: 18 G/DL (ref 12–16)

## 2023-10-13 PROCEDURE — 99195 PHLEBOTOMY: CPT

## 2023-10-13 PROCEDURE — 71046 X-RAY EXAM CHEST 2 VIEWS: CPT

## 2023-10-13 PROCEDURE — 85018 HEMOGLOBIN: CPT

## 2023-10-13 PROCEDURE — 85014 HEMATOCRIT: CPT

## 2023-10-13 PROCEDURE — 82728 ASSAY OF FERRITIN: CPT

## 2023-10-13 RX ORDER — SODIUM CHLORIDE 9 MG/ML
500 INJECTION, SOLUTION INTRAVENOUS ONCE
Status: CANCELLED | OUTPATIENT
Start: 2023-10-13 | End: 2023-10-13

## 2023-10-13 ASSESSMENT — FIBROSIS 4 INDEX: FIB4 SCORE: 1.06

## 2023-10-13 NOTE — PROGRESS NOTES
Ms Kern is here today for therapeutic phlebotomy.      This is her first TP.    She has no new concerns to report.     20 g IV, labs drawn.  HGB  18.    250 ML of whole blood removed and was tolerated well.     Orthostatic VS were stable. Pt denied any dizziness or lightheadedness.    IV removed, gauze and coban applied to the site.    Ms Kern was discharged in stable condition.

## 2023-10-14 ENCOUNTER — APPOINTMENT (OUTPATIENT)
Dept: RADIOLOGY | Facility: MEDICAL CENTER | Age: 58
End: 2023-10-14
Attending: EMERGENCY MEDICINE
Payer: MEDICARE

## 2023-10-14 ENCOUNTER — HOSPITAL ENCOUNTER (EMERGENCY)
Facility: MEDICAL CENTER | Age: 58
End: 2023-10-14
Attending: EMERGENCY MEDICINE
Payer: MEDICARE

## 2023-10-14 VITALS
SYSTOLIC BLOOD PRESSURE: 127 MMHG | OXYGEN SATURATION: 93 % | WEIGHT: 208.11 LBS | RESPIRATION RATE: 15 BRPM | HEART RATE: 93 BPM | TEMPERATURE: 97.6 F | BODY MASS INDEX: 33.45 KG/M2 | HEIGHT: 66 IN | DIASTOLIC BLOOD PRESSURE: 69 MMHG

## 2023-10-14 DIAGNOSIS — J90 LARGE PLEURAL EFFUSION: ICD-10-CM

## 2023-10-14 LAB
ALBUMIN SERPL BCP-MCNC: 4.1 G/DL (ref 3.2–4.9)
ALBUMIN/GLOB SERPL: 1.2 G/DL
ALP SERPL-CCNC: 121 U/L (ref 30–99)
ALT SERPL-CCNC: 22 U/L (ref 2–50)
ANION GAP SERPL CALC-SCNC: 12 MMOL/L (ref 7–16)
AST SERPL-CCNC: 25 U/L (ref 12–45)
BASOPHILS # BLD AUTO: 0.4 % (ref 0–1.8)
BASOPHILS # BLD: 0.05 K/UL (ref 0–0.12)
BILIRUB SERPL-MCNC: 0.3 MG/DL (ref 0.1–1.5)
BUN SERPL-MCNC: 12 MG/DL (ref 8–22)
CALCIUM ALBUM COR SERPL-MCNC: 10.1 MG/DL (ref 8.5–10.5)
CALCIUM SERPL-MCNC: 10.2 MG/DL (ref 8.5–10.5)
CHLORIDE SERPL-SCNC: 102 MMOL/L (ref 96–112)
CO2 SERPL-SCNC: 24 MMOL/L (ref 20–33)
CREAT SERPL-MCNC: 0.68 MG/DL (ref 0.5–1.4)
EKG IMPRESSION: NORMAL
EOSINOPHIL # BLD AUTO: 0.17 K/UL (ref 0–0.51)
EOSINOPHIL NFR BLD: 1.4 % (ref 0–6.9)
ERYTHROCYTE [DISTWIDTH] IN BLOOD BY AUTOMATED COUNT: 44.2 FL (ref 35.9–50)
GFR SERPLBLD CREATININE-BSD FMLA CKD-EPI: 99 ML/MIN/1.73 M 2
GLOBULIN SER CALC-MCNC: 3.5 G/DL (ref 1.9–3.5)
GLUCOSE SERPL-MCNC: 177 MG/DL (ref 65–99)
HCT VFR BLD AUTO: 50.2 % (ref 37–47)
HGB BLD-MCNC: 16.9 G/DL (ref 12–16)
IMM GRANULOCYTES # BLD AUTO: 0.04 K/UL (ref 0–0.11)
IMM GRANULOCYTES NFR BLD AUTO: 0.3 % (ref 0–0.9)
INR PPP: 1.01 (ref 0.87–1.13)
LYMPHOCYTES # BLD AUTO: 1.61 K/UL (ref 1–4.8)
LYMPHOCYTES NFR BLD: 13.7 % (ref 22–41)
MCH RBC QN AUTO: 31.1 PG (ref 27–33)
MCHC RBC AUTO-ENTMCNC: 33.7 G/DL (ref 32.2–35.5)
MCV RBC AUTO: 92.4 FL (ref 81.4–97.8)
MONOCYTES # BLD AUTO: 0.78 K/UL (ref 0–0.85)
MONOCYTES NFR BLD AUTO: 6.6 % (ref 0–13.4)
NEUTROPHILS # BLD AUTO: 9.09 K/UL (ref 1.82–7.42)
NEUTROPHILS NFR BLD: 77.6 % (ref 44–72)
NRBC # BLD AUTO: 0 K/UL
NRBC BLD-RTO: 0 /100 WBC (ref 0–0.2)
PLATELET # BLD AUTO: 352 K/UL (ref 164–446)
PMV BLD AUTO: 9.2 FL (ref 9–12.9)
POTASSIUM SERPL-SCNC: 4.5 MMOL/L (ref 3.6–5.5)
PROT SERPL-MCNC: 7.6 G/DL (ref 6–8.2)
PROTHROMBIN TIME: 13.4 SEC (ref 12–14.6)
RBC # BLD AUTO: 5.43 M/UL (ref 4.2–5.4)
SODIUM SERPL-SCNC: 138 MMOL/L (ref 135–145)
WBC # BLD AUTO: 11.7 K/UL (ref 4.8–10.8)

## 2023-10-14 PROCEDURE — 99283 EMERGENCY DEPT VISIT LOW MDM: CPT

## 2023-10-14 PROCEDURE — 85610 PROTHROMBIN TIME: CPT

## 2023-10-14 PROCEDURE — 93005 ELECTROCARDIOGRAM TRACING: CPT | Performed by: EMERGENCY MEDICINE

## 2023-10-14 PROCEDURE — 36415 COLL VENOUS BLD VENIPUNCTURE: CPT

## 2023-10-14 PROCEDURE — 80053 COMPREHEN METABOLIC PANEL: CPT

## 2023-10-14 PROCEDURE — 85025 COMPLETE CBC W/AUTO DIFF WBC: CPT

## 2023-10-14 PROCEDURE — 93005 ELECTROCARDIOGRAM TRACING: CPT

## 2023-10-14 RX ORDER — ONDANSETRON 4 MG/1
4 TABLET, ORALLY DISINTEGRATING ORAL EVERY 6 HOURS PRN
Qty: 10 TABLET | Refills: 0 | Status: SHIPPED | OUTPATIENT
Start: 2023-10-14 | End: 2023-10-26 | Stop reason: SDUPTHER

## 2023-10-14 RX ORDER — MORPHINE SULFATE 15 MG/1
15 TABLET ORAL EVERY 4 HOURS PRN
Qty: 10 TABLET | Refills: 0 | Status: SHIPPED | OUTPATIENT
Start: 2023-10-14 | End: 2023-10-17

## 2023-10-14 RX ORDER — ONDANSETRON 4 MG/1
4 TABLET, ORALLY DISINTEGRATING ORAL EVERY 6 HOURS PRN
Qty: 10 TABLET | Refills: 0 | Status: SHIPPED | OUTPATIENT
Start: 2023-10-14 | End: 2023-10-14 | Stop reason: SDUPTHER

## 2023-10-14 RX ORDER — MORPHINE SULFATE 15 MG/1
15 TABLET ORAL EVERY 4 HOURS PRN
Qty: 10 TABLET | Refills: 0 | Status: SHIPPED | OUTPATIENT
Start: 2023-10-14 | End: 2023-10-14 | Stop reason: SDUPTHER

## 2023-10-14 ASSESSMENT — FIBROSIS 4 INDEX: FIB4 SCORE: 1.06

## 2023-10-14 NOTE — ED TRIAGE NOTES
Chief Complaint   Patient presents with    Sent by MD     Pleural effusion drainage 2 weeks ago, recent admit to [facility].  Home health yesterday ordered CXR, recommended visit to ED today.    Shortness of Breath     X2 weeks, intermittent, speaking in full sentences, no distress noted.     Patient to triage via ambulation, with a stady gait, patient A&O x4.  Appropriate precautions in place.     Explained wait time and triage process to pt. Pt placed back in lobby, told to notify ED tech or triage RN of any changes, verbalized understanding.

## 2023-10-14 NOTE — ED PROVIDER NOTES
ED Provider Note    Primary care provider: Veronica Lyn M.D.    CHIEF COMPLAINT  Chief Complaint   Patient presents with    Sent by MD     Pleural effusion drainage 2 weeks ago, recent admit to [facility].  Home health yesterday ordered CXR, recommended visit to ED today.    Shortness of Breath     X2 weeks, intermittent, speaking in full sentences, no distress noted.     HPI  Sol Kern is a 58 y.o. female who presents to the Emergency Department for recurrent pleural effusions and shortness of breath.  The patient had a thoracentesis about 2.5 weeks ago, over the past several days she has had increasing shortness of breath, cannot lay flat, has difficulty sleeping.  She does have home oxygen that she is supposed to wear at night but has not really needed it until lately.  She denies any fevers or chills.  She is hoping to get a thoracentesis today.  Last p.o. intake about 2 hours ago.  Not on any anticoagulation.      External Record Review: History of infiltrating ductal carcinoma of the left breast, grade 2, stage anatomic to be (pT2, PN 1A) status post left lumpectomy, ER positive, MA positive, HER-2 negative, Ki-67 22%, Oncotype DX current score 20.  Status post whole breast radiation therapy on endocrine therapy with anastrozole from March 2022 through March 2020.  Patient was admitted to our facility 2 weeks ago for shortness of breath, large pleural effusion, underwent thoracentesis that is positive for metastatic disease.  Followed up with oncology on 10/10, plan to initiate adjuvant chemotherapy.  She had a follow-up chest x-ray done yesterday that showed recurrence of large pleural effusion.    REVIEW OF SYSTEMS  See HPI.     PAST MEDICAL HISTORY   has a past medical history of Allergy, Back pain, Cancer (HCC) (2021), Diabetes (HCC), GERD (gastroesophageal reflux disease), High cholesterol, Hypertension, Malignant neoplasm of upper-outer quadrant of left female breast (HCC), Neck pain, Obesity, Sleep  "apnea, and Snoring.    SURGICAL HISTORY   has a past surgical history that includes endometrial ablation; primary c section; knee arthroscopy; foot surgery (Right); carpal tunnel release (Right); mastectomy, partial (Left, 11/12/2021); node biopsy sentinel (Left, 11/12/2021); and tubal coagulation laparoscopic bilateral.    SOCIAL HISTORY  Social History     Tobacco Use    Smoking status: Former     Current packs/day: 0.25     Average packs/day: 0.3 packs/day for 20.0 years (5.0 ttl pk-yrs)     Types: Cigarettes    Smokeless tobacco: Never    Tobacco comments:     working on quitting    Vaping Use    Vaping Use: Never used   Substance Use Topics    Alcohol use: Not Currently     Comment: rarely    Drug use: Yes     Types: Marijuana, Oral     Comment: gummies      Social History     Substance and Sexual Activity   Drug Use Yes    Types: Marijuana, Oral    Comment: gummies       FAMILY HISTORY  Family History   Problem Relation Age of Onset    Cancer Mother         melanoma    Hypertension Mother     Other Mother         THYROID    Heart Disease Mother         valvular disease    Ovarian Cancer Mother     Heart Disease Father         Heart Attack    Heart Attack Father     Other Father         psoriasis    Hyperlipidemia Brother     Other Brother         psoriasis    Heart Disease Brother         MI    Heart Attack Brother         massive heart attack     Other Other         psoriasis    Heart Disease Maternal Grandfather         MI    Diabetes Neg Hx     Alcohol/Drug Neg Hx     Tubal Cancer Neg Hx     Peritoneal Cancer Neg Hx     Colorectal Cancer Neg Hx     Breast Cancer Neg Hx        CURRENT MEDICATIONS  Reviewed.  See Encounter Summary.     ALLERGIES  Allergies   Allergen Reactions    Codeine Vomiting and Nausea    Hydrocodone Vomiting and Nausea    Other Drug Vomiting and Nausea     Any of the \"cets\" (percocet)       PHYSICAL EXAM  VITAL SIGNS: /63   Pulse 95   Temp 36.3 °C (97.4 °F) (Temporal)   Resp " "(!) 24   Ht 1.676 m (5' 6\")   Wt 94.4 kg (208 lb 1.8 oz)   SpO2 95%   BMI 33.59 kg/m²   Constitutional: Awake, alert in no apparent distress.  HENT: Normocephalic, Bilateral external ears normal. Nose normal.   Eyes: Conjunctiva normal, non-icteric, EOMI.    Thorax & Lungs: Easy unlabored respirations, significantly diminished right side  Cardiovascular: Borderline tachycardic, No murmurs, rubs or gallops. Bilateral pulses symmetrical.   Abdomen:  Soft, nontender, nondistended, normal active bowel sounds.   :    Skin: Visualized skin is  Dry, No erythema, No rash.   Musculoskeletal:   No cyanosis, clubbing or edema. No leg asymmetry.   Neurologic: Alert, Grossly non-focal.   Psychiatric: Normal affect, Normal mood  Lymphatic:          RADIOLOGY  I have independently interpreted the diagnostic imaging associated with this visit and am waiting the final reading from the radiologist.   My preliminary interpretation is as follows: Compared x-rays from 9/20 weight 2 x-rays from 10/13, the patient has a moderate to large right-sided pleural effusion, yesterday's x-rays are similar, possibly slightly worsened than her x-rays from 2 weeks ago. (Pre-thoracentesis).     Radiologist interpretation:   US-THORACENTESIS PUNCTURE RIGHT    (Results Pending)   US-THORACENTESIS PUNCTURE RIGHT    (Results Pending)       COURSE & MEDICAL DECISION MAKING  Pertinent Labs & Imaging studies reviewed. (See chart for details)    COURSE & MEDICAL DECISION MAKING  Pertinent Labs & Imaging studies reviewed. (See chart for details)    Differential diagnoses include but are not limited to: Malignant effusion    4:12 PM - Nursing notes reviewed, patient seen and examined at bedside.    Discussion of management with other medical personnel: Had multiple discussions with ultrasound, interventional radiology, radiology scheduler.    Escalation of care considered, and ultimately not performed: diagnostic imaging and acute inpatient care " management, however at this time, the patient is most appropriate for outpatient management.    Decision Making:  This is a pleasant 58 y.o. year old female who presents with recurrent pleural effusion.  The patient has a fairly large pleural effusion as seen on chest x-ray yesterday, oxygenation around 89 to 90% on room air.  I called radiology, unfortunately they have gone home for the day.  I spent a great deal of time arranging care with the ultrasound technician who was extremely helpful.  The ultrasound technician, Roxana was able to arrange a follow-up thoracentesis for tomorrow, Sunday at 1 PM.    Family is very happy with this plan.  The patient would prefer to be at home where she is much more comfortable.  I think this is the best plan for her as I anticipate she will have quite a few hospitalizations in the future given her unfortunate diagnosis.  She also has home oxygen so this is a reasonable plan and she would not be getting any significant benefit to staying overnight here.       The patient was discharged home (see d/c instructions) was told to return immediately for any signs or symptoms listed, or any worsening at all.  The patient verbally agreed to the discharge precautions and follow-up plan which is documented in EPIC.    Discharge Medications:  New Prescriptions    No medications on file       FINAL IMPRESSION  1. Large pleural effusion

## 2023-10-15 ENCOUNTER — HOSPITAL ENCOUNTER (OUTPATIENT)
Dept: RADIOLOGY | Facility: MEDICAL CENTER | Age: 58
End: 2023-10-15
Attending: EMERGENCY MEDICINE
Payer: MEDICARE

## 2023-10-15 VITALS — HEART RATE: 87 BPM | OXYGEN SATURATION: 96 % | SYSTOLIC BLOOD PRESSURE: 121 MMHG | DIASTOLIC BLOOD PRESSURE: 56 MMHG

## 2023-10-15 DIAGNOSIS — J90 LARGE PLEURAL EFFUSION: ICD-10-CM

## 2023-10-15 PROCEDURE — 71045 X-RAY EXAM CHEST 1 VIEW: CPT

## 2023-10-15 PROCEDURE — C1729 CATH, DRAINAGE: HCPCS

## 2023-10-15 NOTE — ED NOTES
Pt ambulatory to red pod, agree with triage RN note, VSS, GCS 15, NAD, c/o mild SOB, up for ERP to see

## 2023-10-16 LAB
JAK2 P.V617F BLD/T QL: NOT DETECTED
SPECIMEN SOURCE: NORMAL

## 2023-10-17 DIAGNOSIS — C50.412 MALIGNANT NEOPLASM OF UPPER-OUTER QUADRANT OF LEFT BREAST IN FEMALE, ESTROGEN RECEPTOR POSITIVE (HCC): ICD-10-CM

## 2023-10-17 DIAGNOSIS — Z17.0 MALIGNANT NEOPLASM OF UPPER-OUTER QUADRANT OF LEFT BREAST IN FEMALE, ESTROGEN RECEPTOR POSITIVE (HCC): ICD-10-CM

## 2023-10-17 DIAGNOSIS — Z17.0 CARCINOMA OF UPPER-OUTER QUADRANT OF LEFT BREAST IN FEMALE, ESTROGEN RECEPTOR POSITIVE (HCC): ICD-10-CM

## 2023-10-17 DIAGNOSIS — C50.412 CARCINOMA OF UPPER-OUTER QUADRANT OF LEFT BREAST IN FEMALE, ESTROGEN RECEPTOR POSITIVE (HCC): ICD-10-CM

## 2023-10-19 ENCOUNTER — PHARMACY VISIT (OUTPATIENT)
Dept: PHARMACY | Facility: MEDICAL CENTER | Age: 58
End: 2023-10-19
Payer: COMMERCIAL

## 2023-10-19 ENCOUNTER — TELEPHONE (OUTPATIENT)
Dept: DERMATOLOGY | Facility: IMAGING CENTER | Age: 58
End: 2023-10-19
Payer: MEDICARE

## 2023-10-19 ENCOUNTER — HOSPITAL ENCOUNTER (OUTPATIENT)
Dept: RADIOLOGY | Facility: MEDICAL CENTER | Age: 58
End: 2023-10-19
Attending: INTERNAL MEDICINE
Payer: MEDICARE

## 2023-10-19 DIAGNOSIS — J90 LARGE PLEURAL EFFUSION: ICD-10-CM

## 2023-10-19 DIAGNOSIS — C50.412 CARCINOMA OF UPPER-OUTER QUADRANT OF LEFT BREAST IN FEMALE, ESTROGEN RECEPTOR POSITIVE (HCC): ICD-10-CM

## 2023-10-19 DIAGNOSIS — Z17.0 CARCINOMA OF UPPER-OUTER QUADRANT OF LEFT BREAST IN FEMALE, ESTROGEN RECEPTOR POSITIVE (HCC): ICD-10-CM

## 2023-10-19 PROCEDURE — A9552 F18 FDG: HCPCS

## 2023-10-19 PROCEDURE — RXMED WILLOW AMBULATORY MEDICATION CHARGE: Performed by: INTERNAL MEDICINE

## 2023-10-20 ENCOUNTER — TELEPHONE (OUTPATIENT)
Dept: HEMATOLOGY ONCOLOGY | Facility: MEDICAL CENTER | Age: 58
End: 2023-10-20
Payer: MEDICARE

## 2023-10-20 NOTE — TELEPHONE ENCOUNTER
Called patient to follow up post thoracentesis performed in ER during ER visit on 10/14-15/23. Pt states she is doing fine but had a couple of questions in regard to the future thoracentesis appointments. Pt did have questions concerning [facility] and wanted to ensure they were still able to come out to her residence. Will follow up with [facility]. Advised patient to call if she experiences any increased shortness of breath and pain with taking a deep breath in. Pt verbalizes understanding. No other questions or concerns at this time.

## 2023-10-20 NOTE — PROGRESS NOTES
PET results called to office per radiologist - patient with 'hemopneumo', consistent with repeated thoracentesis and underlying disease process.    RN to follow up with patient to evaluate for symptoms - should she feel at her baseline then no further intervention is indicated

## 2023-10-23 ENCOUNTER — APPOINTMENT (OUTPATIENT)
Dept: MEDICAL GROUP | Facility: PHYSICIAN GROUP | Age: 58
End: 2023-10-23
Payer: MEDICARE

## 2023-10-23 ENCOUNTER — DOCUMENTATION (OUTPATIENT)
Dept: PHARMACY | Facility: MEDICAL CENTER | Age: 58
End: 2023-10-23

## 2023-10-23 LAB
FUNGUS SPEC CULT: NORMAL
FUNGUS SPEC FUNGUS STN: NORMAL
GENE XXX MUT ANL BLD/T: NOT DETECTED
MPL P.W515 BLD/T QL: NOT DETECTED
SIGNIFICANT IND 70042: NORMAL
SITE SITE: NORMAL
SOURCE SOURCE: NORMAL

## 2023-10-23 NOTE — PROGRESS NOTES
PHARMACIST PRE SCREEN - New Onboarding  Diagnosis: carcinoma of upper-outer quadrant of left breast in female, ER+ (C50.412, Z17.0)    Drug & Non-Drug Allergies:   EMR Reviewed. No concerning drug or non-drug allergies.    Drug Therapy (name/formulation/dose/route of admin/freq): Kisqali 200mg take 3 tabs (600mg total) by mouth days 1-21 of 28 day cycle  EMR Reviewed  - Dose Appropriateness (Y/N): Y  - Renal or hepatic dose adjustments (Y/N): N   - Any comorbidities, PMH, precautions or contraindications exist that pose medication safety concerns (Y/N): N   - Any relevant lab(s) needed that affects the initial start date (Y/N): N   List Past Treatments: hdx of surgical resection and radiation   List DDI’s: None relevant     Patient’s ability to self-administer medication:   No issues identified per EMR     List Goals of Therapy:  - Reduce progression of disease (POD) and/or improve quality of life   - Mitigation of side effects  - Promote optimal medication administration and adherence   - Improve overall survival (OS) and progression free survival (PFS)   - Reduce tumor biomarkers      Initial Assessment  Dx: Malignant neoplasm of central portion of left breast in female, ER+     Tx prescribed: Kisqali 600mg (200mg tab x3) by mouth days 1-21 of 28 day cycle     Administration: take 3 tabs by mouth with or without food. Swallow whole, do not cut or chew tabs. Take at the same time, preferably in the morning - taking at 7:45- 8AM    Proper Handling/Disposal: hazardous-wash hands before & after using the medication. Anyone else should wear gloves while handling the med. If stopped and have med left dispose of in haz bin, don't flush or throw away    Storage/Excursion:  store at RT out of bathroom, away from light. Keep out of reach of children & pets, keep in original container/no pill box     Duration of therapy:  Until progression or toxicity.      Adherence & Potential Barriers: started on 10/20/23, suggested using  phone alarm reminder, said she's pretty good with taking meds, routine-based adherence   **Adherence Predictor Tool** low risk- understands importance, not concerned with side effects, high copay that is a burden- liaison working on PAP to help with cost     Missed dose mgmt: If missed skip the dose and resume normal scheduling. Do not double the dose. Asked what to do if hour or 2- advised to skip if more than an hr late since mfr doesn't provide any window and taking it closer together increases drug level and can increase risk of side effects     List Common, Serious SE & Mitigation Reviewed:   - Nausea/vomiting: stay hydrated, have smaller frequent meals, don't lie down immediately after eating, avoid spicy/friend/greasy foods. Contact MD if it's not manageable for anti-emetics - has Zofran on hand just in case but doesn't like it so she'll ask about getting a different anti-emetic to have just in case    - Fatigue: stay hydrated, eat protein at each meal, listen to body and rest when needed but get exercise   - Diarrhea: avoid high-fiber foods, avoid spicy/greasy/fatty foods, can take immodium OTC if needed, stay hydrated. Let team know if diarrhea lasts more than 1-2 days    - Alopecia: daughter asked about hair loss, let her know it can happen, studies reported 19-33%, she asked about Rogaine- checked DDI report and no concerns, ok to take, reviewed that biotin may help as well as silk pillowcases, avoiding hot irons and blow dryers, and minimizing stress on hair   List Precautions Reviewed:    - QT prolongation: may cause irregular heart beat, if notice any changes in heartrate let team know    - Bone marrow suppression, may decrease WBCs, increased risk of infection, watch for signs of infection such as  fever >100.5, SOB, dizziness, let team know    - Pulmonary toxicity: rare but if noticing any shortness of breath or cough let team know right away   List Current SE Reviewed (if applicable): none     Mitigation/mgmt: none     S/Sx: deferred  Clinically Relevant, Abnormal Labs: 10/14/2023   - CBC: WBC high: 11.7, RBC high: 5.43H, Hgb high: 16.9, Hct high: 50.2, neutrophils-polys high: 77.60, lymphocytes low: 13.70, ANC high: 9.09   - Chem7: glucose high: 177    - Cr: 0.67, GFR: 99   - LFTs/Tbili: ALP: high 121   - Cancer Specific Biomarkers: CA 27.29: high 235.5. CEA: 1.4 (10/10/23)   BP: 121/56 (10/15/23)     Wellness/Lifestyle Counseling:    Support/QOL: Sol in good spirits and receptive to information, has daughters for support, her daughter Aleida was in the background and mentioned her other daughter having some questions    Immunizations: said she's pretty much updated on all vaccines except pneumonia, reviewed- see below - Prevnar 20, flu shot, or COVID, has had shingles, advised to discuss with oncologist at [facility]      Med Rec/Updated drug list:   EMR inaccurate, medication changes reviewed with patient. (+) Zolpidem, THC/CBD gummy for sleep- let her know we can't know for sure what gummy contains since not regulated so that's the main concern- advised to check with MD if ok with taking   DI Check:    - Cat D: zolpidem + cyclobenzaprine + diazepam = CNS depression, watch for over sedation/drowsiness, takes zolpidem for sleep, others as needed   Common DI & Dietary Avoidance: Avoid grapefruit, pomegranite, [place] (bitter) oranges, and starfruit- fruits and juices as they can interfere with med. Call before starting any new meds to confirm no DDIs, avoid herbal supplements as we don’t always know what's in them and if they'll effect med.    Goals of Therapy:   - Reduce progression of disease (POD) and/or improve quality of life   - Mitigation of side effects  - Promote optimal medication administration and adherence   - Improve overall survival (OS) and progression free survival (PFS)   - Reduce tumor biomarkers  Patient has agreed/understands to goals of therapy during education/counseling      Additional:   Had the pleasure of speaking with Sol to review her new breast cancer therapy, Kisqali. She started on 10/20 and so far so good.  Attempts made last week to review before starting but we weren't able to reach her until today. Provided thorough review of Yesenia, daughter Aleida in the background. Confirmed she's taking 3 tabs (600mg) once daily in the morning for 21 days and then 7 days off for 28 day cycle. No side effects reported, reviewed potential side effects and precautions. She hasn't started any endocrine therapy, explained that they're typically used together so that the endocrine therapy decreases the hormones driving the cancer, will message MD. Oncology appt this Thursday. Advised to review lab frequency, immunizations, endocrine therapy, and anti-emetic to have on hand. Provided clinical pharmacist phone number and encouraged her to call with any questions or concerns. She was appreicative of review and welcoming to future calls. No 1st cycle follow up needed as this is patient's 4th day of therapy. Next [facility] ADLs.     **Pharmacist Intervention/Discussion**  Immunizations: Sol said she's not updated on pneumonia vaccine but doesn't know when last one was. Let her know because she's immunocompromised would qualify for Prevnar 20. She also hasn't had flu shot for this year or last COVID-19. Let her know that there is an updated COVID booster that has updated variant coverage for best protection. With being immunosuppressed may not make as many antibodies but typically some protection is better than none. Advised to discuss with oncologist at upcoming appt to see if he wants her to get Prevnar 20, COVID booster, and flu shot and any specific timing.     DDIs: Sol asked if antibiotics are fine, specifically amoxicillin as well as THC/CBD gummy and daughter asked if Rogaine would be fine. Ran DDI report with all and let them know no DDIs but for the THC/CBD gummy not FDA regulated so  we can't know for sure what it contains and that’s the main concern. Advised to check with oncologist if ok with her taking.     Emani FIGUEROA 10/23/23:   Hi Dr. Kline,     I'm a clinical pharmacist working with [facility]. I just spoke with patient to review her Kisqali. She started on 10/20 and tolerating well, no side effects noted. She's not currently taking an endocrine therapy.  Do you want her to start Fulvestrant?    Thank you,  Leisa Khan, JeanneD

## 2023-10-25 ENCOUNTER — HOSPITAL ENCOUNTER (OUTPATIENT)
Dept: HEMATOLOGY ONCOLOGY | Facility: MEDICAL CENTER | Age: 58
End: 2023-10-25
Attending: INTERNAL MEDICINE
Payer: MEDICARE

## 2023-10-25 VITALS
WEIGHT: 207.23 LBS | TEMPERATURE: 97.9 F | DIASTOLIC BLOOD PRESSURE: 64 MMHG | SYSTOLIC BLOOD PRESSURE: 116 MMHG | HEART RATE: 91 BPM | HEIGHT: 66 IN | BODY MASS INDEX: 33.3 KG/M2 | OXYGEN SATURATION: 94 %

## 2023-10-25 DIAGNOSIS — Z17.0 CARCINOMA OF UPPER-OUTER QUADRANT OF LEFT BREAST IN FEMALE, ESTROGEN RECEPTOR POSITIVE (HCC): ICD-10-CM

## 2023-10-25 DIAGNOSIS — C50.412 CARCINOMA OF UPPER-OUTER QUADRANT OF LEFT BREAST IN FEMALE, ESTROGEN RECEPTOR POSITIVE (HCC): ICD-10-CM

## 2023-10-25 DIAGNOSIS — C50.912 BREAST CANCER, STAGE 4, LEFT (HCC): ICD-10-CM

## 2023-10-25 PROCEDURE — 99215 OFFICE O/P EST HI 40 MIN: CPT | Performed by: INTERNAL MEDICINE

## 2023-10-25 PROCEDURE — 99212 OFFICE O/P EST SF 10 MIN: CPT | Performed by: INTERNAL MEDICINE

## 2023-10-25 RX ORDER — PROCHLORPERAZINE MALEATE 10 MG
10 TABLET ORAL EVERY 6 HOURS PRN
Status: CANCELLED | OUTPATIENT
Start: 2023-10-27

## 2023-10-25 RX ORDER — LAMOTRIGINE 25 MG/1
250 TABLET ORAL ONCE
Status: CANCELLED | OUTPATIENT
Start: 2023-10-27 | End: 2023-10-27

## 2023-10-25 RX ORDER — DIPHENHYDRAMINE HYDROCHLORIDE 50 MG/ML
50 INJECTION INTRAMUSCULAR; INTRAVENOUS PRN
Status: CANCELLED | OUTPATIENT
Start: 2023-10-27

## 2023-10-25 RX ORDER — DIPHENHYDRAMINE HYDROCHLORIDE 50 MG/ML
50 INJECTION INTRAMUSCULAR; INTRAVENOUS PRN
Status: CANCELLED | OUTPATIENT
Start: 2023-11-10

## 2023-10-25 RX ORDER — PROCHLORPERAZINE MALEATE 10 MG
10 TABLET ORAL EVERY 6 HOURS PRN
Status: CANCELLED | OUTPATIENT
Start: 2023-11-10

## 2023-10-25 RX ORDER — EPINEPHRINE 1 MG/ML(1)
0.5 AMPUL (ML) INJECTION PRN
Status: CANCELLED | OUTPATIENT
Start: 2023-11-10

## 2023-10-25 RX ORDER — LAMOTRIGINE 25 MG/1
250 TABLET ORAL ONCE
Status: CANCELLED | OUTPATIENT
Start: 2023-11-10 | End: 2023-11-10

## 2023-10-25 RX ORDER — ONDANSETRON 8 MG/1
8 TABLET, ORALLY DISINTEGRATING ORAL PRN
Status: CANCELLED | OUTPATIENT
Start: 2023-10-27

## 2023-10-25 RX ORDER — ONDANSETRON 8 MG/1
8 TABLET, ORALLY DISINTEGRATING ORAL PRN
Status: CANCELLED | OUTPATIENT
Start: 2023-11-10

## 2023-10-25 RX ORDER — EPINEPHRINE 1 MG/ML(1)
0.5 AMPUL (ML) INJECTION PRN
Status: CANCELLED | OUTPATIENT
Start: 2023-10-27

## 2023-10-25 RX ORDER — METHYLPREDNISOLONE SODIUM SUCCINATE 125 MG/2ML
125 INJECTION, POWDER, LYOPHILIZED, FOR SOLUTION INTRAMUSCULAR; INTRAVENOUS PRN
Status: CANCELLED | OUTPATIENT
Start: 2023-11-10

## 2023-10-25 RX ORDER — METHYLPREDNISOLONE SODIUM SUCCINATE 125 MG/2ML
125 INJECTION, POWDER, LYOPHILIZED, FOR SOLUTION INTRAMUSCULAR; INTRAVENOUS PRN
Status: CANCELLED | OUTPATIENT
Start: 2023-10-27

## 2023-10-25 RX ORDER — ONDANSETRON 2 MG/ML
4 INJECTION INTRAMUSCULAR; INTRAVENOUS PRN
Status: CANCELLED | OUTPATIENT
Start: 2023-10-27

## 2023-10-25 RX ORDER — ONDANSETRON 2 MG/ML
4 INJECTION INTRAMUSCULAR; INTRAVENOUS PRN
Status: CANCELLED | OUTPATIENT
Start: 2023-11-10

## 2023-10-25 ASSESSMENT — PAIN SCALES - GENERAL: PAINLEVEL: 7=MODERATE-SEVERE PAIN

## 2023-10-25 ASSESSMENT — FIBROSIS 4 INDEX: FIB4 SCORE: 0.91

## 2023-10-25 NOTE — PROGRESS NOTES
Date of Followup: 10/25/2023  Primary care physician:Veronica Lyn M.D.  Radiation oncology:Kem Infante  Surgery:Kayleen Sykes     Reason for Consultation:  Ms. Kern  is a pleasant 56 y.o. year old postmenopausal female with a history of poorly controlled diabetes mellitus who had a mammogram in fall 2021 which showed a 2.3 cm spiculated mass in the upper outer quadrant of the left breast.  Biopsy revealed an invasive ductal carcinoma, grade 1, ER/WI positive HER-2 negative with a Ki-67 of 22%.  She is large breasted and elected to undergo a left lumpectomy and sentinel lymph node biopsy by Dr. Sykes on 11/12/2021.  Pathology revealed an invasive ductal carcinoma, grade 2 with associated intermediate grade ductal carcinoma in situ.  The invasive tumor measured 2.9 cm in greatest dimension and all margins were clear.  1 of 1 sentinel lymph nodes was positive for macro metastases, but no extranodal extension was identified.  Postoperative course was unremarkable.  She was seen by medical oncology and an Oncotype performed with results pending.  She had a CT scan of the chest abdomen pelvis which was negative for metastatic disease and a bone scan is pending.  In surgery she has been on a weight reduction diet and has reduced her smoking dramatically from greater than a pack a day to 3 to 4 cigarettes a day.  She feels generally well.  She is previously seen Dr. Kem Infante in consultation.     History of presenting illness:  Her Oncotype came back with a recurrence score of 20 therefore there was no indication for adjuvant chemotherapy.  She had a previously scheduled CT scan of the chest abdomen pelvis and bone scan and these were either for metastatic disease.  She underwent radiation therapy with 40 Gray over 20 doses completed this 1 February 2022.  She did develop moderate formation and erythema and is being treated for this.  She also started postoperative physical therapy for prevention of  lymphedema and is doing well from that perspective.  She is ready to initiate adjuvant endocrine therapy.     Interval history 7/28/2022: Started anastrozole in March 2022 and has been moderately adherent with it although she admits that she misses doses not infrequently.  She still has some tenderness in the left breast after radiation therapy but no nodularity.  She developed relatively severe COVID at the end of June that required Paxlovid and she was given oxygen 1 L/min but was never admitted to the hospital.  Her O2 sats apparently still vary somewhat.  She still has a dry cough but no sputum production or fever currently.  She has no sore throat.  Her energy level remains a little low.  Bone density was done recently and was within normal limits.    Interval history 10/10/2023: She continued on anastrozole intermittently but discontinued it completely in March 2023.  Her diabetes was out of control at that time but it is gotten much better on Ozempic with her A1c coming down from 9-6.0.  She developed shortness of breath and was admitted to the hospital on 9/28/2023 with a large right pleural effusion.  Thoracentesis showed an exudate with metastatic breast cancer, ER positive MO slightly positive HER2 negative.  She felt better after she had her tap.  CT scan of the chest was negative except for the pleural effusion.  She has a history of struct of sleep apnea and was urged to use her CPAP again.  O2 sats are running low 90s at home now.  She denies any breast masses or other nodularity.  She has some low back pain.  No neurologic symptoms.    Interval history 10/25/2023.  We started her on ribociclib 600 mg daily which she started 5 days ago and has had no problems with whatsoever.  We are trying to schedule her fulvestrant as her endocrine component of therapy.  A therapeutic phlebotomy because of her polycythemia.  Her work-up for myeloproliferative disorder/P vera was negative including JAK2 and reflex  "subsequent testing.  She had a PET CT scan performed on 10/19/2023 which showed focal increased activity in the skull base, thoracic spine right shoulder right ribs lumbar spine pelvis and proximal femurs.  She also has increased activity in mediastinal precarinal and right hilar nodes.  The right pleura has multiple hypermetabolic foci.  A incidental moderate right hydronephrosis was noted.  No visceral involvement was seen.  She had her last thoracentesis on the right on 10/15/2023 and does not feel the need for another one right now.        Past Medical History        Past Medical History:   Diagnosis Date    Allergy      Back pain      Cancer (HCC)      breast    Diabetes (HCC)       diet    GERD (gastroesophageal reflux disease)      High cholesterol       not medicated    Malignant neoplasm of upper-outer quadrant of left female breast (HCC)      Neck pain      Obesity      Sleep apnea       no cpap    Snoring           Past Surgical History         Past Surgical History:   Procedure Laterality Date    PB MASTECTOMY, PARTIAL Left 2021     Procedure: MASTECTOMY, PARTIAL - SAMARA LOCALIZED;  Surgeon: Kayleen Sykes M.D.;  Location: SURGERY SAME DAY [facility];  Service: General    NODE BIOPSY SENTINEL Left 2021     Procedure: BIOPSY, LYMPH NODE, SENTINEL;  Surgeon: Kayleen Sykes M.D.;  Location: SURGERY SAME DAY [facility];  Service: General    CARPAL TUNNEL RELEASE Right      ENDOMETRIAL ABLATION        FOOT SURGERY Right      KNEE ARTHROSCOPY        PRIMARY C SECTION         2                    Allergies   Allergen Reactions    Codeine Vomiting and Nausea    Other Drug Vomiting and Nausea       Any of the \"cets\" (percocet)    Vicodin [Hydrocodone-Acetaminophen] Vomiting and Nausea      Encounter Medications               Outpatient Encounter Medications as of 2022   Medication Sig Dispense Refill    ROPINIRole (REQUIP) 0.25 MG Tab TAKE 1 TABLET BY MOUTH AT BEDTIME. TAKE 1-3 HOURS " BEFORE BEDTIME. 90 Tablet 0    silver sulfADIAZINE (SILVADENE) 1 % Cream Apply 1/8 inch layer to affected area  g 2    ondansetron (ZOFRAN ODT) 4 MG TABLET DISPERSIBLE Take 1 Tablet by mouth every 6 hours as needed. 10 Tablet 0    albuterol 108 (90 Base) MCG/ACT Aero Soln inhalation aerosol INHALE 2 PUFFS BY MOUTH EVERY 6 HOURS AS NEEDED FOR SHORTNESS OF BREATH 2 Each 2    nicotine polacrilex (NICOTINE MINI) 2 MG lozenge Place 1 Lozenge into mouth between cheek and gum every 1 hour as needed. 360 Lozenge 0    lidocaine-prilocaine (EMLA) 2.5-2.5 % Cream          cyclobenzaprine (FLEXERIL) 5 MG tablet Take 1-2 Tabs by mouth 3 times a day as needed. 90 Tab 1    diazepam (VALIUM) 10 MG tablet Take 10 mg by mouth 1 time daily as needed (muscle spasms, back pain).          No facility-administered encounter medications on file as of 2/16/2022.         @[facility]@  Social History                   Socioeconomic History    Marital status:        Spouse name: Not on file    Number of children: Not on file    Years of education: Not on file    Highest education level: Not on file   Occupational History    Not on file   Tobacco Use    Smoking status: Current Every Day Smoker       Packs/day: 0.25       Years: 10.00       Pack years: 2.50       Types: Cigarettes    Smokeless tobacco: Never Used    Tobacco comment: working on quitting    Vaping Use    Vaping Use: Never used   Substance and Sexual Activity    Alcohol use: No    Drug use: No    Sexual activity: Yes       Partners: Male       Comment:    Other Topics Concern    Not on file   Social History Narrative    Not on file      Social Determinants of Health      Financial Resource Strain: Not on file   Food Insecurity: Not on file   Transportation Needs: Not on file   Physical Activity: Not on file   Stress: Not on file   Social Connections: Not on file   Intimate Partner Violence: Not on file   Housing Stability: Not on file         Social History               Tobacco Use   Smoking Status Current Every Day Smoker    Packs/day: 0.25    Years: 10.00    Pack years: 2.50    Types: Cigarettes   Smokeless Tobacco Never Used   Tobacco Comment     working on quitting       Social History            Substance and Sexual Activity   Alcohol Use No      Social History            Substance and Sexual Activity   Drug Use No                                        OB History    Para Term  AB Living   5       2     SAB IAB Ectopic Molar Multiple Live Births     2                 # Outcome Date GA Lbr Valentin/2nd Weight Sex Delivery Anes PTL Lv   5                      4                      3                      2 SAB                     1 SAB                        No LMP recorded. Patient has had an ablation.     G P A L      Family History             Family History   Problem Relation Age of Onset    Cancer Mother           ovarian cancer / malanoma    Hypertension Mother      Other Mother           THYROID    Heart Disease Mother           valvular disease    Heart Disease Father           Heart Attack    Heart Attack Father      Other Father           psoriasis    Hyperlipidemia Brother      Other Brother           psoriasis    Heart Disease Brother           MI    Heart Attack Brother           massive heart attack     Other Other           psoriasis    Heart Disease Maternal Grandfather           MI    Diabetes Neg Hx      Alcohol/Drug Neg Hx                 Review of Systems   Constitutional: Negative for malaise/fatigue and weight loss.   HENT: Negative.    Eyes: Negative.    Respiratory: Negative.    Cardiovascular: Negative.    Gastrointestinal: Negative for diarrhea, nausea and vomiting.   Genitourinary: Negative.    Musculoskeletal: Negative.    Skin: Negative.    Neurological: Negative for dizziness and weakness.   Endo/Heme/Allergies: Negative.    Psychiatric/Behavioral: Negative for memory loss.   All other systems reviewed and are  negative.                            Objective         Physical Exam  Vitals  reviewed.   Constitutional:       Appearance: Normal appearance.   Eyes:      Extraocular Movements: Extraocular movements intact.      Pupils: Pupils are equal, round, and reactive to light.   Cardiovascular:      Rate and Rhythm: Normal rate and regular rhythm.   Pulmonary:      Effort: Pulmonary effort is normal.      Breath sounds: Normal breath sounds.   Chest:   Breasts:      Left: Inverted nipple:         Comments: Left breast shows 1+ hyperpigmentation from radiation and some mild peau d'orange from radiation without masses nipple discharge or tenderness..  Right breast shows a stable cystic lesion in the upper inner quadrant skin no masses nipple discharge or tenderness noted.  Abdominal:      General: Bowel sounds are normal.      Palpations: Abdomen is soft.   Musculoskeletal:         General: Normal range of motion.   Skin:     General: Skin is warm and dry.   Neurological:      General: No focal deficit present.      Mental Status: She is alert.               Cardio mobile showed normal QTc F  Assessment      1.  Infiltrating ductal carcinoma of the left breast, grade 2, stage anatomic to be (pT2, PN 1A) status post left lumpectomy, ER positive, DC positive, HER-2 negative, Ki-67 22%, Oncotype DX current score 20.  Status post whole breast radiation therapy on endocrine therapy with anastrozole from March 2022 through March 2023 through with moderate adherence  2.  Diabetes mellitus under fair control  3.  Tobacco abuse improving  4.  Hypertension  5.  COVID-19 infection June 2022 resolved  6.  Recurrent/metastatic ER positive breast cancer eluding malignant right pleural effusion from breast cancer, ER positive, DC slightly positive, HER2 negative.  PET scan shows extensive bony metastases and pleural metastases and mediastinal metastases.  7.  Longstanding polycythemia dating back at least 5-year.  Now worse with hemoglobin  19 hematocrit 57.  Status post phlebotomy with genetic work-up negative for myeloproliferative disorder.  Likely secondary to chronic hypoxemia  8.  Somatic genomic profile negative for actionable genes.  ESR 1 negative    Plan: I spent approximately 1 hour reviewing patient data and with the patient and her family reviewing prognosis previous treatment scan images and plan for the future of which greater than 50% was face-to-face.  Continue ribociclib.  She will initiate fulvestrant later this week.  We will see her back in 2 weeks to check her labs and initial assessment of the combination therapy.  Magdi Kline MD

## 2023-10-26 ENCOUNTER — NON-PROVIDER VISIT (OUTPATIENT)
Dept: DERMATOLOGY | Facility: IMAGING CENTER | Age: 58
End: 2023-10-26
Payer: MEDICARE

## 2023-10-26 NOTE — ADDENDUM NOTE
Encounter addended by: Erich Cardoso, Med Ass't on: 10/25/2023 5:21 PM   Actions taken: Charge Capture section accepted

## 2023-10-26 NOTE — PROGRESS NOTES
Sol Kern is a 58 y.o. female here for a Non-Provider Visit for Suture Removal.    Sutures were placed by mode Porter on date: 10/12/2023  Skin is healed: Yes  Provider notified if skin is not healed, or if there is redness, heat, pain, or drainage from incision: N\A  Sutures removed.   Mastisol and steristips are placed: Yes    Advised to use emollient (vaseline, aquaphor, etc.) as needed, avoid peroxide and antibiotic ointment to reduce irritation.     Path report has been reviewed by provider.  Path report has reviewed with patient.

## 2023-10-27 ENCOUNTER — OUTPATIENT INFUSION SERVICES (OUTPATIENT)
Dept: ONCOLOGY | Facility: MEDICAL CENTER | Age: 58
End: 2023-10-27
Attending: INTERNAL MEDICINE
Payer: MEDICARE

## 2023-10-27 VITALS
TEMPERATURE: 97.7 F | BODY MASS INDEX: 36.84 KG/M2 | SYSTOLIC BLOOD PRESSURE: 141 MMHG | OXYGEN SATURATION: 92 % | HEIGHT: 63 IN | DIASTOLIC BLOOD PRESSURE: 73 MMHG | RESPIRATION RATE: 16 BRPM | WEIGHT: 207.89 LBS | HEART RATE: 84 BPM

## 2023-10-27 DIAGNOSIS — C50.912 BREAST CANCER, STAGE 4, LEFT (HCC): ICD-10-CM

## 2023-10-27 PROCEDURE — 96402 CHEMO HORMON ANTINEOPL SQ/IM: CPT

## 2023-10-27 PROCEDURE — 700111 HCHG RX REV CODE 636 W/ 250 OVERRIDE (IP): Mod: JZ | Performed by: INTERNAL MEDICINE

## 2023-10-27 RX ORDER — LAMOTRIGINE 25 MG/1
250 TABLET ORAL ONCE
Status: COMPLETED | OUTPATIENT
Start: 2023-10-27 | End: 2023-10-27

## 2023-10-27 RX ADMIN — FULVESTRANT 250 MG: 50 INJECTION INTRAMUSCULAR at 14:07

## 2023-10-27 RX ADMIN — FULVESTRANT 250 MG: 50 INJECTION INTRAMUSCULAR at 14:08

## 2023-10-27 ASSESSMENT — PAIN DESCRIPTION - PAIN TYPE: TYPE: CHRONIC PAIN

## 2023-10-27 ASSESSMENT — FIBROSIS 4 INDEX: FIB4 SCORE: 0.91

## 2023-10-27 NOTE — PROGRESS NOTES
Patient came into INF with daughter. Orders and vitals reviewed, assessment done. Education about medication and treatment given patient verbalized understanding. Injection given in right and left buttock. Patient tolerated well. Patient observed after injection with no adverse events. Patient left in stable condition with next appointment confirmed.

## 2023-11-03 ENCOUNTER — HOSPITAL ENCOUNTER (OUTPATIENT)
Dept: RADIOLOGY | Facility: MEDICAL CENTER | Age: 58
End: 2023-11-03
Attending: INTERNAL MEDICINE
Payer: MEDICARE

## 2023-11-03 DIAGNOSIS — J90 LARGE PLEURAL EFFUSION: ICD-10-CM

## 2023-11-03 DIAGNOSIS — Z17.0 CARCINOMA OF UPPER-OUTER QUADRANT OF LEFT BREAST IN FEMALE, ESTROGEN RECEPTOR POSITIVE (HCC): ICD-10-CM

## 2023-11-03 DIAGNOSIS — C50.412 CARCINOMA OF UPPER-OUTER QUADRANT OF LEFT BREAST IN FEMALE, ESTROGEN RECEPTOR POSITIVE (HCC): ICD-10-CM

## 2023-11-03 PROBLEM — R53.1 WEAKNESS: Status: ACTIVE | Noted: 2023-11-03

## 2023-11-03 PROCEDURE — C1729 CATH, DRAINAGE: HCPCS

## 2023-11-03 NOTE — PROGRESS NOTES
US guided therapeutic thoracentesis done by Dr. Day;(no H&P required as this is a NON SEDATION procedure) right mid posterior back access site; dressing CDI; 800 mL obtained and discarded. Pt tolerated the procedure well. Pt hemodynamically stable pre/intra/post procedure. All questions and concerns answered prior to d/c. Patient provided with all appropriate education for procedure. Pt d/c home.

## 2023-11-09 ENCOUNTER — HOSPITAL ENCOUNTER (OUTPATIENT)
Dept: HEMATOLOGY ONCOLOGY | Facility: MEDICAL CENTER | Age: 58
End: 2023-11-09
Attending: NURSE PRACTITIONER
Payer: MEDICARE

## 2023-11-09 VITALS
HEART RATE: 92 BPM | SYSTOLIC BLOOD PRESSURE: 118 MMHG | HEIGHT: 63 IN | TEMPERATURE: 98.1 F | DIASTOLIC BLOOD PRESSURE: 58 MMHG | BODY MASS INDEX: 36.62 KG/M2 | RESPIRATION RATE: 12 BRPM | WEIGHT: 206.68 LBS | OXYGEN SATURATION: 92 %

## 2023-11-09 DIAGNOSIS — Z17.0 CARCINOMA OF UPPER-OUTER QUADRANT OF LEFT BREAST IN FEMALE, ESTROGEN RECEPTOR POSITIVE (HCC): ICD-10-CM

## 2023-11-09 DIAGNOSIS — J90 LARGE PLEURAL EFFUSION: ICD-10-CM

## 2023-11-09 DIAGNOSIS — C50.412 CARCINOMA OF UPPER-OUTER QUADRANT OF LEFT BREAST IN FEMALE, ESTROGEN RECEPTOR POSITIVE (HCC): ICD-10-CM

## 2023-11-09 DIAGNOSIS — Z79.899 ENCOUNTER FOR LONG-TERM (CURRENT) USE OF HIGH-RISK MEDICATION: ICD-10-CM

## 2023-11-09 DIAGNOSIS — D75.1 POLYCYTHEMIA: ICD-10-CM

## 2023-11-09 PROCEDURE — 99212 OFFICE O/P EST SF 10 MIN: CPT | Performed by: NURSE PRACTITIONER

## 2023-11-09 PROCEDURE — 99214 OFFICE O/P EST MOD 30 MIN: CPT | Performed by: NURSE PRACTITIONER

## 2023-11-09 ASSESSMENT — ENCOUNTER SYMPTOMS
COUGH: 0
WEIGHT LOSS: 0
NAUSEA: 0
HEADACHES: 0
SHORTNESS OF BREATH: 1
FEVER: 0
DIZZINESS: 0
CONSTIPATION: 1
VOMITING: 0
CHILLS: 0
PALPITATIONS: 0
DIARRHEA: 0

## 2023-11-09 ASSESSMENT — FIBROSIS 4 INDEX: FIB4 SCORE: 0.91

## 2023-11-09 ASSESSMENT — PAIN SCALES - GENERAL: PAINLEVEL: NO PAIN

## 2023-11-09 NOTE — ADDENDUM NOTE
Encounter addended by: Korinne Mitchell, Med Ass't on: 11/9/2023 2:27 PM   Actions taken: Charge Capture section accepted

## 2023-11-09 NOTE — PROGRESS NOTES
Subjective     Sol Kern is a 58 y.o. female who presents with Breast Cancer (Prechemo)          HPI    Primary Care: Veronica Lyn M.D.  Radiation Oncology: Kem Infante M.D.   Surgery: Kayleen Sykes M.D.     Diagnosis:  Recurrent/metastatic ER positive breast cancer eluding malignant right pleural effusion from breast cancer, ER positive, OK slightly positive, HER2 negative     Chief Complaint: Patient seen today for evaluation of her metastatic ER positive breast cancer, for continued monitoring of symptoms and side effects of cancer treatments, employing ribociclib and Faslodex.    Oncology history of presenting illness:  Ms. Kern  is a pleasant 56 y.o. year old postmenopausal female with a history of poorly controlled diabetes mellitus who had a mammogram in fall 2021 which showed a 2.3 cm spiculated mass in the upper outer quadrant of the left breast.  Biopsy revealed an invasive ductal carcinoma, grade 1, ER/OK positive HER-2 negative with a Ki-67 of 22%.  She is large breasted and elected to undergo a left lumpectomy and sentinel lymph node biopsy by Dr. Sykes on 11/12/2021.  Pathology revealed an invasive ductal carcinoma, grade 2 with associated intermediate grade ductal carcinoma in situ.  The invasive tumor measured 2.9 cm in greatest dimension and all margins were clear.  1 of 1 sentinel lymph nodes was positive for macro metastases, but no extranodal extension was identified.  Postoperative course was unremarkable.  She was seen by medical oncology and an Oncotype performed with results pending.  She had a CT scan of the chest abdomen pelvis which was negative for metastatic disease and a bone scan is pending.  In surgery she has been on a weight reduction diet and has reduced her smoking dramatically from greater than a pack a day to 3 to 4 cigarettes a day.  She feels generally well.  She is previously seen Dr. Kem Infante in consultation.    Interval histories:  Her Oncotype came  back with a recurrence score of 20 therefore there was no indication for adjuvant chemotherapy.  She had a previously scheduled CT scan of the chest abdomen pelvis and bone scan and these were either for metastatic disease.  She underwent radiation therapy with 40 Gray over 20 doses completed this 1 February 2022.  She did develop moderate formation and erythema and is being treated for this.  She also started postoperative physical therapy for prevention of lymphedema and is doing well from that perspective.  She is ready to initiate adjuvant endocrine therapy.     Interval history 7/28/2022: Started anastrozole in March 2022 and has been moderately adherent with it although she admits that she misses doses not infrequently.  She still has some tenderness in the left breast after radiation therapy but no nodularity.  She developed relatively severe COVID at the end of June that required Paxlovid and she was given oxygen 1 L/min but was never admitted to the hospital.  Her O2 sats apparently still vary somewhat.  She still has a dry cough but no sputum production or fever currently.  She has no sore throat.  Her energy level remains a little low.  Bone density was done recently and was within normal limits.     Interval history 10/10/2023: She continued on anastrozole intermittently but discontinued it completely in March 2023.  Her diabetes was out of control at that time but it is gotten much better on Ozempic with her A1c coming down from 9-6.0.  She developed shortness of breath and was admitted to the hospital on 9/28/2023 with a large right pleural effusion.  Thoracentesis showed an exudate with metastatic breast cancer, ER positive ID slightly positive HER2 negative.  She felt better after she had her tap.  CT scan of the chest was negative except for the pleural effusion.  She has a history of struct of sleep apnea and was urged to use her CPAP again.  O2 sats are running low 90s at home now.  She denies any  breast masses or other nodularity.  She has some low back pain.  No neurologic symptoms.     Interval history 10/25/2023.  We started her on ribociclib 600 mg daily which she started 5 days ago and has had no problems with whatsoever.  We are trying to schedule her fulvestrant as her endocrine component of therapy.  A therapeutic phlebotomy because of her polycythemia.  Her work-up for myeloproliferative disorder/P vera was negative including JAK2 and reflex subsequent testing.  She had a PET CT scan performed on 10/19/2023 which showed focal increased activity in the skull base, thoracic spine right shoulder right ribs lumbar spine pelvis and proximal femurs.  She also has increased activity in mediastinal precarinal and right hilar nodes.  The right pleura has multiple hypermetabolic foci.  A incidental moderate right hydronephrosis was noted.  No visceral involvement was seen.  She had her last thoracentesis on the right on 10/15/2023 and does not feel the need for another one right now.    Interval history 11/09/23 (Nitesh, APRN):  Patient is doing very well overall and tolerating her treatment well.  She did note some mild constipation since starting the ribociclib.  She is due to complete her first cycle as she has 2 more days left before her week off.  She has been tolerating the thoracentesis which last 1 was completed on 11/3/2023.  She stated that she tolerated little bit better and is noticed less amount of fluid being removed.  She is questioning why she does not have another 1 appointment scheduled in 2 weeks.  She did state that she does note some shortness of breath with exertion still.  She is wearing oxygen when she is at home resting.  She also wears oxygen at night, 3 L.  She stated that she does not have a CPAP machine and has not had one for many years.    Treatment history:  10/21/23: C1 Ribociclib  10/27/23: C1D1 Faslodex  11/10/23: C1D15 Faslodex       Allergies   Allergen Reactions     "Codeine Vomiting and Nausea    Hydrocodone Vomiting and Nausea    Other Drug Vomiting and Nausea     Any of the \"cets\" (percocet)       Current Outpatient Medications on File Prior to Visit   Medication Sig Dispense Refill    ondansetron (ZOFRAN ODT) 4 MG TABLET DISPERSIBLE Take 1 Tablet by mouth every 6 hours as needed for Nausea/Vomiting. For cancer treatment associated nausea 30 Tablet 1    lisinopril (PRINIVIL) 2.5 MG Tab Take 1 Tablet by mouth every day. 90 Tablet 1    lidocaine (LIDODERM) 5 % Patch Place 1 Patch on the skin every 24 hours. Place patch to affected area in am and remove at bedtime. 30 Patch 5    metFORMIN ER (GLUCOPHAGE XR) 500 MG TABLET SR 24 HR Take 1 Tablet by mouth every day. 90 Tablet 1    albuterol 108 (90 Base) MCG/ACT Aero Soln inhalation aerosol Inhale 2 Puffs every 6 hours as needed for Shortness of Breath. 6.7 Each 1    Ribociclib Succ, 600 MG Dose, 200 MG Tablet Therapy Pack Take 600 mg by mouth see administration instructions. Take 600 mg by mouth daily for 3 weeks (21 days), off for 1 week (7 days) 63 Each 0    cyclobenzaprine (FLEXERIL) 10 mg Tab Take 1 Tablet by mouth 3 times a day as needed for Muscle Spasms. 90 Tablet 0    guaiFENesin ER (MUCINEX) 600 MG TABLET SR 12 HR Take 1 Tablet by mouth every 12 hours. 30 Tablet 0    acetaminophen (TYLENOL) 500 MG Tab Take 1,000 mg by mouth 2 times a day as needed for Mild Pain. 2 tablets = 1,000 mg.      fluocinonide (LIDEX) 0.05 % Cream Apply 1 Application topically 2 times a day as needed (Rash, Itching). Apply to affected areas of back or buttocks. Do not use on face, armpit, or groin.      Cyanocobalamin (B-12) 5000 MCG Cap Take 5,000 mcg by mouth every evening.      Semaglutide,0.25 or 0.5MG/DOS, (OZEMPIC, 0.25 OR 0.5 MG/DOSE,) 2 MG/3ML Solution Pen-injector Inject 0.5 mg under the skin every 7 days. 9 mL 3     No current facility-administered medications on file prior to visit.        Review of Systems   Constitutional:  Negative " "for chills, fever, malaise/fatigue and weight loss.   Respiratory:  Positive for shortness of breath. Negative for cough.    Cardiovascular:  Positive for chest pain. Negative for palpitations.   Gastrointestinal:  Positive for constipation. Negative for diarrhea, nausea and vomiting.   Genitourinary:  Negative for dysuria.   Neurological:  Negative for dizziness and headaches.              Objective     /58   Pulse 92   Temp 36.7 °C (98.1 °F) (Temporal)   Resp 12   Ht 1.6 m (5' 3\")   Wt 93.8 kg (206 lb 10.9 oz)   SpO2 92%   BMI 36.61 kg/m²      Physical Exam  Vitals reviewed.   Constitutional:       General: She is not in acute distress.     Appearance: Normal appearance. She is not diaphoretic.   HENT:      Head: Normocephalic and atraumatic.   Cardiovascular:      Rate and Rhythm: Normal rate and regular rhythm.      Heart sounds: Normal heart sounds. No murmur heard.     No friction rub. No gallop.   Pulmonary:      Effort: Pulmonary effort is normal. No respiratory distress.      Breath sounds: No wheezing.      Comments: Diminished on the right upper and lower lobes  Abdominal:      General: Bowel sounds are normal. There is no distension.      Palpations: Abdomen is soft.      Tenderness: There is no abdominal tenderness.   Musculoskeletal:         General: No swelling or tenderness. Normal range of motion.   Skin:     General: Skin is warm and dry.   Neurological:      Mental Status: She is alert and oriented to person, place, and time.   Psychiatric:         Mood and Affect: Mood normal.         Behavior: Behavior normal.                             Assessment & Plan       1. Carcinoma of upper-outer quadrant of left breast in female, estrogen receptor positive (HCC)  Referral to Oncology Nutrition Services      2. Large pleural effusion        3. Polycythemia        4. Encounter for long-term (current) use of high-risk medication                Impression:  1.  Infiltrating ductal carcinoma of " the left breast, grade 2, stage anatomic to be (pT2, PN 1A) status post left lumpectomy, ER positive, DE positive, HER-2 negative, Ki-67 22%, Oncotype DX current score 20.  Status post whole breast radiation therapy on endocrine therapy with anastrozole from March 2022 through March 2023 through with moderate adherence  2.  Diabetes mellitus under fair control  3.  Tobacco abuse improving  4.  Hypertension  5.  COVID-19 infection June 2022 resolved  6.  Recurrent/metastatic ER positive breast cancer eluding malignant right pleural effusion from breast cancer, ER positive, DE slightly positive, HER2 negative.  PET scan shows extensive bony metastases and pleural metastases and mediastinal metastases.  7.  Longstanding polycythemia dating back at least 5-year.  Now worse with hemoglobin 19 hematocrit 57.  Status post phlebotomy with genetic work-up negative for myeloproliferative disorder.  Likely secondary to chronic hypoxemia  8.  Somatic genomic profile negative for actionable genes.  ESR 1 negative       Plan:  Patient is doing well overall and tolerating ribociclib.  She is due for her second loading dose of Faslodex tomorrow.  She is to continue on treatment as planned.    Patient with thoracentesis procedures which appear to be improving.  She is requesting to continue every 2 weeks.  Have discussed with office RN who will ensure patient appointments are taken care of.    Patient also questioning therapeutic phlebotomy and how often she is to have this.  She is due for her next 1 next Tuesday, 11/14/2023.  Will await results of her next therapeutic phlebotomy CBC to determine whether she needs to continue every 2 weeks or every 4 weeks.    Patient with mild constipation.  I discussed with her starting MiraLAX daily or every other day as needed.  I will also go ahead and place referral to oncology dietitian at request of patient and daughter.    Patient with known bony metastatic disease.  Discussed with   Raisa and will start patient on bisphosphonate therapy with Xgeva, to start with her cycle 2 of Faslodex as she will get this every 28 days.      Please note that this dictation was created using voice recognition software. I have made every reasonable attempt to correct obvious errors, but I expect that there are errors of grammar and possibly content that I did not discover before finalizing the note.

## 2023-11-10 ENCOUNTER — OUTPATIENT INFUSION SERVICES (OUTPATIENT)
Dept: ONCOLOGY | Facility: MEDICAL CENTER | Age: 58
End: 2023-11-10
Attending: INTERNAL MEDICINE
Payer: MEDICARE

## 2023-11-10 VITALS
HEART RATE: 95 BPM | BODY MASS INDEX: 36.33 KG/M2 | RESPIRATION RATE: 18 BRPM | HEIGHT: 63 IN | SYSTOLIC BLOOD PRESSURE: 140 MMHG | OXYGEN SATURATION: 92 % | WEIGHT: 205.03 LBS | DIASTOLIC BLOOD PRESSURE: 79 MMHG | TEMPERATURE: 97.7 F

## 2023-11-10 DIAGNOSIS — C50.912 BREAST CANCER, STAGE 4, LEFT (HCC): ICD-10-CM

## 2023-11-10 DIAGNOSIS — D58.2 ELEVATED HEMOGLOBIN (HCC): ICD-10-CM

## 2023-11-10 LAB
HCT VFR BLD AUTO: 44.5 % (ref 37–47)
HGB BLD-MCNC: 15.2 G/DL (ref 12–16)

## 2023-11-10 PROCEDURE — 85018 HEMOGLOBIN: CPT

## 2023-11-10 PROCEDURE — 700111 HCHG RX REV CODE 636 W/ 250 OVERRIDE (IP): Mod: JZ | Performed by: INTERNAL MEDICINE

## 2023-11-10 PROCEDURE — 85014 HEMATOCRIT: CPT

## 2023-11-10 PROCEDURE — 96402 CHEMO HORMON ANTINEOPL SQ/IM: CPT

## 2023-11-10 PROCEDURE — 36415 COLL VENOUS BLD VENIPUNCTURE: CPT

## 2023-11-10 RX ORDER — SODIUM CHLORIDE 9 MG/ML
500 INJECTION, SOLUTION INTRAVENOUS ONCE
Status: CANCELLED | OUTPATIENT
Start: 2023-11-10 | End: 2023-11-10

## 2023-11-10 RX ORDER — LAMOTRIGINE 25 MG/1
250 TABLET ORAL ONCE
Status: COMPLETED | OUTPATIENT
Start: 2023-11-10 | End: 2023-11-10

## 2023-11-10 RX ADMIN — FULVESTRANT 250 MG: 50 INJECTION, SOLUTION INTRAMUSCULAR at 15:59

## 2023-11-10 RX ADMIN — FULVESTRANT 250 MG: 50 INJECTION, SOLUTION INTRAMUSCULAR at 16:02

## 2023-11-10 ASSESSMENT — FIBROSIS 4 INDEX: FIB4 SCORE: 0.91

## 2023-11-11 LAB
MYCOBACTERIUM SPEC CULT: NORMAL
RHODAMINE-AURAMINE STN SPEC: NORMAL
SIGNIFICANT IND 70042: NORMAL
SITE SITE: NORMAL
SOURCE SOURCE: NORMAL

## 2023-11-11 NOTE — PROGRESS NOTES
[facility] for [facility]/possible TP and loading dose 2 of Faslodex ambulating with a steady gait, daughter at side. POC reviewed. IV started in L AC x 2 attempts, brisk blood return noted/ labs drawn off line, Sol tolerated well. Faslodex administered per MD order, L/R dorsogluteal, Pt tolerated well. Lab results reviewed, Hgb 15.2, Hct 44.5 % not within parameters for phlebotomy. Confirmed next appointment, Pt agrees to canx next Tues labs/TP appointment as it is not needed. TP schedule pending FU with MD. Confirmed next faslodex appointment and Sol was discharged home with her daughter in no acute distress.

## 2023-11-13 ENCOUNTER — PATIENT MESSAGE (OUTPATIENT)
Dept: HEMATOLOGY ONCOLOGY | Facility: MEDICAL CENTER | Age: 58
End: 2023-11-13
Payer: MEDICARE

## 2023-11-13 ENCOUNTER — DOCUMENTATION (OUTPATIENT)
Dept: RADIATION ONCOLOGY | Facility: MEDICAL CENTER | Age: 58
End: 2023-11-13
Payer: MEDICARE

## 2023-11-14 ENCOUNTER — PATIENT OUTREACH (OUTPATIENT)
Dept: ONCOLOGY | Facility: MEDICAL CENTER | Age: 58
End: 2023-11-14
Payer: MEDICARE

## 2023-11-14 ENCOUNTER — APPOINTMENT (OUTPATIENT)
Dept: ONCOLOGY | Facility: MEDICAL CENTER | Age: 58
End: 2023-11-14
Attending: STUDENT IN AN ORGANIZED HEALTH CARE EDUCATION/TRAINING PROGRAM
Payer: MEDICARE

## 2023-11-14 NOTE — PATIENT COMMUNICATION
Contacted patient to update on change in thoracentesis appointment. Updated patient on letter for [employer]. Will call patient once letter is completed.

## 2023-11-14 NOTE — PROGRESS NOTES
On November 14, 2023, Oncology Social Worker Jada Alonso attempted telephone contact with pt. to follow up on psychosocial distress screening.  OSW Gavin left voicemail message for pt. requesting pt. call back at earliest convenience.  OSW Gavin left contact information in voicemail message.

## 2023-11-14 NOTE — PROGRESS NOTES
Nutrition Services: [facility] Referral  Sol Kern is a 58 y.o. female with diagnosis of breast cancer  . Referral received for nutrition services. Due to high volume of referrals, please allow for up to 14 days for initial RD consultation per policy. RD will attempt to see at next oncology appointment or will contact per policy for nutrition assessment.    Please contact as needed.  [phone]

## 2023-11-15 ENCOUNTER — PATIENT MESSAGE (OUTPATIENT)
Dept: HEMATOLOGY ONCOLOGY | Facility: MEDICAL CENTER | Age: 58
End: 2023-11-15
Payer: MEDICARE

## 2023-11-15 ENCOUNTER — TELEPHONE (OUTPATIENT)
Dept: ONCOLOGY | Facility: MEDICAL CENTER | Age: 58
End: 2023-11-15
Payer: MEDICARE

## 2023-11-15 NOTE — PATIENT COMMUNICATION
Contacted patient via phone to update on therapeutic phlebotomy. Patient's hemoglobin was great per Lorraine GREEN APRN. Will discontinue monitoring and therapeutic phlebotomies moving forward unless there is a change in condition. Pt verbalizes understanding. Updated patient on letter to [employer] for ticket reimbursement. Pt will  hard copy next week when in office for appointment. No other concerns at this time.

## 2023-11-16 NOTE — TELEPHONE ENCOUNTER
Nutrition Services: Telephone Encounter     RD called for nutrition check-in for advice on nutrition intake. Pt answered the phone but was driving. RD asked pt for an email where contact information could be sent since pt could not write phone number down. RD sent contact information as well as the breast cancer nutrition therapy handout. Also recommended '[email or web address]' for recipe ideas.     Pt verbalizes understanding and is receptive to information provided.   Please contact [phone]

## 2023-11-17 ENCOUNTER — HOSPITAL ENCOUNTER (OUTPATIENT)
Dept: RADIOLOGY | Facility: MEDICAL CENTER | Age: 58
End: 2023-11-17
Attending: INTERNAL MEDICINE
Payer: MEDICARE

## 2023-11-17 DIAGNOSIS — J90 LARGE PLEURAL EFFUSION: ICD-10-CM

## 2023-11-17 DIAGNOSIS — Z17.0 CARCINOMA OF UPPER-OUTER QUADRANT OF LEFT BREAST IN FEMALE, ESTROGEN RECEPTOR POSITIVE (HCC): ICD-10-CM

## 2023-11-17 DIAGNOSIS — C50.412 CARCINOMA OF UPPER-OUTER QUADRANT OF LEFT BREAST IN FEMALE, ESTROGEN RECEPTOR POSITIVE (HCC): ICD-10-CM

## 2023-11-17 PROCEDURE — C1729 CATH, DRAINAGE: HCPCS

## 2023-11-17 NOTE — PROGRESS NOTES
US guided therapeutic thoracentesis done by Dr. Becerra;(no H&P required as this is a NON SEDATION procedure) Right mid posterior back access site; dressing CDI; 150 mL of clear, yellow fluid obtained and discarded. Pt tolerated the procedure well. Pt hemodynamically stable pre/intra/post procedure. All questions and concerns answered prior to d/c. Patient provided with all appropriate education for procedure. Pt d/c home.

## 2023-11-19 RX ORDER — LAMOTRIGINE 25 MG/1
250 TABLET ORAL ONCE
Status: CANCELLED | OUTPATIENT
Start: 2023-11-27 | End: 2023-11-24

## 2023-11-19 RX ORDER — METHYLPREDNISOLONE SODIUM SUCCINATE 125 MG/2ML
125 INJECTION, POWDER, LYOPHILIZED, FOR SOLUTION INTRAMUSCULAR; INTRAVENOUS PRN
Status: CANCELLED | OUTPATIENT
Start: 2023-11-27

## 2023-11-19 RX ORDER — PROCHLORPERAZINE MALEATE 10 MG
10 TABLET ORAL EVERY 6 HOURS PRN
Status: CANCELLED | OUTPATIENT
Start: 2023-11-27

## 2023-11-19 RX ORDER — DIPHENHYDRAMINE HYDROCHLORIDE 50 MG/ML
50 INJECTION INTRAMUSCULAR; INTRAVENOUS PRN
Status: CANCELLED | OUTPATIENT
Start: 2023-11-27

## 2023-11-19 RX ORDER — EPINEPHRINE 1 MG/ML(1)
0.5 AMPUL (ML) INJECTION PRN
Status: CANCELLED | OUTPATIENT
Start: 2023-11-27

## 2023-11-19 RX ORDER — ONDANSETRON 8 MG/1
8 TABLET, ORALLY DISINTEGRATING ORAL PRN
Status: CANCELLED | OUTPATIENT
Start: 2023-11-27

## 2023-11-19 RX ORDER — ONDANSETRON 2 MG/ML
4 INJECTION INTRAMUSCULAR; INTRAVENOUS PRN
Status: CANCELLED | OUTPATIENT
Start: 2023-11-27

## 2023-11-20 PROBLEM — G47.01 INSOMNIA DUE TO MEDICAL CONDITION: Status: ACTIVE | Noted: 2023-11-20

## 2023-11-21 NOTE — PROGRESS NOTES
Subjective     Sol Kern is a 58 y.o. female who presents with Cancer (Prechemo)          HPI    Primary Care: Veronica Lyn M.D.  Radiation Oncology: Kem Infante M.D.   Surgery: Kayleen Sykes M.D.     Diagnosis:  Recurrent/metastatic ER positive breast cancer eluding malignant right pleural effusion from breast cancer, ER positive, TN slightly positive, HER2 negative     Chief Complaint: Patient seen today for evaluation of her metastatic ER positive breast cancer, for continued monitoring of symptoms and side effects of cancer treatments, employing ribociclib and Faslodex.     Oncology history of presenting illness:  Ms. Kern  is a pleasant 56 y.o. year old postmenopausal female with a history of poorly controlled diabetes mellitus who had a mammogram in fall 2021 which showed a 2.3 cm spiculated mass in the upper outer quadrant of the left breast.  Biopsy revealed an invasive ductal carcinoma, grade 1, ER/TN positive HER-2 negative with a Ki-67 of 22%.  She is large breasted and elected to undergo a left lumpectomy and sentinel lymph node biopsy by Dr. Sykes on 11/12/2021.  Pathology revealed an invasive ductal carcinoma, grade 2 with associated intermediate grade ductal carcinoma in situ.  The invasive tumor measured 2.9 cm in greatest dimension and all margins were clear.  1 of 1 sentinel lymph nodes was positive for macro metastases, but no extranodal extension was identified.  Postoperative course was unremarkable.  She was seen by medical oncology and an Oncotype performed with results pending.  She had a CT scan of the chest abdomen pelvis which was negative for metastatic disease and a bone scan is pending.  In surgery she has been on a weight reduction diet and has reduced her smoking dramatically from greater than a pack a day to 3 to 4 cigarettes a day.  She feels generally well.  She is previously seen Dr. Kem Infante in consultation.     Interval histories:  Her Oncotype came back  with a recurrence score of 20 therefore there was no indication for adjuvant chemotherapy.  She had a previously scheduled CT scan of the chest abdomen pelvis and bone scan and these were either for metastatic disease.  She underwent radiation therapy with 40 Gray over 20 doses completed this 1 February 2022.  She did develop moderate formation and erythema and is being treated for this.  She also started postoperative physical therapy for prevention of lymphedema and is doing well from that perspective.  She is ready to initiate adjuvant endocrine therapy.     Interval history 7/28/2022: Started anastrozole in March 2022 and has been moderately adherent with it although she admits that she misses doses not infrequently.  She still has some tenderness in the left breast after radiation therapy but no nodularity.  She developed relatively severe COVID at the end of June that required Paxlovid and she was given oxygen 1 L/min but was never admitted to the hospital.  Her O2 sats apparently still vary somewhat.  She still has a dry cough but no sputum production or fever currently.  She has no sore throat.  Her energy level remains a little low.  Bone density was done recently and was within normal limits.     Interval history 10/10/2023: She continued on anastrozole intermittently but discontinued it completely in March 2023.  Her diabetes was out of control at that time but it is gotten much better on Ozempic with her A1c coming down from 9-6.0.  She developed shortness of breath and was admitted to the hospital on 9/28/2023 with a large right pleural effusion.  Thoracentesis showed an exudate with metastatic breast cancer, ER positive SD slightly positive HER2 negative.  She felt better after she had her tap.  CT scan of the chest was negative except for the pleural effusion.  She has a history of struct of sleep apnea and was urged to use her CPAP again.  O2 sats are running low 90s at home now.  She denies any  breast masses or other nodularity.  She has some low back pain.  No neurologic symptoms.     Interval history 10/25/2023.  We started her on ribociclib 600 mg daily which she started 5 days ago and has had no problems with whatsoever.  We are trying to schedule her fulvestrant as her endocrine component of therapy.  A therapeutic phlebotomy because of her polycythemia.  Her work-up for myeloproliferative disorder/P vera was negative including JAK2 and reflex subsequent testing.  She had a PET CT scan performed on 10/19/2023 which showed focal increased activity in the skull base, thoracic spine right shoulder right ribs lumbar spine pelvis and proximal femurs.  She also has increased activity in mediastinal precarinal and right hilar nodes.  The right pleura has multiple hypermetabolic foci.  A incidental moderate right hydronephrosis was noted.  No visceral involvement was seen.  She had her last thoracentesis on the right on 10/15/2023 and does not feel the need for another one right now.     Interval history 11/09/23 (CAlsop, APRN):  Patient is doing very well overall and tolerating her treatment well.  She did note some mild constipation since starting the ribociclib.  She is due to complete her first cycle as she has 2 more days left before her week off.  She has been tolerating the thoracentesis which last 1 was completed on 11/3/2023.  She stated that she tolerated little bit better and is noticed less amount of fluid being removed.  She is questioning why she does not have another 1 appointment scheduled in 2 weeks.  She did state that she does note some shortness of breath with exertion still.  She is wearing oxygen when she is at home resting.  She also wears oxygen at night, 3 L.  She stated that she does not have a CPAP machine and has not had one for many years.    Interval history 11/22/23 (CAlsop, APRN):  Patient is doing very well.  She stated that she does continue to feel winded at times with  "shortness of breath with any activity but overall she is doing great.  She denies any nausea vomiting, diarrhea constipation.  She denies any significant pain.  She overall states that she is not under percent but she is feeling much better.  She had her thoracentesis last Friday and had only 150 cc of fluid removed.     Treatment history:  10/21/23: C1 Ribociclib  10/27/23: C1D1 Faslodex  11/10/23: C1D15 Faslodex   11/18/23: C2 Ribociclib  11/27/23: C2D1 Faslodex / C1 Xgeva      Allergies   Allergen Reactions    Codeine Vomiting and Nausea    Hydrocodone Vomiting and Nausea    Other Drug Vomiting and Nausea     Any of the \"cets\" (percocet)     Current Outpatient Medications on File Prior to Visit   Medication Sig Dispense Refill    zolpidem (AMBIEN) 5 MG Tab Take 1 Tablet by mouth at bedtime as needed for Sleep for up to 30 days. 15 Tablet 0    ondansetron (ZOFRAN ODT) 4 MG TABLET DISPERSIBLE Take 1 Tablet by mouth every 6 hours as needed for Nausea/Vomiting. For cancer treatment associated nausea 30 Tablet 1    lisinopril (PRINIVIL) 2.5 MG Tab Take 1 Tablet by mouth every day. 90 Tablet 1    lidocaine (LIDODERM) 5 % Patch Place 1 Patch on the skin every 24 hours. Place patch to affected area in am and remove at bedtime. 30 Patch 5    metFORMIN ER (GLUCOPHAGE XR) 500 MG TABLET SR 24 HR Take 1 Tablet by mouth every day. 90 Tablet 1    albuterol 108 (90 Base) MCG/ACT Aero Soln inhalation aerosol Inhale 2 Puffs every 6 hours as needed for Shortness of Breath. 6.7 Each 1    Ribociclib Succ, 600 MG Dose, 200 MG Tablet Therapy Pack Take 600 mg by mouth see administration instructions. Take 600 mg by mouth daily for 3 weeks (21 days), off for 1 week (7 days) 63 Each 0    cyclobenzaprine (FLEXERIL) 10 mg Tab Take 1 Tablet by mouth 3 times a day as needed for Muscle Spasms. 90 Tablet 0    guaiFENesin ER (MUCINEX) 600 MG TABLET SR 12 HR Take 1 Tablet by mouth every 12 hours. (Patient not taking: Reported on 11/9/2023) 30 " "Tablet 0    acetaminophen (TYLENOL) 500 MG Tab Take 1,000 mg by mouth 2 times a day as needed for Mild Pain. 2 tablets = 1,000 mg.      fluocinonide (LIDEX) 0.05 % Cream Apply 1 Application topically 2 times a day as needed (Rash, Itching). Apply to affected areas of back or buttocks. Do not use on face, armpit, or groin.      Cyanocobalamin (B-12) 5000 MCG Cap Take 5,000 mcg by mouth every evening.      Semaglutide,0.25 or 0.5MG/DOS, (OZEMPIC, 0.25 OR 0.5 MG/DOSE,) 2 MG/3ML Solution Pen-injector Inject 0.5 mg under the skin every 7 days. 9 mL 3     No current facility-administered medications on file prior to visit.         Review of Systems   Constitutional:  Positive for malaise/fatigue. Negative for chills, fever and weight loss.   Respiratory:  Positive for shortness of breath. Negative for cough.    Cardiovascular:  Negative for chest pain and palpitations.   Gastrointestinal:  Negative for constipation, diarrhea, nausea and vomiting.   Genitourinary:  Negative for dysuria.   Musculoskeletal:  Negative for myalgias.   Neurological:  Negative for dizziness and headaches.              Objective     /72   Pulse 95   Temp 37.1 °C (98.7 °F) (Temporal)   Resp 12   Ht 1.6 m (5' 3\")   Wt 93.8 kg (206 lb 12.7 oz)   SpO2 91%   BMI 36.63 kg/m²      Physical Exam  Vitals reviewed.   Constitutional:       General: She is not in acute distress.     Appearance: Normal appearance. She is not diaphoretic.   HENT:      Head: Normocephalic and atraumatic.   Cardiovascular:      Rate and Rhythm: Normal rate and regular rhythm.      Heart sounds: No murmur heard.     No friction rub. No gallop.   Pulmonary:      Effort: Pulmonary effort is normal. No respiratory distress.      Breath sounds: Normal breath sounds. No wheezing.   Abdominal:      General: Bowel sounds are normal. There is no distension.      Palpations: Abdomen is soft.      Tenderness: There is no abdominal tenderness.   Musculoskeletal:         " General: No swelling or tenderness. Normal range of motion.   Skin:     General: Skin is warm and dry.   Neurological:      Mental Status: She is alert and oriented to person, place, and time.   Psychiatric:         Mood and Affect: Mood normal.         Behavior: Behavior normal.                      Assessment & Plan       1. Carcinoma of upper-outer quadrant of left breast in female, estrogen receptor positive (HCC)        2. Encounter for long-term (current) use of high-risk medication                Impression:  1.  Infiltrating ductal carcinoma of the left breast, grade 2, stage anatomic to be (pT2, PN 1A) status post left lumpectomy, ER positive, AL positive, HER-2 negative, Ki-67 22%, Oncotype DX current score 20.  Status post whole breast radiation therapy on endocrine therapy with anastrozole from March 2022 through March 2023 through with moderate adherence  2.  Diabetes mellitus under fair control  3.  Tobacco abuse improving  4.  Hypertension  5.  COVID-19 infection June 2022 resolved  6.  Recurrent/metastatic ER positive breast cancer eluding malignant right pleural effusion from breast cancer, ER positive, AL slightly positive, HER2 negative.  PET scan shows extensive bony metastases and pleural metastases and mediastinal metastases.  7.  Longstanding polycythemia dating back at least 5-year.  Now worse with hemoglobin 19 hematocrit 57.  Status post phlebotomy with genetic work-up negative for myeloproliferative disorder.  Likely secondary to chronic hypoxemia.  She had 1 phlebotomy required and after that her hemoglobin was back to normal at 15.2.  We will continue to monitor this.  8.  Somatic genomic profile negative for actionable genes.  ESR 1 negative      Plan:  Patient is doing very well and tolerating treatment well.  She is on her second cycle of Ribociclib and tolerating this well.  Her cycle 2 of Faslodex is scheduled for next week and we will add the addition of Xgeva planned for next week  as well.      Patient to follow-up in the clinic in 4 weeks, or sooner if needed.      Please note that this dictation was created using voice recognition software. I have made every reasonable attempt to correct obvious errors, but I expect that there are errors of grammar and possibly content that I did not discover before finalizing the note.

## 2023-11-22 ENCOUNTER — HOSPITAL ENCOUNTER (OUTPATIENT)
Dept: HEMATOLOGY ONCOLOGY | Facility: MEDICAL CENTER | Age: 58
End: 2023-11-22
Attending: NURSE PRACTITIONER
Payer: MEDICARE

## 2023-11-22 VITALS
RESPIRATION RATE: 12 BRPM | SYSTOLIC BLOOD PRESSURE: 122 MMHG | HEART RATE: 95 BPM | TEMPERATURE: 98.7 F | DIASTOLIC BLOOD PRESSURE: 72 MMHG | BODY MASS INDEX: 36.64 KG/M2 | OXYGEN SATURATION: 91 % | HEIGHT: 63 IN | WEIGHT: 206.79 LBS

## 2023-11-22 DIAGNOSIS — C50.412 CARCINOMA OF UPPER-OUTER QUADRANT OF LEFT BREAST IN FEMALE, ESTROGEN RECEPTOR POSITIVE (HCC): ICD-10-CM

## 2023-11-22 DIAGNOSIS — Z17.0 CARCINOMA OF UPPER-OUTER QUADRANT OF LEFT BREAST IN FEMALE, ESTROGEN RECEPTOR POSITIVE (HCC): ICD-10-CM

## 2023-11-22 DIAGNOSIS — Z79.899 ENCOUNTER FOR LONG-TERM (CURRENT) USE OF HIGH-RISK MEDICATION: ICD-10-CM

## 2023-11-22 PROCEDURE — 99214 OFFICE O/P EST MOD 30 MIN: CPT | Performed by: NURSE PRACTITIONER

## 2023-11-22 PROCEDURE — 99212 OFFICE O/P EST SF 10 MIN: CPT | Performed by: NURSE PRACTITIONER

## 2023-11-22 ASSESSMENT — ENCOUNTER SYMPTOMS
MYALGIAS: 0
HEADACHES: 0
WEIGHT LOSS: 0
DIARRHEA: 0
PALPITATIONS: 0
NAUSEA: 0
VOMITING: 0
COUGH: 0
DIZZINESS: 0
CHILLS: 0
SHORTNESS OF BREATH: 1
FEVER: 0
CONSTIPATION: 0

## 2023-11-22 ASSESSMENT — FIBROSIS 4 INDEX: FIB4 SCORE: 0.91

## 2023-11-22 ASSESSMENT — PAIN SCALES - GENERAL: PAINLEVEL: NO PAIN

## 2023-11-22 NOTE — ANESTHESIA POSTPROCEDURE EVALUATION
Patient: Sol Kern    Procedure Summary     Date: 11/12/21 Room / Location: [facility] ROOM 28 / SURGERY SAME DAY [facility]    Anesthesia Start: 1323 Anesthesia Stop: 1455    Procedures:       MASTECTOMY, PARTIAL - SAMARA LOCALIZED (Left Breast)      BIOPSY, LYMPH NODE, SENTINEL (Left Axilla) Diagnosis: (LEFT BREAST CANCER)    Surgeons: Kayleen Sykes M.D. Responsible Provider: Yari Ortiz M.D.    Anesthesia Type: general ASA Status: 3          Final Anesthesia Type: general  Last vitals  BP   Blood Pressure: 122/71, NIBP: (!) 85/56    Temp   36.1 °C (97 °F)    Pulse   72   Resp   12    SpO2   93 %      Anesthesia Post Evaluation    Patient location during evaluation: PACU  Patient participation: complete - patient participated  Level of consciousness: awake and alert  Pain score: 0    Airway patency: patent  Anesthetic complications: no  Cardiovascular status: adequate and hemodynamically stable  Respiratory status: acceptable  Hydration status: acceptable    PONV: none          No complications documented.     Nurse Pain Score: 0 (NPRS)        
No. KHURRAM screening performed.  STOP BANG Legend: 0-2 = LOW Risk; 3-4 = INTERMEDIATE Risk; 5-8 = HIGH Risk

## 2023-11-27 ENCOUNTER — OUTPATIENT INFUSION SERVICES (OUTPATIENT)
Dept: ONCOLOGY | Facility: MEDICAL CENTER | Age: 58
End: 2023-11-27
Attending: INTERNAL MEDICINE
Payer: MEDICARE

## 2023-11-27 VITALS
DIASTOLIC BLOOD PRESSURE: 83 MMHG | SYSTOLIC BLOOD PRESSURE: 129 MMHG | HEART RATE: 84 BPM | BODY MASS INDEX: 36.95 KG/M2 | HEIGHT: 63 IN | OXYGEN SATURATION: 93 % | WEIGHT: 208.56 LBS | RESPIRATION RATE: 18 BRPM | TEMPERATURE: 98.1 F

## 2023-11-27 DIAGNOSIS — C50.412 CARCINOMA OF UPPER-OUTER QUADRANT OF LEFT BREAST IN FEMALE, ESTROGEN RECEPTOR POSITIVE (HCC): ICD-10-CM

## 2023-11-27 DIAGNOSIS — Z17.0 CARCINOMA OF UPPER-OUTER QUADRANT OF LEFT BREAST IN FEMALE, ESTROGEN RECEPTOR POSITIVE (HCC): ICD-10-CM

## 2023-11-27 DIAGNOSIS — C50.912 BREAST CANCER, STAGE 4, LEFT (HCC): ICD-10-CM

## 2023-11-27 LAB
ALBUMIN SERPL BCP-MCNC: 3.9 G/DL (ref 3.2–4.9)
ALBUMIN/GLOB SERPL: 1.1 G/DL
ALP SERPL-CCNC: 140 U/L (ref 30–99)
ALT SERPL-CCNC: 328 U/L (ref 2–50)
ANION GAP SERPL CALC-SCNC: 13 MMOL/L (ref 7–16)
AST SERPL-CCNC: 139 U/L (ref 12–45)
BASOPHILS # BLD AUTO: 1.9 % (ref 0–1.8)
BASOPHILS # BLD: 0.08 K/UL (ref 0–0.12)
BILIRUB SERPL-MCNC: 0.5 MG/DL (ref 0.1–1.5)
BUN SERPL-MCNC: 10 MG/DL (ref 8–22)
CA-I BLD ISE-SCNC: 1.21 MMOL/L (ref 1.1–1.3)
CALCIUM ALBUM COR SERPL-MCNC: 9.7 MG/DL (ref 8.5–10.5)
CALCIUM SERPL-MCNC: 9.6 MG/DL (ref 8.5–10.5)
CHLORIDE SERPL-SCNC: 104 MMOL/L (ref 96–112)
CO2 SERPL-SCNC: 20 MMOL/L (ref 20–33)
CREAT BLD-MCNC: 0.8 MG/DL (ref 0.5–1.4)
CREAT SERPL-MCNC: 0.72 MG/DL (ref 0.5–1.4)
EOSINOPHIL # BLD AUTO: 0.1 K/UL (ref 0–0.51)
EOSINOPHIL NFR BLD: 2.3 % (ref 0–6.9)
ERYTHROCYTE [DISTWIDTH] IN BLOOD BY AUTOMATED COUNT: 51.3 FL (ref 35.9–50)
GFR SERPLBLD CREATININE-BSD FMLA CKD-EPI: 95 ML/MIN/1.73 M 2
GLOBULIN SER CALC-MCNC: 3.5 G/DL (ref 1.9–3.5)
GLUCOSE SERPL-MCNC: 107 MG/DL (ref 65–99)
HCT VFR BLD AUTO: 44.5 % (ref 37–47)
HGB BLD-MCNC: 15.6 G/DL (ref 12–16)
IMM GRANULOCYTES # BLD AUTO: 0.02 K/UL (ref 0–0.11)
IMM GRANULOCYTES NFR BLD AUTO: 0.5 % (ref 0–0.9)
LYMPHOCYTES # BLD AUTO: 0.92 K/UL (ref 1–4.8)
LYMPHOCYTES NFR BLD: 21.5 % (ref 22–41)
MCH RBC QN AUTO: 32.2 PG (ref 27–33)
MCHC RBC AUTO-ENTMCNC: 35.1 G/DL (ref 32.2–35.5)
MCV RBC AUTO: 91.8 FL (ref 81.4–97.8)
MONOCYTES # BLD AUTO: 0.4 K/UL (ref 0–0.85)
MONOCYTES NFR BLD AUTO: 9.3 % (ref 0–13.4)
NEUTROPHILS # BLD AUTO: 2.76 K/UL (ref 1.82–7.42)
NEUTROPHILS NFR BLD: 64.5 % (ref 44–72)
NRBC # BLD AUTO: 0 K/UL
NRBC BLD-RTO: 0 /100 WBC (ref 0–0.2)
PLATELET # BLD AUTO: 421 K/UL (ref 164–446)
PMV BLD AUTO: 8.7 FL (ref 9–12.9)
POTASSIUM SERPL-SCNC: 4 MMOL/L (ref 3.6–5.5)
PROT SERPL-MCNC: 7.4 G/DL (ref 6–8.2)
RBC # BLD AUTO: 4.85 M/UL (ref 4.2–5.4)
SODIUM SERPL-SCNC: 137 MMOL/L (ref 135–145)
WBC # BLD AUTO: 4.3 K/UL (ref 4.8–10.8)

## 2023-11-27 PROCEDURE — 96402 CHEMO HORMON ANTINEOPL SQ/IM: CPT

## 2023-11-27 PROCEDURE — 82330 ASSAY OF CALCIUM: CPT

## 2023-11-27 PROCEDURE — 96372 THER/PROPH/DIAG INJ SC/IM: CPT | Mod: XU

## 2023-11-27 PROCEDURE — 82565 ASSAY OF CREATININE: CPT | Mod: XU

## 2023-11-27 PROCEDURE — 700111 HCHG RX REV CODE 636 W/ 250 OVERRIDE (IP): Mod: JZ | Performed by: NURSE PRACTITIONER

## 2023-11-27 PROCEDURE — 36415 COLL VENOUS BLD VENIPUNCTURE: CPT

## 2023-11-27 PROCEDURE — 80053 COMPREHEN METABOLIC PANEL: CPT

## 2023-11-27 PROCEDURE — 85025 COMPLETE CBC W/AUTO DIFF WBC: CPT

## 2023-11-27 RX ORDER — LAMOTRIGINE 25 MG/1
250 TABLET ORAL ONCE
Status: COMPLETED | OUTPATIENT
Start: 2023-11-27 | End: 2023-11-27

## 2023-11-27 RX ADMIN — FULVESTRANT 250 MG: 50 INJECTION, SOLUTION INTRAMUSCULAR at 15:58

## 2023-11-27 RX ADMIN — FULVESTRANT 250 MG: 50 INJECTION, SOLUTION INTRAMUSCULAR at 15:52

## 2023-11-27 RX ADMIN — DENOSUMAB 120 MG: 120 INJECTION SUBCUTANEOUS at 15:49

## 2023-11-27 ASSESSMENT — FIBROSIS 4 INDEX: FIB4 SCORE: 0.91

## 2023-11-28 ENCOUNTER — TELEPHONE (OUTPATIENT)
Dept: HEMATOLOGY ONCOLOGY | Facility: MEDICAL CENTER | Age: 58
End: 2023-11-28
Payer: MEDICARE

## 2023-11-28 NOTE — PROGRESS NOTES
Patient presents for Faslodex and Xgeva injections. Reviewed plan of care patient verbalizes understanding. Patient given verbal and written education on Xgeva questions answered as needed. Blood work drawn and within parameters to proceed with treatment. Injections given as ordered band aids to sites. Patient scheduled for her next several appointments and released in no acute distress.

## 2023-11-28 NOTE — TELEPHONE ENCOUNTER
Recent lab work show increased liver enzymes. Per Lorraine GREEN, APRN, patient to stop ribociclib, will repeat CMP in 2 weeks in clinic. Called to update patient. Answered patient questions regarding what signs to look for besides blood work for worsening liver symptoms. Discussed with patient to look for dark colored urine, pain in right upper abdomen, and yellowing of skin and/or eyes. Sent patient education sheet regarding Kisq[facility] through [employer] [employer] message. No other questions at this time. Will follow up with patient next week.

## 2023-11-30 PROBLEM — R79.89 ELEVATED LFTS: Status: ACTIVE | Noted: 2023-11-30

## 2023-11-30 RX ORDER — METFORMIN HYDROCHLORIDE 500 MG/1
500 TABLET, EXTENDED RELEASE ORAL DAILY
Qty: 200 TABLET | Refills: 1 | Status: SHIPPED | OUTPATIENT
Start: 2023-11-30

## 2023-11-30 NOTE — TELEPHONE ENCOUNTER
Received request via: Pharmacy    Was the patient seen in the last year in this department? Yes    Does the patient have an active prescription (recently filled or refills available) for medication(s) requested?  INSURANCE REQUESTING 100 DAY SUPPLY PLEASE ADVISE.    Does the patient have alf Plus and need 100 day supply (blood pressure, diabetes and cholesterol meds only)? Yes, quantity updated to 100 days

## 2023-12-04 ENCOUNTER — OFFICE VISIT (OUTPATIENT)
Dept: MEDICAL GROUP | Facility: PHYSICIAN GROUP | Age: 58
End: 2023-12-04
Payer: MEDICARE

## 2023-12-04 ENCOUNTER — PATIENT MESSAGE (OUTPATIENT)
Dept: HEALTH INFORMATION MANAGEMENT | Facility: OTHER | Age: 58
End: 2023-12-04

## 2023-12-04 VITALS
HEIGHT: 63 IN | BODY MASS INDEX: 37.1 KG/M2 | SYSTOLIC BLOOD PRESSURE: 114 MMHG | DIASTOLIC BLOOD PRESSURE: 62 MMHG | HEART RATE: 90 BPM | OXYGEN SATURATION: 92 % | TEMPERATURE: 97.9 F | WEIGHT: 209.4 LBS

## 2023-12-04 DIAGNOSIS — C50.412 CARCINOMA OF UPPER-OUTER QUADRANT OF LEFT BREAST IN FEMALE, ESTROGEN RECEPTOR POSITIVE (HCC): ICD-10-CM

## 2023-12-04 DIAGNOSIS — R79.89 ELEVATED LFTS: ICD-10-CM

## 2023-12-04 DIAGNOSIS — G47.33 OSA (OBSTRUCTIVE SLEEP APNEA): ICD-10-CM

## 2023-12-04 DIAGNOSIS — J96.11 CHRONIC RESPIRATORY FAILURE WITH HYPOXIA (HCC): ICD-10-CM

## 2023-12-04 DIAGNOSIS — Z17.0 CARCINOMA OF UPPER-OUTER QUADRANT OF LEFT BREAST IN FEMALE, ESTROGEN RECEPTOR POSITIVE (HCC): ICD-10-CM

## 2023-12-04 DIAGNOSIS — K04.7 TOOTH INFECTION: ICD-10-CM

## 2023-12-04 DIAGNOSIS — E11.40 TYPE 2 DIABETES MELLITUS WITH DIABETIC NEUROPATHY, WITHOUT LONG-TERM CURRENT USE OF INSULIN (HCC): ICD-10-CM

## 2023-12-04 DIAGNOSIS — C50.912 BREAST CANCER, STAGE 4, LEFT (HCC): ICD-10-CM

## 2023-12-04 DIAGNOSIS — Z23 NEED FOR VACCINATION: ICD-10-CM

## 2023-12-04 PROBLEM — N64.59 BREAST SYMPTOM: Status: RESOLVED | Noted: 2023-06-28 | Resolved: 2023-12-04

## 2023-12-04 PROBLEM — J96.01 ACUTE RESPIRATORY FAILURE WITH HYPOXIA (HCC): Status: RESOLVED | Noted: 2023-09-28 | Resolved: 2023-12-04

## 2023-12-04 LAB
HBA1C MFR BLD: 6.1 % (ref ?–5.8)
POCT INT CON NEG: NEGATIVE
POCT INT CON POS: POSITIVE

## 2023-12-04 PROCEDURE — G0010 ADMIN HEPATITIS B VACCINE: HCPCS | Performed by: FAMILY MEDICINE

## 2023-12-04 PROCEDURE — 92250 FUNDUS PHOTOGRAPHY W/I&R: CPT | Mod: TC | Performed by: FAMILY MEDICINE

## 2023-12-04 PROCEDURE — 99215 OFFICE O/P EST HI 40 MIN: CPT | Mod: 25 | Performed by: FAMILY MEDICINE

## 2023-12-04 PROCEDURE — 3074F SYST BP LT 130 MM HG: CPT | Performed by: FAMILY MEDICINE

## 2023-12-04 PROCEDURE — 3078F DIAST BP <80 MM HG: CPT | Performed by: FAMILY MEDICINE

## 2023-12-04 PROCEDURE — 83036 HEMOGLOBIN GLYCOSYLATED A1C: CPT | Performed by: FAMILY MEDICINE

## 2023-12-04 PROCEDURE — G0008 ADMIN INFLUENZA VIRUS VAC: HCPCS | Performed by: FAMILY MEDICINE

## 2023-12-04 PROCEDURE — 90686 IIV4 VACC NO PRSV 0.5 ML IM: CPT | Performed by: FAMILY MEDICINE

## 2023-12-04 PROCEDURE — 90746 HEPB VACCINE 3 DOSE ADULT IM: CPT | Performed by: FAMILY MEDICINE

## 2023-12-04 RX ORDER — AMOXICILLIN 500 MG/1
500 CAPSULE ORAL 3 TIMES DAILY
Qty: 21 CAPSULE | Refills: 0 | Status: SHIPPED | OUTPATIENT
Start: 2023-12-04 | End: 2023-12-11

## 2023-12-04 ASSESSMENT — ENCOUNTER SYMPTOMS
VOMITING: 0
CHILLS: 0
FEVER: 0
DIARRHEA: 0

## 2023-12-04 ASSESSMENT — FIBROSIS 4 INDEX: FIB4 SCORE: 1.09

## 2023-12-05 ENCOUNTER — PATIENT OUTREACH (OUTPATIENT)
Dept: HEALTH INFORMATION MANAGEMENT | Facility: OTHER | Age: 58
End: 2023-12-05
Payer: MEDICARE

## 2023-12-05 ENCOUNTER — TELEPHONE (OUTPATIENT)
Dept: HEALTH INFORMATION MANAGEMENT | Facility: OTHER | Age: 58
End: 2023-12-05
Payer: MEDICARE

## 2023-12-05 DIAGNOSIS — I10 ESSENTIAL HYPERTENSION: ICD-10-CM

## 2023-12-05 DIAGNOSIS — E11.40 TYPE 2 DIABETES MELLITUS WITH DIABETIC NEUROPATHY, WITHOUT LONG-TERM CURRENT USE OF INSULIN (HCC): ICD-10-CM

## 2023-12-05 SDOH — ECONOMIC STABILITY: INCOME INSECURITY: IN THE LAST 12 MONTHS, WAS THERE A TIME WHEN YOU WERE NOT ABLE TO PAY THE MORTGAGE OR RENT ON TIME?: NO

## 2023-12-05 SDOH — ECONOMIC STABILITY: HOUSING INSECURITY: IN THE LAST 12 MONTHS, HOW MANY PLACES HAVE YOU LIVED?: 1

## 2023-12-05 ASSESSMENT — SOCIAL DETERMINANTS OF HEALTH (SDOH)
WITHIN THE LAST YEAR, HAVE YOU BEEN HUMILIATED OR EMOTIONALLY ABUSED IN OTHER WAYS BY YOUR PARTNER OR EX-PARTNER?: NO
WITHIN THE LAST YEAR, HAVE YOU BEEN KICKED, HIT, SLAPPED, OR OTHERWISE PHYSICALLY HURT BY YOUR PARTNER OR EX-PARTNER?: NO
WITHIN THE LAST YEAR, HAVE YOU BEEN AFRAID OF YOUR PARTNER OR EX-PARTNER?: NO
IN THE PAST 12 MONTHS, HAS THE ELECTRIC, GAS, OIL, OR WATER COMPANY THREATENED TO SHUT OFF SERVICE IN YOUR HOME?: NO
WITHIN THE LAST YEAR, HAVE TO BEEN RAPED OR FORCED TO HAVE ANY KIND OF SEXUAL ACTIVITY BY YOUR PARTNER OR EX-PARTNER?: NO

## 2023-12-05 ASSESSMENT — PATIENT HEALTH QUESTIONNAIRE - PHQ9: CLINICAL INTERPRETATION OF PHQ2 SCORE: 0

## 2023-12-05 NOTE — PROGRESS NOTES
Subjective:     CC: DM    HPI:   Sol presents today with    Problem   Chronic Respiratory Failure With Hypoxia (Hcc)    2-4L overnight   Sometimes during the day depending on symptoms     Carcinoma of Upper-Outer Quadrant of Left Breast in Female, Estrogen Receptor Positive (Hcc)    9/2021: L breast, s/p surgical resection + radiation, no chemo   Anastrozole 2/2022 - 9/2023 9/2023: malignant pleural effusion, breast cancer has metastasized to lungs & bones    Not doing chemo, currently on immunotherapy.  10/2023 - Ribociclib, Faslodex started  11/27/2023 - Ribociclib was d/c'd for transaminitis; will readdress after repeat labs  11/27/2023 - Xgeva  Now will be Xgeva + Faslodex monthly     Type 2 Diabetes Mellitus With Diabetic Neuropathy, Without Long-Term Current Use of Insulin (Hcc)    This is a chronic condition.  Current medications:   Insulin: -  Biguanide: metformin 500 XR mg daily   GLP1-RA: Ozempic 0.5 mg every 7 days  SGLT-2i: -  DPP4-I: -  TZD: -  Maged: -  Sulfonyluria: -      Last A1c: 6.1% (12/2023); 6.0% (6/2023);  9.7% (2/2023); 8.5% (7/2022); 12.2% (3/2022)  Last Microalb/Cr ratio: <30 (6/2023)  Fasting sugars: n/a  Last diabetic foot exam: 2/2023  Last retinal eye exam: 2/2022 - no DM retinopathy  ACEi/ARB: lisinopril 2.5 mg  Statin: none  Aspirin: no  Concomitant HTN: yes - above goal  Nightly foot checks: yes     Fady (Obstructive Sleep Apnea)    Diagnosed ~2010  Not currently treated     Acute Respiratory Failure With Hypoxia (Hcc) (Resolved)   Breast Symptom (Resolved)    R breast  In the area of the biopsy marker  2 weeks ago, noticed a smell  There is a bump located at the biopsy site, getting bigger  Last night, popped it and white material  Got slightly smaller         Health Maintenance: Completed    ROS:  Review of Systems   Constitutional:  Negative for chills and fever.   Gastrointestinal:  Negative for diarrhea and vomiting.       Objective:     Exam:  /62 (BP Location: Right arm,  "Patient Position: Sitting, BP Cuff Size: Adult)   Pulse 90   Temp 36.6 °C (97.9 °F) (Temporal)   Ht 1.6 m (5' 3\")   Wt 95 kg (209 lb 6.4 oz)   SpO2 92%   BMI 37.09 kg/m²  Body mass index is 37.09 kg/m².    Physical Exam  Constitutional:       Appearance: Normal appearance.   Cardiovascular:      Rate and Rhythm: Normal rate and regular rhythm.      Heart sounds: Normal heart sounds.   Pulmonary:      Effort: Pulmonary effort is normal.      Breath sounds: Normal breath sounds.   Musculoskeletal:      Cervical back: Normal range of motion and neck supple.   Neurological:      Mental Status: She is alert.           Assessment & Plan:     58 y.o. female with the following -     1. Carcinoma of upper-outer quadrant of left breast in female, estrogen receptor positive (HCC)  2. Breast cancer, stage 4, left (HCC)  3. Elevated LFTs  Chronic, uncontrolled, side effects of treatment. Recently recurred with lung and bone metastases. She is established with oncology and started on Ribociclib and Faslodex. Unfortunately, she got severe transaminitis so the ribociclib had to be discontinued. She will follow up with oncology as directed.    4. Type 2 diabetes mellitus with diabetic neuropathy, without long-term current use of insulin (HCC)  Chronic, controlled.  Hemoglobin A1c is under 7.0%.  She is up-to-date with all diabetic screenings, on an ACE inhibitor for renal protection, and is on a statin for cardiovascular protection.  We will continue the current regimen.  - metformin  mg daily  - ozempic 0.5 mg every 7 days  - POCT A1C  - POCT Retinal Eye Exam    5. YOANA (obstructive sleep apnea)  Chronic, uncontrolled. She is not on a CPAP and hasn't been for years. She needs a sleep study, which has been ordered.  - Overnight Home Sleep Study; Future    6. Chronic respiratory failure with hypoxia (HCC)  Chronic, stable.  She is been on oxygen since her pleural effusion related to the cancer.  Uses 2 to 4 L at night and " during the day as needed.  She will continue that regimen.    7. Tooth infection  Acute.  She is noticing a tooth infection on the right lower side.  Is been there for several weeks and she is getting a bad taste and bad smell associated.  I have provided prescription antibiotic but she is aware that she has to go to a dentist to get this definitively treated.  Unfortunately, she just started Xgeva by oncology which comes with the risk of osteonecrosis of the jaw after an invasive jaw procedure so she is can to discuss how she pauses the dosing with her oncologist for that procedure.  - amoxicillin (AMOXIL) 500 MG Cap; Take 1 Capsule by mouth 3 times a day for 7 days.  Dispense: 21 Capsule; Refill: 0    8. Need for vaccination  - Influenza Vaccine Quad Injection (PF)  - Hep B Adult 20+    I spent a total of 49 minutes with record review, exam, communication with the patient, communication with other providers, and documentation of this encounter.    Referral for genetic research was offered. Patient is a participant.      Return in about 3 months (around 3/4/2024) for f/u DM, get A1c.    Please note that this dictation was created using voice recognition software. I have made every reasonable attempt to correct obvious errors, but I expect that there are errors of grammar and possibly content that I did not discover before finalizing the note.

## 2023-12-05 NOTE — PROGRESS NOTES
INITIAL CARE MANAGEMENT CARE PLAN/ASSESSMENT     Sol is enrolling in the personal care management program today in order to have all the support and resources she can get as she manages her chronic health conditions and engages in her fight against cancer.     Medication Self-Management Goals:     Reviewed medications listed below with patient.      Current Outpatient Medications:     denosumab (PROLIA) 60 MG/ML Solution Prefilled Syringe injection, Inject 60 mg under the skin every 6 months., Disp: , Rfl:     amoxicillin (AMOXIL) 500 MG Cap, Take 1 Capsule by mouth 3 times a day for 7 days., Disp: 21 Capsule, Rfl: 0    metFORMIN ER (GLUCOPHAGE XR) 500 MG TABLET SR 24 HR, Take 1 Tablet by mouth every day., Disp: 200 Tablet, Rfl: 1    zolpidem (AMBIEN) 5 MG Tab, Take 1 Tablet by mouth at bedtime as needed for Sleep for up to 30 days. (Patient taking differently: Take 10 mg by mouth at bedtime as needed for Sleep.), Disp: 15 Tablet, Rfl: 0    ondansetron (ZOFRAN ODT) 4 MG TABLET DISPERSIBLE, Take 1 Tablet by mouth every 6 hours as needed for Nausea/Vomiting. For cancer treatment associated nausea, Disp: 30 Tablet, Rfl: 1    lisinopril (PRINIVIL) 2.5 MG Tab, Take 1 Tablet by mouth every day., Disp: 90 Tablet, Rfl: 1    lidocaine (LIDODERM) 5 % Patch, Place 1 Patch on the skin every 24 hours. Place patch to affected area in am and remove at bedtime., Disp: 30 Patch, Rfl: 5    albuterol 108 (90 Base) MCG/ACT Aero Soln inhalation aerosol, Inhale 2 Puffs every 6 hours as needed for Shortness of Breath., Disp: 6.7 Each, Rfl: 1    Ribociclib Succ, 600 MG Dose, 200 MG Tablet Therapy Pack, Take 600 mg by mouth see administration instructions. Take 600 mg by mouth daily for 3 weeks (21 days), off for 1 week (7 days) (Patient not taking: Reported on 12/4/2023), Disp: 63 Each, Rfl: 0    cyclobenzaprine (FLEXERIL) 10 mg Tab, Take 1 Tablet by mouth 3 times a day as needed for Muscle Spasms., Disp: 90 Tablet, Rfl: 0     acetaminophen (TYLENOL) 500 MG Tab, Take 1,000 mg by mouth 2 times a day as needed for Mild Pain. 2 tablets = 1,000 mg., Disp: , Rfl:     fluocinonide (LIDEX) 0.05 % Cream, Apply 1 Application topically 2 times a day as needed (Rash, Itching). Apply to affected areas of back or buttocks. Do not use on face, armpit, or groin., Disp: , Rfl:     Cyanocobalamin (B-12) 5000 MCG Cap, Take 5,000 mcg by mouth every evening., Disp: , Rfl:     Semaglutide,0.25 or 0.5MG/DOS, (OZEMPIC, 0.25 OR 0.5 MG/DOSE,) 2 MG/3ML Solution Pen-injector, Inject 0.5 mg under the skin every 7 days., Disp: 9 mL, Rfl: 3         Goal:  Continue taking all medications as prescribed.        Physical/Functional/Environmental Status:     Activities of Daily Living:  Bathing: independent   Dressing: independent  Grooming: independent  Mouth Care: independent  Toileting: independent  Climbing a Flight of Stairs: independent    Independent Activities of Daily Living:  Shopping: independent  Cooking: independent  Managing Medications: independent  Using the phone and looking up numbers: independent  Driving or using public transportation: needs assistance  Managing Finances: independent        12/5/2023    10:17 AM   STEADI Fall Risk   STEADI Risk for Falling Score 3   One or more falls in the last year No   Advised to use a cane or walker to get around safely No   Feels unsteady when walking No   Steadies self on furniture while walking at home No   Worried about falling No   Needs to push with hands when rising from a chair No   Has trouble stepping up onto a curb / using stairs No   Often has to rush to the toilet Yes   Has lost some feeling in feet No   Takes medicine that makes him/her feel lightheaded or more tired than usual Yes   Takes medicine to sleep or improve mood Yes   Often feels sad or depressed No         Goal:  Continue utilizing positive coping and support system well.     Social Determinants of Health       Financial Status:       Financial Resource Strain: Low Risk  (3/20/2023)    Overall Financial Resource Strain (CARDIA)     Difficulty of Paying Living Expenses: Not very hard         Referred to CHW/SW:  NA       Transportation Status:      Transportation Needs: No Transportation Needs (3/20/2023)    PRAPARE - Transportation     Lack of Transportation (Medical): No     Lack of Transportation (Non-Medical): No        Referred to CHW/SW:  NA      Food Insecurity:      Food Insecurity: No Food Insecurity (3/20/2023)    Hunger Vital Sign     Worried About Running Out of Food in the Last Year: Never true     Ran Out of Food in the Last Year: Never true        Referred to CHW/SW:  NA       Housing Status:     Housing Stability: Low Risk  (12/5/2023)    Housing Stability Vital Sign     Unable to Pay for Housing in the Last Year: No     Number of Places Lived in the Last Year: 1     Unstable Housing in the Last Year: No        Referred to CHW/SW:  NA      Social Connections:     Social Connections: Moderately Integrated (3/20/2023)    Social Connection and Isolation Panel [NHANES]     Frequency of Communication with Friends and Family: More than three times a week     Frequency of Social Gatherings with Friends and Family: Once a week     Attends Worship Services: 1 to 4 times per year     Active Member of Clubs or Organizations: No     Attends Club or Organization Meetings: Never     Marital Status:         Referred to CHW/SW:  NA      Mental/Behavioral/Psychosocial Status:        3/20/2023     2:00 PM 9/28/2023    10:33 PM 12/5/2023    10:16 AM   Depression Screen (PHQ-2/PHQ-9)   PHQ-2 Total Score  0    PHQ-2 Total Score 0  0       Interpretation of PHQ-9 Total Score   Score Severity   1-4 No Depression   5-9 Mild Depression   10-14 Moderate Depression   15-19 Moderately Severe Depression   20-27 Severe Depression       Goal:  Continue using healthy coping strategies and support system well.       Chronic Care Management Care Plan    "      Discussion:  The patient hopes to use this program as an extra set of resources and to keep the burden of coordinating care during this time as reasonable as possible for herself and her family members. She also wants to be able to ask questions and be proactive in a way that is easily accessible .The patient wants to avoid \"crisis mode\" however possible.     Goals: Confidently preventing crises  Goal:  The patient will have the opportunity to regularly ask questions and learn about and review her current and future health concerns.   Barriers:Multiple providers, complex set of health concerns  Interventions:   Monthly check in  Monthly medication audit  Monthly status and plan of care assessment    Start Date: 12/5/23  End Date:  12/5/24     Next Scheduled patient outreach:   1/5/23 by phone  "

## 2023-12-06 ENCOUNTER — PATIENT OUTREACH (OUTPATIENT)
Dept: HEALTH INFORMATION MANAGEMENT | Facility: OTHER | Age: 58
End: 2023-12-06
Payer: MEDICARE

## 2023-12-06 DIAGNOSIS — E11.40 TYPE 2 DIABETES MELLITUS WITH DIABETIC NEUROPATHY, WITHOUT LONG-TERM CURRENT USE OF INSULIN (HCC): ICD-10-CM

## 2023-12-06 NOTE — PROGRESS NOTES
..Community Health Worker Note  NILDA Herrera contacted pt per JAMAICA Galindo's request. Pt is interested in beginning process to establish ACP docs.     Discussed: Pt would like to receive information and docs to make ACP wishes known.     Follow-Up Needs: CHW will call in one week to see if pt has any questions about ACP paperwork.     Plan: NILDA Herrera is going to send pt ACP docs, guide and workshop information to pt's e-mail. CHW will call pt in one week to check if pt has any questions or concerns.

## 2023-12-12 ENCOUNTER — HOSPITAL ENCOUNTER (OUTPATIENT)
Facility: MEDICAL CENTER | Age: 58
End: 2023-12-12
Attending: INTERNAL MEDICINE
Payer: MEDICARE

## 2023-12-12 ENCOUNTER — HOSPITAL ENCOUNTER (OUTPATIENT)
Dept: HEMATOLOGY ONCOLOGY | Facility: MEDICAL CENTER | Age: 58
End: 2023-12-12
Attending: INTERNAL MEDICINE
Payer: MEDICARE

## 2023-12-12 ENCOUNTER — TELEPHONE (OUTPATIENT)
Dept: HEMATOLOGY ONCOLOGY | Facility: MEDICAL CENTER | Age: 58
End: 2023-12-12
Payer: MEDICARE

## 2023-12-12 DIAGNOSIS — C50.912 BREAST CANCER, STAGE 4, LEFT (HCC): ICD-10-CM

## 2023-12-12 DIAGNOSIS — Z79.899 ENCOUNTER FOR LONG-TERM (CURRENT) USE OF HIGH-RISK MEDICATION: ICD-10-CM

## 2023-12-12 DIAGNOSIS — R79.89 ELEVATED LFTS: ICD-10-CM

## 2023-12-12 LAB
ALBUMIN SERPL BCP-MCNC: 4 G/DL (ref 3.2–4.9)
ALBUMIN/GLOB SERPL: 1.1 G/DL
ALP SERPL-CCNC: 149 U/L (ref 30–99)
ALT SERPL-CCNC: 45 U/L (ref 2–50)
ANION GAP SERPL CALC-SCNC: 12 MMOL/L (ref 7–16)
AST SERPL-CCNC: 28 U/L (ref 12–45)
BASOPHILS # BLD AUTO: 1.5 % (ref 0–1.8)
BASOPHILS # BLD: 0.11 K/UL (ref 0–0.12)
BILIRUB SERPL-MCNC: 0.2 MG/DL (ref 0.1–1.5)
BUN SERPL-MCNC: 10 MG/DL (ref 8–22)
CALCIUM ALBUM COR SERPL-MCNC: 10.1 MG/DL (ref 8.5–10.5)
CALCIUM SERPL-MCNC: 10.1 MG/DL (ref 8.5–10.5)
CHLORIDE SERPL-SCNC: 104 MMOL/L (ref 96–112)
CO2 SERPL-SCNC: 23 MMOL/L (ref 20–33)
CREAT SERPL-MCNC: 0.57 MG/DL (ref 0.5–1.4)
EOSINOPHIL # BLD AUTO: 0.14 K/UL (ref 0–0.51)
EOSINOPHIL NFR BLD: 1.9 % (ref 0–6.9)
ERYTHROCYTE [DISTWIDTH] IN BLOOD BY AUTOMATED COUNT: 54.1 FL (ref 35.9–50)
GFR SERPLBLD CREATININE-BSD FMLA CKD-EPI: 103 ML/MIN/1.73 M 2
GLOBULIN SER CALC-MCNC: 3.6 G/DL (ref 1.9–3.5)
GLUCOSE SERPL-MCNC: 137 MG/DL (ref 65–99)
HCT VFR BLD AUTO: 47.1 % (ref 37–47)
HGB BLD-MCNC: 16.2 G/DL (ref 12–16)
IMM GRANULOCYTES # BLD AUTO: 0.08 K/UL (ref 0–0.11)
IMM GRANULOCYTES NFR BLD AUTO: 1.1 % (ref 0–0.9)
LYMPHOCYTES # BLD AUTO: 1.36 K/UL (ref 1–4.8)
LYMPHOCYTES NFR BLD: 18.9 % (ref 22–41)
MCH RBC QN AUTO: 32.1 PG (ref 27–33)
MCHC RBC AUTO-ENTMCNC: 34.4 G/DL (ref 32.2–35.5)
MCV RBC AUTO: 93.3 FL (ref 81.4–97.8)
MONOCYTES # BLD AUTO: 0.85 K/UL (ref 0–0.85)
MONOCYTES NFR BLD AUTO: 11.8 % (ref 0–13.4)
NEUTROPHILS # BLD AUTO: 4.64 K/UL (ref 1.82–7.42)
NEUTROPHILS NFR BLD: 64.8 % (ref 44–72)
NRBC # BLD AUTO: 0 K/UL
NRBC BLD-RTO: 0 /100 WBC (ref 0–0.2)
PLATELET # BLD AUTO: 323 K/UL (ref 164–446)
PMV BLD AUTO: 9.1 FL (ref 9–12.9)
POTASSIUM SERPL-SCNC: 4 MMOL/L (ref 3.6–5.5)
PROT SERPL-MCNC: 7.6 G/DL (ref 6–8.2)
RBC # BLD AUTO: 5.05 M/UL (ref 4.2–5.4)
SODIUM SERPL-SCNC: 139 MMOL/L (ref 135–145)
WBC # BLD AUTO: 7.2 K/UL (ref 4.8–10.8)

## 2023-12-12 PROCEDURE — 36415 COLL VENOUS BLD VENIPUNCTURE: CPT

## 2023-12-12 PROCEDURE — 80053 COMPREHEN METABOLIC PANEL: CPT

## 2023-12-12 PROCEDURE — 85025 COMPLETE CBC W/AUTO DIFF WBC: CPT

## 2023-12-12 RX ORDER — PROCHLORPERAZINE MALEATE 10 MG
10 TABLET ORAL EVERY 6 HOURS PRN
Status: CANCELLED | OUTPATIENT
Start: 2023-12-25

## 2023-12-12 RX ORDER — ONDANSETRON 8 MG/1
8 TABLET, ORALLY DISINTEGRATING ORAL PRN
Status: CANCELLED | OUTPATIENT
Start: 2023-12-25

## 2023-12-12 RX ORDER — EPINEPHRINE 1 MG/ML(1)
0.5 AMPUL (ML) INJECTION PRN
Status: CANCELLED | OUTPATIENT
Start: 2023-12-25

## 2023-12-12 RX ORDER — LAMOTRIGINE 25 MG/1
250 TABLET ORAL ONCE
Status: CANCELLED | OUTPATIENT
Start: 2023-12-25 | End: 2023-12-25

## 2023-12-12 RX ORDER — METHYLPREDNISOLONE SODIUM SUCCINATE 125 MG/2ML
125 INJECTION, POWDER, LYOPHILIZED, FOR SOLUTION INTRAMUSCULAR; INTRAVENOUS PRN
Status: CANCELLED | OUTPATIENT
Start: 2023-12-25

## 2023-12-12 RX ORDER — DIPHENHYDRAMINE HYDROCHLORIDE 50 MG/ML
50 INJECTION INTRAMUSCULAR; INTRAVENOUS PRN
Status: CANCELLED | OUTPATIENT
Start: 2023-12-25

## 2023-12-12 RX ORDER — ONDANSETRON 2 MG/ML
4 INJECTION INTRAMUSCULAR; INTRAVENOUS PRN
Status: CANCELLED | OUTPATIENT
Start: 2023-12-25

## 2023-12-12 NOTE — PROGRESS NOTES
Sol Kern is a 58 y.o. female here for a non-provider visit for: Lab Draws  on 12/12/2023 at 1330    Procedure Performed: Venipuncture     Anatomical site: Right Hand     Equipment used: 23g butterfly    Labs drawn: CBC w/diff and CMP    Ordering Provider: Magdi Kline MD    Gabe By: Lorie Lundy R.N.

## 2023-12-13 ENCOUNTER — PATIENT OUTREACH (OUTPATIENT)
Dept: HEALTH INFORMATION MANAGEMENT | Facility: OTHER | Age: 58
End: 2023-12-13
Payer: MEDICARE

## 2023-12-13 DIAGNOSIS — E11.40 TYPE 2 DIABETES MELLITUS WITH DIABETIC NEUROPATHY, WITHOUT LONG-TERM CURRENT USE OF INSULIN (HCC): ICD-10-CM

## 2023-12-13 NOTE — TELEPHONE ENCOUNTER
Lab results came back, Dr. Kline assessed. Patient to restart Kisqali at 400mg, days 1-21 of 28 day cycle. Contacted patient to update. Pt to start tomorrow morning 12/13/23. Lab ordered prior to next appointment with Dr. Klnie.

## 2023-12-13 NOTE — PROGRESS NOTES
..Community Health Worker Note    Discussed: CHW contacted pt to make sure she had received e-mail. Pt stated that she did but she has not gone through the e-mail yet. CHW encouraged pt to call if she had any questions or concerns when she does go through it.     Follow-Up Needs: None at this time.     Plan: No needs at this time. CHW will not follow unless pt reaches out for further assistance.

## 2023-12-22 ENCOUNTER — HOSPITAL ENCOUNTER (OUTPATIENT)
Dept: LAB | Facility: MEDICAL CENTER | Age: 58
End: 2023-12-22
Attending: INTERNAL MEDICINE
Payer: MEDICARE

## 2023-12-22 DIAGNOSIS — Z79.899 ENCOUNTER FOR LONG-TERM (CURRENT) USE OF HIGH-RISK MEDICATION: ICD-10-CM

## 2023-12-22 LAB
ALBUMIN SERPL BCP-MCNC: 4.1 G/DL (ref 3.2–4.9)
ALBUMIN/GLOB SERPL: 1.2 G/DL
ALP SERPL-CCNC: 140 U/L (ref 30–99)
ALT SERPL-CCNC: 135 U/L (ref 2–50)
ANION GAP SERPL CALC-SCNC: 10 MMOL/L (ref 7–16)
AST SERPL-CCNC: 61 U/L (ref 12–45)
BILIRUB SERPL-MCNC: 0.4 MG/DL (ref 0.1–1.5)
BUN SERPL-MCNC: 10 MG/DL (ref 8–22)
CALCIUM ALBUM COR SERPL-MCNC: 9.4 MG/DL (ref 8.5–10.5)
CALCIUM SERPL-MCNC: 9.5 MG/DL (ref 8.5–10.5)
CHLORIDE SERPL-SCNC: 101 MMOL/L (ref 96–112)
CO2 SERPL-SCNC: 25 MMOL/L (ref 20–33)
CREAT SERPL-MCNC: 0.66 MG/DL (ref 0.5–1.4)
GFR SERPLBLD CREATININE-BSD FMLA CKD-EPI: 100 ML/MIN/1.73 M 2
GLOBULIN SER CALC-MCNC: 3.4 G/DL (ref 1.9–3.5)
GLUCOSE SERPL-MCNC: 206 MG/DL (ref 65–99)
POTASSIUM SERPL-SCNC: 4.7 MMOL/L (ref 3.6–5.5)
PROT SERPL-MCNC: 7.5 G/DL (ref 6–8.2)
SODIUM SERPL-SCNC: 136 MMOL/L (ref 135–145)

## 2023-12-22 PROCEDURE — 36415 COLL VENOUS BLD VENIPUNCTURE: CPT

## 2023-12-22 PROCEDURE — 80053 COMPREHEN METABOLIC PANEL: CPT

## 2023-12-26 ENCOUNTER — OUTPATIENT INFUSION SERVICES (OUTPATIENT)
Dept: ONCOLOGY | Facility: MEDICAL CENTER | Age: 58
End: 2023-12-26
Attending: INTERNAL MEDICINE
Payer: MEDICARE

## 2023-12-26 ENCOUNTER — HOSPITAL ENCOUNTER (OUTPATIENT)
Dept: HEMATOLOGY ONCOLOGY | Facility: MEDICAL CENTER | Age: 58
End: 2023-12-26
Attending: INTERNAL MEDICINE
Payer: MEDICARE

## 2023-12-26 VITALS
DIASTOLIC BLOOD PRESSURE: 74 MMHG | BODY MASS INDEX: 36.64 KG/M2 | TEMPERATURE: 98 F | WEIGHT: 206.79 LBS | RESPIRATION RATE: 16 BRPM | SYSTOLIC BLOOD PRESSURE: 122 MMHG | HEART RATE: 87 BPM | HEIGHT: 63 IN | OXYGEN SATURATION: 94 %

## 2023-12-26 VITALS
OXYGEN SATURATION: 98 % | TEMPERATURE: 97 F | RESPIRATION RATE: 18 BRPM | HEIGHT: 63 IN | HEART RATE: 88 BPM | BODY MASS INDEX: 36.33 KG/M2 | SYSTOLIC BLOOD PRESSURE: 126 MMHG | DIASTOLIC BLOOD PRESSURE: 72 MMHG | WEIGHT: 205.03 LBS

## 2023-12-26 DIAGNOSIS — Z17.0 CARCINOMA OF UPPER-OUTER QUADRANT OF LEFT BREAST IN FEMALE, ESTROGEN RECEPTOR POSITIVE (HCC): ICD-10-CM

## 2023-12-26 DIAGNOSIS — C50.412 CARCINOMA OF UPPER-OUTER QUADRANT OF LEFT BREAST IN FEMALE, ESTROGEN RECEPTOR POSITIVE (HCC): ICD-10-CM

## 2023-12-26 DIAGNOSIS — R79.89 ELEVATED LFTS: ICD-10-CM

## 2023-12-26 DIAGNOSIS — C50.912 BREAST CANCER, STAGE 4, LEFT (HCC): ICD-10-CM

## 2023-12-26 PROCEDURE — 700111 HCHG RX REV CODE 636 W/ 250 OVERRIDE (IP): Mod: JZ | Performed by: NURSE PRACTITIONER

## 2023-12-26 PROCEDURE — 96372 THER/PROPH/DIAG INJ SC/IM: CPT | Mod: XU

## 2023-12-26 PROCEDURE — 96402 CHEMO HORMON ANTINEOPL SQ/IM: CPT

## 2023-12-26 PROCEDURE — 99212 OFFICE O/P EST SF 10 MIN: CPT | Mod: 25 | Performed by: INTERNAL MEDICINE

## 2023-12-26 PROCEDURE — 99214 OFFICE O/P EST MOD 30 MIN: CPT | Performed by: INTERNAL MEDICINE

## 2023-12-26 RX ORDER — LAMOTRIGINE 25 MG/1
250 TABLET ORAL ONCE
Status: COMPLETED | OUTPATIENT
Start: 2023-12-26 | End: 2023-12-26

## 2023-12-26 RX ADMIN — FULVESTRANT 250 MG: 50 INJECTION INTRAMUSCULAR at 14:57

## 2023-12-26 RX ADMIN — DENOSUMAB 120 MG: 120 INJECTION SUBCUTANEOUS at 14:47

## 2023-12-26 ASSESSMENT — ENCOUNTER SYMPTOMS
MYALGIAS: 0
HEADACHES: 0
COUGH: 0
DIARRHEA: 0
WEIGHT LOSS: 0
VOMITING: 0
DIZZINESS: 0
SHORTNESS OF BREATH: 1
FEVER: 0
CONSTIPATION: 0
PALPITATIONS: 0
CHILLS: 0
NAUSEA: 0

## 2023-12-26 ASSESSMENT — FIBROSIS 4 INDEX
FIB4 SCORE: 0.98
FIB4 SCORE: 0.98

## 2023-12-26 NOTE — PROGRESS NOTES
Subjective   Medical oncology follow-up visit: 12/26/2023  Sol Kern is a 58 y.o. female who presents with Breast Cancer (Prechemo )          HPI    Primary Care: Veronica Lyn M.D.  Radiation Oncology: Kem Infante M.D.   Surgery: Kayleen Sykes M.D.     Diagnosis:  Recurrent/metastatic ER positive breast cancer eluding malignant right pleural effusion from breast cancer, ER positive, PA slightly positive, HER2 negative     Chief Complaint: Patient seen today for evaluation of her metastatic ER positive breast cancer, for continued monitoring of symptoms and side effects of cancer treatments, employing ribociclib and Faslodex.     Oncology history of presenting illness:  Ms. Kern  is a pleasant 56 y.o. year old postmenopausal female with a history of poorly controlled diabetes mellitus who had a mammogram in fall 2021 which showed a 2.3 cm spiculated mass in the upper outer quadrant of the left breast.  Biopsy revealed an invasive ductal carcinoma, grade 1, ER/PA positive HER-2 negative with a Ki-67 of 22%.  She is large breasted and elected to undergo a left lumpectomy and sentinel lymph node biopsy by Dr. Sykes on 11/12/2021.  Pathology revealed an invasive ductal carcinoma, grade 2 with associated intermediate grade ductal carcinoma in situ.  The invasive tumor measured 2.9 cm in greatest dimension and all margins were clear.  1 of 1 sentinel lymph nodes was positive for macro metastases, but no extranodal extension was identified.  Postoperative course was unremarkable.  She was seen by medical oncology and an Oncotype performed with results pending.  She had a CT scan of the chest abdomen pelvis which was negative for metastatic disease and a bone scan is pending.  In surgery she has been on a weight reduction diet and has reduced her smoking dramatically from greater than a pack a day to 3 to 4 cigarettes a day.  She feels generally well.  She is previously seen Dr. Kem Infante in  consultation.     Interval histories:  Her Oncotype came back with a recurrence score of 20 therefore there was no indication for adjuvant chemotherapy.  She had a previously scheduled CT scan of the chest abdomen pelvis and bone scan and these were either for metastatic disease.  She underwent radiation therapy with 40 Gray over 20 doses completed this 1 February 2022.  She did develop moderate formation and erythema and is being treated for this.  She also started postoperative physical therapy for prevention of lymphedema and is doing well from that perspective.  She is ready to initiate adjuvant endocrine therapy.     Interval history 7/28/2022: Started anastrozole in March 2022 and has been moderately adherent with it although she admits that she misses doses not infrequently.  She still has some tenderness in the left breast after radiation therapy but no nodularity.  She developed relatively severe COVID at the end of June that required Paxlovid and she was given oxygen 1 L/min but was never admitted to the hospital.  Her O2 sats apparently still vary somewhat.  She still has a dry cough but no sputum production or fever currently.  She has no sore throat.  Her energy level remains a little low.  Bone density was done recently and was within normal limits.     Interval history 10/10/2023: She continued on anastrozole intermittently but discontinued it completely in March 2023.  Her diabetes was out of control at that time but it is gotten much better on Ozempic with her A1c coming down from 9-6.0.  She developed shortness of breath and was admitted to the hospital on 9/28/2023 with a large right pleural effusion.  Thoracentesis showed an exudate with metastatic breast cancer, ER positive OH slightly positive HER2 negative.  She felt better after she had her tap.  CT scan of the chest was negative except for the pleural effusion.  She has a history of struct of sleep apnea and was urged to use her CPAP again.   O2 sats are running low 90s at home now.  She denies any breast masses or other nodularity.  She has some low back pain.  No neurologic symptoms.     Interval history 10/25/2023.  We started her on ribociclib 600 mg daily which she started 5 days ago and has had no problems with whatsoever.  We are trying to schedule her fulvestrant as her endocrine component of therapy.  A therapeutic phlebotomy because of her polycythemia.  Her work-up for myeloproliferative disorder/P vera was negative including JAK2 and reflex subsequent testing.  She had a PET CT scan performed on 10/19/2023 which showed focal increased activity in the skull base, thoracic spine right shoulder right ribs lumbar spine pelvis and proximal femurs.  She also has increased activity in mediastinal precarinal and right hilar nodes.  The right pleura has multiple hypermetabolic foci.  A incidental moderate right hydronephrosis was noted.  No visceral involvement was seen.  She had her last thoracentesis on the right on 10/15/2023 and does not feel the need for another one right now.     Interval history 11/09/23 (CAlsop, APRN):  Patient is doing very well overall and tolerating her treatment well.  She did note some mild constipation since starting the ribociclib.  She is due to complete her first cycle as she has 2 more days left before her week off.  She has been tolerating the thoracentesis which last 1 was completed on 11/3/2023.  She stated that she tolerated little bit better and is noticed less amount of fluid being removed.  She is questioning why she does not have another 1 appointment scheduled in 2 weeks.  She did state that she does note some shortness of breath with exertion still.  She is wearing oxygen when she is at home resting.  She also wears oxygen at night, 3 L.  She stated that she does not have a CPAP machine and has not had one for many years.    Interval history 11/22/23 (CAlsop, APRN):  Patient is doing very well.  She stated  "that she does continue to feel winded at times with shortness of breath with any activity but overall she is doing great.  She denies any nausea vomiting, diarrhea constipation.  She denies any significant pain.  She overall states that she is not under percent but she is feeling much better.  She had her thoracentesis last Friday and had only 150 cc of fluid removed.    Interval history 12/26/2023: On 11/27/2023 she was found to have elevated liver function tests including the ALT which was 5-10 times greater than upper limit of normal.  Ribociclib was held and repeated CMP on 12/12/2023 showed resolution of elevation of ALT and AST.  She was restarted on Ribociclib at 400 mg daily and is about 2 weeks into that.  Repeat labs on 12/22/2023 showed mild elevation in AST to 61 and ALT to 135.  Her blood sugar is also up somewhat.  She has no shortness of breath or cough and has not had thoracentesis in some time.  She does note some occasional nausea and abdominal bloating but it should be noted that she is on Ozempic as well for for the last 6 months and is lost about 25 pounds.     Treatment history:  10/21/23: C1 Ribociclib  10/27/23: C1D1 Faslodex  11/10/23: C1D15 Faslodex   11/18/23: C2 Ribociclib  11/27/23: C2D1 Faslodex / C1 Xgeva   12/12/2023: C3 D1 Ribociclib dose reduced to 400 mg daily      Allergies   Allergen Reactions    Codeine Vomiting and Nausea    Hydrocodone Vomiting and Nausea    Other Drug Vomiting and Nausea     Any of the \"cets\" (percocet)     Current Outpatient Medications on File Prior to Encounter   Medication Sig Dispense Refill    cyclobenzaprine (FLEXERIL) 10 mg Tab TAKE 1 TABLET BY MOUTH THREE TIMES A DAY AS NEEDED FOR MUSCLE SPASM 90 Tablet 0    denosumab (PROLIA) 60 MG/ML Solution Prefilled Syringe injection Inject 60 mg under the skin every 6 months.      metFORMIN ER (GLUCOPHAGE XR) 500 MG TABLET SR 24 HR Take 1 Tablet by mouth every day. 200 Tablet 1    ondansetron (ZOFRAN ODT) 4 MG " TABLET DISPERSIBLE Take 1 Tablet by mouth every 6 hours as needed for Nausea/Vomiting. For cancer treatment associated nausea 30 Tablet 1    lisinopril (PRINIVIL) 2.5 MG Tab Take 1 Tablet by mouth every day. 90 Tablet 1    lidocaine (LIDODERM) 5 % Patch Place 1 Patch on the skin every 24 hours. Place patch to affected area in am and remove at bedtime. 30 Patch 5    albuterol 108 (90 Base) MCG/ACT Aero Soln inhalation aerosol Inhale 2 Puffs every 6 hours as needed for Shortness of Breath. 6.7 Each 1    acetaminophen (TYLENOL) 500 MG Tab Take 1,000 mg by mouth 2 times a day as needed for Mild Pain. 2 tablets = 1,000 mg.      fluocinonide (LIDEX) 0.05 % Cream Apply 1 Application topically 2 times a day as needed (Rash, Itching). Apply to affected areas of back or buttocks. Do not use on face, armpit, or groin.      Cyanocobalamin (B-12) 5000 MCG Cap Take 5,000 mcg by mouth every evening.      Semaglutide,0.25 or 0.5MG/DOS, (OZEMPIC, 0.25 OR 0.5 MG/DOSE,) 2 MG/3ML Solution Pen-injector Inject 0.5 mg under the skin every 7 days. 9 mL 3    Ribociclib Succ, 600 MG Dose, 200 MG Tablet Therapy Pack Take 600 mg by mouth see administration instructions. Take 600 mg by mouth daily for 3 weeks (21 days), off for 1 week (7 days) (Patient not taking: Reported on 12/4/2023) 63 Each 0     No current facility-administered medications on file prior to encounter.         Review of Systems   Constitutional:  Positive for malaise/fatigue. Negative for chills, fever and weight loss.   Respiratory:  Positive for shortness of breath. Negative for cough.    Cardiovascular:  Negative for chest pain and palpitations.   Gastrointestinal:  Negative for constipation, diarrhea, nausea and vomiting.   Genitourinary:  Negative for dysuria.   Musculoskeletal:  Negative for myalgias.   Neurological:  Negative for dizziness and headaches.              Objective     /74   Pulse 87   Temp 36.7 °C (98 °F) (Temporal)   Resp 16   Ht 1.6 m (5'  "2.99\")   Wt 93.8 kg (206 lb 12.7 oz)   SpO2 94%   BMI 36.64 kg/m²      Physical Exam  Vitals reviewed.   Constitutional:       General: She is not in acute distress.     Appearance: Normal appearance. She is not diaphoretic.   HENT:      Head: Normocephalic and atraumatic.   Cardiovascular:      Rate and Rhythm: Normal rate and regular rhythm.      Heart sounds: No murmur heard.     No friction rub. No gallop.   Pulmonary:      Effort: Pulmonary effort is normal. No respiratory distress.      Breath sounds: Normal breath sounds. No wheezing.      Comments: No dullness to percussion at the bases.  Lungs are clear to AMP without rales rhonchi's or wheezes.  Abdominal:      General: Bowel sounds are normal. There is no distension.      Palpations: Abdomen is soft.      Tenderness: There is no abdominal tenderness.   Musculoskeletal:         General: No swelling or tenderness. Normal range of motion.   Skin:     General: Skin is warm and dry.   Neurological:      Mental Status: She is alert and oriented to person, place, and time.   Psychiatric:         Mood and Affect: Mood normal.         Behavior: Behavior normal.                      Assessment & Plan       1. Carcinoma of upper-outer quadrant of left breast in female, estrogen receptor positive (HCC)                Impression:  1.  Infiltrating ductal carcinoma of the left breast, grade 2, stage anatomic to be (pT2, PN 1A) status post left lumpectomy, ER positive, ID positive, HER-2 negative, Ki-67 22%, Oncotype DX current score 20.  Status post whole breast radiation therapy on endocrine therapy with anastrozole from March 2022 through March 2023 through with moderate adherence  2.  Diabetes mellitus under fair control, better on Ozempic  3.  Tobacco abuse improving  4.  Hypertension  5.  COVID-19 infection June 2022 resolved  6.  Recurrent/metastatic ER positive breast cancer eluding malignant right pleural effusion from breast cancer, ER positive, ID slightly " positive, HER2 negative.  PET scan shows extensive bony metastases and pleural metastases and mediastinal metastases.  7.  Longstanding polycythemia dating back at least 5-year.  Now worse with hemoglobin 19 hematocrit 57.  Status post phlebotomy with genetic work-up negative for myeloproliferative disorder.  Likely secondary to chronic hypoxemia.  She had 1 phlebotomy required and after that her hemoglobin was back to normal at 15.2.  We will continue to monitor this.  Slightly higher but no need yet for repeat phlebotomy  8.  Somatic genomic profile negative for actionable genes.  ESR 1 negative      Plan:  Patient is doing very well and tolerating treatment well.  She is on her second cycle of Ribociclib and tolerating this well.  Her cycle 2 of Faslodex is scheduled for next week and we will add the addition of Xgeva planned for next week as well.      Patient to follow-up in the clinic in 4 weeks, or sooner if needed.      Please note that this dictation was created using voice recognition software. I have made every reasonable attempt to correct obvious errors, but I expect that there are errors of grammar and possibly content that I did not discover before finalizing the note.

## 2023-12-27 NOTE — PROGRESS NOTES
Sol arrives ambulatory to [facility] for every 28 day Faslodex and Xgeva injections.  Sol voices no complaints.      Sol  denies dental procedures in the past 4 weeks or upcoming dental procedures, denies s/s of hypocalcimia.   Lab drawn 12/23/2023 reviewed, parameters met, Xgeva injected Sub Q to back right arm per MAR, bandaid applied to site.      Faslodex injections administered deep IM to right and left dorsogluteal areas, adhesive dressings applied.  Sol tolerated well, discharged in no apparent distress, next appointment scheduled and confirmed.

## 2024-01-05 ENCOUNTER — PATIENT OUTREACH (OUTPATIENT)
Dept: HEALTH INFORMATION MANAGEMENT | Facility: OTHER | Age: 59
End: 2024-01-05
Payer: MEDICARE

## 2024-01-05 DIAGNOSIS — E11.40 TYPE 2 DIABETES MELLITUS WITH DIABETIC NEUROPATHY, WITHOUT LONG-TERM CURRENT USE OF INSULIN (HCC): ICD-10-CM

## 2024-01-05 DIAGNOSIS — I10 ESSENTIAL HYPERTENSION: ICD-10-CM

## 2024-01-05 NOTE — PROGRESS NOTES
"Assessment    I spoke to the patient this afternoon for her monthly CCM follow up. The patient's future appointments were reviewed and the patient is aware and voices understanding. She states her intention to call Radiology in order to confirm her time since the one we have in the system and the one she has written down are an hour off. The patient states that she is hydrating well. Her daughter has a reverse osmosis system and she really enjoys the flavor of the water. She says that she drinks about 2 big pitchers of water away. She states that she hasn't had a change in appetite so far as the result of her chemo and radiation treatments. The patient states that she has been \"tired on and off\" but that she \"just does what she can do and then takes a nap.\" The patient and her daughter mention no changes or lapses in medications. They deny any pressing questions or concerns at this point that aren't already being addressed.     Education    *Article on winter skin protection sent by mail.     Plan of Care and Goals    *Remain free from falls, injuries  *Healthy and appropriate nutrition, hydration, and activity  *Continued follow up with PCP and specialists as indicated.     Barriers:    *Management of multiple chronic and acute medical conditions    Progress:    Progressing    Next outreach:  2/5/23 by phone                         "

## 2024-01-08 ENCOUNTER — HOSPITAL ENCOUNTER (OUTPATIENT)
Dept: LAB | Facility: MEDICAL CENTER | Age: 59
End: 2024-01-08
Attending: INTERNAL MEDICINE
Payer: MEDICARE

## 2024-01-08 LAB
ALBUMIN SERPL BCP-MCNC: 4.1 G/DL (ref 3.2–4.9)
ALBUMIN/GLOB SERPL: 1.1 G/DL
ALP SERPL-CCNC: 116 U/L (ref 30–99)
ALT SERPL-CCNC: 40 U/L (ref 2–50)
ANION GAP SERPL CALC-SCNC: 14 MMOL/L (ref 7–16)
AST SERPL-CCNC: 28 U/L (ref 12–45)
BASOPHILS # BLD AUTO: 1.9 % (ref 0–1.8)
BASOPHILS # BLD: 0.11 K/UL (ref 0–0.12)
BILIRUB SERPL-MCNC: 0.4 MG/DL (ref 0.1–1.5)
BUN SERPL-MCNC: 12 MG/DL (ref 8–22)
CALCIUM ALBUM COR SERPL-MCNC: 10.6 MG/DL (ref 8.5–10.5)
CALCIUM SERPL-MCNC: 10.7 MG/DL (ref 8.5–10.5)
CHLORIDE SERPL-SCNC: 101 MMOL/L (ref 96–112)
CO2 SERPL-SCNC: 22 MMOL/L (ref 20–33)
CREAT SERPL-MCNC: 0.62 MG/DL (ref 0.5–1.4)
EOSINOPHIL # BLD AUTO: 0.11 K/UL (ref 0–0.51)
EOSINOPHIL NFR BLD: 1.9 % (ref 0–6.9)
ERYTHROCYTE [DISTWIDTH] IN BLOOD BY AUTOMATED COUNT: 51.5 FL (ref 35.9–50)
GFR SERPLBLD CREATININE-BSD FMLA CKD-EPI: 101 ML/MIN/1.73 M 2
GLOBULIN SER CALC-MCNC: 3.9 G/DL (ref 1.9–3.5)
GLUCOSE SERPL-MCNC: 134 MG/DL (ref 65–99)
HCT VFR BLD AUTO: 50.8 % (ref 37–47)
HGB BLD-MCNC: 17.4 G/DL (ref 12–16)
IMM GRANULOCYTES # BLD AUTO: 0.01 K/UL (ref 0–0.11)
IMM GRANULOCYTES NFR BLD AUTO: 0.2 % (ref 0–0.9)
LYMPHOCYTES # BLD AUTO: 1.44 K/UL (ref 1–4.8)
LYMPHOCYTES NFR BLD: 24.9 % (ref 22–41)
MCH RBC QN AUTO: 32.3 PG (ref 27–33)
MCHC RBC AUTO-ENTMCNC: 34.3 G/DL (ref 32.2–35.5)
MCV RBC AUTO: 94.4 FL (ref 81.4–97.8)
MONOCYTES # BLD AUTO: 0.65 K/UL (ref 0–0.85)
MONOCYTES NFR BLD AUTO: 11.2 % (ref 0–13.4)
NEUTROPHILS # BLD AUTO: 3.47 K/UL (ref 1.82–7.42)
NEUTROPHILS NFR BLD: 59.9 % (ref 44–72)
NRBC # BLD AUTO: 0 K/UL
NRBC BLD-RTO: 0 /100 WBC (ref 0–0.2)
PLATELET # BLD AUTO: 258 K/UL (ref 164–446)
PMV BLD AUTO: 9.2 FL (ref 9–12.9)
POTASSIUM SERPL-SCNC: 4 MMOL/L (ref 3.6–5.5)
PROT SERPL-MCNC: 8 G/DL (ref 6–8.2)
RBC # BLD AUTO: 5.38 M/UL (ref 4.2–5.4)
RETINAL SCREEN: NEGATIVE
SODIUM SERPL-SCNC: 137 MMOL/L (ref 135–145)
WBC # BLD AUTO: 5.8 K/UL (ref 4.8–10.8)

## 2024-01-08 PROCEDURE — 80053 COMPREHEN METABOLIC PANEL: CPT

## 2024-01-08 PROCEDURE — 36415 COLL VENOUS BLD VENIPUNCTURE: CPT

## 2024-01-08 PROCEDURE — 85025 COMPLETE CBC W/AUTO DIFF WBC: CPT

## 2024-01-09 ENCOUNTER — HOSPITAL ENCOUNTER (OUTPATIENT)
Dept: HEMATOLOGY ONCOLOGY | Facility: MEDICAL CENTER | Age: 59
End: 2024-01-09
Attending: INTERNAL MEDICINE
Payer: MEDICARE

## 2024-01-09 VITALS
OXYGEN SATURATION: 93 % | HEART RATE: 101 BPM | WEIGHT: 210.54 LBS | SYSTOLIC BLOOD PRESSURE: 108 MMHG | HEIGHT: 62 IN | BODY MASS INDEX: 38.74 KG/M2 | TEMPERATURE: 97.6 F | RESPIRATION RATE: 18 BRPM | DIASTOLIC BLOOD PRESSURE: 66 MMHG

## 2024-01-09 DIAGNOSIS — Z17.0 CARCINOMA OF UPPER-OUTER QUADRANT OF LEFT BREAST IN FEMALE, ESTROGEN RECEPTOR POSITIVE (HCC): ICD-10-CM

## 2024-01-09 DIAGNOSIS — C50.912 BREAST CANCER, STAGE 4, LEFT (HCC): ICD-10-CM

## 2024-01-09 DIAGNOSIS — C50.412 CARCINOMA OF UPPER-OUTER QUADRANT OF LEFT BREAST IN FEMALE, ESTROGEN RECEPTOR POSITIVE (HCC): ICD-10-CM

## 2024-01-09 PROCEDURE — 99214 OFFICE O/P EST MOD 30 MIN: CPT | Performed by: INTERNAL MEDICINE

## 2024-01-09 PROCEDURE — 99212 OFFICE O/P EST SF 10 MIN: CPT | Performed by: INTERNAL MEDICINE

## 2024-01-09 ASSESSMENT — ENCOUNTER SYMPTOMS
WEIGHT LOSS: 0
CHILLS: 0
VOMITING: 0
CONSTIPATION: 0
FEVER: 0
NAUSEA: 0
HEADACHES: 0
PALPITATIONS: 0
SHORTNESS OF BREATH: 1
DIARRHEA: 0
COUGH: 0
DIZZINESS: 0
MYALGIAS: 0

## 2024-01-09 ASSESSMENT — FIBROSIS 4 INDEX: FIB4 SCORE: 1.03

## 2024-01-09 NOTE — ADDENDUM NOTE
Encounter addended by: Erich Cardoso, Med Ass't on: 1/9/2024 12:46 PM   Actions taken: Charge Capture section accepted

## 2024-01-09 NOTE — PROGRESS NOTES
Subjective   Medical oncology follow-up visit: 1/9/2024  Sol Kern is a 58 y.o. female who presents with Breast Cancer          HPI    Primary Care: Veronica Lyn M.D.  Radiation Oncology: Kem Infante M.D.   Surgery: Kayleen Sykes M.D.     Diagnosis:  Recurrent/metastatic ER positive breast cancer eluding malignant right pleural effusion from breast cancer, ER positive, TX slightly positive, HER2 negative     Chief Complaint: Patient seen today for evaluation of her metastatic ER positive breast cancer, for continued monitoring of symptoms and side effects of cancer treatments, employing ribociclib and Faslodex.     Oncology history of presenting illness:  Ms. Kern  is a pleasant 56 y.o. year old postmenopausal female with a history of poorly controlled diabetes mellitus who had a mammogram in fall 2021 which showed a 2.3 cm spiculated mass in the upper outer quadrant of the left breast.  Biopsy revealed an invasive ductal carcinoma, grade 1, ER/TX positive HER-2 negative with a Ki-67 of 22%.  She is large breasted and elected to undergo a left lumpectomy and sentinel lymph node biopsy by Dr. Sykes on 11/12/2021.  Pathology revealed an invasive ductal carcinoma, grade 2 with associated intermediate grade ductal carcinoma in situ.  The invasive tumor measured 2.9 cm in greatest dimension and all margins were clear.  1 of 1 sentinel lymph nodes was positive for macro metastases, but no extranodal extension was identified.  Postoperative course was unremarkable.  She was seen by medical oncology and an Oncotype performed with results pending.  She had a CT scan of the chest abdomen pelvis which was negative for metastatic disease and a bone scan is pending.  In surgery she has been on a weight reduction diet and has reduced her smoking dramatically from greater than a pack a day to 3 to 4 cigarettes a day.  She feels generally well.  She is previously seen Dr. Kem Infante in consultation.      Interval histories:  Her Oncotype came back with a recurrence score of 20 therefore there was no indication for adjuvant chemotherapy.  She had a previously scheduled CT scan of the chest abdomen pelvis and bone scan and these were either for metastatic disease.  She underwent radiation therapy with 40 Gray over 20 doses completed this 1 February 2022.  She did develop moderate formation and erythema and is being treated for this.  She also started postoperative physical therapy for prevention of lymphedema and is doing well from that perspective.  She is ready to initiate adjuvant endocrine therapy.     Interval history 7/28/2022: Started anastrozole in March 2022 and has been moderately adherent with it although she admits that she misses doses not infrequently.  She still has some tenderness in the left breast after radiation therapy but no nodularity.  She developed relatively severe COVID at the end of June that required Paxlovid and she was given oxygen 1 L/min but was never admitted to the hospital.  Her O2 sats apparently still vary somewhat.  She still has a dry cough but no sputum production or fever currently.  She has no sore throat.  Her energy level remains a little low.  Bone density was done recently and was within normal limits.     Interval history 10/10/2023: She continued on anastrozole intermittently but discontinued it completely in March 2023.  Her diabetes was out of control at that time but it is gotten much better on Ozempic with her A1c coming down from 9-6.0.  She developed shortness of breath and was admitted to the hospital on 9/28/2023 with a large right pleural effusion.  Thoracentesis showed an exudate with metastatic breast cancer, ER positive CO slightly positive HER2 negative.  She felt better after she had her tap.  CT scan of the chest was negative except for the pleural effusion.  She has a history of struct of sleep apnea and was urged to use her CPAP again.  O2 sats are  running low 90s at home now.  She denies any breast masses or other nodularity.  She has some low back pain.  No neurologic symptoms.     Interval history 10/25/2023.  We started her on ribociclib 600 mg daily which she started 5 days ago and has had no problems with whatsoever.  We are trying to schedule her fulvestrant as her endocrine component of therapy.  A therapeutic phlebotomy because of her polycythemia.  Her work-up for myeloproliferative disorder/P vera was negative including JAK2 and reflex subsequent testing.  She had a PET CT scan performed on 10/19/2023 which showed focal increased activity in the skull base, thoracic spine right shoulder right ribs lumbar spine pelvis and proximal femurs.  She also has increased activity in mediastinal precarinal and right hilar nodes.  The right pleura has multiple hypermetabolic foci.  A incidental moderate right hydronephrosis was noted.  No visceral involvement was seen.  She had her last thoracentesis on the right on 10/15/2023 and does not feel the need for another one right now.     Interval history 11/09/23 (CAlsop, APRN):  Patient is doing very well overall and tolerating her treatment well.  She did note some mild constipation since starting the ribociclib.  She is due to complete her first cycle as she has 2 more days left before her week off.  She has been tolerating the thoracentesis which last 1 was completed on 11/3/2023.  She stated that she tolerated little bit better and is noticed less amount of fluid being removed.  She is questioning why she does not have another 1 appointment scheduled in 2 weeks.  She did state that she does note some shortness of breath with exertion still.  She is wearing oxygen when she is at home resting.  She also wears oxygen at night, 3 L.  She stated that she does not have a CPAP machine and has not had one for many years.    Interval history 11/22/23 (CAlsop, APRN):  Patient is doing very well.  She stated that she  "does continue to feel winded at times with shortness of breath with any activity but overall she is doing great.  She denies any nausea vomiting, diarrhea constipation.  She denies any significant pain.  She overall states that she is not under percent but she is feeling much better.  She had her thoracentesis last Friday and had only 150 cc of fluid removed.    Interval history 12/26/2023: On 11/27/2023 she was found to have elevated liver function tests including the ALT which was 5-10 times greater than upper limit of normal.  Ribociclib was held and repeated CMP on 12/12/2023 showed resolution of elevation of ALT and AST.  She was restarted on Ribociclib at 400 mg daily and is about 2 weeks into that.  Repeat labs on 12/22/2023 showed mild elevation in AST to 61 and ALT to 135.  Her blood sugar is also up somewhat.  She has no shortness of breath or cough and has not had thoracentesis in some time.  She does note some occasional nausea and abdominal bloating but it should be noted that she is on Ozempic as well for for the last 6 months and is lost about 25 pounds.      Interval history 1/9/2024: She is on her off week from her Ribociclib at 400 mg daily and is tolerating it very well.  She has minimal dyspnea on exertion no orthopnea cough or sputum production.  CT of the chest is scheduled for later this month.     Treatment history:  10/21/23: C1 Ribociclib  10/27/23: C1D1 Faslodex  11/10/23: C1D15 Faslodex   11/18/23: C2 Ribociclib  11/27/23: C2D1 Faslodex / C1 Xgeva   12/12/2023: C3 D1 Ribociclib dose reduced to 400 mg daily  1/12/2024: C4 D1 Ribociclib 400 mg with Faslodex and Xgeva      Allergies   Allergen Reactions    Codeine Vomiting and Nausea    Hydrocodone Vomiting and Nausea    Other Drug Vomiting and Nausea     Any of the \"cets\" (percocet)     Current Outpatient Medications on File Prior to Encounter   Medication Sig Dispense Refill    cyclobenzaprine (FLEXERIL) 10 mg Tab TAKE 1 TABLET BY MOUTH " THREE TIMES A DAY AS NEEDED FOR MUSCLE SPASM 90 Tablet 0    denosumab (PROLIA) 60 MG/ML Solution Prefilled Syringe injection Inject 60 mg under the skin every 6 months.      metFORMIN ER (GLUCOPHAGE XR) 500 MG TABLET SR 24 HR Take 1 Tablet by mouth every day. 200 Tablet 1    ondansetron (ZOFRAN ODT) 4 MG TABLET DISPERSIBLE Take 1 Tablet by mouth every 6 hours as needed for Nausea/Vomiting. For cancer treatment associated nausea 30 Tablet 1    lisinopril (PRINIVIL) 2.5 MG Tab Take 1 Tablet by mouth every day. 90 Tablet 1    lidocaine (LIDODERM) 5 % Patch Place 1 Patch on the skin every 24 hours. Place patch to affected area in am and remove at bedtime. 30 Patch 5    albuterol 108 (90 Base) MCG/ACT Aero Soln inhalation aerosol Inhale 2 Puffs every 6 hours as needed for Shortness of Breath. 6.7 Each 1    Ribociclib Succ, 600 MG Dose, 200 MG Tablet Therapy Pack Take 600 mg by mouth see administration instructions. Take 600 mg by mouth daily for 3 weeks (21 days), off for 1 week (7 days) 63 Each 0    acetaminophen (TYLENOL) 500 MG Tab Take 1,000 mg by mouth 2 times a day as needed for Mild Pain. 2 tablets = 1,000 mg.      fluocinonide (LIDEX) 0.05 % Cream Apply 1 Application topically 2 times a day as needed (Rash, Itching). Apply to affected areas of back or buttocks. Do not use on face, armpit, or groin.      Cyanocobalamin (B-12) 5000 MCG Cap Take 5,000 mcg by mouth every evening.      Semaglutide,0.25 or 0.5MG/DOS, (OZEMPIC, 0.25 OR 0.5 MG/DOSE,) 2 MG/3ML Solution Pen-injector Inject 0.5 mg under the skin every 7 days. 9 mL 3     No current facility-administered medications on file prior to encounter.         Review of Systems   Constitutional:  Positive for malaise/fatigue. Negative for chills, fever and weight loss.   Respiratory:  Positive for shortness of breath. Negative for cough.    Cardiovascular:  Negative for chest pain and palpitations.   Gastrointestinal:  Negative for constipation, diarrhea, nausea and  "vomiting.   Genitourinary:  Negative for dysuria.   Musculoskeletal:  Negative for myalgias.   Neurological:  Negative for dizziness and headaches.              Objective     /66 (BP Location: Right arm, Patient Position: Sitting)   Pulse (!) 101   Temp 36.4 °C (97.6 °F) (Temporal)   Resp 18   Ht 1.575 m (5' 2\")   Wt 95.5 kg (210 lb 8.6 oz)   SpO2 93%   BMI 38.51 kg/m²      Physical Exam  Vitals reviewed.   Constitutional:       General: She is not in acute distress.     Appearance: Normal appearance. She is not diaphoretic.   HENT:      Head: Normocephalic and atraumatic.   Cardiovascular:      Rate and Rhythm: Normal rate and regular rhythm.      Heart sounds: No murmur heard.     No friction rub. No gallop.   Pulmonary:      Effort: Pulmonary effort is normal. No respiratory distress.      Breath sounds: Normal breath sounds. No wheezing.      Comments: No dullness to percussion at the bases.  Lungs are clear to AMP without rales rhonchi's or wheezes.  Abdominal:      General: Bowel sounds are normal. There is no distension.      Palpations: Abdomen is soft.      Tenderness: There is no abdominal tenderness.   Musculoskeletal:         General: No swelling or tenderness. Normal range of motion.   Skin:     General: Skin is warm and dry.   Neurological:      Mental Status: She is alert and oriented to person, place, and time.   Psychiatric:         Mood and Affect: Mood normal.         Behavior: Behavior normal.                    Assessment & Plan       1. Carcinoma of upper-outer quadrant of left breast in female, estrogen receptor positive (HCC)        2. Breast cancer, stage 4, left (HCC)                Impression:  1.  Infiltrating ductal carcinoma of the left breast, grade 2, stage anatomic to be (pT2, PN 1A) status post left lumpectomy, ER positive, ME positive, HER-2 negative, Ki-67 22%, Oncotype DX current score 20.  Status post whole breast radiation therapy on endocrine therapy with " anastrozole from March 2022 through March 2023 through with moderate adherence  2.  Diabetes mellitus under fair control, better on Ozempic  3.  Tobacco abuse improving  4.  Hypertension  5.  COVID-19 infection June 2022 resolved  6.  Recurrent/metastatic ER positive breast cancer malignant right pleural effusion from breast cancer, ER positive, LA slightly positive, HER2 negative.  PET scan shows extensive bony metastases and pleural metastases and mediastinal metastases.  7.  Longstanding polycythemia dating back at least 5-year.  Status post phlebotomy with genetic work-up negative for myeloproliferative disorder.  Likely secondary to chronic hypoxemia.  She had 1 phlebotomy required and after that her hemoglobin was back to normal at 15.2.  We will continue to monitor this.  Slightly higher but no need yet for repeat phlebotomy  8.  Somatic genomic profile negative for actionable genes.  ESR 1 negative      Plan:  Continue Ribociclib and Faslodex.  Repeat CTA chest to assess her pleural effusion.  Will repeat PET scan 1 to 2 months.  Patient to follow-up in the clinic in 4 weeks, or sooner if needed.      Please note that this dictation was created using voice recognition software. I have made every reasonable attempt to correct obvious errors, but I expect that there are errors of grammar and possibly content that I did not discover before finalizing the note.

## 2024-01-16 ENCOUNTER — APPOINTMENT (OUTPATIENT)
Dept: RADIOLOGY | Facility: IMAGING CENTER | Age: 59
End: 2024-01-16
Payer: MEDICARE

## 2024-01-16 ENCOUNTER — OFFICE VISIT (OUTPATIENT)
Dept: URGENT CARE | Facility: CLINIC | Age: 59
End: 2024-01-16
Payer: MEDICARE

## 2024-01-16 ENCOUNTER — TELEPHONE (OUTPATIENT)
Dept: HEMATOLOGY ONCOLOGY | Facility: MEDICAL CENTER | Age: 59
End: 2024-01-16

## 2024-01-16 ENCOUNTER — PATIENT MESSAGE (OUTPATIENT)
Dept: URGENT CARE | Facility: CLINIC | Age: 59
End: 2024-01-16

## 2024-01-16 VITALS
RESPIRATION RATE: 14 BRPM | WEIGHT: 205 LBS | BODY MASS INDEX: 35 KG/M2 | OXYGEN SATURATION: 94 % | SYSTOLIC BLOOD PRESSURE: 138 MMHG | HEART RATE: 94 BPM | HEIGHT: 64 IN | TEMPERATURE: 98.4 F | DIASTOLIC BLOOD PRESSURE: 80 MMHG

## 2024-01-16 DIAGNOSIS — J22 UNSPECIFIED ACUTE LOWER RESPIRATORY INFECTION: ICD-10-CM

## 2024-01-16 DIAGNOSIS — R06.09 OTHER FORM OF DYSPNEA: ICD-10-CM

## 2024-01-16 DIAGNOSIS — U07.1 COVID-19: ICD-10-CM

## 2024-01-16 LAB
FLUAV RNA SPEC QL NAA+PROBE: NEGATIVE
FLUBV RNA SPEC QL NAA+PROBE: NEGATIVE
RSV RNA SPEC QL NAA+PROBE: NEGATIVE
SARS-COV-2 RNA RESP QL NAA+PROBE: POSITIVE

## 2024-01-16 PROCEDURE — 0241U POCT CEPHEID COV-2, FLU A/B, RSV - PCR: CPT

## 2024-01-16 PROCEDURE — 99214 OFFICE O/P EST MOD 30 MIN: CPT

## 2024-01-16 PROCEDURE — 71046 X-RAY EXAM CHEST 2 VIEWS: CPT | Mod: TC

## 2024-01-16 PROCEDURE — 3075F SYST BP GE 130 - 139MM HG: CPT

## 2024-01-16 PROCEDURE — 3079F DIAST BP 80-89 MM HG: CPT

## 2024-01-16 RX ORDER — EPINEPHRINE 1 MG/ML(1)
0.5 AMPUL (ML) INJECTION PRN
Status: CANCELLED | OUTPATIENT
Start: 2024-01-22

## 2024-01-16 RX ORDER — LAMOTRIGINE 25 MG/1
250 TABLET ORAL ONCE
Status: CANCELLED | OUTPATIENT
Start: 2024-01-22 | End: 2024-01-22

## 2024-01-16 RX ORDER — ZOLPIDEM TARTRATE 5 MG/1
10 TABLET ORAL NIGHTLY PRN
COMMUNITY

## 2024-01-16 RX ORDER — ONDANSETRON 8 MG/1
8 TABLET, ORALLY DISINTEGRATING ORAL PRN
Status: CANCELLED | OUTPATIENT
Start: 2024-01-22

## 2024-01-16 RX ORDER — DIPHENHYDRAMINE HYDROCHLORIDE 50 MG/ML
50 INJECTION INTRAMUSCULAR; INTRAVENOUS PRN
Status: CANCELLED | OUTPATIENT
Start: 2024-01-22

## 2024-01-16 RX ORDER — PROCHLORPERAZINE MALEATE 10 MG
10 TABLET ORAL EVERY 6 HOURS PRN
Status: CANCELLED | OUTPATIENT
Start: 2024-01-22

## 2024-01-16 RX ORDER — ONDANSETRON 2 MG/ML
4 INJECTION INTRAMUSCULAR; INTRAVENOUS PRN
Status: CANCELLED | OUTPATIENT
Start: 2024-01-22

## 2024-01-16 RX ORDER — METHYLPREDNISOLONE SODIUM SUCCINATE 125 MG/2ML
125 INJECTION, POWDER, LYOPHILIZED, FOR SOLUTION INTRAMUSCULAR; INTRAVENOUS PRN
Status: CANCELLED | OUTPATIENT
Start: 2024-01-22

## 2024-01-16 ASSESSMENT — ENCOUNTER SYMPTOMS
ABDOMINAL PAIN: 0
DIZZINESS: 1
NAUSEA: 0
HEADACHES: 0
SHORTNESS OF BREATH: 1
SPUTUM PRODUCTION: 1
FEVER: 0
WHEEZING: 0
CHILLS: 1
VOMITING: 0
DIARRHEA: 0
COUGH: 1

## 2024-01-16 ASSESSMENT — FIBROSIS 4 INDEX: FIB4 SCORE: 1.03

## 2024-01-16 NOTE — PROGRESS NOTES
CHIEF COMPLAINT  Chief Complaint   Patient presents with    Nasal Congestion     Heavy congestion     Muscle Ache    Dizziness    Chills     Symptoms started last night    Other     Tested positive for COVID this morning      Subjective:   Sol Kern is a 58 y.o. female who presents for complaints of having nasal congestion, muscle aches, dizziness and chills.  She does report a positive at-home COVID test this morning.  She denies any fevers.  She does report associated symptoms of shortness of breath as well as brown-colored mucus.  Patient reports that she is currently receiving chemotherapy for treatment of breast cancer.      Review of Systems   Constitutional:  Positive for chills. Negative for fever.   HENT:  Positive for congestion.    Respiratory:  Positive for cough, sputum production and shortness of breath. Negative for wheezing.    Cardiovascular:  Negative for chest pain.   Gastrointestinal:  Negative for abdominal pain, diarrhea, nausea and vomiting.   Neurological:  Positive for dizziness. Negative for headaches.       PAST MEDICAL HISTORY  Patient Active Problem List    Diagnosis Date Noted    Elevated LFTs 11/30/2023    Insomnia due to medical condition 11/20/2023    Weakness 11/03/2023    Breast cancer, stage 4, left (HCC) 10/25/2023    Chronic respiratory failure with hypoxia (HCC) 10/13/2023    Elevated hemoglobin (HCC) 10/06/2023    Leukocytosis 10/06/2023    Chronic back pain 09/29/2023    Large pleural effusion 09/28/2023    Mixed stress and urge urinary incontinence 03/20/2023    Cyst (solitary) of breast 12/09/2022    Hepatic steatosis 12/09/2022    History of COVID-19 07/15/2022    Vertigo 04/21/2022    Achilles tendon mass 01/31/2022    Cigarette nicotine dependence in remission 12/15/2021    Carcinoma of upper-outer quadrant of left breast in female, estrogen receptor positive (HCC) 10/18/2021    Neuropathy 09/15/2021    Type 2 diabetes mellitus with diabetic neuropathy, without  "long-term current use of insulin (HCC)     Postnasal drip 10/22/2019    Essential hypertension 03/19/2019    Vitamin D deficiency 03/05/2019    Class 1 obesity in adult 08/28/2017    YOANA (obstructive sleep apnea) 08/28/2017    Chronic midline low back pain with right-sided sciatica 08/28/2017    Neck pain 08/28/2017    Multiple allergies 08/28/2017    Psoriasis 08/28/2017       SURGICAL HISTORY   has a past surgical history that includes endometrial ablation; primary c section; knee arthroscopy; foot surgery (Right); carpal tunnel release (Right); mastectomy, partial (Left, 11/12/2021); node biopsy sentinel (Left, 11/12/2021); and tubal coagulation laparoscopic bilateral.    ALLERGIES  Allergies   Allergen Reactions    Codeine Vomiting and Nausea    Hydrocodone Vomiting and Nausea    Other Drug Vomiting and Nausea     Any of the \"cets\" (percocet)       CURRENT MEDICATIONS  Home Medications       Reviewed by Erich Corey't (Medical Assistant) on 01/16/24 at 1145  Med List Status: <None>     Medication Last Dose Status   acetaminophen (TYLENOL) 500 MG Tab  Active   albuterol 108 (90 Base) MCG/ACT Aero Soln inhalation aerosol  Active   Cyanocobalamin (B-12) 5000 MCG Cap  Active   cyclobenzaprine (FLEXERIL) 10 mg Tab  Active   denosumab (PROLIA) 60 MG/ML Solution Prefilled Syringe injection  Active   fluocinonide (LIDEX) 0.05 % Cream  Active   lidocaine (LIDODERM) 5 % Patch  Active   lisinopril (PRINIVIL) 2.5 MG Tab  Active   metFORMIN ER (GLUCOPHAGE XR) 500 MG TABLET SR 24 HR  Active   ondansetron (ZOFRAN ODT) 4 MG TABLET DISPERSIBLE  Active   Ribociclib Succ, 600 MG Dose, 200 MG Tablet Therapy Pack  Active   Semaglutide,0.25 or 0.5MG/DOS, (OZEMPIC, 0.25 OR 0.5 MG/DOSE,) 2 MG/3ML Solution Pen-injector  Active                    SOCIAL HISTORY  Social History     Tobacco Use    Smoking status: Former     Current packs/day: 0.25     Average packs/day: 0.3 packs/day for 20.0 years (5.0 ttl pk-yrs)     Types: " "Cigarettes    Smokeless tobacco: Never    Tobacco comments:     working on quitting    Vaping Use    Vaping Use: Never used   Substance and Sexual Activity    Alcohol use: Not Currently     Comment: rarely    Drug use: Yes     Types: Marijuana, Oral     Comment: gummies    Sexual activity: Yes     Partners: Male     Comment:        FAMILY HISTORY  Family History   Problem Relation Age of Onset    Cancer Mother         melanoma    Hypertension Mother     Other Mother         THYROID    Heart Disease Mother         valvular disease    Ovarian Cancer Mother     Heart Disease Father         Heart Attack    Heart Attack Father     Other Father         psoriasis    Hyperlipidemia Brother     Other Brother         psoriasis    Heart Disease Brother         MI    Heart Attack Brother         massive heart attack     Other Other         psoriasis    Heart Disease Maternal Grandfather         MI    Diabetes Neg Hx     Alcohol/Drug Neg Hx     Tubal Cancer Neg Hx     Peritoneal Cancer Neg Hx     Colorectal Cancer Neg Hx     Breast Cancer Neg Hx          Medications, Allergies, and current problem list reviewed today in Epic.     Objective:     /80 (BP Location: Left arm, Patient Position: Sitting, BP Cuff Size: Adult)   Pulse 94   Temp 36.9 °C (98.4 °F) (Temporal)   Resp 14   Ht 1.626 m (5' 4\")   Wt 93 kg (205 lb)   SpO2 94%     Physical Exam  Vitals reviewed.   Constitutional:       General: She is not in acute distress.     Appearance: Normal appearance. She is not ill-appearing or toxic-appearing.   HENT:      Head: Normocephalic.      Nose: Nose normal.      Mouth/Throat:      Mouth: Mucous membranes are moist.      Pharynx: Oropharynx is clear. No oropharyngeal exudate or posterior oropharyngeal erythema.   Cardiovascular:      Rate and Rhythm: Normal rate and regular rhythm.      Pulses: Normal pulses.      Heart sounds: Normal heart sounds.   Pulmonary:      Effort: Pulmonary effort is normal. No " respiratory distress.      Breath sounds: Normal breath sounds. No stridor. No wheezing, rhonchi or rales.   Musculoskeletal:      Cervical back: Normal range of motion. No rigidity or tenderness.   Lymphadenopathy:      Cervical: No cervical adenopathy.   Skin:     General: Skin is warm.      Capillary Refill: Capillary refill takes less than 2 seconds.   Neurological:      General: No focal deficit present.      Mental Status: She is alert.   Psychiatric:         Mood and Affect: Mood normal.       RADIOLOGY RESULTS   DX-CHEST-2 VIEWS    Result Date: 1/16/2024 1/16/2024 12:14 PM HISTORY/REASON FOR EXAM:  Shortness of Breath. TECHNIQUE/EXAM DESCRIPTION AND NUMBER OF VIEWS: Two views of the chest. COMPARISON:  10/15/2023 FINDINGS: Cardiomediastinal silhouette is stable. New small right pleural effusion including some loculated intrafissural fluid. No left pleural effusion. No pneumothorax. No acute osseous abnormality.     Small right pleural effusion with some intrafissural fluid.          Assessment/Plan:     Diagnosis and associated orders:     1. Other form of dyspnea  DX-CHEST-2 VIEWS    POCT CEPHEID COV-2, FLU A/B, RSV - PCR      2. Unspecified acute lower respiratory infection  POCT CEPHEID COV-2, FLU A/B, RSV - PCR         Comments/MDM:     On his exam patient is alert no apparent signs of distress.  Mucous membranes are moist and clear.  No pharyngeal erythema.  Neck is supple, no lymphadenopathy.  She is clear to auscultation bilaterally.Normal res  Chest x-ray does reveal a right sided pleural effusion.  Previous records reveal that this is a stable finding, and has recurrent with requires repeat thoracentesis.  She is being followed closely by geriatric medicine as well as her oncologist.   Point-of-care viral swab completed in clinic.  Patient is wanting to be evaluated for RSV as well. Informed of results.   Patient requesting Paxlovid.  Discussed interactions with current medications, shared decision  to prescribe molnupiravir.  Medication interactions reviewed for any potential harm based on patient's medication reconciliation.  Ensured med rec was up-to-date  Discussed use of OTC medications for alleviation of discomfort.  Counseled on adequate hydration and rest.  Red flag signs and symptoms discussed at length.  Instructed to return to ER or urgent care if symptoms worsen or fail to improve.       Differential diagnosis, natural history, supportive care, and indications for immediate follow-up discussed.    Advised the patient to follow-up with the primary care physician for recheck, reevaluation, and consideration of further management.    Please note that this dictation was created using voice recognition software. I have made a reasonable attempt to correct obvious errors, but I expect that there are errors of grammar and possibly content that I did not discover before finalizing the note.    This note was electronically signed by STEVO Mckinney

## 2024-01-16 NOTE — TELEPHONE ENCOUNTER
Patient has lots of congestion, and has very thick mucous when she blows her nose, reddish brown.    She just did an at-home Covid test, and it is positive.    Can Dr. Kline prescribe the Paxlivid (for Covid) ??    Please return the patient call.

## 2024-01-16 NOTE — PROGRESS NOTES
Spoke with patient about positive COVID-19.  Clarified with patient that I checked all her medications against the molnupiravir.  Patient did report following up with oncology RN to let her know of her chest x-ray as well as the prescription for molnupiravir.  Patient declines any questions or concerns at this time.

## 2024-01-17 ENCOUNTER — TELEPHONE (OUTPATIENT)
Dept: MEDICAL GROUP | Facility: PHYSICIAN GROUP | Age: 59
End: 2024-01-17
Payer: MEDICARE

## 2024-01-17 RX ORDER — SEMAGLUTIDE 0.68 MG/ML
0.5 INJECTION, SOLUTION SUBCUTANEOUS
Qty: 9 ML | Refills: 0 | Status: SHIPPED | OUTPATIENT
Start: 2024-01-17

## 2024-01-17 NOTE — TELEPHONE ENCOUNTER
VOICEMAIL  1. Caller Name: Sol Shauna Dion                          Call Back Number: [phone]      2. Message: Patient LVM stating they had chest congestion, headaches, chills starting yesterday. They woke up today with the same symptoms and took a Covid test that came back positive. Patient is requesting Paxlovid.     Please advise.

## 2024-01-17 NOTE — TELEPHONE ENCOUNTER
Phone Number Called: [phone]      Call outcome: Spoke to patient regarding message below.    Message: Called patient to follow up on voicemail. Patient stated that they actually went to urgent care yesterday and got Paxlovid.     Patient also stated that they need a refill on their Ozempic and their Ambien. Patient stated they will make an appointment once they feel better for the refill on Ambien.

## 2024-01-17 NOTE — TELEPHONE ENCOUNTER
Phone Number Called: [phone]    Call outcome: Did not leave a detailed message. Requested patient to call back.    Message: LVM letting aris know it is important that she is seen today for these symptoms and that if she can't get in with someone at this office today, that it is advised to go to urgent care. Requesting patient to give us a call back.       Attempt 1

## 2024-01-22 ENCOUNTER — OUTPATIENT INFUSION SERVICES (OUTPATIENT)
Dept: ONCOLOGY | Facility: MEDICAL CENTER | Age: 59
End: 2024-01-22
Attending: INTERNAL MEDICINE
Payer: MEDICARE

## 2024-01-22 ENCOUNTER — APPOINTMENT (OUTPATIENT)
Dept: HEMATOLOGY ONCOLOGY | Facility: MEDICAL CENTER | Age: 59
End: 2024-01-22
Payer: MEDICARE

## 2024-01-22 VITALS
HEART RATE: 90 BPM | BODY MASS INDEX: 38.09 KG/M2 | SYSTOLIC BLOOD PRESSURE: 144 MMHG | WEIGHT: 214.95 LBS | OXYGEN SATURATION: 93 % | RESPIRATION RATE: 18 BRPM | DIASTOLIC BLOOD PRESSURE: 75 MMHG | TEMPERATURE: 98 F | HEIGHT: 63 IN

## 2024-01-22 DIAGNOSIS — C50.412 CARCINOMA OF UPPER-OUTER QUADRANT OF LEFT BREAST IN FEMALE, ESTROGEN RECEPTOR POSITIVE (HCC): ICD-10-CM

## 2024-01-22 DIAGNOSIS — Z17.0 CARCINOMA OF UPPER-OUTER QUADRANT OF LEFT BREAST IN FEMALE, ESTROGEN RECEPTOR POSITIVE (HCC): ICD-10-CM

## 2024-01-22 DIAGNOSIS — C50.912 BREAST CANCER, STAGE 4, LEFT (HCC): ICD-10-CM

## 2024-01-22 LAB
ALBUMIN SERPL BCP-MCNC: 3.8 G/DL (ref 3.2–4.9)
ALBUMIN/GLOB SERPL: 1 G/DL
ALP SERPL-CCNC: 92 U/L (ref 30–99)
ALT SERPL-CCNC: 37 U/L (ref 2–50)
ANION GAP SERPL CALC-SCNC: 15 MMOL/L (ref 7–16)
AST SERPL-CCNC: 27 U/L (ref 12–45)
BASOPHILS # BLD AUTO: 0.8 % (ref 0–1.8)
BASOPHILS # BLD: 0.05 K/UL (ref 0–0.12)
BILIRUB SERPL-MCNC: 0.4 MG/DL (ref 0.1–1.5)
BUN SERPL-MCNC: 11 MG/DL (ref 8–22)
CA-I BLD ISE-SCNC: 1.25 MMOL/L (ref 1.1–1.3)
CALCIUM ALBUM COR SERPL-MCNC: 9.5 MG/DL (ref 8.5–10.5)
CALCIUM SERPL-MCNC: 9.3 MG/DL (ref 8.5–10.5)
CHLORIDE SERPL-SCNC: 103 MMOL/L (ref 96–112)
CO2 SERPL-SCNC: 20 MMOL/L (ref 20–33)
CREAT BLD-MCNC: 0.6 MG/DL (ref 0.5–1.4)
CREAT SERPL-MCNC: 0.58 MG/DL (ref 0.5–1.4)
EOSINOPHIL # BLD AUTO: 0.15 K/UL (ref 0–0.51)
EOSINOPHIL NFR BLD: 2.3 % (ref 0–6.9)
ERYTHROCYTE [DISTWIDTH] IN BLOOD BY AUTOMATED COUNT: 47.9 FL (ref 35.9–50)
GFR SERPLBLD CREATININE-BSD FMLA CKD-EPI: 103 ML/MIN/1.73 M 2
GLOBULIN SER CALC-MCNC: 3.9 G/DL (ref 1.9–3.5)
GLUCOSE SERPL-MCNC: 255 MG/DL (ref 65–99)
HCT VFR BLD AUTO: 48 % (ref 37–47)
HGB BLD-MCNC: 16.2 G/DL (ref 12–16)
IMM GRANULOCYTES # BLD AUTO: 0.02 K/UL (ref 0–0.11)
IMM GRANULOCYTES NFR BLD AUTO: 0.3 % (ref 0–0.9)
LYMPHOCYTES # BLD AUTO: 1.24 K/UL (ref 1–4.8)
LYMPHOCYTES NFR BLD: 18.7 % (ref 22–41)
MCH RBC QN AUTO: 32.3 PG (ref 27–33)
MCHC RBC AUTO-ENTMCNC: 33.8 G/DL (ref 32.2–35.5)
MCV RBC AUTO: 95.8 FL (ref 81.4–97.8)
MONOCYTES # BLD AUTO: 0.34 K/UL (ref 0–0.85)
MONOCYTES NFR BLD AUTO: 5.1 % (ref 0–13.4)
NEUTROPHILS # BLD AUTO: 4.84 K/UL (ref 1.82–7.42)
NEUTROPHILS NFR BLD: 72.8 % (ref 44–72)
NRBC # BLD AUTO: 0 K/UL
NRBC BLD-RTO: 0 /100 WBC (ref 0–0.2)
PLATELET # BLD AUTO: 354 K/UL (ref 164–446)
PMV BLD AUTO: 9 FL (ref 9–12.9)
POTASSIUM SERPL-SCNC: 4 MMOL/L (ref 3.6–5.5)
PROT SERPL-MCNC: 7.7 G/DL (ref 6–8.2)
RBC # BLD AUTO: 5.01 M/UL (ref 4.2–5.4)
SODIUM SERPL-SCNC: 138 MMOL/L (ref 135–145)
WBC # BLD AUTO: 6.6 K/UL (ref 4.8–10.8)

## 2024-01-22 PROCEDURE — 96372 THER/PROPH/DIAG INJ SC/IM: CPT | Mod: XU

## 2024-01-22 PROCEDURE — 82330 ASSAY OF CALCIUM: CPT

## 2024-01-22 PROCEDURE — 80053 COMPREHEN METABOLIC PANEL: CPT

## 2024-01-22 PROCEDURE — 82565 ASSAY OF CREATININE: CPT

## 2024-01-22 PROCEDURE — 700111 HCHG RX REV CODE 636 W/ 250 OVERRIDE (IP): Mod: JZ | Performed by: NURSE PRACTITIONER

## 2024-01-22 PROCEDURE — 96402 CHEMO HORMON ANTINEOPL SQ/IM: CPT

## 2024-01-22 PROCEDURE — 36415 COLL VENOUS BLD VENIPUNCTURE: CPT

## 2024-01-22 PROCEDURE — 85025 COMPLETE CBC W/AUTO DIFF WBC: CPT

## 2024-01-22 RX ORDER — LAMOTRIGINE 25 MG/1
250 TABLET ORAL ONCE
Status: COMPLETED | OUTPATIENT
Start: 2024-01-22 | End: 2024-01-22

## 2024-01-22 RX ADMIN — DENOSUMAB 120 MG: 120 INJECTION SUBCUTANEOUS at 15:56

## 2024-01-22 RX ADMIN — FULVESTRANT 250 MG: 50 INJECTION INTRAMUSCULAR at 15:56

## 2024-01-22 ASSESSMENT — FIBROSIS 4 INDEX: FIB4 SCORE: 1.03

## 2024-01-22 NOTE — PROGRESS NOTES
Subjective     Sol Kern is a 58 y.o. female who presents with No chief complaint on file.          [facility]    Primary Care: Veronica Lyn M.D.  Radiation Oncology: Kem Infante M.D.   Surgery: Kayleen Sykes M.D.     Diagnosis:  Recurrent/metastatic ER positive breast cancer eluding malignant right pleural effusion from breast cancer, ER positive, OR slightly positive, HER2 negative     Chief Complaint: Patient seen today for evaluation of her metastatic ER positive breast cancer, for continued monitoring of symptoms and side effects of cancer treatments, employing ribociclib and Faslodex.     Oncology history of presenting illness:  Ms. Kern  is a pleasant 56 y.o. year old postmenopausal female with a history of poorly controlled diabetes mellitus who had a mammogram in fall 2021 which showed a 2.3 cm spiculated mass in the upper outer quadrant of the left breast.  Biopsy revealed an invasive ductal carcinoma, grade 1, ER/OR positive HER-2 negative with a Ki-67 of 22%.  She is large breasted and elected to undergo a left lumpectomy and sentinel lymph node biopsy by Dr. Sykes on 11/12/2021.  Pathology revealed an invasive ductal carcinoma, grade 2 with associated intermediate grade ductal carcinoma in situ.  The invasive tumor measured 2.9 cm in greatest dimension and all margins were clear.  1 of 1 sentinel lymph nodes was positive for macro metastases, but no extranodal extension was identified.  Postoperative course was unremarkable.  She was seen by medical oncology and an Oncotype performed with results pending.  She had a CT scan of the chest abdomen pelvis which was negative for metastatic disease and a bone scan is pending.  In surgery she has been on a weight reduction diet and has reduced her smoking dramatically from greater than a pack a day to 3 to 4 cigarettes a day.  She feels generally well.  She is previously seen Dr. Kem Infante in consultation.     Interval histories:  Her Oncotype  came back with a recurrence score of 20 therefore there was no indication for adjuvant chemotherapy.  She had a previously scheduled CT scan of the chest abdomen pelvis and bone scan and these were either for metastatic disease.  She underwent radiation therapy with 40 Gray over 20 doses completed this 1 February 2022.  She did develop moderate formation and erythema and is being treated for this.  She also started postoperative physical therapy for prevention of lymphedema and is doing well from that perspective.  She is ready to initiate adjuvant endocrine therapy.     Interval history 7/28/2022: Started anastrozole in March 2022 and has been moderately adherent with it although she admits that she misses doses not infrequently.  She still has some tenderness in the left breast after radiation therapy but no nodularity.  She developed relatively severe COVID at the end of June that required Paxlovid and she was given oxygen 1 L/min but was never admitted to the hospital.  Her O2 sats apparently still vary somewhat.  She still has a dry cough but no sputum production or fever currently.  She has no sore throat.  Her energy level remains a little low.  Bone density was done recently and was within normal limits.     Interval history 10/10/2023: She continued on anastrozole intermittently but discontinued it completely in March 2023.  Her diabetes was out of control at that time but it is gotten much better on Ozempic with her A1c coming down from 9-6.0.  She developed shortness of breath and was admitted to the hospital on 9/28/2023 with a large right pleural effusion.  Thoracentesis showed an exudate with metastatic breast cancer, ER positive RI slightly positive HER2 negative.  She felt better after she had her tap.  CT scan of the chest was negative except for the pleural effusion.  She has a history of struct of sleep apnea and was urged to use her CPAP again.  O2 sats are running low 90s at home now.  She  denies any breast masses or other nodularity.  She has some low back pain.  No neurologic symptoms.     Interval history 10/25/2023.  We started her on ribociclib 600 mg daily which she started 5 days ago and has had no problems with whatsoever.  We are trying to schedule her fulvestrant as her endocrine component of therapy.  A therapeutic phlebotomy because of her polycythemia.  Her work-up for myeloproliferative disorder/P vera was negative including JAK2 and reflex subsequent testing.  She had a PET CT scan performed on 10/19/2023 which showed focal increased activity in the skull base, thoracic spine right shoulder right ribs lumbar spine pelvis and proximal femurs.  She also has increased activity in mediastinal precarinal and right hilar nodes.  The right pleura has multiple hypermetabolic foci.  A incidental moderate right hydronephrosis was noted.  No visceral involvement was seen.  She had her last thoracentesis on the right on 10/15/2023 and does not feel the need for another one right now.     Interval history 11/09/23 (CAlsop, APRN):  Patient is doing very well overall and tolerating her treatment well.  She did note some mild constipation since starting the ribociclib.  She is due to complete her first cycle as she has 2 more days left before her week off.  She has been tolerating the thoracentesis which last 1 was completed on 11/3/2023.  She stated that she tolerated little bit better and is noticed less amount of fluid being removed.  She is questioning why she does not have another 1 appointment scheduled in 2 weeks.  She did state that she does note some shortness of breath with exertion still.  She is wearing oxygen when she is at home resting.  She also wears oxygen at night, 3 L.  She stated that she does not have a CPAP machine and has not had one for many years.     Interval history 11/22/23 (CAlsop, APRN):  Patient is doing very well.  She stated that she does continue to feel winded at  "times with shortness of breath with any activity but overall she is doing great.  She denies any nausea vomiting, diarrhea constipation.  She denies any significant pain.  She overall states that she is not under percent but she is feeling much better.  She had her thoracentesis last Friday and had only 150 cc of fluid removed.    Interval history 12/26/2023: On 11/27/2023 she was found to have elevated liver function tests including the ALT which was 5-10 times greater than upper limit of normal.  Ribociclib was held and repeated CMP on 12/12/2023 showed resolution of elevation of ALT and AST.  She was restarted on Ribociclib at 400 mg daily and is about 2 weeks into that.  Repeat labs on 12/22/2023 showed mild elevation in AST to 61 and ALT to 135.  Her blood sugar is also up somewhat.  She has no shortness of breath or cough and has not had thoracentesis in some time.  She does note some occasional nausea and abdominal bloating but it should be noted that she is on Ozempic as well for for the last 6 months and is lost about 25 pounds.       Interval history 1/9/2024: She is on her off week from her Ribociclib at 400 mg daily and is tolerating it very well.  She has minimal dyspnea on exertion no orthopnea cough or sputum production.  CT of the chest is scheduled for later this month.    Interval history 01/22/24 (Nitesh, APRN):       Treatment history:  10/21/23: C1 Ribociclib  10/27/23: C1D1 Faslodex  11/10/23: C1D15 Faslodex   11/18/23: C2 Ribociclib  11/27/23: C2D1 Faslodex / C1 Xgeva  12/16/23: C3 Ribociclib - dose reduction to 400 mg d/t elevated liver enzymes  12/26/23: C3D1 Faslodex / C2 XGeva  01/22/24: C4D1 Faslodex / C3 XGeva      Allergies   Allergen Reactions    Codeine Vomiting and Nausea    Hydrocodone Vomiting and Nausea    Other Drug Vomiting and Nausea     Any of the \"cets\" (percocet)     Current Outpatient Medications on File Prior to Visit   Medication Sig Dispense Refill    Semaglutide,0.25 or " 0.5MG/DOS, (OZEMPIC, 0.25 OR 0.5 MG/DOSE,) 2 MG/3ML Solution Pen-injector Inject 0.5 mg under the skin every 7 days. 9 mL 0    zolpidem (AMBIEN) 5 MG Tab Take 10 mg by mouth at bedtime as needed for Sleep.      cyclobenzaprine (FLEXERIL) 10 mg Tab TAKE 1 TABLET BY MOUTH THREE TIMES A DAY AS NEEDED FOR MUSCLE SPASM 90 Tablet 0    denosumab (PROLIA) 60 MG/ML Solution Prefilled Syringe injection Inject 60 mg under the skin every 6 months.      metFORMIN ER (GLUCOPHAGE XR) 500 MG TABLET SR 24 HR Take 1 Tablet by mouth every day. 200 Tablet 1    ondansetron (ZOFRAN ODT) 4 MG TABLET DISPERSIBLE Take 1 Tablet by mouth every 6 hours as needed for Nausea/Vomiting. For cancer treatment associated nausea 30 Tablet 1    lisinopril (PRINIVIL) 2.5 MG Tab Take 1 Tablet by mouth every day. 90 Tablet 1    lidocaine (LIDODERM) 5 % Patch Place 1 Patch on the skin every 24 hours. Place patch to affected area in am and remove at bedtime. 30 Patch 5    albuterol 108 (90 Base) MCG/ACT Aero Soln inhalation aerosol Inhale 2 Puffs every 6 hours as needed for Shortness of Breath. 6.7 Each 1    Ribociclib Succ, 600 MG Dose, 200 MG Tablet Therapy Pack Take 600 mg by mouth see administration instructions. Take 600 mg by mouth daily for 3 weeks (21 days), off for 1 week (7 days) 63 Each 0    acetaminophen (TYLENOL) 500 MG Tab Take 1,000 mg by mouth 2 times a day as needed for Mild Pain. 2 tablets = 1,000 mg.      fluocinonide (LIDEX) 0.05 % Cream Apply 1 Application topically 2 times a day as needed (Rash, Itching). Apply to affected areas of back or buttocks. Do not use on face, armpit, or groin.      Cyanocobalamin (B-12) 5000 MCG Cap Take 5,000 mcg by mouth every evening.       No current facility-administered medications on file prior to visit.       ROS           Objective     There were no vitals taken for this visit.     Physical Exam                        Assessment & Plan          Impression:  1.  Infiltrating ductal carcinoma of the  left breast, grade 2, stage anatomic to be (pT2, PN 1A) status post left lumpectomy, ER positive, MT positive, HER-2 negative, Ki-67 22%, Oncotype DX current score 20.  Status post whole breast radiation therapy on endocrine therapy with anastrozole from March 2022 through March 2023 through with moderate adherence  2.  Diabetes mellitus under fair control, better on Ozempic  3.  Tobacco abuse improving  4.  Hypertension  5.  COVID-19 infection June 2022 resolved  6.  Recurrent/metastatic ER positive breast cancer malignant right pleural effusion from breast cancer, ER positive, MT slightly positive, HER2 negative.  PET scan shows extensive bony metastases and pleural metastases and mediastinal metastases. Currently on Ribociclib originally on 600 mg PO 21 days on, 7 days off, however d/t elevated enzymes she was dose reduced to 400 mg with cycle 3.   7.  Longstanding polycythemia dating back at least 5-year.  Status post phlebotomy with genetic work-up negative for myeloproliferative disorder.  Likely secondary to chronic hypoxemia.  She had 1 phlebotomy required and after that her hemoglobin was back to normal at 15.2.  We will continue to monitor this.  Slightly higher but no need yet for repeat phlebotomy  8.  Somatic genomic profile negative for actionable genes.  ESR 1 negative      Plan:      CT Chest scheduled for 01/26/24.

## 2024-01-23 NOTE — PROGRESS NOTES
Pt presented to Infusion for monthly Faslodex and Xgeva injections. POC discussed and pt verbalized understanding. Pt denies pain or s/s of acute illness today. Faslodex and Xgeva injections administered per MAR, band-aid applied to sites and pt tolerated well. No s/s of reactions or complications noted. Future appts confirmed  emailed for future appts, and pt confirmed [employer] access to view appts. Pt discharged to self care in NAD.

## 2024-01-26 ENCOUNTER — HOSPITAL ENCOUNTER (OUTPATIENT)
Dept: RADIOLOGY | Facility: MEDICAL CENTER | Age: 59
End: 2024-01-26
Attending: INTERNAL MEDICINE
Payer: MEDICARE

## 2024-01-26 DIAGNOSIS — Z17.0 CARCINOMA OF UPPER-OUTER QUADRANT OF LEFT BREAST IN FEMALE, ESTROGEN RECEPTOR POSITIVE (HCC): ICD-10-CM

## 2024-01-26 DIAGNOSIS — R79.89 ELEVATED LFTS: ICD-10-CM

## 2024-01-26 DIAGNOSIS — C50.412 CARCINOMA OF UPPER-OUTER QUADRANT OF LEFT BREAST IN FEMALE, ESTROGEN RECEPTOR POSITIVE (HCC): ICD-10-CM

## 2024-01-26 PROCEDURE — 700117 HCHG RX CONTRAST REV CODE 255: Performed by: INTERNAL MEDICINE

## 2024-01-26 PROCEDURE — 71260 CT THORAX DX C+: CPT

## 2024-01-26 RX ADMIN — IOHEXOL 75 ML: 350 INJECTION, SOLUTION INTRAVENOUS at 15:14

## 2024-02-06 ENCOUNTER — PATIENT OUTREACH (OUTPATIENT)
Dept: HEALTH INFORMATION MANAGEMENT | Facility: OTHER | Age: 59
End: 2024-02-06
Payer: MEDICARE

## 2024-02-06 ENCOUNTER — HOSPITAL ENCOUNTER (OUTPATIENT)
Dept: HEMATOLOGY ONCOLOGY | Facility: MEDICAL CENTER | Age: 59
End: 2024-02-06
Attending: INTERNAL MEDICINE
Payer: MEDICARE

## 2024-02-06 VITALS
TEMPERATURE: 97.5 F | OXYGEN SATURATION: 93 % | HEART RATE: 100 BPM | WEIGHT: 214.07 LBS | DIASTOLIC BLOOD PRESSURE: 76 MMHG | RESPIRATION RATE: 17 BRPM | HEIGHT: 69 IN | SYSTOLIC BLOOD PRESSURE: 122 MMHG | BODY MASS INDEX: 31.71 KG/M2

## 2024-02-06 DIAGNOSIS — Z86.16 HISTORY OF COVID-19: ICD-10-CM

## 2024-02-06 DIAGNOSIS — Z17.0 CARCINOMA OF UPPER-OUTER QUADRANT OF LEFT BREAST IN FEMALE, ESTROGEN RECEPTOR POSITIVE (HCC): ICD-10-CM

## 2024-02-06 DIAGNOSIS — E11.40 TYPE 2 DIABETES MELLITUS WITH DIABETIC NEUROPATHY, WITHOUT LONG-TERM CURRENT USE OF INSULIN (HCC): ICD-10-CM

## 2024-02-06 DIAGNOSIS — C50.412 CARCINOMA OF UPPER-OUTER QUADRANT OF LEFT BREAST IN FEMALE, ESTROGEN RECEPTOR POSITIVE (HCC): ICD-10-CM

## 2024-02-06 DIAGNOSIS — C50.912 BREAST CANCER, STAGE 4, LEFT (HCC): ICD-10-CM

## 2024-02-06 DIAGNOSIS — I10 ESSENTIAL HYPERTENSION: ICD-10-CM

## 2024-02-06 DIAGNOSIS — C50.111 MALIGNANT NEOPLASM OF CENTRAL PORTION OF RIGHT BREAST IN FEMALE, ESTROGEN RECEPTOR POSITIVE (HCC): ICD-10-CM

## 2024-02-06 DIAGNOSIS — Z17.0 MALIGNANT NEOPLASM OF CENTRAL PORTION OF RIGHT BREAST IN FEMALE, ESTROGEN RECEPTOR POSITIVE (HCC): ICD-10-CM

## 2024-02-06 PROCEDURE — 99214 OFFICE O/P EST MOD 30 MIN: CPT | Performed by: INTERNAL MEDICINE

## 2024-02-06 PROCEDURE — 99212 OFFICE O/P EST SF 10 MIN: CPT | Performed by: INTERNAL MEDICINE

## 2024-02-06 ASSESSMENT — ENCOUNTER SYMPTOMS
WEIGHT LOSS: 0
NAUSEA: 0
COUGH: 0
PALPITATIONS: 0
CHILLS: 0
HEADACHES: 0
FEVER: 0
SHORTNESS OF BREATH: 1
MYALGIAS: 0
CONSTIPATION: 0
VOMITING: 0
DIZZINESS: 0
DIARRHEA: 0

## 2024-02-06 ASSESSMENT — FIBROSIS 4 INDEX: FIB4 SCORE: 0.75

## 2024-02-06 ASSESSMENT — PAIN SCALES - GENERAL: PAINLEVEL: NO PAIN

## 2024-02-06 NOTE — PROGRESS NOTES
Assessment    I spoke to the patient this morning by phone for her Monthly CCM follow up. The patient is aware of her future appointments and states understanding. The patient denies any issues obtaining or taking medications. She does ask the question of how late is too late to take her Kisquali since it is meant to be taken and the same time each day. No specific information found in [facility] Guide or in the information on drug administration on the Kisquali website. The patient advised to ask her Oncologist, who she sees this afternoon, for their recommendation. The patient denies any recent falls or injuries. The patient states that her appetite has remained good. She states that she has her blood sugar taken at provider appointments but doesn't regularly take it at home. Symptoms of high and low blood sugar reviewed. Patient voiced understanding. States that she does have equipment to take her blood sugar if these symptoms emerge. The patient states that she hasn't regularly taken her blood pressures at home. Denies any cardiac signs or symptoms. Last blood pressure in chart 144/75. Patient advised to take blood pressures at regular intervals to keep track of any treatment related issues. Patient care gaps sent by [facility] to keep them on her radar. The patient is recovering from a Covid 19 diagnosis mid January, for which her primary symptoms were headache, severe congestion with thick mucous, and fatigue. She states that the fatigue is all that remains right now and that she took the prescribed antiviral as indicated.     Education    *Tips for diabetes management during cancer treatment provided by mail.     Plan of Care and Goals    *Remain free from falls, injuries  *Healthy nutrition, hydration, and activity  *Follow up with PCP and specialists as indicated    Barriers:    *Management of acute and chronic health concerns.     Progress:    Progressing    Next outreach:  3/5/24 by  phone

## 2024-02-06 NOTE — PROGRESS NOTES
Subjective   Medical oncology follow-up visit: 2/6/2024  Sol Kern is a 58 y.o. female who presents with Breast Cancer          HPI    Primary Care: Veronica Lyn M.D.  Radiation Oncology: Kem Infante M.D.   Surgery: Kayleen Sykes M.D.     Diagnosis:  Recurrent/metastatic ER positive breast cancer eluding malignant right pleural effusion from breast cancer, ER positive, NE slightly positive, HER2 negative     Chief Complaint: Patient seen today for evaluation of her metastatic ER positive breast cancer, for continued monitoring of symptoms and side effects of cancer treatments, employing ribociclib and Faslodex.     Oncology history of presenting illness:  Ms. Kern  is a pleasant 56 y.o. year old postmenopausal female with a history of poorly controlled diabetes mellitus who had a mammogram in fall 2021 which showed a 2.3 cm spiculated mass in the upper outer quadrant of the left breast.  Biopsy revealed an invasive ductal carcinoma, grade 1, ER/NE positive HER-2 negative with a Ki-67 of 22%.  She is large breasted and elected to undergo a left lumpectomy and sentinel lymph node biopsy by Dr. Sykes on 11/12/2021.  Pathology revealed an invasive ductal carcinoma, grade 2 with associated intermediate grade ductal carcinoma in situ.  The invasive tumor measured 2.9 cm in greatest dimension and all margins were clear.  1 of 1 sentinel lymph nodes was positive for macro metastases, but no extranodal extension was identified.  Postoperative course was unremarkable.  She was seen by medical oncology and an Oncotype performed with results pending.  She had a CT scan of the chest abdomen pelvis which was negative for metastatic disease and a bone scan is pending.  In surgery she has been on a weight reduction diet and has reduced her smoking dramatically from greater than a pack a day to 3 to 4 cigarettes a day.  She feels generally well.  She is previously seen Dr. Kem Infante in consultation.      Interval histories:  Her Oncotype came back with a recurrence score of 20 therefore there was no indication for adjuvant chemotherapy.  She had a previously scheduled CT scan of the chest abdomen pelvis and bone scan and these were either for metastatic disease.  She underwent radiation therapy with 40 Gray over 20 doses completed this 1 February 2022.  She did develop moderate formation and erythema and is being treated for this.  She also started postoperative physical therapy for prevention of lymphedema and is doing well from that perspective.  She is ready to initiate adjuvant endocrine therapy.     Interval history 7/28/2022: Started anastrozole in March 2022 and has been moderately adherent with it although she admits that she misses doses not infrequently.  She still has some tenderness in the left breast after radiation therapy but no nodularity.  She developed relatively severe COVID at the end of June that required Paxlovid and she was given oxygen 1 L/min but was never admitted to the hospital.  Her O2 sats apparently still vary somewhat.  She still has a dry cough but no sputum production or fever currently.  She has no sore throat.  Her energy level remains a little low.  Bone density was done recently and was within normal limits.     Interval history 10/10/2023: She continued on anastrozole intermittently but discontinued it completely in March 2023.  Her diabetes was out of control at that time but it is gotten much better on Ozempic with her A1c coming down from 9-6.0.  She developed shortness of breath and was admitted to the hospital on 9/28/2023 with a large right pleural effusion.  Thoracentesis showed an exudate with metastatic breast cancer, ER positive CO slightly positive HER2 negative.  She felt better after she had her tap.  CT scan of the chest was negative except for the pleural effusion.  She has a history of struct of sleep apnea and was urged to use her CPAP again.  O2 sats are  running low 90s at home now.  She denies any breast masses or other nodularity.  She has some low back pain.  No neurologic symptoms.     Interval history 10/25/2023.  We started her on ribociclib 600 mg daily which she started 5 days ago and has had no problems with whatsoever.  We are trying to schedule her fulvestrant as her endocrine component of therapy.  A therapeutic phlebotomy because of her polycythemia.  Her work-up for myeloproliferative disorder/P vera was negative including JAK2 and reflex subsequent testing.  She had a PET CT scan performed on 10/19/2023 which showed focal increased activity in the skull base, thoracic spine right shoulder right ribs lumbar spine pelvis and proximal femurs.  She also has increased activity in mediastinal precarinal and right hilar nodes.  The right pleura has multiple hypermetabolic foci.  A incidental moderate right hydronephrosis was noted.  No visceral involvement was seen.  She had her last thoracentesis on the right on 10/15/2023 and does not feel the need for another one right now.     Interval history 11/09/23 (CAlsop, APRN):  Patient is doing very well overall and tolerating her treatment well.  She did note some mild constipation since starting the ribociclib.  She is due to complete her first cycle as she has 2 more days left before her week off.  She has been tolerating the thoracentesis which last 1 was completed on 11/3/2023.  She stated that she tolerated little bit better and is noticed less amount of fluid being removed.  She is questioning why she does not have another 1 appointment scheduled in 2 weeks.  She did state that she does note some shortness of breath with exertion still.  She is wearing oxygen when she is at home resting.  She also wears oxygen at night, 3 L.  She stated that she does not have a CPAP machine and has not had one for many years.    Interval history 11/22/23 (CAlsop, APRN):  Patient is doing very well.  She stated that she  does continue to feel winded at times with shortness of breath with any activity but overall she is doing great.  She denies any nausea vomiting, diarrhea constipation.  She denies any significant pain.  She overall states that she is not under percent but she is feeling much better.  She had her thoracentesis last Friday and had only 150 cc of fluid removed.    Interval history 12/26/2023: On 11/27/2023 she was found to have elevated liver function tests including the ALT which was 5-10 times greater than upper limit of normal.  Ribociclib was held and repeated CMP on 12/12/2023 showed resolution of elevation of ALT and AST.  She was restarted on Ribociclib at 400 mg daily and is about 2 weeks into that.  Repeat labs on 12/22/2023 showed mild elevation in AST to 61 and ALT to 135.  Her blood sugar is also up somewhat.  She has no shortness of breath or cough and has not had thoracentesis in some time.  She does note some occasional nausea and abdominal bloating but it should be noted that she is on Ozempic as well for for the last 6 months and is lost about 25 pounds.      Interval history 1/9/2024: She is on her off week from her Ribociclib at 400 mg daily and is tolerating it very well.  She has minimal dyspnea on exertion no orthopnea cough or sputum production.  CT of the chest is scheduled for later this month.    Interval history 2/6/2024.  She is doing very well now.  She did have an episode of COVID-19 after we saw her last month mainly manifest as upper respiratory symptoms.  She had a lot of congestion for about 10 days but this has resolved completely.  She did note some mild dyspnea but this is improved back to baseline as well.      Treatment history:  10/21/23: C1 Ribociclib  10/27/23: C1D1 Faslodex  11/10/23: C1D15 Faslodex   11/18/23: C2 Ribociclib  11/27/23: C2D1 Faslodex / C1 Xgeva   12/12/2023: C3 D1 Ribociclib dose reduced to 400 mg daily  1/12/2024: C4 D1 Ribociclib 400 mg with Faslodex and  "Xgeva  2/8/2024: C5 D1 Ribociclib 400 mg with Faslodex and Xgeva.      Allergies   Allergen Reactions    Codeine Vomiting and Nausea    Hydrocodone Vomiting and Nausea    Other Drug Vomiting and Nausea     Any of the \"cets\" (percocet)     Current Outpatient Medications on File Prior to Encounter   Medication Sig Dispense Refill    Semaglutide,0.25 or 0.5MG/DOS, (OZEMPIC, 0.25 OR 0.5 MG/DOSE,) 2 MG/3ML Solution Pen-injector Inject 0.5 mg under the skin every 7 days. 9 mL 0    zolpidem (AMBIEN) 5 MG Tab Take 10 mg by mouth at bedtime as needed for Sleep.      cyclobenzaprine (FLEXERIL) 10 mg Tab TAKE 1 TABLET BY MOUTH THREE TIMES A DAY AS NEEDED FOR MUSCLE SPASM 90 Tablet 0    metFORMIN ER (GLUCOPHAGE XR) 500 MG TABLET SR 24 HR Take 1 Tablet by mouth every day. 200 Tablet 1    ondansetron (ZOFRAN ODT) 4 MG TABLET DISPERSIBLE Take 1 Tablet by mouth every 6 hours as needed for Nausea/Vomiting. For cancer treatment associated nausea 30 Tablet 1    lisinopril (PRINIVIL) 2.5 MG Tab Take 1 Tablet by mouth every day. 90 Tablet 1    lidocaine (LIDODERM) 5 % Patch Place 1 Patch on the skin every 24 hours. Place patch to affected area in am and remove at bedtime. 30 Patch 5    albuterol 108 (90 Base) MCG/ACT Aero Soln inhalation aerosol Inhale 2 Puffs every 6 hours as needed for Shortness of Breath. 6.7 Each 1    Ribociclib Succ, 600 MG Dose, 200 MG Tablet Therapy Pack Take 600 mg by mouth see administration instructions. Take 600 mg by mouth daily for 3 weeks (21 days), off for 1 week (7 days) 63 Each 0    acetaminophen (TYLENOL) 500 MG Tab Take 1,000 mg by mouth 2 times a day as needed for Mild Pain. 2 tablets = 1,000 mg.      fluocinonide (LIDEX) 0.05 % Cream Apply 1 Application topically 2 times a day as needed (Rash, Itching). Apply to affected areas of back or buttocks. Do not use on face, armpit, or groin.      Cyanocobalamin (B-12) 5000 MCG Cap Take 5,000 mcg by mouth every evening.      denosumab (PROLIA) 60 MG/ML " "Solution Prefilled Syringe injection Inject 60 mg under the skin every 6 months. (Patient not taking: Reported on 1/22/2024)       No current facility-administered medications on file prior to encounter.         Review of Systems   Constitutional:  Positive for malaise/fatigue. Negative for chills, fever and weight loss.   Respiratory:  Positive for shortness of breath. Negative for cough.    Cardiovascular:  Negative for chest pain and palpitations.   Gastrointestinal:  Negative for constipation, diarrhea, nausea and vomiting.   Genitourinary:  Negative for dysuria.   Musculoskeletal:  Negative for myalgias.   Neurological:  Negative for dizziness and headaches.              Objective     /76 (BP Location: Right arm, Patient Position: Sitting)   Pulse 100   Temp 36.4 °C (97.5 °F) (Temporal)   Resp 17   Ht 1.753 m (5' 9\")   Wt 97.1 kg (214 lb 1.1 oz)   SpO2 93%   BMI 31.61 kg/m²      Physical Exam  Vitals reviewed.   Constitutional:       General: She is not in acute distress.     Appearance: Normal appearance. She is not diaphoretic.   HENT:      Head: Normocephalic and atraumatic.   Cardiovascular:      Rate and Rhythm: Normal rate and regular rhythm.      Heart sounds: No murmur heard.     No friction rub. No gallop.   Pulmonary:      Effort: Pulmonary effort is normal. No respiratory distress.      Breath sounds: Normal breath sounds. No wheezing.      Comments: No dullness to percussion at the bases.  Lungs are clear to AMP without rales rhonchi's or wheezes.  Abdominal:      General: Bowel sounds are normal. There is no distension.      Palpations: Abdomen is soft.      Tenderness: There is no abdominal tenderness.   Musculoskeletal:         General: No swelling or tenderness. Normal range of motion.   Skin:     General: Skin is warm and dry.   Neurological:      Mental Status: She is alert and oriented to person, place, and time.   Psychiatric:         Mood and Affect: Mood normal.         " Behavior: Behavior normal.                    Assessment & Plan       1. Carcinoma of upper-outer quadrant of left breast in female, estrogen receptor positive (HCC)        2. Breast cancer, stage 4, left (HCC)                Impression:  1.  Infiltrating ductal carcinoma of the left breast, grade 2, stage anatomic to be (pT2, PN 1A) status post left lumpectomy, ER positive, MA positive, HER-2 negative, Ki-67 22%, Oncotype DX current score 20.  Status post whole breast radiation therapy on endocrine therapy with anastrozole from March 2022 through March 2023 through with moderate adherence  2.  Diabetes mellitus under fair control, better on Ozempic  3.  Tobacco abuse improving  4.  Hypertension  5.  COVID-19 infection June 2022 resolved  6.  Recurrent/metastatic ER positive breast cancer malignant right pleural effusion from breast cancer, ER positive, MA slightly positive, HER2 negative.  PET scan shows extensive bony metastases and pleural metastases and mediastinal metastases.  7.  Longstanding polycythemia dating back at least 5-year.  Status post phlebotomy with genetic work-up negative for myeloproliferative disorder.  Likely secondary to chronic hypoxemia.  She had 1 phlebotomy required and after that her hemoglobin was back to normal at 15.2.  We will continue to monitor this.  Slightly higher but no need yet for repeat phlebotomy  8.  Somatic genomic profile negative for actionable genes.  ESR 1 negative      Plan:  Continue Ribociclib and Faslodex.  And Xgeva.  We will check her tumor markers with her next Xgeva.  I will see her back in 6 to 8 weeks with another PET scan to assess her bone metastases.  Overall we are very pleased with her response to therapy.  Patient to follow-up in the clinic in 4 weeks, or sooner if needed.      Please note that this dictation was created using voice recognition software. I have made every reasonable attempt to correct obvious errors, but I expect that there are errors  of grammar and possibly content that I did not discover before finalizing the note.

## 2024-02-07 NOTE — ADDENDUM NOTE
Encounter addended by: Erich Cardoso, Med Ass't on: 2/6/2024 5:00 PM   Actions taken: Charge Capture section accepted

## 2024-02-14 RX ORDER — PROCHLORPERAZINE MALEATE 10 MG
10 TABLET ORAL EVERY 6 HOURS PRN
Status: CANCELLED | OUTPATIENT
Start: 2024-02-19

## 2024-02-14 RX ORDER — ONDANSETRON 2 MG/ML
4 INJECTION INTRAMUSCULAR; INTRAVENOUS PRN
Status: CANCELLED | OUTPATIENT
Start: 2024-02-19

## 2024-02-14 RX ORDER — DIPHENHYDRAMINE HYDROCHLORIDE 50 MG/ML
50 INJECTION INTRAMUSCULAR; INTRAVENOUS PRN
Status: CANCELLED | OUTPATIENT
Start: 2024-02-19

## 2024-02-14 RX ORDER — LAMOTRIGINE 25 MG/1
250 TABLET ORAL ONCE
Status: CANCELLED | OUTPATIENT
Start: 2024-02-19 | End: 2024-02-19

## 2024-02-14 RX ORDER — EPINEPHRINE 1 MG/ML(1)
0.5 AMPUL (ML) INJECTION PRN
Status: CANCELLED | OUTPATIENT
Start: 2024-02-19

## 2024-02-14 RX ORDER — ONDANSETRON 8 MG/1
8 TABLET, ORALLY DISINTEGRATING ORAL PRN
Status: CANCELLED | OUTPATIENT
Start: 2024-02-19

## 2024-02-14 RX ORDER — METHYLPREDNISOLONE SODIUM SUCCINATE 125 MG/2ML
125 INJECTION, POWDER, LYOPHILIZED, FOR SOLUTION INTRAMUSCULAR; INTRAVENOUS PRN
Status: CANCELLED | OUTPATIENT
Start: 2024-02-19

## 2024-02-15 ENCOUNTER — APPOINTMENT (OUTPATIENT)
Dept: HEMATOLOGY ONCOLOGY | Facility: MEDICAL CENTER | Age: 59
End: 2024-02-15
Payer: MEDICARE

## 2024-02-19 ENCOUNTER — OUTPATIENT INFUSION SERVICES (OUTPATIENT)
Dept: ONCOLOGY | Facility: MEDICAL CENTER | Age: 59
End: 2024-02-19
Attending: INTERNAL MEDICINE
Payer: MEDICARE

## 2024-02-19 VITALS
OXYGEN SATURATION: 92 % | WEIGHT: 216.05 LBS | RESPIRATION RATE: 18 BRPM | TEMPERATURE: 97.5 F | BODY MASS INDEX: 38.28 KG/M2 | HEIGHT: 63 IN | DIASTOLIC BLOOD PRESSURE: 80 MMHG | SYSTOLIC BLOOD PRESSURE: 139 MMHG | HEART RATE: 92 BPM

## 2024-02-19 DIAGNOSIS — C50.912 BREAST CANCER, STAGE 4, LEFT (HCC): ICD-10-CM

## 2024-02-19 DIAGNOSIS — Z17.0 CARCINOMA OF UPPER-OUTER QUADRANT OF LEFT BREAST IN FEMALE, ESTROGEN RECEPTOR POSITIVE (HCC): ICD-10-CM

## 2024-02-19 DIAGNOSIS — C50.412 CARCINOMA OF UPPER-OUTER QUADRANT OF LEFT BREAST IN FEMALE, ESTROGEN RECEPTOR POSITIVE (HCC): ICD-10-CM

## 2024-02-19 LAB
ALBUMIN SERPL BCP-MCNC: 3.9 G/DL (ref 3.2–4.9)
ALBUMIN/GLOB SERPL: 1.1 G/DL
ALP SERPL-CCNC: 90 U/L (ref 30–99)
ALT SERPL-CCNC: 38 U/L (ref 2–50)
ANION GAP SERPL CALC-SCNC: 14 MMOL/L (ref 7–16)
AST SERPL-CCNC: 26 U/L (ref 12–45)
BASOPHILS # BLD AUTO: 0.9 % (ref 0–1.8)
BASOPHILS # BLD: 0.05 K/UL (ref 0–0.12)
BILIRUB SERPL-MCNC: 0.4 MG/DL (ref 0.1–1.5)
BUN SERPL-MCNC: 11 MG/DL (ref 8–22)
CA-I BLD ISE-SCNC: 1.22 MMOL/L (ref 1.1–1.3)
CALCIUM ALBUM COR SERPL-MCNC: 9.6 MG/DL (ref 8.5–10.5)
CALCIUM SERPL-MCNC: 9.5 MG/DL (ref 8.5–10.5)
CEA SERPL-MCNC: 1.3 NG/ML (ref 0–3)
CHLORIDE SERPL-SCNC: 101 MMOL/L (ref 96–112)
CO2 SERPL-SCNC: 21 MMOL/L (ref 20–33)
CREAT BLD-MCNC: 0.7 MG/DL (ref 0.5–1.4)
CREAT SERPL-MCNC: 0.74 MG/DL (ref 0.5–1.4)
EOSINOPHIL # BLD AUTO: 0.09 K/UL (ref 0–0.51)
EOSINOPHIL NFR BLD: 1.7 % (ref 0–6.9)
ERYTHROCYTE [DISTWIDTH] IN BLOOD BY AUTOMATED COUNT: 45 FL (ref 35.9–50)
GFR SERPLBLD CREATININE-BSD FMLA CKD-EPI: 92 ML/MIN/1.73 M 2
GLOBULIN SER CALC-MCNC: 3.7 G/DL (ref 1.9–3.5)
GLUCOSE SERPL-MCNC: 273 MG/DL (ref 65–99)
HCT VFR BLD AUTO: 45.9 % (ref 37–47)
HGB BLD-MCNC: 16 G/DL (ref 12–16)
IMM GRANULOCYTES # BLD AUTO: 0.02 K/UL (ref 0–0.11)
IMM GRANULOCYTES NFR BLD AUTO: 0.4 % (ref 0–0.9)
LYMPHOCYTES # BLD AUTO: 1.33 K/UL (ref 1–4.8)
LYMPHOCYTES NFR BLD: 25.1 % (ref 22–41)
MCH RBC QN AUTO: 33.1 PG (ref 27–33)
MCHC RBC AUTO-ENTMCNC: 34.9 G/DL (ref 32.2–35.5)
MCV RBC AUTO: 95 FL (ref 81.4–97.8)
MONOCYTES # BLD AUTO: 0.42 K/UL (ref 0–0.85)
MONOCYTES NFR BLD AUTO: 7.9 % (ref 0–13.4)
NEUTROPHILS # BLD AUTO: 3.38 K/UL (ref 1.82–7.42)
NEUTROPHILS NFR BLD: 64 % (ref 44–72)
NRBC # BLD AUTO: 0 K/UL
NRBC BLD-RTO: 0 /100 WBC (ref 0–0.2)
PLATELET # BLD AUTO: 355 K/UL (ref 164–446)
PMV BLD AUTO: 9.1 FL (ref 9–12.9)
POTASSIUM SERPL-SCNC: 4.1 MMOL/L (ref 3.6–5.5)
PROT SERPL-MCNC: 7.6 G/DL (ref 6–8.2)
RBC # BLD AUTO: 4.83 M/UL (ref 4.2–5.4)
SODIUM SERPL-SCNC: 136 MMOL/L (ref 135–145)
WBC # BLD AUTO: 5.3 K/UL (ref 4.8–10.8)

## 2024-02-19 PROCEDURE — 85025 COMPLETE CBC W/AUTO DIFF WBC: CPT

## 2024-02-19 PROCEDURE — 82330 ASSAY OF CALCIUM: CPT

## 2024-02-19 PROCEDURE — 80053 COMPREHEN METABOLIC PANEL: CPT

## 2024-02-19 PROCEDURE — 86300 IMMUNOASSAY TUMOR CA 15-3: CPT

## 2024-02-19 PROCEDURE — 96402 CHEMO HORMON ANTINEOPL SQ/IM: CPT

## 2024-02-19 PROCEDURE — 36415 COLL VENOUS BLD VENIPUNCTURE: CPT

## 2024-02-19 PROCEDURE — 82565 ASSAY OF CREATININE: CPT | Mod: XU

## 2024-02-19 PROCEDURE — 82378 CARCINOEMBRYONIC ANTIGEN: CPT

## 2024-02-19 PROCEDURE — 700111 HCHG RX REV CODE 636 W/ 250 OVERRIDE (IP): Mod: JZ | Performed by: NURSE PRACTITIONER

## 2024-02-19 PROCEDURE — 96372 THER/PROPH/DIAG INJ SC/IM: CPT | Mod: XU

## 2024-02-19 RX ORDER — LAMOTRIGINE 25 MG/1
250 TABLET ORAL ONCE
Status: COMPLETED | OUTPATIENT
Start: 2024-02-19 | End: 2024-02-19

## 2024-02-19 RX ADMIN — FULVESTRANT 250 MG: 50 INJECTION INTRAMUSCULAR at 16:33

## 2024-02-19 RX ADMIN — FULVESTRANT 250 MG: 50 INJECTION INTRAMUSCULAR at 16:34

## 2024-02-19 RX ADMIN — DENOSUMAB 120 MG: 120 INJECTION SUBCUTANEOUS at 16:33

## 2024-02-19 ASSESSMENT — FIBROSIS 4 INDEX: FIB4 SCORE: 0.75

## 2024-02-20 NOTE — PROGRESS NOTES
Pt presents to \[facility]\"" for fulvestrant and denosumab. Pt denies any oral sx in the last 6 weeks; education provided and verbalized understanding. Labs drawn from [facility]; brisk blood return observed and pt tolerated well. Labs within treatable parameters. Fulvestrant injected into both R and L dorsogluteal with no s/s of adverse reactions. Denosumab injected into R back arm with no s/s of adverse reactions. Next appointment confirmed and education provided. Pt discharged to self care with all personal belongings and in NAD.

## 2024-02-21 LAB — CANCER AG27-29 SERPL-ACNC: 116.9 U/ML

## 2024-02-22 ENCOUNTER — OFFICE VISIT (OUTPATIENT)
Dept: MEDICAL GROUP | Facility: PHYSICIAN GROUP | Age: 59
End: 2024-02-22
Payer: MEDICARE

## 2024-02-22 VITALS
DIASTOLIC BLOOD PRESSURE: 72 MMHG | OXYGEN SATURATION: 96 % | TEMPERATURE: 97.4 F | SYSTOLIC BLOOD PRESSURE: 126 MMHG | WEIGHT: 217 LBS | BODY MASS INDEX: 38.45 KG/M2 | HEART RATE: 88 BPM | HEIGHT: 63 IN

## 2024-02-22 DIAGNOSIS — Z12.11 COLON CANCER SCREENING: ICD-10-CM

## 2024-02-22 DIAGNOSIS — G47.01 INSOMNIA DUE TO MEDICAL CONDITION: ICD-10-CM

## 2024-02-22 DIAGNOSIS — G25.81 RESTLESS LEG SYNDROME: ICD-10-CM

## 2024-02-22 DIAGNOSIS — E66.01 CLASS 2 SEVERE OBESITY DUE TO EXCESS CALORIES WITH SERIOUS COMORBIDITY AND BODY MASS INDEX (BMI) OF 38.0 TO 38.9 IN ADULT (HCC): ICD-10-CM

## 2024-02-22 DIAGNOSIS — E11.40 TYPE 2 DIABETES MELLITUS WITH DIABETIC NEUROPATHY, WITHOUT LONG-TERM CURRENT USE OF INSULIN (HCC): Primary | ICD-10-CM

## 2024-02-22 DIAGNOSIS — J96.11 CHRONIC RESPIRATORY FAILURE WITH HYPOXIA (HCC): ICD-10-CM

## 2024-02-22 PROCEDURE — 3074F SYST BP LT 130 MM HG: CPT | Performed by: FAMILY MEDICINE

## 2024-02-22 PROCEDURE — 3078F DIAST BP <80 MM HG: CPT | Performed by: FAMILY MEDICINE

## 2024-02-22 PROCEDURE — 99214 OFFICE O/P EST MOD 30 MIN: CPT | Performed by: FAMILY MEDICINE

## 2024-02-22 RX ORDER — PRAMIPEXOLE DIHYDROCHLORIDE 0.12 MG/1
0.12 TABLET ORAL NIGHTLY
Qty: 100 TABLET | Refills: 0 | Status: SHIPPED | OUTPATIENT
Start: 2024-02-22

## 2024-02-22 ASSESSMENT — PATIENT HEALTH QUESTIONNAIRE - PHQ9: CLINICAL INTERPRETATION OF PHQ2 SCORE: 0

## 2024-02-22 ASSESSMENT — FIBROSIS 4 INDEX: FIB4 SCORE: 0.71

## 2024-02-22 NOTE — PROGRESS NOTES
Verbal consent was acquired by the patient to use [employer] ambient listening note generation during this visit     Subjective:     HPI:   History of Present Illness  The patient presents for diabetes follow-up.    Her glucose has been high when checked for her cancer. She was not fasting for her labs. She gets her labs every month.     She is not using CBD gummies. She is having a hard time sleeping for a while. She thinks she needs to go back on Ambien. She takes her cancer medication in the morning. Her cancer medication was reduced from 3 to 2 because her liver levels were high. She takes 400 mg 2 pills once a day. She takes lisinopril and metformin at night.    She skipped her metformin a couple of times to see if that was making her legs tingly. The tingly sensation in her legs is making it hard to sleep. The sensation in her legs does not go away when she gets out of bed and starts walking. She has had it before. She gets the sensation when she is lying in bed trying to fall asleep. She puts her leg over the bed and tries to shake it off. Sometimes she gets out of bed and walks it off. Right now, she can feel the sensation in the bottom of her feet. She has always had this on and off. She took ropinirole for restless legs syndrome before. It helped the first day, but after that, it did not help anymore. She can not stay on her back for very long. She had this when she was 5, 6, or 8 years old. Her mother used to come in and rub her lower legs for her. It seemed to go away for a while, but then with her back issues, it brought it back on again. Flexeril helps sometimes. She denies a history of iron deficiency. This leg problem has been going on for years, but it is getting worse.    She denies chest pain. If she exerts herself, she will get a little out of breath. She denies fevers or chills.    Health Maintenance: Completed    Objective:     Exam:  /72 (BP Location: Left arm, Patient Position:  "Sitting, BP Cuff Size: Adult)   Pulse 88   Temp 36.3 °C (97.4 °F) (Temporal)   Ht 1.6 m (5' 2.99\")   Wt 98.4 kg (217 lb)   SpO2 96%   BMI 38.45 kg/m²  Body mass index is 38.45 kg/m².    Physical Exam  Constitutional:       Appearance: Normal appearance.   Cardiovascular:      Rate and Rhythm: Normal rate and regular rhythm.      Heart sounds: Normal heart sounds.   Pulmonary:      Effort: Pulmonary effort is normal.      Breath sounds: Normal breath sounds.   Musculoskeletal:      Cervical back: Normal range of motion and neck supple.   Lymphadenopathy:      Cervical: No cervical adenopathy.   Neurological:      Mental Status: She is alert.             Results  Laboratory Studies  Labs were reviewed with the patient.    Assessment & Plan:     1. Type 2 diabetes mellitus with diabetic neuropathy, without long-term current use of insulin ([facility])  HEMOGLOBIN A1C    Diabetic Monofilament Lower Extremity Exam      2. Insomnia due to medical condition        3. Restless leg syndrome  pramipexole (MIRAPEX) 0.125 MG Tab      4. Colon cancer screening  Referral to GI for Colonoscopy    CANCELED: Referral to GI for Colonoscopy      5. Class 2 severe obesity due to excess calories with serious comorbidity and body mass index (BMI) of 38.0 to 38.9 in adult ([facility])        6. Chronic respiratory failure with hypoxia ([facility])          Assessment & Plan  1. Diabetes.  Her A1c from 12/04/2023 was well controlled. She is due for her diabetic foot exam. I will order an A1c to be checked before next visit.  She will continue metformin ER 5 mg daily and Ozempic 0.5 mg every 7 days.    2. Restless leg syndrome.  Chronic, uncontrolled.  For years she is been getting a tingly, and see sensation in her legs when she first lays down which makes it hard to sleep.  It will get better sometimes if she stands up and starts walking.  I suspect she has restless leg syndrome.  I will prescribe pramipexole 0.125 mg ] 2 to 3 hours before bedtime. "     3. Health maintenance.  I will place a new referral [facility] for a colonoscopy.    4.  Obesity  Chronic, stable.  BMI is above 35 and it is comorbid with diabetes.  Unfortunately, leading a healthy lifestyle is quite difficult as she is currently treating breast cancer.  She will continue doing her best and we will continue to monitor.    Follow-up  The patient will follow up in 6 weeks.    HCC Gap Form    Diagnosis to address: J96.11 - Chronic respiratory failure with hypoxia (HCC)  Assessment and plan: Chronic, stable. Continue with current defined treatment plan: 2-4 L/min of oxygen nightly. Follow-up at least annually.  Last edited 02/22/24 15:36 PST by Veronica Lyn M.D.         Referral for genetic research was offered. Patient is a participant.    Please note that this dictation was created using voice recognition software. I have made every reasonable attempt to correct obvious errors, but I expect that there are errors of grammar and possibly content that I did not discover before finalizing the note.

## 2024-02-29 ENCOUNTER — APPOINTMENT (OUTPATIENT)
Dept: PHYSICAL THERAPY | Facility: REHABILITATION | Age: 59
End: 2024-02-29
Attending: INTERNAL MEDICINE
Payer: MEDICARE

## 2024-03-06 ENCOUNTER — APPOINTMENT (OUTPATIENT)
Dept: PHYSICAL THERAPY | Facility: REHABILITATION | Age: 59
End: 2024-03-06
Attending: INTERNAL MEDICINE
Payer: MEDICARE

## 2024-03-11 ENCOUNTER — PATIENT OUTREACH (OUTPATIENT)
Dept: HEALTH INFORMATION MANAGEMENT | Facility: OTHER | Age: 59
End: 2024-03-11
Payer: MEDICARE

## 2024-03-11 ENCOUNTER — PATIENT MESSAGE (OUTPATIENT)
Dept: HEALTH INFORMATION MANAGEMENT | Facility: OTHER | Age: 59
End: 2024-03-11

## 2024-03-11 DIAGNOSIS — C50.912 BREAST CANCER, STAGE 4, LEFT (HCC): ICD-10-CM

## 2024-03-11 DIAGNOSIS — J96.11 CHRONIC RESPIRATORY FAILURE WITH HYPOXIA (HCC): ICD-10-CM

## 2024-03-11 DIAGNOSIS — I10 ESSENTIAL HYPERTENSION: ICD-10-CM

## 2024-03-11 DIAGNOSIS — E11.40 TYPE 2 DIABETES MELLITUS WITH DIABETIC NEUROPATHY, WITHOUT LONG-TERM CURRENT USE OF INSULIN (HCC): ICD-10-CM

## 2024-03-11 NOTE — PROGRESS NOTES
Assessment    I spoke to the patient this morning for her monthly and quarterly CCM reviews. The patient's future appointments reviewed. Patient is aware and voices understanding. She keeps a calendar to help track them. The patient's medications are reviewed and are reported as being taken as indicated in her chart. The patient reports that she is not experiencing any cardiac symptoms. Blood pressure in 120s and low 130's systolic in system for last three appointments.Patient reports it being a bit easier to breathe and being to walk a bit further at the lower altitude on her trip to [place] but nothing dramatic.  Patient reports that she is continuing to keep herself well hydrated. She states that she went with her daughters to [place], returning about a week ago. She states that her nutrition and diet went off track during that time but she is now getting back on track. She has spoken with her primary care provider about elevated blood glucose readings taken prior to oncology infusions. Patient reported not having fasted for 8 hours prior to these test and states that her PCP told her not to be too worried about them. Patient voices understanding of elements of good nutrition for her cancer treatment and diabetes. Patient's stated concerns at this point are some soreness on the ventrogluteal injection site where she received her last injections. She states she felt the nurse did it higher than normal this time. She was advised to message oncology and see if they had any further instructions with regard to this prior to her next injection. She reports it not being so sore at any of her prior treatments. Patient does report Kisquali making her tired, intends to discuss switching dosage times with her provider at her next appointment.     Education    *Article on blood pressure management during cancer treatment    Plan of Care and Goals    *Remain free from falls, injuries  *Healthy nutrition, hydration, and  activity    Barriers:    *Management of acute and chronic medical concerns.     Progress:    Progressing    Next outreach:  One Month

## 2024-03-13 ENCOUNTER — PHYSICAL THERAPY (OUTPATIENT)
Dept: PHYSICAL THERAPY | Facility: REHABILITATION | Age: 59
End: 2024-03-13
Attending: INTERNAL MEDICINE
Payer: MEDICARE

## 2024-03-13 DIAGNOSIS — L76.82 AXILLARY WEB SYNDROME: ICD-10-CM

## 2024-03-13 DIAGNOSIS — L90.5 AXILLARY WEB SYNDROME: ICD-10-CM

## 2024-03-13 DIAGNOSIS — I89.0 LYMPHEDEMA OF EXTREMITY: ICD-10-CM

## 2024-03-13 DIAGNOSIS — M25.9 SHOULDER PROBLEM: ICD-10-CM

## 2024-03-13 DIAGNOSIS — C50.912 BREAST CANCER, STAGE 4, LEFT (HCC): ICD-10-CM

## 2024-03-13 PROCEDURE — 97163 PT EVAL HIGH COMPLEX 45 MIN: CPT

## 2024-03-13 RX ORDER — LAMOTRIGINE 25 MG/1
250 TABLET ORAL ONCE
Status: CANCELLED | OUTPATIENT
Start: 2024-03-18 | End: 2024-03-18

## 2024-03-13 RX ORDER — PROCHLORPERAZINE MALEATE 10 MG
10 TABLET ORAL EVERY 6 HOURS PRN
Status: CANCELLED | OUTPATIENT
Start: 2024-03-18

## 2024-03-13 RX ORDER — METHYLPREDNISOLONE SODIUM SUCCINATE 125 MG/2ML
125 INJECTION, POWDER, LYOPHILIZED, FOR SOLUTION INTRAMUSCULAR; INTRAVENOUS PRN
Status: CANCELLED | OUTPATIENT
Start: 2024-03-18

## 2024-03-13 RX ORDER — EPINEPHRINE 1 MG/ML(1)
0.5 AMPUL (ML) INJECTION PRN
Status: CANCELLED | OUTPATIENT
Start: 2024-03-18

## 2024-03-13 RX ORDER — ONDANSETRON 8 MG/1
8 TABLET, ORALLY DISINTEGRATING ORAL PRN
Status: CANCELLED | OUTPATIENT
Start: 2024-03-18

## 2024-03-13 RX ORDER — DIPHENHYDRAMINE HYDROCHLORIDE 50 MG/ML
50 INJECTION INTRAMUSCULAR; INTRAVENOUS PRN
Status: CANCELLED | OUTPATIENT
Start: 2024-03-18

## 2024-03-13 RX ORDER — ONDANSETRON 2 MG/ML
4 INJECTION INTRAMUSCULAR; INTRAVENOUS PRN
Status: CANCELLED | OUTPATIENT
Start: 2024-03-18

## 2024-03-13 NOTE — OP THERAPY EVALUATION
"  Outpatient Physical Therapy  LYMPHEDEMA THERAPY INITIAL EVALUATION    [facility] Physical Therapy [place] [address] [facility].  [address]  [facility] [address]  Phone:  [phone]  Fax:  [phone]    Date of Evaluation: 03/13/2024    Patient: Sol Kern  YOB: 1965  MRN: [id number]     Referring Provider: Magdi Kline M.D.  [address] [place] [address] [address]  [place] [address]   Referring Diagnosis Malignant neoplasm of unspecified site of left female breast [C50.912]     Time Calculation    Start time: 1416  Stop time: 1501 Time Calculation (min): 45 minutes             Chief Complaint: Shoulder Problem and Lymphedema Breast Cancer    Visit Diagnoses     ICD-10-CM   1. Breast cancer, stage 4, left (HCC)  C50.912   2. Lymphedema of extremity  I89.0   3. Axillary web syndrome  L76.82    L90.5   4. Shoulder problem  M25.9       Subjective:   History of Present Illness:     Mechanism of injury:  Per chart \"Infiltrating ductal carcinoma of the left breast, grade 2, stage anatomic to be (pT2, PN 1A) status post left lumpectomy, ER positive, TX positive, HER-2 negative, Ki-67 22%, Oncotype DX current score 20.  Status post whole breast radiation therapy on endocrine therapy with anastrozole from March 2022 through March 2023 through with moderate adherence. Recurrent/metastatic ER positive breast cancer malignant right pleural effusion from breast cancer, ER positive, TX slightly positive, HER2 negative.  PET scan shows extensive bony metastases and pleural metastases and mediastinal metastases.\" Currently on Ribociclib and Faslodex and Xgeva\" Per PET: \"Multiple foci of increased activity in the thoracic spine, RIGHT shoulder, RIGHT ribs, lumbar spine, pelvis and proximal femurs.  Uptake lateral to LEFT hip likely greater trochanteric bursitis.\"    Body feeling \"ok\". She reports she has pain to her L anterior leg and thinks it is related to an injection she had that was a little high on her hip. For " many years she has had disc bulges and on/off neck/head pain. Reports she could feel a tendon of sorts down her L arm at times. Seems like her L forearm is swollen sometimes too. Bilateral feet are sore too but reports she is also diabetic       Past Medical History:   Diagnosis Date    Allergy     Back pain     Cancer (HCC)     breast    Diabetes (HCC)     diet    GERD (gastroesophageal reflux disease)     High cholesterol     not medicated    Hypertension     Malignant neoplasm of upper-outer quadrant of left female breast (HCC)     Neck pain     Obesity     Sleep apnea     no cpap    Snoring      Past Surgical History:   Procedure Laterality Date    PB MASTECTOMY, PARTIAL Left 2021    Procedure: MASTECTOMY, PARTIAL - SAMARA LOCALIZED;  Surgeon: Kayleen Sykes M.D.;  Location: SURGERY SAME DAY [facility];  Service: General    NODE BIOPSY SENTINEL Left 2021    Procedure: BIOPSY, LYMPH NODE, SENTINEL;  Surgeon: Kayleen Sykes M.D.;  Location: SURGERY SAME DAY [facility];  Service: General    CARPAL TUNNEL RELEASE Right     ENDOMETRIAL ABLATION      FOOT SURGERY Right     KNEE ARTHROSCOPY      PRIMARY C SECTION      2     TUBAL COAGULATION LAPAROSCOPIC BILATERAL       Social History     Tobacco Use    Smoking status: Former     Current packs/day: 0.25     Average packs/day: 0.3 packs/day for 20.0 years (5.0 ttl pk-yrs)     Types: Cigarettes    Smokeless tobacco: Never    Tobacco comments:     working on quitting    Substance Use Topics    Alcohol use: Not Currently     Comment: rarely     Family and Occupational History     Socioeconomic History    Marital status:      Spouse name: Not on file    Number of children: Not on file    Years of education: Not on file    Highest education level: Associate degree: occupational, technical, or vocational program   Occupational History    Not on file       Lymphedema Objective      Left Upper Extremity Circumferential Measurements  Other:  cm  Areola: cm  Breast Crease: cm  Axilla: 38.5 cm  Upper Proximal Humerus: 37.7 cm  Distal Humerus: 34.4 cm  Elbow: 27.2 cm  Mid-Forearm: 24.7 cm  Wrist: 15.9 cm  Distal Garza Crease: 18.4 cm  Total: 196.8 cm    Right Upper Extremity Circumferential Measurements  Other: cm  Areola: cm  Breast Crease: cm  Axilla: 37.1 cm  Upper Proximal Humerus: 36.6 cm  Distal Humerus: 33.4 cm  Elbow: 27.2 cm  Mid-Forearm: 22 cm  Wrist: 16.8 cm  Distal Garza Crease: 18.4 cm  Total: cm 191.5            Therapeutic Treatments and Modalities:     Therapeutic Treatment and Modalities Summary: Educated pt on pathogenesis of lymphedema including difference between a healthy lymphatic system and one that has been affected by lymph node removal and radiation. Discussed risk reduction strategies as well as high-risk activity such as sauna, air travel, injections, and repetitive overhead lifting. Discussed use of sleeve. Handout provided.       Pt has been educated on axillary web syndrome which is thought to be a result of injury/trauma to axillary lymphatics likely due to an interruption of the lymphovenous channels which causes hardening and pooling of the lymphovenous fluid, resulting in inflammation, fibrosis, and shortening of the tissue.      Time-based treatments/modalities:           Assessment and Plan:   Assessment details:  Sol is a 59yo F who initially underwent treatment for Stage IB (T2N0M0) G1 invasive ductal carcinoma of the left upper outer quadrant of breast ER/CO positive HER-2/zachary negative with a Ki-67 of 22% in 2021 which included lumpectomy and sentinel node biopsy along with radiation. She was taking Anastrozole but due to diabetic issues, ceased in March 2023 and in October of that year was found to have metastatic disease to her lungs. Per PET, she does also have lesions in her bones. Sol arrives today with report of a tendon that is travelling from her axilla to her forearm and fullness to her L forearm. In  comparison, her L forearm is ~2cm larger than her R and does have a palpable cord extending from axilla. Discussed anatomy of lymphatic system as well as cording. She will benefit from intervention to assist with fluid movement away from her L upper quadrant as well as ensure full ROM to her L UE to transition to gentle resistive exercises to build lean muscle (which will also improve her overall fluid movement). Sol has agreed to this plan and will benefit from intervention.     Prognosis: fair      Goals:   Short Term Goals:  1. Pt will achieve a total limb circumference reduction of 3cm in order to decrease risk for infection and progression of disease process.  2. Pt will be independent with self-MLD to facilitate fluid migration to healthy tissues and lymph nodes.   3. Pt will obtain a compressive garment to allow for fluid movement out of forearm.      Short term goal time span:  2-4 weeks    Long Term Goals:  1. Pt will have a reduction in total limb circumference of 6cm in order to decrease risk for infection and progression of disease process.   2. Patient will be independent with maintenance phase management of lymphedema including: compression garments, skin care education, risk reduction strategies, manual lymphatic drainage, and therapeutic exercise.   3. Pt will sustain her full ROM and initiate a strengthening program to build lean muscle mass.   Long term goal time span:  4-6 weeks    Plan:  Therapy options:  Physical therapy treatment to continue  Planned therapy interventions:  Caregiver education, compression sleeve, decongestive exercises, gait training (CPT 37947), home exercise program, manual lymph drainage, intermittent compression, myofascial release techniques, orthotic measurements/fitting, patient education, postural exercises, range of motion exercises, referral for compression garment & instructions for don/doffing, self-care/training (CPT 44944), sequential compression pump, short  stretch bandages, skin/wound care, soft tissue manual techniques (CPT 33179), strengthening exercises and Velcro wraps  Planned education:  Functional anatomy and physiology of the lymphatic system, pathophysiology of lymphedema, lymphedema exercise, lymphedema precautions, proper skin care/nutrition, compression bandaging, infection prevention, self massage, scar tissue management, breast cancer rehab, activity guidelines, dietary guidelines, skin care guidelines, home pump use, bandage removal and long term self-management of lymphedema  Frequency:  1x week  Duration in weeks:  8  Discussed with:  Patient      Functional Assessment Used    LLIS- TBA    Referring provider co-signature:  I have reviewed this plan of care and my co-signature certifies the need for services.    Certification Period: 03/13/2024 to  05/09/24    Physician Signature: ________________________________ Date: ______________

## 2024-03-15 ENCOUNTER — APPOINTMENT (OUTPATIENT)
Dept: MEDICAL GROUP | Facility: PHYSICIAN GROUP | Age: 59
End: 2024-03-15
Payer: MEDICARE

## 2024-03-18 ENCOUNTER — TELEPHONE (OUTPATIENT)
Dept: HEMATOLOGY ONCOLOGY | Facility: MEDICAL CENTER | Age: 59
End: 2024-03-18
Payer: MEDICARE

## 2024-03-18 ENCOUNTER — OUTPATIENT INFUSION SERVICES (OUTPATIENT)
Dept: ONCOLOGY | Facility: MEDICAL CENTER | Age: 59
End: 2024-03-18
Attending: INTERNAL MEDICINE
Payer: MEDICARE

## 2024-03-18 VITALS
BODY MASS INDEX: 38.2 KG/M2 | TEMPERATURE: 97.7 F | HEIGHT: 63 IN | WEIGHT: 215.61 LBS | RESPIRATION RATE: 16 BRPM | HEART RATE: 83 BPM | DIASTOLIC BLOOD PRESSURE: 74 MMHG | OXYGEN SATURATION: 94 % | SYSTOLIC BLOOD PRESSURE: 132 MMHG

## 2024-03-18 DIAGNOSIS — Z17.0 CARCINOMA OF UPPER-OUTER QUADRANT OF LEFT BREAST IN FEMALE, ESTROGEN RECEPTOR POSITIVE (HCC): ICD-10-CM

## 2024-03-18 DIAGNOSIS — C50.412 CARCINOMA OF UPPER-OUTER QUADRANT OF LEFT BREAST IN FEMALE, ESTROGEN RECEPTOR POSITIVE (HCC): ICD-10-CM

## 2024-03-18 DIAGNOSIS — C50.912 BREAST CANCER, STAGE 4, LEFT (HCC): ICD-10-CM

## 2024-03-18 LAB
ALBUMIN SERPL BCP-MCNC: 4.1 G/DL (ref 3.2–4.9)
ALBUMIN/GLOB SERPL: 1.3 G/DL
ALP SERPL-CCNC: 90 U/L (ref 30–99)
ALT SERPL-CCNC: 43 U/L (ref 2–50)
ANION GAP SERPL CALC-SCNC: 12 MMOL/L (ref 7–16)
AST SERPL-CCNC: 32 U/L (ref 12–45)
BASOPHILS # BLD AUTO: 1 % (ref 0–1.8)
BASOPHILS # BLD: 0.05 K/UL (ref 0–0.12)
BILIRUB SERPL-MCNC: 0.3 MG/DL (ref 0.1–1.5)
BUN SERPL-MCNC: 11 MG/DL (ref 8–22)
CA-I BLD ISE-SCNC: 1.21 MMOL/L (ref 1.1–1.3)
CALCIUM ALBUM COR SERPL-MCNC: 9.5 MG/DL (ref 8.5–10.5)
CALCIUM SERPL-MCNC: 9.6 MG/DL (ref 8.5–10.5)
CHLORIDE SERPL-SCNC: 102 MMOL/L (ref 96–112)
CO2 SERPL-SCNC: 20 MMOL/L (ref 20–33)
CREAT BLD-MCNC: 0.6 MG/DL (ref 0.5–1.4)
CREAT SERPL-MCNC: 0.68 MG/DL (ref 0.5–1.4)
EOSINOPHIL # BLD AUTO: 0.1 K/UL (ref 0–0.51)
EOSINOPHIL NFR BLD: 1.9 % (ref 0–6.9)
ERYTHROCYTE [DISTWIDTH] IN BLOOD BY AUTOMATED COUNT: 46.6 FL (ref 35.9–50)
GFR SERPLBLD CREATININE-BSD FMLA CKD-EPI: 99 ML/MIN/1.73 M 2
GLOBULIN SER CALC-MCNC: 3.1 G/DL (ref 1.9–3.5)
GLUCOSE SERPL-MCNC: 249 MG/DL (ref 65–99)
HCT VFR BLD AUTO: 44.5 % (ref 37–47)
HGB BLD-MCNC: 15.9 G/DL (ref 12–16)
IMM GRANULOCYTES # BLD AUTO: 0.02 K/UL (ref 0–0.11)
IMM GRANULOCYTES NFR BLD AUTO: 0.4 % (ref 0–0.9)
LYMPHOCYTES # BLD AUTO: 1.19 K/UL (ref 1–4.8)
LYMPHOCYTES NFR BLD: 22.8 % (ref 22–41)
MCH RBC QN AUTO: 33.7 PG (ref 27–33)
MCHC RBC AUTO-ENTMCNC: 35.7 G/DL (ref 32.2–35.5)
MCV RBC AUTO: 94.3 FL (ref 81.4–97.8)
MONOCYTES # BLD AUTO: 0.41 K/UL (ref 0–0.85)
MONOCYTES NFR BLD AUTO: 7.9 % (ref 0–13.4)
NEUTROPHILS # BLD AUTO: 3.44 K/UL (ref 1.82–7.42)
NEUTROPHILS NFR BLD: 66 % (ref 44–72)
NRBC # BLD AUTO: 0 K/UL
NRBC BLD-RTO: 0 /100 WBC (ref 0–0.2)
PLATELET # BLD AUTO: 363 K/UL (ref 164–446)
PMV BLD AUTO: 8.9 FL (ref 9–12.9)
POTASSIUM SERPL-SCNC: 3.8 MMOL/L (ref 3.6–5.5)
PROT SERPL-MCNC: 7.2 G/DL (ref 6–8.2)
RBC # BLD AUTO: 4.72 M/UL (ref 4.2–5.4)
SODIUM SERPL-SCNC: 134 MMOL/L (ref 135–145)
WBC # BLD AUTO: 5.2 K/UL (ref 4.8–10.8)

## 2024-03-18 PROCEDURE — 96402 CHEMO HORMON ANTINEOPL SQ/IM: CPT

## 2024-03-18 PROCEDURE — 82565 ASSAY OF CREATININE: CPT

## 2024-03-18 PROCEDURE — 82330 ASSAY OF CALCIUM: CPT

## 2024-03-18 PROCEDURE — 85025 COMPLETE CBC W/AUTO DIFF WBC: CPT

## 2024-03-18 PROCEDURE — 96372 THER/PROPH/DIAG INJ SC/IM: CPT

## 2024-03-18 PROCEDURE — 700111 HCHG RX REV CODE 636 W/ 250 OVERRIDE (IP): Mod: JZ | Performed by: NURSE PRACTITIONER

## 2024-03-18 PROCEDURE — 80053 COMPREHEN METABOLIC PANEL: CPT

## 2024-03-18 RX ORDER — LAMOTRIGINE 25 MG/1
250 TABLET ORAL ONCE
Status: COMPLETED | OUTPATIENT
Start: 2024-03-18 | End: 2024-03-18

## 2024-03-18 RX ADMIN — FULVESTRANT 250 MG: 50 INJECTION INTRAMUSCULAR at 16:55

## 2024-03-18 RX ADMIN — FULVESTRANT 250 MG: 50 INJECTION INTRAMUSCULAR at 16:53

## 2024-03-18 RX ADMIN — DENOSUMAB 120 MG: 120 INJECTION SUBCUTANEOUS at 16:47

## 2024-03-18 ASSESSMENT — FIBROSIS 4 INDEX: FIB4 SCORE: 0.72

## 2024-03-18 NOTE — TELEPHONE ENCOUNTER
"Patient called with c/o pain in the left side where patient had previously had an injection. Patient states that pain occurred appx one week after last injections and the nurse who had administered the injection \"was a little higher than usual\". Spoke with Dr. Kline, it is not uncommon to have pain at injection site, advised patient to use moist heat on site and is able to take over the counter anti-inflammatory medication such as aleve. Updated patient via phone call. No other questions or concerns at this time.   "

## 2024-03-19 ENCOUNTER — PHYSICAL THERAPY (OUTPATIENT)
Dept: PHYSICAL THERAPY | Facility: REHABILITATION | Age: 59
End: 2024-03-19
Attending: INTERNAL MEDICINE
Payer: MEDICARE

## 2024-03-19 DIAGNOSIS — I89.0 LYMPHEDEMA OF EXTREMITY: ICD-10-CM

## 2024-03-19 DIAGNOSIS — C50.912 BREAST CANCER, STAGE 4, LEFT (HCC): ICD-10-CM

## 2024-03-19 DIAGNOSIS — L90.5 AXILLARY WEB SYNDROME: ICD-10-CM

## 2024-03-19 DIAGNOSIS — M25.9 SHOULDER PROBLEM: ICD-10-CM

## 2024-03-19 DIAGNOSIS — L76.82 AXILLARY WEB SYNDROME: ICD-10-CM

## 2024-03-19 PROCEDURE — 97110 THERAPEUTIC EXERCISES: CPT

## 2024-03-19 PROCEDURE — 97140 MANUAL THERAPY 1/> REGIONS: CPT

## 2024-03-19 NOTE — OP THERAPY DAILY TREATMENT
Outpatient Physical Therapy  LYMPHEDEMA THERAPY DAILY TREATMENT     [facility] Physical Therapy [address]  [address]  [place] [address]  Phone:  [phone]  Fax:  [phone]    Date: 03/19/2024    Patient: Sol Kern  YOB: 1965  MRN: [id number]     Time Calculation    Start time: 1416  Stop time: 1500 Time Calculation (min): 44 minutes         Chief Complaint: Lymphedema Breast Cancer and Shoulder Problem    Visit #: 2    Subjective:   History of Present Illness:     Mechanism of injury:  Doing better at L hip after asking for different injection site. Thinks L arm is better.       Lymphedema Objective    Left Upper Extremity Circumferential Measurements EVAL  Other: cm  Areola: cm  Breast Crease: cm  Axilla: 38.5 cm  Upper Proximal Humerus: 37.7 cm  Distal Humerus: 34.4 cm  Elbow: 27.2 cm  Mid-Forearm: 24.7 cm  Wrist: 15.9 cm  Distal Garza Crease: 18.4 cm  Total: 196.8 cm     Right Upper Extremity Circumferential Measurements EVAL  Other: cm  Areola: cm  Breast Crease: cm  Axilla: 37.1 cm  Upper Proximal Humerus: 36.6 cm  Distal Humerus: 33.4 cm  Elbow: 27.2 cm  Mid-Forearm: 22 cm  Wrist: 16.8 cm  Distal Garza Crease: 18.4 cm  Total: cm 191.5    Therapeutic Treatments and Modalities:     Therapeutic Treatment and Modalities Summary: -Trigger point release to L: forearm extensors, biceps, pec major, pec minor, subscap, supraspinatus, infraspinatus, teres major/minor, traps, levator scap   -L joint mobilization to radius/ulna for interossei, wrist, CMC  -MLD to L breast including parasternal and intercostal collectors.   -MLD to L UE   -scapular mobilization in sidely to L scapula  -contract/relax into abduction in sidely  -GHJ mob inferiorly and AP    Time-based treatments/modalities:    Physical Therapy Timed Treatment Charges  Manual therapy minutes (CPT 08326): 34 minutes  Therapeutic exercise minutes (CPT 84648): 10 minutes      Assessment and Plan:   Assessment  details:  Sol has improved her palpable cords but does experience some pull at her L axilla during abduction. She will benefit from further intervention to reduce cording and improve her ROM.     Plan:  Therapy options:  Physical therapy treatment to continue  Discussed with:  Patient

## 2024-03-19 NOTE — PROGRESS NOTES
Sol is here for Faslodex/Xgeva injections. Denies any oral surgery in the past 4 weeks or plans to have oral surgery in the next 4 weeks. Labs drawn peripherally using 23g butterfly to right hand; gauze/tape applied to site. Xgeva given per MAR. Faslodex injections given per MAR. Adhesive bandages applied to sites. Message sent to Schedulers for future appointment. Discharged to self care; no apparent distress noted.

## 2024-03-21 ENCOUNTER — APPOINTMENT (OUTPATIENT)
Dept: PHYSICAL THERAPY | Facility: REHABILITATION | Age: 59
End: 2024-03-21
Attending: INTERNAL MEDICINE
Payer: MEDICARE

## 2024-03-25 ENCOUNTER — HOSPITAL ENCOUNTER (OUTPATIENT)
Dept: RADIOLOGY | Facility: MEDICAL CENTER | Age: 59
End: 2024-03-25
Attending: INTERNAL MEDICINE
Payer: MEDICARE

## 2024-03-25 DIAGNOSIS — C50.111 MALIGNANT NEOPLASM OF CENTRAL PORTION OF RIGHT BREAST IN FEMALE, ESTROGEN RECEPTOR POSITIVE (HCC): ICD-10-CM

## 2024-03-25 DIAGNOSIS — C50.912 BREAST CANCER, STAGE 4, LEFT (HCC): ICD-10-CM

## 2024-03-25 DIAGNOSIS — Z17.0 MALIGNANT NEOPLASM OF CENTRAL PORTION OF RIGHT BREAST IN FEMALE, ESTROGEN RECEPTOR POSITIVE (HCC): ICD-10-CM

## 2024-03-25 LAB — GLUCOSE BLD-MCNC: 194 MG/DL (ref 65–99)

## 2024-03-25 PROCEDURE — A9552 F18 FDG: HCPCS

## 2024-03-26 ENCOUNTER — PHYSICAL THERAPY (OUTPATIENT)
Dept: PHYSICAL THERAPY | Facility: REHABILITATION | Age: 59
End: 2024-03-26
Attending: INTERNAL MEDICINE
Payer: MEDICARE

## 2024-03-26 DIAGNOSIS — L90.5 AXILLARY WEB SYNDROME: ICD-10-CM

## 2024-03-26 DIAGNOSIS — M25.9 SHOULDER PROBLEM: ICD-10-CM

## 2024-03-26 DIAGNOSIS — C50.912 BREAST CANCER, STAGE 4, LEFT (HCC): ICD-10-CM

## 2024-03-26 DIAGNOSIS — I89.0 LYMPHEDEMA OF EXTREMITY: ICD-10-CM

## 2024-03-26 DIAGNOSIS — L76.82 AXILLARY WEB SYNDROME: ICD-10-CM

## 2024-03-26 PROCEDURE — 97140 MANUAL THERAPY 1/> REGIONS: CPT

## 2024-03-26 PROCEDURE — 97535 SELF CARE MNGMENT TRAINING: CPT

## 2024-03-26 NOTE — OP THERAPY DAILY TREATMENT
Outpatient Physical Therapy  LYMPHEDEMA THERAPY DAILY TREATMENT     [facility] Physical Therapy [address].  [address]  [place] [address]  Phone:  [phone]  Fax:  [phone]    Date: 03/26/2024    Patient: Sol Kern  YOB: 1965  MRN: [id number]     Time Calculation    Start time: 1415  Stop time: 1501 Time Calculation (min): 46 minutes         Chief Complaint: Lymphedema Breast Cancer and Shoulder Problem    Visit #: 3    Subjective:   History of Present Illness:     Mechanism of injury:  Having some leg pain to her L anterior thigh when laying down.      Lymphedema Objective    Left Upper Extremity Circumferential Measurements EVAL  Other: cm  Areola: cm  Breast Crease: cm  Axilla: 38.5 cm  Upper Proximal Humerus: 37.7 cm  Distal Humerus: 34.4 cm  Elbow: 27.2 cm  Mid-Forearm: 24.7 cm  Wrist: 15.9 cm  Distal Garza Crease: 18.4 cm  Total: 196.8 cm     Right Upper Extremity Circumferential Measurements EVAL  Other: cm  Areola: cm  Breast Crease: cm  Axilla: 37.1 cm  Upper Proximal Humerus: 36.6 cm  Distal Humerus: 33.4 cm  Elbow: 27.2 cm  Mid-Forearm: 22 cm  Wrist: 16.8 cm  Distal Garza Crease: 18.4 cm  Total: cm 191.5    Therapeutic Exercises (CPT 80899):       Therapeutic Exercise Summary: Added seated pelvic tilts    Therapeutic Treatments and Modalities:     1. Manual Therapy (CPT 33392)    Therapeutic Treatment and Modalities Summary: -Trigger point release to L: forearm extensors, biceps, pec major, pec minor, subscap, supraspinatus, infraspinatus, teres major/minor, traps, levator scap   -L joint mobilization to radius/ulna for interossei, wrist, CMC  -MLD to L breast including parasternal and intercostal collectors.   -MLD to L UE   -GHJ mob inferiorly and AP during abduction/flexion    Time-based treatments/modalities:    Physical Therapy Timed Treatment Charges  Functional training, self care minutes (CPT 64097): 12 minutes  Manual therapy minutes (CPT 32677): 34  minutes      Assessment and Plan:   Assessment details:  Sol has seen improvement in her cord to her L axilla. It sounds as though she has some nerve pain to her L thigh and can benefit from some back exercises that will be initiated next session.    Plan:  Therapy options:  Physical therapy treatment to continue  Discussed with:  Patient

## 2024-03-27 ENCOUNTER — HOSPITAL ENCOUNTER (OUTPATIENT)
Dept: HEMATOLOGY ONCOLOGY | Facility: MEDICAL CENTER | Age: 59
End: 2024-03-27
Attending: INTERNAL MEDICINE
Payer: MEDICARE

## 2024-03-27 VITALS
WEIGHT: 218.26 LBS | DIASTOLIC BLOOD PRESSURE: 82 MMHG | RESPIRATION RATE: 17 BRPM | SYSTOLIC BLOOD PRESSURE: 122 MMHG | HEART RATE: 88 BPM | OXYGEN SATURATION: 93 % | HEIGHT: 62 IN | TEMPERATURE: 97.6 F | BODY MASS INDEX: 40.16 KG/M2

## 2024-03-27 DIAGNOSIS — Z17.0 CARCINOMA OF UPPER-OUTER QUADRANT OF LEFT BREAST IN FEMALE, ESTROGEN RECEPTOR POSITIVE (HCC): ICD-10-CM

## 2024-03-27 DIAGNOSIS — C50.412 CARCINOMA OF UPPER-OUTER QUADRANT OF LEFT BREAST IN FEMALE, ESTROGEN RECEPTOR POSITIVE (HCC): ICD-10-CM

## 2024-03-27 PROCEDURE — 99212 OFFICE O/P EST SF 10 MIN: CPT | Performed by: INTERNAL MEDICINE

## 2024-03-27 PROCEDURE — 99214 OFFICE O/P EST MOD 30 MIN: CPT | Performed by: INTERNAL MEDICINE

## 2024-03-27 ASSESSMENT — ENCOUNTER SYMPTOMS
MUSCULOSKELETAL NEGATIVE: 1
VOMITING: 0
DIZZINESS: 0
CARDIOVASCULAR NEGATIVE: 1
COUGH: 0
NEUROLOGICAL NEGATIVE: 1
CHILLS: 0
HEADACHES: 0
DIARRHEA: 0
MYALGIAS: 0
SHORTNESS OF BREATH: 1
NAUSEA: 0
PALPITATIONS: 0
PSYCHIATRIC NEGATIVE: 1
EYES NEGATIVE: 1
FEVER: 0
GASTROINTESTINAL NEGATIVE: 1
CONSTIPATION: 0
WEIGHT LOSS: 0

## 2024-03-27 ASSESSMENT — FIBROSIS 4 INDEX: FIB4 SCORE: 0.82

## 2024-03-27 ASSESSMENT — PAIN SCALES - GENERAL: PAINLEVEL: NO PAIN

## 2024-03-27 NOTE — PROGRESS NOTES
Subjective   Medical oncology follow-up visit: 3/27/2024  Chief complaint Sol Kern is a 59 y.o. 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  Radiation Oncology: Kem Infante M.D.   Surgery: Kayleen Sykes M.D.     Diagnosis:  Recurrent/metastatic ER positive breast cancer eluding malignant right pleural effusion from breast cancer, ER positive, HI slightly positive, HER2 negative        Oncology history of presenting illness:  Ms. Kern  is a pleasant 56 y.o. year old postmenopausal female with a history of poorly controlled diabetes mellitus who had a mammogram in fall 2021 which showed a 2.3 cm spiculated mass in the upper outer quadrant of the left breast.  Biopsy revealed an invasive ductal carcinoma, grade 1, ER/HI positive HER-2 negative with a Ki-67 of 22%.  She is large breasted and elected to undergo a left lumpectomy and sentinel lymph node biopsy by Dr. Sykes on 11/12/2021.  Pathology revealed an invasive ductal carcinoma, grade 2 with associated intermediate grade ductal carcinoma in situ.  The invasive tumor measured 2.9 cm in greatest dimension and all margins were clear.  1 of 1 sentinel lymph nodes was positive for macro metastases, but no extranodal extension was identified.  Postoperative course was unremarkable.  She was seen by medical oncology and an Oncotype performed with results pending.  She had a CT scan of the chest abdomen pelvis which was negative for metastatic disease and a bone scan is pending.  In surgery she has been on a weight reduction diet and has reduced her smoking dramatically from greater than a pack a day to 3 to 4 cigarettes a day.  She feels generally well.  She is previously seen Dr. Kem Infante in consultation.     Interval histories:  Her Oncotype came back with a recurrence score of 20 therefore there was no  indication for adjuvant chemotherapy.  She had a previously scheduled CT scan of the chest abdomen pelvis and bone scan and these were either for metastatic disease.  She underwent radiation therapy with 40 Gray over 20 doses completed this 1 February 2022.  She did develop moderate formation and erythema and is being treated for this.  She also started postoperative physical therapy for prevention of lymphedema and is doing well from that perspective.  She is ready to initiate adjuvant endocrine therapy.     Interval history 7/28/2022: Started anastrozole in March 2022 and has been moderately adherent with it although she admits that she misses doses not infrequently.  She still has some tenderness in the left breast after radiation therapy but no nodularity.  She developed relatively severe COVID at the end of June that required Paxlovid and she was given oxygen 1 L/min but was never admitted to the hospital.  Her O2 sats apparently still vary somewhat.  She still has a dry cough but no sputum production or fever currently.  She has no sore throat.  Her energy level remains a little low.  Bone density was done recently and was within normal limits.     Interval history 10/10/2023: She continued on anastrozole intermittently but discontinued it completely in March 2023.  Her diabetes was out of control at that time but it is gotten much better on Ozempic with her A1c coming down from 9-6.0.  She developed shortness of breath and was admitted to the hospital on 9/28/2023 with a large right pleural effusion.  Thoracentesis showed an exudate with metastatic breast cancer, ER positive WI slightly positive HER2 negative.  She felt better after she had her tap.  CT scan of the chest was negative except for the pleural effusion.  She has a history of struct of sleep apnea and was urged to use her CPAP again.  O2 sats are running low 90s at home now.  She denies any breast masses or other nodularity.  She has some low back  pain.  No neurologic symptoms.     Interval history 10/25/2023.  We started her on ribociclib 600 mg daily which she started 5 days ago and has had no problems with whatsoever.  We are trying to schedule her fulvestrant as her endocrine component of therapy.  A therapeutic phlebotomy because of her polycythemia.  Her work-up for myeloproliferative disorder/P vera was negative including JAK2 and reflex subsequent testing.  She had a PET CT scan performed on 10/19/2023 which showed focal increased activity in the skull base, thoracic spine right shoulder right ribs lumbar spine pelvis and proximal femurs.  She also has increased activity in mediastinal precarinal and right hilar nodes.  The right pleura has multiple hypermetabolic foci.  A incidental moderate right hydronephrosis was noted.  No visceral involvement was seen.  She had her last thoracentesis on the right on 10/15/2023 and does not feel the need for another one right now.     Interval history 11/09/23 (CAlsop, APRN):  Patient is doing very well overall and tolerating her treatment well.  She did note some mild constipation since starting the ribociclib.  She is due to complete her first cycle as she has 2 more days left before her week off.  She has been tolerating the thoracentesis which last 1 was completed on 11/3/2023.  She stated that she tolerated little bit better and is noticed less amount of fluid being removed.  She is questioning why she does not have another 1 appointment scheduled in 2 weeks.  She did state that she does note some shortness of breath with exertion still.  She is wearing oxygen when she is at home resting.  She also wears oxygen at night, 3 L.  She stated that she does not have a CPAP machine and has not had one for many years.    Interval history 11/22/23 (CAlsop, APRN):  Patient is doing very well.  She stated that she does continue to feel winded at times with shortness of breath with any activity but overall she is doing  great.  She denies any nausea vomiting, diarrhea constipation.  She denies any significant pain.  She overall states that she is not under percent but she is feeling much better.  She had her thoracentesis last Friday and had only 150 cc of fluid removed.    Interval history 12/26/2023: On 11/27/2023 she was found to have elevated liver function tests including the ALT which was 5-10 times greater than upper limit of normal.  Ribociclib was held and repeated CMP on 12/12/2023 showed resolution of elevation of ALT and AST.  She was restarted on Ribociclib at 400 mg daily and is about 2 weeks into that.  Repeat labs on 12/22/2023 showed mild elevation in AST to 61 and ALT to 135.  Her blood sugar is also up somewhat.  She has no shortness of breath or cough and has not had thoracentesis in some time.  She does note some occasional nausea and abdominal bloating but it should be noted that she is on Ozempic as well for for the last 6 months and is lost about 25 pounds.      Interval history 1/9/2024: She is on her off week from her Ribociclib at 400 mg daily and is tolerating it very well.  She has minimal dyspnea on exertion no orthopnea cough or sputum production.  CT of the chest is scheduled for later this month.    Interval history 2/6/2024.  She is doing very well now.  She did have an episode of COVID-19 after we saw her last month mainly manifest as upper respiratory symptoms.  She had a lot of congestion for about 10 days but this has resolved completely.  She did note some mild dyspnea but this is improved back to baseline as well.    Interval history 3/27/2024: She is tolerating Ribociclib 400 mg daily fulvestrant and Xgeva extremely well.  She has no bone pain whatsoever.  She denies any pulmonary symptoms including no shortness of breath cough sputum production or chest pain.  She had a PET CT scan that showed a dramatic response with complete resolution of all hypermetabolic activity in multiple bones and  "lymph nodes and pleura except for a small loculated right pleural effusion with an SUV of 3.  Liver function tests are normal.    Treatment history:  10/21/23: C1 Ribociclib  10/27/23: C1D1 Faslodex  11/10/23: C1D15 Faslodex   11/18/23: C2 Ribociclib  11/27/23: C2D1 Faslodex / C1 Xgeva   12/12/2023: C3 D1 Ribociclib dose reduced to 400 mg daily  1/12/2024: C4 D1 Ribociclib 400 mg with Faslodex and Xgeva  2/8/2024: C5 D1 Ribociclib 400 mg with Faslodex and Xgeva.  3/8/2024: C6 D1 Ribociclib 400 mg with Faslodex and Xgeva      Allergies   Allergen Reactions    Codeine Vomiting and Nausea    Hydrocodone Vomiting and Nausea    Other Drug Vomiting and Nausea     Any of the \"cets\" (percocet)     Current Outpatient Medications on File Prior to Encounter   Medication Sig Dispense Refill    pramipexole (MIRAPEX) 0.125 MG Tab Take 1 Tablet by mouth every evening. Take 2-3 hours before bed. 100 Tablet 0    Semaglutide,0.25 or 0.5MG/DOS, (OZEMPIC, 0.25 OR 0.5 MG/DOSE,) 2 MG/3ML Solution Pen-injector Inject 0.5 mg under the skin every 7 days. 9 mL 0    zolpidem (AMBIEN) 5 MG Tab Take 10 mg by mouth at bedtime as needed for Sleep.      cyclobenzaprine (FLEXERIL) 10 mg Tab TAKE 1 TABLET BY MOUTH THREE TIMES A DAY AS NEEDED FOR MUSCLE SPASM (Patient taking differently: Take 10 mg by mouth 3 times a day as needed for Muscle Spasms (As needed).) 90 Tablet 0    denosumab (PROLIA) 60 MG/ML Solution Prefilled Syringe injection Inject 60 mg under the skin every 6 months.      metFORMIN ER (GLUCOPHAGE XR) 500 MG TABLET SR 24 HR Take 1 Tablet by mouth every day. 200 Tablet 1    ondansetron (ZOFRAN ODT) 4 MG TABLET DISPERSIBLE Take 1 Tablet by mouth every 6 hours as needed for Nausea/Vomiting. For cancer treatment associated nausea 30 Tablet 1    lisinopril (PRINIVIL) 2.5 MG Tab Take 1 Tablet by mouth every day. 90 Tablet 1    lidocaine (LIDODERM) 5 % Patch Place 1 Patch on the skin every 24 hours. Place patch to affected area in am and " "remove at bedtime. 30 Patch 5    albuterol 108 (90 Base) MCG/ACT Aero Soln inhalation aerosol Inhale 2 Puffs every 6 hours as needed for Shortness of Breath. 6.7 Each 1    Ribociclib Succ, 600 MG Dose, 200 MG Tablet Therapy Pack Take 600 mg by mouth see administration instructions. Take 600 mg by mouth daily for 3 weeks (21 days), off for 1 week (7 days) (Patient taking differently: Take 400 mg by mouth every day.) 63 Each 0    fluocinonide (LIDEX) 0.05 % Cream Apply 1 Application topically 2 times a day as needed (Rash, Itching). Apply to affected areas of back or buttocks. Do not use on face, armpit, or groin.      Cyanocobalamin (B-12) 5000 MCG Cap Take 5,000 mcg by mouth every evening.       No current facility-administered medications on file prior to encounter.         Review of Systems   Constitutional:  Positive for malaise/fatigue. Negative for chills, fever and weight loss.   HENT: Negative.     Eyes: Negative.    Respiratory:  Positive for shortness of breath. Negative for cough.    Cardiovascular: Negative.  Negative for chest pain and palpitations.   Gastrointestinal: Negative.  Negative for constipation, diarrhea, nausea and vomiting.   Genitourinary: Negative.  Negative for dysuria.   Musculoskeletal: Negative.  Negative for myalgias.   Skin: Negative.    Neurological: Negative.  Negative for dizziness and headaches.   Endo/Heme/Allergies: Negative.    Psychiatric/Behavioral: Negative.                Objective     /82 (BP Location: Right arm, Patient Position: Sitting)   Pulse 88   Temp 36.4 °C (97.6 °F) (Temporal)   Resp 17   Ht 1.575 m (5' 2\")   Wt 99 kg (218 lb 4.1 oz)   SpO2 93%   BMI 39.92 kg/m²      Physical Exam  Vitals reviewed.   Constitutional:       General: She is not in acute distress.     Appearance: Normal appearance. She is not diaphoretic.   HENT:      Head: Normocephalic and atraumatic.   Cardiovascular:      Rate and Rhythm: Normal rate and regular rhythm.      Heart " sounds: No murmur heard.     No friction rub. No gallop.   Pulmonary:      Effort: Pulmonary effort is normal. No respiratory distress.      Breath sounds: Normal breath sounds. No wheezing.      Comments: No dullness to percussion at the bases.  Lungs are clear to AMP without rales rhonchi's or wheezes.  Abdominal:      General: Bowel sounds are normal. There is no distension.      Palpations: Abdomen is soft.      Tenderness: There is no abdominal tenderness.   Musculoskeletal:         General: No swelling or tenderness. Normal range of motion.   Skin:     General: Skin is warm and dry.   Neurological:      Mental Status: She is alert and oriented to person, place, and time.   Psychiatric:         Mood and Affect: Mood normal.         Behavior: Behavior normal.                    Assessment & Plan       1. Carcinoma of upper-outer quadrant of left breast in female, estrogen receptor positive (HCC)                Impression:  1.  Infiltrating ductal carcinoma of the left breast, grade 2, stage anatomic to be (pT2, PN 1A) status post left lumpectomy, ER positive, NV positive, HER-2 negative, Ki-67 22%, Oncotype DX current score 20.  Status post whole breast radiation therapy on endocrine therapy with anastrozole from March 2022 through March 2023 through with moderate adherence  2.  Diabetes mellitus under fair control, better on Ozempic  3.  Tobacco abuse improving  4.  Hypertension  5.  COVID-19 infection June 2022 resolved  6.  Recurrent/metastatic ER positive breast cancer malignant right pleural effusion from breast cancer, ER positive, NV slightly positive, HER2 negative.  PET scan shows extensive bony metastases and pleural metastases and mediastinal metastases.  Dramatic near complete clinical remission by PET CT scan March 2024  7.  Longstanding polycythemia dating back at least 5-year.  Status post phlebotomy with genetic work-up negative for myeloproliferative disorder.  Likely secondary to chronic  hypoxemia.  She had 1 phlebotomy required and after that her hemoglobin was back to normal at 15.2.  We will continue to monitor this.  Slightly higher but no need yet for repeat phlebotomy  8.  Somatic genomic profile negative for actionable genes.  ESR 1 negative      Plan:  Continue Ribociclib 400 mg daily 3 to 4 weeks and Faslodex.  And Xgeva I will see her back in 8 weeks for routine follow-up.      Please note that this dictation was created using voice recognition software. I have made every reasonable attempt to correct obvious errors, but I expect that there are errors of grammar and possibly content that I did not discover before finalizing the note.

## 2024-03-28 ENCOUNTER — APPOINTMENT (OUTPATIENT)
Dept: PHYSICAL THERAPY | Facility: REHABILITATION | Age: 59
End: 2024-03-28
Attending: INTERNAL MEDICINE
Payer: MEDICARE

## 2024-04-10 RX ORDER — LAMOTRIGINE 25 MG/1
250 TABLET ORAL ONCE
Status: CANCELLED | OUTPATIENT
Start: 2024-04-15 | End: 2024-04-15

## 2024-04-10 RX ORDER — ONDANSETRON 8 MG/1
8 TABLET, ORALLY DISINTEGRATING ORAL PRN
Status: CANCELLED | OUTPATIENT
Start: 2024-04-15

## 2024-04-10 RX ORDER — EPINEPHRINE 1 MG/ML(1)
0.5 AMPUL (ML) INJECTION PRN
Status: CANCELLED | OUTPATIENT
Start: 2024-04-15

## 2024-04-10 RX ORDER — METHYLPREDNISOLONE SODIUM SUCCINATE 125 MG/2ML
125 INJECTION, POWDER, LYOPHILIZED, FOR SOLUTION INTRAMUSCULAR; INTRAVENOUS PRN
Status: CANCELLED | OUTPATIENT
Start: 2024-04-15

## 2024-04-10 RX ORDER — ONDANSETRON 2 MG/ML
4 INJECTION INTRAMUSCULAR; INTRAVENOUS PRN
Status: CANCELLED | OUTPATIENT
Start: 2024-04-15

## 2024-04-10 RX ORDER — PROCHLORPERAZINE MALEATE 10 MG
10 TABLET ORAL EVERY 6 HOURS PRN
Status: CANCELLED | OUTPATIENT
Start: 2024-04-15

## 2024-04-10 RX ORDER — DIPHENHYDRAMINE HYDROCHLORIDE 50 MG/ML
50 INJECTION INTRAMUSCULAR; INTRAVENOUS PRN
Status: CANCELLED | OUTPATIENT
Start: 2024-04-15

## 2024-04-15 ENCOUNTER — OUTPATIENT INFUSION SERVICES (OUTPATIENT)
Dept: ONCOLOGY | Facility: MEDICAL CENTER | Age: 59
End: 2024-04-15
Attending: INTERNAL MEDICINE
Payer: MEDICARE

## 2024-04-15 VITALS
OXYGEN SATURATION: 94 % | SYSTOLIC BLOOD PRESSURE: 143 MMHG | WEIGHT: 218.48 LBS | HEART RATE: 78 BPM | BODY MASS INDEX: 38.71 KG/M2 | RESPIRATION RATE: 16 BRPM | HEIGHT: 63 IN | DIASTOLIC BLOOD PRESSURE: 79 MMHG | TEMPERATURE: 98.5 F

## 2024-04-15 DIAGNOSIS — Z17.0 CARCINOMA OF UPPER-OUTER QUADRANT OF LEFT BREAST IN FEMALE, ESTROGEN RECEPTOR POSITIVE (HCC): ICD-10-CM

## 2024-04-15 DIAGNOSIS — C50.912 BREAST CANCER, STAGE 4, LEFT (HCC): ICD-10-CM

## 2024-04-15 DIAGNOSIS — C50.412 CARCINOMA OF UPPER-OUTER QUADRANT OF LEFT BREAST IN FEMALE, ESTROGEN RECEPTOR POSITIVE (HCC): ICD-10-CM

## 2024-04-15 LAB
ALBUMIN SERPL BCP-MCNC: 3.8 G/DL (ref 3.2–4.9)
ALBUMIN/GLOB SERPL: 1.3 G/DL
ALP SERPL-CCNC: 84 U/L (ref 30–99)
ALT SERPL-CCNC: 35 U/L (ref 2–50)
ANION GAP SERPL CALC-SCNC: 14 MMOL/L (ref 7–16)
AST SERPL-CCNC: 22 U/L (ref 12–45)
BASOPHILS # BLD AUTO: 1.7 % (ref 0–1.8)
BASOPHILS # BLD: 0.07 K/UL (ref 0–0.12)
BILIRUB SERPL-MCNC: 0.3 MG/DL (ref 0.1–1.5)
BUN SERPL-MCNC: 12 MG/DL (ref 8–22)
CA-I BLD ISE-SCNC: 1.23 MMOL/L (ref 1.1–1.3)
CALCIUM ALBUM COR SERPL-MCNC: 9.3 MG/DL (ref 8.5–10.5)
CALCIUM SERPL-MCNC: 9.1 MG/DL (ref 8.5–10.5)
CHLORIDE SERPL-SCNC: 102 MMOL/L (ref 96–112)
CO2 SERPL-SCNC: 20 MMOL/L (ref 20–33)
CREAT BLD-MCNC: 0.6 MG/DL (ref 0.5–1.4)
CREAT SERPL-MCNC: 0.66 MG/DL (ref 0.5–1.4)
EOSINOPHIL # BLD AUTO: 0.09 K/UL (ref 0–0.51)
EOSINOPHIL NFR BLD: 2.2 % (ref 0–6.9)
ERYTHROCYTE [DISTWIDTH] IN BLOOD BY AUTOMATED COUNT: 47.1 FL (ref 35.9–50)
GFR SERPLBLD CREATININE-BSD FMLA CKD-EPI: 100 ML/MIN/1.73 M 2
GLOBULIN SER CALC-MCNC: 3 G/DL (ref 1.9–3.5)
GLUCOSE SERPL-MCNC: 353 MG/DL (ref 65–99)
HCT VFR BLD AUTO: 44 % (ref 37–47)
HGB BLD-MCNC: 15.4 G/DL (ref 12–16)
IMM GRANULOCYTES # BLD AUTO: 0 K/UL (ref 0–0.11)
IMM GRANULOCYTES NFR BLD AUTO: 0 % (ref 0–0.9)
LYMPHOCYTES # BLD AUTO: 0.96 K/UL (ref 1–4.8)
LYMPHOCYTES NFR BLD: 23.4 % (ref 22–41)
MCH RBC QN AUTO: 33.6 PG (ref 27–33)
MCHC RBC AUTO-ENTMCNC: 35 G/DL (ref 32.2–35.5)
MCV RBC AUTO: 95.9 FL (ref 81.4–97.8)
MONOCYTES # BLD AUTO: 0.37 K/UL (ref 0–0.85)
MONOCYTES NFR BLD AUTO: 9 % (ref 0–13.4)
NEUTROPHILS # BLD AUTO: 2.61 K/UL (ref 1.82–7.42)
NEUTROPHILS NFR BLD: 63.7 % (ref 44–72)
NRBC # BLD AUTO: 0 K/UL
NRBC BLD-RTO: 0 /100 WBC (ref 0–0.2)
PLATELET # BLD AUTO: 333 K/UL (ref 164–446)
PMV BLD AUTO: 9.4 FL (ref 9–12.9)
POTASSIUM SERPL-SCNC: 4.2 MMOL/L (ref 3.6–5.5)
PROT SERPL-MCNC: 6.8 G/DL (ref 6–8.2)
RBC # BLD AUTO: 4.59 M/UL (ref 4.2–5.4)
SODIUM SERPL-SCNC: 136 MMOL/L (ref 135–145)
WBC # BLD AUTO: 4.1 K/UL (ref 4.8–10.8)

## 2024-04-15 PROCEDURE — 82330 ASSAY OF CALCIUM: CPT

## 2024-04-15 PROCEDURE — 82565 ASSAY OF CREATININE: CPT

## 2024-04-15 PROCEDURE — 80053 COMPREHEN METABOLIC PANEL: CPT

## 2024-04-15 PROCEDURE — 96402 CHEMO HORMON ANTINEOPL SQ/IM: CPT

## 2024-04-15 PROCEDURE — 96372 THER/PROPH/DIAG INJ SC/IM: CPT

## 2024-04-15 PROCEDURE — 700111 HCHG RX REV CODE 636 W/ 250 OVERRIDE (IP): Mod: JZ | Performed by: NURSE PRACTITIONER

## 2024-04-15 PROCEDURE — 85025 COMPLETE CBC W/AUTO DIFF WBC: CPT

## 2024-04-15 RX ORDER — LAMOTRIGINE 25 MG/1
250 TABLET ORAL ONCE
Status: COMPLETED | OUTPATIENT
Start: 2024-04-15 | End: 2024-04-15

## 2024-04-15 RX ADMIN — DENOSUMAB 120 MG: 120 INJECTION SUBCUTANEOUS at 14:08

## 2024-04-15 RX ADMIN — FULVESTRANT 250 MG: 50 INJECTION, SOLUTION INTRAMUSCULAR at 14:08

## 2024-04-15 RX ADMIN — FULVESTRANT 250 MG: 50 INJECTION, SOLUTION INTRAMUSCULAR at 14:09

## 2024-04-15 ASSESSMENT — FIBROSIS 4 INDEX: FIB4 SCORE: 0.82

## 2024-04-16 ENCOUNTER — PHYSICAL THERAPY (OUTPATIENT)
Dept: PHYSICAL THERAPY | Facility: REHABILITATION | Age: 59
End: 2024-04-16
Attending: INTERNAL MEDICINE
Payer: MEDICARE

## 2024-04-16 DIAGNOSIS — C50.912 BREAST CANCER, STAGE 4, LEFT (HCC): ICD-10-CM

## 2024-04-16 DIAGNOSIS — L76.82 AXILLARY WEB SYNDROME: ICD-10-CM

## 2024-04-16 DIAGNOSIS — I89.0 LYMPHEDEMA OF EXTREMITY: ICD-10-CM

## 2024-04-16 DIAGNOSIS — L90.5 AXILLARY WEB SYNDROME: ICD-10-CM

## 2024-04-16 DIAGNOSIS — M25.9 SHOULDER PROBLEM: ICD-10-CM

## 2024-04-16 PROCEDURE — 97535 SELF CARE MNGMENT TRAINING: CPT

## 2024-04-16 PROCEDURE — 97140 MANUAL THERAPY 1/> REGIONS: CPT

## 2024-04-16 NOTE — OP THERAPY DAILY TREATMENT
Outpatient Physical Therapy  LYMPHEDEMA THERAPY DAILY TREATMENT     [facility] [address]  [address]  [place] [address]  Phone:  [phone]  Fax:  [phone]    Date: 04/16/2024    Patient: Sol Kern  YOB: 1965  MRN: [id number]     Time Calculation    Start time: 1330  Stop time: 1414 Time Calculation (min): 44 minutes         Chief Complaint: Shoulder Problem and Lymphedema Breast Cancer    Visit #: 4    Subjective:   History of Present Illness:     Mechanism of injury:  Back is doing ok but still havign tingling and her feet at night. Recently flew to [place] and after she got off the plane, noted some pulling to her L axilla. Did her stretches.       Lymphedema Objective     Left Upper Extremity Circumferential Measurements EVAL  Other: cm  Areola: cm  Breast Crease: cm  Axilla: 38.5 cm  Upper Proximal Humerus: 37.7 cm  Distal Humerus: 34.4 cm  Elbow: 27.2 cm  Mid-Forearm: 24.7 cm  Wrist: 15.9 cm  Distal Garza Crease: 18.4 cm  Total: 196.8 cm     Right Upper Extremity Circumferential Measurements EVAL  Other: cm  Areola: cm  Breast Crease: cm  Axilla: 37.1 cm  Upper Proximal Humerus: 36.6 cm  Distal Humerus: 33.4 cm  Elbow: 27.2 cm  Mid-Forearm: 22 cm  Wrist: 16.8 cm  Distal Garza Crease: 18.4 cm  Total: cm 191.5    Therapeutic Exercises (CPT 56899):       Therapeutic Exercise Summary: HEP: seated pelvic tilts, wall walk    Therapeutic Treatments and Modalities:     1. Self Care ADL Training (CPT 68282), Discussed current POC: Sol quite excited regarding her PET scan results and discussion with her onocologist. Encouraged ongoing mobility, especially at back and shoulder. Introduced gentle spinal extension to see if that will alleviate some of her radicular LE symptoms.    2. Manual Therapy (CPT 99529)    Therapeutic Treatment and Modalities Summary: -Trigger point release to L: forearm extensors, biceps, pec major, pec minor, subscap, supraspinatus,  infraspinatus, teres major/minor, traps, levator scap   -L joint mobilization to radius/ulna for interossei, wrist, CMC  -MLD to L breast including parasternal and intercostal collectors.   -MLD to L UE   -GHJ mob inferiorly and AP during abduction/flexion    Time-based treatments/modalities:    Physical Therapy Timed Treatment Charges  Functional training, self care minutes (CPT 45519): 11 minutes  Manual therapy minutes (CPT 61138): 34 minutes      Assessment and Plan:   Assessment details:  Sol has improved her overall mobility and reports recently being more active. Her tightness to L  UE was improved, however, after a brief trip to [facility], she reports an increase in shoulder and axillary discomfort. She will benefit from further intervention to improve tissue length and fluid movement.     Plan:  Therapy options:  Physical therapy treatment to continue  Discussed with:  Patient

## 2024-04-17 ENCOUNTER — TELEPHONE (OUTPATIENT)
Dept: MEDICAL GROUP | Facility: PHYSICIAN GROUP | Age: 59
End: 2024-04-17

## 2024-04-17 ENCOUNTER — OFFICE VISIT (OUTPATIENT)
Dept: MEDICAL GROUP | Facility: PHYSICIAN GROUP | Age: 59
End: 2024-04-17
Payer: MEDICARE

## 2024-04-17 ENCOUNTER — PATIENT OUTREACH (OUTPATIENT)
Dept: HEALTH INFORMATION MANAGEMENT | Facility: OTHER | Age: 59
End: 2024-04-17

## 2024-04-17 VITALS
WEIGHT: 219 LBS | SYSTOLIC BLOOD PRESSURE: 126 MMHG | DIASTOLIC BLOOD PRESSURE: 74 MMHG | BODY MASS INDEX: 38.8 KG/M2 | HEIGHT: 63 IN | OXYGEN SATURATION: 93 % | TEMPERATURE: 98.2 F | HEART RATE: 90 BPM

## 2024-04-17 DIAGNOSIS — J96.11 CHRONIC RESPIRATORY FAILURE WITH HYPOXIA (HCC): ICD-10-CM

## 2024-04-17 DIAGNOSIS — C50.912 BREAST CANCER, STAGE 4, LEFT (HCC): ICD-10-CM

## 2024-04-17 DIAGNOSIS — E11.65 TYPE 2 DIABETES MELLITUS WITH HYPERGLYCEMIA, WITHOUT LONG-TERM CURRENT USE OF INSULIN (HCC): ICD-10-CM

## 2024-04-17 DIAGNOSIS — E11.40 TYPE 2 DIABETES MELLITUS WITH DIABETIC NEUROPATHY, WITHOUT LONG-TERM CURRENT USE OF INSULIN (HCC): ICD-10-CM

## 2024-04-17 DIAGNOSIS — G25.81 RESTLESS LEG SYNDROME: Primary | ICD-10-CM

## 2024-04-17 DIAGNOSIS — E11.42 DIABETIC POLYNEUROPATHY ASSOCIATED WITH TYPE 2 DIABETES MELLITUS (HCC): ICD-10-CM

## 2024-04-17 DIAGNOSIS — I10 ESSENTIAL HYPERTENSION: ICD-10-CM

## 2024-04-17 LAB
HBA1C MFR BLD: 9.8 % (ref ?–5.8)
POCT INT CON NEG: NEGATIVE
POCT INT CON POS: POSITIVE

## 2024-04-17 PROCEDURE — 83036 HEMOGLOBIN GLYCOSYLATED A1C: CPT | Performed by: FAMILY MEDICINE

## 2024-04-17 PROCEDURE — 3074F SYST BP LT 130 MM HG: CPT | Performed by: FAMILY MEDICINE

## 2024-04-17 PROCEDURE — 3078F DIAST BP <80 MM HG: CPT | Performed by: FAMILY MEDICINE

## 2024-04-17 PROCEDURE — 99214 OFFICE O/P EST MOD 30 MIN: CPT | Performed by: FAMILY MEDICINE

## 2024-04-17 RX ORDER — SEMAGLUTIDE 1.34 MG/ML
1 INJECTION, SOLUTION SUBCUTANEOUS
Qty: 9 ML | Refills: 0 | Status: SHIPPED | OUTPATIENT
Start: 2024-04-17

## 2024-04-17 ASSESSMENT — FIBROSIS 4 INDEX: FIB4 SCORE: 0.68

## 2024-04-17 NOTE — PROGRESS NOTES
Verbal consent was acquired by the patient to use [employer] ambient listening note generation during this visit     Subjective:     HPI:   History of Present Illness  The patient presents for evaluation of multiple medical concerns.    The patient had been on pramipexole for approximately 2.5 weeks for her restless legs syndrome, but discontinued its use due to associated nausea. The medication initially alleviated her symptoms, but subsequently lost their efficacy. Despite her busy schedule and cancer treatment, her nausea resolved upon discontinuation of the medication. She is unable to take gabapentin due to intolerance. She is currently undergoing physical therapy for her breast cancer and lymphedema, who has recommended light exercises for her lower back. She finds that less medication alleviates her discomfort. She speculates that her mattress may be contributing to her symptoms, as she did not experience these symptoms during a hotel trip in the [place] area.    The patient reports weight gain. During her last blood work, her blood glucose level was in the 300s, despite not fasting. She expresses interest in increasing her Ozempic dosage. She experiences neuropathy in both feet, with the left side being more affected than the right. She is currently on a 0.5 mg dose of Ozempic.    The patient continues to require nocturnal oxygen therapy. She reports difficulty in ambulation due to hip pain and breathlessness. She declined a walk test during today's visit. She reports feeling slightly lightheaded, which she attributes to her diabetes, and wishes to return home and relax. She has a pulse oximeter at home to monitor her oxygen levels. She denies experiencing chest pain, fever, or chills.    Supplemental Information  Her cancer is almost nonexistent. Her last PET scan was in the middle of 03/2024. She is going in for a consultation for a colonoscopy. She has been screened for HIV at least once in her adult life. It  "has been a long time since her last Pap smear, but she is overdue. She tested positive for COVID-19 on 01/16/2024.    Health Maintenance: Completed    Objective:     Exam:  /74 (BP Location: Right arm, Patient Position: Sitting, BP Cuff Size: Adult)   Pulse 90   Temp 36.8 °C (98.2 °F) (Temporal)   Ht 1.6 m (5' 2.99\")   Wt 99.3 kg (219 lb)   SpO2 93%   BMI 38.81 kg/m²  Body mass index is 38.81 kg/m².    Physical Exam  Constitutional:       Appearance: Normal appearance.   Cardiovascular:      Rate and Rhythm: Normal rate and regular rhythm.      Heart sounds: Normal heart sounds.   Pulmonary:      Effort: Pulmonary effort is normal.      Breath sounds: Normal breath sounds.   Musculoskeletal:      Cervical back: Normal range of motion and neck supple.   Lymphadenopathy:      Cervical: No cervical adenopathy.   Neurological:      Mental Status: She is alert.             Results  Laboratory Studies  A1c is 9.8%.    Assessment & Plan:     1. Restless leg syndrome        2. Type 2 diabetes mellitus with hyperglycemia, without long-term current use of insulin ([facility])  POCT Hemoglobin A1C    Lipid Profile      3. Diabetic polyneuropathy associated with type 2 diabetes mellitus ([facility])        4. Chronic respiratory failure with hypoxia ([facility])            Assessment & Plan  1. Restless Leg Syndrome.  The patient's condition is chronic and stable. During her previous appointment, we initiated treatment with pramipexole, however, this induced significant nausea, leading her to discontinue the medication. Regrettably, the other alternatives such as ropinirole have high rates of nausea, necessitating the consideration of a gabapentinoid. However, the patient has previously demonstrated intolerance to gabapentin, thus, there is currently no available treatment for Restless Leg Syndrome. She is currently engaged in physical therapy for leg pain, but is uncertain if this contributes to her symptoms. Consequently, she will " continue with physical therapy and we will monitor her progress.    2. Type 2 Diabetes Mellitus with Hyperglycemia.  The patient's condition is chronic and uncontrolled. Her hemoglobin A1c level today is 9.8 percent. All other diabetic screenings are current. The dosage of Ozempic will be increased to 1 mg every 7 days. She will maintain her daily intake of metformin extended-release 500 mg.    3. Chronic Respiratory Failure with Hypoxia.  The patient's condition is chronic and stable. She has been utilizing between 2 to 4 L of oxygen overnight and occasionally during the day, contingent on her symptoms. She has also reported experiencing shortness of breath during ambulation. The patient declined a 6-minute walk test in the office today. I have advised her to monitor her oxygen level during her next walk and if she experiences shortness of breath, she should use her home pulse oximeter. If it is below 89 percent, she should wear oxygen during ambulation.    Follow-up  The patient is scheduled for a follow-up visit in 3 months to monitor her diabetes.      Referral for genetic research was offered. Patient is a participant.    Please note that this dictation was created using voice recognition software. I have made every reasonable attempt to correct obvious errors, but I expect that there are errors of grammar and possibly content that I did not discover before finalizing the note.

## 2024-04-17 NOTE — TELEPHONE ENCOUNTER
Patient is wanting to know how to go about getting their blood type tested and whether or not insurance would cover it. If it could be added to their labs that were ordered at today's visit that would be the patient's preference. Please advise.

## 2024-04-17 NOTE — PROGRESS NOTES
Assessment    I spoke with the patient for her monthly PCM review while she was in clinic for her PCP appointment. Patient is aware of her future appointment dates and times. She is aware of her care gaps and is in the process of gradually addressing them with her provider. The patient got very good news at her last Oncology appointment that she is thrilled about. She reports that her blood sugars have been high. Her dosage of Ozempic has been raised in order to gain better control. Patient voices understanding of care measures and recommendations. Patient reports no recent falls or injuries. Patient reports no cardiac symptoms at this time. Patient reports feeling relieved and well. Patient voices understanding of plan of care, medications, and how to get a hold of me should any needs or concerns arise.     Education    *Article on [facility] Allergy Management strategies sent by mail.     Plan of Care and Goals    *Patient will remain free from falls/injuries  *Healthy nutrition, hydration, and activity    Barriers:    *Manage of chronic and acute health concerns.     Progress:    Progressing    Next outreach:    One Month

## 2024-04-17 NOTE — TELEPHONE ENCOUNTER
We only get blood type if she needs a transfusion. If she wants to know her blood type, then she can donate blood. They will test it then and can let her know. But even if she knows her blood type, we will still test and not take her word for it if she needs a transfusion as using the wrong blood type in a transfusion can be catastrophic.

## 2024-04-18 NOTE — PROGRESS NOTES
Contacted patient to advise of Dr. Lyn wanting to go ahead with her appointment on May 6th, 2024. Patient is aware and voices understanding.

## 2024-04-23 ENCOUNTER — PHYSICAL THERAPY (OUTPATIENT)
Dept: PHYSICAL THERAPY | Facility: REHABILITATION | Age: 59
End: 2024-04-23
Attending: INTERNAL MEDICINE
Payer: MEDICARE

## 2024-04-23 DIAGNOSIS — C50.912 BREAST CANCER, STAGE 4, LEFT (HCC): ICD-10-CM

## 2024-04-23 DIAGNOSIS — M25.9 SHOULDER PROBLEM: ICD-10-CM

## 2024-04-23 DIAGNOSIS — L76.82 AXILLARY WEB SYNDROME: ICD-10-CM

## 2024-04-23 DIAGNOSIS — L90.5 AXILLARY WEB SYNDROME: ICD-10-CM

## 2024-04-23 DIAGNOSIS — I89.0 LYMPHEDEMA OF EXTREMITY: ICD-10-CM

## 2024-04-23 PROCEDURE — 97110 THERAPEUTIC EXERCISES: CPT

## 2024-04-23 PROCEDURE — 97140 MANUAL THERAPY 1/> REGIONS: CPT

## 2024-04-23 NOTE — OP THERAPY DAILY TREATMENT
Outpatient Physical Therapy  LYMPHEDEMA THERAPY DAILY TREATMENT     [facility] Physical Therapy [address].  [address]  [place] [address]  Phone:  [phone]  Fax:  [phone]    Date: 04/23/2024    Patient: Sol Kern  YOB: 1965  MRN: [id number]     Time Calculation    Start time: 1330  Stop time: 1413 Time Calculation (min): 43 minutes         Chief Complaint: Lymphedema Breast Cancer and Shoulder Problem    Visit #: 5    Subjective:   History of Present Illness:     Mechanism of injury:  L side bothersome. Neck, hip, leg all sore, thinks possibly related from her injection site. Unsure. L shoulder somewhat achy but thinks related to her recent gardening over the weekend.       Lymphedema Objective      Left Upper Extremity Circumferential Measurements  Other: cm  Areola: cm  Breast Crease: cm  Axilla: 40.1 cm  Upper Proximal Humerus: 36.8 cm  Distal Humerus: 32.6 cm  Elbow: 28.3 cm  Mid-Forearm: 25.7 cm  Wrist: 15.8 cm  Distal Garza Crease: 18.2 cm  Total: 197.5 cm           Left Upper Extremity Circumferential Measurements EVAL  Other: cm  Areola: cm  Breast Crease: cm  Axilla: 38.5 cm  Upper Proximal Humerus: 37.7 cm  Distal Humerus: 34.4 cm  Elbow: 27.2 cm  Mid-Forearm: 24.7 cm  Wrist: 15.9 cm  Distal Garza Crease: 18.4 cm  Total: 196.8 cm     Right Upper Extremity Circumferential Measurements EVAL  Other: cm  Areola: cm  Breast Crease: cm  Axilla: 37.1 cm  Upper Proximal Humerus: 36.6 cm  Distal Humerus: 33.4 cm  Elbow: 27.2 cm  Mid-Forearm: 22 cm  Wrist: 16.8 cm  Distal Garza Crease: 18.4 cm  Total: cm 191.5    Therapeutic Exercises (CPT 78967):     1. wall walk with lift off, x10, added to HEP with handout      Therapeutic Exercise Summary: HEP: seated pelvic tilts, wall walk    Therapeutic Treatments and Modalities:     1. Manual Therapy (CPT 12960)    Therapeutic Treatment and Modalities Summary: -Trigger point release to L: forearm extensors, biceps, pec major,  pec minor, subscap, supraspinatus, infraspinatus, teres major/minor, traps, levator scap   -L joint mobilization to radius/ulna for interossei, wrist, CMC  -MLD to L breast including parasternal and intercostal collectors.   -MLD to L UE   -GHJ mob inferiorly and AP during abduction/flexion  -ART to pectorals during abduction and flexion  -ART to pectorals in sidely into abduction.    Time-based treatments/modalities:    Physical Therapy Timed Treatment Charges  Manual therapy minutes (CPT 61662): 33 minutes  Therapeutic exercise minutes (CPT 75232): 10 minutes      Assessment and Plan:   Assessment details:  Sol has sustained her measurement and today improved her abduction at her L shoulder. It does seem that her L pectorals are restricting her overhead activity and she will have to target them more with her HEP.     Plan:  Therapy options:  Physical therapy treatment to continue  Discussed with:  Patient

## 2024-04-23 NOTE — OP THERAPY PROGRESS SUMMARY
Outpatient Physical Therapy  PROGRESS SUMMARY NOTE      [facility] [address].  [address]  [place] [address]  Phone:  [phone]  Fax:  [phone]    Date of Visit: 04/23/2024    Patient: Sol Kern  YOB: 1965  MRN: [id number]     Referring Provider: Magdi Kline M.D.  [address] [place] [address] [address]  [place] [address]   Referring Diagnosis Malignant neoplasm of unspecified site of left female breast [C50.912]     Visit Diagnoses     ICD-10-CM   1. Breast cancer, stage 4, left (HCC)  C50.912   2. Lymphedema of extremity  I89.0   3. Axillary web syndrome  L76.82    L90.5   4. Shoulder problem  M25.9       Rehab Potential: good    Progress Report Period: 3/13/24-4/22/24    Functional Assessment Used          Objective Findings and Assessment:   Patient progression towards goals: Sol has sustained her measurement and today improved her abduction at her L shoulder. It does seem that her L pectorals are restricting her overhead activity and she will have to target them more with her HEP.     Objective findings and assessment details: Left Upper Extremity Circumferential Measurements 4/23  Axilla: 40.1 cm  Upper Proximal Humerus: 36.8 cm  Distal Humerus: 32.6 cm  Elbow: 28.3 cm  Mid-Forearm: 25.7 cm  Wrist: 15.8 cm  Distal Garza Crease: 18.2 cm  Total: 197.5 cm      Left Upper Extremity Circumferential Measurements EVAL  Axilla: 38.5 cm  Upper Proximal Humerus: 37.7 cm  Distal Humerus: 34.4 cm  Elbow: 27.2 cm  Mid-Forearm: 24.7 cm  Wrist: 15.9 cm  Distal Garza Crease: 18.4 cm  Total: 196.8 cm     Right Upper Extremity Circumferential Measurements EVAL  Axilla: 37.1 cm  Upper Proximal Humerus: 36.6 cm  Distal Humerus: 33.4 cm  Elbow: 27.2 cm  Mid-Forearm: 22 cm  Wrist: 16.8 cm  Distal Garza Crease: 18.4 cm  Total: cm 191.5      Goals:   Short Term Goals:   1. Pt will achieve a total limb circumference reduction of 3cm in order to decrease risk for infection  and progression of disease process. Goal Not Met  2. Pt will be independent with self-MLD to facilitate fluid migration to healthy tissues and lymph nodes. Goal Met  3. Pt will obtain a compressive garment to allow for fluid movement out of forearm. Goal Not MEt    Short term goal time span:  2-4 weeks      Long Term Goals:    1. Pt will have a reduction in total limb circumference of 6cm in order to decrease risk for infection and progression of disease process. Goal Not Met  2. Patient will be independent with maintenance phase management of lymphedema including: compression garments, skin care education, risk reduction strategies, manual lymphatic drainage, and therapeutic exercise. Goal Not Met  3. Pt will sustain her full ROM and initiate a strengthening program to build lean muscle mass. Goal Partially Met     Long term goal time span:  4-6 weeks    Plan:   Planned therapy interventions:  Functional Training, Self Care (CPT 35006), Manual Therapy (CPT 93663), Therapeutic Activities (CPT 55043) and Therapeutic Exercise (CPT 98333)  Frequency:  1x week  Duration in weeks:  8      Referring provider co-signature:  I have reviewed this plan of care and my co-signature certifies the need for services.     Certification Period: 04/23/2024 to 06/18/24    Physician Signature: ________________________________ Date: ______________

## 2024-04-30 ENCOUNTER — PHYSICAL THERAPY (OUTPATIENT)
Dept: PHYSICAL THERAPY | Facility: REHABILITATION | Age: 59
End: 2024-04-30
Attending: INTERNAL MEDICINE
Payer: MEDICARE

## 2024-04-30 DIAGNOSIS — L90.5 AXILLARY WEB SYNDROME: ICD-10-CM

## 2024-04-30 DIAGNOSIS — L76.82 AXILLARY WEB SYNDROME: ICD-10-CM

## 2024-04-30 DIAGNOSIS — M25.9 SHOULDER PROBLEM: ICD-10-CM

## 2024-04-30 DIAGNOSIS — I89.0 LYMPHEDEMA OF EXTREMITY: ICD-10-CM

## 2024-04-30 DIAGNOSIS — C50.912 BREAST CANCER, STAGE 4, LEFT (HCC): ICD-10-CM

## 2024-04-30 NOTE — OP THERAPY DAILY TREATMENT
Outpatient Physical Therapy  LYMPHEDEMA THERAPY DAILY TREATMENT     [facility] Physical Therapy [address].  [address]  [place] [address]  Phone:  [phone]  Fax:  [phone]    Date: 04/30/2024    Patient: Sol Kern  YOB: 1965  MRN: [id number]     Time Calculation    Start time: 1331  Stop time: 1417 Time Calculation (min): 46 minutes         Chief Complaint: Shoulder Problem    Visit #: 6    Subjective:   History of Present Illness:     Mechanism of injury:  L hip and anterior leg is bothersome, especially at night.       Lymphedema Objective      Left Upper Extremity Circumferential Measurements  Other: cm  Areola: cm  Breast Crease: cm  Axilla: 40.1 cm  Upper Proximal Humerus: 36.5 cm  Distal Humerus: 32.3 cm  Elbow: 27.6 cm  Mid-Forearm: 24.3 cm  Wrist: 16.2 cm  Distal Garza Crease: 19 cm  Total: 196 cm          Left Upper Extremity Circumferential Measurements 4/23  Other: cm  Areola: cm  Breast Crease: cm  Axilla: 40.1 cm  Upper Proximal Humerus: 36.8 cm  Distal Humerus: 32.6 cm  Elbow: 28.3 cm  Mid-Forearm: 25.7 cm  Wrist: 15.8 cm  Distal Garza Crease: 18.2 cm  Total: 197.5 cm              Left Upper Extremity Circumferential Measurements EVAL  Other: cm  Areola: cm  Breast Crease: cm  Axilla: 38.5 cm  Upper Proximal Humerus: 37.7 cm  Distal Humerus: 34.4 cm  Elbow: 27.2 cm  Mid-Forearm: 24.7 cm  Wrist: 15.9 cm  Distal Garza Crease: 18.4 cm  Total: 196.8 cm     Right Upper Extremity Circumferential Measurements EVAL  Other: cm  Areola: cm  Breast Crease: cm  Axilla: 37.1 cm  Upper Proximal Humerus: 36.6 cm  Distal Humerus: 33.4 cm  Elbow: 27.2 cm  Mid-Forearm: 22 cm  Wrist: 16.8 cm  Distal Garza Crease: 18.4 cm  Total: cm 191.5    Therapeutic Exercises (CPT 90188):     1. wall walk with lift off, HEP    2. sciatic nerve glide, 2x 10, added to HEP    3. wall gold with chin tuck and scap retraction, x10, added to HEP      Therapeutic Exercise Summary: HEP: seated  pelvic tilts, wall walk    Therapeutic Treatments and Modalities:     1. Manual Therapy (CPT 43063)    Therapeutic Treatment and Modalities Summary: -Trigger point release to L: forearm extensors, biceps, pec major, pec minor, subscap, supraspinatus, infraspinatus, teres major/minor, traps, levator scap   -L joint mobilization to radius/ulna for interossei, wrist, CMC  -MLD to L breast including parasternal and intercostal collectors.   -MLD to L UE       Time-based treatments/modalities:    Physical Therapy Timed Treatment Charges  Manual therapy minutes (CPT 67517): 34 minutes  Therapeutic exercise minutes (CPT 97228): 12 minutes      Assessment and Plan:   Assessment details:  Sol seems to have been most bothered by her L radiating pain. Question if related to nerve impingement. She has been given a nerve glide to assist with discomfort. She will benefit from further intervention to reduce symptoms and improve her comfort.     Plan:  Therapy options:  Physical therapy treatment to continue  Discussed with:  Patient and family

## 2024-05-06 ENCOUNTER — APPOINTMENT (OUTPATIENT)
Dept: MEDICAL GROUP | Facility: PHYSICIAN GROUP | Age: 59
End: 2024-05-06
Payer: MEDICARE

## 2024-05-08 ENCOUNTER — PHYSICAL THERAPY (OUTPATIENT)
Dept: PHYSICAL THERAPY | Facility: REHABILITATION | Age: 59
End: 2024-05-08
Attending: INTERNAL MEDICINE
Payer: MEDICARE

## 2024-05-08 DIAGNOSIS — I89.0 LYMPHEDEMA OF EXTREMITY: ICD-10-CM

## 2024-05-08 DIAGNOSIS — L90.5 AXILLARY WEB SYNDROME: ICD-10-CM

## 2024-05-08 DIAGNOSIS — M25.9 SHOULDER PROBLEM: ICD-10-CM

## 2024-05-08 DIAGNOSIS — C50.912 BREAST CANCER, STAGE 4, LEFT (HCC): ICD-10-CM

## 2024-05-08 DIAGNOSIS — L76.82 AXILLARY WEB SYNDROME: ICD-10-CM

## 2024-05-08 RX ORDER — ONDANSETRON 8 MG/1
8 TABLET, ORALLY DISINTEGRATING ORAL PRN
Status: CANCELLED | OUTPATIENT
Start: 2024-05-13

## 2024-05-08 RX ORDER — PROCHLORPERAZINE MALEATE 10 MG
10 TABLET ORAL EVERY 6 HOURS PRN
Status: CANCELLED | OUTPATIENT
Start: 2024-05-13

## 2024-05-08 RX ORDER — METHYLPREDNISOLONE SODIUM SUCCINATE 125 MG/2ML
125 INJECTION, POWDER, LYOPHILIZED, FOR SOLUTION INTRAMUSCULAR; INTRAVENOUS PRN
Status: CANCELLED | OUTPATIENT
Start: 2024-05-13

## 2024-05-08 RX ORDER — EPINEPHRINE 1 MG/ML(1)
0.5 AMPUL (ML) INJECTION PRN
Status: CANCELLED | OUTPATIENT
Start: 2024-05-13

## 2024-05-08 RX ORDER — LAMOTRIGINE 25 MG/1
250 TABLET ORAL ONCE
Status: CANCELLED | OUTPATIENT
Start: 2024-05-13 | End: 2024-05-13

## 2024-05-08 RX ORDER — ONDANSETRON 2 MG/ML
4 INJECTION INTRAMUSCULAR; INTRAVENOUS PRN
Status: CANCELLED | OUTPATIENT
Start: 2024-05-13

## 2024-05-08 RX ORDER — DIPHENHYDRAMINE HYDROCHLORIDE 50 MG/ML
50 INJECTION INTRAMUSCULAR; INTRAVENOUS PRN
Status: CANCELLED | OUTPATIENT
Start: 2024-05-13

## 2024-05-08 NOTE — OP THERAPY DAILY TREATMENT
Outpatient Physical Therapy  LYMPHEDEMA THERAPY DAILY TREATMENT     [facility] Physical Therapy [address]  [address]  [place] [address]  Phone:  [phone]  Fax:  [phone]    Date: 05/08/2024    Patient: Sol Kern  YOB: 1965  MRN: [id number]     Time Calculation    Start time: 1416  Stop time: 1458 Time Calculation (min): 42 minutes         Chief Complaint: Shoulder Problem and Lymphedema Breast Cancer    Visit #: 7    Subjective:   History of Present Illness:     Mechanism of injury:  L LE bothersome to her again. Was quite stiff this morning and had trouble sleeping last night.       Lymphedema Objective    Left Upper Extremity Circumferential Measurements 4/30  Other: cm  Areola: cm  Breast Crease: cm  Axilla: 40.1 cm  Upper Proximal Humerus: 36.5 cm  Distal Humerus: 32.3 cm  Elbow: 27.6 cm  Mid-Forearm: 24.3 cm  Wrist: 16.2 cm  Distal Garza Crease: 19 cm  Total: 196 cm      Left Upper Extremity Circumferential Measurements 4/23  Other: cm  Areola: cm  Breast Crease: cm  Axilla: 40.1 cm  Upper Proximal Humerus: 36.8 cm  Distal Humerus: 32.6 cm  Elbow: 28.3 cm  Mid-Forearm: 25.7 cm  Wrist: 15.8 cm  Distal Garza Crease: 18.2 cm  Total: 197.5 cm              Left Upper Extremity Circumferential Measurements EVAL  Other: cm  Areola: cm  Breast Crease: cm  Axilla: 38.5 cm  Upper Proximal Humerus: 37.7 cm  Distal Humerus: 34.4 cm  Elbow: 27.2 cm  Mid-Forearm: 24.7 cm  Wrist: 15.9 cm  Distal Garza Crease: 18.4 cm  Total: 196.8 cm     Right Upper Extremity Circumferential Measurements EVAL  Other: cm  Areola: cm  Breast Crease: cm  Axilla: 37.1 cm  Upper Proximal Humerus: 36.6 cm  Distal Humerus: 33.4 cm  Elbow: 27.2 cm  Mid-Forearm: 22 cm  Wrist: 16.8 cm  Distal Garza Crease: 18.4 cm  Total: cm 191.5    Therapeutic Exercises (CPT 25183):     1. wall walk with lift off, HEP    2. sciatic nerve glide, HEP    3. wall gold with chin tuck and scap retraction, HEP    4.  standing extension with elbow drive, x10, added to HEP    5. door extension with strap, x5, advised for HEP, but recommended standing instead      Therapeutic Exercise Summary: HEP: seated pelvic tilts, wall walk    Therapeutic Treatments and Modalities:     1. Manual Therapy (CPT 87644)    Therapeutic Treatment and Modalities Summary: -Trigger point release to L: forearm extensors, biceps, pec major, pec minor, subscap, supraspinatus, infraspinatus, teres major/minor, traps, levator scap   -L joint mobilization to radius/ulna for interossei, wrist, CMC  -MLD to L breast including parasternal and intercostal collectors.   -MLD to L UE   -sidely scapular mobilization in all planes  -GHJ AP with abduction  -Scapular mobilization into abduction to achieve increased range.       Time-based treatments/modalities:    Physical Therapy Timed Treatment Charges  Manual therapy minutes (CPT 94810): 32 minutes  Therapeutic exercise minutes (CPT 28828): 10 minutes      Assessment and Plan:   Assessment details:  Sol was able to centralize her L low back symptoms with performance of repetitive extension. Her L UE presented unusually taut today requiring more trigger point and manual release than previous days. She will benefit from further intervention to improve her comfort with mobility.     Plan:  Therapy options:  Physical therapy treatment to continue  Discussed with:  Patient

## 2024-05-09 ENCOUNTER — HOSPITAL ENCOUNTER (OUTPATIENT)
Dept: LAB | Facility: MEDICAL CENTER | Age: 59
End: 2024-05-09
Attending: FAMILY MEDICINE
Payer: MEDICARE

## 2024-05-09 ENCOUNTER — HOSPITAL ENCOUNTER (OUTPATIENT)
Dept: LAB | Facility: MEDICAL CENTER | Age: 59
End: 2024-05-09
Attending: INTERNAL MEDICINE
Payer: MEDICARE

## 2024-05-09 DIAGNOSIS — E11.40 TYPE 2 DIABETES MELLITUS WITH DIABETIC NEUROPATHY, WITHOUT LONG-TERM CURRENT USE OF INSULIN (HCC): ICD-10-CM

## 2024-05-09 DIAGNOSIS — C50.912 BREAST CANCER, STAGE 4, LEFT (HCC): ICD-10-CM

## 2024-05-09 DIAGNOSIS — E11.65 TYPE 2 DIABETES MELLITUS WITH HYPERGLYCEMIA, WITHOUT LONG-TERM CURRENT USE OF INSULIN (HCC): ICD-10-CM

## 2024-05-09 LAB
ALBUMIN SERPL BCP-MCNC: 4.1 G/DL (ref 3.2–4.9)
ALBUMIN/GLOB SERPL: 1.3 G/DL
ALP SERPL-CCNC: 82 U/L (ref 30–99)
ALT SERPL-CCNC: 32 U/L (ref 2–50)
ANION GAP SERPL CALC-SCNC: 11 MMOL/L (ref 7–16)
AST SERPL-CCNC: 28 U/L (ref 12–45)
BASOPHILS # BLD AUTO: 1.5 % (ref 0–1.8)
BASOPHILS # BLD: 0.08 K/UL (ref 0–0.12)
BILIRUB SERPL-MCNC: 0.5 MG/DL (ref 0.1–1.5)
BUN SERPL-MCNC: 10 MG/DL (ref 8–22)
CALCIUM ALBUM COR SERPL-MCNC: 9.4 MG/DL (ref 8.5–10.5)
CALCIUM SERPL-MCNC: 9.5 MG/DL (ref 8.5–10.5)
CHLORIDE SERPL-SCNC: 104 MMOL/L (ref 96–112)
CHOLEST SERPL-MCNC: 188 MG/DL (ref 100–199)
CO2 SERPL-SCNC: 23 MMOL/L (ref 20–33)
CREAT SERPL-MCNC: 0.84 MG/DL (ref 0.5–1.4)
EOSINOPHIL # BLD AUTO: 0.07 K/UL (ref 0–0.51)
EOSINOPHIL NFR BLD: 1.4 % (ref 0–6.9)
ERYTHROCYTE [DISTWIDTH] IN BLOOD BY AUTOMATED COUNT: 47.5 FL (ref 35.9–50)
EST. AVERAGE GLUCOSE BLD GHB EST-MCNC: 206 MG/DL
FASTING STATUS PATIENT QL REPORTED: NORMAL
GFR SERPLBLD CREATININE-BSD FMLA CKD-EPI: 79 ML/MIN/1.73 M 2
GLOBULIN SER CALC-MCNC: 3.1 G/DL (ref 1.9–3.5)
GLUCOSE SERPL-MCNC: 163 MG/DL (ref 65–99)
HBA1C MFR BLD: 8.8 % (ref 4–5.6)
HCT VFR BLD AUTO: 47.5 % (ref 37–47)
HDLC SERPL-MCNC: 54 MG/DL
HGB BLD-MCNC: 16.7 G/DL (ref 12–16)
IMM GRANULOCYTES # BLD AUTO: 0.03 K/UL (ref 0–0.11)
IMM GRANULOCYTES NFR BLD AUTO: 0.6 % (ref 0–0.9)
LDLC SERPL CALC-MCNC: 111 MG/DL
LYMPHOCYTES # BLD AUTO: 1.02 K/UL (ref 1–4.8)
LYMPHOCYTES NFR BLD: 19.7 % (ref 22–41)
MCH RBC QN AUTO: 34.4 PG (ref 27–33)
MCHC RBC AUTO-ENTMCNC: 35.2 G/DL (ref 32.2–35.5)
MCV RBC AUTO: 97.9 FL (ref 81.4–97.8)
MONOCYTES # BLD AUTO: 0.42 K/UL (ref 0–0.85)
MONOCYTES NFR BLD AUTO: 8.1 % (ref 0–13.4)
NEUTROPHILS # BLD AUTO: 3.56 K/UL (ref 1.82–7.42)
NEUTROPHILS NFR BLD: 68.7 % (ref 44–72)
NRBC # BLD AUTO: 0 K/UL
NRBC BLD-RTO: 0 /100 WBC (ref 0–0.2)
PLATELET # BLD AUTO: 275 K/UL (ref 164–446)
PMV BLD AUTO: 9.8 FL (ref 9–12.9)
POTASSIUM SERPL-SCNC: 4.9 MMOL/L (ref 3.6–5.5)
PROT SERPL-MCNC: 7.2 G/DL (ref 6–8.2)
RBC # BLD AUTO: 4.85 M/UL (ref 4.2–5.4)
SODIUM SERPL-SCNC: 138 MMOL/L (ref 135–145)
TRIGL SERPL-MCNC: 117 MG/DL (ref 0–149)
WBC # BLD AUTO: 5.2 K/UL (ref 4.8–10.8)

## 2024-05-13 ENCOUNTER — OUTPATIENT INFUSION SERVICES (OUTPATIENT)
Dept: ONCOLOGY | Facility: MEDICAL CENTER | Age: 59
End: 2024-05-13
Attending: INTERNAL MEDICINE
Payer: MEDICARE

## 2024-05-13 VITALS
DIASTOLIC BLOOD PRESSURE: 72 MMHG | HEIGHT: 63 IN | BODY MASS INDEX: 38.75 KG/M2 | TEMPERATURE: 98 F | RESPIRATION RATE: 18 BRPM | HEART RATE: 83 BPM | OXYGEN SATURATION: 94 % | SYSTOLIC BLOOD PRESSURE: 141 MMHG | WEIGHT: 218.7 LBS

## 2024-05-13 DIAGNOSIS — Z17.0 CARCINOMA OF UPPER-OUTER QUADRANT OF LEFT BREAST IN FEMALE, ESTROGEN RECEPTOR POSITIVE (HCC): ICD-10-CM

## 2024-05-13 DIAGNOSIS — C50.912 BREAST CANCER, STAGE 4, LEFT (HCC): ICD-10-CM

## 2024-05-13 DIAGNOSIS — C50.412 CARCINOMA OF UPPER-OUTER QUADRANT OF LEFT BREAST IN FEMALE, ESTROGEN RECEPTOR POSITIVE (HCC): ICD-10-CM

## 2024-05-13 RX ORDER — LAMOTRIGINE 25 MG/1
250 TABLET ORAL ONCE
Status: COMPLETED | OUTPATIENT
Start: 2024-05-13 | End: 2024-05-13

## 2024-05-13 RX ADMIN — DENOSUMAB 120 MG: 120 INJECTION SUBCUTANEOUS at 14:05

## 2024-05-13 RX ADMIN — FULVESTRANT 250 MG: 50 INJECTION INTRAMUSCULAR at 14:12

## 2024-05-13 RX ADMIN — FULVESTRANT 250 MG: 50 INJECTION INTRAMUSCULAR at 14:10

## 2024-05-13 ASSESSMENT — FIBROSIS 4 INDEX: FIB4 SCORE: 1.1

## 2024-05-13 NOTE — PROGRESS NOTES
Sol is here for Faslodex injections and Xgeva. Labs previously drawn and reviewed today and with parameters to proceed with treatment. Denies any oral surgery ing the past 4 weeks or plans to have oral surgery in the next 4 weeks. Xgeva injection given per MAR; adhesive bandages applied to site. Faslodex injections given per MAR; adhesive bandages applied to sites. Next appointment scheduled. Discharged to self care; no apparent distress noted.

## 2024-05-14 ENCOUNTER — PHYSICAL THERAPY (OUTPATIENT)
Dept: PHYSICAL THERAPY | Facility: REHABILITATION | Age: 59
End: 2024-05-14
Attending: INTERNAL MEDICINE
Payer: MEDICARE

## 2024-05-14 DIAGNOSIS — L90.5 AXILLARY WEB SYNDROME: ICD-10-CM

## 2024-05-14 DIAGNOSIS — L76.82 AXILLARY WEB SYNDROME: ICD-10-CM

## 2024-05-14 DIAGNOSIS — I89.0 LYMPHEDEMA OF EXTREMITY: ICD-10-CM

## 2024-05-14 DIAGNOSIS — M25.9 SHOULDER PROBLEM: ICD-10-CM

## 2024-05-14 DIAGNOSIS — C50.912 BREAST CANCER, STAGE 4, LEFT (HCC): ICD-10-CM

## 2024-05-14 NOTE — OP THERAPY DAILY TREATMENT
Outpatient Physical Therapy  LYMPHEDEMA THERAPY DAILY TREATMENT     [facility] Physical Therapy [address].  [address]  [place] [address]  Phone:  [phone]  Fax:  [phone]    Date: 05/14/2024    Patient: Sol Kern  YOB: 1965  MRN: [id number]     Time Calculation    Start time: 1417  Stop time: 1500 Time Calculation (min): 43 minutes         Chief Complaint: Lymphedema Breast Cancer and Shoulder Problem    Visit #: 8    Subjective:   History of Present Illness:     Mechanism of injury:  Sciatic pain better.       Lymphedema Objective     Left Upper Extremity Circumferential Measurements 4/30  Other: cm  Areola: cm  Breast Crease: cm  Axilla: 40.1 cm  Upper Proximal Humerus: 36.5 cm  Distal Humerus: 32.3 cm  Elbow: 27.6 cm  Mid-Forearm: 24.3 cm  Wrist: 16.2 cm  Distal Garza Crease: 19 cm  Total: 196 cm      Left Upper Extremity Circumferential Measurements 4/23  Other: cm  Areola: cm  Breast Crease: cm  Axilla: 40.1 cm  Upper Proximal Humerus: 36.8 cm  Distal Humerus: 32.6 cm  Elbow: 28.3 cm  Mid-Forearm: 25.7 cm  Wrist: 15.8 cm  Distal Garza Crease: 18.2 cm  Total: 197.5 cm              Left Upper Extremity Circumferential Measurements EVAL  Other: cm  Areola: cm  Breast Crease: cm  Axilla: 38.5 cm  Upper Proximal Humerus: 37.7 cm  Distal Humerus: 34.4 cm  Elbow: 27.2 cm  Mid-Forearm: 24.7 cm  Wrist: 15.9 cm  Distal Garza Crease: 18.4 cm  Total: 196.8 cm     Right Upper Extremity Circumferential Measurements EVAL  Other: cm  Areola: cm  Breast Crease: cm  Axilla: 37.1 cm  Upper Proximal Humerus: 36.6 cm  Distal Humerus: 33.4 cm  Elbow: 27.2 cm  Mid-Forearm: 22 cm  Wrist: 16.8 cm  Distal Garza Crease: 18.4 cm  Total: cm 191.5    Therapeutic Exercises (CPT 77182):     1. wall walk with lift off, HEP    2. sciatic nerve glide, HEP    3. wall gold with chin tuck and scap retraction, HEP    4. standing extension with elbow drive, HEP    5. door extension with strap, HEP       Therapeutic Exercise Summary: HEP: seated pelvic tilts, wall walk    Therapeutic Treatments and Modalities:     1. Manual Therapy (CPT 38828)    Therapeutic Treatment and Modalities Summary: -Trigger point release to L: forearm extensors, biceps, pec major, pec minor, subscap, supraspinatus, infraspinatus, teres major/minor, traps, levator scap   -L joint mobilization to radius/ulna for interossei, wrist, CMC  -MLD to L breast including parasternal and intercostal collectors.   -MLD to L UE   -sidely scapular mobilization in all planes  -GHJ AP with abduction  -Scapular mobilization into abduction to achieve increased range.       Time-based treatments/modalities:    Physical Therapy Timed Treatment Charges  Functional training, self care minutes (CPT 66884): 16 minutes  Manual therapy minutes (CPT 09642): 27 minutes      Assessment and Plan:   Assessment details:  Sol has improved her radiating sciatic pain with only a couple flares since last week. Her shoulder is tight at end range but her cording has reduced. She will benefit from further intervention to achieve improved range for overhead reaching.    Plan:  Therapy options:  Physical therapy treatment to continue  Discussed with:  Patient

## 2024-05-15 ENCOUNTER — APPOINTMENT (OUTPATIENT)
Dept: PHYSICAL THERAPY | Facility: REHABILITATION | Age: 59
End: 2024-05-15
Attending: INTERNAL MEDICINE
Payer: MEDICARE

## 2024-05-15 ENCOUNTER — PATIENT OUTREACH (OUTPATIENT)
Dept: HEALTH INFORMATION MANAGEMENT | Facility: OTHER | Age: 59
End: 2024-05-15
Payer: MEDICARE

## 2024-05-15 DIAGNOSIS — I10 ESSENTIAL HYPERTENSION: ICD-10-CM

## 2024-05-15 DIAGNOSIS — E11.40 TYPE 2 DIABETES MELLITUS WITH DIABETIC NEUROPATHY, WITHOUT LONG-TERM CURRENT USE OF INSULIN (HCC): ICD-10-CM

## 2024-05-15 DIAGNOSIS — J96.11 CHRONIC RESPIRATORY FAILURE WITH HYPOXIA (HCC): ICD-10-CM

## 2024-05-29 ENCOUNTER — HOSPITAL ENCOUNTER (OUTPATIENT)
Dept: HEMATOLOGY ONCOLOGY | Facility: MEDICAL CENTER | Age: 59
End: 2024-05-29
Attending: INTERNAL MEDICINE
Payer: MEDICARE

## 2024-05-29 VITALS
RESPIRATION RATE: 17 BRPM | TEMPERATURE: 97.9 F | SYSTOLIC BLOOD PRESSURE: 140 MMHG | WEIGHT: 219.25 LBS | OXYGEN SATURATION: 95 % | BODY MASS INDEX: 38.85 KG/M2 | DIASTOLIC BLOOD PRESSURE: 90 MMHG | HEIGHT: 63 IN | HEART RATE: 84 BPM

## 2024-05-29 DIAGNOSIS — C50.912 BREAST CANCER, STAGE 4, LEFT (HCC): ICD-10-CM

## 2024-05-29 ASSESSMENT — ENCOUNTER SYMPTOMS
COUGH: 0
PSYCHIATRIC NEGATIVE: 1
CONSTIPATION: 0
DIARRHEA: 0
HEADACHES: 0
GASTROINTESTINAL NEGATIVE: 1
DIZZINESS: 0
SHORTNESS OF BREATH: 1
CARDIOVASCULAR NEGATIVE: 1
PALPITATIONS: 0
FEVER: 0
CHILLS: 0
NAUSEA: 0
NEUROLOGICAL NEGATIVE: 1
VOMITING: 0
MYALGIAS: 0
MUSCULOSKELETAL NEGATIVE: 1
EYES NEGATIVE: 1
WEIGHT LOSS: 0

## 2024-05-29 ASSESSMENT — FIBROSIS 4 INDEX: FIB4 SCORE: 1.1

## 2024-05-29 ASSESSMENT — PAIN SCALES - GENERAL: PAINLEVEL: 1=MINIMAL PAIN

## 2024-05-29 NOTE — ADDENDUM NOTE
Encounter addended by: Erich Cardoso, Med Ass't on: 5/29/2024 4:34 PM   Actions taken: Charge Capture section accepted

## 2024-05-29 NOTE — PROGRESS NOTES
Subjective   Medical oncology follow-up visit: 3/27/2024  Chief complaint Sol Kern is a 59 y.o. 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  Radiation Oncology: Kem Infante M.D.   Surgery: Kayleen Sykes M.D.     Diagnosis:  Recurrent/metastatic ER positive breast cancer eluding malignant right pleural effusion from breast cancer, ER positive, HI slightly positive, HER2 negative        Oncology history of presenting illness:  Ms. Kern  is a pleasant 56 y.o. year old postmenopausal female with a history of poorly controlled diabetes mellitus who had a mammogram in fall 2021 which showed a 2.3 cm spiculated mass in the upper outer quadrant of the left breast.  Biopsy revealed an invasive ductal carcinoma, grade 1, ER/HI positive HER-2 negative with a Ki-67 of 22%.  She is large breasted and elected to undergo a left lumpectomy and sentinel lymph node biopsy by Dr. Sykes on 11/12/2021.  Pathology revealed an invasive ductal carcinoma, grade 2 with associated intermediate grade ductal carcinoma in situ.  The invasive tumor measured 2.9 cm in greatest dimension and all margins were clear.  1 of 1 sentinel lymph nodes was positive for macro metastases, but no extranodal extension was identified.  Postoperative course was unremarkable.  She was seen by medical oncology and an Oncotype performed with results pending.  She had a CT scan of the chest abdomen pelvis which was negative for metastatic disease and a bone scan is pending.  In surgery she has been on a weight reduction diet and has reduced her smoking dramatically from greater than a pack a day to 3 to 4 cigarettes a day.  She feels generally well.  She is previously seen Dr. Kem Infante in consultation.     Interval histories:  Her Oncotype came back with a recurrence score of 20 therefore there was no  indication for adjuvant chemotherapy.  She had a previously scheduled CT scan of the chest abdomen pelvis and bone scan and these were either for metastatic disease.  She underwent radiation therapy with 40 Gray over 20 doses completed this 1 February 2022.  She did develop moderate formation and erythema and is being treated for this.  She also started postoperative physical therapy for prevention of lymphedema and is doing well from that perspective.  She is ready to initiate adjuvant endocrine therapy.     Interval history 7/28/2022: Started anastrozole in March 2022 and has been moderately adherent with it although she admits that she misses doses not infrequently.  She still has some tenderness in the left breast after radiation therapy but no nodularity.  She developed relatively severe COVID at the end of June that required Paxlovid and she was given oxygen 1 L/min but was never admitted to the hospital.  Her O2 sats apparently still vary somewhat.  She still has a dry cough but no sputum production or fever currently.  She has no sore throat.  Her energy level remains a little low.  Bone density was done recently and was within normal limits.     Interval history 10/10/2023: She continued on anastrozole intermittently but discontinued it completely in March 2023.  Her diabetes was out of control at that time but it is gotten much better on Ozempic with her A1c coming down from 9-6.0.  She developed shortness of breath and was admitted to the hospital on 9/28/2023 with a large right pleural effusion.  Thoracentesis showed an exudate with metastatic breast cancer, ER positive VA slightly positive HER2 negative.  She felt better after she had her tap.  CT scan of the chest was negative except for the pleural effusion.  She has a history of struct of sleep apnea and was urged to use her CPAP again.  O2 sats are running low 90s at home now.  She denies any breast masses or other nodularity.  She has some low back  pain.  No neurologic symptoms.     Interval history 10/25/2023.  We started her on ribociclib 600 mg daily which she started 5 days ago and has had no problems with whatsoever.  We are trying to schedule her fulvestrant as her endocrine component of therapy.  A therapeutic phlebotomy because of her polycythemia.  Her work-up for myeloproliferative disorder/P vera was negative including JAK2 and reflex subsequent testing.  She had a PET CT scan performed on 10/19/2023 which showed focal increased activity in the skull base, thoracic spine right shoulder right ribs lumbar spine pelvis and proximal femurs.  She also has increased activity in mediastinal precarinal and right hilar nodes.  The right pleura has multiple hypermetabolic foci.  A incidental moderate right hydronephrosis was noted.  No visceral involvement was seen.  She had her last thoracentesis on the right on 10/15/2023 and does not feel the need for another one right now.     Interval history 11/09/23 (CAlsop, APRN):  Patient is doing very well overall and tolerating her treatment well.  She did note some mild constipation since starting the ribociclib.  She is due to complete her first cycle as she has 2 more days left before her week off.  She has been tolerating the thoracentesis which last 1 was completed on 11/3/2023.  She stated that she tolerated little bit better and is noticed less amount of fluid being removed.  She is questioning why she does not have another 1 appointment scheduled in 2 weeks.  She did state that she does note some shortness of breath with exertion still.  She is wearing oxygen when she is at home resting.  She also wears oxygen at night, 3 L.  She stated that she does not have a CPAP machine and has not had one for many years.    Interval history 11/22/23 (CAlsop, APRN):  Patient is doing very well.  She stated that she does continue to feel winded at times with shortness of breath with any activity but overall she is doing  great.  She denies any nausea vomiting, diarrhea constipation.  She denies any significant pain.  She overall states that she is not under percent but she is feeling much better.  She had her thoracentesis last Friday and had only 150 cc of fluid removed.    Interval history 12/26/2023: On 11/27/2023 she was found to have elevated liver function tests including the ALT which was 5-10 times greater than upper limit of normal.  Ribociclib was held and repeated CMP on 12/12/2023 showed resolution of elevation of ALT and AST.  She was restarted on Ribociclib at 400 mg daily and is about 2 weeks into that.  Repeat labs on 12/22/2023 showed mild elevation in AST to 61 and ALT to 135.  Her blood sugar is also up somewhat.  She has no shortness of breath or cough and has not had thoracentesis in some time.  She does note some occasional nausea and abdominal bloating but it should be noted that she is on Ozempic as well for for the last 6 months and is lost about 25 pounds.      Interval history 1/9/2024: She is on her off week from her Ribociclib at 400 mg daily and is tolerating it very well.  She has minimal dyspnea on exertion no orthopnea cough or sputum production.  CT of the chest is scheduled for later this month.    Interval history 2/6/2024.  She is doing very well now.  She did have an episode of COVID-19 after we saw her last month mainly manifest as upper respiratory symptoms.  She had a lot of congestion for about 10 days but this has resolved completely.  She did note some mild dyspnea but this is improved back to baseline as well.    Interval history 3/27/2024: She is tolerating Ribociclib 400 mg daily fulvestrant and Xgeva extremely well.  She has no bone pain whatsoever.  She denies any pulmonary symptoms including no shortness of breath cough sputum production or chest pain.  She had a PET CT scan that showed a dramatic response with complete resolution of all hypermetabolic activity in multiple bones and  "lymph nodes and pleura except for a small loculated right pleural effusion with an SUV of 3.  Liver function tests are normal.    Interval history 5/29/2024: She continues to tolerate Ribociclib well.  She does get fatigue towards the end of the cycle but improves on her off week.  No bone pain or other symptomatology.  Her niece is just got diagnosed with breast cancer in [place].  Sol has had full genetic panel which was negative.    Treatment history:  10/21/23: C1 Ribociclib  10/27/23: C1D1 Faslodex  11/10/23: C1D15 Faslodex   11/18/23: C2 Ribociclib  11/27/23: C2D1 Faslodex / C1 Xgeva   12/12/2023: C3 D1 Ribociclib dose reduced to 400 mg daily  1/12/2024: C4 D1 Ribociclib 400 mg with Faslodex and Xgeva  219/2024: C5 D1 Ribociclib 400 mg with Faslodex and Xgeva.  3/18/2024: C6 D1 Ribociclib 400 mg with Faslodex and Xgeva  4/15/2024: C7-D1 Ribociclib 400 mg with Faslodex and Xgeva  5/13/2024: C8-D1 Ribociclib 400 mg with Faslodex and Xgeva          Allergies   Allergen Reactions    Codeine Vomiting and Nausea    Hydrocodone Vomiting and Nausea    Other Drug Vomiting and Nausea     Any of the \"cets\" (percocet)     Current Outpatient Medications on File Prior to Encounter   Medication Sig Dispense Refill    atorvastatin (LIPITOR) 10 MG Tab Take 1 Tablet by mouth every evening. 100 Tablet 1    Semaglutide, 1 MG/DOSE, (OZEMPIC, 1 MG/DOSE,) 4 MG/3ML Solution Pen-injector Inject 1 mg under the skin every 7 days. 9 mL 0    zolpidem (AMBIEN) 5 MG Tab Take 10 mg by mouth at bedtime as needed for Sleep.      cyclobenzaprine (FLEXERIL) 10 mg Tab TAKE 1 TABLET BY MOUTH THREE TIMES A DAY AS NEEDED FOR MUSCLE SPASM (Patient taking differently: Take 10 mg by mouth 3 times a day as needed for Muscle Spasms (As needed).) 90 Tablet 0    denosumab (PROLIA) 60 MG/ML Solution Prefilled Syringe injection Inject 60 mg under the skin every 6 months.      metFORMIN ER (GLUCOPHAGE XR) 500 MG TABLET SR 24 HR Take 1 Tablet by mouth every " day. 200 Tablet 1    ondansetron (ZOFRAN ODT) 4 MG TABLET DISPERSIBLE Take 1 Tablet by mouth every 6 hours as needed for Nausea/Vomiting. For cancer treatment associated nausea 30 Tablet 1    lisinopril (PRINIVIL) 2.5 MG Tab Take 1 Tablet by mouth every day. 90 Tablet 1    lidocaine (LIDODERM) 5 % Patch Place 1 Patch on the skin every 24 hours. Place patch to affected area in am and remove at bedtime. 30 Patch 5    albuterol 108 (90 Base) MCG/ACT Aero Soln inhalation aerosol Inhale 2 Puffs every 6 hours as needed for Shortness of Breath. 6.7 Each 1    Ribociclib Succ, 600 MG Dose, 200 MG Tablet Therapy Pack Take 600 mg by mouth see administration instructions. Take 600 mg by mouth daily for 3 weeks (21 days), off for 1 week (7 days) (Patient taking differently: Take 400 mg by mouth every day.) 63 Each 0    fluocinonide (LIDEX) 0.05 % Cream Apply 1 Application topically 2 times a day as needed (Rash, Itching). Apply to affected areas of back or buttocks. Do not use on face, armpit, or groin.      Cyanocobalamin (B-12) 5000 MCG Cap Take 5,000 mcg by mouth every evening.       No current facility-administered medications on file prior to encounter.         Review of Systems   Constitutional:  Positive for malaise/fatigue. Negative for chills, fever and weight loss.   HENT: Negative.     Eyes: Negative.    Respiratory:  Positive for shortness of breath. Negative for cough.    Cardiovascular: Negative.  Negative for chest pain and palpitations.   Gastrointestinal: Negative.  Negative for constipation, diarrhea, nausea and vomiting.   Genitourinary: Negative.  Negative for dysuria.   Musculoskeletal: Negative.  Negative for myalgias.   Skin: Negative.    Neurological: Negative.  Negative for dizziness and headaches.   Endo/Heme/Allergies: Negative.    Psychiatric/Behavioral: Negative.                Objective     BP (!) 140/90 (BP Location: Right arm, Patient Position: Sitting)   Pulse 84   Temp 36.6 °C (97.9 °F)  "(Temporal)   Resp 17   Ht 1.6 m (5' 2.99\")   Wt 99.5 kg (219 lb 4 oz)   SpO2 95%   BMI 38.85 kg/m²      Physical Exam  Vitals reviewed.   Constitutional:       General: She is not in acute distress.     Appearance: Normal appearance. She is not diaphoretic.   HENT:      Head: Normocephalic and atraumatic.   Cardiovascular:      Rate and Rhythm: Normal rate and regular rhythm.      Heart sounds: No murmur heard.     No friction rub. No gallop.   Pulmonary:      Effort: Pulmonary effort is normal. No respiratory distress.      Breath sounds: Normal breath sounds. No wheezing.      Comments: No dullness to percussion at the bases.  Lungs are clear to AMP without rales rhonchi's or wheezes.  Abdominal:      General: Bowel sounds are normal. There is no distension.      Palpations: Abdomen is soft.      Tenderness: There is no abdominal tenderness.   Musculoskeletal:         General: No swelling or tenderness. Normal range of motion.   Skin:     General: Skin is warm and dry.   Neurological:      Mental Status: She is alert and oriented to person, place, and time.   Psychiatric:         Mood and Affect: Mood normal.         Behavior: Behavior normal.                      Assessment & Plan       No diagnosis found.          Impression:  1.  Infiltrating ductal carcinoma of the left breast, grade 2, stage anatomic to be (pT2, PN 1A) status post left lumpectomy, ER positive, AL positive, HER-2 negative, Ki-67 22%, Oncotype DX current score 20.  Status post whole breast radiation therapy on endocrine therapy with anastrozole from March 2022 through March 2023 through with moderate adherence  2.  Diabetes mellitus under fair control, better on Ozempic  3.  Tobacco abuse improving  4.  Hypertension  5.  COVID-19 infection June 2022 resolved  6.  Recurrent/metastatic ER positive breast cancer malignant right pleural effusion from breast cancer, ER positive, AL slightly positive, HER2 negative.  PET scan shows extensive " bony metastases and pleural metastases and mediastinal metastases.  Dramatic near complete clinical remission by PET CT scan March 2024  7.  Longstanding polycythemia dating back at least 5-year.  Status post phlebotomy with genetic work-up negative for myeloproliferative disorder.  Likely secondary to chronic hypoxemia.  She had 1 phlebotomy required and after that her hemoglobin was back to normal at 15.2.  We will continue to monitor this.  Slightly higher but no need yet for repeat phlebotomy  8.  Somatic genomic profile negative for actionable genes.  ESR 1 negative      Plan:  Continue Ribociclib 400 mg daily 3 to 4 weeks and Faslodex.  And Xgeva I will see her back in 8 weeks for routine follow-up.  Tumor markers are will be drawn then      Please note that this dictation was created using voice recognition software. I have made every reasonable attempt to correct obvious errors, but I expect that there are errors of grammar and possibly content that I did not discover before finalizing the note.

## 2024-05-30 ENCOUNTER — PHYSICAL THERAPY (OUTPATIENT)
Dept: PHYSICAL THERAPY | Facility: REHABILITATION | Age: 59
End: 2024-05-30
Attending: INTERNAL MEDICINE
Payer: MEDICARE

## 2024-05-30 DIAGNOSIS — L90.5 AXILLARY WEB SYNDROME: ICD-10-CM

## 2024-05-30 DIAGNOSIS — C50.912 BREAST CANCER, STAGE 4, LEFT (HCC): ICD-10-CM

## 2024-05-30 DIAGNOSIS — I89.0 LYMPHEDEMA OF EXTREMITY: ICD-10-CM

## 2024-05-30 DIAGNOSIS — L76.82 AXILLARY WEB SYNDROME: ICD-10-CM

## 2024-05-30 DIAGNOSIS — M25.9 SHOULDER PROBLEM: ICD-10-CM

## 2024-05-30 NOTE — OP THERAPY DAILY TREATMENT
Outpatient Physical Therapy  LYMPHEDEMA THERAPY DAILY TREATMENT     [facility] Physical Therapy [address]  [address]  [place] [address]  Phone:  [phone]  Fax:  [phone]    Date: 05/30/2024    Patient: Sol Kern  YOB: 1965  MRN: [id number]     Time Calculation    Start time: 1515 (pt late)  Stop time: 1550 Time Calculation (min): 35 minutes         Chief Complaint: Lymphedema Breast Cancer    Visit #: [id number]    Subjective:   History of Present Illness:     Mechanism of injury:  Body feels good. Noted new cording to her L UE.       Lymphedema Objective      Left Upper Extremity Circumferential Measurements  Other: cm  Areola: cm  Breast Crease: cm  Axilla: 40.2 cm  Upper Proximal Humerus: 36.7 cm  Distal Humerus: 34.2 cm  Elbow: 28.3 cm  Mid-Forearm: 26 cm  Wrist: 16.4 cm  Distal Garza Crease: 19.2 cm  Total: 201 cm           Left Upper Extremity Circumferential Measurements 4/30  Other: cm  Areola: cm  Breast Crease: cm  Axilla: 40.1 cm  Upper Proximal Humerus: 36.5 cm  Distal Humerus: 32.3 cm  Elbow: 27.6 cm  Mid-Forearm: 24.3 cm  Wrist: 16.2 cm  Distal Garza Crease: 19 cm  Total: 196 cm      Left Upper Extremity Circumferential Measurements 4/23  Other: cm  Areola: cm  Breast Crease: cm  Axilla: 40.1 cm  Upper Proximal Humerus: 36.8 cm  Distal Humerus: 32.6 cm  Elbow: 28.3 cm  Mid-Forearm: 25.7 cm  Wrist: 15.8 cm  Distal Garza Crease: 18.2 cm  Total: 197.5 cm              Left Upper Extremity Circumferential Measurements EVAL  Other: cm  Areola: cm  Breast Crease: cm  Axilla: 38.5 cm  Upper Proximal Humerus: 37.7 cm  Distal Humerus: 34.4 cm  Elbow: 27.2 cm  Mid-Forearm: 24.7 cm  Wrist: 15.9 cm  Distal Garza Crease: 18.4 cm  Total: 196.8 cm     Right Upper Extremity Circumferential Measurements EVAL  Other: cm  Areola: cm  Breast Crease: cm  Axilla: 37.1 cm  Upper Proximal Humerus: 36.6 cm  Distal Humerus: 33.4 cm  Elbow: 27.2 cm  Mid-Forearm: 22 cm  Wrist: 16.8  cm  Distal Garza Crease: 18.4 cm  Total: cm 191.5    Therapeutic Exercises (CPT 60014):     1. wall walk with lift off, HEP    2. sciatic nerve glide, HEP    3. wall gold with chin tuck and scap retraction, HEP    4. standing extension with elbow drive, HEP    5. door extension with strap, HEP      Therapeutic Exercise Summary: HEP: seated pelvic tilts, wall walk    Therapeutic Treatments and Modalities:     1. Manual Therapy (CPT 24355)    Therapeutic Treatment and Modalities Summary: -Trigger point release to L: forearm extensors, biceps, pec major, pec minor, subscap, supraspinatus, infraspinatus, teres major/minor, traps, levator scap   -L joint mobilization to radius/ulna for interossei, wrist, CMC  -MLD to L breast including parasternal and intercostal collectors.   -MLD to L UE without trunk involvement. Focus on L UE and accompanying anastomoses.   The purpose of Manual Lymph Drainage (MLD) is to reduce lymph volume in the affected limb by increasing intake of lymphatic load into the lymphatic system, increasing the volume of transported lymph fluid, moving lymph fluid in superficial lymph vessels to collateral lymph collectors, anastomoses, or tissue channels, and increasing venous return. The goal of MLD is to re-route the lymph flow around blocked areas into more centrally located healthy lymph vessels, which drain into the venous system.        Time-based treatments/modalities:    Physical Therapy Timed Treatment Charges  Manual therapy minutes (CPT 41385): 35 minutes      Assessment and Plan:   Assessment details:  Sol has sustained an increase to her L UE circumferential measurements and has also noted a small cord to her L axillay. Suspect this to be related to weekend activity of camping, heat, and sodium. Anticipate Sol to be able to reduce and control her L UE with ongoing performance of self-MLD as well as light compression, which was placed ot her L UE today.     Plan:  Therapy options:   Physical therapy treatment to continue  Discussed with:  Patient

## 2024-05-31 NOTE — OP THERAPY PROGRESS SUMMARY
Outpatient Physical Therapy  PROGRESS SUMMARY NOTE      [facility] Physical Therapy [address].  [address]  [place] [address]  Phone:  [phone]  Fax:  [phone]    Date of Visit: 05/30/2024    Patient: Sol Kern  YOB: 1965  MRN: [id number]     Referring Provider: Magdi Kline M.D.  [address] [place] [address] [address]  [place] [address]   Referring Diagnosis Malignant neoplasm of unspecified site of left female breast [C50.912]     Visit Diagnoses     ICD-10-CM   1. Breast cancer, stage 4, left (HCC)  C50.912   2. Lymphedema of extremity  I89.0   3. Axillary web syndrome  L76.82    L90.5   4. Shoulder problem  M25.9       Rehab Potential: good    Progress Report Period: 4/23/24-5/30/24    Functional Assessment Used          Objective Findings and Assessment:   Patient progression towards goals: oSl has sustained an increase to her L UE circumferential measurements and has also noted a small cord to her L axillay. Suspect this to be related to weekend activity of camping, heat, and sodium. Anticipate Sol to be able to reduce and control her L UE with ongoing performance of self-MLD as well as light compression, which was placed ot her L UE today.       Objective findings and assessment details: Left Upper Extremity Circumferential Measurements 5/30  Axilla: 40.2 cm  Upper Proximal Humerus: 36.7 cm  Distal Humerus: 34.2 cm  Elbow: 28.3 cm  Mid-Forearm: 26 cm  Wrist: 16.4 cm  Distal Garza Crease: 19.2 cm  Total: 201 cm      Left Upper Extremity Circumferential Measurements 4/23  Axilla: 40.1 cm  Upper Proximal Humerus: 36.8 cm  Distal Humerus: 32.6 cm  Elbow: 28.3 cm  Mid-Forearm: 25.7 cm  Wrist: 15.8 cm  Distal Garza Crease: 18.2 cm  Total: 197.5 cm      Left Upper Extremity Circumferential Measurements EVAL  Axilla: 38.5 cm  Upper Proximal Humerus: 37.7 cm  Distal Humerus: 34.4 cm  Elbow: 27.2 cm  Mid-Forearm: 24.7 cm  Wrist: 15.9 cm  Distal Garza Crease: 18.4  cm  Total: 196.8 cm     Right Upper Extremity Circumferential Measurements EVAL  Axilla: 37.1 cm  Upper Proximal Humerus: 36.6 cm  Distal Humerus: 33.4 cm  Elbow: 27.2 cm  Mid-Forearm: 22 cm  Wrist: 16.8 cm  Distal Garza Crease: 18.4 cm  Total: cm 191.5      Goals:   Short Term Goals:   1. Pt will achieve a total limb circumference reduction of 3cm in order to decrease risk for infection and progression of disease process. Goal Not Met  2. Pt will be independent with self-MLD to facilitate fluid migration to healthy tissues and lymph nodes. Goal Met  3. Pt will obtain a compressive garment to allow for fluid movement out of forearm. Goal Not MEt    Short term goal time span:  2-4 weeks      Long Term Goals:    1. Pt will have a reduction in total limb circumference of 6cm in order to decrease risk for infection and progression of disease process. Goal Not Met  2. Patient will be independent with maintenance phase management of lymphedema including: compression garments, skin care education, risk reduction strategies, manual lymphatic drainage, and therapeutic exercise. Goal Not Met  3. Pt will sustain her full ROM and initiate a strengthening program to build lean muscle mass. Goal Partially Met     Long term goal time span:  4-6 weeks    Plan:   Planned therapy interventions:  Functional Training, Self Care (CPT 80283), Manual Therapy (CPT 88886), Therapeutic Activities (CPT 62149) and Therapeutic Exercise (CPT 14697)  Frequency:  1x week  Duration in weeks:  8      Referring provider co-signature:  I have reviewed this plan of care and my co-signature certifies the need for services.     Certification Period: 05/30/2024 to 07/25/24    Physician Signature: ________________________________ Date: ______________

## 2024-05-31 NOTE — PROGRESS NOTES
Assessment    I spoke to the patient this afternoon for her monthly PCM review. The patient is aware of her future appointment dates and times. She reports some fatigue towards the end of her [place] doses for the month, uses oxygen when she gets short of breath after activity or at night but denies any increase in need or symptomology, and that her skin can have a smooth, shiny cast but other than that she reports that she is doing very well. Her Ozempic dosage was increased and she is aware of what she needs to do to help bring her blood glucose back in line. The patient states that her blood pressures have been stable. She has been participating in physical therapy for lymphedema due to a lymph node removed during her recent cancer treatment. She states that this is helpful and is pleased with how it is going. The patient denies any recent falls or injuries. The patient denies any other pressing needs or concerns. She reports that she is aware of how to contact me should any arise.     Education    Article on Diabetes and Sleep sent by mail    Plan of Care and Goals    *Patient will remain free from falls/injuries  *Healthy nutrition, hydration, and activity  *Patient will continue to follow up with PCP and specialists as indicated    Barriers:    Management of acute and chronic medical concerns    Progress:    Progressing    Next outreach:  One Month

## 2024-06-03 RX ORDER — LAMOTRIGINE 25 MG/1
250 TABLET ORAL ONCE
Status: CANCELLED | OUTPATIENT
Start: 2024-06-10 | End: 2024-06-10

## 2024-06-03 RX ORDER — ONDANSETRON 2 MG/ML
4 INJECTION INTRAMUSCULAR; INTRAVENOUS PRN
Status: CANCELLED | OUTPATIENT
Start: 2024-06-10

## 2024-06-03 RX ORDER — ONDANSETRON 8 MG/1
8 TABLET, ORALLY DISINTEGRATING ORAL PRN
Status: CANCELLED | OUTPATIENT
Start: 2024-06-10

## 2024-06-03 RX ORDER — PROCHLORPERAZINE MALEATE 10 MG
10 TABLET ORAL EVERY 6 HOURS PRN
Status: CANCELLED | OUTPATIENT
Start: 2024-06-10

## 2024-06-03 RX ORDER — METHYLPREDNISOLONE SODIUM SUCCINATE 125 MG/2ML
125 INJECTION, POWDER, LYOPHILIZED, FOR SOLUTION INTRAMUSCULAR; INTRAVENOUS PRN
Status: CANCELLED | OUTPATIENT
Start: 2024-06-10

## 2024-06-03 RX ORDER — DIPHENHYDRAMINE HYDROCHLORIDE 50 MG/ML
50 INJECTION INTRAMUSCULAR; INTRAVENOUS PRN
Status: CANCELLED | OUTPATIENT
Start: 2024-06-10

## 2024-06-03 RX ORDER — EPINEPHRINE 1 MG/ML(1)
0.5 AMPUL (ML) INJECTION PRN
Status: CANCELLED | OUTPATIENT
Start: 2024-06-10

## 2024-06-05 ENCOUNTER — PHYSICAL THERAPY (OUTPATIENT)
Dept: PHYSICAL THERAPY | Facility: REHABILITATION | Age: 59
End: 2024-06-05
Attending: INTERNAL MEDICINE
Payer: MEDICARE

## 2024-06-05 DIAGNOSIS — L76.82 AXILLARY WEB SYNDROME: ICD-10-CM

## 2024-06-05 DIAGNOSIS — C50.912 BREAST CANCER, STAGE 4, LEFT (HCC): ICD-10-CM

## 2024-06-05 DIAGNOSIS — M25.9 SHOULDER PROBLEM: ICD-10-CM

## 2024-06-05 DIAGNOSIS — L90.5 AXILLARY WEB SYNDROME: ICD-10-CM

## 2024-06-05 DIAGNOSIS — I89.0 LYMPHEDEMA OF EXTREMITY: ICD-10-CM

## 2024-06-05 PROCEDURE — 97535 SELF CARE MNGMENT TRAINING: CPT

## 2024-06-05 PROCEDURE — 97140 MANUAL THERAPY 1/> REGIONS: CPT

## 2024-06-05 NOTE — OP THERAPY DAILY TREATMENT
Outpatient Physical Therapy  LYMPHEDEMA THERAPY DAILY TREATMENT     [facility] Physical Therapy [address]  [address]  [place] [address]  Phone:  [phone]  Fax:  [phone]    Date: 06/05/2024    Patient: Sol Kern  YOB: 1965  MRN: [id number]     Time Calculation    Start time: 1330  Stop time: 1417 Time Calculation (min): 47 minutes         Chief Complaint: Lymphedema Breast Cancer    Visit #: 10    Subjective:   History of Present Illness:     Mechanism of injury:  Arm better sometimes but sometimes the same.       Lymphedema Objective      Left Upper Extremity Circumferential Measurements  Other: cm  Areola: cm  Breast Crease: cm  Axilla: 40.2 cm  Upper Proximal Humerus: 37.5 cm  Distal Humerus: 33.6 cm  Elbow: 28.2 cm  Mid-Forearm: 26.2 cm  Wrist: 16.3 cm  Distal Garza Crease: 19 cm  Total: 201 cm    Right Upper Extremity Circumferential Measurements  Other: cm  Areola: cm  Breast Crease: cm  Axilla: 40 cm  Upper Proximal Humerus: 38.5 cm  Distal Humerus: 34.3 cm  Elbow: 27.5 cm  Mid-Forearm: 23.6 cm  Wrist: 16.7 cm  Distal Garza Crease: 18.7 cm  Total: cm 199.3          Left Upper Extremity Circumferential Measurements5/30  Other: cm  Areola: cm  Breast Crease: cm  Axilla: 40.2 cm  Upper Proximal Humerus: 36.7 cm  Distal Humerus: 34.2 cm  Elbow: 28.3 cm  Mid-Forearm: 26 cm  Wrist: 16.4 cm  Distal Garza Crease: 19.2 cm  Total: 201 cm              Left Upper Extremity Circumferential Measurements 4/30  Other: cm  Areola: cm  Breast Crease: cm  Axilla: 40.1 cm  Upper Proximal Humerus: 36.5 cm  Distal Humerus: 32.3 cm  Elbow: 27.6 cm  Mid-Forearm: 24.3 cm  Wrist: 16.2 cm  Distal Garza Crease: 19 cm  Total: 196 cm      Left Upper Extremity Circumferential Measurements 4/23  Other: cm  Areola: cm  Breast Crease: cm  Axilla: 40.1 cm  Upper Proximal Humerus: 36.8 cm  Distal Humerus: 32.6 cm  Elbow: 28.3 cm  Mid-Forearm: 25.7 cm  Wrist: 15.8 cm  Distal Garza Crease: 18.2  cm  Total: 197.5 cm              Left Upper Extremity Circumferential Measurements EVAL  Other: cm  Areola: cm  Breast Crease: cm  Axilla: 38.5 cm  Upper Proximal Humerus: 37.7 cm  Distal Humerus: 34.4 cm  Elbow: 27.2 cm  Mid-Forearm: 24.7 cm  Wrist: 15.9 cm  Distal Garza Crease: 18.4 cm  Total: 196.8 cm     Right Upper Extremity Circumferential Measurements EVAL  Other: cm  Areola: cm  Breast Crease: cm  Axilla: 37.1 cm  Upper Proximal Humerus: 36.6 cm  Distal Humerus: 33.4 cm  Elbow: 27.2 cm  Mid-Forearm: 22 cm  Wrist: 16.8 cm  Distal Garza Crease: 18.4 cm  Total: cm 191.5    Therapeutic Exercises (CPT 46790):     1. wall walk with lift off, HEP    2. sciatic nerve glide, HEP    3. wall gold with chin tuck and scap retraction, HEP    4. standing extension with elbow drive, HEP    5. door extension with strap, HEP      Therapeutic Exercise Summary: HEP: seated pelvic tilts, wall walk    Therapeutic Treatments and Modalities:     1. Manual Therapy (CPT 14297)    Therapeutic Treatment and Modalities Summary: -Trigger point release to L: forearm extensors, biceps, pec major, pec minor, subscap, supraspinatus, infraspinatus, teres major/minor, traps, levator scap   -L joint mobilization to radius/ulna for interossei, wrist, CMC  -MLD to L breast including parasternal and intercostal collectors.   -MLD to L UE without trunk involvement. Focus on L UE and accompanying anastomoses.   The purpose of Manual Lymph Drainage (MLD) is to reduce lymph volume in the affected limb by increasing intake of lymphatic load into the lymphatic system, increasing the volume of transported lymph fluid, moving lymph fluid in superficial lymph vessels to collateral lymph collectors, anastomoses, or tissue channels, and increasing venous return. The goal of MLD is to re-route the lymph flow around blocked areas into more centrally located healthy lymph vessels, which drain into the venous system.        Time-based  treatments/modalities:    Physical Therapy Timed Treatment Charges  Functional training, self care minutes (CPT 40157): 10 minutes  Manual therapy minutes (CPT 72724): 37 minutes      Assessment and Plan:   Assessment details:  Sol has sustained her measurements from her previous visit. It is possible that she has gained some weight as her R UE has also increased in circumference. However, she does note a heaviness/achiness to her L axilla which is consistent with fluid accumulation despite measurements. She will benefit from further intervention to achieve decongestion.     Plan:  Therapy options:  Physical therapy treatment to continue  Discussed with:  Patient

## 2024-06-10 ENCOUNTER — OUTPATIENT INFUSION SERVICES (OUTPATIENT)
Dept: ONCOLOGY | Facility: MEDICAL CENTER | Age: 59
End: 2024-06-10
Attending: NURSE PRACTITIONER
Payer: MEDICARE

## 2024-06-10 VITALS
RESPIRATION RATE: 18 BRPM | TEMPERATURE: 97.5 F | HEART RATE: 88 BPM | OXYGEN SATURATION: 98 % | BODY MASS INDEX: 39.18 KG/M2 | HEIGHT: 63 IN | SYSTOLIC BLOOD PRESSURE: 168 MMHG | WEIGHT: 221.12 LBS | DIASTOLIC BLOOD PRESSURE: 94 MMHG

## 2024-06-10 DIAGNOSIS — C50.412 CARCINOMA OF UPPER-OUTER QUADRANT OF LEFT BREAST IN FEMALE, ESTROGEN RECEPTOR POSITIVE (HCC): ICD-10-CM

## 2024-06-10 DIAGNOSIS — Z17.0 CARCINOMA OF UPPER-OUTER QUADRANT OF LEFT BREAST IN FEMALE, ESTROGEN RECEPTOR POSITIVE (HCC): ICD-10-CM

## 2024-06-10 DIAGNOSIS — C50.912 BREAST CANCER, STAGE 4, LEFT (HCC): ICD-10-CM

## 2024-06-10 LAB
ALBUMIN SERPL BCP-MCNC: 3.9 G/DL (ref 3.2–4.9)
ALBUMIN/GLOB SERPL: 1.3 G/DL
ALP SERPL-CCNC: 79 U/L (ref 30–99)
ALT SERPL-CCNC: 28 U/L (ref 2–50)
ANION GAP SERPL CALC-SCNC: 12 MMOL/L (ref 7–16)
AST SERPL-CCNC: 25 U/L (ref 12–45)
BASOPHILS # BLD AUTO: 1 % (ref 0–1.8)
BASOPHILS # BLD: 0.05 K/UL (ref 0–0.12)
BILIRUB SERPL-MCNC: 0.3 MG/DL (ref 0.1–1.5)
BUN SERPL-MCNC: 11 MG/DL (ref 8–22)
CA-I BLD ISE-SCNC: 1.2 MMOL/L (ref 1.1–1.3)
CALCIUM ALBUM COR SERPL-MCNC: 9.6 MG/DL (ref 8.5–10.5)
CALCIUM SERPL-MCNC: 9.5 MG/DL (ref 8.5–10.5)
CEA SERPL-MCNC: 1.5 NG/ML (ref 0–3)
CHLORIDE SERPL-SCNC: 104 MMOL/L (ref 96–112)
CO2 SERPL-SCNC: 23 MMOL/L (ref 20–33)
CREAT BLD-MCNC: 0.7 MG/DL (ref 0.5–1.4)
CREAT SERPL-MCNC: 0.65 MG/DL (ref 0.5–1.4)
EOSINOPHIL # BLD AUTO: 0.11 K/UL (ref 0–0.51)
EOSINOPHIL NFR BLD: 2.2 % (ref 0–6.9)
ERYTHROCYTE [DISTWIDTH] IN BLOOD BY AUTOMATED COUNT: 47.3 FL (ref 35.9–50)
GFR SERPLBLD CREATININE-BSD FMLA CKD-EPI: 100 ML/MIN/1.73 M 2
GLOBULIN SER CALC-MCNC: 3 G/DL (ref 1.9–3.5)
GLUCOSE SERPL-MCNC: 181 MG/DL (ref 65–99)
HCT VFR BLD AUTO: 44.5 % (ref 37–47)
HGB BLD-MCNC: 15.2 G/DL (ref 12–16)
IMM GRANULOCYTES # BLD AUTO: 0.03 K/UL (ref 0–0.11)
IMM GRANULOCYTES NFR BLD AUTO: 0.6 % (ref 0–0.9)
LYMPHOCYTES # BLD AUTO: 1.05 K/UL (ref 1–4.8)
LYMPHOCYTES NFR BLD: 20.8 % (ref 22–41)
MCH RBC QN AUTO: 33.9 PG (ref 27–33)
MCHC RBC AUTO-ENTMCNC: 34.2 G/DL (ref 32.2–35.5)
MCV RBC AUTO: 99.3 FL (ref 81.4–97.8)
MONOCYTES # BLD AUTO: 0.44 K/UL (ref 0–0.85)
MONOCYTES NFR BLD AUTO: 8.7 % (ref 0–13.4)
NEUTROPHILS # BLD AUTO: 3.38 K/UL (ref 1.82–7.42)
NEUTROPHILS NFR BLD: 66.7 % (ref 44–72)
NRBC # BLD AUTO: 0 K/UL
NRBC BLD-RTO: 0 /100 WBC (ref 0–0.2)
PLATELET # BLD AUTO: 337 K/UL (ref 164–446)
PMV BLD AUTO: 9.2 FL (ref 9–12.9)
POTASSIUM SERPL-SCNC: 3.9 MMOL/L (ref 3.6–5.5)
PROT SERPL-MCNC: 6.9 G/DL (ref 6–8.2)
RBC # BLD AUTO: 4.48 M/UL (ref 4.2–5.4)
SODIUM SERPL-SCNC: 139 MMOL/L (ref 135–145)
WBC # BLD AUTO: 5.1 K/UL (ref 4.8–10.8)

## 2024-06-10 PROCEDURE — 96402 CHEMO HORMON ANTINEOPL SQ/IM: CPT

## 2024-06-10 PROCEDURE — 85025 COMPLETE CBC W/AUTO DIFF WBC: CPT

## 2024-06-10 PROCEDURE — 82330 ASSAY OF CALCIUM: CPT

## 2024-06-10 PROCEDURE — 86300 IMMUNOASSAY TUMOR CA 15-3: CPT

## 2024-06-10 PROCEDURE — 80053 COMPREHEN METABOLIC PANEL: CPT

## 2024-06-10 PROCEDURE — 82565 ASSAY OF CREATININE: CPT

## 2024-06-10 PROCEDURE — 96372 THER/PROPH/DIAG INJ SC/IM: CPT

## 2024-06-10 PROCEDURE — 700111 HCHG RX REV CODE 636 W/ 250 OVERRIDE (IP): Mod: JZ | Performed by: NURSE PRACTITIONER

## 2024-06-10 PROCEDURE — 82378 CARCINOEMBRYONIC ANTIGEN: CPT

## 2024-06-10 RX ORDER — LAMOTRIGINE 25 MG/1
250 TABLET ORAL ONCE
Status: COMPLETED | OUTPATIENT
Start: 2024-06-10 | End: 2024-06-10

## 2024-06-10 RX ADMIN — DENOSUMAB 120 MG: 120 INJECTION SUBCUTANEOUS at 14:10

## 2024-06-10 RX ADMIN — FULVESTRANT 250 MG: 50 INJECTION INTRAMUSCULAR at 14:15

## 2024-06-10 RX ADMIN — FULVESTRANT 250 MG: 50 INJECTION INTRAMUSCULAR at 14:11

## 2024-06-10 ASSESSMENT — FIBROSIS 4 INDEX: FIB4 SCORE: 1.1

## 2024-06-10 NOTE — PROGRESS NOTES
Pt arrives to [facility] for Xgeva and Faslodex injections.  Pt denies any s/sx of infection or recent/planned dental procedures.  All labs drawn via 25g butterfly needle to R-AC.  Labs reviewed and ok to give Xgeva per pharmacy.  Xgeva given SC to back of RUE.  Faslodex given IM to left and right dorso-gluteal muscles.  Email sent to scheduling dept to set up next appt in 6 months.  Pt dc home to self care.

## 2024-06-12 ENCOUNTER — PHYSICAL THERAPY (OUTPATIENT)
Dept: PHYSICAL THERAPY | Facility: REHABILITATION | Age: 59
End: 2024-06-12
Attending: INTERNAL MEDICINE
Payer: MEDICARE

## 2024-06-12 DIAGNOSIS — L76.82 AXILLARY WEB SYNDROME: ICD-10-CM

## 2024-06-12 DIAGNOSIS — L90.5 AXILLARY WEB SYNDROME: ICD-10-CM

## 2024-06-12 DIAGNOSIS — M25.9 SHOULDER PROBLEM: ICD-10-CM

## 2024-06-12 DIAGNOSIS — C50.912 BREAST CANCER, STAGE 4, LEFT (HCC): ICD-10-CM

## 2024-06-12 DIAGNOSIS — I89.0 LYMPHEDEMA OF EXTREMITY: ICD-10-CM

## 2024-06-12 LAB — CANCER AG27-29 SERPL-ACNC: 85.2 U/ML

## 2024-06-12 PROCEDURE — 97140 MANUAL THERAPY 1/> REGIONS: CPT

## 2024-06-12 NOTE — OP THERAPY DAILY TREATMENT
Outpatient Physical Therapy  LYMPHEDEMA THERAPY DAILY TREATMENT     [facility] Physical Therapy [address].  [address]  [place] [address]  Phone:  [phone]  Fax:  [phone]    Date: 06/12/2024    Patient: Sol Kern  YOB: 1965  MRN: [id number]     Time Calculation    Start time: 1330  Stop time: 1415 Time Calculation (min): 45 minutes         Chief Complaint: Lymphedema Breast Cancer    Visit #: 11    Subjective:   History of Present Illness:     Mechanism of injury:  L UE a little sore but feeling better.       Lymphedema Objective    Left Upper Extremity Circumferential Measurements 6/5  Other: cm  Areola: cm  Breast Crease: cm  Axilla: 40.2 cm  Upper Proximal Humerus: 37.5 cm  Distal Humerus: 33.6 cm  Elbow: 28.2 cm  Mid-Forearm: 26.2 cm  Wrist: 16.3 cm  Distal Garza Crease: 19 cm  Total: 201 cm     Right Upper Extremity Circumferential Measurements 6/5  Other: cm  Areola: cm  Breast Crease: cm  Axilla: 40 cm  Upper Proximal Humerus: 38.5 cm  Distal Humerus: 34.3 cm  Elbow: 27.5 cm  Mid-Forearm: 23.6 cm  Wrist: 16.7 cm  Distal Garza Crease: 18.7 cm  Total: cm 199.3      Left Upper Extremity Circumferential Measurements5/30  Other: cm  Areola: cm  Breast Crease: cm  Axilla: 40.2 cm  Upper Proximal Humerus: 36.7 cm  Distal Humerus: 34.2 cm  Elbow: 28.3 cm  Mid-Forearm: 26 cm  Wrist: 16.4 cm  Distal Garza Crease: 19.2 cm  Total: 201 cm              Left Upper Extremity Circumferential Measurements 4/30  Other: cm  Areola: cm  Breast Crease: cm  Axilla: 40.1 cm  Upper Proximal Humerus: 36.5 cm  Distal Humerus: 32.3 cm  Elbow: 27.6 cm  Mid-Forearm: 24.3 cm  Wrist: 16.2 cm  Distal Garza Crease: 19 cm  Total: 196 cm      Left Upper Extremity Circumferential Measurements 4/23  Other: cm  Areola: cm  Breast Crease: cm  Axilla: 40.1 cm  Upper Proximal Humerus: 36.8 cm  Distal Humerus: 32.6 cm  Elbow: 28.3 cm  Mid-Forearm: 25.7 cm  Wrist: 15.8 cm  Distal Garza Crease: 18.2  cm  Total: 197.5 cm              Left Upper Extremity Circumferential Measurements EVAL  Other: cm  Areola: cm  Breast Crease: cm  Axilla: 38.5 cm  Upper Proximal Humerus: 37.7 cm  Distal Humerus: 34.4 cm  Elbow: 27.2 cm  Mid-Forearm: 24.7 cm  Wrist: 15.9 cm  Distal Garza Crease: 18.4 cm  Total: 196.8 cm     Right Upper Extremity Circumferential Measurements EVAL  Other: cm  Areola: cm  Breast Crease: cm  Axilla: 37.1 cm  Upper Proximal Humerus: 36.6 cm  Distal Humerus: 33.4 cm  Elbow: 27.2 cm  Mid-Forearm: 22 cm  Wrist: 16.8 cm  Distal Garza Crease: 18.4 cm  Total: cm 191.5    Therapeutic Exercises (CPT 40808):     1. wall walk with lift off, HEP    2. sciatic nerve glide, HEP    3. wall gold with chin tuck and scap retraction, HEP    4. standing extension with elbow drive, HEP    5. door extension with strap, HEP      Therapeutic Exercise Summary: HEP: seated pelvic tilts, wall walk    Therapeutic Treatments and Modalities:     1. Manual Therapy (CPT 44948)    Therapeutic Treatment and Modalities Summary: -Trigger point release to L: forearm extensors, biceps, pec major, pec minor, subscap, supraspinatus, infraspinatus, teres major/minor, traps, levator scap   -L joint mobilization to radius/ulna for interossei, wrist, CMC  -MLD to L breast including parasternal and intercostal collectors.   -MLD to L UE without trunk involvement. Focus on L UE and accompanying anastomoses.   The purpose of Manual Lymph Drainage (MLD) is to reduce lymph volume in the affected limb by increasing intake of lymphatic load into the lymphatic system, increasing the volume of transported lymph fluid, moving lymph fluid in superficial lymph vessels to collateral lymph collectors, anastomoses, or tissue channels, and increasing venous return. The goal of MLD is to re-route the lymph flow around blocked areas into more centrally located healthy lymph vessels, which drain into the venous system.        Time-based  treatments/modalities:    Physical Therapy Timed Treatment Charges  Manual therapy minutes (CPT 93007): 45 minutes      Assessment and Plan:   Assessment details:  Sol has had an improvement in her cording and arm is subjectively improved. She will benefit from further intervention to achieve decongestion.    Plan:  Therapy options:  Physical therapy treatment to continue  Discussed with:  Patient

## 2024-06-14 ENCOUNTER — APPOINTMENT (OUTPATIENT)
Dept: MEDICAL GROUP | Facility: PHYSICIAN GROUP | Age: 59
End: 2024-06-14
Payer: MEDICARE

## 2024-06-14 ENCOUNTER — HOSPITAL ENCOUNTER (OUTPATIENT)
Facility: MEDICAL CENTER | Age: 59
End: 2024-06-14
Attending: FAMILY MEDICINE
Payer: MEDICARE

## 2024-06-14 ENCOUNTER — PATIENT OUTREACH (OUTPATIENT)
Dept: HEALTH INFORMATION MANAGEMENT | Facility: OTHER | Age: 59
End: 2024-06-14

## 2024-06-14 VITALS
OXYGEN SATURATION: 93 % | BODY MASS INDEX: 39.3 KG/M2 | SYSTOLIC BLOOD PRESSURE: 128 MMHG | HEART RATE: 80 BPM | WEIGHT: 221.8 LBS | HEIGHT: 63 IN | TEMPERATURE: 97.6 F | DIASTOLIC BLOOD PRESSURE: 72 MMHG

## 2024-06-14 DIAGNOSIS — Z01.419 WELL WOMAN EXAM: ICD-10-CM

## 2024-06-14 DIAGNOSIS — Z11.51 SCREENING FOR HPV (HUMAN PAPILLOMAVIRUS): ICD-10-CM

## 2024-06-14 DIAGNOSIS — C50.912 BREAST CANCER, STAGE 4, LEFT (HCC): ICD-10-CM

## 2024-06-14 DIAGNOSIS — Z86.16 HISTORY OF COVID-19: ICD-10-CM

## 2024-06-14 DIAGNOSIS — I10 ESSENTIAL HYPERTENSION: ICD-10-CM

## 2024-06-14 DIAGNOSIS — Z12.4 SCREENING FOR CERVICAL CANCER: ICD-10-CM

## 2024-06-14 DIAGNOSIS — G47.01 INSOMNIA DUE TO MEDICAL CONDITION: ICD-10-CM

## 2024-06-14 DIAGNOSIS — E11.40 TYPE 2 DIABETES MELLITUS WITH DIABETIC NEUROPATHY, WITHOUT LONG-TERM CURRENT USE OF INSULIN (HCC): ICD-10-CM

## 2024-06-14 DIAGNOSIS — E78.00 HIGH CHOLESTEROL: ICD-10-CM

## 2024-06-14 DIAGNOSIS — J96.11 CHRONIC RESPIRATORY FAILURE WITH HYPOXIA (HCC): ICD-10-CM

## 2024-06-14 PROBLEM — K64.9 HEMORRHOIDS: Status: ACTIVE | Noted: 2024-06-14

## 2024-06-14 LAB
CREAT UR-MCNC: 91.62 MG/DL
MICROALBUMIN UR-MCNC: <1.2 MG/DL
MICROALBUMIN/CREAT UR: NORMAL MG/G (ref 0–30)

## 2024-06-14 PROCEDURE — 99214 OFFICE O/P EST MOD 30 MIN: CPT | Mod: 25 | Performed by: FAMILY MEDICINE

## 2024-06-14 PROCEDURE — 3078F DIAST BP <80 MM HG: CPT | Performed by: FAMILY MEDICINE

## 2024-06-14 PROCEDURE — 82043 UR ALBUMIN QUANTITATIVE: CPT

## 2024-06-14 PROCEDURE — 88175 CYTOPATH C/V AUTO FLUID REDO: CPT

## 2024-06-14 PROCEDURE — 82570 ASSAY OF URINE CREATININE: CPT

## 2024-06-14 PROCEDURE — 3074F SYST BP LT 130 MM HG: CPT | Performed by: FAMILY MEDICINE

## 2024-06-14 PROCEDURE — 99396 PREV VISIT EST AGE 40-64: CPT | Performed by: FAMILY MEDICINE

## 2024-06-14 PROCEDURE — 87624 HPV HI-RISK TYP POOLED RSLT: CPT

## 2024-06-14 PROCEDURE — 99000 SPECIMEN HANDLING OFFICE-LAB: CPT | Performed by: FAMILY MEDICINE

## 2024-06-14 RX ORDER — ATORVASTATIN CALCIUM 20 MG/1
20 TABLET, FILM COATED ORAL NIGHTLY
Qty: 100 TABLET | Refills: 0 | Status: SHIPPED | OUTPATIENT
Start: 2024-06-14

## 2024-06-14 RX ORDER — ZOLPIDEM TARTRATE 10 MG/1
10 TABLET ORAL NIGHTLY PRN
Qty: 30 TABLET | Refills: 0 | Status: SHIPPED | OUTPATIENT
Start: 2024-06-14 | End: 2024-07-14

## 2024-06-14 RX ORDER — LAMOTRIGINE 25 MG/1
250 TABLET ORAL
COMMUNITY

## 2024-06-14 ASSESSMENT — FIBROSIS 4 INDEX: FIB4 SCORE: 0.86

## 2024-06-14 NOTE — PROGRESS NOTES
Assessment    I spoke to the patient while she was in clinic today for an appointment with her PCP. The patient is aware of her future appointment dates and times. The patient's medications were reviewed by her PCP and patient reports them being taken as indicated in her chart. The patient reports no pressing questions or needs. She states that Dr. Lyn has sent a message to Dr. Kline with regard to a yearly mammogram. Patient to schedule when orders present, contact me if assistance is needed. The patient reports understanding of how to get a hold of me if any needs or questions arise.     Education    Article on cholesterol sent by mail    Plan of Care and Goals    Patient will remain free from falls, injuries.   Healthy nutrition, hydration, and activity    Barriers:    Management of acute and chronic medical concerns.     Progress:    Progressing    Next outreach:  One Month

## 2024-06-14 NOTE — PROGRESS NOTES
Subjective:     CC:   Chief Complaint   Patient presents with    Annual Exam     W/ pap       HPI:   Sol Kern is a 59 y.o. female who presents for annual exam. She is feeling well and denies any complaints.    Ob-Gyn/ History:    Patient has GYN provider: no  /Para:  5/2  Last Pap Smear:  5+ years ago. No history of abnormal pap smears.  Gyn Surgery:   x1, tubal ligation, endometrial ablation.  Current Contraceptive Method:  N/a. Yes currently sexually active.  Last menstrual period:  ~.   No significant bloating/fluid retention, pelvic pain. +dyspareunia - vaginal dryness. No vaginal discharge  Post-menopausal bleeding: no  Urinary incontinence: +stress - rare  Folate intake: n/a     Health Maintenance  Advanced directive: packet provided   Osteoporosis Screen/ DEXA: n/a   PT for falls prevention: n/a   Cholesterol Screenin2024 - T chol 188, , HDL 54, ; atorvastatin    Diabetes Screening: n/a - has DM   Aspirin Use: no      Family History:  Updated: yes  FHS-7 score: 2 - HNP participant      Anticipatory Guidance  Diet: trying to eat healthy   Exercise: not much   Substance Abuse: no   Safe in relationship - .   Seat belts, bike helmet, gun safety discussed.  Sun protection used.  Dental Home.    Cancer screening  Colorectal Cancer Screening: scheduled    Lung Cancer Screening: n/a - <20 pack-years    Cervical Cancer Screening: today   Breast Cancer Screening: asking oncology     Infectious disease screening/Immunizations  --STI Screening: declined   --Practices safe sex.  --HIV Screening: reports completed - neg  --Hepatitis C Screening: completed - neg ()   --Immunizations:    Influenza: n/a    HPV:  n/a    Tetanus: due     Shingles: completed   Pneumococcal: completed     Hepatitis B: completed  COVID-19:  booster recommended  Other immunizations: n/a     She  has a past medical history of Allergy, Back pain, Cancer (HCC) (), Diabetes  (HCC), GERD (gastroesophageal reflux disease), High cholesterol, Hypertension, Malignant neoplasm of upper-outer quadrant of left female breast (HCC), Neck pain, Obesity, Sleep apnea, and Snoring.  She  has a past surgical history that includes endometrial ablation; primary c section; knee arthroscopy; foot surgery (Right); carpal tunnel release (Right); pr mastectomy, partial (Left, 11/12/2021); node biopsy sentinel (Left, 11/12/2021); and tubal coagulation laparoscopic bilateral.    Family History   Problem Relation Age of Onset    Cancer Mother         melanoma    Hypertension Mother     Other Mother         THYROID    Heart Disease Mother         valvular disease    Ovarian Cancer Mother     Heart Disease Father         Heart Attack    Heart Attack Father     Other Father         psoriasis    Hyperlipidemia Brother     Other Brother         psoriasis    Heart Disease Brother         MI    Heart Attack Brother         massive heart attack     Heart Disease Maternal Grandfather         MI    Breast Cancer Other     Other Other         psoriasis    Diabetes Neg Hx     Alcohol/Drug Neg Hx     Tubal Cancer Neg Hx     Peritoneal Cancer Neg Hx     Colorectal Cancer Neg Hx        Social History     Socioeconomic History    Marital status:      Spouse name: Not on file    Number of children: Not on file    Years of education: Not on file    Highest education level: Associate degree: occupational, technical, or vocational program   Occupational History    Not on file   Tobacco Use    Smoking status: Former     Current packs/day: 0.25     Average packs/day: 0.3 packs/day for 20.0 years (5.0 ttl pk-yrs)     Types: Cigarettes    Smokeless tobacco: Never    Tobacco comments:     working on quitting    Vaping Use    Vaping status: Never Used   Substance and Sexual Activity    Alcohol use: Not Currently     Comment: rarely    Drug use: Not Currently     Types: Marijuana, Oral     Comment: gummies    Sexual activity: Yes      Partners: Male     Comment:    Other Topics Concern    Not on file   Social History Narrative    Not on file     Social Determinants of Health     Financial Resource Strain: Low Risk  (3/20/2023)    Overall Financial Resource Strain (CARDIA)     Difficulty of Paying Living Expenses: Not very hard   Food Insecurity: No Food Insecurity (3/20/2023)    Hunger Vital Sign     Worried About Running Out of Food in the Last Year: Never true     Ran Out of Food in the Last Year: Never true   Transportation Needs: No Transportation Needs (3/20/2023)    PRAPARE - Transportation     Lack of Transportation (Medical): No     Lack of Transportation (Non-Medical): No   Physical Activity: Inactive (3/20/2023)    Exercise Vital Sign     Days of Exercise per Week: 0 days     Minutes of Exercise per Session: 0 min   Stress: Stress Concern Present (3/20/2023)    Citizen of the [place] of Occupational Health - Occupational Stress Questionnaire     Feeling of Stress : To some extent   Social Connections: Moderately Integrated (3/20/2023)    Social Connection and Isolation Panel [NHANES]     Frequency of Communication with Friends and Family: More than three times a week     Frequency of Social Gatherings with Friends and Family: Once a week     Attends Hinduism Services: 1 to 4 times per year     Active Member of Clubs or Organizations: No     Attends Club or Organization Meetings: Never     Marital Status:    Intimate Partner Violence: Not At Risk (12/5/2023)    Humiliation, Afraid, Rape, and Kick questionnaire     Fear of Current or Ex-Partner: No     Emotionally Abused: No     Physically Abused: No     Sexually Abused: No   Housing Stability: Low Risk  (12/5/2023)    Housing Stability Vital Sign     Unable to Pay for Housing in the Last Year: No     Number of Places Lived in the Last Year: 1     Unstable Housing in the Last Year: No       Patient Active Problem List    Diagnosis Date Noted    High cholesterol 06/14/2024     Hemorrhoids 06/14/2024    Restless leg syndrome 04/17/2024    Elevated LFTs 11/30/2023    Insomnia due to medical condition 11/20/2023    Weakness 11/03/2023    Breast cancer, stage 4, left (HCC) 10/25/2023    Chronic respiratory failure with hypoxia ([facility]) 10/13/2023    Elevated hemoglobin (HCC) 10/06/2023    Leukocytosis 10/06/2023    Chronic back pain 09/29/2023    Large pleural effusion 09/28/2023    Mixed stress and urge urinary incontinence 03/20/2023    Cyst (solitary) of breast 12/09/2022    Hepatic steatosis 12/09/2022    History of COVID-19 07/15/2022    Vertigo 04/21/2022    Achilles tendon mass 01/31/2022    Cigarette nicotine dependence in remission 12/15/2021    Carcinoma of upper-outer quadrant of left breast in female, estrogen receptor positive ([facility]) 10/18/2021    Neuropathy 09/15/2021    Type 2 diabetes mellitus with diabetic neuropathy, without long-term current use of insulin ([facility])     Postnasal drip 10/22/2019    Essential hypertension 03/19/2019    Vitamin D deficiency 03/05/2019    Class 2 severe obesity with serious comorbidity in adult ([facility]) 08/28/2017    YOANA (obstructive sleep apnea) 08/28/2017    Chronic midline low back pain with right-sided sciatica 08/28/2017    Neck pain 08/28/2017    Multiple allergies 08/28/2017    Psoriasis 08/28/2017         Current Outpatient Medications   Medication Sig Dispense Refill    atorvastatin (LIPITOR) 20 MG Tab Take 1 Tablet by mouth every evening. 100 Tablet 0    denosumab (XGEVA) 120 MG/1.7ML injection Inject 120 mg under the skin every 28 days.      fulvestrant (FASLODEX) 250 MG/5ML Solution Prefilled Syringe injection Inject 250 mg into the shoulder, thigh, or buttocks every 28 days.      zolpidem (AMBIEN) 10 MG Tab Take 1 Tablet by mouth at bedtime as needed for Sleep for up to 30 days. 30 Tablet 0    Semaglutide, 1 MG/DOSE, (OZEMPIC, 1 MG/DOSE,) 4 MG/3ML Solution Pen-injector Inject 1 mg under the skin every 7 days. 9 mL 0    cyclobenzaprine  "(FLEXERIL) 10 mg Tab TAKE 1 TABLET BY MOUTH THREE TIMES A DAY AS NEEDED FOR MUSCLE SPASM (Patient taking differently: Take 10 mg by mouth 3 times a day as needed for Muscle Spasms (As needed).) 90 Tablet 0    metFORMIN ER (GLUCOPHAGE XR) 500 MG TABLET SR 24 HR Take 1 Tablet by mouth every day. 200 Tablet 1    ondansetron (ZOFRAN ODT) 4 MG TABLET DISPERSIBLE Take 1 Tablet by mouth every 6 hours as needed for Nausea/Vomiting. For cancer treatment associated nausea 30 Tablet 1    lisinopril (PRINIVIL) 2.5 MG Tab Take 1 Tablet by mouth every day. 90 Tablet 1    lidocaine (LIDODERM) 5 % Patch Place 1 Patch on the skin every 24 hours. Place patch to affected area in am and remove at bedtime. 30 Patch 5    albuterol 108 (90 Base) MCG/ACT Aero Soln inhalation aerosol Inhale 2 Puffs every 6 hours as needed for Shortness of Breath. 6.7 Each 1    Ribociclib Succ, 600 MG Dose, 200 MG Tablet Therapy Pack Take 600 mg by mouth see administration instructions. Take 600 mg by mouth daily for 3 weeks (21 days), off for 1 week (7 days) (Patient taking differently: Take 400 mg by mouth every day.) 63 Each 0    fluocinonide (LIDEX) 0.05 % Cream Apply 1 Application topically 2 times a day as needed (Rash, Itching). Apply to affected areas of back or buttocks. Do not use on face, armpit, or groin.      Cyanocobalamin (B-12) 5000 MCG Cap Take 5,000 mcg by mouth every evening.       No current facility-administered medications for this visit.     Allergies   Allergen Reactions    Codeine Vomiting and Nausea    Hydrocodone Vomiting and Nausea    Other Drug Vomiting and Nausea     Any of the \"cets\" (percocet)       Review of Systems   Constitutional: Negative for fever, chills.   HENT: Negative for congestion.    Eyes: Negative for pain.    Respiratory: Positive for shortness of breath - chronic.  Cardiovascular: Negative for leg swelling.   Gastrointestinal: Negative for nausea, vomiting.   Genitourinary: Negative for hematuria.   Skin: " "Negative for rash.   Neurological: Positive for dizziness - slight, sometimes, related to sugars.   Endo/Heme/Allergies: Does not bleed easily.   Psychiatric/Behavioral: Negative for depression.  The patient is not nervous/anxious.      Objective:     /72 (BP Location: Left arm, Patient Position: Sitting, BP Cuff Size: Adult)   Pulse 80   Temp 36.4 °C (97.6 °F) (Temporal)   Ht 1.6 m (5' 2.99\")   Wt 101 kg (221 lb 12.8 oz)   SpO2 93%   BMI 39.30 kg/m²   Body mass index is 39.3 kg/m².  Wt Readings from Last 4 Encounters:   06/14/24 101 kg (221 lb 12.8 oz)   06/10/24 100 kg (221 lb 1.9 oz)   05/29/24 99.5 kg (219 lb 4 oz)   05/13/24 99.2 kg (218 lb 11.1 oz)       Physical Exam:  Constitutional: Well-developed and well-nourished. Not diaphoretic. No distress.   Skin: Skin is warm and dry. No rash noted.  Head: Atraumatic without lesions.  Eyes: Conjunctivae and extraocular motions are normal. Pupils are equal, round, and reactive to light. No scleral icterus.   Ears:  External ears unremarkable. Tympanic membranes clear and intact.  Mouth/Throat: Dentition is fair. Tongue normal. Oropharynx is clear and moist. Posterior pharynx without erythema or exudates.  Neck: Supple, trachea midline. Normal range of motion. No thyromegaly present. No lymphadenopathy--cervical or supraclavicular.  Cardiovascular: Regular rate and rhythm, S1 and S2 without murmur, rubs, or gallops.  Lungs: Normal inspiratory effort, CTA bilaterally, no wheezes/rhonchi/rales  Abdomen: Soft, non tender, and without distention. Active bowel sounds in all four quadrants. No rebound, guarding, masses or HSM.  :Perineum and external genitalia normal without rash. Vagina with scant discharge. Cervix without visible lesions or discharge.  Extremities: No cyanosis, clubbing, erythema, nor edema. Distal pulses intact and symmetric.   Musculoskeletal: All major joints AROM full in all directions without pain.  Neurological: Alert and oriented x 3. " DTRs 2+/3 and symmetric. No cranial nerve deficit except right sided CN VIII. 5/5 myotomes. Sensation intact.   Psychiatric:  Behavior, mood, and affect are appropriate.    A chaperone was offered to the patient during today's exam.: Chaperone Michelle Hernandez was present.    Assessment and Plan:     1. Well woman exam        2. Screening for HPV (human papillomavirus)  THINPREP PAP WITH HPV      3. Screening for cervical cancer  THINPREP PAP WITH HPV      4. Type 2 diabetes mellitus with diabetic neuropathy, without long-term current use of insulin (HCC)  Microalbumin Creat Ratio Urine - Clinic Collect      5. High cholesterol        6. Insomnia due to medical condition  Controlled Substance Treatment Agreement    zolpidem (AMBIEN) 10 MG Tab          HCM:  up to date   Labs per orders  Immunizations per orders  Patient counseled about skin care, diet, supplements, prenatal vitamins, safe sex and exercise.    Referral for genetic research was offered. Patient is a participant.    ACUTE VISIT    3. High cholesterol  Chronic, uncontrolled. LDL remains above 100. Will increase atorvastatin to 20 mg daily.     4. Insomnia due to medical condition  Chronic, uncontrolled. 4 months of difficulty staying asleep nearly nightly. Trying to adhere to sleep hygiene. She would like a refill of ambien to help tide her over. Last prescription was 11/20/2023. She reports 5 mg doesn't work, she needs 10 mg. We will provide a small refill today. Informed consent and controlled substance agreement obtained today. If this becomes a regular refill, then we will need to get a UDS. Obtained and reviewed patient utilization report from [facility] pharmacy database on 6/14/2024 8:51 AM  prior to writing prescription for controlled substance II, III or IV per [place] bill . Based on assessment of the report, the prescription is medically necessary.   - ambien 10 mg tabs, 1 tab nightly prn insomnia, #30, 30 days    **Patient was informed the  annual wellness visit does not include a discussion of acute/new concerns. She was informed that this portion of the visit will be coded separately and She will receive a separate bill later. Patient expressed understanding.    Follow-up: Return if symptoms worsen or fail to improve.

## 2024-06-14 NOTE — PATIENT INSTRUCTIONS
12 Tips for Healthy Sleep:    Stick to a sleep schedule.  Noted bed and wake up at the same time each day.  Is creatures of habit, people have a hard time adjusting changes sleep patterns.  Sleeping later on weekends hopefully make up for lack of sleep during the week and will be harder to wake up early Monday morning.  Set alarm for bedtime.  Often we set alarm for this and wake up at 3 to do so for when it is time to go to sleep.  If there is only 1 piece of ice remember intake and use toetips, that should be it.  Exercise is great, but not too late in the day.  Try to exercise at least 30 minutes on most days but not later than 2 to 3 hours before your bedtime.  Avoid caffeine and nicotine.  Coffee, shalonda, certain teas, chocolate contain a stimulant caffeine, and its effects can take as long as 8 hours to work fully.  Therefore, cup of coffee late afternoon can make it hard for her full sleep at night.  Nicotine is also stimulant, often causes milligrams of sleep only very lightly.  In addition, smokers often wake up too early in the morning because of nicotine withdrawal.  Avoid alcoholic drinks before bed.  Having a night Or alcoholic beverage before sleep may help you relax, but heavy use rubs you have REM sleep, keeping you in the later stages of sleep.  Heavy alcohol ingestion also may contribute to impairment in breathing at night.  He also tend to wake up in the middle of the night when the effects of the alcohol are worn off.  Avoid large meals and beverages late at night.  A light snack is okay, but a large meal can cause indigestion, which interferes with sleep.  Drinking to any fluids at night can cause frequent awakenings to urinate.  If possible, avoid medications that delay or disrupt her sleep.  Some commonly prescribed heart, blood pressure, or asthma medications, as well as some over-the-counter and herbal remedies for cough, colds, or allergies, can disrupt sleep patterns.  In the event trouble  sleeping, talk to your healthcare provider pharmacist to see whether any drugs or taking might be contributing to your insomnia and ask whether that can be taken at other times during the day early in the evening.  Do not take naps after 3 PM.  Naps can help make up for lost sleep, but late afternoon naps can make it harder to fall asleep at night.  Relax before bed.  Do not over schedule your day so that no time is low for unwinding.  A relaxing activity, such as reading or listening to music, she reported a bedtime ritual.  Take a hot bath before bed.  The drop in body temperature after getting out of the bath may help you feel sleepy, in the bath can help you relax and slow down so you are more ready to sleep.  Dark bedroom, cool bedroom, and gadget free bedroom.  Get rid of anything in your bedroom I distract you from sleep, such as noises, bright lights, an uncomfortable bed, or warm temperatures.  You sleep better if the temperature in the room is kept on the cool side.  A TV, cell phone, or computer in the bedroom can be a distraction and a private if needed sleep.  Having a comfortable mattress and pillow can help promote a good night sleep.  Individuals with insomnia often watch the clock.  Turn the clock face out of UC do not worry about the time while trying to fall asleep.  Have the right sunlight exposure.  [place] is [place] to regulating daily sleep patterns.  Try to get outside in natural sunlight for at least 30 minutes each day.  If possible, wake up with the sun or use very bright lights in the morning.  Sleep experts recommend that, if you have problems falling asleep, you should get an hour of exposure to morning sunlight and turn down the lights before bedtime.  Do not lie in bed awake.  If you find yourself still awake after staying in bed for more than 20 minutes or if you are starting to feel anxious or worried, get up and do some relaxing activity until you feel sleepy.  The anxiety of not  being able to sleep can make it harder to fall asleep.    Insomnia Treatment Options    CBT-Insomnia (#1 Treatment recommendation)  - Cognitive Behavioral Therapy (CBT) for insomnia has been found to be effective at promoting improved, healthy sleep.  - Unfortunately, we do not have enough psychologists providing this excellent treatment for insomnia but I have a couple options where you can do this on your own.  CBT-I  mobile phone thee. This is free and was developed by the [facility] to treat veterans for insomnia. It does work best in conjunction with a therapist/psychologist, but it can be used by itself  Say Kash To Insomnia by Darrius Pierre. This a book where you learn about sleep and how sleep medicines actually do not help and then guides you through a 6 weeks of CBT for isomnia.    Sleep Medications (Back Up Option, and only for short-term)  - Sleep medications are no longer recommended as 1st-line treatment for insomnia and not recommended for long term treatment.  - Not a single sleep medication on the market helps people fall asleep faster nor do they cause healthier, deeper sleep  - Potential risks of sleep medications   - Lingering next-day effects. Up to 80% of people report excess drowsiness or slowed thinking in the morning.   - Confusion or loss of coordination. This could increase a person's risk for falls or lead to accidents right before bed or during the night.   - Abnormal behavior. This can include talking, walking, or taking a drive while someone remains partially asleep and they can be completely unaware of their actions.   - Allergic reaction   - Drug interaction. The medication may interact with another medication, potentially changing that medication's effectiveness.   - Suppressed breathing. Some medications can suppress breathing while asleep, worsening conditions like Sleep Apnea.   - Worsen some medical conditions. Some sleep medications can worsen depression, for example.   -  Habit-forming. Many sleep medications can be habit-forming. This could lead to the medication being used too long, or needing higher doses of the medication for it to work again, increasing the risk for side effects. Also, abruptly stopping these medications can lead to withdrawal.  - If you are using a sleep medication, please try to limit your use and work on the 12 tips discussed above.    For Further Information    - I recommend the website: https://sleepfoundation.org  - You can learn all about sleep and its health benefits as well as tips and products to help promote healthy sleep

## 2024-06-18 LAB
CYTOLOGIST CVX/VAG CYTO: NORMAL
CYTOLOGY CVX/VAG DOC CYTO: NORMAL
CYTOLOGY CVX/VAG DOC THIN PREP: NORMAL
HPV I/H RISK 4 DNA CVX QL PROBE+SIG AMP: NEGATIVE
NOTE NL11727A: NORMAL
OTHER STN SPEC: NORMAL
STAT OF ADQ CVX/VAG CYTO-IMP: NORMAL

## 2024-06-25 ENCOUNTER — HOSPITAL ENCOUNTER (EMERGENCY)
Facility: MEDICAL CENTER | Age: 59
End: 2024-06-25
Attending: EMERGENCY MEDICINE
Payer: MEDICARE

## 2024-06-25 ENCOUNTER — PHARMACY VISIT (OUTPATIENT)
Dept: PHARMACY | Facility: MEDICAL CENTER | Age: 59
End: 2024-06-25
Payer: COMMERCIAL

## 2024-06-25 VITALS
OXYGEN SATURATION: 98 % | WEIGHT: 220.46 LBS | SYSTOLIC BLOOD PRESSURE: 141 MMHG | BODY MASS INDEX: 36.73 KG/M2 | HEIGHT: 65 IN | DIASTOLIC BLOOD PRESSURE: 85 MMHG | HEART RATE: 88 BPM | RESPIRATION RATE: 16 BRPM | TEMPERATURE: 98.3 F

## 2024-06-25 DIAGNOSIS — S06.0X0A CONCUSSION WITHOUT LOSS OF CONSCIOUSNESS, INITIAL ENCOUNTER: ICD-10-CM

## 2024-06-25 DIAGNOSIS — S16.1XXA STRAIN OF NECK MUSCLE, INITIAL ENCOUNTER: ICD-10-CM

## 2024-06-25 PROCEDURE — RXMED WILLOW AMBULATORY MEDICATION CHARGE: Performed by: EMERGENCY MEDICINE

## 2024-06-25 PROCEDURE — 700102 HCHG RX REV CODE 250 W/ 637 OVERRIDE(OP): Performed by: EMERGENCY MEDICINE

## 2024-06-25 PROCEDURE — 99284 EMERGENCY DEPT VISIT MOD MDM: CPT

## 2024-06-25 PROCEDURE — A9270 NON-COVERED ITEM OR SERVICE: HCPCS | Performed by: EMERGENCY MEDICINE

## 2024-06-25 RX ORDER — IBUPROFEN 200 MG
400 TABLET ORAL ONCE
Status: COMPLETED | OUTPATIENT
Start: 2024-06-25 | End: 2024-06-25

## 2024-06-25 RX ORDER — CYCLOBENZAPRINE HCL 10 MG
10 TABLET ORAL ONCE
Status: COMPLETED | OUTPATIENT
Start: 2024-06-25 | End: 2024-06-25

## 2024-06-25 RX ORDER — CYCLOBENZAPRINE HCL 10 MG
10 TABLET ORAL 3 TIMES DAILY PRN
Qty: 30 TABLET | Refills: 0 | Status: SHIPPED | OUTPATIENT
Start: 2024-06-25

## 2024-06-25 RX ORDER — ACETAMINOPHEN 325 MG/1
650 TABLET ORAL ONCE
Status: COMPLETED | OUTPATIENT
Start: 2024-06-25 | End: 2024-06-25

## 2024-06-25 RX ADMIN — ACETAMINOPHEN 650 MG: 325 TABLET, FILM COATED ORAL at 17:07

## 2024-06-25 RX ADMIN — CYCLOBENZAPRINE 10 MG: 10 TABLET, FILM COATED ORAL at 17:07

## 2024-06-25 RX ADMIN — IBUPROFEN 400 MG: 200 TABLET, FILM COATED ORAL at 17:07

## 2024-06-25 ASSESSMENT — FIBROSIS 4 INDEX: FIB4 SCORE: 0.86
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
## 2024-06-25 NOTE — DISCHARGE INSTRUCTIONS
Take 400 mg of Motrin, 650 mg of Tylenol and 1 Flexeril tablet every 6 hours for pain and spasm until substantially improved and follow-up with physical therapy this week
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
## 2024-06-26 ENCOUNTER — PHYSICAL THERAPY (OUTPATIENT)
Dept: PHYSICAL THERAPY | Facility: REHABILITATION | Age: 59
End: 2024-06-26
Attending: INTERNAL MEDICINE
Payer: MEDICARE

## 2024-06-26 DIAGNOSIS — L90.5 AXILLARY WEB SYNDROME: ICD-10-CM

## 2024-06-26 DIAGNOSIS — I89.0 LYMPHEDEMA OF EXTREMITY: ICD-10-CM

## 2024-06-26 DIAGNOSIS — L76.82 AXILLARY WEB SYNDROME: ICD-10-CM

## 2024-06-26 DIAGNOSIS — M25.9 SHOULDER PROBLEM: ICD-10-CM

## 2024-06-26 DIAGNOSIS — C50.912 BREAST CANCER, STAGE 4, LEFT (HCC): ICD-10-CM

## 2024-06-26 PROCEDURE — 97140 MANUAL THERAPY 1/> REGIONS: CPT

## 2024-06-26 RX ORDER — ONDANSETRON 8 MG/1
8 TABLET, ORALLY DISINTEGRATING ORAL PRN
OUTPATIENT
Start: 2024-07-08

## 2024-06-26 RX ORDER — ONDANSETRON 2 MG/ML
4 INJECTION INTRAMUSCULAR; INTRAVENOUS PRN
OUTPATIENT
Start: 2024-07-08

## 2024-06-26 RX ORDER — METHYLPREDNISOLONE SODIUM SUCCINATE 125 MG/2ML
125 INJECTION, POWDER, LYOPHILIZED, FOR SOLUTION INTRAMUSCULAR; INTRAVENOUS PRN
OUTPATIENT
Start: 2024-07-08

## 2024-06-26 RX ORDER — DIPHENHYDRAMINE HYDROCHLORIDE 50 MG/ML
50 INJECTION INTRAMUSCULAR; INTRAVENOUS PRN
OUTPATIENT
Start: 2024-07-08

## 2024-06-26 RX ORDER — EPINEPHRINE 1 MG/ML(1)
0.5 AMPUL (ML) INJECTION PRN
OUTPATIENT
Start: 2024-07-08

## 2024-06-26 RX ORDER — LAMOTRIGINE 25 MG/1
250 TABLET ORAL ONCE
OUTPATIENT
Start: 2024-07-08 | End: 2024-07-08

## 2024-06-26 RX ORDER — PROCHLORPERAZINE MALEATE 10 MG
10 TABLET ORAL EVERY 6 HOURS PRN
OUTPATIENT
Start: 2024-07-08
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
## 2024-06-26 NOTE — ED NOTES
Pt given discharge instructions/ home care instructions explained, pt verbalized understanding of instructions given/pt understands the importance of follow up, pt ambulatory to ER lorna.

## 2024-07-08 ENCOUNTER — PATIENT OUTREACH (OUTPATIENT)
Dept: HEALTH INFORMATION MANAGEMENT | Facility: OTHER | Age: 59
End: 2024-07-08
Payer: MEDICARE

## 2024-07-08 ENCOUNTER — PATIENT MESSAGE (OUTPATIENT)
Dept: HEALTH INFORMATION MANAGEMENT | Facility: OTHER | Age: 59
End: 2024-07-08

## 2024-07-08 DIAGNOSIS — E11.40 TYPE 2 DIABETES MELLITUS WITH DIABETIC NEUROPATHY, WITHOUT LONG-TERM CURRENT USE OF INSULIN (HCC): ICD-10-CM

## 2024-07-08 DIAGNOSIS — C50.912 BREAST CANCER, STAGE 4, LEFT (HCC): ICD-10-CM

## 2024-07-08 DIAGNOSIS — I10 ESSENTIAL HYPERTENSION: ICD-10-CM

## 2024-07-08 DIAGNOSIS — J96.11 CHRONIC RESPIRATORY FAILURE WITH HYPOXIA (HCC): ICD-10-CM

## 2024-07-10 ENCOUNTER — APPOINTMENT (OUTPATIENT)
Dept: RADIOLOGY | Facility: MEDICAL CENTER | Age: 59
End: 2024-07-10
Attending: EMERGENCY MEDICINE
Payer: OTHER MISCELLANEOUS

## 2024-07-10 ENCOUNTER — HOSPITAL ENCOUNTER (EMERGENCY)
Facility: MEDICAL CENTER | Age: 59
End: 2024-07-10
Attending: EMERGENCY MEDICINE
Payer: OTHER MISCELLANEOUS

## 2024-07-10 ENCOUNTER — OUTPATIENT INFUSION SERVICES (OUTPATIENT)
Dept: ONCOLOGY | Facility: MEDICAL CENTER | Age: 59
End: 2024-07-10
Attending: INTERNAL MEDICINE
Payer: MEDICARE

## 2024-07-10 VITALS
DIASTOLIC BLOOD PRESSURE: 88 MMHG | WEIGHT: 225.09 LBS | HEART RATE: 80 BPM | RESPIRATION RATE: 16 BRPM | OXYGEN SATURATION: 91 % | HEIGHT: 64 IN | BODY MASS INDEX: 38.43 KG/M2 | TEMPERATURE: 98.4 F | SYSTOLIC BLOOD PRESSURE: 170 MMHG

## 2024-07-10 VITALS
WEIGHT: 222.66 LBS | SYSTOLIC BLOOD PRESSURE: 147 MMHG | RESPIRATION RATE: 18 BRPM | TEMPERATURE: 97.4 F | BODY MASS INDEX: 39.45 KG/M2 | HEART RATE: 90 BPM | OXYGEN SATURATION: 93 % | HEIGHT: 63 IN | DIASTOLIC BLOOD PRESSURE: 84 MMHG

## 2024-07-10 DIAGNOSIS — Z17.0 CARCINOMA OF UPPER-OUTER QUADRANT OF LEFT BREAST IN FEMALE, ESTROGEN RECEPTOR POSITIVE (HCC): ICD-10-CM

## 2024-07-10 DIAGNOSIS — N39.0 ACUTE UTI: ICD-10-CM

## 2024-07-10 DIAGNOSIS — R42 VERTIGO: ICD-10-CM

## 2024-07-10 DIAGNOSIS — C50.412 CARCINOMA OF UPPER-OUTER QUADRANT OF LEFT BREAST IN FEMALE, ESTROGEN RECEPTOR POSITIVE (HCC): ICD-10-CM

## 2024-07-10 DIAGNOSIS — C50.912 BREAST CANCER, STAGE 4, LEFT (HCC): ICD-10-CM

## 2024-07-10 DIAGNOSIS — G44.319 ACUTE POST-TRAUMATIC HEADACHE, NOT INTRACTABLE: ICD-10-CM

## 2024-07-10 DIAGNOSIS — F07.81 POSTCONCUSSIVE SYNDROME: ICD-10-CM

## 2024-07-10 LAB
ALBUMIN SERPL BCP-MCNC: 3.9 G/DL (ref 3.2–4.9)
ALBUMIN/GLOB SERPL: 1.3 G/DL
ALP SERPL-CCNC: 80 U/L (ref 30–99)
ALT SERPL-CCNC: 37 U/L (ref 2–50)
ANION GAP SERPL CALC-SCNC: 12 MMOL/L (ref 7–16)
APPEARANCE UR: CLEAR
APTT PPP: 24.9 SEC (ref 24.7–36)
AST SERPL-CCNC: 28 U/L (ref 12–45)
BACTERIA #/AREA URNS HPF: ABNORMAL /HPF
BASOPHILS # BLD AUTO: 0.8 % (ref 0–1.8)
BASOPHILS # BLD AUTO: 0.8 % (ref 0–1.8)
BASOPHILS # BLD: 0.05 K/UL (ref 0–0.12)
BASOPHILS # BLD: 0.05 K/UL (ref 0–0.12)
BILIRUB SERPL-MCNC: 0.3 MG/DL (ref 0.1–1.5)
BILIRUB UR QL STRIP.AUTO: NEGATIVE
BUN SERPL-MCNC: 11 MG/DL (ref 8–22)
CA-I BLD ISE-SCNC: 1.18 MMOL/L (ref 1.1–1.3)
CALCIUM ALBUM COR SERPL-MCNC: 10.1 MG/DL (ref 8.5–10.5)
CALCIUM SERPL-MCNC: 10 MG/DL (ref 8.5–10.5)
CHLORIDE SERPL-SCNC: 102 MMOL/L (ref 96–112)
CO2 SERPL-SCNC: 23 MMOL/L (ref 20–33)
COLOR UR: YELLOW
CREAT BLD-MCNC: 0.8 MG/DL (ref 0.5–1.4)
CREAT SERPL-MCNC: 0.73 MG/DL (ref 0.5–1.4)
EOSINOPHIL # BLD AUTO: 0.09 K/UL (ref 0–0.51)
EOSINOPHIL # BLD AUTO: 0.1 K/UL (ref 0–0.51)
EOSINOPHIL NFR BLD: 1.4 % (ref 0–6.9)
EOSINOPHIL NFR BLD: 1.6 % (ref 0–6.9)
EPI CELLS #/AREA URNS HPF: NEGATIVE /HPF
ERYTHROCYTE [DISTWIDTH] IN BLOOD BY AUTOMATED COUNT: 44.7 FL (ref 35.9–50)
ERYTHROCYTE [DISTWIDTH] IN BLOOD BY AUTOMATED COUNT: 45.2 FL (ref 35.9–50)
GFR SERPLBLD CREATININE-BSD FMLA CKD-EPI: 93 ML/MIN/1.73 M 2
GLOBULIN SER CALC-MCNC: 3 G/DL (ref 1.9–3.5)
GLUCOSE SERPL-MCNC: 187 MG/DL (ref 65–99)
GLUCOSE UR STRIP.AUTO-MCNC: 500 MG/DL
HCT VFR BLD AUTO: 44.4 % (ref 37–47)
HCT VFR BLD AUTO: 45.2 % (ref 37–47)
HGB BLD-MCNC: 15.5 G/DL (ref 12–16)
HGB BLD-MCNC: 15.5 G/DL (ref 12–16)
HYALINE CASTS #/AREA URNS LPF: ABNORMAL /LPF
IMM GRANULOCYTES # BLD AUTO: 0.01 K/UL (ref 0–0.11)
IMM GRANULOCYTES # BLD AUTO: 0.03 K/UL (ref 0–0.11)
IMM GRANULOCYTES NFR BLD AUTO: 0.2 % (ref 0–0.9)
IMM GRANULOCYTES NFR BLD AUTO: 0.5 % (ref 0–0.9)
INR PPP: 1.03 (ref 0.87–1.13)
KETONES UR STRIP.AUTO-MCNC: NEGATIVE MG/DL
LACTATE SERPL-SCNC: 1.8 MMOL/L (ref 0.5–2)
LACTATE SERPL-SCNC: 2.4 MMOL/L (ref 0.5–2)
LEUKOCYTE ESTERASE UR QL STRIP.AUTO: ABNORMAL
LYMPHOCYTES # BLD AUTO: 1.12 K/UL (ref 1–4.8)
LYMPHOCYTES # BLD AUTO: 1.2 K/UL (ref 1–4.8)
LYMPHOCYTES NFR BLD: 17.9 % (ref 22–41)
LYMPHOCYTES NFR BLD: 18.6 % (ref 22–41)
MCH RBC QN AUTO: 33.7 PG (ref 27–33)
MCH RBC QN AUTO: 34.5 PG (ref 27–33)
MCHC RBC AUTO-ENTMCNC: 34.3 G/DL (ref 32.2–35.5)
MCHC RBC AUTO-ENTMCNC: 34.9 G/DL (ref 32.2–35.5)
MCV RBC AUTO: 98.3 FL (ref 81.4–97.8)
MCV RBC AUTO: 98.9 FL (ref 81.4–97.8)
MICRO URNS: ABNORMAL
MONOCYTES # BLD AUTO: 0.41 K/UL (ref 0–0.85)
MONOCYTES # BLD AUTO: 0.49 K/UL (ref 0–0.85)
MONOCYTES NFR BLD AUTO: 6.5 % (ref 0–13.4)
MONOCYTES NFR BLD AUTO: 7.6 % (ref 0–13.4)
NEUTROPHILS # BLD AUTO: 4.55 K/UL (ref 1.82–7.42)
NEUTROPHILS # BLD AUTO: 4.62 K/UL (ref 1.82–7.42)
NEUTROPHILS NFR BLD: 71.4 % (ref 44–72)
NEUTROPHILS NFR BLD: 72.7 % (ref 44–72)
NITRITE UR QL STRIP.AUTO: POSITIVE
NRBC # BLD AUTO: 0 K/UL
NRBC # BLD AUTO: 0 K/UL
NRBC BLD-RTO: 0 /100 WBC (ref 0–0.2)
NRBC BLD-RTO: 0 /100 WBC (ref 0–0.2)
PH UR STRIP.AUTO: 6 [PH] (ref 5–8)
PLATELET # BLD AUTO: 329 K/UL (ref 164–446)
PLATELET # BLD AUTO: 331 K/UL (ref 164–446)
PMV BLD AUTO: 9.2 FL (ref 9–12.9)
PMV BLD AUTO: 9.5 FL (ref 9–12.9)
POTASSIUM SERPL-SCNC: 3.7 MMOL/L (ref 3.6–5.5)
PROT SERPL-MCNC: 6.9 G/DL (ref 6–8.2)
PROT UR QL STRIP: NEGATIVE MG/DL
PROTHROMBIN TIME: 13.6 SEC (ref 12–14.6)
RBC # BLD AUTO: 4.49 M/UL (ref 4.2–5.4)
RBC # BLD AUTO: 4.6 M/UL (ref 4.2–5.4)
RBC # URNS HPF: ABNORMAL /HPF
RBC UR QL AUTO: ABNORMAL
SODIUM SERPL-SCNC: 137 MMOL/L (ref 135–145)
SP GR UR STRIP.AUTO: 1.03
UROBILINOGEN UR STRIP.AUTO-MCNC: 0.2 MG/DL
WBC # BLD AUTO: 6.3 K/UL (ref 4.8–10.8)
WBC # BLD AUTO: 6.5 K/UL (ref 4.8–10.8)
WBC #/AREA URNS HPF: ABNORMAL /HPF

## 2024-07-10 PROCEDURE — 82330 ASSAY OF CALCIUM: CPT

## 2024-07-10 PROCEDURE — A9270 NON-COVERED ITEM OR SERVICE: HCPCS | Performed by: EMERGENCY MEDICINE

## 2024-07-10 PROCEDURE — 96374 THER/PROPH/DIAG INJ IV PUSH: CPT

## 2024-07-10 PROCEDURE — 70450 CT HEAD/BRAIN W/O DYE: CPT

## 2024-07-10 PROCEDURE — 99285 EMERGENCY DEPT VISIT HI MDM: CPT

## 2024-07-10 PROCEDURE — 700111 HCHG RX REV CODE 636 W/ 250 OVERRIDE (IP): Mod: JZ | Performed by: EMERGENCY MEDICINE

## 2024-07-10 PROCEDURE — 82565 ASSAY OF CREATININE: CPT

## 2024-07-10 PROCEDURE — 96372 THER/PROPH/DIAG INJ SC/IM: CPT

## 2024-07-10 PROCEDURE — 36415 COLL VENOUS BLD VENIPUNCTURE: CPT

## 2024-07-10 PROCEDURE — 83605 ASSAY OF LACTIC ACID: CPT | Mod: 91

## 2024-07-10 PROCEDURE — 80053 COMPREHEN METABOLIC PANEL: CPT

## 2024-07-10 PROCEDURE — 87077 CULTURE AEROBIC IDENTIFY: CPT

## 2024-07-10 PROCEDURE — 87040 BLOOD CULTURE FOR BACTERIA: CPT

## 2024-07-10 PROCEDURE — 85025 COMPLETE CBC W/AUTO DIFF WBC: CPT

## 2024-07-10 PROCEDURE — 87186 SC STD MICRODIL/AGAR DIL: CPT

## 2024-07-10 PROCEDURE — 87086 URINE CULTURE/COLONY COUNT: CPT

## 2024-07-10 PROCEDURE — 71045 X-RAY EXAM CHEST 1 VIEW: CPT

## 2024-07-10 PROCEDURE — 700111 HCHG RX REV CODE 636 W/ 250 OVERRIDE (IP): Mod: JZ | Performed by: NURSE PRACTITIONER

## 2024-07-10 PROCEDURE — 81001 URINALYSIS AUTO W/SCOPE: CPT

## 2024-07-10 PROCEDURE — 85730 THROMBOPLASTIN TIME PARTIAL: CPT

## 2024-07-10 PROCEDURE — 85025 COMPLETE CBC W/AUTO DIFF WBC: CPT | Mod: 91

## 2024-07-10 PROCEDURE — 96402 CHEMO HORMON ANTINEOPL SQ/IM: CPT

## 2024-07-10 PROCEDURE — 700102 HCHG RX REV CODE 250 W/ 637 OVERRIDE(OP): Performed by: EMERGENCY MEDICINE

## 2024-07-10 PROCEDURE — 96375 TX/PRO/DX INJ NEW DRUG ADDON: CPT

## 2024-07-10 PROCEDURE — 85610 PROTHROMBIN TIME: CPT

## 2024-07-10 PROCEDURE — 700105 HCHG RX REV CODE 258: Performed by: EMERGENCY MEDICINE

## 2024-07-10 RX ORDER — ONDANSETRON 2 MG/ML
4 INJECTION INTRAMUSCULAR; INTRAVENOUS ONCE
Status: COMPLETED | OUTPATIENT
Start: 2024-07-10 | End: 2024-07-10

## 2024-07-10 RX ORDER — LAMOTRIGINE 25 MG/1
250 TABLET ORAL ONCE
Status: COMPLETED | OUTPATIENT
Start: 2024-07-10 | End: 2024-07-10

## 2024-07-10 RX ORDER — ONDANSETRON 8 MG/1
8 TABLET, ORALLY DISINTEGRATING ORAL PRN
Status: DISCONTINUED | OUTPATIENT
Start: 2024-07-10 | End: 2024-07-10 | Stop reason: HOSPADM

## 2024-07-10 RX ORDER — CEFTRIAXONE 1 G/1
1000 INJECTION, POWDER, FOR SOLUTION INTRAMUSCULAR; INTRAVENOUS ONCE
Status: COMPLETED | OUTPATIENT
Start: 2024-07-10 | End: 2024-07-10

## 2024-07-10 RX ORDER — MECLIZINE HYDROCHLORIDE 25 MG/1
25 TABLET ORAL 3 TIMES DAILY PRN
Qty: 30 TABLET | Refills: 0 | Status: SHIPPED | OUTPATIENT
Start: 2024-07-10

## 2024-07-10 RX ORDER — SODIUM CHLORIDE, SODIUM LACTATE, POTASSIUM CHLORIDE, CALCIUM CHLORIDE 600; 310; 30; 20 MG/100ML; MG/100ML; MG/100ML; MG/100ML
1000 INJECTION, SOLUTION INTRAVENOUS ONCE
Status: COMPLETED | OUTPATIENT
Start: 2024-07-10 | End: 2024-07-10

## 2024-07-10 RX ORDER — SULFAMETHOXAZOLE/TRIMETHOPRIM 800-160 MG
1 TABLET ORAL EVERY 12 HOURS
Qty: 6 TABLET | Refills: 0 | Status: ACTIVE | OUTPATIENT
Start: 2024-07-10 | End: 2024-07-13

## 2024-07-10 RX ORDER — MECLIZINE HYDROCHLORIDE 25 MG/1
25 TABLET ORAL ONCE
Status: COMPLETED | OUTPATIENT
Start: 2024-07-10 | End: 2024-07-10

## 2024-07-10 RX ORDER — DIAZEPAM 10 MG/2ML
2.5 INJECTION, SOLUTION INTRAMUSCULAR; INTRAVENOUS ONCE
Status: COMPLETED | OUTPATIENT
Start: 2024-07-10 | End: 2024-07-10

## 2024-07-10 RX ORDER — ONDANSETRON 4 MG/1
4 TABLET, ORALLY DISINTEGRATING ORAL EVERY 8 HOURS PRN
Qty: 10 TABLET | Refills: 0 | Status: SHIPPED | OUTPATIENT
Start: 2024-07-10

## 2024-07-10 RX ADMIN — SODIUM CHLORIDE, POTASSIUM CHLORIDE, SODIUM LACTATE AND CALCIUM CHLORIDE 1000 ML: 600; 310; 30; 20 INJECTION, SOLUTION INTRAVENOUS at 20:05

## 2024-07-10 RX ADMIN — MECLIZINE HYDROCHLORIDE 25 MG: 25 TABLET ORAL at 20:08

## 2024-07-10 RX ADMIN — DENOSUMAB 120 MG: 120 INJECTION SUBCUTANEOUS at 15:53

## 2024-07-10 RX ADMIN — CEFTRIAXONE SODIUM 1000 MG: 1 INJECTION, POWDER, FOR SOLUTION INTRAMUSCULAR; INTRAVENOUS at 22:32

## 2024-07-10 RX ADMIN — DIAZEPAM 2.5 MG: 5 INJECTION, SOLUTION INTRAMUSCULAR; INTRAVENOUS at 22:39

## 2024-07-10 RX ADMIN — FULVESTRANT 250 MG: 50 INJECTION INTRAMUSCULAR at 15:55

## 2024-07-10 RX ADMIN — FULVESTRANT 250 MG: 50 INJECTION INTRAMUSCULAR at 15:58

## 2024-07-10 RX ADMIN — ONDANSETRON 4 MG: 2 INJECTION INTRAMUSCULAR; INTRAVENOUS at 22:26

## 2024-07-10 ASSESSMENT — FIBROSIS 4 INDEX
FIB4 SCORE: 0.86
FIB4 SCORE: 0.87

## 2024-07-10 ASSESSMENT — PAIN DESCRIPTION - PAIN TYPE: TYPE: ACUTE PAIN
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
## 2024-07-10 NOTE — LETTER
7/23/2024               Sol Kern  [address]  [id number]  [facility] [address]        Dear Sol (MR#[id number])    This letter is sent in regards to your recent visit to the [facility] Emergency Department on 7/10/2024. During the visit, tests were performed to assist the physician in your medical diagnosis. A review of your tests requires that we notify you of the following:    Your urine culture was POSITIVE for a bacteria called Escherichia coli. The antibiotic prescribed for you (sulfamethoxazole-trimethoprim) should be active to treat this bacteria. It is important that you continue taking your antibiotic until it is finished.     Please feel free to contact me at the number below if you have any questions or concerns. Thank you for your cooperation in the matter.    Sincerely,  ED Culture Follow-Up Staff  Faisal Gonzalez, PharmD  [phone]    [facility] Emergency Department  [facility] [place], [place] [address] [phone] (ED Culture Line)

## 2024-07-11 ENCOUNTER — PHYSICAL THERAPY (OUTPATIENT)
Dept: PHYSICAL THERAPY | Facility: REHABILITATION | Age: 59
End: 2024-07-11
Attending: INTERNAL MEDICINE
Payer: MEDICARE

## 2024-07-11 DIAGNOSIS — C50.912 BREAST CANCER, STAGE 4, LEFT (HCC): ICD-10-CM

## 2024-07-11 DIAGNOSIS — L90.5 AXILLARY WEB SYNDROME: ICD-10-CM

## 2024-07-11 DIAGNOSIS — L76.82 AXILLARY WEB SYNDROME: ICD-10-CM

## 2024-07-11 DIAGNOSIS — I89.0 LYMPHEDEMA OF EXTREMITY: ICD-10-CM

## 2024-07-11 DIAGNOSIS — M25.9 SHOULDER PROBLEM: ICD-10-CM

## 2024-07-11 PROCEDURE — 97140 MANUAL THERAPY 1/> REGIONS: CPT
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
## 2024-07-11 NOTE — DISCHARGE INSTRUCTIONS
To the emergency department if you have new or different headache, persistent room spinning dizziness that does not go away, unilateral weakness, multiple episodes of vomiting, back pain, persistently rapid heart rate or fever that will not go down with Tylenol or ibuprofen.  Take all your antibiotics until gone.

## 2024-07-15 ENCOUNTER — TELEPHONE (OUTPATIENT)
Dept: HEALTH INFORMATION MANAGEMENT | Facility: OTHER | Age: 59
End: 2024-07-15
Payer: MEDICARE

## 2024-07-15 LAB
BACTERIA BLD CULT: NORMAL
BACTERIA BLD CULT: NORMAL
SIGNIFICANT IND 70042: NORMAL
SIGNIFICANT IND 70042: NORMAL
SITE SITE: NORMAL
SITE SITE: NORMAL
SOURCE SOURCE: NORMAL
SOURCE SOURCE: NORMAL

## 2024-07-18 ENCOUNTER — APPOINTMENT (OUTPATIENT)
Dept: PHYSICAL THERAPY | Facility: REHABILITATION | Age: 59
End: 2024-07-18
Attending: INTERNAL MEDICINE
Payer: MEDICARE

## 2024-07-18 ENCOUNTER — PHYSICAL THERAPY (OUTPATIENT)
Dept: PHYSICAL THERAPY | Facility: MEDICAL CENTER | Age: 59
End: 2024-07-18
Attending: FAMILY MEDICINE
Payer: MEDICARE

## 2024-07-18 DIAGNOSIS — S16.1XXA STRAIN OF NECK MUSCLE, INITIAL ENCOUNTER: ICD-10-CM

## 2024-07-18 PROCEDURE — 95992 CANALITH REPOSITIONING PROC: CPT

## 2024-07-18 PROCEDURE — 97162 PT EVAL MOD COMPLEX 30 MIN: CPT

## 2024-07-18 ASSESSMENT — ENCOUNTER SYMPTOMS
PAIN SCALE AT LOWEST: 1
PAIN SCALE AT HIGHEST: 8
PAIN SCALE: 2

## 2024-07-23 ENCOUNTER — PHYSICAL THERAPY (OUTPATIENT)
Dept: PHYSICAL THERAPY | Facility: REHABILITATION | Age: 59
End: 2024-07-23
Attending: INTERNAL MEDICINE
Payer: MEDICARE

## 2024-07-23 DIAGNOSIS — I89.0 LYMPHEDEMA OF EXTREMITY: ICD-10-CM

## 2024-07-23 DIAGNOSIS — L90.5 AXILLARY WEB SYNDROME: ICD-10-CM

## 2024-07-23 DIAGNOSIS — L76.82 AXILLARY WEB SYNDROME: ICD-10-CM

## 2024-07-23 DIAGNOSIS — C50.912 BREAST CANCER, STAGE 4, LEFT (HCC): ICD-10-CM

## 2024-07-23 DIAGNOSIS — M25.9 SHOULDER PROBLEM: ICD-10-CM

## 2024-07-23 PROCEDURE — 97140 MANUAL THERAPY 1/> REGIONS: CPT
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
## 2024-07-24 ENCOUNTER — PATIENT MESSAGE (OUTPATIENT)
Dept: MEDICAL GROUP | Facility: PHYSICIAN GROUP | Age: 59
End: 2024-07-24
Payer: MEDICARE

## 2024-07-24 DIAGNOSIS — G89.29 CHRONIC MIDLINE LOW BACK PAIN WITH RIGHT-SIDED SCIATICA: ICD-10-CM

## 2024-07-24 DIAGNOSIS — M54.41 CHRONIC MIDLINE LOW BACK PAIN WITH RIGHT-SIDED SCIATICA: ICD-10-CM

## 2024-07-24 RX ORDER — SEMAGLUTIDE 1.34 MG/ML
1 INJECTION, SOLUTION SUBCUTANEOUS
Qty: 9 ML | Refills: 0 | OUTPATIENT
Start: 2024-07-24

## 2024-07-24 RX ORDER — LIDOCAINE 50 MG/G
1 PATCH TOPICAL EVERY 24 HOURS
Qty: 30 PATCH | Refills: 0 | Status: SHIPPED | OUTPATIENT
Start: 2024-07-24

## 2024-07-24 RX ORDER — SEMAGLUTIDE 1.34 MG/ML
1 INJECTION, SOLUTION SUBCUTANEOUS
Qty: 9 ML | Refills: 0 | Status: SHIPPED | OUTPATIENT
Start: 2024-07-24

## 2024-07-26 ENCOUNTER — PHYSICAL THERAPY (OUTPATIENT)
Dept: PHYSICAL THERAPY | Facility: MEDICAL CENTER | Age: 59
End: 2024-07-26
Attending: FAMILY MEDICINE
Payer: MEDICARE

## 2024-07-26 DIAGNOSIS — S16.1XXA STRAIN OF NECK MUSCLE, INITIAL ENCOUNTER: ICD-10-CM

## 2024-07-26 DIAGNOSIS — M25.9 SHOULDER PROBLEM: ICD-10-CM

## 2024-07-26 PROCEDURE — 97110 THERAPEUTIC EXERCISES: CPT

## 2024-07-26 PROCEDURE — 97140 MANUAL THERAPY 1/> REGIONS: CPT

## 2024-07-26 ASSESSMENT — GAIT ASSESSMENTS
GAIT WITH VERTICAL HEAD TURNS: MODERATE IMPAIRMENT: PERFORMS HEAD TURNS WITH MODERATE CHANGE IN GAIT VELOCITY, SLOWS DOWN, STAGGERS BUT RECOVERS, CAN CONTINUE TO WALK
STEP AROUND OBSTACLES: MILD IMPAIRMENT: IS ABLE TO STEP AROUND BOTH CONES, BUT MUST SLOW DOWN AND ADJUST STEPS TO CLEAR CONES
GAIT LEVEL SURFACE: NORMAL: WALKS 20', NO ASSISTIVE DEVICES, GOOD SPEED, NO EVIDENCE FOR IMBALANCE, NORMAL GAIT PATTERN
GAIT AND PIVOT TURN: MILD IMPAIRMENT: PIVOT TURNS SAFELY IN >3 SECONDS AND STOPS WITH NO LOSS OF BALANCE
CHANGE IN GAIT SPEED: NORMAL: ABLE TO SMOOTHLY CHANGE WALKING SPEED WITHOUT LOSS OF BALANCE OR GAIT DEVIATION. SHOWS A SIGNIFICANT DIFFERENCE IN WALKING SPEEDS BETWEEN NORMAL, FAST AND SLOW SPEEDS
DYNAMIC GAIT INDEX SCORE: 66.67
STEP OVER OBSTACLE: MODERATE IMPAIRMENT: IS ABLE TO STEP OVER BOX BUT MUST STOP , THEN STEP OVER. MAY REQUIRE VERBAL CUEING
GAIT WITH HORIZONTAL HEAD TURNS: MILD IMPAIRMENT: PERFORMS HEAD TURNS SMOOTHLY WITH SLIGHT CHANGE IN GAIT VELOCITY, IE, MINOR DISRUPTION TO SMOOTH GAIT PATH OR USES WALKING AID
STEPS: MILD IMPAIRMENT: ALTERNATING FEET, MUST USE RAIL

## 2024-07-30 ENCOUNTER — PHYSICAL THERAPY (OUTPATIENT)
Dept: PHYSICAL THERAPY | Facility: REHABILITATION | Age: 59
End: 2024-07-30
Attending: INTERNAL MEDICINE
Payer: MEDICARE

## 2024-07-30 DIAGNOSIS — L76.82 AXILLARY WEB SYNDROME: ICD-10-CM

## 2024-07-30 DIAGNOSIS — C50.912 BREAST CANCER, STAGE 4, LEFT (HCC): ICD-10-CM

## 2024-07-30 DIAGNOSIS — L90.5 AXILLARY WEB SYNDROME: ICD-10-CM

## 2024-07-30 DIAGNOSIS — I89.0 LYMPHEDEMA OF EXTREMITY: ICD-10-CM

## 2024-07-30 DIAGNOSIS — M25.9 SHOULDER PROBLEM: ICD-10-CM

## 2024-07-30 PROCEDURE — 97535 SELF CARE MNGMENT TRAINING: CPT

## 2024-07-30 PROCEDURE — 97140 MANUAL THERAPY 1/> REGIONS: CPT

## 2024-07-30 RX ORDER — DIPHENHYDRAMINE HYDROCHLORIDE 50 MG/ML
50 INJECTION INTRAMUSCULAR; INTRAVENOUS PRN
OUTPATIENT
Start: 2024-08-07

## 2024-07-30 RX ORDER — ONDANSETRON 2 MG/ML
4 INJECTION INTRAMUSCULAR; INTRAVENOUS PRN
OUTPATIENT
Start: 2024-08-07

## 2024-07-30 RX ORDER — ONDANSETRON 8 MG/1
8 TABLET, ORALLY DISINTEGRATING ORAL PRN
OUTPATIENT
Start: 2024-08-07

## 2024-07-30 RX ORDER — EPINEPHRINE 1 MG/ML(1)
0.5 AMPUL (ML) INJECTION PRN
OUTPATIENT
Start: 2024-08-07

## 2024-07-30 RX ORDER — LAMOTRIGINE 25 MG/1
250 TABLET ORAL ONCE
OUTPATIENT
Start: 2024-08-07 | End: 2024-08-07

## 2024-07-30 RX ORDER — PROCHLORPERAZINE MALEATE 10 MG
10 TABLET ORAL EVERY 6 HOURS PRN
OUTPATIENT
Start: 2024-08-07

## 2024-07-30 RX ORDER — METHYLPREDNISOLONE SODIUM SUCCINATE 125 MG/2ML
125 INJECTION, POWDER, LYOPHILIZED, FOR SOLUTION INTRAMUSCULAR; INTRAVENOUS PRN
OUTPATIENT
Start: 2024-08-07

## 2024-07-31 ENCOUNTER — PHYSICAL THERAPY (OUTPATIENT)
Dept: PHYSICAL THERAPY | Facility: REHABILITATION | Age: 59
End: 2024-07-31
Attending: INTERNAL MEDICINE
Payer: MEDICARE

## 2024-07-31 ENCOUNTER — HOSPITAL ENCOUNTER (OUTPATIENT)
Dept: HEMATOLOGY ONCOLOGY | Facility: MEDICAL CENTER | Age: 59
End: 2024-07-31
Attending: INTERNAL MEDICINE
Payer: MEDICARE

## 2024-07-31 VITALS
SYSTOLIC BLOOD PRESSURE: 126 MMHG | OXYGEN SATURATION: 94 % | TEMPERATURE: 99 F | HEART RATE: 97 BPM | BODY MASS INDEX: 38.11 KG/M2 | WEIGHT: 223.22 LBS | HEIGHT: 64 IN | DIASTOLIC BLOOD PRESSURE: 86 MMHG

## 2024-07-31 DIAGNOSIS — C50.912 BREAST CANCER, STAGE 4, LEFT (HCC): ICD-10-CM

## 2024-07-31 DIAGNOSIS — S16.1XXA STRAIN OF NECK MUSCLE, INITIAL ENCOUNTER: ICD-10-CM

## 2024-07-31 PROCEDURE — 95992 CANALITH REPOSITIONING PROC: CPT

## 2024-07-31 PROCEDURE — 99212 OFFICE O/P EST SF 10 MIN: CPT | Performed by: INTERNAL MEDICINE

## 2024-07-31 PROCEDURE — 97535 SELF CARE MNGMENT TRAINING: CPT

## 2024-07-31 ASSESSMENT — ENCOUNTER SYMPTOMS
CHILLS: 0
GASTROINTESTINAL NEGATIVE: 1
PALPITATIONS: 0
HEADACHES: 0
VOMITING: 0
DIZZINESS: 0
COUGH: 0
FEVER: 0
SHORTNESS OF BREATH: 1
MYALGIAS: 0
NEUROLOGICAL NEGATIVE: 1
MUSCULOSKELETAL NEGATIVE: 1
CARDIOVASCULAR NEGATIVE: 1
WEIGHT LOSS: 0
EYES NEGATIVE: 1
PSYCHIATRIC NEGATIVE: 1
NAUSEA: 0
CONSTIPATION: 0
DIARRHEA: 0

## 2024-07-31 ASSESSMENT — PAIN SCALES - GENERAL: PAINLEVEL: 7=MODERATE-SEVERE PAIN

## 2024-07-31 ASSESSMENT — FIBROSIS 4 INDEX: FIB4 SCORE: 0.85

## 2024-08-01 ENCOUNTER — HOSPITAL ENCOUNTER (OUTPATIENT)
Dept: RADIOLOGY | Facility: MEDICAL CENTER | Age: 59
End: 2024-08-01
Attending: FAMILY MEDICINE
Payer: MEDICARE

## 2024-08-01 DIAGNOSIS — Z12.31 VISIT FOR SCREENING MAMMOGRAM: ICD-10-CM

## 2024-08-01 PROCEDURE — 77067 SCR MAMMO BI INCL CAD: CPT

## 2024-08-02 ENCOUNTER — APPOINTMENT (OUTPATIENT)
Dept: PHYSICAL THERAPY | Facility: MEDICAL CENTER | Age: 59
End: 2024-08-02
Attending: FAMILY MEDICINE
Payer: MEDICARE

## 2024-08-07 ENCOUNTER — HOSPITAL ENCOUNTER (OUTPATIENT)
Dept: RADIOLOGY | Facility: MEDICAL CENTER | Age: 59
End: 2024-08-07
Attending: FAMILY MEDICINE
Payer: MEDICARE

## 2024-08-07 ENCOUNTER — OUTPATIENT INFUSION SERVICES (OUTPATIENT)
Dept: ONCOLOGY | Facility: MEDICAL CENTER | Age: 59
End: 2024-08-07
Attending: INTERNAL MEDICINE
Payer: MEDICARE

## 2024-08-07 VITALS
WEIGHT: 223.99 LBS | OXYGEN SATURATION: 93 % | BODY MASS INDEX: 39.69 KG/M2 | HEART RATE: 77 BPM | TEMPERATURE: 97.1 F | SYSTOLIC BLOOD PRESSURE: 135 MMHG | DIASTOLIC BLOOD PRESSURE: 78 MMHG | HEIGHT: 63 IN | RESPIRATION RATE: 18 BRPM

## 2024-08-07 DIAGNOSIS — R92.8 ABNORMAL MAMMOGRAM: ICD-10-CM

## 2024-08-07 DIAGNOSIS — C50.912 BREAST CANCER, STAGE 4, LEFT (HCC): ICD-10-CM

## 2024-08-07 DIAGNOSIS — C50.412 CARCINOMA OF UPPER-OUTER QUADRANT OF LEFT BREAST IN FEMALE, ESTROGEN RECEPTOR POSITIVE (HCC): ICD-10-CM

## 2024-08-07 DIAGNOSIS — Z17.0 CARCINOMA OF UPPER-OUTER QUADRANT OF LEFT BREAST IN FEMALE, ESTROGEN RECEPTOR POSITIVE (HCC): ICD-10-CM

## 2024-08-07 LAB
ALBUMIN SERPL BCP-MCNC: 3.8 G/DL (ref 3.2–4.9)
ALBUMIN/GLOB SERPL: 1.2 G/DL
ALP SERPL-CCNC: 89 U/L (ref 30–99)
ALT SERPL-CCNC: 32 U/L (ref 2–50)
ANION GAP SERPL CALC-SCNC: 13 MMOL/L (ref 7–16)
AST SERPL-CCNC: 32 U/L (ref 12–45)
BASOPHILS # BLD AUTO: 1.1 % (ref 0–1.8)
BASOPHILS # BLD: 0.06 K/UL (ref 0–0.12)
BILIRUB SERPL-MCNC: 0.4 MG/DL (ref 0.1–1.5)
BUN SERPL-MCNC: 11 MG/DL (ref 8–22)
CA-I BLD ISE-SCNC: 1.23 MMOL/L (ref 1.1–1.3)
CALCIUM ALBUM COR SERPL-MCNC: 10.1 MG/DL (ref 8.5–10.5)
CALCIUM SERPL-MCNC: 9.9 MG/DL (ref 8.5–10.5)
CHLORIDE SERPL-SCNC: 102 MMOL/L (ref 96–112)
CO2 SERPL-SCNC: 22 MMOL/L (ref 20–33)
CREAT BLD-MCNC: 0.7 MG/DL (ref 0.5–1.4)
CREAT SERPL-MCNC: 0.73 MG/DL (ref 0.5–1.4)
EOSINOPHIL # BLD AUTO: 0.1 K/UL (ref 0–0.51)
EOSINOPHIL NFR BLD: 1.8 % (ref 0–6.9)
ERYTHROCYTE [DISTWIDTH] IN BLOOD BY AUTOMATED COUNT: 46.6 FL (ref 35.9–50)
GFR SERPLBLD CREATININE-BSD FMLA CKD-EPI: 93 ML/MIN/1.73 M 2
GLOBULIN SER CALC-MCNC: 3.3 G/DL (ref 1.9–3.5)
GLUCOSE SERPL-MCNC: 246 MG/DL (ref 65–99)
HCT VFR BLD AUTO: 43.2 % (ref 37–47)
HGB BLD-MCNC: 15.1 G/DL (ref 12–16)
IMM GRANULOCYTES # BLD AUTO: 0.02 K/UL (ref 0–0.11)
IMM GRANULOCYTES NFR BLD AUTO: 0.4 % (ref 0–0.9)
LYMPHOCYTES # BLD AUTO: 1.15 K/UL (ref 1–4.8)
LYMPHOCYTES NFR BLD: 20.7 % (ref 22–41)
MCH RBC QN AUTO: 34.2 PG (ref 27–33)
MCHC RBC AUTO-ENTMCNC: 35 G/DL (ref 32.2–35.5)
MCV RBC AUTO: 98 FL (ref 81.4–97.8)
MONOCYTES # BLD AUTO: 0.47 K/UL (ref 0–0.85)
MONOCYTES NFR BLD AUTO: 8.5 % (ref 0–13.4)
NEUTROPHILS # BLD AUTO: 3.75 K/UL (ref 1.82–7.42)
NEUTROPHILS NFR BLD: 67.5 % (ref 44–72)
NRBC # BLD AUTO: 0 K/UL
NRBC BLD-RTO: 0 /100 WBC (ref 0–0.2)
PLATELET # BLD AUTO: 301 K/UL (ref 164–446)
PMV BLD AUTO: 9.4 FL (ref 9–12.9)
POTASSIUM SERPL-SCNC: 4.4 MMOL/L (ref 3.6–5.5)
PROT SERPL-MCNC: 7.1 G/DL (ref 6–8.2)
RBC # BLD AUTO: 4.41 M/UL (ref 4.2–5.4)
SODIUM SERPL-SCNC: 137 MMOL/L (ref 135–145)
WBC # BLD AUTO: 5.6 K/UL (ref 4.8–10.8)

## 2024-08-07 PROCEDURE — 700111 HCHG RX REV CODE 636 W/ 250 OVERRIDE (IP): Mod: JZ,JG | Performed by: NURSE PRACTITIONER

## 2024-08-07 PROCEDURE — 36415 COLL VENOUS BLD VENIPUNCTURE: CPT

## 2024-08-07 PROCEDURE — 82330 ASSAY OF CALCIUM: CPT

## 2024-08-07 PROCEDURE — 96402 CHEMO HORMON ANTINEOPL SQ/IM: CPT

## 2024-08-07 PROCEDURE — G0279 TOMOSYNTHESIS, MAMMO: HCPCS | Mod: LT

## 2024-08-07 PROCEDURE — 85025 COMPLETE CBC W/AUTO DIFF WBC: CPT

## 2024-08-07 PROCEDURE — 82565 ASSAY OF CREATININE: CPT

## 2024-08-07 PROCEDURE — 80053 COMPREHEN METABOLIC PANEL: CPT

## 2024-08-07 PROCEDURE — 76642 ULTRASOUND BREAST LIMITED: CPT | Mod: LT

## 2024-08-07 PROCEDURE — 96372 THER/PROPH/DIAG INJ SC/IM: CPT

## 2024-08-07 RX ORDER — LAMOTRIGINE 25 MG/1
250 TABLET ORAL ONCE
Status: COMPLETED | OUTPATIENT
Start: 2024-08-07 | End: 2024-08-07

## 2024-08-07 RX ADMIN — FULVESTRANT 250 MG: 50 INJECTION INTRAMUSCULAR at 16:05

## 2024-08-07 RX ADMIN — DENOSUMAB 120 MG: 120 INJECTION SUBCUTANEOUS at 15:55

## 2024-08-07 RX ADMIN — FULVESTRANT 250 MG: 50 INJECTION INTRAMUSCULAR at 16:02

## 2024-08-07 ASSESSMENT — FIBROSIS 4 INDEX: FIB4 SCORE: 0.85

## 2024-08-08 NOTE — PROGRESS NOTES
[facility] for labs/ faslodex and xgeva injections accompanied by her daughter. Pt denies changes to health, medication or allergies. POC reviewed.    25 G butterfly used to draw labs from R hand/brisk blood return noted, gauze and coban applied.     Pt denies recent or pending oral surgery/ infections or wounds. I-stat ionized Ca/Cr WDL. Xgeva administered per MD order, R BA/bandaid applied.     Faslodex administered per MD order, L/R dorsogluteal, bandaids applied, Sol tolerated well. Confirmed next appointment and Sol was discharged home in no acute distress.

## 2024-08-09 ENCOUNTER — PHYSICAL THERAPY (OUTPATIENT)
Dept: PHYSICAL THERAPY | Facility: MEDICAL CENTER | Age: 59
End: 2024-08-09
Attending: FAMILY MEDICINE
Payer: MEDICARE

## 2024-08-09 DIAGNOSIS — M25.9 SHOULDER PROBLEM: ICD-10-CM

## 2024-08-09 DIAGNOSIS — S16.1XXA STRAIN OF NECK MUSCLE, INITIAL ENCOUNTER: ICD-10-CM

## 2024-08-09 PROCEDURE — 97530 THERAPEUTIC ACTIVITIES: CPT

## 2024-08-09 PROCEDURE — 97140 MANUAL THERAPY 1/> REGIONS: CPT

## 2024-08-09 PROCEDURE — 97110 THERAPEUTIC EXERCISES: CPT

## 2024-08-09 NOTE — OP THERAPY DAILY TREATMENT
"  Outpatient Physical Therapy  DAILY TREATMENT      [facility] Outpatient Physical Therapy  [address] [place] [address]    Date: 08/09/2024    Patient: Sol Kern  YOB: 1965  MRN: [id number]     Time Calculation    Start time: 1330  Stop time: 1424 Time Calculation (min): 54 minutes         Chief Complaint: Vertigo and Neck Pain    Visit #: 4    SUBJECTIVE:  She has not had any vertigo return and feels good since her last visit, but she is worried that it may come back. She  continues to c/o headaches sub occipital, left temporal.    OBJECTIVE:  Current objective measures:   Hurley Hallpike negative on R and L sides    DGI: 67%          Therapeutic Exercises (CPT 94913):     1. supine chin nods on pillow, 2x10, 5\" holds    2. supine cervical rotation on pillow, 2x10    3. cervical AAROM rotation, 2x10, with pillowcase    4. cervical AAROM extension, 2x10, with pillowcase    5. postural correction with hands on head, Pt. gently sets hands on heads and stands as upright as possible into hands, VC for improved postural alignment      Therapeutic Exercise Summary: Verbally reviewed suboccipital release using tennis ball at home. Pt. Given HEP handout with above exercises.     Therapeutic Treatments and Modalities:     1. Canalith Repositioning (CPT 04952), NT    2. Therapeutic Activities (CPT 52202), See below    3. Manual Therapy (CPT 19615), see below    Therapeutic Treatment and Modalities Summary: TAD: Reviewed Epley maneuver with patient verbally and then PT demonstrated and instructed Pt. On how to perform with patient going through each position. Verbally reviewed performing Epley maneuver to treat R side herself at home. Discussed when to perform Epley maneuver. Discussed sleeping positions and head positioning when she has another episode of vertigo.     Manual: suboccipital release performed 3x, Pt. Had some relief in pain and headache symptoms post manual interventions. Discussed using tennis ball to " achieve same results at home.     Time-based treatments/modalities:             ASSESSMENT:   Response to treatment:  She was negative for BPPV testing today, but since she has had frequent episodes of PC BPPV she was educated on performing Epley maneuver to treat R side at home if vertigo occurs again. If she is unsuccessful in treating it at home with another vertigo episode she will call to come in for treatment. Able to resume exercises and AAROM activities for her cervical spine again today without any adverse reactions or increased pain. She would benefit from continued cervical spine and postural exercises.     PLAN/RECOMMENDATIONS:   Plan for treatment: therapy treatment to continue next visit.  Planned interventions for next visit: continue with current treatment.  Monitor vertigo for relapse, postural stabilization exercises, balance training

## 2024-08-12 ENCOUNTER — PHYSICAL THERAPY (OUTPATIENT)
Dept: PHYSICAL THERAPY | Facility: MEDICAL CENTER | Age: 59
End: 2024-08-12
Attending: FAMILY MEDICINE
Payer: MEDICARE

## 2024-08-12 DIAGNOSIS — S16.1XXA STRAIN OF NECK MUSCLE, INITIAL ENCOUNTER: ICD-10-CM

## 2024-08-12 DIAGNOSIS — M25.9 SHOULDER PROBLEM: ICD-10-CM

## 2024-08-12 PROCEDURE — 97140 MANUAL THERAPY 1/> REGIONS: CPT

## 2024-08-12 PROCEDURE — 97110 THERAPEUTIC EXERCISES: CPT

## 2024-08-12 NOTE — OP THERAPY DAILY TREATMENT
"  Outpatient Physical Therapy  DAILY TREATMENT     [facility] Outpatient Physical Therapy  [address]  [place] [address]  Phone:  [phone]  Fax:  [phone]    Date: 08/12/2024    Patient: Sol Kern  YOB: 1965  MRN: [id number]     Time Calculation    Start time: 1415  Stop time: 1510 Time Calculation (min): 55 minutes         Chief Complaint: Neck Problem and Vertigo    Visit #: 5    SUBJECTIVE:  No vertigo. Does still get a pre-symptom like it could spin when she rolls to the L side, but never does occur. Generally feeling \"not quite right.\" The neck \"hurts.\" Reports no improvement. The manual gives short term relief (about 1-2 hrs). Has not tried the tennis balls yet. The towel stretch does help.     OBJECTIVE:      Therapeutic Exercises (CPT 41105):     1. supine chin nods on pillow, 2x10, 5\" holds    2. supine cervical rotation on pillow, 2x10    3. cervical AAROM rotation, 2x10, with pillowcase    4. truong samm, reviewed, but not performed due to back pain    5. seated upper t/s ext, fair tolerance    6. seated upper t/s rotation, fair tolerance    7. B shoulder ER, pink x 15    8. UT stretch, x 1 min ea      Therapeutic Exercise Summary:   Access Code: [email or web address]  URL: [email or web address]  Date: 08/12/2024  Prepared by:     Exercises  - Seated Small Neck Circles  - 5 x weekly - 20 reps  - Seated Upper Trapezius Stretch  - 5 x weekly - 60 seconds hold  - Shoulder External Rotation and Scapular Retraction with Resistance  - 5 x weekly - 2 sets - 10-15 reps  - Truong-Daroff Vestibular Exercise  - 5 x weekly - 3-5 sets    Therapeutic Treatments and Modalities:     1. Manual Therapy (CPT 40843), Supine: SOR,  GIII, CTJ rotational mobilizations GIV.    Time-based treatments/modalities:    Physical Therapy Timed Treatment Charges  Manual therapy minutes (CPT 22636): 15 minutes  Therapeutic exercise minutes (CPT 09679): 40 " minutes    ASSESSMENT:   Response to treatment: BPPV appears to be clear. Offered habituation exercises for residual sx. C/S is more mobile and less tender through the subocc. Cont restrictions in the CTJ and hypomobile t/s.     PLAN/RECOMMENDATIONS:   Plan for treatment: therapy treatment to continue next visit.  Planned interventions for next visit: continue with current treatment.

## 2024-08-13 ENCOUNTER — PATIENT OUTREACH (OUTPATIENT)
Dept: HEALTH INFORMATION MANAGEMENT | Facility: OTHER | Age: 59
End: 2024-08-13
Payer: MEDICARE

## 2024-08-13 DIAGNOSIS — C50.912 BREAST CANCER, STAGE 4, LEFT (HCC): ICD-10-CM

## 2024-08-13 DIAGNOSIS — Z86.16 HISTORY OF COVID-19: ICD-10-CM

## 2024-08-13 DIAGNOSIS — I10 ESSENTIAL HYPERTENSION: ICD-10-CM

## 2024-08-13 DIAGNOSIS — J96.11 CHRONIC RESPIRATORY FAILURE WITH HYPOXIA (HCC): ICD-10-CM

## 2024-08-13 DIAGNOSIS — E11.40 TYPE 2 DIABETES MELLITUS WITH DIABETIC NEUROPATHY, WITHOUT LONG-TERM CURRENT USE OF INSULIN (HCC): ICD-10-CM

## 2024-08-13 NOTE — PROGRESS NOTES
"Assessment    I spoke to the patient this afternoon by phone for her monthly PCM follow up. The patient denies any recent falls or injuries. The patient states that her neck still hurts \"on and off.\" She states that she is very impressed with her lymphedema physical therapy. The patient reports that her blood sugars, breathing, and blood pressures have been stable. She reports understanding of how to get a hold of me if necessary.     Education and Self Management of Care    Article on cholesterol sent by mail  Patient will continue following up with PCP, PT, and specialists as indicated    Plan of Care and Goals    Patient will remain free from falls, injuries    Barriers:    Management of chronic and acute health concerns.     Progress:    Progressing    Next outreach:    One Month                           "

## 2024-08-14 ENCOUNTER — PHYSICAL THERAPY (OUTPATIENT)
Dept: PHYSICAL THERAPY | Facility: REHABILITATION | Age: 59
End: 2024-08-14
Attending: INTERNAL MEDICINE
Payer: MEDICARE

## 2024-08-14 DIAGNOSIS — M25.9 SHOULDER PROBLEM: ICD-10-CM

## 2024-08-14 DIAGNOSIS — C50.912 BREAST CANCER, STAGE 4, LEFT (HCC): ICD-10-CM

## 2024-08-14 DIAGNOSIS — I89.0 LYMPHEDEMA OF EXTREMITY: ICD-10-CM

## 2024-08-14 PROCEDURE — 97140 MANUAL THERAPY 1/> REGIONS: CPT

## 2024-08-14 PROCEDURE — 97110 THERAPEUTIC EXERCISES: CPT
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
## 2024-08-19 ENCOUNTER — APPOINTMENT (OUTPATIENT)
Dept: PHYSICAL THERAPY | Facility: MEDICAL CENTER | Age: 59
End: 2024-08-19
Attending: FAMILY MEDICINE
Payer: MEDICARE

## 2024-08-20 ENCOUNTER — APPOINTMENT (OUTPATIENT)
Dept: PHYSICAL THERAPY | Facility: REHABILITATION | Age: 59
End: 2024-08-20
Attending: INTERNAL MEDICINE
Payer: MEDICARE

## 2024-08-22 ENCOUNTER — OFFICE VISIT (OUTPATIENT)
Dept: MEDICAL GROUP | Facility: PHYSICIAN GROUP | Age: 59
End: 2024-08-22
Payer: MEDICARE

## 2024-08-22 ENCOUNTER — PHYSICAL THERAPY (OUTPATIENT)
Dept: PHYSICAL THERAPY | Facility: REHABILITATION | Age: 59
End: 2024-08-22
Attending: INTERNAL MEDICINE
Payer: MEDICARE

## 2024-08-22 ENCOUNTER — PATIENT OUTREACH (OUTPATIENT)
Dept: HEALTH INFORMATION MANAGEMENT | Facility: OTHER | Age: 59
End: 2024-08-22

## 2024-08-22 VITALS
OXYGEN SATURATION: 95 % | DIASTOLIC BLOOD PRESSURE: 82 MMHG | TEMPERATURE: 97.2 F | HEIGHT: 64 IN | SYSTOLIC BLOOD PRESSURE: 140 MMHG | HEART RATE: 93 BPM | BODY MASS INDEX: 38.24 KG/M2 | WEIGHT: 224 LBS | RESPIRATION RATE: 20 BRPM

## 2024-08-22 DIAGNOSIS — C50.912 BREAST CANCER, STAGE 4, LEFT (HCC): ICD-10-CM

## 2024-08-22 DIAGNOSIS — S16.1XXA STRAIN OF NECK MUSCLE, INITIAL ENCOUNTER: ICD-10-CM

## 2024-08-22 DIAGNOSIS — J96.11 CHRONIC RESPIRATORY FAILURE WITH HYPOXIA (HCC): ICD-10-CM

## 2024-08-22 DIAGNOSIS — I89.0 LYMPHEDEMA OF EXTREMITY: ICD-10-CM

## 2024-08-22 DIAGNOSIS — L90.5 AXILLARY WEB SYNDROME: ICD-10-CM

## 2024-08-22 DIAGNOSIS — I10 ESSENTIAL HYPERTENSION: ICD-10-CM

## 2024-08-22 DIAGNOSIS — E11.40 TYPE 2 DIABETES MELLITUS WITH DIABETIC NEUROPATHY, WITHOUT LONG-TERM CURRENT USE OF INSULIN (HCC): Primary | ICD-10-CM

## 2024-08-22 DIAGNOSIS — R06.09 DYSPNEA ON EXERTION: ICD-10-CM

## 2024-08-22 DIAGNOSIS — E11.40 TYPE 2 DIABETES MELLITUS WITH DIABETIC NEUROPATHY, WITHOUT LONG-TERM CURRENT USE OF INSULIN (HCC): ICD-10-CM

## 2024-08-22 DIAGNOSIS — M25.9 SHOULDER PROBLEM: ICD-10-CM

## 2024-08-22 DIAGNOSIS — L76.82 AXILLARY WEB SYNDROME: ICD-10-CM

## 2024-08-22 DIAGNOSIS — G47.01 INSOMNIA DUE TO MEDICAL CONDITION: ICD-10-CM

## 2024-08-22 LAB
HBA1C MFR BLD: 8.9 % (ref ?–5.8)
POCT INT CON NEG: NEGATIVE
POCT INT CON POS: POSITIVE

## 2024-08-22 PROCEDURE — 97140 MANUAL THERAPY 1/> REGIONS: CPT

## 2024-08-22 PROCEDURE — 83036 HEMOGLOBIN GLYCOSYLATED A1C: CPT | Performed by: FAMILY MEDICINE

## 2024-08-22 PROCEDURE — 3079F DIAST BP 80-89 MM HG: CPT | Performed by: FAMILY MEDICINE

## 2024-08-22 PROCEDURE — 3077F SYST BP >= 140 MM HG: CPT | Performed by: FAMILY MEDICINE

## 2024-08-22 PROCEDURE — 97110 THERAPEUTIC EXERCISES: CPT

## 2024-08-22 PROCEDURE — 99214 OFFICE O/P EST MOD 30 MIN: CPT | Performed by: FAMILY MEDICINE

## 2024-08-22 RX ORDER — ZOLPIDEM TARTRATE 5 MG/1
10 TABLET ORAL NIGHTLY PRN
COMMUNITY
End: 2024-08-22

## 2024-08-22 RX ORDER — SEMAGLUTIDE 2.68 MG/ML
2 INJECTION, SOLUTION SUBCUTANEOUS
Qty: 9 ML | Refills: 1 | Status: SHIPPED | OUTPATIENT
Start: 2024-08-22

## 2024-08-22 RX ORDER — ALBUTEROL SULFATE 90 UG/1
2 AEROSOL, METERED RESPIRATORY (INHALATION) EVERY 6 HOURS PRN
Qty: 8 EACH | Refills: 3 | Status: SHIPPED | OUTPATIENT
Start: 2024-08-22

## 2024-08-22 RX ORDER — ZOLPIDEM TARTRATE 10 MG/1
10 TABLET ORAL NIGHTLY PRN
Qty: 30 TABLET | Refills: 0 | Status: SHIPPED | OUTPATIENT
Start: 2024-08-22 | End: 2024-09-21

## 2024-08-22 ASSESSMENT — FIBROSIS 4 INDEX: FIB4 SCORE: 1.15
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
## 2024-08-22 NOTE — PROGRESS NOTES
Touched base with patient. She reports following through on her current plan of care and no additional needs or questions at this time.

## 2024-08-26 ENCOUNTER — APPOINTMENT (OUTPATIENT)
Dept: PHYSICAL THERAPY | Facility: MEDICAL CENTER | Age: 59
End: 2024-08-26
Attending: FAMILY MEDICINE
Payer: MEDICARE

## 2024-08-27 ENCOUNTER — APPOINTMENT (OUTPATIENT)
Dept: PHYSICAL THERAPY | Facility: REHABILITATION | Age: 59
End: 2024-08-27
Attending: INTERNAL MEDICINE
Payer: MEDICARE

## 2024-08-27 RX ORDER — LAMOTRIGINE 25 MG/1
250 TABLET ORAL ONCE
OUTPATIENT
Start: 2024-09-04 | End: 2024-09-04

## 2024-08-27 RX ORDER — DIPHENHYDRAMINE HYDROCHLORIDE 50 MG/ML
50 INJECTION INTRAMUSCULAR; INTRAVENOUS PRN
OUTPATIENT
Start: 2024-09-04

## 2024-08-27 RX ORDER — PROCHLORPERAZINE MALEATE 10 MG
10 TABLET ORAL EVERY 6 HOURS PRN
OUTPATIENT
Start: 2024-09-04

## 2024-08-27 RX ORDER — EPINEPHRINE 1 MG/ML(1)
0.5 AMPUL (ML) INJECTION PRN
OUTPATIENT
Start: 2024-09-04

## 2024-08-27 RX ORDER — ONDANSETRON 8 MG/1
8 TABLET, ORALLY DISINTEGRATING ORAL PRN
OUTPATIENT
Start: 2024-09-04

## 2024-08-27 RX ORDER — METHYLPREDNISOLONE SODIUM SUCCINATE 125 MG/2ML
125 INJECTION, POWDER, LYOPHILIZED, FOR SOLUTION INTRAMUSCULAR; INTRAVENOUS PRN
OUTPATIENT
Start: 2024-09-04

## 2024-08-27 RX ORDER — ONDANSETRON 2 MG/ML
4 INJECTION INTRAMUSCULAR; INTRAVENOUS PRN
OUTPATIENT
Start: 2024-09-04

## 2024-08-29 ENCOUNTER — PHYSICAL THERAPY (OUTPATIENT)
Dept: PHYSICAL THERAPY | Facility: REHABILITATION | Age: 59
End: 2024-08-29
Attending: INTERNAL MEDICINE
Payer: MEDICARE

## 2024-08-29 DIAGNOSIS — L76.82 AXILLARY WEB SYNDROME: ICD-10-CM

## 2024-08-29 DIAGNOSIS — I89.0 LYMPHEDEMA OF EXTREMITY: ICD-10-CM

## 2024-08-29 DIAGNOSIS — M25.9 SHOULDER PROBLEM: ICD-10-CM

## 2024-08-29 DIAGNOSIS — C50.912 BREAST CANCER, STAGE 4, LEFT (HCC): ICD-10-CM

## 2024-08-29 DIAGNOSIS — L90.5 AXILLARY WEB SYNDROME: ICD-10-CM

## 2024-08-29 PROCEDURE — 97140 MANUAL THERAPY 1/> REGIONS: CPT

## 2024-08-29 PROCEDURE — 97110 THERAPEUTIC EXERCISES: CPT

## 2024-08-29 NOTE — OP THERAPY DAILY TREATMENT
Outpatient Physical Therapy  LYMPHEDEMA THERAPY DAILY TREATMENT     [facility] Physical Therapy [address]  [address]  [place] [address]  Phone:  [phone]  Fax:  [phone]    Date: 08/29/2024    Patient: Sol Kern  YOB: 1965  MRN: [id number]     Time Calculation    Start time: 0945  Stop time: 1029 Time Calculation (min): 44 minutes         Chief Complaint: Shoulder Problem    Visit #: 20    Subjective:   History of Present Illness:     Mechanism of injury:  Arm a little stiff. Did go to [facility] and has been busy regarding performance of her HEP.       Lymphedema Objective    Left Upper Extremity Circumferential Measurements 8/14  Axilla: 40.3 cm  Upper Proximal Humerus: 37.6 cm  Distal Humerus: 33.6 cm  Elbow: 28.6 cm  Mid-Forearm: 25.8 cm  Wrist: 16.2 cm  Distal Garza Crease: 19.3 cm  Total: 201.4 cm        Left Upper Extremity Circumferential Measurements 7/23  Axilla: 40.4 cm  Upper Proximal Humerus: 37.4 cm  Distal Humerus: 32.3 cm  Elbow: 27.7 cm  Mid-Forearm: 26.7 cm  Wrist: 16.3 cm  Distal Garza Crease: 18.1 cm  Total: 198.9 cm        Left Upper Extremity Circumferential Measurements 5/30  Axilla: 40.2 cm  Upper Proximal Humerus: 36.7 cm  Distal Humerus: 34.2 cm  Elbow: 28.3 cm  Mid-Forearm: 26 cm  Wrist: 16.4 cm  Distal Garza Crease: 19.2 cm  Total: 201 cm        Left Upper Extremity Circumferential Measurements 4/23  Axilla: 40.1 cm  Upper Proximal Humerus: 36.8 cm  Distal Humerus: 32.6 cm  Elbow: 28.3 cm  Mid-Forearm: 25.7 cm  Wrist: 15.8 cm  Distal Garza Crease: 18.2 cm  Total: 197.5 cm      Left Upper Extremity Circumferential Measurements EVAL  Axilla: 38.5 cm  Upper Proximal Humerus: 37.7 cm  Distal Humerus: 34.4 cm  Elbow: 27.2 cm  Mid-Forearm: 24.7 cm  Wrist: 15.9 cm  Distal Garza Crease: 18.4 cm  Total: 196.8 cm     Right Upper Extremity Circumferential Measurements EVAL  Axilla: 37.1 cm  Upper Proximal Humerus: 36.6 cm  Distal Humerus: 33.4  cm  Elbow: 27.2 cm  Mid-Forearm: 22 cm  Wrist: 16.8 cm  Distal Garza Crease: 18.4 cm  Total: cm 191.5    Therapeutic Exercises (CPT 21540):     1. wall walk with lift off, HEP    2. sciatic nerve glide, HEP    3. wall gold with chin tuck and scap retraction, HEP    4. standing extension with elbow drive, HEP    5. door extension with strap, HEP    6. supine shoulder flexion with green band, x15, HEP    7. abduction in sidely with green band, x15, HEP    8. chest opening with green band, x15, HEP      Therapeutic Exercise Summary:   HEP: seated pelvic tilts, wall walk    Therapeutic Treatments and Modalities:     1. Manual Therapy (CPT 45519)    Therapeutic Treatment and Modalities Summary:   -GHJ AP into abduction; Inferior GHJ glide with abduction  -ART to L pectorals and subscap; trigger point release  -teres major/minor trigger point release  -MLD to L UE      Time-based treatments/modalities:    Physical Therapy Timed Treatment Charges  Manual therapy minutes (CPT 25755): 31 minutes  Therapeutic exercise minutes (CPT 00441): 13 minutes      Assessment and Plan:   Assessment details:  Sol did have an increase in muscle tension across her L pectorals today but is still able to achieve overhead ROM with initial assist. She will benefit from further intervention to sustain her full range consistently.    Plan:  Therapy options:  Physical therapy treatment to continue  Discussed with:  Patient

## 2024-08-30 ENCOUNTER — PHYSICAL THERAPY (OUTPATIENT)
Dept: PHYSICAL THERAPY | Facility: MEDICAL CENTER | Age: 59
End: 2024-08-30
Attending: FAMILY MEDICINE
Payer: MEDICARE

## 2024-08-30 DIAGNOSIS — S16.1XXA STRAIN OF NECK MUSCLE, INITIAL ENCOUNTER: ICD-10-CM

## 2024-08-30 PROCEDURE — 97110 THERAPEUTIC EXERCISES: CPT

## 2024-08-30 PROCEDURE — 97140 MANUAL THERAPY 1/> REGIONS: CPT

## 2024-08-30 PROCEDURE — 97112 NEUROMUSCULAR REEDUCATION: CPT

## 2024-08-30 NOTE — OP THERAPY DAILY TREATMENT
"  Outpatient Physical Therapy  DAILY TREATMENT     [facility] Outpatient Physical Therapy  [address]  [place] [address]  Phone:  [phone]  Fax:  [phone]    Date: 08/30/2024    Patient: Sol Kern  YOB: 1965  MRN: [id number]     Time Calculation    Start time: 1015  Stop time: 1110 Time Calculation (min): 55 minutes         Chief Complaint: No chief complaint on file.    Visit #: 6    SUBJECTIVE:  I'm better overall. Less 'pre-dizzy' sensation when I lay down and no vertigo. I did have 1.5 days of dizziness after air travel going but not returning during a recent trip. Nauseated today, relates this to cancer medication. SOR gives temporary relief of headache. Neck is getting better \"bit by bit.\" Neck and lower back pain does cause some trouble with sleeping.     OBJECTIVE:      Therapeutic Exercises (CPT 11887):     1. Seated c/s AROM, prot/ret, rotation in neutral and flexion, circles, x 10 ea    2. seated neck circles, 2x10, EO and EC for half    3. scapular clocks, x 10 ea (F/B)    4. supine GH flexion, x 20, does report feeling this pull in the L lower back and thigh.      Therapeutic Exercise Summary:   Access Code: [email or web address]  URL: [email or web address]  Date: 08/12/2024  Prepared by:     Exercises  - Seated Small Neck Circles  - 5 x weekly - 20 reps  - Seated Upper Trapezius Stretch  - 5 x weekly - 60 seconds hold  - Shoulder External Rotation and Scapular Retraction with Resistance  - 5 x weekly - 2 sets - 10-15 reps  - Truong-Daroff Vestibular Exercise  - 5 x weekly - 3-5 sets    Therapeutic Treatments and Modalities:     1. Manual Therapy (CPT 69843), Supine: SOR,  GIII, CTJ rotational mobilizations GIV.    2. Neuromuscular Re-education (CPT 81166)    Therapeutic Treatment and Modalities Summary:   - Review of \"just right\" VRT loading, as well as functional considerations/pacing for daily activities.     Seated on " "ball:  - EC static x 1 min   - EC with bounce x 1 min (slight sx)  - EC static with HTs x 10 (appropriate challenge, some nausea)  - Smooth pursuit 3 way: x 10 ea  - Slow VORx1 x 10 ea (sx provoking)    Seated EOB:   - VOR-C slow x 10 ea    Time-based treatments/modalities:    Physical Therapy Timed Treatment Charges  Manual therapy minutes (CPT 51270): 10 minutes  Neuromusc re-ed, balance, coor, post minutes (CPT 22043): 30 minutes  Therapeutic exercise minutes (CPT 22478): 15 minutes      ASSESSMENT:   Response to treatment: Developing understanding of VRT concepts and encouraged to consider integration of \"just right\" loading into daily activities. C/S AROM and vision demands well tolerated when sitting on firm surface, but does reproduce on ball. Should continue to develop tolerance for complex seated conditions before moving into standing.     PLAN/RECOMMENDATIONS:   Plan for treatment: therapy treatment to continue next visit.  Planned interventions for next visit: continue with current treatment.         "

## 2024-09-04 ENCOUNTER — OUTPATIENT INFUSION SERVICES (OUTPATIENT)
Dept: ONCOLOGY | Facility: MEDICAL CENTER | Age: 59
End: 2024-09-04
Attending: INTERNAL MEDICINE
Payer: MEDICARE

## 2024-09-04 ENCOUNTER — PATIENT MESSAGE (OUTPATIENT)
Dept: HEALTH INFORMATION MANAGEMENT | Facility: OTHER | Age: 59
End: 2024-09-04

## 2024-09-04 VITALS
HEART RATE: 88 BPM | DIASTOLIC BLOOD PRESSURE: 83 MMHG | BODY MASS INDEX: 39.45 KG/M2 | SYSTOLIC BLOOD PRESSURE: 150 MMHG | OXYGEN SATURATION: 92 % | WEIGHT: 222.66 LBS | HEIGHT: 63 IN | RESPIRATION RATE: 18 BRPM | TEMPERATURE: 97.5 F

## 2024-09-04 DIAGNOSIS — C50.912 BREAST CANCER, STAGE 4, LEFT (HCC): ICD-10-CM

## 2024-09-04 DIAGNOSIS — Z17.0 CARCINOMA OF UPPER-OUTER QUADRANT OF LEFT BREAST IN FEMALE, ESTROGEN RECEPTOR POSITIVE (HCC): ICD-10-CM

## 2024-09-04 DIAGNOSIS — C50.412 CARCINOMA OF UPPER-OUTER QUADRANT OF LEFT BREAST IN FEMALE, ESTROGEN RECEPTOR POSITIVE (HCC): ICD-10-CM

## 2024-09-04 LAB
ALBUMIN SERPL BCP-MCNC: 4 G/DL (ref 3.2–4.9)
ALBUMIN/GLOB SERPL: 1.3 G/DL
ALP SERPL-CCNC: 84 U/L (ref 30–99)
ALT SERPL-CCNC: 40 U/L (ref 2–50)
ANION GAP SERPL CALC-SCNC: 16 MMOL/L (ref 7–16)
AST SERPL-CCNC: 41 U/L (ref 12–45)
BASOPHILS # BLD AUTO: 1.2 % (ref 0–1.8)
BASOPHILS # BLD: 0.07 K/UL (ref 0–0.12)
BILIRUB SERPL-MCNC: 0.4 MG/DL (ref 0.1–1.5)
BUN SERPL-MCNC: 9 MG/DL (ref 8–22)
CA-I BLD ISE-SCNC: 1.25 MMOL/L (ref 1.1–1.3)
CALCIUM ALBUM COR SERPL-MCNC: 9.8 MG/DL (ref 8.5–10.5)
CALCIUM SERPL-MCNC: 9.8 MG/DL (ref 8.5–10.5)
CHLORIDE SERPL-SCNC: 103 MMOL/L (ref 96–112)
CO2 SERPL-SCNC: 20 MMOL/L (ref 20–33)
CREAT BLD-MCNC: 0.8 MG/DL (ref 0.5–1.4)
CREAT SERPL-MCNC: 0.72 MG/DL (ref 0.5–1.4)
EOSINOPHIL # BLD AUTO: 0.06 K/UL (ref 0–0.51)
EOSINOPHIL NFR BLD: 1 % (ref 0–6.9)
ERYTHROCYTE [DISTWIDTH] IN BLOOD BY AUTOMATED COUNT: 48 FL (ref 35.9–50)
GFR SERPLBLD CREATININE-BSD FMLA CKD-EPI: 95 ML/MIN/1.73 M 2
GLOBULIN SER CALC-MCNC: 3.2 G/DL (ref 1.9–3.5)
GLUCOSE SERPL-MCNC: 255 MG/DL (ref 65–99)
HCT VFR BLD AUTO: 46.2 % (ref 37–47)
HGB BLD-MCNC: 15.9 G/DL (ref 12–16)
IMM GRANULOCYTES # BLD AUTO: 0.02 K/UL (ref 0–0.11)
IMM GRANULOCYTES NFR BLD AUTO: 0.3 % (ref 0–0.9)
LYMPHOCYTES # BLD AUTO: 0.97 K/UL (ref 1–4.8)
LYMPHOCYTES NFR BLD: 16 % (ref 22–41)
MCH RBC QN AUTO: 34.3 PG (ref 27–33)
MCHC RBC AUTO-ENTMCNC: 34.4 G/DL (ref 32.2–35.5)
MCV RBC AUTO: 99.6 FL (ref 81.4–97.8)
MONOCYTES # BLD AUTO: 0.5 K/UL (ref 0–0.85)
MONOCYTES NFR BLD AUTO: 8.3 % (ref 0–13.4)
NEUTROPHILS # BLD AUTO: 4.43 K/UL (ref 1.82–7.42)
NEUTROPHILS NFR BLD: 73.2 % (ref 44–72)
NRBC # BLD AUTO: 0 K/UL
NRBC BLD-RTO: 0 /100 WBC (ref 0–0.2)
PLATELET # BLD AUTO: 355 K/UL (ref 164–446)
PMV BLD AUTO: 9.6 FL (ref 9–12.9)
POTASSIUM SERPL-SCNC: 4.4 MMOL/L (ref 3.6–5.5)
PROT SERPL-MCNC: 7.2 G/DL (ref 6–8.2)
RBC # BLD AUTO: 4.64 M/UL (ref 4.2–5.4)
SODIUM SERPL-SCNC: 139 MMOL/L (ref 135–145)
WBC # BLD AUTO: 6.1 K/UL (ref 4.8–10.8)

## 2024-09-04 PROCEDURE — 80053 COMPREHEN METABOLIC PANEL: CPT

## 2024-09-04 PROCEDURE — 700111 HCHG RX REV CODE 636 W/ 250 OVERRIDE (IP): Mod: JZ,JG | Performed by: NURSE PRACTITIONER

## 2024-09-04 PROCEDURE — 36415 COLL VENOUS BLD VENIPUNCTURE: CPT

## 2024-09-04 PROCEDURE — 85025 COMPLETE CBC W/AUTO DIFF WBC: CPT

## 2024-09-04 PROCEDURE — 82565 ASSAY OF CREATININE: CPT

## 2024-09-04 PROCEDURE — 96402 CHEMO HORMON ANTINEOPL SQ/IM: CPT

## 2024-09-04 PROCEDURE — 82330 ASSAY OF CALCIUM: CPT

## 2024-09-04 PROCEDURE — 96372 THER/PROPH/DIAG INJ SC/IM: CPT

## 2024-09-04 RX ORDER — LAMOTRIGINE 25 MG/1
250 TABLET ORAL ONCE
Status: COMPLETED | OUTPATIENT
Start: 2024-09-04 | End: 2024-09-04

## 2024-09-04 RX ADMIN — FULVESTRANT 250 MG: 50 INJECTION INTRAMUSCULAR at 15:21

## 2024-09-04 RX ADMIN — DENOSUMAB 120 MG: 120 INJECTION SUBCUTANEOUS at 15:20

## 2024-09-04 ASSESSMENT — FIBROSIS 4 INDEX: FIB4 SCORE: 1.15

## 2024-09-04 NOTE — PROGRESS NOTES
[facility] Faslodex and xgeva injections. She denies any upcoming or previous oral surgery.      Labs drawn via venipuncture to her right arm. Labs reviewed by pharmacy and RN; within parameter for treatment today.       Xgeva injected in the back of her right arm, band-aid applied to injection site.      Faslodex administered in her, left and right dorsogluteal area, bandaids applied.     She was then discharged home in stable condition aware of her next appointment.

## 2024-09-05 ENCOUNTER — APPOINTMENT (OUTPATIENT)
Dept: PHYSICAL THERAPY | Facility: MEDICAL CENTER | Age: 59
End: 2024-09-05
Payer: MEDICARE

## 2024-09-09 ENCOUNTER — PHYSICAL THERAPY (OUTPATIENT)
Dept: PHYSICAL THERAPY | Facility: REHABILITATION | Age: 59
End: 2024-09-09
Attending: INTERNAL MEDICINE
Payer: MEDICARE

## 2024-09-09 DIAGNOSIS — L76.82 AXILLARY WEB SYNDROME: ICD-10-CM

## 2024-09-09 DIAGNOSIS — L90.5 AXILLARY WEB SYNDROME: ICD-10-CM

## 2024-09-09 DIAGNOSIS — M25.9 SHOULDER PROBLEM: ICD-10-CM

## 2024-09-09 DIAGNOSIS — C50.912 BREAST CANCER, STAGE 4, LEFT (HCC): ICD-10-CM

## 2024-09-09 DIAGNOSIS — I89.0 LYMPHEDEMA OF EXTREMITY: ICD-10-CM

## 2024-09-09 PROCEDURE — 97140 MANUAL THERAPY 1/> REGIONS: CPT

## 2024-09-09 PROCEDURE — 97110 THERAPEUTIC EXERCISES: CPT

## 2024-09-09 NOTE — OP THERAPY DAILY TREATMENT
Outpatient Physical Therapy  LYMPHEDEMA THERAPY DAILY TREATMENT     [facility] Physical Therapy [address].  [address]  [place] [address]  Phone:  [phone]  Fax:  [phone]    Date: 09/09/2024    Patient: Sol Kern  YOB: 1965  MRN: [id number]     Time Calculation    Start time: 1334  Stop time: 1417 Time Calculation (min): 43 minutes         Chief Complaint: Lymphedema Aquired and Shoulder Problem    Visit #: 21    Subjective:   History of Present Illness:     Mechanism of injury:  Shoulder better. Not perfect though.       Lymphedema Objective    Left Upper Extremity Circumferential Measurements 8/14  Axilla: 40.3 cm  Upper Proximal Humerus: 37.6 cm  Distal Humerus: 33.6 cm  Elbow: 28.6 cm  Mid-Forearm: 25.8 cm  Wrist: 16.2 cm  Distal Garza Crease: 19.3 cm  Total: 201.4 cm        Left Upper Extremity Circumferential Measurements 7/23  Axilla: 40.4 cm  Upper Proximal Humerus: 37.4 cm  Distal Humerus: 32.3 cm  Elbow: 27.7 cm  Mid-Forearm: 26.7 cm  Wrist: 16.3 cm  Distal Garza Crease: 18.1 cm  Total: 198.9 cm        Left Upper Extremity Circumferential Measurements 5/30  Axilla: 40.2 cm  Upper Proximal Humerus: 36.7 cm  Distal Humerus: 34.2 cm  Elbow: 28.3 cm  Mid-Forearm: 26 cm  Wrist: 16.4 cm  Distal Garza Crease: 19.2 cm  Total: 201 cm        Left Upper Extremity Circumferential Measurements 4/23  Axilla: 40.1 cm  Upper Proximal Humerus: 36.8 cm  Distal Humerus: 32.6 cm  Elbow: 28.3 cm  Mid-Forearm: 25.7 cm  Wrist: 15.8 cm  Distal Garza Crease: 18.2 cm  Total: 197.5 cm      Left Upper Extremity Circumferential Measurements EVAL  Axilla: 38.5 cm  Upper Proximal Humerus: 37.7 cm  Distal Humerus: 34.4 cm  Elbow: 27.2 cm  Mid-Forearm: 24.7 cm  Wrist: 15.9 cm  Distal Garza Crease: 18.4 cm  Total: 196.8 cm     Right Upper Extremity Circumferential Measurements EVAL  Axilla: 37.1 cm  Upper Proximal Humerus: 36.6 cm  Distal Humerus: 33.4 cm  Elbow: 27.2 cm  Mid-Forearm: 22  cm  Wrist: 16.8 cm  Distal Garza Crease: 18.4 cm  Total: cm 191.5    Therapeutic Exercises (CPT 55710):     1. wall walk with lift off, HEP    2. sciatic nerve glide, HEP    3. wall gold with chin tuck and scap retraction, HEP    4. standing extension with elbow drive, HEP    5. door extension with strap, HEP    6. supine shoulder flexion with green band, HEP    7. abduction in sidely with green band, HEP    8. chest opening with green band, HEP    9. isometric wall ER with body rotation, x10, 4-10sec hold, added to HEP      Therapeutic Exercise Summary:   HEP: seated pelvic tilts, wall walk    Therapeutic Treatments and Modalities:     1. Manual Therapy (CPT 65465)    Therapeutic Treatment and Modalities Summary:   -GHJ AP into abduction; Inferior GHJ glide with abduction  -ART to L pectorals   -MLD to L UE  -tissue tension release to L axilla  -ART to L subscap      Time-based treatments/modalities:    Physical Therapy Timed Treatment Charges  Manual therapy minutes (CPT 95837): 33 minutes  Therapeutic exercise minutes (CPT 54544): 10 minutes      Assessment and Plan:   Assessment details:  Sol has improved her overhead ROM to her L UE but feels most tight at her subscapularis area. She will benefit from further intervention to assist with release and consistent improvement to her shoulder ROM.     Plan:  Therapy options:  Physical therapy treatment to continue  Discussed with:  Patient

## 2024-09-10 ENCOUNTER — PATIENT MESSAGE (OUTPATIENT)
Dept: MEDICAL GROUP | Facility: PHYSICIAN GROUP | Age: 59
End: 2024-09-10
Payer: MEDICARE

## 2024-09-10 DIAGNOSIS — G89.29 CHRONIC MIDLINE LOW BACK PAIN WITH RIGHT-SIDED SCIATICA: ICD-10-CM

## 2024-09-10 DIAGNOSIS — G47.01 INSOMNIA DUE TO MEDICAL CONDITION: ICD-10-CM

## 2024-09-10 DIAGNOSIS — M54.41 CHRONIC MIDLINE LOW BACK PAIN WITH RIGHT-SIDED SCIATICA: ICD-10-CM

## 2024-09-10 NOTE — OP THERAPY DAILY TREATMENT
Outpatient Physical Therapy  DAILY TREATMENT     [facility] Outpatient Physical Therapy  [address]  [place] [address]  Phone:  [phone]  Fax:  [phone]    Date: 09/11/2024    Patient: Sol Kern  YOB: 1965  MRN: [id number]     Time Calculation    Start time: 1415  Stop time: 1505 Time Calculation (min): 50 minutes         Chief Complaint: Neck Problem    Visit #: 7    SUBJECTIVE:  My neck is sore today, but I've been doing better. Finding relief from using the lacrosse ball and the eye exercises are going. The manual therapy provides a lot of relief. With regard to the dizziness, has not had vertigo. Does continue to have lightheadedness/imbalance sensations that typically last a couple mins, but has occurred for a day after a plane ride.     OBJECTIVE:  Dizziness Handicap Inventory - Physical Items Score: 16  Dizziness Handicap Inventory - Emotional Items Score: 6  Dizziness Handicap Inventory - Functional Items Score: 24  Dizziness Handicap Inventory - Total Score: 46     Therapeutic Exercises (CPT 98187):     1. Seated c/s AROM, prot/ret, rotation in neutral and flexion, circles, x 10 ea    2. seated neck circles, 2x10, EO and EC for half    3. scapular clocks, x 10 ea (F/B)    4. supine GH flexion, x 20, does report feeling this pull in the L lower back and thigh.      Therapeutic Exercise Summary:   Access Code: [email or web address]  URL: [email or web address]  Date: 08/12/2024  Prepared by:     Exercises  - Seated Small Neck Circles  - 5 x weekly - 20 reps  - Seated Upper Trapezius Stretch  - 5 x weekly - 60 seconds hold  - Shoulder External Rotation and Scapular Retraction with Resistance  - 5 x weekly - 2 sets - 10-15 reps  - Truong-Daroff Vestibular Exercise  - 5 x weekly - 3-5 sets    Therapeutic Treatments and Modalities:     1. Manual Therapy (CPT 66553), Supine: SOR,  GIII, CTJ rotational mobilizations GIV.    2. Therapeutic  Activities (CPT 21261)    Therapeutic Treatment and Modalities Summary:   Positional exam: NEG for B dodie hallpike and roll    VOMS total = 1. Dizziness was only reproduced by VOR-C and recovered blake Marmolejo Sensory Organization Performance (SOP) test:  1) Standard Eyes Open= N  2) Standard Eyes Closed= N  3) Modified Tandem Eyes Open= N  4) Modified Tandem Eyes Closed= S  5) Foam Eyes Open= N  6) Foam Eyes Closed= S (apprehensive, close SBA)  7) Fukuda Step= NT    Key: N=Normal, S= Sway, F= Fall, R= Right, L= Left      Time-based treatments/modalities:    Physical Therapy Timed Treatment Charges  Manual therapy minutes (CPT 77502): 10 minutes  Therapeutic activity minutes (CPT 79554): 15 minutes  Therapeutic exercise minutes (CPT 51013): 15 minutes    ASSESSMENT:   Response to treatment: See PN    PLAN/RECOMMENDATIONS:   Plan for treatment: therapy treatment to continue next visit.  Planned interventions for next visit: continue with current treatment.

## 2024-09-11 ENCOUNTER — PHYSICAL THERAPY (OUTPATIENT)
Dept: PHYSICAL THERAPY | Facility: MEDICAL CENTER | Age: 59
End: 2024-09-11
Attending: INTERNAL MEDICINE
Payer: MEDICARE

## 2024-09-11 DIAGNOSIS — S16.1XXA STRAIN OF NECK MUSCLE, INITIAL ENCOUNTER: ICD-10-CM

## 2024-09-11 PROCEDURE — 97530 THERAPEUTIC ACTIVITIES: CPT

## 2024-09-11 PROCEDURE — 97110 THERAPEUTIC EXERCISES: CPT

## 2024-09-11 PROCEDURE — 97140 MANUAL THERAPY 1/> REGIONS: CPT
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
## 2024-09-12 RX ORDER — SEMAGLUTIDE 2.68 MG/ML
2 INJECTION, SOLUTION SUBCUTANEOUS
Qty: 9 ML | Refills: 0 | Status: SHIPPED | OUTPATIENT
Start: 2024-09-12

## 2024-09-12 RX ORDER — ZOLPIDEM TARTRATE 10 MG/1
10 TABLET ORAL NIGHTLY PRN
Qty: 30 TABLET | Refills: 0 | Status: SHIPPED | OUTPATIENT
Start: 2024-09-12 | End: 2024-10-12

## 2024-09-13 ENCOUNTER — PATIENT OUTREACH (OUTPATIENT)
Dept: HEALTH INFORMATION MANAGEMENT | Facility: OTHER | Age: 59
End: 2024-09-13
Payer: MEDICARE

## 2024-09-13 DIAGNOSIS — E11.40 TYPE 2 DIABETES MELLITUS WITH DIABETIC NEUROPATHY, WITHOUT LONG-TERM CURRENT USE OF INSULIN (HCC): ICD-10-CM

## 2024-09-13 DIAGNOSIS — J96.11 CHRONIC RESPIRATORY FAILURE WITH HYPOXIA (HCC): ICD-10-CM

## 2024-09-13 DIAGNOSIS — I10 ESSENTIAL HYPERTENSION: ICD-10-CM

## 2024-09-13 DIAGNOSIS — C50.912 BREAST CANCER, STAGE 4, LEFT (HCC): ICD-10-CM

## 2024-09-13 NOTE — PROGRESS NOTES
Assessment    I spoke to the patient this morning for her monthly and quarterly PCM follow ups. Patient denies falls or injuries since our last conversation. Patient reports no changes to her medications in the last two weeks. Patient reports continuing to work with PT. She states that it is helping her neck to the degree where PT is considering moving treatment to her lower back. Patient states that she is not quite ready to stop treatment on her neck just yet. She reports still getting some neck discomfort and headaches. PT will need new orders to change therapy. PCP informed.The patient is aware of her future appointment dates and times. The patient reports understanding of how to get a hold of me with any questions or concerns.     Education and Self Management of Care    Education provided with regard to PET scan prep.   The patient will continue to follow up with PCP/Specialists as indicated    Plan of Care and Goals    Patient will remain free from falls/injuries  Healthy nutrition, hydration, and activity    Barriers:    Management of chronic and acute health concerns.     Progress:    Progressing    Next outreach:  One Month

## 2024-09-15 RX ORDER — LAMOTRIGINE 25 MG/1
250 TABLET ORAL ONCE
OUTPATIENT
Start: 2024-10-02 | End: 2024-10-02

## 2024-09-15 RX ORDER — ONDANSETRON 8 MG/1
8 TABLET, ORALLY DISINTEGRATING ORAL PRN
OUTPATIENT
Start: 2024-10-02

## 2024-09-15 RX ORDER — DIPHENHYDRAMINE HYDROCHLORIDE 50 MG/ML
50 INJECTION INTRAMUSCULAR; INTRAVENOUS PRN
OUTPATIENT
Start: 2024-10-02

## 2024-09-15 RX ORDER — METHYLPREDNISOLONE SODIUM SUCCINATE 125 MG/2ML
125 INJECTION, POWDER, LYOPHILIZED, FOR SOLUTION INTRAMUSCULAR; INTRAVENOUS PRN
OUTPATIENT
Start: 2024-10-02

## 2024-09-15 RX ORDER — ONDANSETRON 2 MG/ML
4 INJECTION INTRAMUSCULAR; INTRAVENOUS PRN
OUTPATIENT
Start: 2024-10-02

## 2024-09-15 RX ORDER — PROCHLORPERAZINE MALEATE 10 MG
10 TABLET ORAL EVERY 6 HOURS PRN
OUTPATIENT
Start: 2024-10-02

## 2024-09-15 RX ORDER — EPINEPHRINE 1 MG/ML(1)
0.5 AMPUL (ML) INJECTION PRN
OUTPATIENT
Start: 2024-10-02

## 2024-09-16 RX ORDER — LIDOCAINE 50 MG/G
1 PATCH TOPICAL EVERY 24 HOURS
Qty: 30 PATCH | Refills: 0 | Status: SHIPPED | OUTPATIENT
Start: 2024-09-16

## 2024-09-16 NOTE — PATIENT COMMUNICATION
Received request via: Patient    Was the patient seen in the last year in this department? Yes    Does the patient have an active prescription (recently filled or refills available) for medication(s) requested? No    [facility]    Does the patient have long term Plus and need 100-day supply? (This applies to ALL medications) Patient does not have SCP

## 2024-09-17 ENCOUNTER — HOSPITAL ENCOUNTER (OUTPATIENT)
Dept: RADIOLOGY | Facility: MEDICAL CENTER | Age: 59
End: 2024-09-17
Attending: INTERNAL MEDICINE
Payer: MEDICARE

## 2024-09-17 DIAGNOSIS — C50.912 BREAST CANCER, STAGE 4, LEFT (HCC): ICD-10-CM

## 2024-09-17 PROCEDURE — A9552 F18 FDG: HCPCS

## 2024-09-18 ENCOUNTER — TELEPHONE (OUTPATIENT)
Dept: HEMATOLOGY ONCOLOGY | Facility: MEDICAL CENTER | Age: 59
End: 2024-09-18
Payer: MEDICARE

## 2024-09-18 ENCOUNTER — PHYSICAL THERAPY (OUTPATIENT)
Dept: PHYSICAL THERAPY | Facility: REHABILITATION | Age: 59
End: 2024-09-18
Attending: INTERNAL MEDICINE
Payer: MEDICARE

## 2024-09-18 DIAGNOSIS — L76.82 AXILLARY WEB SYNDROME: ICD-10-CM

## 2024-09-18 DIAGNOSIS — L90.5 AXILLARY WEB SYNDROME: ICD-10-CM

## 2024-09-18 DIAGNOSIS — M25.9 SHOULDER PROBLEM: ICD-10-CM

## 2024-09-18 DIAGNOSIS — C50.912 BREAST CANCER, STAGE 4, LEFT (HCC): ICD-10-CM

## 2024-09-18 DIAGNOSIS — I89.0 LYMPHEDEMA OF EXTREMITY: ICD-10-CM

## 2024-09-18 PROCEDURE — 97110 THERAPEUTIC EXERCISES: CPT

## 2024-09-18 PROCEDURE — 97140 MANUAL THERAPY 1/> REGIONS: CPT

## 2024-09-18 RX ORDER — ATORVASTATIN CALCIUM 20 MG/1
20 TABLET, FILM COATED ORAL EVERY EVENING
Qty: 100 TABLET | Refills: 3 | Status: SHIPPED | OUTPATIENT
Start: 2024-09-18
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
## 2024-09-18 NOTE — TELEPHONE ENCOUNTER
Received request via: Pharmacy    Was the patient seen in the last year in this department? Yes    Does the patient have an active prescription (recently filled or refills available) for medication(s) requested? No    Pharmacy Name: [facility]/pharmacy #[id number] - [place], [place] - [address]     Does the patient have jail Plus and need 100-day supply? (This applies to ALL medications) Patient does not have SCP

## 2024-09-18 NOTE — TELEPHONE ENCOUNTER
Prior Authorization for Kisqali (600 MG Dose) 200MG tablets (Quantity: 63, Days: 28) has been submitted via Cover My Meds: Key ([id number])    Insurance: [facility]    Will follow up in 24-48 business hours.

## 2024-09-30 ENCOUNTER — PHYSICAL THERAPY (OUTPATIENT)
Dept: PHYSICAL THERAPY | Facility: REHABILITATION | Age: 59
End: 2024-09-30
Attending: INTERNAL MEDICINE
Payer: MEDICARE

## 2024-09-30 DIAGNOSIS — L90.5 AXILLARY WEB SYNDROME: ICD-10-CM

## 2024-09-30 DIAGNOSIS — M25.9 SHOULDER PROBLEM: ICD-10-CM

## 2024-09-30 DIAGNOSIS — S16.1XXA STRAIN OF NECK MUSCLE, INITIAL ENCOUNTER: ICD-10-CM

## 2024-09-30 DIAGNOSIS — I89.0 LYMPHEDEMA OF EXTREMITY: ICD-10-CM

## 2024-09-30 DIAGNOSIS — C50.912 BREAST CANCER, STAGE 4, LEFT (HCC): ICD-10-CM

## 2024-09-30 DIAGNOSIS — L76.82 AXILLARY WEB SYNDROME: ICD-10-CM

## 2024-09-30 PROCEDURE — 97535 SELF CARE MNGMENT TRAINING: CPT

## 2024-09-30 PROCEDURE — 97140 MANUAL THERAPY 1/> REGIONS: CPT

## 2024-09-30 NOTE — OP THERAPY DAILY TREATMENT
Outpatient Physical Therapy  LYMPHEDEMA THERAPY DAILY TREATMENT     [facility] Physical Therapy [address].  [address]  [place] [address]  Phone:  [phone]  Fax:  [phone]    Date: 09/30/2024    Patient: Sol Kern  YOB: 1965  MRN: [id number]     Time Calculation    Start time: 0819  Stop time: 0901 Time Calculation (min): 42 minutes         Chief Complaint: Lymphedema Breast Cancer    Visit #: 23    Subjective:   History of Present Illness:     Mechanism of injury:  Resting a bit; had a nice week/weekend in [place] at [facility].       Lymphedema Objective     Left Upper Extremity Circumferential Measurements 9/18  Axilla: 41.2 cm  Upper Proximal Humerus: 38.2 cm  Distal Humerus: 34.1 cm  Elbow: 28.3 cm  Mid-Forearm: 26.2 cm  Wrist: 16.2 cm  Distal Garza Crease: 19.3 cm  Total: 203.5 cm        Left Upper Extremity Circumferential Measurements 8/14  Axilla: 40.3 cm  Upper Proximal Humerus: 37.6 cm  Distal Humerus: 33.6 cm  Elbow: 28.6 cm  Mid-Forearm: 25.8 cm  Wrist: 16.2 cm  Distal Garza Crease: 19.3 cm  Total: 201.4 cm        Left Upper Extremity Circumferential Measurements 7/23  Axilla: 40.4 cm  Upper Proximal Humerus: 37.4 cm  Distal Humerus: 32.3 cm  Elbow: 27.7 cm  Mid-Forearm: 26.7 cm  Wrist: 16.3 cm  Distal Garza Crease: 18.1 cm  Total: 198.9 cm        Left Upper Extremity Circumferential Measurements 5/30  Axilla: 40.2 cm  Upper Proximal Humerus: 36.7 cm  Distal Humerus: 34.2 cm  Elbow: 28.3 cm  Mid-Forearm: 26 cm  Wrist: 16.4 cm  Distal Garza Crease: 19.2 cm  Total: 201 cm        Left Upper Extremity Circumferential Measurements 4/23  Axilla: 40.1 cm  Upper Proximal Humerus: 36.8 cm  Distal Humerus: 32.6 cm  Elbow: 28.3 cm  Mid-Forearm: 25.7 cm  Wrist: 15.8 cm  Distal Garza Crease: 18.2 cm  Total: 197.5 cm      Left Upper Extremity Circumferential Measurements EVAL  Axilla: 38.5 cm  Upper Proximal Humerus: 37.7 cm  Distal Humerus: 34.4 cm  Elbow: 27.2  cm  Mid-Forearm: 24.7 cm  Wrist: 15.9 cm  Distal Garza Crease: 18.4 cm  Total: 196.8 cm     Right Upper Extremity Circumferential Measurements EVAL  Axilla: 37.1 cm  Upper Proximal Humerus: 36.6 cm  Distal Humerus: 33.4 cm  Elbow: 27.2 cm  Mid-Forearm: 22 cm  Wrist: 16.8 cm  Distal Garza Crease: 18.4 cm  Total: cm 191.5    Therapeutic Exercises (CPT 97267):     1. wall walk with lift off, HEP    2. sciatic nerve glide, HEP    3. wall gold with chin tuck and scap retraction, HEP    4. standing extension with elbow drive, HEP    5. door extension with strap, HEP    6. supine shoulder flexion with green band, HEP    7. abduction in sidely with green band, HEP    8. chest opening with green band, HEP    9. isometric wall ER with body rotation, HEP    10. supine flexion with green band      Therapeutic Exercise Summary:   HEP: seated pelvic tilts, wall walk    Therapeutic Treatments and Modalities:     1. Manual Therapy (CPT 47907)    Therapeutic Treatment and Modalities Summary: -Trigger point release to L: forearm extensors, biceps, pec major, pec minor, subscap, supraspinatus, infraspinatus, teres major/minor, traps, levator scap   -L joint mobilization to radius/ulna for interossei, wrist, CMC  -MLD to L breast including parasternal and intercostal collectors.   -MLD to L UE   -ART to pectorals and sub sca with abduction      Time-based treatments/modalities:    Physical Therapy Timed Treatment Charges  Functional training, self care minutes (CPT 32514): 16 minutes  Manual therapy minutes (CPT 71429): 26 minutes      Assessment and Plan:   Assessment details:  Sol does have subjective improvement in her shoulder and chest tightness after intervention. Her overhead ROM has been sustained with subjective pull to her chest at end range. She will benefit from further intervention to decongest her L UE and ipmrove shoulder ROM.    Plan:  Therapy options:  Physical therapy treatment to continue  Discussed with:   Patient

## 2024-10-02 ENCOUNTER — HOSPITAL ENCOUNTER (OUTPATIENT)
Dept: HEMATOLOGY ONCOLOGY | Facility: MEDICAL CENTER | Age: 59
End: 2024-10-02
Attending: INTERNAL MEDICINE
Payer: MEDICARE

## 2024-10-02 ENCOUNTER — OUTPATIENT INFUSION SERVICES (OUTPATIENT)
Dept: ONCOLOGY | Facility: MEDICAL CENTER | Age: 59
End: 2024-10-02
Attending: INTERNAL MEDICINE
Payer: MEDICARE

## 2024-10-02 VITALS
TEMPERATURE: 97 F | BODY MASS INDEX: 39.26 KG/M2 | SYSTOLIC BLOOD PRESSURE: 175 MMHG | RESPIRATION RATE: 18 BRPM | WEIGHT: 221.56 LBS | DIASTOLIC BLOOD PRESSURE: 96 MMHG | OXYGEN SATURATION: 93 % | HEIGHT: 63 IN | HEART RATE: 90 BPM

## 2024-10-02 VITALS
HEART RATE: 101 BPM | WEIGHT: 222.88 LBS | SYSTOLIC BLOOD PRESSURE: 124 MMHG | OXYGEN SATURATION: 95 % | BODY MASS INDEX: 41.02 KG/M2 | DIASTOLIC BLOOD PRESSURE: 78 MMHG | TEMPERATURE: 97.9 F | HEIGHT: 62 IN

## 2024-10-02 DIAGNOSIS — Z17.0 CARCINOMA OF UPPER-OUTER QUADRANT OF LEFT BREAST IN FEMALE, ESTROGEN RECEPTOR POSITIVE (HCC): ICD-10-CM

## 2024-10-02 DIAGNOSIS — C50.412 CARCINOMA OF UPPER-OUTER QUADRANT OF LEFT BREAST IN FEMALE, ESTROGEN RECEPTOR POSITIVE (HCC): ICD-10-CM

## 2024-10-02 DIAGNOSIS — C50.912 BREAST CANCER, STAGE 4, LEFT (HCC): ICD-10-CM

## 2024-10-02 LAB
ALBUMIN SERPL BCP-MCNC: 3.9 G/DL (ref 3.2–4.9)
ALBUMIN/GLOB SERPL: 1.2 G/DL
ALP SERPL-CCNC: 79 U/L (ref 30–99)
ALT SERPL-CCNC: 29 U/L (ref 2–50)
ANION GAP SERPL CALC-SCNC: 10 MMOL/L (ref 7–16)
AST SERPL-CCNC: 33 U/L (ref 12–45)
BASOPHILS # BLD AUTO: 1.3 % (ref 0–1.8)
BASOPHILS # BLD: 0.08 K/UL (ref 0–0.12)
BILIRUB SERPL-MCNC: 0.5 MG/DL (ref 0.1–1.5)
BUN SERPL-MCNC: 7 MG/DL (ref 8–22)
CA-I BLD ISE-SCNC: 1.18 MMOL/L (ref 1.1–1.3)
CALCIUM ALBUM COR SERPL-MCNC: 9.3 MG/DL (ref 8.5–10.5)
CALCIUM SERPL-MCNC: 9.2 MG/DL (ref 8.5–10.5)
CHLORIDE SERPL-SCNC: 104 MMOL/L (ref 96–112)
CO2 SERPL-SCNC: 23 MMOL/L (ref 20–33)
CREAT BLD-MCNC: 0.7 MG/DL (ref 0.5–1.4)
CREAT SERPL-MCNC: 0.76 MG/DL (ref 0.5–1.4)
EOSINOPHIL # BLD AUTO: 0.07 K/UL (ref 0–0.51)
EOSINOPHIL NFR BLD: 1.2 % (ref 0–6.9)
ERYTHROCYTE [DISTWIDTH] IN BLOOD BY AUTOMATED COUNT: 47.1 FL (ref 35.9–50)
GFR SERPLBLD CREATININE-BSD FMLA CKD-EPI: 89 ML/MIN/1.73 M 2
GLOBULIN SER CALC-MCNC: 3.2 G/DL (ref 1.9–3.5)
GLUCOSE SERPL-MCNC: 164 MG/DL (ref 65–99)
HCT VFR BLD AUTO: 44.2 % (ref 37–47)
HGB BLD-MCNC: 15.6 G/DL (ref 12–16)
IMM GRANULOCYTES # BLD AUTO: 0.04 K/UL (ref 0–0.11)
IMM GRANULOCYTES NFR BLD AUTO: 0.7 % (ref 0–0.9)
LYMPHOCYTES # BLD AUTO: 1.05 K/UL (ref 1–4.8)
LYMPHOCYTES NFR BLD: 17.4 % (ref 22–41)
MCH RBC QN AUTO: 34.7 PG (ref 27–33)
MCHC RBC AUTO-ENTMCNC: 35.3 G/DL (ref 32.2–35.5)
MCV RBC AUTO: 98.2 FL (ref 81.4–97.8)
MONOCYTES # BLD AUTO: 0.56 K/UL (ref 0–0.85)
MONOCYTES NFR BLD AUTO: 9.3 % (ref 0–13.4)
NEUTROPHILS # BLD AUTO: 4.22 K/UL (ref 1.82–7.42)
NEUTROPHILS NFR BLD: 70.1 % (ref 44–72)
NRBC # BLD AUTO: 0 K/UL
NRBC BLD-RTO: 0 /100 WBC (ref 0–0.2)
PLATELET # BLD AUTO: 334 K/UL (ref 164–446)
PMV BLD AUTO: 9.4 FL (ref 9–12.9)
POTASSIUM SERPL-SCNC: 4.2 MMOL/L (ref 3.6–5.5)
PROT SERPL-MCNC: 7.1 G/DL (ref 6–8.2)
RBC # BLD AUTO: 4.5 M/UL (ref 4.2–5.4)
SODIUM SERPL-SCNC: 137 MMOL/L (ref 135–145)
WBC # BLD AUTO: 6 K/UL (ref 4.8–10.8)

## 2024-10-02 PROCEDURE — 700111 HCHG RX REV CODE 636 W/ 250 OVERRIDE (IP): Mod: JZ,JG | Performed by: NURSE PRACTITIONER

## 2024-10-02 PROCEDURE — 82565 ASSAY OF CREATININE: CPT

## 2024-10-02 PROCEDURE — 99214 OFFICE O/P EST MOD 30 MIN: CPT | Performed by: INTERNAL MEDICINE

## 2024-10-02 PROCEDURE — 85025 COMPLETE CBC W/AUTO DIFF WBC: CPT

## 2024-10-02 PROCEDURE — 96372 THER/PROPH/DIAG INJ SC/IM: CPT

## 2024-10-02 PROCEDURE — 80053 COMPREHEN METABOLIC PANEL: CPT

## 2024-10-02 PROCEDURE — 99212 OFFICE O/P EST SF 10 MIN: CPT | Performed by: INTERNAL MEDICINE

## 2024-10-02 PROCEDURE — 82330 ASSAY OF CALCIUM: CPT

## 2024-10-02 PROCEDURE — 36415 COLL VENOUS BLD VENIPUNCTURE: CPT

## 2024-10-02 PROCEDURE — 96402 CHEMO HORMON ANTINEOPL SQ/IM: CPT

## 2024-10-02 RX ORDER — LAMOTRIGINE 25 MG/1
250 TABLET ORAL ONCE
Status: COMPLETED | OUTPATIENT
Start: 2024-10-02 | End: 2024-10-02

## 2024-10-02 RX ADMIN — FULVESTRANT 250 MG: 50 INJECTION INTRAMUSCULAR at 12:00

## 2024-10-02 RX ADMIN — DENOSUMAB 120 MG: 120 INJECTION SUBCUTANEOUS at 11:59

## 2024-10-02 ASSESSMENT — ENCOUNTER SYMPTOMS
WEIGHT LOSS: 0
NAUSEA: 0
SHORTNESS OF BREATH: 1
PSYCHIATRIC NEGATIVE: 1
MUSCULOSKELETAL NEGATIVE: 1
CARDIOVASCULAR NEGATIVE: 1
FEVER: 0
COUGH: 0
EYES NEGATIVE: 1
MYALGIAS: 0
PALPITATIONS: 0
DIZZINESS: 0
VOMITING: 0
CHILLS: 0
CONSTIPATION: 0
HEADACHES: 0
GASTROINTESTINAL NEGATIVE: 1
NEUROLOGICAL NEGATIVE: 1
DIARRHEA: 0

## 2024-10-02 ASSESSMENT — FIBROSIS 4 INDEX
FIB4 SCORE: 1.12
FIB4 SCORE: 1.11

## 2024-10-03 ENCOUNTER — PHYSICAL THERAPY (OUTPATIENT)
Dept: PHYSICAL THERAPY | Facility: REHABILITATION | Age: 59
End: 2024-10-03
Attending: INTERNAL MEDICINE
Payer: MEDICARE

## 2024-10-03 DIAGNOSIS — S16.1XXA STRAIN OF NECK MUSCLE, INITIAL ENCOUNTER: ICD-10-CM

## 2024-10-03 DIAGNOSIS — L76.82 AXILLARY WEB SYNDROME: ICD-10-CM

## 2024-10-03 DIAGNOSIS — L90.5 AXILLARY WEB SYNDROME: ICD-10-CM

## 2024-10-03 DIAGNOSIS — M25.9 SHOULDER PROBLEM: ICD-10-CM

## 2024-10-03 DIAGNOSIS — C50.912 BREAST CANCER, STAGE 4, LEFT (HCC): ICD-10-CM

## 2024-10-03 DIAGNOSIS — I89.0 LYMPHEDEMA OF EXTREMITY: ICD-10-CM

## 2024-10-03 PROCEDURE — 97140 MANUAL THERAPY 1/> REGIONS: CPT

## 2024-10-09 RX ORDER — DIPHENHYDRAMINE HYDROCHLORIDE 50 MG/ML
50 INJECTION INTRAMUSCULAR; INTRAVENOUS PRN
OUTPATIENT
Start: 2024-10-30

## 2024-10-09 RX ORDER — PROCHLORPERAZINE MALEATE 10 MG
10 TABLET ORAL EVERY 6 HOURS PRN
OUTPATIENT
Start: 2024-10-30

## 2024-10-09 RX ORDER — LAMOTRIGINE 25 MG/1
250 TABLET ORAL ONCE
OUTPATIENT
Start: 2024-10-30 | End: 2024-10-30

## 2024-10-09 RX ORDER — ONDANSETRON 2 MG/ML
4 INJECTION INTRAMUSCULAR; INTRAVENOUS PRN
OUTPATIENT
Start: 2024-10-30

## 2024-10-09 RX ORDER — EPINEPHRINE 1 MG/ML(1)
0.5 AMPUL (ML) INJECTION PRN
OUTPATIENT
Start: 2024-10-30

## 2024-10-09 RX ORDER — ONDANSETRON 8 MG/1
8 TABLET, ORALLY DISINTEGRATING ORAL PRN
OUTPATIENT
Start: 2024-10-30

## 2024-10-09 RX ORDER — METHYLPREDNISOLONE SODIUM SUCCINATE 125 MG/2ML
125 INJECTION, POWDER, LYOPHILIZED, FOR SOLUTION INTRAMUSCULAR; INTRAVENOUS PRN
OUTPATIENT
Start: 2024-10-30

## 2024-10-10 ENCOUNTER — PATIENT OUTREACH (OUTPATIENT)
Dept: HEALTH INFORMATION MANAGEMENT | Facility: OTHER | Age: 59
End: 2024-10-10
Payer: MEDICARE

## 2024-10-10 DIAGNOSIS — I10 ESSENTIAL HYPERTENSION: ICD-10-CM

## 2024-10-10 DIAGNOSIS — J96.11 CHRONIC RESPIRATORY FAILURE WITH HYPOXIA (HCC): ICD-10-CM

## 2024-10-10 DIAGNOSIS — E11.40 TYPE 2 DIABETES MELLITUS WITH DIABETIC NEUROPATHY, WITHOUT LONG-TERM CURRENT USE OF INSULIN (HCC): ICD-10-CM

## 2024-10-10 DIAGNOSIS — C50.912 BREAST CANCER, STAGE 4, LEFT (HCC): ICD-10-CM

## 2024-10-10 DIAGNOSIS — Z86.16 HISTORY OF COVID-19: ICD-10-CM

## 2024-10-15 ENCOUNTER — OFFICE VISIT (OUTPATIENT)
Dept: URGENT CARE | Facility: CLINIC | Age: 59
End: 2024-10-15
Payer: MEDICARE

## 2024-10-15 ENCOUNTER — APPOINTMENT (OUTPATIENT)
Dept: URGENT CARE | Facility: CLINIC | Age: 59
End: 2024-10-15
Payer: MEDICARE

## 2024-10-15 VITALS
RESPIRATION RATE: 18 BRPM | SYSTOLIC BLOOD PRESSURE: 138 MMHG | OXYGEN SATURATION: 92 % | HEIGHT: 62 IN | TEMPERATURE: 97.4 F | BODY MASS INDEX: 41.04 KG/M2 | DIASTOLIC BLOOD PRESSURE: 74 MMHG | WEIGHT: 223 LBS | HEART RATE: 94 BPM

## 2024-10-15 DIAGNOSIS — H10.33 ACUTE BACTERIAL CONJUNCTIVITIS OF BOTH EYES: ICD-10-CM

## 2024-10-15 PROCEDURE — 3078F DIAST BP <80 MM HG: CPT | Performed by: PHYSICIAN ASSISTANT

## 2024-10-15 PROCEDURE — 3075F SYST BP GE 130 - 139MM HG: CPT | Performed by: PHYSICIAN ASSISTANT

## 2024-10-15 PROCEDURE — 99213 OFFICE O/P EST LOW 20 MIN: CPT | Performed by: PHYSICIAN ASSISTANT

## 2024-10-15 RX ORDER — OFLOXACIN 3 MG/ML
1 SOLUTION/ DROPS OPHTHALMIC 4 TIMES DAILY
Qty: 10 ML | Refills: 0 | Status: SHIPPED | OUTPATIENT
Start: 2024-10-15 | End: 2024-10-16 | Stop reason: SDUPTHER

## 2024-10-15 ASSESSMENT — ENCOUNTER SYMPTOMS
EYE REDNESS: 1
DOUBLE VISION: 0
FEVER: 0
FOREIGN BODY SENSATION: 0
EYE DISCHARGE: 1
EYE ITCHING: 1
BLURRED VISION: 0

## 2024-10-15 ASSESSMENT — FIBROSIS 4 INDEX: FIB4 SCORE: 1.12

## 2024-10-15 ASSESSMENT — VISUAL ACUITY: OU: 1

## 2024-10-16 ENCOUNTER — APPOINTMENT (OUTPATIENT)
Dept: PHYSICAL THERAPY | Facility: REHABILITATION | Age: 59
End: 2024-10-16
Attending: INTERNAL MEDICINE
Payer: MEDICARE

## 2024-10-16 ENCOUNTER — TELEPHONE (OUTPATIENT)
Dept: URGENT CARE | Facility: CLINIC | Age: 59
End: 2024-10-16

## 2024-10-16 ENCOUNTER — APPOINTMENT (OUTPATIENT)
Dept: PHYSICAL THERAPY | Facility: MEDICAL CENTER | Age: 59
End: 2024-10-16
Attending: FAMILY MEDICINE
Payer: MEDICARE

## 2024-10-16 DIAGNOSIS — H10.33 ACUTE BACTERIAL CONJUNCTIVITIS OF BOTH EYES: ICD-10-CM

## 2024-10-16 RX ORDER — OFLOXACIN 3 MG/ML
1 SOLUTION/ DROPS OPHTHALMIC 4 TIMES DAILY
Qty: 10 ML | Refills: 0 | Status: SHIPPED | OUTPATIENT
Start: 2024-10-16 | End: 2024-10-23

## 2024-10-16 NOTE — TELEPHONE ENCOUNTER
Patient dropped prescribed eye drops in toilet, requesting script be resent. Ofloxacin 0.3% eyedrops.

## 2024-10-17 ENCOUNTER — PATIENT MESSAGE (OUTPATIENT)
Dept: HEALTH INFORMATION MANAGEMENT | Facility: OTHER | Age: 59
End: 2024-10-17

## 2024-10-21 ENCOUNTER — APPOINTMENT (OUTPATIENT)
Dept: PULMONOLOGY | Facility: MEDICAL CENTER | Age: 59
End: 2024-10-21
Attending: FAMILY MEDICINE
Payer: MEDICARE

## 2024-10-22 ENCOUNTER — APPOINTMENT (OUTPATIENT)
Dept: PHYSICAL THERAPY | Facility: MEDICAL CENTER | Age: 59
End: 2024-10-22
Attending: FAMILY MEDICINE
Payer: MEDICARE

## 2024-10-24 ENCOUNTER — APPOINTMENT (OUTPATIENT)
Dept: PHYSICAL THERAPY | Facility: REHABILITATION | Age: 59
End: 2024-10-24
Attending: INTERNAL MEDICINE
Payer: MEDICARE

## 2024-10-29 ENCOUNTER — PHYSICAL THERAPY (OUTPATIENT)
Dept: PHYSICAL THERAPY | Facility: MEDICAL CENTER | Age: 59
End: 2024-10-29
Attending: FAMILY MEDICINE
Payer: MEDICARE

## 2024-10-29 DIAGNOSIS — S16.1XXA STRAIN OF NECK MUSCLE, INITIAL ENCOUNTER: ICD-10-CM

## 2024-10-29 PROCEDURE — 97140 MANUAL THERAPY 1/> REGIONS: CPT

## 2024-10-29 PROCEDURE — 97530 THERAPEUTIC ACTIVITIES: CPT

## 2024-10-30 ENCOUNTER — APPOINTMENT (OUTPATIENT)
Dept: ONCOLOGY | Facility: MEDICAL CENTER | Age: 59
End: 2024-10-30
Attending: INTERNAL MEDICINE
Payer: MEDICARE

## 2024-11-06 ENCOUNTER — OUTPATIENT INFUSION SERVICES (OUTPATIENT)
Dept: ONCOLOGY | Facility: MEDICAL CENTER | Age: 59
End: 2024-11-06
Attending: INTERNAL MEDICINE
Payer: MEDICARE

## 2024-11-06 VITALS
WEIGHT: 222.66 LBS | HEIGHT: 63 IN | RESPIRATION RATE: 18 BRPM | DIASTOLIC BLOOD PRESSURE: 76 MMHG | BODY MASS INDEX: 39.45 KG/M2 | SYSTOLIC BLOOD PRESSURE: 151 MMHG | HEART RATE: 83 BPM | TEMPERATURE: 97.9 F | OXYGEN SATURATION: 98 %

## 2024-11-06 DIAGNOSIS — M54.41 CHRONIC MIDLINE LOW BACK PAIN WITH RIGHT-SIDED SCIATICA: ICD-10-CM

## 2024-11-06 DIAGNOSIS — C50.912 BREAST CANCER, STAGE 4, LEFT (HCC): ICD-10-CM

## 2024-11-06 DIAGNOSIS — G89.29 CHRONIC MIDLINE LOW BACK PAIN WITH RIGHT-SIDED SCIATICA: ICD-10-CM

## 2024-11-06 LAB
ALBUMIN SERPL BCP-MCNC: 3.8 G/DL (ref 3.2–4.9)
ALBUMIN/GLOB SERPL: 1.2 G/DL
ALP SERPL-CCNC: 97 U/L (ref 30–99)
ALT SERPL-CCNC: 18 U/L (ref 2–50)
ANION GAP SERPL CALC-SCNC: 10 MMOL/L (ref 7–16)
AST SERPL-CCNC: 25 U/L (ref 12–45)
BASOPHILS # BLD AUTO: 0.9 % (ref 0–1.8)
BASOPHILS # BLD: 0.05 K/UL (ref 0–0.12)
BILIRUB SERPL-MCNC: 0.4 MG/DL (ref 0.1–1.5)
BUN SERPL-MCNC: 9 MG/DL (ref 8–22)
CALCIUM ALBUM COR SERPL-MCNC: 10.5 MG/DL (ref 8.5–10.5)
CALCIUM SERPL-MCNC: 10.3 MG/DL (ref 8.5–10.5)
CHLORIDE SERPL-SCNC: 106 MMOL/L (ref 96–112)
CO2 SERPL-SCNC: 23 MMOL/L (ref 20–33)
CREAT SERPL-MCNC: 0.78 MG/DL (ref 0.5–1.4)
EOSINOPHIL # BLD AUTO: 0.08 K/UL (ref 0–0.51)
EOSINOPHIL NFR BLD: 1.5 % (ref 0–6.9)
ERYTHROCYTE [DISTWIDTH] IN BLOOD BY AUTOMATED COUNT: 46.5 FL (ref 35.9–50)
GFR SERPLBLD CREATININE-BSD FMLA CKD-EPI: 86 ML/MIN/1.73 M 2
GLOBULIN SER CALC-MCNC: 3.2 G/DL (ref 1.9–3.5)
GLUCOSE SERPL-MCNC: 351 MG/DL (ref 65–99)
HCT VFR BLD AUTO: 41.3 % (ref 37–47)
HGB BLD-MCNC: 14.5 G/DL (ref 12–16)
IMM GRANULOCYTES # BLD AUTO: 0.03 K/UL (ref 0–0.11)
IMM GRANULOCYTES NFR BLD AUTO: 0.5 % (ref 0–0.9)
LYMPHOCYTES # BLD AUTO: 0.95 K/UL (ref 1–4.8)
LYMPHOCYTES NFR BLD: 17.3 % (ref 22–41)
MCH RBC QN AUTO: 34.9 PG (ref 27–33)
MCHC RBC AUTO-ENTMCNC: 35.1 G/DL (ref 32.2–35.5)
MCV RBC AUTO: 99.3 FL (ref 81.4–97.8)
MONOCYTES # BLD AUTO: 0.49 K/UL (ref 0–0.85)
MONOCYTES NFR BLD AUTO: 8.9 % (ref 0–13.4)
NEUTROPHILS # BLD AUTO: 3.88 K/UL (ref 1.82–7.42)
NEUTROPHILS NFR BLD: 70.9 % (ref 44–72)
NRBC # BLD AUTO: 0 K/UL
NRBC BLD-RTO: 0 /100 WBC (ref 0–0.2)
PLATELET # BLD AUTO: 378 K/UL (ref 164–446)
PMV BLD AUTO: 9.1 FL (ref 9–12.9)
POTASSIUM SERPL-SCNC: 4.4 MMOL/L (ref 3.6–5.5)
PROT SERPL-MCNC: 7 G/DL (ref 6–8.2)
RBC # BLD AUTO: 4.16 M/UL (ref 4.2–5.4)
SODIUM SERPL-SCNC: 139 MMOL/L (ref 135–145)
WBC # BLD AUTO: 5.5 K/UL (ref 4.8–10.8)

## 2024-11-06 PROCEDURE — 96402 CHEMO HORMON ANTINEOPL SQ/IM: CPT

## 2024-11-06 PROCEDURE — 700111 HCHG RX REV CODE 636 W/ 250 OVERRIDE (IP): Mod: JZ,JG | Performed by: NURSE PRACTITIONER

## 2024-11-06 PROCEDURE — 80053 COMPREHEN METABOLIC PANEL: CPT

## 2024-11-06 PROCEDURE — 85025 COMPLETE CBC W/AUTO DIFF WBC: CPT

## 2024-11-06 PROCEDURE — 36415 COLL VENOUS BLD VENIPUNCTURE: CPT

## 2024-11-06 RX ORDER — LAMOTRIGINE 25 MG/1
250 TABLET ORAL ONCE
Status: COMPLETED | OUTPATIENT
Start: 2024-11-06 | End: 2024-11-06

## 2024-11-06 RX ORDER — DIAZEPAM 10 MG/1
TABLET ORAL
COMMUNITY
End: 2024-11-25

## 2024-11-06 RX ORDER — ZOLPIDEM TARTRATE 10 MG/1
10 TABLET ORAL NIGHTLY PRN
COMMUNITY

## 2024-11-06 RX ADMIN — FULVESTRANT 250 MG: 50 INJECTION INTRAMUSCULAR at 12:10

## 2024-11-06 RX ADMIN — FULVESTRANT 250 MG: 50 INJECTION INTRAMUSCULAR at 12:07

## 2024-11-06 ASSESSMENT — FIBROSIS 4 INDEX: FIB4 SCORE: 1.12

## 2024-11-06 NOTE — PROGRESS NOTES
Pt presented to Infusion for Q 28 day Faslodex injections plus labs. POC discussed and pt verbalized understanding. Pt denies s/s of acute illness today. Labs drawn from RAC x1 attempt without difficulty; site covered with sterile gauze/coban and pt tolerated well. Faslodex injections administered per MAR, band-aids applied to site and pt tolerated well. No s/s of reactions or complications noted. Future appts confirmed. Pt reports Xgeva to be given every 3 months now, confirmed with scheduling ok to schedule accordingly.     Pt confirmed [employer] access to view appts. Pt discharged to self care in [facility], accompanied by her daughter.

## 2024-11-07 ENCOUNTER — TELEPHONE (OUTPATIENT)
Dept: HEMATOLOGY ONCOLOGY | Facility: MEDICAL CENTER | Age: 59
End: 2024-11-07
Payer: MEDICARE

## 2024-11-07 RX ORDER — LIDOCAINE 50 MG/G
1 PATCH TOPICAL EVERY 24 HOURS
Qty: 30 PATCH | Refills: 0 | Status: SHIPPED | OUTPATIENT
Start: 2024-11-07

## 2024-11-08 NOTE — TELEPHONE ENCOUNTER
Contacted patient in regard to blood sugar result and if patient is still taking Ozempic. Per patient, she noted the high blood sugar and realized that she did not take the Ozempic the day before as planned and injected the evening after the lab work. All questions answered, no other concerns, will update Lorraine ROOT.

## 2024-11-12 ENCOUNTER — TELEPHONE (OUTPATIENT)
Dept: HEALTH INFORMATION MANAGEMENT | Facility: OTHER | Age: 59
End: 2024-11-12

## 2024-11-12 ENCOUNTER — TELEPHONE (OUTPATIENT)
Dept: MEDICAL GROUP | Facility: PHYSICIAN GROUP | Age: 59
End: 2024-11-12

## 2024-11-12 NOTE — TELEPHONE ENCOUNTER
DOCUMENTATION OF PAR STATUS:    1. Name of Medication & Dose: Lidocaine 5% Patches      2. Name of Prescription Coverage Company & phone #:  [employer]     3. Date Prior Auth Submitted: 11/12/2024     4. What information was given to obtain insurance decision?  ICD 10 Code     5. Prior Auth Status? Pending    6. Patient Notified: N\A

## 2024-11-15 ENCOUNTER — TELEPHONE (OUTPATIENT)
Dept: MEDICAL GROUP | Facility: PHYSICIAN GROUP | Age: 59
End: 2024-11-15
Payer: MEDICARE

## 2024-11-15 NOTE — TELEPHONE ENCOUNTER
FINAL PRIOR AUTHORIZATION STATUS:    1.  Name of Medication & Dose: lidocaine patches     2. Prior Auth Status: Approved through 11/12/2025     3. Action Taken: Pharmacy Notified: N\A Patient Notified: N\A

## 2024-11-18 ENCOUNTER — PATIENT OUTREACH (OUTPATIENT)
Dept: HEALTH INFORMATION MANAGEMENT | Facility: OTHER | Age: 59
End: 2024-11-18
Payer: MEDICARE

## 2024-11-18 DIAGNOSIS — I10 ESSENTIAL HYPERTENSION: ICD-10-CM

## 2024-11-18 DIAGNOSIS — E11.40 TYPE 2 DIABETES MELLITUS WITH DIABETIC NEUROPATHY, WITHOUT LONG-TERM CURRENT USE OF INSULIN (HCC): ICD-10-CM

## 2024-11-18 NOTE — PROGRESS NOTES
I updated the patient that the A1C is in the system and needs to be taken 11/22/24 at the earliest for it to be covered by insurance. I also advised her that her PCP would like to speak to her about the possibility of a CGM in January.

## 2024-11-21 ENCOUNTER — PHYSICAL THERAPY (OUTPATIENT)
Dept: PHYSICAL THERAPY | Facility: REHABILITATION | Age: 59
End: 2024-11-21
Attending: INTERNAL MEDICINE
Payer: MEDICARE

## 2024-11-21 DIAGNOSIS — L90.5 AXILLARY WEB SYNDROME: ICD-10-CM

## 2024-11-21 DIAGNOSIS — I89.0 LYMPHEDEMA OF EXTREMITY: ICD-10-CM

## 2024-11-21 DIAGNOSIS — L76.82 AXILLARY WEB SYNDROME: ICD-10-CM

## 2024-11-21 DIAGNOSIS — C50.912 BREAST CANCER, STAGE 4, LEFT (HCC): ICD-10-CM

## 2024-11-21 DIAGNOSIS — M25.9 SHOULDER PROBLEM: ICD-10-CM

## 2024-11-21 PROCEDURE — 999999 HB NO CHARGE

## 2024-11-21 PROCEDURE — 97535 SELF CARE MNGMENT TRAINING: CPT

## 2024-11-21 NOTE — OP THERAPY DAILY TREATMENT
Outpatient Physical Therapy  LYMPHEDEMA THERAPY DAILY TREATMENT     [facility] Physical Therapy [address] [place] [address]  [address]  [place] [address]  Phone:  [phone]  Fax:  [phone]    Date: 11/21/2024    Patient: Sol Kern  YOB: 1965  MRN: [id number]     Time Calculation    Start time: 1125 (pt late)  Stop time: 1210 Time Calculation (min): 45 minutes         Chief Complaint: Shoulder Problem and Lymphedema Breast Cancer    Visit #: 25    Subjective:   History of Present Illness:     Mechanism of injury:  Arm is doing pretty good.       Lymphedema Objective        Therapeutic Exercises (CPT 47497):     1. wall walk with lift off, HEP    2. sciatic nerve glide, HEP    3. wall gold with chin tuck and scap retraction, HEP    4. standing extension with elbow drive, HEP    5. door extension with strap, HEP    6. supine shoulder flexion with green band, HEP    7. abduction in sidely with green band, HEP    8. chest opening with green band, HEP    9. isometric wall ER with body rotation, HEP    10. supine flexion with green band      Therapeutic Exercise Summary:   HEP: seated pelvic tilts, wall walk    Therapeutic Treatments and Modalities:     1. Self Care ADL Training (CPT 07001)    Therapeutic Treatment and Modalities Summary: -L UE currently slightly tight but doing well swelling-wise.   -has been having stomach issues after a bad fast food experience a few weeks ago.   -L UE ROM full but pull at end range. Intervention deferred following discussion with pt.     Time-based treatments/modalities:    Physical Therapy Timed Treatment Charges  Functional training, self care minutes (CPT 26221): 20 minutes      Assessment and Plan:   Assessment details:  Sol returns from traveling today and reports L UE to be tight at end range and intermittently uncomfortable. She Will benefit from skilled intervention which was deferred today.     Plan:  Therapy options:  Physical therapy treatment  to continue  Discussed with:  Patient

## 2024-11-22 ENCOUNTER — HOSPITAL ENCOUNTER (OUTPATIENT)
Dept: LAB | Facility: MEDICAL CENTER | Age: 59
End: 2024-11-22
Attending: INTERNAL MEDICINE
Payer: MEDICARE

## 2024-11-22 ENCOUNTER — HOSPITAL ENCOUNTER (OUTPATIENT)
Dept: LAB | Facility: MEDICAL CENTER | Age: 59
End: 2024-11-22
Attending: FAMILY MEDICINE
Payer: MEDICARE

## 2024-11-22 DIAGNOSIS — E11.40 TYPE 2 DIABETES MELLITUS WITH DIABETIC NEUROPATHY, WITHOUT LONG-TERM CURRENT USE OF INSULIN (HCC): ICD-10-CM

## 2024-11-22 DIAGNOSIS — C50.412 CARCINOMA OF UPPER-OUTER QUADRANT OF LEFT BREAST IN FEMALE, ESTROGEN RECEPTOR POSITIVE (HCC): ICD-10-CM

## 2024-11-22 DIAGNOSIS — Z17.0 CARCINOMA OF UPPER-OUTER QUADRANT OF LEFT BREAST IN FEMALE, ESTROGEN RECEPTOR POSITIVE (HCC): ICD-10-CM

## 2024-11-22 LAB
BASOPHILS # BLD AUTO: 1.3 % (ref 0–1.8)
BASOPHILS # BLD: 0.06 K/UL (ref 0–0.12)
EOSINOPHIL # BLD AUTO: 0.08 K/UL (ref 0–0.51)
EOSINOPHIL NFR BLD: 1.7 % (ref 0–6.9)
ERYTHROCYTE [DISTWIDTH] IN BLOOD BY AUTOMATED COUNT: 49.8 FL (ref 35.9–50)
EST. AVERAGE GLUCOSE BLD GHB EST-MCNC: 154 MG/DL
HBA1C MFR BLD: 7 % (ref 4–5.6)
HCT VFR BLD AUTO: 43.6 % (ref 37–47)
HGB BLD-MCNC: 14.8 G/DL (ref 12–16)
IMM GRANULOCYTES # BLD AUTO: 0.02 K/UL (ref 0–0.11)
IMM GRANULOCYTES NFR BLD AUTO: 0.4 % (ref 0–0.9)
LYMPHOCYTES # BLD AUTO: 1.14 K/UL (ref 1–4.8)
LYMPHOCYTES NFR BLD: 23.8 % (ref 22–41)
MCH RBC QN AUTO: 34.3 PG (ref 27–33)
MCHC RBC AUTO-ENTMCNC: 33.9 G/DL (ref 32.2–35.5)
MCV RBC AUTO: 101.2 FL (ref 81.4–97.8)
MONOCYTES # BLD AUTO: 0.6 K/UL (ref 0–0.85)
MONOCYTES NFR BLD AUTO: 12.5 % (ref 0–13.4)
NEUTROPHILS # BLD AUTO: 2.9 K/UL (ref 1.82–7.42)
NEUTROPHILS NFR BLD: 60.3 % (ref 44–72)
NRBC # BLD AUTO: 0 K/UL
NRBC BLD-RTO: 0 /100 WBC (ref 0–0.2)
PLATELET # BLD AUTO: 279 K/UL (ref 164–446)
PMV BLD AUTO: 9.1 FL (ref 9–12.9)
RBC # BLD AUTO: 4.31 M/UL (ref 4.2–5.4)
WBC # BLD AUTO: 4.8 K/UL (ref 4.8–10.8)

## 2024-11-22 PROCEDURE — 85025 COMPLETE CBC W/AUTO DIFF WBC: CPT

## 2024-11-22 PROCEDURE — 82378 CARCINOEMBRYONIC ANTIGEN: CPT

## 2024-11-22 PROCEDURE — 80053 COMPREHEN METABOLIC PANEL: CPT

## 2024-11-22 PROCEDURE — 36415 COLL VENOUS BLD VENIPUNCTURE: CPT

## 2024-11-22 PROCEDURE — 86300 IMMUNOASSAY TUMOR CA 15-3: CPT

## 2024-11-22 PROCEDURE — 83036 HEMOGLOBIN GLYCOSYLATED A1C: CPT

## 2024-11-23 LAB
ALBUMIN SERPL BCP-MCNC: 3.8 G/DL (ref 3.2–4.9)
ALBUMIN/GLOB SERPL: 1.2 G/DL
ALP SERPL-CCNC: 70 U/L (ref 30–99)
ALT SERPL-CCNC: 26 U/L (ref 2–50)
ANION GAP SERPL CALC-SCNC: 11 MMOL/L (ref 7–16)
AST SERPL-CCNC: 29 U/L (ref 12–45)
BILIRUB SERPL-MCNC: 0.4 MG/DL (ref 0.1–1.5)
BUN SERPL-MCNC: 9 MG/DL (ref 8–22)
CALCIUM ALBUM COR SERPL-MCNC: 9.4 MG/DL (ref 8.5–10.5)
CALCIUM SERPL-MCNC: 9.2 MG/DL (ref 8.5–10.5)
CEA SERPL-MCNC: 3.7 NG/ML (ref 0–3)
CHLORIDE SERPL-SCNC: 104 MMOL/L (ref 96–112)
CO2 SERPL-SCNC: 25 MMOL/L (ref 20–33)
CREAT SERPL-MCNC: 0.65 MG/DL (ref 0.5–1.4)
GFR SERPLBLD CREATININE-BSD FMLA CKD-EPI: 100 ML/MIN/1.73 M 2
GLOBULIN SER CALC-MCNC: 3.2 G/DL (ref 1.9–3.5)
GLUCOSE SERPL-MCNC: 128 MG/DL (ref 65–99)
POTASSIUM SERPL-SCNC: 4.3 MMOL/L (ref 3.6–5.5)
PROT SERPL-MCNC: 7 G/DL (ref 6–8.2)
SODIUM SERPL-SCNC: 140 MMOL/L (ref 135–145)

## 2024-11-24 LAB — CANCER AG27-29 SERPL-ACNC: 282.3 U/ML

## 2024-11-25 ENCOUNTER — APPOINTMENT (OUTPATIENT)
Dept: PHYSICAL THERAPY | Facility: REHABILITATION | Age: 59
End: 2024-11-25
Attending: INTERNAL MEDICINE
Payer: MEDICARE

## 2024-11-25 ENCOUNTER — APPOINTMENT (OUTPATIENT)
Dept: RADIOLOGY | Facility: MEDICAL CENTER | Age: 59
End: 2024-11-25
Attending: EMERGENCY MEDICINE
Payer: MEDICARE

## 2024-11-25 ENCOUNTER — TELEPHONE (OUTPATIENT)
Dept: HEMATOLOGY ONCOLOGY | Facility: MEDICAL CENTER | Age: 59
End: 2024-11-25
Payer: MEDICARE

## 2024-11-25 ENCOUNTER — HOSPITAL ENCOUNTER (OUTPATIENT)
Facility: MEDICAL CENTER | Age: 59
End: 2024-11-26
Attending: EMERGENCY MEDICINE | Admitting: INTERNAL MEDICINE
Payer: MEDICARE

## 2024-11-25 ENCOUNTER — APPOINTMENT (OUTPATIENT)
Dept: PULMONOLOGY | Facility: MEDICAL CENTER | Age: 59
End: 2024-11-25
Payer: MEDICARE

## 2024-11-25 DIAGNOSIS — R11.14 BILIOUS VOMITING WITH NAUSEA: ICD-10-CM

## 2024-11-25 DIAGNOSIS — C78.6 MALIGNANT NEOPLASM METASTATIC TO PERITONEUM (HCC): ICD-10-CM

## 2024-11-25 DIAGNOSIS — M54.50 ACUTE BILATERAL LOW BACK PAIN WITHOUT SCIATICA: ICD-10-CM

## 2024-11-25 DIAGNOSIS — R10.84 GENERALIZED ABDOMINAL PAIN: ICD-10-CM

## 2024-11-25 DIAGNOSIS — R74.8 ELEVATED LIPASE: ICD-10-CM

## 2024-11-25 DIAGNOSIS — N30.01 ACUTE CYSTITIS WITH HEMATURIA: ICD-10-CM

## 2024-11-25 PROBLEM — Z71.89 ADVANCED CARE PLANNING/COUNSELING DISCUSSION: Status: ACTIVE | Noted: 2024-11-25

## 2024-11-25 PROBLEM — C50.919 BREAST CANCER IN FEMALE (HCC): Status: ACTIVE | Noted: 2024-11-25

## 2024-11-25 LAB
ALBUMIN SERPL BCP-MCNC: 4.6 G/DL (ref 3.2–4.9)
ALBUMIN/GLOB SERPL: 1.1 G/DL
ALP SERPL-CCNC: 92 U/L (ref 30–99)
ALT SERPL-CCNC: 25 U/L (ref 2–50)
ANION GAP SERPL CALC-SCNC: 16 MMOL/L (ref 7–16)
APPEARANCE UR: CLEAR
AST SERPL-CCNC: 35 U/L (ref 12–45)
BACTERIA #/AREA URNS HPF: ABNORMAL /HPF
BASOPHILS # BLD AUTO: 1.3 % (ref 0–1.8)
BASOPHILS # BLD: 0.09 K/UL (ref 0–0.12)
BILIRUB SERPL-MCNC: 0.5 MG/DL (ref 0.1–1.5)
BILIRUB UR QL STRIP.AUTO: NEGATIVE
BUN SERPL-MCNC: 10 MG/DL (ref 8–22)
CA OXALATE CRYSTAL  1765: PRESENT /HPF
CALCIUM ALBUM COR SERPL-MCNC: 10.2 MG/DL (ref 8.5–10.5)
CALCIUM SERPL-MCNC: 10.7 MG/DL (ref 8.5–10.5)
CASTS URNS QL MICRO: ABNORMAL /LPF (ref 0–2)
CHLORIDE SERPL-SCNC: 103 MMOL/L (ref 96–112)
CO2 SERPL-SCNC: 22 MMOL/L (ref 20–33)
COLOR UR: YELLOW
CREAT SERPL-MCNC: 0.82 MG/DL (ref 0.5–1.4)
EKG IMPRESSION: NORMAL
EOSINOPHIL # BLD AUTO: 0.08 K/UL (ref 0–0.51)
EOSINOPHIL NFR BLD: 1.1 % (ref 0–6.9)
EPITHELIAL CELLS 1715: ABNORMAL /HPF (ref 0–5)
ERYTHROCYTE [DISTWIDTH] IN BLOOD BY AUTOMATED COUNT: 47.7 FL (ref 35.9–50)
GFR SERPLBLD CREATININE-BSD FMLA CKD-EPI: 81 ML/MIN/1.73 M 2
GLOBULIN SER CALC-MCNC: 4.1 G/DL (ref 1.9–3.5)
GLUCOSE BLD STRIP.AUTO-MCNC: 130 MG/DL (ref 65–99)
GLUCOSE BLD STRIP.AUTO-MCNC: 183 MG/DL (ref 65–99)
GLUCOSE SERPL-MCNC: 119 MG/DL (ref 65–99)
GLUCOSE UR STRIP.AUTO-MCNC: NEGATIVE MG/DL
HCT VFR BLD AUTO: 46.9 % (ref 37–47)
HGB BLD-MCNC: 16.3 G/DL (ref 12–16)
HYALINE CAST   1831: PRESENT /LPF
IMM GRANULOCYTES # BLD AUTO: 0.05 K/UL (ref 0–0.11)
IMM GRANULOCYTES NFR BLD AUTO: 0.7 % (ref 0–0.9)
KETONES UR STRIP.AUTO-MCNC: NEGATIVE MG/DL
LEUKOCYTE ESTERASE UR QL STRIP.AUTO: ABNORMAL
LIPASE SERPL-CCNC: 123 U/L (ref 11–82)
LYMPHOCYTES # BLD AUTO: 1.36 K/UL (ref 1–4.8)
LYMPHOCYTES NFR BLD: 19.3 % (ref 22–41)
MAGNESIUM SERPL-MCNC: 1.9 MG/DL (ref 1.5–2.5)
MCH RBC QN AUTO: 34.3 PG (ref 27–33)
MCHC RBC AUTO-ENTMCNC: 34.8 G/DL (ref 32.2–35.5)
MCV RBC AUTO: 98.7 FL (ref 81.4–97.8)
MICRO URNS: ABNORMAL
MONOCYTES # BLD AUTO: 0.71 K/UL (ref 0–0.85)
MONOCYTES NFR BLD AUTO: 10.1 % (ref 0–13.4)
NEUTROPHILS # BLD AUTO: 4.74 K/UL (ref 1.82–7.42)
NEUTROPHILS NFR BLD: 67.5 % (ref 44–72)
NITRITE UR QL STRIP.AUTO: NEGATIVE
NRBC # BLD AUTO: 0 K/UL
NRBC BLD-RTO: 0 /100 WBC (ref 0–0.2)
PH UR STRIP.AUTO: 5 [PH] (ref 5–8)
PHOSPHATE SERPL-MCNC: 3.5 MG/DL (ref 2.5–4.5)
PLATELET # BLD AUTO: 322 K/UL (ref 164–446)
PMV BLD AUTO: 8.9 FL (ref 9–12.9)
POTASSIUM SERPL-SCNC: 3.5 MMOL/L (ref 3.6–5.5)
PROT SERPL-MCNC: 8.7 G/DL (ref 6–8.2)
PROT UR QL STRIP: NEGATIVE MG/DL
RBC # BLD AUTO: 4.75 M/UL (ref 4.2–5.4)
RBC # URNS HPF: ABNORMAL /HPF (ref 0–2)
RBC UR QL AUTO: NEGATIVE
SODIUM SERPL-SCNC: 141 MMOL/L (ref 135–145)
SP GR UR STRIP.AUTO: 1.02
UROBILINOGEN UR STRIP.AUTO-MCNC: 0.2 EU/DL
WBC # BLD AUTO: 7 K/UL (ref 4.8–10.8)
WBC #/AREA URNS HPF: ABNORMAL /HPF

## 2024-11-25 PROCEDURE — 87086 URINE CULTURE/COLONY COUNT: CPT

## 2024-11-25 PROCEDURE — 82962 GLUCOSE BLOOD TEST: CPT

## 2024-11-25 PROCEDURE — 81001 URINALYSIS AUTO W/SCOPE: CPT

## 2024-11-25 PROCEDURE — 700105 HCHG RX REV CODE 258: Performed by: EMERGENCY MEDICINE

## 2024-11-25 PROCEDURE — A9270 NON-COVERED ITEM OR SERVICE: HCPCS | Performed by: INTERNAL MEDICINE

## 2024-11-25 PROCEDURE — 80053 COMPREHEN METABOLIC PANEL: CPT

## 2024-11-25 PROCEDURE — 36415 COLL VENOUS BLD VENIPUNCTURE: CPT

## 2024-11-25 PROCEDURE — 700117 HCHG RX CONTRAST REV CODE 255: Performed by: EMERGENCY MEDICINE

## 2024-11-25 PROCEDURE — 83690 ASSAY OF LIPASE: CPT

## 2024-11-25 PROCEDURE — 96376 TX/PRO/DX INJ SAME DRUG ADON: CPT

## 2024-11-25 PROCEDURE — 85025 COMPLETE CBC W/AUTO DIFF WBC: CPT

## 2024-11-25 PROCEDURE — 700105 HCHG RX REV CODE 258: Performed by: INTERNAL MEDICINE

## 2024-11-25 PROCEDURE — 84100 ASSAY OF PHOSPHORUS: CPT

## 2024-11-25 PROCEDURE — 96375 TX/PRO/DX INJ NEW DRUG ADDON: CPT

## 2024-11-25 PROCEDURE — 700111 HCHG RX REV CODE 636 W/ 250 OVERRIDE (IP): Performed by: INTERNAL MEDICINE

## 2024-11-25 PROCEDURE — 99497 ADVNCD CARE PLAN 30 MIN: CPT | Mod: 25 | Performed by: INTERNAL MEDICINE

## 2024-11-25 PROCEDURE — 99223 1ST HOSP IP/OBS HIGH 75: CPT | Performed by: INTERNAL MEDICINE

## 2024-11-25 PROCEDURE — 93010 ELECTROCARDIOGRAM REPORT: CPT | Performed by: INTERNAL MEDICINE

## 2024-11-25 PROCEDURE — 74177 CT ABD & PELVIS W/CONTRAST: CPT

## 2024-11-25 PROCEDURE — 96372 THER/PROPH/DIAG INJ SC/IM: CPT | Mod: XU

## 2024-11-25 PROCEDURE — G0378 HOSPITAL OBSERVATION PER HR: HCPCS

## 2024-11-25 PROCEDURE — 96365 THER/PROPH/DIAG IV INF INIT: CPT | Mod: XU

## 2024-11-25 PROCEDURE — 93005 ELECTROCARDIOGRAM TRACING: CPT | Performed by: INTERNAL MEDICINE

## 2024-11-25 PROCEDURE — 83735 ASSAY OF MAGNESIUM: CPT

## 2024-11-25 PROCEDURE — 700111 HCHG RX REV CODE 636 W/ 250 OVERRIDE (IP): Mod: JZ | Performed by: EMERGENCY MEDICINE

## 2024-11-25 PROCEDURE — 700102 HCHG RX REV CODE 250 W/ 637 OVERRIDE(OP): Performed by: INTERNAL MEDICINE

## 2024-11-25 PROCEDURE — 99285 EMERGENCY DEPT VISIT HI MDM: CPT

## 2024-11-25 RX ORDER — DEXTROSE MONOHYDRATE 25 G/50ML
25 INJECTION, SOLUTION INTRAVENOUS
Status: DISCONTINUED | OUTPATIENT
Start: 2024-11-25 | End: 2024-11-26 | Stop reason: HOSPADM

## 2024-11-25 RX ORDER — BUTALBITAL, ACETAMINOPHEN AND CAFFEINE 50; 325; 40 MG/1; MG/1; MG/1
1 TABLET ORAL EVERY 6 HOURS PRN
Status: DISCONTINUED | OUTPATIENT
Start: 2024-11-25 | End: 2024-11-26 | Stop reason: HOSPADM

## 2024-11-25 RX ORDER — PROCHLORPERAZINE EDISYLATE 5 MG/ML
5-10 INJECTION INTRAMUSCULAR; INTRAVENOUS EVERY 4 HOURS PRN
Status: DISCONTINUED | OUTPATIENT
Start: 2024-11-25 | End: 2024-11-26 | Stop reason: HOSPADM

## 2024-11-25 RX ORDER — LABETALOL HYDROCHLORIDE 5 MG/ML
10 INJECTION, SOLUTION INTRAVENOUS EVERY 4 HOURS PRN
Status: DISCONTINUED | OUTPATIENT
Start: 2024-11-25 | End: 2024-11-26 | Stop reason: HOSPADM

## 2024-11-25 RX ORDER — PROMETHAZINE HYDROCHLORIDE 25 MG/1
12.5-25 SUPPOSITORY RECTAL EVERY 4 HOURS PRN
Status: DISCONTINUED | OUTPATIENT
Start: 2024-11-25 | End: 2024-11-26 | Stop reason: HOSPADM

## 2024-11-25 RX ORDER — ONDANSETRON 2 MG/ML
4 INJECTION INTRAMUSCULAR; INTRAVENOUS ONCE
Status: COMPLETED | OUTPATIENT
Start: 2024-11-25 | End: 2024-11-25

## 2024-11-25 RX ORDER — ACETAMINOPHEN 325 MG/1
650 TABLET ORAL EVERY 6 HOURS PRN
Status: DISCONTINUED | OUTPATIENT
Start: 2024-11-25 | End: 2024-11-26 | Stop reason: HOSPADM

## 2024-11-25 RX ORDER — AMOXICILLIN 250 MG
2 CAPSULE ORAL EVERY EVENING
Status: DISCONTINUED | OUTPATIENT
Start: 2024-11-25 | End: 2024-11-26 | Stop reason: HOSPADM

## 2024-11-25 RX ORDER — MORPHINE SULFATE 4 MG/ML
4 INJECTION INTRAVENOUS ONCE
Status: COMPLETED | OUTPATIENT
Start: 2024-11-25 | End: 2024-11-25

## 2024-11-25 RX ORDER — MORPHINE SULFATE 4 MG/ML
4 INJECTION INTRAVENOUS
Status: DISCONTINUED | OUTPATIENT
Start: 2024-11-25 | End: 2024-11-25

## 2024-11-25 RX ORDER — SODIUM CHLORIDE, SODIUM LACTATE, POTASSIUM CHLORIDE, CALCIUM CHLORIDE 600; 310; 30; 20 MG/100ML; MG/100ML; MG/100ML; MG/100ML
INJECTION, SOLUTION INTRAVENOUS CONTINUOUS
Status: DISCONTINUED | OUTPATIENT
Start: 2024-11-25 | End: 2024-11-26 | Stop reason: HOSPADM

## 2024-11-25 RX ORDER — ONDANSETRON 2 MG/ML
4 INJECTION INTRAMUSCULAR; INTRAVENOUS EVERY 4 HOURS PRN
Status: DISCONTINUED | OUTPATIENT
Start: 2024-11-25 | End: 2024-11-26 | Stop reason: HOSPADM

## 2024-11-25 RX ORDER — INSULIN LISPRO 100 [IU]/ML
3-14 INJECTION, SOLUTION INTRAVENOUS; SUBCUTANEOUS
Status: DISCONTINUED | OUTPATIENT
Start: 2024-11-25 | End: 2024-11-26 | Stop reason: HOSPADM

## 2024-11-25 RX ORDER — POTASSIUM CHLORIDE 1500 MG/1
20 TABLET, EXTENDED RELEASE ORAL ONCE
Status: COMPLETED | OUTPATIENT
Start: 2024-11-25 | End: 2024-11-25

## 2024-11-25 RX ORDER — ZOLPIDEM TARTRATE 5 MG/1
10 TABLET ORAL NIGHTLY PRN
Status: DISCONTINUED | OUTPATIENT
Start: 2024-11-25 | End: 2024-11-26 | Stop reason: HOSPADM

## 2024-11-25 RX ORDER — ATORVASTATIN CALCIUM 20 MG/1
20 TABLET, FILM COATED ORAL EVERY EVENING
Status: DISCONTINUED | OUTPATIENT
Start: 2024-11-25 | End: 2024-11-26 | Stop reason: HOSPADM

## 2024-11-25 RX ORDER — SIMETHICONE 125 MG
250 TABLET,CHEWABLE ORAL EVERY 6 HOURS PRN
COMMUNITY

## 2024-11-25 RX ORDER — MORPHINE SULFATE 4 MG/ML
4 INJECTION INTRAVENOUS
Status: DISCONTINUED | OUTPATIENT
Start: 2024-11-25 | End: 2024-11-26 | Stop reason: HOSPADM

## 2024-11-25 RX ORDER — ONDANSETRON 4 MG/1
4 TABLET, ORALLY DISINTEGRATING ORAL EVERY 4 HOURS PRN
Status: DISCONTINUED | OUTPATIENT
Start: 2024-11-25 | End: 2024-11-26 | Stop reason: HOSPADM

## 2024-11-25 RX ORDER — ALBUTEROL SULFATE 90 UG/1
2 INHALANT RESPIRATORY (INHALATION) EVERY 6 HOURS PRN
Status: DISCONTINUED | OUTPATIENT
Start: 2024-11-25 | End: 2024-11-26 | Stop reason: HOSPADM

## 2024-11-25 RX ORDER — OXYCODONE HYDROCHLORIDE 5 MG/1
5 TABLET ORAL
Status: DISCONTINUED | OUTPATIENT
Start: 2024-11-25 | End: 2024-11-26 | Stop reason: HOSPADM

## 2024-11-25 RX ORDER — ENOXAPARIN SODIUM 100 MG/ML
40 INJECTION SUBCUTANEOUS DAILY
Status: DISCONTINUED | OUTPATIENT
Start: 2024-11-25 | End: 2024-11-26 | Stop reason: HOSPADM

## 2024-11-25 RX ORDER — PROMETHAZINE HYDROCHLORIDE 25 MG/1
12.5-25 TABLET ORAL EVERY 4 HOURS PRN
Status: DISCONTINUED | OUTPATIENT
Start: 2024-11-25 | End: 2024-11-26 | Stop reason: HOSPADM

## 2024-11-25 RX ORDER — LISINOPRIL 10 MG/1
10 TABLET ORAL EVERY EVENING
Status: DISCONTINUED | OUTPATIENT
Start: 2024-11-25 | End: 2024-11-26 | Stop reason: HOSPADM

## 2024-11-25 RX ORDER — OXYCODONE HYDROCHLORIDE 10 MG/1
10 TABLET ORAL
Status: DISCONTINUED | OUTPATIENT
Start: 2024-11-25 | End: 2024-11-26 | Stop reason: HOSPADM

## 2024-11-25 RX ORDER — POLYETHYLENE GLYCOL 3350 17 G/17G
1 POWDER, FOR SOLUTION ORAL
Status: DISCONTINUED | OUTPATIENT
Start: 2024-11-25 | End: 2024-11-26 | Stop reason: HOSPADM

## 2024-11-25 RX ADMIN — SODIUM CHLORIDE, POTASSIUM CHLORIDE, SODIUM LACTATE AND CALCIUM CHLORIDE: 600; 310; 30; 20 INJECTION, SOLUTION INTRAVENOUS at 11:14

## 2024-11-25 RX ADMIN — POTASSIUM CHLORIDE 20 MEQ: 1500 TABLET, EXTENDED RELEASE ORAL at 11:37

## 2024-11-25 RX ADMIN — CEFAZOLIN 2 G: 2 INJECTION, POWDER, FOR SOLUTION INTRAMUSCULAR; INTRAVENOUS at 21:38

## 2024-11-25 RX ADMIN — ENOXAPARIN SODIUM 40 MG: 100 INJECTION SUBCUTANEOUS at 16:57

## 2024-11-25 RX ADMIN — ONDANSETRON 4 MG: 2 INJECTION INTRAMUSCULAR; INTRAVENOUS at 09:27

## 2024-11-25 RX ADMIN — ONDANSETRON 4 MG: 2 INJECTION INTRAMUSCULAR; INTRAVENOUS at 12:04

## 2024-11-25 RX ADMIN — PROCHLORPERAZINE EDISYLATE 10 MG: 5 INJECTION INTRAMUSCULAR; INTRAVENOUS at 13:05

## 2024-11-25 RX ADMIN — IOHEXOL 100 ML: 350 INJECTION, SOLUTION INTRAVENOUS at 11:00

## 2024-11-25 RX ADMIN — MORPHINE SULFATE 4 MG: 4 INJECTION, SOLUTION INTRAMUSCULAR; INTRAVENOUS at 10:33

## 2024-11-25 RX ADMIN — CEFAZOLIN 2 G: 2 INJECTION, POWDER, FOR SOLUTION INTRAMUSCULAR; INTRAVENOUS at 14:56

## 2024-11-25 RX ADMIN — MORPHINE SULFATE 4 MG: 4 INJECTION, SOLUTION INTRAMUSCULAR; INTRAVENOUS at 09:27

## 2024-11-25 RX ADMIN — LISINOPRIL 10 MG: 10 TABLET ORAL at 16:57

## 2024-11-25 RX ADMIN — PROCHLORPERAZINE EDISYLATE 10 MG: 5 INJECTION INTRAMUSCULAR; INTRAVENOUS at 19:05

## 2024-11-25 RX ADMIN — ATORVASTATIN CALCIUM 20 MG: 20 TABLET, FILM COATED ORAL at 16:57

## 2024-11-25 ASSESSMENT — COGNITIVE AND FUNCTIONAL STATUS - GENERAL
STANDING UP FROM CHAIR USING ARMS: A LITTLE
CLIMB 3 TO 5 STEPS WITH RAILING: A LITTLE
EATING MEALS: A LITTLE
SUGGESTED CMS G CODE MODIFIER DAILY ACTIVITY: CK
DAILY ACTIVITIY SCORE: 17
MOBILITY SCORE: 18
TURNING FROM BACK TO SIDE WHILE IN FLAT BAD: A LITTLE
DRESSING REGULAR LOWER BODY CLOTHING: A LOT
MOVING FROM LYING ON BACK TO SITTING ON SIDE OF FLAT BED: A LITTLE
TOILETING: A LITTLE
DRESSING REGULAR UPPER BODY CLOTHING: A LITTLE
MOVING TO AND FROM BED TO CHAIR: A LITTLE
WALKING IN HOSPITAL ROOM: A LITTLE
HELP NEEDED FOR BATHING: A LITTLE
SUGGESTED CMS G CODE MODIFIER MOBILITY: CK
PERSONAL GROOMING: A LITTLE

## 2024-11-25 ASSESSMENT — ENCOUNTER SYMPTOMS
SHORTNESS OF BREATH: 1
ABDOMINAL PAIN: 1
HEADACHES: 1
NAUSEA: 1

## 2024-11-25 ASSESSMENT — LIFESTYLE VARIABLES
HAVE YOU EVER FELT YOU SHOULD CUT DOWN ON YOUR DRINKING: NO
HAVE PEOPLE ANNOYED YOU BY CRITICIZING YOUR DRINKING: NO
EVER HAD A DRINK FIRST THING IN THE MORNING TO STEADY YOUR NERVES TO GET RID OF A HANGOVER: NO
DO YOU DRINK ALCOHOL: NO
AVERAGE NUMBER OF DAYS PER WEEK YOU HAVE A DRINK CONTAINING ALCOHOL: 0
TOTAL SCORE: 0
TOTAL SCORE: 0
ALCOHOL_USE: NO
HOW MANY TIMES IN THE PAST YEAR HAVE YOU HAD 5 OR MORE DRINKS IN A DAY: 0
ON A TYPICAL DAY WHEN YOU DRINK ALCOHOL HOW MANY DRINKS DO YOU HAVE: 0
EVER FELT BAD OR GUILTY ABOUT YOUR DRINKING: NO
DOES PATIENT WANT TO STOP DRINKING: NO
TOTAL SCORE: 0
CONSUMPTION TOTAL: NEGATIVE

## 2024-11-25 ASSESSMENT — SOCIAL DETERMINANTS OF HEALTH (SDOH)
WITHIN THE PAST 12 MONTHS, YOU WORRIED THAT YOUR FOOD WOULD RUN OUT BEFORE YOU GOT THE MONEY TO BUY MORE: NEVER TRUE
WITHIN THE LAST YEAR, HAVE YOU BEEN AFRAID OF YOUR PARTNER OR EX-PARTNER?: NO
WITHIN THE LAST YEAR, HAVE YOU BEEN HUMILIATED OR EMOTIONALLY ABUSED IN OTHER WAYS BY YOUR PARTNER OR EX-PARTNER?: NO
WITHIN THE LAST YEAR, HAVE TO BEEN RAPED OR FORCED TO HAVE ANY KIND OF SEXUAL ACTIVITY BY YOUR PARTNER OR EX-PARTNER?: NO
WITHIN THE LAST YEAR, HAVE YOU BEEN KICKED, HIT, SLAPPED, OR OTHERWISE PHYSICALLY HURT BY YOUR PARTNER OR EX-PARTNER?: NO
WITHIN THE PAST 12 MONTHS, THE FOOD YOU BOUGHT JUST DIDN'T LAST AND YOU DIDN'T HAVE MONEY TO GET MORE: NEVER TRUE
IN THE PAST 12 MONTHS, HAS THE ELECTRIC, GAS, OIL, OR WATER COMPANY THREATENED TO SHUT OFF SERVICE IN YOUR HOME?: NO
WITHIN THE LAST YEAR, HAVE YOU BEEN AFRAID OF YOUR PARTNER OR EX-PARTNER?: NO

## 2024-11-25 ASSESSMENT — PATIENT HEALTH QUESTIONNAIRE - PHQ9
2. FEELING DOWN, DEPRESSED, IRRITABLE, OR HOPELESS: NOT AT ALL
1. LITTLE INTEREST OR PLEASURE IN DOING THINGS: NOT AT ALL
SUM OF ALL RESPONSES TO PHQ9 QUESTIONS 1 AND 2: 0

## 2024-11-25 ASSESSMENT — PAIN DESCRIPTION - PAIN TYPE
TYPE: ACUTE PAIN

## 2024-11-25 ASSESSMENT — FIBROSIS 4 INDEX
FIB4 SCORE: 1.24
FIB4 SCORE: 1.33

## 2024-11-25 NOTE — OP THERAPY DAILY TREATMENT
Outpatient Physical Therapy  LYMPHEDEMA THERAPY DAILY TREATMENT     [facility] Physical Therapy [address].  [address]  [place] [address]  Phone:  [phone]  Fax:  [phone]    Date: 11/25/2024    Patient: Sol Kern  YOB: 1965  MRN: [id number]     Time Calculation                   Chief Complaint: No chief complaint on file.    Visit #: 26    Subjective    Lymphedema Objective        Therapeutic Exercises (CPT 20871):     1. wall walk with lift off, HEP    2. sciatic nerve glide, HEP    3. wall gold with chin tuck and scap retraction, HEP    4. standing extension with elbow drive, HEP    5. door extension with strap, HEP    6. supine shoulder flexion with green band, HEP    7. abduction in sidely with green band, HEP    8. chest opening with green band, HEP    9. isometric wall ER with body rotation, HEP    10. supine flexion with green band      Therapeutic Exercise Summary:   HEP: seated pelvic tilts, wall walk    Therapeutic Treatments and Modalities:     1. Self Care ADL Training (CPT 62790)    Therapeutic Treatment and Modalities Summary: -L UE currently slightly tight but doing well swelling-wise.   -has been having stomach issues after a bad fast food experience a few weeks ago.   -L UE ROM full but pull at end range. Intervention deferred following discussion with pt.     Time-based treatments/modalities:           LYMPHEDEMA ASSESSMENT AND PLAN

## 2024-11-25 NOTE — ED NOTES
Medication history reviewed with patient and family at bedside.   Med rec is complete  Allergies reviewed.     Patient has not had any outpatient antibiotics in the last 30 days.   Anticoagulants: No    Patient is currently receiving in patient Chemotherapy and oral at home chemo- Patient restarted Ribociclib 400mg daily- 3 weeks on, 1 week off. Per daughter patient is on day 2 or 3 of new cycle.    Armando Barrientos PhT

## 2024-11-25 NOTE — TELEPHONE ENCOUNTER
Patient contacted office with c/o abdominal pain, feelings of being bloated, having increased heartburn, belching, and flatulence. Patient is experiencing pain in abdomen and back. Due to pain, patient is unable to lie down comfortably and has not been able to sleep. Patient rates pain 8-9 out of 10 on pain scale, pain is worse when patient is using the bathroom and is relieved for appx 5 min after bathroom use before pain is back. Patient inquires if she should go to ER, advised patient to go to urgent care as they are able to do xrays and start there. Patient states she feels it is an ER visit due to pain. Advised patient that she knows her body best. Patient will be going to ER, RTOC updated.

## 2024-11-25 NOTE — ED NOTES
Pt medicated per MAR.    Spine appears normal, range of motion is not limited, no muscle or joint tenderness

## 2024-11-25 NOTE — ED NOTES
Pt reported to have vomited another 3 times.  Pt refusing phenergan, will medicate with compazine.

## 2024-11-25 NOTE — ASSESSMENT & PLAN NOTE
Recurrent breast cancer with CT showing metastasis and carcinomatosis to the abdomen pelvis as well as bone  Pain control  Oncology was consulted by the ER

## 2024-11-25 NOTE — ED NOTES
Pt vomited approx 300 ml of clear emesis, pt medicated per MAR for N/V.  All needs met at this time.

## 2024-11-25 NOTE — ASSESSMENT & PLAN NOTE
Patient reporting some urinary urgency  Follow-up on urine culture  Check procalcitonin  Started on cefazolin

## 2024-11-25 NOTE — ASSESSMENT & PLAN NOTE
Lab Results   Component Value Date/Time    HBA1C 7.0 (H) 11/22/2024 0719    HBA1C 8.9 (A) 08/22/2024 1458    HBA1C 8.8 (H) 05/09/2024 0945     Associated with peripheral neuropathy  I have ordered insulin sliding scale with D50 and glucagon for hypoglycemia per protocol.  Diabetic diet  Diabetic education

## 2024-11-25 NOTE — H&P
"Hospital Medicine History & Physical Note    Date of Service  11/25/2024    Primary Care Physician  Veronica Lyn M.D.    Consultants  oncology    Specialist Names: Dr. Kline    Code Status  Full Code    Chief Complaint  Chief Complaint   Patient presents with    Abdominal Pain     For the last month, started when coming home from [place], stopped at [employer] and had a Big MAC and since than has been having pain in abd on and off getting worse then better, severe pain and bloating, tender, reports when she has a BM gets slight relief, and reports loss of appetite gets bloated and nausea when eating.  H/o breast CA - just had some blood work and her CA levels are elevated.       History of Presenting Illness  Sol Kern is a 59 y.o. female with past medical history significant for hypertension, hyperlipidemia, diabetes, metastatic breast cancer followed by Dr. Kline who presented 11/25/2024 with abdominal pain and distention.    Patient reports worsening abdominal pain and distention over the past month.  Patient states she can no longer lay flat because of the pain.  She is still nauseous and also gets full very quickly with oral intake.  She denies any bony pain.  She does report more shortness of breath over the last month.  She uses 1-2 L of oxygen at home when \"she feels like she needs it\".  Reports some chills for the last few weeks.  She denies any dysuria but does report that her pee smells funny sometimes.  She also reports some days he feels like she has to pee when she does not pee that much.    On presentation patient has been hypertensive requiring 2 L nasal cannula.  Labs significant for hemoglobin 16.3, potassium 3.5, glucose 119, lipase 123.  Urinalysis is concerning for UTI  She was given morphine and Zofran in the ER.  Started on maintenance fluids.    CT abdomen pelvis shows diffuse omental nodularity and stranding suggestive of carcinomatosis.  Trace right upper quadrant " ascites.  Hepatic steatosis.  Hepatomegaly.  Redemonstrated sclerotic osseous metastasis.    I discussed the plan of care with patient, family, and ERP .    Review of Systems  Review of Systems   Respiratory:  Positive for shortness of breath.    Gastrointestinal:  Positive for abdominal pain and nausea.   Genitourinary:  Positive for urgency. Negative for dysuria.   Neurological:  Positive for headaches.       Past Medical History   has a past medical history of Allergy, Back pain, Cancer (HCC) (2021), Diabetes (HCC), GERD (gastroesophageal reflux disease), High cholesterol, Hypertension, Malignant neoplasm of upper-outer quadrant of left female breast (HCC), Neck pain, Obesity, Sleep apnea, and Snoring.    Surgical History   has a past surgical history that includes endometrial ablation; primary c section; knee arthroscopy; foot surgery (Right); carpal tunnel release (Right); pr mastectomy, partial (Left, 11/12/2021); node biopsy sentinel (Left, 11/12/2021); and tubal coagulation laparoscopic bilateral.     Family History  family history includes Breast Cancer in an other family member; Cancer in her mother; Heart Attack in her brother and father; Heart Disease in her brother, father, maternal grandfather, and mother; Hyperlipidemia in her brother; Hypertension in her mother; Other in her brother, father, mother, and another family member; Ovarian Cancer in her mother.   Family history reviewed with patient. There is family history that is pertinent to the chief complaint.     Social History   reports that she has quit smoking. Her smoking use included cigarettes. She has a 5 pack-year smoking history. She has never used smokeless tobacco. She reports that she does not currently use alcohol. She reports that she does not currently use drugs after having used the following drugs: Marijuana and Oral.    Allergies  Allergies   Allergen Reactions    Codeine Vomiting and Nausea    Hydrocodone Vomiting and Nausea     "Other Drug Vomiting and Nausea     Any of the \"cets\" (percocet)       Medications  Prior to Admission Medications   Prescriptions Last Dose Informant Patient Reported? Taking?   Cyanocobalamin (B-12) 5000 MCG Cap  Patient Yes No   Sig: Take 5,000 mcg by mouth every evening.   Ribociclib Succ, 600 MG Dose, 200 MG Tablet Therapy Pack   No No   Sig: Take 600 mg by mouth see administration instructions. Take 600 mg by mouth daily for 3 weeks (21 days), off for 1 week (7 days)   Patient taking differently: Take 400 mg by mouth see administration instructions. Take 400 mg by mouth daily for 3 weeks (21 days), off for 1 week (7 days)   Semaglutide, 2 MG/DOSE, (OZEMPIC, 2 MG/DOSE,) 8 MG/3ML Solution Pen-injector   No No   Sig: Inject 2 mg under the skin every 7 days.   albuterol 108 (90 Base) MCG/ACT Aero Soln inhalation aerosol   No No   Sig: Inhale 2 Puffs every 6 hours as needed for Shortness of Breath.   atorvastatin (LIPITOR) 20 MG Tab   No No   Sig: TAKE 1 TABLET BY MOUTH EVERY DAY IN THE EVENING   cyclobenzaprine (FLEXERIL) 10 mg Tab   No No   Sig: TAKE 1 TABLET BY MOUTH THREE TIMES A DAY AS NEEDED FOR MUSCLE SPASM   Patient taking differently: Take 10 mg by mouth 3 times a day as needed for Muscle Spasms (As needed).   cyclobenzaprine (FLEXERIL) 10 mg Tab   No No   Sig: Take 1 Tablet by mouth 3 times a day as needed for Muscle Spasms.   denosumab (XGEVA) 120 MG/1.7ML injection   Yes No   Sig: Inject 120 mg under the skin every 28 days.   diazepam (VALIUM) 10 MG tablet   Yes No   Si   fluocinonide (LIDEX) 0.05 % Cream  Patient Yes No   Sig: Apply 1 Application topically 2 times a day as needed (Rash, Itching). Apply to affected areas of back or buttocks. Do not use on face, armpit, or groin.   fulvestrant (FASLODEX) 250 MG/5ML Solution Prefilled Syringe injection   Yes No   Sig: Inject 250 mg into the shoulder, thigh, or buttocks every 28 days.   lidocaine (LIDODERM) 5 % Patch   No No   Sig: Place 1 Patch on the " skin every 24 hours. Place patch to affected area in am and remove at bedtime.   lisinopril (PRINIVIL) 2.5 MG Tab   No No   Sig: Take 1 Tablet by mouth every day.   meclizine (ANTIVERT) 25 MG Tab   No No   Sig: Take 1 Tablet by mouth 3 times a day as needed for Vertigo.   Patient not taking: Reported on 11/6/2024   metFORMIN ER (GLUCOPHAGE XR) 500 MG TABLET SR 24 HR   No No   Sig: Take 1 Tablet by mouth every day.   ondansetron (ZOFRAN ODT) 4 MG TABLET DISPERSIBLE   No No   Sig: Take 1 Tablet by mouth every 8 hours as needed for Nausea/Vomiting.   zolpidem (AMBIEN) 5 MG Tab   Yes No   Sig: Take 5 mg by mouth at bedtime as needed for Sleep.      Facility-Administered Medications: None       Physical Exam  Temp:  [36.4 °C (97.5 °F)] 36.4 °C (97.5 °F)  Pulse:  [78-91] 78  Resp:  [16] 16  BP: (167-183)/() 167/85  SpO2:  [88 %-97 %] 96 %  Blood Pressure: (!) 167/85   Temperature: 36.4 °C (97.5 °F)   Pulse: 78   Respiration: 16   Pulse Oximetry: 96 %       Physical Exam  Vitals and nursing note reviewed.   Constitutional:       Appearance: Normal appearance. She is obese. She is ill-appearing.   HENT:      Head: Normocephalic and atraumatic.      Right Ear: External ear normal.      Left Ear: External ear normal.      Nose: Nose normal.      Mouth/Throat:      Mouth: Mucous membranes are moist.      Pharynx: Oropharynx is clear. No oropharyngeal exudate or posterior oropharyngeal erythema.   Eyes:      Extraocular Movements: Extraocular movements intact.      Conjunctiva/sclera: Conjunctivae normal.   Cardiovascular:      Rate and Rhythm: Normal rate and regular rhythm.      Pulses: Normal pulses.      Heart sounds: Normal heart sounds. No murmur heard.  Pulmonary:      Effort: Pulmonary effort is normal. No respiratory distress.      Breath sounds: Normal breath sounds. No stridor. No wheezing or rales.   Abdominal:      General: Abdomen is flat. Bowel sounds are normal. There is distension.      Palpations:  "Abdomen is soft. There is no mass.      Tenderness: There is abdominal tenderness.   Musculoskeletal:      Cervical back: Normal range of motion.   Skin:     General: Skin is warm.      Capillary Refill: Capillary refill takes less than 2 seconds.   Neurological:      General: No focal deficit present.      Mental Status: She is alert and oriented to person, place, and time. Mental status is at baseline.      Cranial Nerves: No cranial nerve deficit.   Psychiatric:         Mood and Affect: Mood normal.         Behavior: Behavior normal.         Laboratory:  Recent Labs     11/25/24  0900   WBC 7.0   RBC 4.75   HEMOGLOBIN 16.3*   HEMATOCRIT 46.9   MCV 98.7*   MCH 34.3*   MCHC 34.8   RDW 47.7   PLATELETCT 322   MPV 8.9*     Recent Labs     11/25/24  0900   SODIUM 141   POTASSIUM 3.5*   CHLORIDE 103   CO2 22   GLUCOSE 119*   BUN 10   CREATININE 0.82   CALCIUM 10.7*     Recent Labs     11/25/24  0900   ALTSGPT 25   ASTSGOT 35   ALKPHOSPHAT 92   TBILIRUBIN 0.5   LIPASE 123*   GLUCOSE 119*         No results for input(s): \"NTPROBNP\" in the last 72 hours.      No results for input(s): \"TROPONINT\" in the last 72 hours.    Imaging:  CT-ABDOMEN-PELVIS WITH   Final Result      1.  Diffuse omental nodularity and stranding suggestive of carcinomatosis.   2.  Trace right upper quadrant ascites.   3.  Hepatic steatosis.   4.  Hepatomegaly.   5.  Redemonstrated sclerotic osseous metastases.             EKG:  I have personally reviewed the images and compared with prior images. and My impression is: Sinus rhythm at 76 bpm.  No specific ischemic changes.    Assessment/Plan:  Justification for Admission Status  I anticipate this patient is appropriate for observation status at this time because history of metastatic breast cancer to the bone and now with peritoneal carcinomatosis causing abdominal pain, hypertension, hyperlipidemia, diabetes    Patient will need a Med/Surg bed on EMERGENCY service .  The need is secondary to history " of metastatic breast cancer to the bone and now with peritoneal carcinomatosis causing abdominal pain, hypertension, hyperlipidemia, diabetes.    * Breast cancer, stage 4, left (HCC)- (present on admission)  Assessment & Plan  Recurrent breast cancer with CT showing metastasis and carcinomatosis to the abdomen pelvis as well as bone  Pain control  Oncology was consulted by the ER    Acute cystitis with hematuria  Assessment & Plan  Patient reporting some urinary urgency  Follow-up on urine culture  Check procalcitonin  Started on cefazolin    Advanced care planning/counseling discussion  Assessment & Plan  Advance care plan discussion with patient and daughter at bedside for 17 minutes.  Patient will be full code.    High cholesterol- (present on admission)  Assessment & Plan  Continue outpatient atorvastatin    Elevated hemoglobin (HCC)- (present on admission)  Assessment & Plan  Possibly related to dehydration versus chronic hypoxia  Continue to trend    Type 2 diabetes mellitus with diabetic neuropathy, without long-term current use of insulin (HCC)- (present on admission)  Assessment & Plan  Lab Results   Component Value Date/Time    HBA1C 7.0 (H) 11/22/2024 0719    HBA1C 8.9 (A) 08/22/2024 1458    HBA1C 8.8 (H) 05/09/2024 0945     Associated with peripheral neuropathy  I have ordered insulin sliding scale with D50 and glucagon for hypoglycemia per protocol.  Diabetic diet  Diabetic education    Hypokalemia- (present on admission)  Assessment & Plan  Supplement and continue to trend    Essential hypertension- (present on admission)  Assessment & Plan  Continue outpatient lisinopril        VTE prophylaxis: enoxaparin ppx

## 2024-11-25 NOTE — ED TRIAGE NOTES
Pt ambulated to triage with   Chief Complaint   Patient presents with    Abdominal Pain     For the last month, started when coming home from [place], stopped at [facility] and had a Big MAC and since than has been having pain in abd on and off getting worse then better, severe pain and bloating, tender, reports when she has a BM gets slight relief, and reports loss of appetite gets bloated and nausea when eating.  H/o breast CA - just had some blood work and her CA levels are elevated.       Protocol ordered.  Pt Informed regarding triage process and verbalized understanding to inform triage tech or RN for any changes in condition. Placed in lobby.

## 2024-11-25 NOTE — ED PROVIDER NOTES
Emergency Physician Note    Chief Concern:  Chief Complaint   Patient presents with    Abdominal Pain     For the last month, started when coming home from [place], stopped at [facility] and had a Big MAC and since than has been having pain in abd on and off getting worse then better, severe pain and bloating, tender, reports when she has a BM gets slight relief, and reports loss of appetite gets bloated and nausea when eating.  H/o breast CA - just had some blood work and her CA levels are elevated.         External Records Reviewed:  Outpatient clinic notes reviewed: Patient was seen in oncology clinic 10/2/2024.  Patient has a history of diabetes, with history of invasive ductal carcinoma, detected on mammogram in 2021.  She elected to undergo left lumpectomy and sentinel node biopsy, performed by Dr. Sykes.  CT scan at the time was negative for metastatic disease.  There was no indication for adjuvant chemotherapy.  She was started on anastrozole.  In October 2023 she was seen in the emergency department with a large right pleural effusion, she was diagnosed with metastatic breast cancer.  PET/CT showed bony metastatic disease.  She is currently on Ribociclib, Faslodex, and Xgeva.  PET/CT in March of this year showed dramatic response with complete resolution of all hypermetabolic activity in the bones and lymph nodes, with exception of a small loculated right pleural effusion.  Plan is to continue Ribociclib, and Faslodex.  Also did have a history of longstanding polycythemia.    HPI/ROS     Outside Historians:   Daughter at bedside provides additional history.     HPI:  Sol Kern is a 59 y.o. female who presents to the emergency department today for evaluation of abdominal bloating, abdominal pain, and back pain.  She has a past medical history significant for breast cancer, initially treated with lumpectomy and sentinel node biopsy in 2021.  Unfortunately, in October 2023 she had recurrence of metastatic  disease to the bones.  This showed excellent response to initial therapy, and most recent screening PET scan performed 3 to 4 months ago showed resolution of metabolic activity with regard to bony metastases.    About a month ago she noticed some abdominal discomfort, and mild distention.  The symptoms have progressively worsened over the past month.  She has noticed loss of appetite with associated nausea.  She is also noted some loose stools.  She states having a bowel movement will alleviate her symptoms for about 5 minutes, she then begins to feel bloated and painful again.  She also has some pain localized to the back, worse with lying flat.  Daughter states that she has been sleeping in a recliner for the last few nights, because the pain has been so severe.  Additionally pain has been waking her up from sleep.    Daughter reports that she had tumor marker labs drawn, and got those results over the weekend, results demonstrate elevated tumor markers.    She has not had any associated fevers, no chest pain, no cough.  She does report sweats and chills at night, then no active fevers.  Pain is exacerbated by movement and pressing on the affected area which causes increased discomfort, though she has no localized pain to the lower quadrants.  She states discomfort seems more pronounced in the upper abdomen.    PAST MEDICAL HISTORY  Past Medical History:   Diagnosis Date    Allergy     Back pain     Cancer (HCC) 2021    breast    Diabetes (HCC)     diet    GERD (gastroesophageal reflux disease)     High cholesterol     not medicated    Hypertension     Malignant neoplasm of upper-outer quadrant of left female breast (HCC)     Neck pain     Obesity     Sleep apnea     no cpap    Snoring        SURGICAL HISTORY  Past Surgical History:   Procedure Laterality Date    PB MASTECTOMY, PARTIAL Left 11/12/2021    Procedure: MASTECTOMY, PARTIAL - [facility] LOCALIZED;  Surgeon: Kayleen Sykes M.D.;  Location: SURGERY SAME DAY  [facility];  Service: General    NODE BIOPSY SENTINEL Left 2021    Procedure: BIOPSY, LYMPH NODE, SENTINEL;  Surgeon: Kayleen Sykes M.D.;  Location: SURGERY SAME DAY [facility];  Service: General    CARPAL TUNNEL RELEASE Right     ENDOMETRIAL ABLATION      FOOT SURGERY Right     KNEE ARTHROSCOPY      PRIMARY C SECTION      2     TUBAL COAGULATION LAPAROSCOPIC BILATERAL         FAMILY HISTORY  Family History   Problem Relation Age of Onset    Cancer Mother         melanoma    Hypertension Mother     Other Mother         THYROID    Heart Disease Mother         valvular disease    Ovarian Cancer Mother     Heart Disease Father         Heart Attack    Heart Attack Father     Other Father         psoriasis    Hyperlipidemia Brother     Other Brother         psoriasis    Heart Disease Brother         MI    Heart Attack Brother         massive heart attack     Heart Disease Maternal Grandfather         MI    Breast Cancer Other     Other Other         psoriasis    Diabetes Neg Hx     Alcohol/Drug Neg Hx     Tubal Cancer Neg Hx     Peritoneal Cancer Neg Hx     Colorectal Cancer Neg Hx        SOCIAL HISTORY   reports that she has quit smoking. Her smoking use included cigarettes. She has a 5 pack-year smoking history. She has never used smokeless tobacco. She reports that she does not currently use alcohol. She reports that she does not currently use drugs after having used the following drugs: Marijuana and Oral.    CURRENT MEDICATIONS  Previous Medications    ALBUTEROL 108 (90 BASE) MCG/ACT AERO SOLN INHALATION AEROSOL    Inhale 2 Puffs every 6 hours as needed for Shortness of Breath.    ATORVASTATIN (LIPITOR) 20 MG TAB    TAKE 1 TABLET BY MOUTH EVERY DAY IN THE EVENING    CYANOCOBALAMIN (B-12) 5000 MCG CAP    Take 5,000 mcg by mouth every evening.    CYCLOBENZAPRINE (FLEXERIL) 10 MG TAB    TAKE 1 TABLET BY MOUTH THREE TIMES A DAY AS NEEDED FOR MUSCLE SPASM    CYCLOBENZAPRINE (FLEXERIL) 10 MG TAB    Take 1  "Tablet by mouth 3 times a day as needed for Muscle Spasms.    DENOSUMAB (XGEVA) 120 MG/1.7ML INJECTION    Inject 120 mg under the skin every 28 days.    DIAZEPAM (VALIUM) 10 MG TABLET    14    FLUOCINONIDE (LIDEX) 0.05 % CREAM    Apply 1 Application topically 2 times a day as needed (Rash, Itching). Apply to affected areas of back or buttocks. Do not use on face, armpit, or groin.    FULVESTRANT (FASLODEX) 250 MG/5ML SOLUTION PREFILLED SYRINGE INJECTION    Inject 250 mg into the shoulder, thigh, or buttocks every 28 days.    LIDOCAINE (LIDODERM) 5 % PATCH    Place 1 Patch on the skin every 24 hours. Place patch to affected area in am and remove at bedtime.    LISINOPRIL (PRINIVIL) 2.5 MG TAB    Take 1 Tablet by mouth every day.    MECLIZINE (ANTIVERT) 25 MG TAB    Take 1 Tablet by mouth 3 times a day as needed for Vertigo.    METFORMIN ER (GLUCOPHAGE XR) 500 MG TABLET SR 24 HR    Take 1 Tablet by mouth every day.    ONDANSETRON (ZOFRAN ODT) 4 MG TABLET DISPERSIBLE    Take 1 Tablet by mouth every 8 hours as needed for Nausea/Vomiting.    RIBOCICLIB SUCC, 600 MG DOSE, 200 MG TABLET THERAPY PACK    Take 600 mg by mouth see administration instructions. Take 600 mg by mouth daily for 3 weeks (21 days), off for 1 week (7 days)    SEMAGLUTIDE, 2 MG/DOSE, (OZEMPIC, 2 MG/DOSE,) 8 MG/3ML SOLUTION PEN-INJECTOR    Inject 2 mg under the skin every 7 days.    ZOLPIDEM (AMBIEN) 5 MG TAB    Take 5 mg by mouth at bedtime as needed for Sleep.       ALLERGIES  Codeine, Hydrocodone, and Other drug    PHYSICAL EXAM  Vital Signs: BP (!) 179/101   Pulse 91   Temp 36.4 °C (97.5 °F) (Temporal)   Resp 16   Ht 1.651 m (5' 5\")   Wt 99.6 kg (219 lb 9.3 oz)   SpO2 97%   BMI 36.54 kg/m²   Constitutional: Alert, no acute distress, afebrile  HENT: Normocephalic, atraumatic.  Cardiovascular: No tachycardia, regular rate and rhythm  Pulmonary: No respiratory distress, normal work of breathing, breath sounds quite equal bilaterally  Abdomen: " Soft, distended with diffuse discomfort on abdominal palpation, diffuse tenderness on abdominal palpation, no peritoneal signs, no rebound, no guarding  Skin: Warm, dry, no rashes or lesions  Musculoskeletal: Normal range of motion in all extremities, no swelling or deformity noted  Neurologic: Alert, oriented, normal motor function, no speech deficits    Diagnostic Studies & Procedures    Labs:  All labs reviewed by me as noted below.    Radiology:  The attending Emergency Physician has independently interpreted the following imaging:  I independently interpreted the CT of the abdomen pelvis, no air-fluid levels present, no significant bowel distention to suggest bowel obstruction.    CT-ABDOMEN-PELVIS WITH   Final Result      1.  Diffuse omental nodularity and stranding suggestive of carcinomatosis.   2.  Trace right upper quadrant ascites.   3.  Hepatic steatosis.   4.  Hepatomegaly.   5.  Redemonstrated sclerotic osseous metastases.             Course and Medical Decision Making    Initial Assessment and Plan:  Ms. eKrn presents to the emergency department today for evaluation of abdominal bloating, abdominal pain, impressively worsening back pain.  All symptoms have been progressively worsening over the past month.  Primary concern now is pain, daughter states that she is no longer able to lie flat in bed, and has had to sleep in the recliner.  On arrival to the emergency department, her vital signs are reassuring, she is mildly tachycardic with heart rate of 91, though afebrile.  History, and vital signs are less concerning for SIRS or sepsis on arrival.  Differential diagnosis includes bowel obstruction, worsening metastatic disease, electrolyte abnormality, ascites.  On physical examination she has some diffuse discomfort on abdominal palpation, though no peritoneal signs.    On laboratory evaluation her white blood count is within normal limits, again less concerning for infectious etiology.  She has no  evidence of anemia, platelet count is within normal limits.  Urinalysis is negative for evidence of infection, with nitrate negative urine and no bacteria seen on microscopic examination.  CMP shows no significant electrolyte abnormalities, lipase is mildly elevated to 123, the symptoms seem less consistent with acute pancreatitis.    CT of the abdomen and pelvis demonstrates sclerotic osseous lesions consistent with metastatic disease, as well as omental nodularity suggestive of carcinomatosis.    At this time is for admission to hospitalist service for pain control in the setting of likely recurrent metastatic disease.  [facility] oncology was consulted to see the patient in consultation, kindly agreed to see the patient while hospitalized.    Additional Problems and Disposition    I have discussed management of the patient with the following physicians:   Dr. Damon, Hospitalist    Discussion of management with other Qualified Healthcare Professionals or appropriate source(s):   [facility]    Disposition:  Admit in stable condition    FINAL IMPRESSION   1. Generalized abdominal pain    2. Acute bilateral low back pain without sciatica    3. 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

## 2024-11-25 NOTE — ASSESSMENT & PLAN NOTE
Advance care plan discussion with patient and daughter at bedside for 17 minutes.  Patient will be full code.

## 2024-11-25 NOTE — PROGRESS NOTES
Report received from ED RN. Assume care. Pt is AAOx4.  Assessment completed. VSS.  fully ambulatory, Pt was update for the care for the day. White board updated, All question answered. Pt has call light within reach,  bed is in the lowest position. Pt has no other needs at this time.

## 2024-11-26 ENCOUNTER — HOSPITAL ENCOUNTER (OUTPATIENT)
Dept: HEMATOLOGY ONCOLOGY | Facility: MEDICAL CENTER | Age: 59
End: 2024-11-26
Attending: INTERNAL MEDICINE
Payer: MEDICARE

## 2024-11-26 ENCOUNTER — PHARMACY VISIT (OUTPATIENT)
Dept: PHARMACY | Facility: MEDICAL CENTER | Age: 59
End: 2024-11-26
Payer: COMMERCIAL

## 2024-11-26 ENCOUNTER — APPOINTMENT (OUTPATIENT)
Dept: HEMATOLOGY ONCOLOGY | Facility: MEDICAL CENTER | Age: 59
End: 2024-11-26
Attending: EMERGENCY MEDICINE
Payer: MEDICARE

## 2024-11-26 VITALS
SYSTOLIC BLOOD PRESSURE: 128 MMHG | OXYGEN SATURATION: 91 % | WEIGHT: 219.14 LBS | RESPIRATION RATE: 16 BRPM | DIASTOLIC BLOOD PRESSURE: 68 MMHG | HEART RATE: 73 BPM | TEMPERATURE: 97.5 F | HEIGHT: 65 IN | BODY MASS INDEX: 36.51 KG/M2

## 2024-11-26 DIAGNOSIS — C50.412 CARCINOMA OF UPPER-OUTER QUADRANT OF LEFT BREAST IN FEMALE, ESTROGEN RECEPTOR POSITIVE (HCC): ICD-10-CM

## 2024-11-26 DIAGNOSIS — Z17.0 CARCINOMA OF UPPER-OUTER QUADRANT OF LEFT BREAST IN FEMALE, ESTROGEN RECEPTOR POSITIVE (HCC): ICD-10-CM

## 2024-11-26 DIAGNOSIS — Z17.0 MALIGNANT NEOPLASM OF CENTRAL PORTION OF RIGHT BREAST IN FEMALE, ESTROGEN RECEPTOR POSITIVE (HCC): ICD-10-CM

## 2024-11-26 DIAGNOSIS — C50.111 MALIGNANT NEOPLASM OF CENTRAL PORTION OF RIGHT BREAST IN FEMALE, ESTROGEN RECEPTOR POSITIVE (HCC): ICD-10-CM

## 2024-11-26 PROBLEM — E87.6 HYPOKALEMIA: Status: RESOLVED | Noted: 2019-03-29 | Resolved: 2024-11-26

## 2024-11-26 PROBLEM — D58.2 ELEVATED HEMOGLOBIN (HCC): Status: RESOLVED | Noted: 2023-10-06 | Resolved: 2024-11-26

## 2024-11-26 LAB
ALBUMIN SERPL BCP-MCNC: 3.5 G/DL (ref 3.2–4.9)
ALBUMIN/GLOB SERPL: 1.3 G/DL
ALP SERPL-CCNC: 66 U/L (ref 30–99)
ALT SERPL-CCNC: 22 U/L (ref 2–50)
ANION GAP SERPL CALC-SCNC: 11 MMOL/L (ref 7–16)
AST SERPL-CCNC: 22 U/L (ref 12–45)
BASOPHILS # BLD AUTO: 1 % (ref 0–1.8)
BASOPHILS # BLD: 0.06 K/UL (ref 0–0.12)
BILIRUB SERPL-MCNC: 0.2 MG/DL (ref 0.1–1.5)
BUN SERPL-MCNC: 10 MG/DL (ref 8–22)
CALCIUM ALBUM COR SERPL-MCNC: 9.5 MG/DL (ref 8.5–10.5)
CALCIUM SERPL-MCNC: 9.1 MG/DL (ref 8.5–10.5)
CHLORIDE SERPL-SCNC: 104 MMOL/L (ref 96–112)
CO2 SERPL-SCNC: 25 MMOL/L (ref 20–33)
CREAT SERPL-MCNC: 0.63 MG/DL (ref 0.5–1.4)
EOSINOPHIL # BLD AUTO: 0.08 K/UL (ref 0–0.51)
EOSINOPHIL NFR BLD: 1.3 % (ref 0–6.9)
ERYTHROCYTE [DISTWIDTH] IN BLOOD BY AUTOMATED COUNT: 50.9 FL (ref 35.9–50)
GFR SERPLBLD CREATININE-BSD FMLA CKD-EPI: 100 ML/MIN/1.73 M 2
GLOBULIN SER CALC-MCNC: 2.8 G/DL (ref 1.9–3.5)
GLUCOSE SERPL-MCNC: 126 MG/DL (ref 65–99)
HCT VFR BLD AUTO: 40.9 % (ref 37–47)
HGB BLD-MCNC: 13.9 G/DL (ref 12–16)
IMM GRANULOCYTES # BLD AUTO: 0.03 K/UL (ref 0–0.11)
IMM GRANULOCYTES NFR BLD AUTO: 0.5 % (ref 0–0.9)
LIPASE SERPL-CCNC: 420 U/L (ref 11–82)
LYMPHOCYTES # BLD AUTO: 1.33 K/UL (ref 1–4.8)
LYMPHOCYTES NFR BLD: 22.3 % (ref 22–41)
MAGNESIUM SERPL-MCNC: 2 MG/DL (ref 1.5–2.5)
MCH RBC QN AUTO: 34.8 PG (ref 27–33)
MCHC RBC AUTO-ENTMCNC: 34 G/DL (ref 32.2–35.5)
MCV RBC AUTO: 102.3 FL (ref 81.4–97.8)
MONOCYTES # BLD AUTO: 0.84 K/UL (ref 0–0.85)
MONOCYTES NFR BLD AUTO: 14.1 % (ref 0–13.4)
NEUTROPHILS # BLD AUTO: 3.63 K/UL (ref 1.82–7.42)
NEUTROPHILS NFR BLD: 60.8 % (ref 44–72)
NRBC # BLD AUTO: 0 K/UL
NRBC BLD-RTO: 0 /100 WBC (ref 0–0.2)
PHOSPHATE SERPL-MCNC: 3.8 MG/DL (ref 2.5–4.5)
PLATELET # BLD AUTO: 238 K/UL (ref 164–446)
PMV BLD AUTO: 8.8 FL (ref 9–12.9)
POTASSIUM SERPL-SCNC: 3.6 MMOL/L (ref 3.6–5.5)
PROCALCITONIN SERPL-MCNC: 0.08 NG/ML
PROT SERPL-MCNC: 6.3 G/DL (ref 6–8.2)
RBC # BLD AUTO: 4 M/UL (ref 4.2–5.4)
SODIUM SERPL-SCNC: 140 MMOL/L (ref 135–145)
WBC # BLD AUTO: 6 K/UL (ref 4.8–10.8)

## 2024-11-26 PROCEDURE — 700102 HCHG RX REV CODE 250 W/ 637 OVERRIDE(OP): Performed by: INTERNAL MEDICINE

## 2024-11-26 PROCEDURE — 85025 COMPLETE CBC W/AUTO DIFF WBC: CPT

## 2024-11-26 PROCEDURE — A9270 NON-COVERED ITEM OR SERVICE: HCPCS | Performed by: INTERNAL MEDICINE

## 2024-11-26 PROCEDURE — 700111 HCHG RX REV CODE 636 W/ 250 OVERRIDE (IP): Performed by: INTERNAL MEDICINE

## 2024-11-26 PROCEDURE — 83735 ASSAY OF MAGNESIUM: CPT

## 2024-11-26 PROCEDURE — 80053 COMPREHEN METABOLIC PANEL: CPT

## 2024-11-26 PROCEDURE — RXMED WILLOW AMBULATORY MEDICATION CHARGE: Performed by: HOSPITALIST

## 2024-11-26 PROCEDURE — RXMED WILLOW AMBULATORY MEDICATION CHARGE

## 2024-11-26 PROCEDURE — 700105 HCHG RX REV CODE 258: Performed by: INTERNAL MEDICINE

## 2024-11-26 PROCEDURE — 84100 ASSAY OF PHOSPHORUS: CPT

## 2024-11-26 PROCEDURE — 84145 PROCALCITONIN (PCT): CPT

## 2024-11-26 PROCEDURE — 99239 HOSP IP/OBS DSCHRG MGMT >30: CPT | Performed by: HOSPITALIST

## 2024-11-26 PROCEDURE — 96366 THER/PROPH/DIAG IV INF ADDON: CPT

## 2024-11-26 PROCEDURE — G0378 HOSPITAL OBSERVATION PER HR: HCPCS

## 2024-11-26 PROCEDURE — 83690 ASSAY OF LIPASE: CPT

## 2024-11-26 RX ORDER — PROCHLORPERAZINE MALEATE 10 MG
10 TABLET ORAL EVERY 6 HOURS PRN
Qty: 30 TABLET | Refills: 3 | Status: SHIPPED | OUTPATIENT
Start: 2024-11-26

## 2024-11-26 RX ORDER — NITROFURANTOIN 25; 75 MG/1; MG/1
100 CAPSULE ORAL 2 TIMES DAILY
Qty: 8 CAPSULE | Refills: 0 | Status: SHIPPED | OUTPATIENT
Start: 2024-11-26 | End: 2024-11-30

## 2024-11-26 RX ADMIN — ACETAMINOPHEN 650 MG: 325 TABLET ORAL at 06:20

## 2024-11-26 RX ADMIN — CEFAZOLIN 2 G: 2 INJECTION, POWDER, FOR SOLUTION INTRAMUSCULAR; INTRAVENOUS at 05:19

## 2024-11-26 ASSESSMENT — ENCOUNTER SYMPTOMS
FEVER: 0
CONSTIPATION: 0
PSYCHIATRIC NEGATIVE: 1
COUGH: 0
DIZZINESS: 0
CARDIOVASCULAR NEGATIVE: 1
DIARRHEA: 0
EYES NEGATIVE: 1
MUSCULOSKELETAL NEGATIVE: 1
CHILLS: 0
VOMITING: 0
MYALGIAS: 0
SHORTNESS OF BREATH: 1
HEADACHES: 0
PALPITATIONS: 0
WEIGHT LOSS: 0
NEUROLOGICAL NEGATIVE: 1
GASTROINTESTINAL NEGATIVE: 1
NAUSEA: 0

## 2024-11-26 ASSESSMENT — PAIN DESCRIPTION - PAIN TYPE
TYPE: ACUTE PAIN
TYPE: ACUTE PAIN

## 2024-11-26 NOTE — DISCHARGE INSTRUCTIONS
Discharge Instructions per ROSSY Garcia.     -You are being prescribed Macrobid 100 mg p.o. twice daily for 4 days.  You may take your first dose this evening.  -Continue taking Gas-X at home for further bloating and gas relief.  -I have sent in a script for lipase lab.  Please get that done prior to seeing your oncologist.  -Follow-up with your oncologist today and again on December 3.  -Please hold off on taking metformin for 48 hours.  You may restart taking on 11/28/2024.     DIET: As tolerated     ACTIVITY: As tolerated     DIAGNOSIS: Breast cancer stage IV     Return to ER if you start experiencing any numbness and tingling, chest pain, shortness of breath.

## 2024-11-26 NOTE — DISCHARGE PLANNING
HTH/SCP TCN chart review completed. Collaborated with CM prior to meeting with the pt. The most current review of medical record, knowledge of pt's PLOF and social support, LACE+ score of 77, 6 clicks scores of 18 mobility were considered.      Pt seen at bedside. Introduced TCN program. Provided education regarding post acute levels of care. Education provided regarding case management policy for blanket SNF referrals. Discussed [facility]TH/SCP plan benefits. Pt verbalizes understanding.     Patient reports no concerns with discharge home, denies any need for services/equipment.  Patient reports she has ambulated in hospital and this is confirmed via kardex (2x15 feet with no AD or assist).  Patient is currently on RA, however indicates that patient has condenser from previously hospitalization, unsure of company, and does use home supplemental O2 from time to time. No further TCN needs.     No choice proactively obtained given no needs. In collaboration with , current discharge considerations are home with OP f/u as needed. TCN will continue to follow and collaborate with discharge planning team as additional post acute needs arise. Thank you.     Completed today:  Choice obtained: none  Pt aware of [facility]'s blanket referral policy  SCP with [facility] PCP. discussed possible outpatient transitional PCP follow up if indicated and pt in agreement; sent information to assist in scheduling

## 2024-11-26 NOTE — PROGRESS NOTES
Bedside report received from Dae BERUMEN at this time. Patient A+Ox4, satting 96% on 1L 02,sitting up in bed speaking in full sentences. Daughter at bedside. Using call bell approp, bed locked calls approp for needs. Denies N/V/abd pain

## 2024-11-26 NOTE — PROGRESS NOTES
"Assumed care of patient at bedside report from day shift RN. Updated on plan of care.   Patient currently A & O x 4; on  2 L NC. Able to turn self in bed; without complaints of acute pain, medicated per MAR. Assessment completed. Patient's last bowel movement 11/24 .   Call light within reach. Hourly rounding and fall precautions in place. Bed locked and in lowest position. All questions answered. No other needs indicated at this time.    Most recent vital signs  BP (!) 151/72   Pulse 76   Temp 35.9 °C (96.7 °F) (Temporal)   Resp 16   Ht 1.651 m (5' 5\")   Wt 99.4 kg (219 lb 2.2 oz)   SpO2 94%   BMI 36.47 kg/m²     "

## 2024-11-26 NOTE — CARE PLAN
The patient is Stable - Low risk of patient condition declining or worsening    Shift Goals  Clinical Goals: pain and nausea control  Patient Goals: good rest tonight  Family Goals: updates on plan for tomorrow      Problem: Knowledge Deficit - Standard  Goal: Patient and family/care givers will demonstrate understanding of plan of care, disease process/condition, diagnostic tests and medications  Outcome: Progressing  Note: Patient will demonstrate an understanding of POC and plan to consult with oncology.      Problem: Fall Risk  Goal: Patient will remain free from falls  Outcome: Progressing  Note: Patient and daughter will demonstrate understanding of call light and call care team prior to exiting bed for the restroom.

## 2024-11-26 NOTE — PROGRESS NOTES
DISCHARGE NOTE    Patient cleared for discharge home no services, her daughter is here to drive her home. IV removed. Medications picked up from pharmacy and hand delivered to patient. Patient left the floor at this time with all personal belongings. She is going upstairs to see her oncologist then she will leave with daughter. Took all personal belongings with her

## 2024-11-26 NOTE — HOSPITAL COURSE
"Sol Kern is a 59 y.o. female with past medical history significant for hypertension, hyperlipidemia, diabetes, metastatic breast cancer followed by Dr. Kline who presented 11/25/2024 with abdominal pain and distention.     Patient reports worsening abdominal pain and distention over the past month.  Patient states she can no longer lay flat because of the pain.  She is still nauseous and also gets full very quickly with oral intake.  She denies any bony pain.  She does report more shortness of breath over the last month.  She uses 1-2 L of oxygen at home when \"she feels like she needs it\".  Reports some chills for the last few weeks.  She denies any dysuria but does report that her pee smells funny sometimes.  She also reports some days he feels like she has to pee when she does not pee that much.     On presentation patient has been hypertensive requiring 2 L nasal cannula.  Labs significant for hemoglobin 16.3, potassium 3.5, glucose 119, lipase 123.  Urinalysis is concerning for UTI. CT abdomen pelvis shows diffuse omental nodularity and stranding suggestive of carcinomatosis.  Trace right upper quadrant ascites.  Hepatic steatosis.  Hepatomegaly.  Redemonstrated sclerotic osseous metastasis.    Patient examined this morning.  She states that she is still having some abdominal discomfort with bloating.  Ultimately though she is denying any shortness of breath, nausea, or specific left upper quadrant pain.  I discussed with her that she is having elevated lipase of 420 but her CT imaging is negative for any acute pancreatic pathology.  Discussed with the patient that we will go ahead and discharge her with a course of antibiotics for her urinary tract infection and a prescription to get her lipase rechecked prior to seeing her oncologist on December 3.  Patient states that Dr. Kline came to see her yesterday and that she will be going to see him later today for further lab work and additional " imaging for her advancement of metastatic breast cancer.    Patient expressed understanding.  All questions answered at this time.  Patient okay to discharge.

## 2024-11-26 NOTE — PROGRESS NOTES
Patient refusing insulin  Fsbg 183    Education provided, patient requesting to start her metformin tomorrow if not discharged. RN will communicate to day shift.
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
## 2024-11-26 NOTE — PROGRESS NOTES
4 Eyes Skin Assessment Completed by JAMAICA MURRY and JAMAICA Suárez.    Head WDL  Ears WDL  Nose WDL  Mouth WDL  Neck WDL  Breast/Chest WDL  Shoulder Blades WDL  Spine WDL  (R) Arm/Elbow/Hand WDL  (L) Arm/Elbow/Hand WDL  Abdomen WDL  Groin WDL  Scrotum/Coccyx/Buttocks WDL  (R) Leg Edema  (L) Leg Edema  (R) Heel/Foot/Toe Dry calloused  (L) Heel/Foot/Toe Dry calloused           Devices In Places Tele Box, Pulse Ox, and Nasal Cannula      Interventions In Place Gray Ear Foams, Pillows, Heels Loaded W/Pillows, and Pressure Redistribution Mattress    Possible Skin Injury No    Pictures Uploaded Into Epic N/A  Wound Consult Placed N/A  RN Wound Prevention Protocol Ordered No

## 2024-11-26 NOTE — PROGRESS NOTES
Telemetry Monitor Report Summary    Rhythm SR  HR 63-79  Ectopy R pvc, R pac    Measurements 0.17/ 0.06 /0.41

## 2024-11-27 ENCOUNTER — PATIENT OUTREACH (OUTPATIENT)
Dept: HEALTH INFORMATION MANAGEMENT | Facility: OTHER | Age: 59
End: 2024-11-27
Payer: MEDICARE

## 2024-11-27 DIAGNOSIS — E11.40 TYPE 2 DIABETES MELLITUS WITH DIABETIC NEUROPATHY, WITHOUT LONG-TERM CURRENT USE OF INSULIN (HCC): ICD-10-CM

## 2024-11-27 DIAGNOSIS — C50.912 BREAST CANCER, STAGE 4, LEFT (HCC): ICD-10-CM

## 2024-11-27 DIAGNOSIS — J96.11 CHRONIC RESPIRATORY FAILURE WITH HYPOXIA (HCC): ICD-10-CM

## 2024-11-27 NOTE — PROGRESS NOTES
"I spoke to the patient this morning. She states that she is feeling overall better, but that her last bowel movement was 11/25/24 and that she is feeling bloated again. She states she was going to try coffee to see if it \"loosened things up,\" and then colace, senna, or miralax. Plan sounds reasonable but I am routing it to her PCP for further review. Patient also voices concerns about possible pancreatitis. Indications for urgent or emergent medical care reviewed. Patient assisted in scheduling PCP TCM next week. She also has imaging appointments and an appointment with Dr. Kline 12/5/24. Patient also mentions wanting to ask if her orders for her oxygen concentrator need to be renewed at her next appointment.     Transitional Care Management  TCM Outreach Date and Time: Filed (11/27/2024  9:27 AM)    Discharge Questions  Actual Discharge Date: 11/26/24  Now that you are home, how are you feeling?: Good  Did you receive any new prescriptions?: Yes  Were you able to get them filled?: Yes  Meds to Bed or Pharmacy filled?: Meds to Bed  Do you have any questions about your current medications or new medications (Review Med Rec)?: No  Did you have any durable medical equipment ordered?: No  Do you have a follow up appointment scheduled with your PCP?: Yes  Appointment Date: 12/04/24  Appointment Time: 1400  Any issues or paperwork you wish to discuss with your PCP?: Yes (Please specify) (Oxygen recert?)  Are you (patient) able to get to the appointment?: Yes  If Home Health was ordered, have they contacted you (Patient): Not Applicable  Did you have enough support after your last discharge?: Yes  Does this patient qualify for the CCM program?: Yes    Transitional Care  Number of attempts made to contact patient: 1  Current or previous attempts completed within two business days of discharge? : Yes  Provided education regarding treatment plan, medications, self-management, ADLs?: Yes  Has patient completed an " Advanced Directive?: No  Has the Care Manager's phone number provided?: Yes  Is there anything else I can help you with?: No    Discharge Summary  Chief Complaint: Abdominal pain  Admitting Diagnosis: Acute cholecystitis, Peritoneal carcinomatosis  Discharge Diagnosis: Acute cholecystitis, Peritoneal carcinomatosis

## 2024-11-27 NOTE — DISCHARGE SUMMARY
"Discharge Summary    CHIEF COMPLAINT ON ADMISSION  Chief Complaint   Patient presents with    Abdominal Pain     For the last month, started when coming home from [place], stopped at [employer] and had a Big [employer] and since than has been having pain in abd on and off getting worse then better, severe pain and bloating, tender, reports when she has a BM gets slight relief, and reports loss of appetite gets bloated and nausea when eating.  H/o breast CA - just had some blood work and her CA levels are elevated.       Reason for Admission  Abdominal Pain     Admission Date  11/25/2024    CODE STATUS  Prior    HPI & HOSPITAL COURSE    Sol Kern is a 59 y.o. female with past medical history significant for hypertension, hyperlipidemia, diabetes, metastatic breast cancer followed by Dr. Kline who presented 11/25/2024 with abdominal pain and distention.     Patient reports worsening abdominal pain and distention over the past month.  Patient states she can no longer lay flat because of the pain.  She is still nauseous and also gets full very quickly with oral intake.  She denies any bony pain.  She does report more shortness of breath over the last month.  She uses 1-2 L of oxygen at home when \"she feels like she needs it\".  Reports some chills for the last few weeks.  She denies any dysuria but does report that her pee smells funny sometimes.  She also reports some days he feels like she has to pee when she does not pee that much.     On presentation patient has been hypertensive requiring 2 L nasal cannula.  Labs significant for hemoglobin 16.3, potassium 3.5, glucose 119, lipase 123.  Urinalysis is concerning for UTI. CT abdomen pelvis shows diffuse omental nodularity and stranding suggestive of carcinomatosis.  Trace right upper quadrant ascites.  Hepatic steatosis.  Hepatomegaly.  Redemonstrated sclerotic osseous metastasis.    Lipase level noted to be 420    Patient's lipase may represent mild pancreatitis.  " Patient instructed to be on a bland diet ongoing for the next 48 hours.  We transitioned antibiotic from IV to p.o. for completion of treatment of UTI        Therefore, she is discharged in good and stable condition to home with close outpatient follow-up.    The patient recovered much more quickly than anticipated on admission.    Discharge Date  2024    FOLLOW UP ITEMS POST DISCHARGE      DISCHARGE DIAGNOSES  Principal Problem:    Breast cancer, stage 4, left (HCC) (POA: Yes)  Active Problems:    Essential hypertension (POA: Yes)      Overview: This is a chronic condition.      Current Meds: none      Side effects: lisinopril 10 mg daily      Home BP Los-120s/70s      Associated symptoms: no cp, no sob          Type 2 diabetes mellitus with diabetic neuropathy, without long-term current use of insulin ([facility]) (POA: Yes)      Overview: This is a chronic condition.      Current medications:       Insulin: -      Biguanide: metformin 500 XR mg daily (max dose due to diarrhea)      GLP1-RA: Ozempic 1 mg every 7 days      SGLT-2i: -      DPP4-I: -      TZD: -      Maged: -      Sulfonyluria: -              Last A1c: 8.9% (2024); 9.8% (2024); 6.1% (2023); 6.0% (2023);        9.7% (2023); 8.5% (2022); 12.2% (3/2022)      Last Microalb/Cr ratio: <30 (2024)      Fasting sugars: n/a      Last diabetic foot exam: 2024      Last retinal eye exam: 2023 - no DM retinopathy      ACEi/ARB: lisinopril 2.5 mg      Statin: none      Aspirin: no      Concomitant HTN: yes - above goal      Nightly foot checks: yes            Complications      Circulatory: no        Arthropathy: no        Hyperglycemia: yes      Hypoglycemia: no      Kidney: no        Neuro: yes        Eyes: no        Oral: no        Skin: no      High cholesterol (POA: Yes)    Advanced care planning/counseling discussion (POA: Unknown)    Acute cystitis with hematuria (POA: Unknown)  Resolved Problems:    Hypokalemia (POA: Yes)    Elevated  hemoglobin (HCC) (POA: Yes)      FOLLOW UP  Future Appointments   Date Time Provider Department Center   12/5/2024  8:30 AM PETCT [address]   12/5/2024  2:00 PM Magdi Kline M.D. ONCRMO None   12/9/2024  8:00 AM Braeden Garvin, PT ANT [facility]   12/17/2024  7:30 AM Braeden Garvin, PT ANT [facility]   12/19/2024  1:30 PM Maggie Wolf, PT [address]   12/27/2024 11:[facility]   12/30/2024  9:30 AM Braeden Garvin, ANASTACIO CAIN [facility]   1/16/2025  9:20 AM [address]   3/12/2025 11:00 AM Braeden Garvin, ANASTACIO CAIN [facility]   3/19/2025 11:00 AM Braeden Garvin, ANASTACIO CAIN [facility]     Veronica Lyn M.D.  [address]  [facility] [address]  [phone]    Schedule an appointment as soon as possible for a visit in 1 week(s)        MEDICATIONS ON DISCHARGE     Medication List        START taking these medications        Instructions   nitrofurantoin 100 MG Caps  Commonly known as: Macrobid   Take 1 Capsule by mouth 2 times a day for 4 days.  Dose: 100 mg     prochlorperazine 10 MG Tabs  Commonly known as: Compazine   Take 1 Tablet by mouth every 6 hours as needed for Nausea/Vomiting.  Dose: 10 mg            CONTINUE taking these medications        Instructions   albuterol 108 (90 Base) MCG/ACT Aers inhalation aerosol   Inhale 2 Puffs every 6 hours as needed for Shortness of Breath.  Dose: 2 Puff     atorvastatin 20 MG Tabs  Commonly known as: Lipitor   TAKE 1 TABLET BY MOUTH EVERY DAY IN THE EVENING  Dose: 20 mg     cyclobenzaprine 10 mg Tabs  Commonly known as: Flexeril   TAKE 1 TABLET BY MOUTH THREE TIMES A DAY AS NEEDED FOR MUSCLE SPASM  Dose: 10 mg     denosumab 120 MG/1.7ML injection  Commonly known as: Xgeva   Inject 120 mg under the skin every 28 days.  Dose: 120 mg     fulvestrant 250 MG/5ML Sosy injection  Commonly known as: Faslodex   Inject 250 mg into the shoulder, thigh, or buttocks  "every 90 days.  Dose: 250 mg     Kisqali (600 MG Dose) 200 MG Tbpk  Generic drug: Ribociclib Succ (600 MG Dose)   Take 600 mg by mouth see administration instructions. Take 600 mg by mouth daily for 3 weeks (21 days), off for 1 week (7 days)  Dose: 600 mg     lidocaine 5 % Ptch  Commonly known as: Lidoderm   Place 1 Patch on the skin every 24 hours. Place patch to affected area in am and remove at bedtime.  Dose: 1 Patch     lisinopril 2.5 MG Tabs  Commonly known as: Prinivil   Take 1 Tablet by mouth every day.  Dose: 2.5 mg     metFORMIN  MG Tb24  Commonly known as: Glucophage XR   Take 1 Tablet by mouth every day.  Dose: 500 mg     ondansetron 4 MG Tbdp  Commonly known as: Zofran ODT   Take 1 Tablet by mouth every 8 hours as needed for Nausea/Vomiting.  Dose: 4 mg     Ozempic (2 MG/DOSE) 8 MG/3ML Sopn  Generic drug: Semaglutide (2 MG/DOSE)   Inject 2 mg under the skin every 7 days.  Dose: 2 mg     simethicone 125 MG chewable tablet  Commonly known as: Mylicon   Chew 250 mg every 6 hours as needed for Flatulence. 2 tablets= 250mg  Dose: 250 mg     zolpidem 10 MG Tabs  Commonly known as: Ambien   Take 10 mg by mouth at bedtime as needed for Sleep.  Dose: 10 mg              Allergies  Allergies   Allergen Reactions    Codeine Vomiting and Nausea    Hydrocodone Vomiting and Nausea    Other Drug Vomiting and Nausea     Any of the \"cets\" (percocet)       DIET  No orders of the defined types were placed in this encounter.      ACTIVITY  As tolerated.  Weight bearing as tolerated    CONSULTATIONS      PROCEDURES      LABORATORY  Lab Results   Component Value Date    SODIUM 140 11/26/2024    POTASSIUM 3.6 11/26/2024    CHLORIDE 104 11/26/2024    CO2 25 11/26/2024    GLUCOSE 126 (H) 11/26/2024    BUN 10 11/26/2024    CREATININE 0.63 11/26/2024        Lab Results   Component Value Date    WBC 6.0 11/26/2024    HEMOGLOBIN 13.9 11/26/2024    HEMATOCRIT 40.9 11/26/2024    PLATELETCT 238 11/26/2024        Total time of the " discharge process exceeds 36 minutes.

## 2024-11-28 LAB
BACTERIA UR CULT: NORMAL
SIGNIFICANT IND 70042: NORMAL
SITE SITE: NORMAL
SOURCE SOURCE: NORMAL

## 2024-12-03 ENCOUNTER — APPOINTMENT (OUTPATIENT)
Dept: HEMATOLOGY ONCOLOGY | Facility: MEDICAL CENTER | Age: 59
End: 2024-12-03
Payer: MEDICARE

## 2024-12-03 NOTE — PROGRESS NOTES
oSl Kern is a 59 y.o. female here for a non-provider visit for a lab draw on 11/26/2024 at 11:20 AM.    Procedure performed:  Venipuncture     Anatomical site:  Right Antecubital Area    Equipment used:  23 g butterfly     Labs drawn:   Foundation One    Ordering provider:  Magdi Kline M.D.    Lab draw completed by:  Angie ROOT

## 2024-12-04 ENCOUNTER — DOCUMENTATION (OUTPATIENT)
Dept: HEALTH INFORMATION MANAGEMENT | Facility: OTHER | Age: 59
End: 2024-12-04

## 2024-12-04 ENCOUNTER — HOSPITAL ENCOUNTER (OUTPATIENT)
Dept: LAB | Facility: MEDICAL CENTER | Age: 59
End: 2024-12-04
Payer: MEDICARE

## 2024-12-04 ENCOUNTER — APPOINTMENT (OUTPATIENT)
Dept: ONCOLOGY | Facility: MEDICAL CENTER | Age: 59
End: 2024-12-04
Attending: INTERNAL MEDICINE
Payer: MEDICARE

## 2024-12-04 ENCOUNTER — APPOINTMENT (OUTPATIENT)
Dept: LAB | Facility: MEDICAL CENTER | Age: 59
End: 2024-12-04
Payer: MEDICARE

## 2024-12-04 ENCOUNTER — OFFICE VISIT (OUTPATIENT)
Dept: MEDICAL GROUP | Facility: PHYSICIAN GROUP | Age: 59
End: 2024-12-04
Payer: MEDICARE

## 2024-12-04 VITALS
BODY MASS INDEX: 36.02 KG/M2 | HEART RATE: 91 BPM | WEIGHT: 216.2 LBS | TEMPERATURE: 98.1 F | OXYGEN SATURATION: 94 % | HEIGHT: 65 IN | SYSTOLIC BLOOD PRESSURE: 118 MMHG | DIASTOLIC BLOOD PRESSURE: 62 MMHG

## 2024-12-04 DIAGNOSIS — Z23 NEED FOR VACCINATION: ICD-10-CM

## 2024-12-04 DIAGNOSIS — E11.40 TYPE 2 DIABETES MELLITUS WITH DIABETIC NEUROPATHY, WITHOUT LONG-TERM CURRENT USE OF INSULIN (HCC): ICD-10-CM

## 2024-12-04 DIAGNOSIS — K59.03 DRUG-INDUCED CONSTIPATION: ICD-10-CM

## 2024-12-04 DIAGNOSIS — Z17.0 CARCINOMA OF UPPER-OUTER QUADRANT OF LEFT BREAST IN FEMALE, ESTROGEN RECEPTOR POSITIVE (HCC): ICD-10-CM

## 2024-12-04 DIAGNOSIS — C50.412 CARCINOMA OF UPPER-OUTER QUADRANT OF LEFT BREAST IN FEMALE, ESTROGEN RECEPTOR POSITIVE (HCC): ICD-10-CM

## 2024-12-04 DIAGNOSIS — C80.0 CARCINOMATOSIS (HCC): ICD-10-CM

## 2024-12-04 DIAGNOSIS — Z09 HOSPITAL DISCHARGE FOLLOW-UP: Primary | ICD-10-CM

## 2024-12-04 DIAGNOSIS — R74.8 ELEVATED LIPASE: ICD-10-CM

## 2024-12-04 PROBLEM — C50.919 BREAST CANCER IN FEMALE (HCC): Status: RESOLVED | Noted: 2024-11-25 | Resolved: 2024-12-04

## 2024-12-04 LAB — LIPASE SERPL-CCNC: 36 U/L (ref 11–82)

## 2024-12-04 PROCEDURE — 83690 ASSAY OF LIPASE: CPT

## 2024-12-04 ASSESSMENT — FIBROSIS 4 INDEX: FIB4 SCORE: 1.2

## 2024-12-04 NOTE — PROGRESS NOTES
Subjective:     Sol Kern is a 59 y.o. female who presents for Hospital Follow-up.    Transitional Care Management  TCM Outreach Date and Time: Filed (11/27/2024  9:27 AM)    Discharge Questions  Actual Discharge Date: 11/26/24  Now that you are home, how are you feeling?: Good  Did you receive any new prescriptions?: Yes  Were you able to get them filled?: Yes  Meds to Bed or Pharmacy filled?: Meds to Bed  Do you have any questions about your current medications or new medications (Review Med Rec)?: No  Did you have any durable medical equipment ordered?: No  Do you have a follow up appointment scheduled with your PCP?: Yes  Appointment Date: 12/04/24  Appointment Time: 1400  Any issues or paperwork you wish to discuss with your PCP?: Yes (Please specify) (Oxygen recert?)  Are you (patient) able to get to the appointment?: Yes  If Home Health was ordered, have they contacted you (Patient): Not Applicable  Did you have enough support after your last discharge?: Yes  Does this patient qualify for the CCM program?: Yes    Transitional Care  Number of attempts made to contact patient: 1  Current or previous attempts completed within two business days of discharge? : Yes  Provided education regarding treatment plan, medications, self-management, ADLs?: Yes  Has patient completed an Advanced Directive?: No  Has the Care Manager's phone number provided?: Yes  Is there anything else I can help you with?: No    Discharge Summary  Chief Complaint: Abdominal pain  Admitting Diagnosis: Acute cholecystitis, Peritoneal carcinomatosis  Discharge Diagnosis: Acute cholecystitis, Peritoneal carcinomatosis        HPI:   History of Present Illness  The patient is here today for a hospital follow-up visit. She is accompanied by her daughter.    Abdominal Pain and Associated Symptoms  She was hospitalized from 11/25/2024 to 11/26/2024 due to abdominal pain, which she had been experiencing intermittently for about a month. The  pain, located in the upper abdomen, radiated downwards and was accompanied by bloating, gas, alternating constipation and diarrhea, and heartburn. She has noticed that eating less reduces her pain and bloating. Her lipase levels were slightly elevated at 120 during her hospital stay. She was advised to take Tylenol for pain management, which has since improved.  - Onset: Intermittently for about a month.  - Location: Upper abdomen, radiating downwards.  - Duration: Intermittent for about a month.  - Character: Abdominal pain with bloating, gas, alternating constipation and diarrhea, and heartburn.  - Alleviating Factors: Eating less reduces pain and bloating; Tylenol for pain management.  - Severity: Improved with Tylenol.    Cancer Spread to Abdomen  A PET scan conducted at the end of 09/2024 showed no abnormalities, but a subsequent CT scan during the hospitalization revealed cancer spread to the abdomen. She has an appointment with Dr. Kline tomorrow and a repeat PET scan scheduled for the morning. She has stopped taking Kisqali, Faslodex, ribociclib, and Xgeva. She is sensitive to many medications and experienced hair thinning while on Kisqali. She has not been referred to a palliative care team.    History of UTI  She also had a urinary tract infection (UTI) six weeks prior to her emergency room visit. She is considering taking Uqora to prevent UTIs and a probiotic for her stomach. She has not used MiraLAX before.    Bump in Vagina  She was informed of a bump in her vagina detected on a PET scan and was advised to have an ultrasound and consult a gynecologist. She recently had a Pap smear.    Upcoming Medical Appointments  She has an upcoming appointment with an eye doctor and a sleep apnea test at home. She has not experienced any chest pain or breathing difficulties in the past few days, but she does not breathe deeply. She noticed her oxygen levels dropping while sleeping in the hospital, which  improved with deep breathing.    She is due for her influenza vaccine.    FAMILY HISTORY  Her mother had ovarian cancer and passed away from it at the age of 99.      Current medicines (including reconciliation performed today)  Current Outpatient Medications   Medication Sig Dispense Refill    prochlorperazine (COMPAZINE) 10 MG Tab Take 1 Tablet by mouth every 6 hours as needed for Nausea/Vomiting. 30 Tablet 3    simethicone (MYLICON) 125 MG chewable tablet Chew 250 mg every 6 hours as needed for Flatulence. 2 tablets= 250mg      lidocaine (LIDODERM) 5 % Patch Place 1 Patch on the skin every 24 hours. Place patch to affected area in am and remove at bedtime. 30 Patch 0    zolpidem (AMBIEN) 10 MG Tab Take 10 mg by mouth at bedtime as needed for Sleep.      atorvastatin (LIPITOR) 20 MG Tab TAKE 1 TABLET BY MOUTH EVERY DAY IN THE EVENING 100 Tablet 3    Semaglutide, 2 MG/DOSE, (OZEMPIC, 2 MG/DOSE,) 8 MG/3ML Solution Pen-injector Inject 2 mg under the skin every 7 days. 9 mL 0    albuterol 108 (90 Base) MCG/ACT Aero Soln inhalation aerosol Inhale 2 Puffs every 6 hours as needed for Shortness of Breath. 8 Each 3    ondansetron (ZOFRAN ODT) 4 MG TABLET DISPERSIBLE Take 1 Tablet by mouth every 8 hours as needed for Nausea/Vomiting. 10 Tablet 0    cyclobenzaprine (FLEXERIL) 10 mg Tab TAKE 1 TABLET BY MOUTH THREE TIMES A DAY AS NEEDED FOR MUSCLE SPASM (Patient taking differently: Take 10 mg by mouth 3 times a day as needed for Muscle Spasms (As needed).) 90 Tablet 0    metFORMIN ER (GLUCOPHAGE XR) 500 MG TABLET SR 24 HR Take 1 Tablet by mouth every day. (Patient taking differently: Take 1,000 mg by mouth every day.) 200 Tablet 1    lisinopril (PRINIVIL) 2.5 MG Tab Take 1 Tablet by mouth every day. 90 Tablet 1    Ribociclib Succ, 600 MG Dose, 200 MG Tablet Therapy Pack Take 600 mg by mouth see administration instructions. Take 600 mg by mouth daily for 3 weeks (21 days), off for 1 week (7 days) (Patient taking differently:  "Take 400 mg by mouth see administration instructions. Take 2 tablets (400 mg) by mouth daily for 3 weeks (21 days), off for 1 week (7 days)) 63 Each 0    denosumab (XGEVA) 120 MG/1.7ML injection Inject 120 mg under the skin every 28 days. (Patient not taking: Reported on 12/4/2024)      fulvestrant (FASLODEX) 250 MG/5ML Solution Prefilled Syringe injection Inject 250 mg into the shoulder, thigh, or buttocks every 90 days. (Patient not taking: Reported on 12/4/2024)       No current facility-administered medications for this visit.       Allergies:   Codeine, Hydrocodone, and Other drug    Social History     Tobacco Use    Smoking status: Former     Current packs/day: 0.25     Average packs/day: 0.3 packs/day for 20.0 years (5.0 ttl pk-yrs)     Types: Cigarettes    Smokeless tobacco: Never    Tobacco comments:     working on quitting    Vaping Use    Vaping status: Never Used   Substance Use Topics    Alcohol use: Not Currently     Comment: rarely    Drug use: Not Currently     Types: Marijuana, Oral     Comment: gummies       ROS:  See HPI    Objective:     Vitals:    12/04/24 1344   BP: 118/62   BP Location: Left arm   Patient Position: Sitting   BP Cuff Size: Adult   Pulse: 91   Temp: 36.7 °C (98.1 °F)   TempSrc: Temporal   SpO2: 94%   Weight: 98.1 kg (216 lb 3.2 oz)   Height: 1.651 m (5' 5\")     Body mass index is 35.98 kg/m².    Physical Exam:  Gen: Alert and oriented, No apparent distress.  Neck: Neck is supple without lymphadenopathy.  Lungs: Normal effort, CTA bilaterally, no wheezes, rhonchi, or rales  CV: Regular rate and rhythm. No murmurs, rubs, or gallops.  Ext: No clubbing, cyanosis, edema.    Assessment and Plan:   1. Hospital discharge follow-up        2. Carcinoma of upper-outer quadrant of left breast in female, estrogen receptor positive (HCC)  Referral to Oncology Palliative Care      3. Carcinomatosis (HCC)  Referral to Oncology Palliative Care      4. Type 2 diabetes mellitus with diabetic " neuropathy, without long-term current use of insulin (HCC)  POCT Retinal Eye Exam      5. Drug-induced constipation        6. Need for vaccination  INFLUENZA VACCINE TRI INJ (PF)         Assessment & Plan  1. 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  - Continue using over-the-counter Tylenol as needed for pain management  - PET scan scheduled for tomorrow  - Follow up with oncology tomorrow  - Palliative care referral placed to ensure comfort during cancer treatment    2. Type 2 diabetes - Chronic and controlled.  - Continue metformin ER 1000 mg daily  - Continue Ozempic 2 mg every 7 days  - Point-of-care retinal eye exam obtained today  - All other diabetic screenings are up to date    3. Constipation - Likely exacerbated by recent use of morphine.  - Advised to switch from Senokot to MiraLAX for safer long-term use  - Increase fiber intake and drink plenty of water    4. Health Maintenance - Due for a flu shot today which was given.  - Scheduled an appointment with an eye doctor  - Plans to complete a sleep apnea test at home      - Chart and discharge summary were reviewed.   - Hospitalization and results reviewed with patient.   - Medications reviewed including instructions regarding high risk medications, dosing and side effects.  - Recommended Services: No services needed at this time  - Advance directive/POLST on file?  No     Follow-up:Return if symptoms worsen or fail to improve.    Face-to-face transitional care management services with [facility] (today's visit is within 14 days post discharge & LACE+ score of 28-58) medical decision complexity were provided.

## 2024-12-04 NOTE — PROGRESS NOTES
I spoke with the patient and her daughter while they were in clinic for her TCM appointment. The patient is keeping a positive attitude while waiting for PET scan results and diagnostic decision making. The patient is agreeable to contact later this month for her official PCM phone conversation.

## 2024-12-05 ENCOUNTER — HOSPITAL ENCOUNTER (OUTPATIENT)
Dept: HEMATOLOGY ONCOLOGY | Facility: MEDICAL CENTER | Age: 59
End: 2024-12-05
Attending: INTERNAL MEDICINE
Payer: MEDICARE

## 2024-12-05 ENCOUNTER — HOSPITAL ENCOUNTER (OUTPATIENT)
Dept: RADIOLOGY | Facility: MEDICAL CENTER | Age: 59
End: 2024-12-05
Attending: INTERNAL MEDICINE
Payer: MEDICARE

## 2024-12-05 VITALS
WEIGHT: 214.51 LBS | TEMPERATURE: 98.3 F | SYSTOLIC BLOOD PRESSURE: 122 MMHG | HEART RATE: 98 BPM | OXYGEN SATURATION: 91 % | BODY MASS INDEX: 35.74 KG/M2 | HEIGHT: 65 IN | DIASTOLIC BLOOD PRESSURE: 78 MMHG

## 2024-12-05 DIAGNOSIS — C50.412 CARCINOMA OF UPPER-OUTER QUADRANT OF LEFT BREAST IN FEMALE, ESTROGEN RECEPTOR POSITIVE (HCC): ICD-10-CM

## 2024-12-05 DIAGNOSIS — C50.912 BREAST CANCER, STAGE 4, LEFT (HCC): ICD-10-CM

## 2024-12-05 DIAGNOSIS — C50.111 MALIGNANT NEOPLASM OF CENTRAL PORTION OF RIGHT BREAST IN FEMALE, ESTROGEN RECEPTOR POSITIVE (HCC): ICD-10-CM

## 2024-12-05 DIAGNOSIS — Z17.0 CARCINOMA OF UPPER-OUTER QUADRANT OF LEFT BREAST IN FEMALE, ESTROGEN RECEPTOR POSITIVE (HCC): ICD-10-CM

## 2024-12-05 DIAGNOSIS — Z17.0 MALIGNANT NEOPLASM OF CENTRAL PORTION OF RIGHT BREAST IN FEMALE, ESTROGEN RECEPTOR POSITIVE (HCC): ICD-10-CM

## 2024-12-05 LAB — GLUCOSE BLD-MCNC: 114 MG/DL (ref 65–99)

## 2024-12-05 PROCEDURE — A9552 F18 FDG: HCPCS

## 2024-12-05 PROCEDURE — 99212 OFFICE O/P EST SF 10 MIN: CPT | Performed by: INTERNAL MEDICINE

## 2024-12-05 PROCEDURE — 99214 OFFICE O/P EST MOD 30 MIN: CPT | Performed by: INTERNAL MEDICINE

## 2024-12-05 ASSESSMENT — ENCOUNTER SYMPTOMS
CARDIOVASCULAR NEGATIVE: 1
PSYCHIATRIC NEGATIVE: 1
DIZZINESS: 0
HEADACHES: 0
NAUSEA: 0
MUSCULOSKELETAL NEGATIVE: 1
PALPITATIONS: 0
CHILLS: 0
NEUROLOGICAL NEGATIVE: 1
SHORTNESS OF BREATH: 1
WEIGHT LOSS: 0
EYES NEGATIVE: 1
MYALGIAS: 0
DIARRHEA: 0
VOMITING: 0
GASTROINTESTINAL NEGATIVE: 1
CONSTIPATION: 0
FEVER: 0
COUGH: 0

## 2024-12-05 ASSESSMENT — FIBROSIS 4 INDEX: FIB4 SCORE: 1.2
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
## 2024-12-06 NOTE — OP THERAPY EVALUATION
Outpatient Physical Therapy  INITIAL EVALUATION    [facility] [place]  [address], [address]  [place] [address]  Phone:  [phone]    Date of Evaluation: 12/09/2024    Patient: Slo Kern  YOB: 1965  MRN: [id number]     Referring Provider: Veronica Lyn M.D.  [address]  [place] [address]   Referring Diagnosis Chronic midline low back pain with right-sided sciatica [M54.41, G89.29]     Time Calculation  Start time: 0800  Stop time: 0845 Time Calculation (min): 45 minutes         Chief Complaint: No chief complaint on file.    Visit Diagnoses     ICD-10-CM   1. Chronic midline low back pain with right-sided sciatica  M54.41    G89.29       Date of onset of impairment: Multiple active episodes found    Subjective:   History of Present Illness:     Mechanism of injury:  CMHx: longstanding back pain from an HNP for 20+ years. She indicates that however things flare up and she notes that her back pain comes on and off over each day. She notes that she went to PT a while back and things made her seem worse. She notes that things like massage can be helpful but hard to overall do.     Pain Behavior  Sxs: central lower back pain that spreads to B glute region, anterolateral tingling from both legs all the way down to the toes and whole foot (some days of the week and notes been going on for a long time; and then comes back)    Aggs: sidelying at night, sitting or standing for too long (a couple hrs), roadtrips    Eas: lidocaine patches, flexeril, light massages    24 hour: depends on the day with her back and legs     Sleep: has to switch sides in sidelying a lot  (restless nights, wakes up multiple times most nights)    Irritability: mod    Yellow flags: ask more detail further, avoids things with recent discomfort in the stomach or pains    Imaging: none recently    PMHx: Sig PMHx; currently undergoing Tx for Breast Cancer (no chemo, on meds for  this    Profession/Recreation: Don't exercise mostly because of back and neck pain limiting her here    Goals: dec pain, sleep better and reduce the tingling in the legs and feet                            Past Medical History:   Diagnosis Date    Allergy     Back pain     Cancer (HCC)     breast    Diabetes (HCC)     diet    GERD (gastroesophageal reflux disease)     High cholesterol     not medicated    Hypertension     Malignant neoplasm of upper-outer quadrant of left female breast (HCC)     Neck pain     Obesity     Sleep apnea     no cpap    Snoring      Past Surgical History:   Procedure Laterality Date    PB MASTECTOMY, PARTIAL Left 2021    Procedure: MASTECTOMY, PARTIAL - SAMARA LOCALIZED;  Surgeon: Kayleen Sykes M.D.;  Location: SURGERY SAME DAY [facility];  Service: General    NODE BIOPSY SENTINEL Left 2021    Procedure: BIOPSY, LYMPH NODE, SENTINEL;  Surgeon: Kayleen Sykes M.D.;  Location: SURGERY SAME DAY [facility];  Service: General    CARPAL TUNNEL RELEASE Right     ENDOMETRIAL ABLATION      FOOT SURGERY Right     KNEE ARTHROSCOPY      PRIMARY C SECTION      2     TUBAL COAGULATION LAPAROSCOPIC BILATERAL       Social History     Tobacco Use    Smoking status: Former     Current packs/day: 0.25     Average packs/day: 0.3 packs/day for 20.0 years (5.0 ttl pk-yrs)     Types: Cigarettes    Smokeless tobacco: Never    Tobacco comments:     working on quitting    Substance Use Topics    Alcohol use: Not Currently     Comment: rarely     Family and Occupational History     Socioeconomic History    Marital status:      Spouse name: Not on file    Number of children: Not on file    Years of education: Not on file    Highest education level: Associate degree: occupational, technical, or vocational program   Occupational History    Not on file       Objective     General Comments     Spine Comments   Standing:  Posture    AROM  Flexion to shins WFL  Extension WFL  RSB; central  "back  LSB; central back    Sitting:  Slump (+) L numb, R numb at pad of feet     Supine:  Neuro screens  DTR diminished achilles and patellar B   Dermatomes WFL   Myotomes WFL   UMN signs    Hip Screen globally slightly tight but no sxs  DARWIN  FADIR/Quadrant  Flexion  IR  ER    Painful transitional sit to supine with stiffness; tingling once supine    Sitffness R>>L hip with sukhdev sidelying     Global TTP paraspinals        Therapeutic Exercises (CPT 90088):     1. UPOC 2/10/25 [facility]      Therapeutic Exercise Summary: Home Exercise Program Created and Reviewed with patient  Mod pir stretch x30\"ea  Sciatic n glider 2x15      Next---> Check MMT hip in standing or sidelying, heat and trunk strength, massage (ask what position), NPE with fucntion over pain graph      Time-based treatments/modalities:           Assessment, Response and Plan:   Impairments: impaired functional mobility, lacks appropriate home exercise program and pain with function    Assessment details:  PT finds s/sx consistent with LBP with radiating quality/radicular nature. This is evident with peripherilization of sxs subjectively and with AROM, positive neural tension screens, and location of sxs. Pt is negative for screens of radiculopathy/hard neurological findings with dermatomes, myotomes, and DTRs and UMN screens. She can benefit from skilled PT care.   Barriers to therapy:  Comorbidities  Prognosis: fair    Goals:   Short Term Goals:   -pt meets MCID for RMQ  -pt is waking <3x/night on average to demo improved sleep  -pt with AROM with less pain with R/LSB >90% of time to improve IADLs and ADLs  -pt with ability to turn in bed without more than minor pain 50% of the time  Short term goal time span:  2-4 weeks      Long Term Goals:    -pt meets MCID for RMQ  -pt is waking <2x/night on average to demo improved sleep  -pt with AROM with less pain with R/LSB >90% of time to improve IADLs and ADLs  -pt with ability to turn in bed without more than " minor pain 75% of the time  Long term goal time span:  6-8 weeks    Plan:   Therapy options:  Physical therapy treatment to continue  Planned therapy interventions:  E Stim Attended (CPT 59439), E Stim Unattended (CPT 61776), Hot or Cold Pack Therapy (CPT 89000), Manual Therapy (CPT 27565), Mechanical Traction (CPT 74991), Neuromuscular Re-education (CPT 01339), Therapeutic Activities (CPT 98565) and Therapeutic Exercise (CPT 21679)  Frequency:  2x week  Duration in weeks:  8  Duration in visits:  16  Discussed with:  Patient      Functional Assessment Used        Referring provider co-signature:  I have reviewed this plan of care and my co-signature certifies the need for services.    Certification Period: 12/09/2024 to  02/10/25    Physician Signature: ________________________________ Date: ______________

## 2024-12-09 ENCOUNTER — PHYSICAL THERAPY (OUTPATIENT)
Dept: PHYSICAL THERAPY | Facility: REHABILITATION | Age: 59
End: 2024-12-09
Attending: FAMILY MEDICINE
Payer: MEDICARE

## 2024-12-09 DIAGNOSIS — G89.29 CHRONIC MIDLINE LOW BACK PAIN WITH RIGHT-SIDED SCIATICA: ICD-10-CM

## 2024-12-09 DIAGNOSIS — M54.41 CHRONIC MIDLINE LOW BACK PAIN WITH RIGHT-SIDED SCIATICA: ICD-10-CM

## 2024-12-09 PROCEDURE — 97110 THERAPEUTIC EXERCISES: CPT

## 2024-12-09 PROCEDURE — 97163 PT EVAL HIGH COMPLEX 45 MIN: CPT

## 2024-12-09 NOTE — ADDENDUM NOTE
Encounter addended by: Modesta Oseguera, Med Ass't on: 12/9/2024 9:54 AM   Actions taken: Charge Capture section accepted

## 2024-12-13 ENCOUNTER — TELEPHONE (OUTPATIENT)
Dept: PHYSICAL THERAPY | Facility: MEDICAL CENTER | Age: 59
End: 2024-12-13
Payer: MEDICARE

## 2024-12-13 NOTE — OP THERAPY DISCHARGE SUMMARY
Outpatient Physical Therapy  DISCHARGE SUMMARY NOTE      [facility]  [address]  [place] [address]  Phone:  [phone]  Fax:  [phone]    Date of Visit: 12/13/2024    Patient: Sol Kern  YOB: 1965  MRN: [id number]     Referring Provider: Veronica Lyn M.D.  [address]  [facility] [address]  [place] [address]   Referring Diagnosis   Strain of muscle, fascia and tendon at neck level [S16.1]            Functional Assessment Used        Your patient is being discharged from Physical Therapy with the following comments:   Other    Comments:  Kina has not returned for PT follow up to address her neck pain and dizziness since 10/29/24. See that date of service for a progress note outlining details. Chart review indicates she has established with PT for management of her LBP. Discharging due to lapse for this episode of care, but welcome to return with a new referral if it is necessary to re-establish.      Roxana Mckeon, PT, DPT    Date: 12/13/2024

## 2024-12-16 ENCOUNTER — PATIENT OUTREACH (OUTPATIENT)
Dept: HEALTH INFORMATION MANAGEMENT | Facility: OTHER | Age: 59
End: 2024-12-16
Payer: MEDICARE

## 2024-12-16 DIAGNOSIS — J96.11 CHRONIC RESPIRATORY FAILURE WITH HYPOXIA (HCC): ICD-10-CM

## 2024-12-16 DIAGNOSIS — I10 ESSENTIAL HYPERTENSION: ICD-10-CM

## 2024-12-16 DIAGNOSIS — E11.40 TYPE 2 DIABETES MELLITUS WITH DIABETIC NEUROPATHY, WITHOUT LONG-TERM CURRENT USE OF INSULIN (HCC): ICD-10-CM

## 2024-12-16 NOTE — CARE PLAN
Care Plans       Chronic Care Management (CMS)    Met: 0 of 8 Met: 0 of 8      Progressing (8)       Demonstrate Knowledge of Hypertension (Knowledge of hypertension- Long Term)  Disciplines:  Interdisciplinary  Expected end:  11/18/25      Outcome: Progressing Documented by Deanna Carranza R.N. on 12/16/24 1028     Demonstrate factors that can contribute to high blood pressure (Knowledge deficit of factors contributing to hypertension- Long term)  Disciplines:  Interdisciplinary  Expected end:  11/18/25      Outcome: Progressing Documented by Deanna Carranza R.N. on 12/16/24 1028     Identify which modifiable health habits can be modifed (Recognize current health habits that may contribute to hypertension -Long Term)  Disciplines:  Interdisciplinary  Expected end:  11/18/25      Outcome: Progressing Documented by Deanna Carranza R.N. on 12/16/24 1028     Demonstrate the elements of self-monitoring blood pressure-Short term (Knowledge of self-monitoring blood pressure - Long Term)  Disciplines:  Interdisciplinary  Expected end:  11/18/25      Outcome: Progressing Documented by Deanna Carranza R.N. on 12/16/24 1028     Patient educated about diabetes (Knowledge of diabetes - Long Term)  Disciplines:  Nurse, Interdisciplinary  Expected end:  11/18/25      Outcome: Progressing Documented by Deanna Carranza R.N. on 12/16/24 1028     In conjunction with patient, identify which health habits can be modified (Recognizing current health habits that may contribute to diabetes - Long Term)  Disciplines:  Nurse, Interdisciplinary  Expected end:  11/18/25      Outcome: Progressing Documented by Deanna Carranza R.N. on 12/16/24 1028     Identify patient barriers to managing diabetes (Patient barriers to managing diabetes - Long Term)  Disciplines:  Nurse, Interdisciplinary  Expected end:  11/18/25      Outcome: Progressing Documented by Deanna Carranza R.N. on 12/16/24 1028     Patient Stated Goal: The  patient will have the opportunity to regularly ask questions and learn about and review her current and future health concerns.  (Patient Stated Problem: Confidently preventing crises)  Disciplines:  Nurse, Interdisciplinary  Expected end:  11/18/25   Patient-stated:  Yes      Outcome: Progressing Documented by Deanna Carranza R.N. on 12/16/24 1020

## 2024-12-16 NOTE — PROGRESS NOTES
Assessment    I spoke to the patient this morning by phone for her monthly and quarterly PCM follow up. Patient is aware of future appointment dates and times. Patient is well versed in her plan of care.   HTN: Patient hasn't been monitoring blood pressures. They have been well controlled over her last clinic visits. Patient reports taking medications as prescribed.   DM: Patient reports blood glucose levels in the 120-130s range. The patient states that she stopped her Ozempic about 9 days ago to see if it helped her GI discomfort. She states she feels marginally better but still has some low grade pain. The patient's chart forwarded to provider for review.   Breathing: The patient reports no changes in breathing at this time.     Education and Self-Management of Care    Article on weight related health considerations sent by mail  Patient will continue to follow up with PCP/specialists as indicated    Plan of Care and Goals    Patient will remain free from falls/injuries  Healthy nutrition, hydration, and activity    Barriers:    Management of acute and chronic health concerns    Progress:    Patient reports still having stomach discomfort. She stopped her Ozempic about 9 days ago and has noticed an increase in discomfort with gluten containing products.     Next outreach: One Month

## 2024-12-17 ENCOUNTER — PHYSICAL THERAPY (OUTPATIENT)
Dept: PHYSICAL THERAPY | Facility: REHABILITATION | Age: 59
End: 2024-12-17
Attending: FAMILY MEDICINE
Payer: MEDICARE

## 2024-12-17 ENCOUNTER — OFFICE VISIT (OUTPATIENT)
Dept: SURGICAL ONCOLOGY | Facility: MEDICAL CENTER | Age: 59
End: 2024-12-17
Payer: MEDICARE

## 2024-12-17 VITALS
OXYGEN SATURATION: 95 % | WEIGHT: 216 LBS | BODY MASS INDEX: 35.94 KG/M2 | SYSTOLIC BLOOD PRESSURE: 148 MMHG | HEART RATE: 94 BPM | TEMPERATURE: 98.2 F | DIASTOLIC BLOOD PRESSURE: 78 MMHG

## 2024-12-17 DIAGNOSIS — C50.912 BREAST CANCER, STAGE 4, LEFT (HCC): ICD-10-CM

## 2024-12-17 DIAGNOSIS — M54.41 CHRONIC MIDLINE LOW BACK PAIN WITH RIGHT-SIDED SCIATICA: ICD-10-CM

## 2024-12-17 DIAGNOSIS — R10.9 ABDOMINAL PAIN, UNSPECIFIED ABDOMINAL LOCATION: ICD-10-CM

## 2024-12-17 DIAGNOSIS — Z17.0 CARCINOMA OF UPPER-OUTER QUADRANT OF LEFT BREAST IN FEMALE, ESTROGEN RECEPTOR POSITIVE (HCC): ICD-10-CM

## 2024-12-17 DIAGNOSIS — C80.0 CARCINOMATOSIS (HCC): ICD-10-CM

## 2024-12-17 DIAGNOSIS — G89.29 CHRONIC MIDLINE LOW BACK PAIN WITH RIGHT-SIDED SCIATICA: ICD-10-CM

## 2024-12-17 DIAGNOSIS — C50.412 CARCINOMA OF UPPER-OUTER QUADRANT OF LEFT BREAST IN FEMALE, ESTROGEN RECEPTOR POSITIVE (HCC): ICD-10-CM

## 2024-12-17 PROCEDURE — 97110 THERAPEUTIC EXERCISES: CPT

## 2024-12-17 PROCEDURE — 99205 OFFICE O/P NEW HI 60 MIN: CPT | Performed by: SURGERY

## 2024-12-17 PROCEDURE — 3078F DIAST BP <80 MM HG: CPT | Performed by: SURGERY

## 2024-12-17 PROCEDURE — 97140 MANUAL THERAPY 1/> REGIONS: CPT

## 2024-12-17 PROCEDURE — 3077F SYST BP >= 140 MM HG: CPT | Performed by: SURGERY

## 2024-12-17 ASSESSMENT — FIBROSIS 4 INDEX: FIB4 SCORE: 1.2

## 2024-12-17 ASSESSMENT — ENCOUNTER SYMPTOMS
ABDOMINAL PAIN: 1
CONSTIPATION: 1
DIARRHEA: 1
NAUSEA: 1

## 2024-12-17 NOTE — OP THERAPY DAILY TREATMENT
"  Outpatient Physical Therapy  DAILY TREATMENT     [facility] [place] [id number] [facility] [address]  [place] [address]  Phone:  [phone]    Date: 12/17/2024  Patient: Sol Kern  YOB: 1965  MRN: [id number]   Time Calculation  Start time: 0730  Stop time: 0815 Time Calculation (min): 45 minutes   Chief Complaint: No chief complaint on file.  Visit #: 27    SUBJECTIVE:    Sxs: central lower back pain that spreads to B glute region, anterolateral tingling from both legs all the way down to the toes and whole foot (some days of the week and notes been going on for a long time; and then comes back)     Aggs: sidelying at night, sitting or standing for too long (a couple hrs), roadtrips       OBJECTIVE:  Current objective measures:   AROM  Flexion to shins WFL  Extension WFL  RSB; central back  LSB; central back    Slump (-negative 12/1/24) L numb, R numb at pad of feet   Global TTP    Painful transitional sit to supine with stiffness; tingling once supine    Hip ext MMT 4-/5ea     Sitffness R>>L hip with sukhdev sidelying           Therapeutic Exercises (CPT 05857):     1. 1. UPOC 2/10/25 [facility], Nu step seat 20-21, x10' L0    2. modified piriformis stretch, x1'ea, limited by abdominal pain; with MHP today    3. LTR, x2', with MHP today    4. sciatic n glide, 2x1', with MHP today      Therapeutic Exercise Summary: HEP:  Mod pir stretch x30\"ea  Sciatic n glider 2x15        Next---> Check MMT hip in standing or sidelying, heat and trunk strength, massage (ask what position), NPE with fucntion over pain graph      Therapeutic Treatments and Modalities:     1. Manual Therapy (CPT 79200), STW to paraspinals and QL  Time-based treatments/modalities:  Physical Therapy Timed Treatment Charges  Manual therapy minutes (CPT 00352): 10 minutes  Therapeutic exercise minutes (CPT 91255): 35 minutes  ASSESSMENT:   Response to treatment: s/sx linear to eval. Recent abdominal pain limited session. She " is having this looked at today so we skipped more during our session due to this.     PLAN/RECOMMENDATIONS:   Plan for treatment: therapy treatment to continue next visit.  Planned interventions for next visit: continue with current treatment.

## 2024-12-18 ENCOUNTER — APPOINTMENT (OUTPATIENT)
Dept: ADMISSIONS | Facility: MEDICAL CENTER | Age: 59
End: 2024-12-18
Attending: SURGERY
Payer: MEDICARE

## 2024-12-19 ENCOUNTER — PHYSICAL THERAPY (OUTPATIENT)
Dept: PHYSICAL THERAPY | Facility: REHABILITATION | Age: 59
End: 2024-12-19
Attending: INTERNAL MEDICINE
Payer: MEDICARE

## 2024-12-19 DIAGNOSIS — L76.82 AXILLARY WEB SYNDROME: ICD-10-CM

## 2024-12-19 DIAGNOSIS — I89.0 LYMPHEDEMA OF EXTREMITY: ICD-10-CM

## 2024-12-19 DIAGNOSIS — L90.5 AXILLARY WEB SYNDROME: ICD-10-CM

## 2024-12-19 DIAGNOSIS — M25.9 SHOULDER PROBLEM: ICD-10-CM

## 2024-12-19 DIAGNOSIS — C50.912 BREAST CANCER, STAGE 4, LEFT (HCC): ICD-10-CM

## 2024-12-19 PROCEDURE — 97140 MANUAL THERAPY 1/> REGIONS: CPT

## 2024-12-19 NOTE — OP THERAPY DAILY TREATMENT
Outpatient Physical Therapy  LYMPHEDEMA THERAPY DAILY TREATMENT     [facility] [address]  [address]  [place] [address]  Phone:  [phone]  Fax:  [phone]    Date: 12/19/2024    Patient: Sol Kern  YOB: 1965  MRN: [id number]     Time Calculation    Start time: 1330  Stop time: 1416 Time Calculation (min): 46 minutes         Chief Complaint: Lymphedema Breast Cancer and Shoulder Problem    Visit #: 28    Subjective:   History of Present Illness:     Mechanism of injury:  Taking a break from [employer] after recent findings of possible carcinoma in her abdomen. Surgery for biopsy scheduled for next week.      Lymphedema Objective        Therapeutic Exercises (CPT 89294):     1. wall walk with lift off, HEP    2. sciatic nerve glide, HEP    3. wall gold with chin tuck and scap retraction, HEP    4. standing extension with elbow drive, HEP    5. door extension with strap, HEP    6. supine shoulder flexion with green band, HEP    7. abduction in sidely with green band, HEP    8. chest opening with green band, HEP    9. isometric wall ER with body rotation, HEP    10. supine flexion with green band      Therapeutic Exercise Summary:   HEP: seated pelvic tilts, wall walk    Therapeutic Treatments and Modalities:     1. Manual Therapy (CPT 11677)    Therapeutic Treatment and Modalities Summary:   -Trigger point release to L: forearm extensors, biceps, pec major, pec minor, subscap, supraspinatus, infraspinatus, teres major/minor, traps, levator scap   -L joint mobilization to radius/ulna for interossei, wrist, CMC  -MLD to L breast including parasternal and intercostal collectors. Posteriorly as well.   -MLD to L UE     Time-based treatments/modalities:    Physical Therapy Timed Treatment Charges  Manual therapy minutes (CPT 86909): 46 minutes      Assessment and Plan:   Assessment details:  Sol returns today after unfortunate possible progression of her disease  at abdomen. She is scheduled for biopsy next week. However, today, her L UE has sustained it's overhead ROM with some tightness at L pectorals giving her resisitance at end range. She will benefit from additional intervention to ensure she can sustain her ROM and circumferences.     Plan:  Therapy options:  Physical therapy treatment to continue  Discussed with:  Patient

## 2024-12-19 NOTE — OP THERAPY PROGRESS SUMMARY
Outpatient Physical Therapy  PROGRESS SUMMARY NOTE      [facility] [address].  [address]  [facility] [address]  Phone:  [phone]  Fax:  [phone]    Date of Visit: 12/19/2024    Patient: oSl Kern  YOB: 1965  MRN: [id number]     Referring Provider: Magdi Kline M.D.  [address] [place] [address] [address]  [place] [address]   Referring Diagnosis Malignant neoplasm of unspecified site of left female breast [C50.912]     Visit Diagnoses     ICD-10-CM   1. Shoulder problem  M25.9   2. Axillary web syndrome  L76.82    L90.5   3. Breast cancer, stage 4, left (HCC)  C50.912   4. Lymphedema of extremity  I89.0       Rehab Potential: good    Progress Report Period: 10/18/24-12/19/24    Functional Assessment Used          Objective Findings and Assessment:   Patient progression towards goals: Sol returns today after unfortunate possible progression of her disease at abdomen. She is scheduled for biopsy next week. However, today, her L UE has sustained it's overhead ROM with some tightness at L pectorals giving her resisitance at end range. She will benefit from additional intervention to ensure she can sustain her ROM and circumferences.       Objective findings and assessment details: Left Upper Extremity Circumferential Measurements 9/18  Axilla: 41.2 cm  Upper Proximal Humerus: 38.2 cm  Distal Humerus: 34.1 cm  Elbow: 28.3 cm  Mid-Forearm: 26.2 cm  Wrist: 16.2 cm  Distal Garza Crease: 19.3 cm  Total: 203.5 cm      Left Upper Extremity Circumferential Measurements 8/14  Axilla: 40.3 cm  Upper Proximal Humerus: 37.6 cm  Distal Humerus: 33.6 cm  Elbow: 28.6 cm  Mid-Forearm: 25.8 cm  Wrist: 16.2 cm  Distal Garza Crease: 19.3 cm  Total: 201.4 cm      Left Upper Extremity Circumferential Measurements 7/23  Axilla: 40.4 cm  Upper Proximal Humerus: 37.4 cm  Distal Humerus: 32.3 cm  Elbow: 27.7 cm  Mid-Forearm: 26.7 cm  Wrist: 16.3 cm  Distal Garza Crease: 18.1  cm  Total: 198.9 cm      Left Upper Extremity Circumferential Measurements 5/30  Axilla: 40.2 cm  Upper Proximal Humerus: 36.7 cm  Distal Humerus: 34.2 cm  Elbow: 28.3 cm  Mid-Forearm: 26 cm  Wrist: 16.4 cm  Distal Garza Crease: 19.2 cm  Total: 201 cm      Left Upper Extremity Circumferential Measurements 4/23  Axilla: 40.1 cm  Upper Proximal Humerus: 36.8 cm  Distal Humerus: 32.6 cm  Elbow: 28.3 cm  Mid-Forearm: 25.7 cm  Wrist: 15.8 cm  Distal Garza Crease: 18.2 cm  Total: 197.5 cm      Left Upper Extremity Circumferential Measurements EVAL  Axilla: 38.5 cm  Upper Proximal Humerus: 37.7 cm  Distal Humerus: 34.4 cm  Elbow: 27.2 cm  Mid-Forearm: 24.7 cm  Wrist: 15.9 cm  Distal Garza Crease: 18.4 cm  Total: 196.8 cm     Right Upper Extremity Circumferential Measurements EVAL  Axilla: 37.1 cm  Upper Proximal Humerus: 36.6 cm  Distal Humerus: 33.4 cm  Elbow: 27.2 cm  Mid-Forearm: 22 cm  Wrist: 16.8 cm  Distal Garza Crease: 18.4 cm  Total: cm 191.5      Goals:   Short Term Goals:   1. Pt will achieve a total limb circumference reduction of 3cm in order to decrease risk for infection and progression of disease process. Goal Not Met  2. Pt will be independent with self-MLD to facilitate fluid migration to healthy tissues and lymph nodes. Goal Met  3. Pt will obtain a compressive garment to allow for fluid movement out of forearm. Goal  MEt    4. Patient will initiate a gentle strengthening program to her B UE to improve lean muscle mass and lymphatic flow.      Short term goal time span:  1-2 weeks      Long Term Goals:    1. Pt will have a reduction in total limb circumference of 6cm in order to decrease risk for infection and progression of disease process. Goal Not Met  2. Patient will be independent with maintenance phase management of lymphedema including: compression garments, skin care education, risk reduction strategies, manual lymphatic drainage, and therapeutic exercise. Goal Not Met  3. Pt will sustain her  full ROM and initiate a strengthening program to build lean muscle mass. Goal Partially Met     Long term goal time span:  6-8 weeks    Plan:   Planned therapy interventions:  Therapeutic Activities (CPT 67121), Therapeutic Exercise (CPT 06425), Functional Training, Self Care (CPT 45103) and Manual Therapy (CPT 09987)  Frequency:  2x month  Duration in weeks:  8      Referring provider co-signature:  I have reviewed this plan of care and my co-signature certifies the need for services.     Certification Period: 12/19/2024 to 02/13/25    Physician Signature: ________________________________ Date: ______________

## 2024-12-20 ENCOUNTER — PRE-ADMISSION TESTING (OUTPATIENT)
Dept: ADMISSIONS | Facility: MEDICAL CENTER | Age: 59
End: 2024-12-20
Attending: SURGERY
Payer: MEDICARE

## 2024-12-20 NOTE — PREADMIT AVS NOTE
Current Medications   Medication Instructions    prochlorperazine (COMPAZINE) 10 MG Tab As needed medication, may take if needed, including morning of procedure     simethicone (MYLICON) 125 MG chewable tablet Hold medication day of procedure    lidocaine (LIDODERM) 5 % Patch Hold medication day of procedure    zolpidem (AMBIEN) 10 MG Tab Continue until night before surgery    atorvastatin (LIPITOR) 20 MG Tab Continue until night before surgery    Semaglutide, 2 MG/DOSE, (OZEMPIC, 2 MG/DOSE,) 8 MG/3ML Solution Pen-injector Stop 7 days before surgery    albuterol 108 (90 Base) MCG/ACT Aero Soln inhalation aerosol As needed medication, may take if needed, including morning of procedure     ondansetron (ZOFRAN ODT) 4 MG TABLET DISPERSIBLE As needed medication, may take if needed, including morning of procedure     denosumab (XGEVA) 120 MG/1.7ML injection Hold medication day of procedure    fulvestrant (FASLODEX) 250 MG/5ML Solution Prefilled Syringe injection Follow instructions from surgeon or specialist.    cyclobenzaprine (FLEXERIL) 10 mg Tab As needed medication, may take if needed, including morning of procedure     metFORMIN ER (GLUCOPHAGE XR) 500 MG TABLET SR 24 HR Continue until night before surgery    lisinopril (PRINIVIL) 2.5 MG Tab Stop 24 hours before surgery    Ribociclib Succ, 600 MG Dose, 200 MG Tablet Therapy Pack Follow instructions from surgeon or specialist.

## 2024-12-20 NOTE — OP THERAPY DAILY TREATMENT
"  Outpatient Physical Therapy  DAILY TREATMENT     [facility] Outpatient Physical Therapy [place] [id number] [facility] [place], [place], [address]  [place] [address]  Phone:  [phone]    Date: 12/23/2024  Patient: Sol Kern  YOB: 1965  MRN: [id number]   Time Calculation  Start time: 0245  Stop time: 0325 Time Calculation (min): 40 minutes   Chief Complaint: No chief complaint on file.  Visit #: 29    SUBJECTIVE:  Notes that a couple days of on and off abdominal symptoms. She is going to have a surgery for a biopsy this Thursday.     Sxs: central lower back pain that spreads to B glute region, anterolateral tingling from both legs all the way down to the toes and whole foot (some days of the week and notes been going on for a long time; and then comes back)     Aggs: sidelying at night, sitting or standing for too long (a couple hrs), roadtrips     OBJECTIVE:   Current objective measures:   AROM  Flexion to shins WFL  Extension WFL  RSB; central back  LSB; central back    Slump (-negative 12/1/24) L numb, R numb at pad of feet   Global TTP    Painful transitional sit to supine with stiffness; tingling once supine    Hip ext MMT 4-/5ea     Sitffness R>>L hip with sukhdev sidelying         Therapeutic Exercises (CPT 71580):     1. 1. UPOC 2/10/25 SCP, Nu step seat 20-21, x10' L0    2. modified piriformis stretch, x1'ea, limited by abdominal pain; with MHP today    3. LTR, x2', with MHP today    4. sciatic n glide, 2x1', with MHP today; skipped    7. paloff press, 2x10, purpTB    8. seated ab brace, 2x30\"H    9. standing hip extension, 2x10, in plank position on high table    10. next seated dead/sts      Therapeutic Exercise Summary: HEP:  Mod pir stretch x30\"ea  Sciatic n glider 2x15        Next---> Check MMT hip in standing or sidelying, heat and trunk strength, massage (ask what position), NPE with fucntion over pain graph      Therapeutic Treatments and Modalities:     1. Manual Therapy (CPT 38960), STW to paraspinals and " QL  Time-based treatments/modalities:  Physical Therapy Timed Treatment Charges  Manual therapy minutes (CPT 60213): 10 minutes  Therapeutic exercise minutes (CPT 36497): 30 minutes  ASSESSMENT: pt lying supine/with pressure on abdomen still a limiting factor to session. She is having this biopsied; edu to ask for precautions with movement and PT following. Will add more tolerance in as able.    PLAN/RECOMMENDATIONS:   Plan for treatment: therapy treatment to continue next visit.  Planned interventions for next visit: continue with current treatment.

## 2024-12-20 NOTE — OR NURSING
RN tele pre admit appointment completed with patient:  Medication instructions given according to the Guideline for Pre-Operative Medication Management.  Patient aware medication instructions can be reviewed in the pre admit AVS.    Preparing For Your Procedure packet reviewed with patient including fasting and bathing guidelines.  Patient instructed to stop taking all vitamins and herbal supplements 7 days prior to surgery and instructed to stop any NSAIDS 5 days prior to surgery unless otherwise instructed by medical provider. Surgery date 12/27/2024.    During pre admit appointment this RN encouraged patient to increase fluid intake the day prior to surgery including intake of electrolyte drinks such as Gatorade or electrolyte water and patient may have clear liquids until 2 hours prior to procedure.     Patient verbalizes understanding of all instructions given. No further questions at this time.

## 2024-12-23 ENCOUNTER — TELEPHONE (OUTPATIENT)
Dept: HEMATOLOGY ONCOLOGY | Facility: MEDICAL CENTER | Age: 59
End: 2024-12-23

## 2024-12-23 ENCOUNTER — PHYSICAL THERAPY (OUTPATIENT)
Dept: PHYSICAL THERAPY | Facility: REHABILITATION | Age: 59
End: 2024-12-23
Attending: FAMILY MEDICINE
Payer: MEDICARE

## 2024-12-23 DIAGNOSIS — G89.29 CHRONIC MIDLINE LOW BACK PAIN WITH RIGHT-SIDED SCIATICA: ICD-10-CM

## 2024-12-23 DIAGNOSIS — M54.41 CHRONIC MIDLINE LOW BACK PAIN WITH RIGHT-SIDED SCIATICA: ICD-10-CM

## 2024-12-23 LAB — RETINAL SCREEN: NEGATIVE

## 2024-12-23 PROCEDURE — 97110 THERAPEUTIC EXERCISES: CPT

## 2024-12-23 PROCEDURE — 97140 MANUAL THERAPY 1/> REGIONS: CPT

## 2024-12-24 RX ORDER — LIDOCAINE 50 MG/G
1 PATCH TOPICAL EVERY 24 HOURS
Qty: 30 PATCH | Refills: 0 | Status: SHIPPED | OUTPATIENT
Start: 2024-12-24

## 2024-12-24 NOTE — TELEPHONE ENCOUNTER
Spoke to patient via phone in regard to plan of care. Per Dr. Kline, patient to hold Kisqali starting today until patient's follow visit with Dr. Kline on 1/3/24, and holding this month's Xgeva. Further plan of care will be determined based on biopsy results and follow visit with Dr. Kline. Patient had questions regarding lab work. Will discuss with Dr. Kline and update patient. No other questions or concerns at this time.

## 2024-12-26 ENCOUNTER — APPOINTMENT (OUTPATIENT)
Dept: HEMATOLOGY ONCOLOGY | Facility: MEDICAL CENTER | Age: 59
End: 2024-12-26
Attending: INTERNAL MEDICINE
Payer: MEDICARE

## 2024-12-26 DIAGNOSIS — Z17.0 MALIGNANT NEOPLASM OF UPPER-OUTER QUADRANT OF LEFT BREAST IN FEMALE, ESTROGEN RECEPTOR POSITIVE (HCC): ICD-10-CM

## 2024-12-26 DIAGNOSIS — C50.412 CARCINOMA OF UPPER-OUTER QUADRANT OF LEFT BREAST IN FEMALE, ESTROGEN RECEPTOR POSITIVE (HCC): ICD-10-CM

## 2024-12-26 DIAGNOSIS — C50.412 MALIGNANT NEOPLASM OF UPPER-OUTER QUADRANT OF LEFT BREAST IN FEMALE, ESTROGEN RECEPTOR POSITIVE (HCC): ICD-10-CM

## 2024-12-26 DIAGNOSIS — Z17.0 CARCINOMA OF UPPER-OUTER QUADRANT OF LEFT BREAST IN FEMALE, ESTROGEN RECEPTOR POSITIVE (HCC): ICD-10-CM

## 2024-12-27 ENCOUNTER — ANESTHESIA EVENT (OUTPATIENT)
Dept: SURGERY | Facility: MEDICAL CENTER | Age: 59
End: 2024-12-27
Payer: MEDICARE

## 2024-12-27 ENCOUNTER — HOSPITAL ENCOUNTER (OUTPATIENT)
Facility: MEDICAL CENTER | Age: 59
End: 2024-12-27
Attending: SURGERY | Admitting: SURGERY
Payer: MEDICARE

## 2024-12-27 ENCOUNTER — PHARMACY VISIT (OUTPATIENT)
Dept: PHARMACY | Facility: MEDICAL CENTER | Age: 59
End: 2024-12-27
Payer: COMMERCIAL

## 2024-12-27 ENCOUNTER — ANESTHESIA (OUTPATIENT)
Dept: SURGERY | Facility: MEDICAL CENTER | Age: 59
End: 2024-12-27
Payer: MEDICARE

## 2024-12-27 VITALS
WEIGHT: 214.73 LBS | BODY MASS INDEX: 35.78 KG/M2 | TEMPERATURE: 96.7 F | SYSTOLIC BLOOD PRESSURE: 148 MMHG | RESPIRATION RATE: 18 BRPM | OXYGEN SATURATION: 95 % | DIASTOLIC BLOOD PRESSURE: 70 MMHG | HEIGHT: 65 IN | HEART RATE: 76 BPM

## 2024-12-27 DIAGNOSIS — N30.01 ACUTE CYSTITIS WITH HEMATURIA: ICD-10-CM

## 2024-12-27 DIAGNOSIS — C50.912 BREAST CANCER, STAGE 4, LEFT (HCC): ICD-10-CM

## 2024-12-27 DIAGNOSIS — C80.0 CARCINOMATOSIS (HCC): ICD-10-CM

## 2024-12-27 LAB
ANION GAP SERPL CALC-SCNC: 12 MMOL/L (ref 7–16)
BASOPHILS # BLD AUTO: 1.3 % (ref 0–1.8)
BASOPHILS # BLD: 0.05 K/UL (ref 0–0.12)
BUN SERPL-MCNC: 11 MG/DL (ref 8–22)
CALCIUM SERPL-MCNC: 9.1 MG/DL (ref 8.5–10.5)
CEA SERPL-MCNC: 4.5 NG/ML (ref 0–3)
CHLORIDE SERPL-SCNC: 101 MMOL/L (ref 96–112)
CO2 SERPL-SCNC: 22 MMOL/L (ref 20–33)
CREAT SERPL-MCNC: 0.62 MG/DL (ref 0.5–1.4)
EOSINOPHIL # BLD AUTO: 0.04 K/UL (ref 0–0.51)
EOSINOPHIL NFR BLD: 1.1 % (ref 0–6.9)
ERYTHROCYTE [DISTWIDTH] IN BLOOD BY AUTOMATED COUNT: 49.1 FL (ref 35.9–50)
GFR SERPLBLD CREATININE-BSD FMLA CKD-EPI: 101 ML/MIN/1.73 M 2
GLUCOSE SERPL-MCNC: 106 MG/DL (ref 65–99)
HCT VFR BLD AUTO: 41.2 % (ref 37–47)
HGB BLD-MCNC: 13.9 G/DL (ref 12–16)
IMM GRANULOCYTES # BLD AUTO: 0.01 K/UL (ref 0–0.11)
IMM GRANULOCYTES NFR BLD AUTO: 0.3 % (ref 0–0.9)
LYMPHOCYTES # BLD AUTO: 0.77 K/UL (ref 1–4.8)
LYMPHOCYTES NFR BLD: 20.3 % (ref 22–41)
MCH RBC QN AUTO: 33.9 PG (ref 27–33)
MCHC RBC AUTO-ENTMCNC: 33.7 G/DL (ref 32.2–35.5)
MCV RBC AUTO: 100.5 FL (ref 81.4–97.8)
MONOCYTES # BLD AUTO: 0.77 K/UL (ref 0–0.85)
MONOCYTES NFR BLD AUTO: 20.3 % (ref 0–13.4)
NEUTROPHILS # BLD AUTO: 2.15 K/UL (ref 1.82–7.42)
NEUTROPHILS NFR BLD: 56.7 % (ref 44–72)
NRBC # BLD AUTO: 0 K/UL
NRBC BLD-RTO: 0 /100 WBC (ref 0–0.2)
PATHOLOGY CONSULT NOTE: NORMAL
PLATELET # BLD AUTO: 268 K/UL (ref 164–446)
PMV BLD AUTO: 8.8 FL (ref 9–12.9)
POTASSIUM SERPL-SCNC: 4.1 MMOL/L (ref 3.6–5.5)
RBC # BLD AUTO: 4.1 M/UL (ref 4.2–5.4)
SODIUM SERPL-SCNC: 135 MMOL/L (ref 135–145)
WBC # BLD AUTO: 3.8 K/UL (ref 4.8–10.8)

## 2024-12-27 PROCEDURE — 160046 HCHG PACU - 1ST 60 MINS PHASE II: Performed by: SURGERY

## 2024-12-27 PROCEDURE — 700101 HCHG RX REV CODE 250: Performed by: SURGERY

## 2024-12-27 PROCEDURE — 160025 RECOVERY II MINUTES (STATS): Performed by: SURGERY

## 2024-12-27 PROCEDURE — 700105 HCHG RX REV CODE 258: Performed by: SURGERY

## 2024-12-27 PROCEDURE — RXMED WILLOW AMBULATORY MEDICATION CHARGE: Performed by: SURGERY

## 2024-12-27 PROCEDURE — 82378 CARCINOEMBRYONIC ANTIGEN: CPT

## 2024-12-27 PROCEDURE — 160002 HCHG RECOVERY MINUTES (STAT): Performed by: SURGERY

## 2024-12-27 PROCEDURE — 86300 IMMUNOASSAY TUMOR CA 15-3: CPT

## 2024-12-27 PROCEDURE — 36415 COLL VENOUS BLD VENIPUNCTURE: CPT

## 2024-12-27 PROCEDURE — 49320 DIAG LAPARO SEPARATE PROC: CPT | Performed by: SURGERY

## 2024-12-27 PROCEDURE — 80048 BASIC METABOLIC PNL TOTAL CA: CPT

## 2024-12-27 PROCEDURE — 160039 HCHG SURGERY MINUTES - EA ADDL 1 MIN LEVEL 3: Performed by: SURGERY

## 2024-12-27 PROCEDURE — 160035 HCHG PACU - 1ST 60 MINS PHASE I: Performed by: SURGERY

## 2024-12-27 PROCEDURE — 160028 HCHG SURGERY MINUTES - 1ST 30 MINS LEVEL 3: Performed by: SURGERY

## 2024-12-27 PROCEDURE — 700101 HCHG RX REV CODE 250: Performed by: ANESTHESIOLOGY

## 2024-12-27 PROCEDURE — 160009 HCHG ANES TIME/MIN: Performed by: SURGERY

## 2024-12-27 PROCEDURE — 160048 HCHG OR STATISTICAL LEVEL 1-5: Performed by: SURGERY

## 2024-12-27 PROCEDURE — 85025 COMPLETE CBC W/AUTO DIFF WBC: CPT

## 2024-12-27 PROCEDURE — 700111 HCHG RX REV CODE 636 W/ 250 OVERRIDE (IP): Mod: JZ | Performed by: ANESTHESIOLOGY

## 2024-12-27 PROCEDURE — 88305 TISSUE EXAM BY PATHOLOGIST: CPT | Mod: 59

## 2024-12-27 RX ORDER — ONDANSETRON 2 MG/ML
4 INJECTION INTRAMUSCULAR; INTRAVENOUS
Status: COMPLETED | OUTPATIENT
Start: 2024-12-27 | End: 2024-12-27

## 2024-12-27 RX ORDER — LIDOCAINE HYDROCHLORIDE 20 MG/ML
INJECTION, SOLUTION EPIDURAL; INFILTRATION; INTRACAUDAL; PERINEURAL PRN
Status: DISCONTINUED | OUTPATIENT
Start: 2024-12-27 | End: 2024-12-27 | Stop reason: SURG

## 2024-12-27 RX ORDER — OXYCODONE HCL 5 MG/5 ML
10 SOLUTION, ORAL ORAL
Status: DISCONTINUED | OUTPATIENT
Start: 2024-12-27 | End: 2024-12-27 | Stop reason: HOSPADM

## 2024-12-27 RX ORDER — MIDAZOLAM HYDROCHLORIDE 1 MG/ML
1 INJECTION INTRAMUSCULAR; INTRAVENOUS
Status: DISCONTINUED | OUTPATIENT
Start: 2024-12-27 | End: 2024-12-27 | Stop reason: HOSPADM

## 2024-12-27 RX ORDER — HYDROMORPHONE HYDROCHLORIDE 1 MG/ML
0.2 INJECTION, SOLUTION INTRAMUSCULAR; INTRAVENOUS; SUBCUTANEOUS
Status: DISCONTINUED | OUTPATIENT
Start: 2024-12-27 | End: 2024-12-27 | Stop reason: HOSPADM

## 2024-12-27 RX ORDER — CEFAZOLIN SODIUM 1 G/3ML
INJECTION, POWDER, FOR SOLUTION INTRAMUSCULAR; INTRAVENOUS PRN
Status: DISCONTINUED | OUTPATIENT
Start: 2024-12-27 | End: 2024-12-27 | Stop reason: SURG

## 2024-12-27 RX ORDER — KETOROLAC TROMETHAMINE 15 MG/ML
INJECTION, SOLUTION INTRAMUSCULAR; INTRAVENOUS PRN
Status: DISCONTINUED | OUTPATIENT
Start: 2024-12-27 | End: 2024-12-27 | Stop reason: SURG

## 2024-12-27 RX ORDER — PROCHLORPERAZINE MALEATE 10 MG
10 TABLET ORAL EVERY 6 HOURS PRN
Qty: 8 TABLET | Refills: 0 | Status: SHIPPED | OUTPATIENT
Start: 2024-12-27 | End: 2024-12-29

## 2024-12-27 RX ORDER — DIPHENHYDRAMINE HYDROCHLORIDE 50 MG/ML
12.5 INJECTION INTRAMUSCULAR; INTRAVENOUS
Status: DISCONTINUED | OUTPATIENT
Start: 2024-12-27 | End: 2024-12-27 | Stop reason: HOSPADM

## 2024-12-27 RX ORDER — MEPERIDINE HYDROCHLORIDE 25 MG/ML
25 INJECTION INTRAMUSCULAR; INTRAVENOUS; SUBCUTANEOUS
Status: DISCONTINUED | OUTPATIENT
Start: 2024-12-27 | End: 2024-12-27 | Stop reason: HOSPADM

## 2024-12-27 RX ORDER — ONDANSETRON 2 MG/ML
INJECTION INTRAMUSCULAR; INTRAVENOUS PRN
Status: DISCONTINUED | OUTPATIENT
Start: 2024-12-27 | End: 2024-12-27 | Stop reason: SURG

## 2024-12-27 RX ORDER — SODIUM CHLORIDE, SODIUM LACTATE, POTASSIUM CHLORIDE, CALCIUM CHLORIDE 600; 310; 30; 20 MG/100ML; MG/100ML; MG/100ML; MG/100ML
INJECTION, SOLUTION INTRAVENOUS CONTINUOUS
Status: DISCONTINUED | OUTPATIENT
Start: 2024-12-27 | End: 2024-12-27 | Stop reason: HOSPADM

## 2024-12-27 RX ORDER — BUPIVACAINE HYDROCHLORIDE AND EPINEPHRINE 5; 5 MG/ML; UG/ML
INJECTION, SOLUTION PERINEURAL
Status: DISCONTINUED | OUTPATIENT
Start: 2024-12-27 | End: 2024-12-27 | Stop reason: HOSPADM

## 2024-12-27 RX ORDER — HYDROMORPHONE HYDROCHLORIDE 1 MG/ML
0.5 INJECTION, SOLUTION INTRAMUSCULAR; INTRAVENOUS; SUBCUTANEOUS
Status: DISCONTINUED | OUTPATIENT
Start: 2024-12-27 | End: 2024-12-27 | Stop reason: HOSPADM

## 2024-12-27 RX ORDER — DEXAMETHASONE SODIUM PHOSPHATE 4 MG/ML
INJECTION, SOLUTION INTRA-ARTICULAR; INTRALESIONAL; INTRAMUSCULAR; INTRAVENOUS; SOFT TISSUE PRN
Status: DISCONTINUED | OUTPATIENT
Start: 2024-12-27 | End: 2024-12-27 | Stop reason: SURG

## 2024-12-27 RX ORDER — IPRATROPIUM BROMIDE AND ALBUTEROL SULFATE 2.5; .5 MG/3ML; MG/3ML
3 SOLUTION RESPIRATORY (INHALATION)
Status: DISCONTINUED | OUTPATIENT
Start: 2024-12-27 | End: 2024-12-27 | Stop reason: HOSPADM

## 2024-12-27 RX ORDER — OXYCODONE HCL 5 MG/5 ML
5 SOLUTION, ORAL ORAL
Status: DISCONTINUED | OUTPATIENT
Start: 2024-12-27 | End: 2024-12-27 | Stop reason: HOSPADM

## 2024-12-27 RX ORDER — HYDROMORPHONE HYDROCHLORIDE 1 MG/ML
0.1 INJECTION, SOLUTION INTRAMUSCULAR; INTRAVENOUS; SUBCUTANEOUS
Status: DISCONTINUED | OUTPATIENT
Start: 2024-12-27 | End: 2024-12-27 | Stop reason: HOSPADM

## 2024-12-27 RX ORDER — MIDAZOLAM HYDROCHLORIDE 1 MG/ML
INJECTION INTRAMUSCULAR; INTRAVENOUS PRN
Status: DISCONTINUED | OUTPATIENT
Start: 2024-12-27 | End: 2024-12-27 | Stop reason: SURG

## 2024-12-27 RX ADMIN — CEFAZOLIN 2 G: 1 INJECTION, POWDER, FOR SOLUTION INTRAMUSCULAR; INTRAVENOUS at 13:21

## 2024-12-27 RX ADMIN — ONDANSETRON 4 MG: 2 INJECTION INTRAMUSCULAR; INTRAVENOUS at 14:59

## 2024-12-27 RX ADMIN — ROCURONIUM BROMIDE 50 MG: 10 INJECTION, SOLUTION INTRAVENOUS at 13:25

## 2024-12-27 RX ADMIN — LIDOCAINE HYDROCHLORIDE 80 MG: 20 INJECTION, SOLUTION EPIDURAL; INFILTRATION; INTRACAUDAL; PERINEURAL at 13:25

## 2024-12-27 RX ADMIN — FENTANYL CITRATE 100 MCG: 50 INJECTION, SOLUTION INTRAMUSCULAR; INTRAVENOUS at 13:25

## 2024-12-27 RX ADMIN — DEXAMETHASONE SODIUM PHOSPHATE 8 MG: 4 INJECTION INTRA-ARTICULAR; INTRALESIONAL; INTRAMUSCULAR; INTRAVENOUS; SOFT TISSUE at 13:38

## 2024-12-27 RX ADMIN — PROPOFOL 200 MG: 10 INJECTION, EMULSION INTRAVENOUS at 13:25

## 2024-12-27 RX ADMIN — SODIUM CHLORIDE, POTASSIUM CHLORIDE, SODIUM LACTATE AND CALCIUM CHLORIDE: 600; 310; 30; 20 INJECTION, SOLUTION INTRAVENOUS at 12:44

## 2024-12-27 RX ADMIN — SUGAMMADEX 200 MG: 100 INJECTION, SOLUTION INTRAVENOUS at 13:55

## 2024-12-27 RX ADMIN — KETOROLAC TROMETHAMINE 30 MG: 15 INJECTION, SOLUTION INTRAMUSCULAR; INTRAVENOUS at 13:38

## 2024-12-27 RX ADMIN — ONDANSETRON 4 MG: 2 INJECTION INTRAMUSCULAR; INTRAVENOUS at 13:38

## 2024-12-27 RX ADMIN — MIDAZOLAM HYDROCHLORIDE 2 MG: 1 INJECTION, SOLUTION INTRAMUSCULAR; INTRAVENOUS at 13:21

## 2024-12-27 ASSESSMENT — FIBROSIS 4 INDEX: FIB4 SCORE: 1.2

## 2024-12-27 ASSESSMENT — PAIN DESCRIPTION - PAIN TYPE
TYPE: SURGICAL PAIN

## 2024-12-27 ASSESSMENT — PAIN SCALES - GENERAL: PAIN_LEVEL: 5

## 2024-12-27 NOTE — OR NURSING
Patient discharge instructions given by eder Jackson . Iv removed .   Discharge home with family , patient is alert and oriented x 4 walks with a steady gait . Surgical site is clean dry and intact . No drainage noted , denies any pain just tolerable nausea.

## 2024-12-27 NOTE — ANESTHESIA POSTPROCEDURE EVALUATION
Patient: Sol Kern    Procedure Summary       Date: 12/27/24 Room / Location: [place] [address] / SURGERY [facility]    Anesthesia Start: 1321 Anesthesia Stop: 1408    Procedure: DIAGNOSTIC LAPAROSCOPY (Abdomen) Diagnosis: (BREAST CANCER)    Surgeons: Tasneem Gonzalez M.D. Responsible Provider: Rory Morrison M.D.    Anesthesia Type: general ASA Status: 3            Final Anesthesia Type: general  Last vitals  BP   Blood Pressure: (!) 170/91    Temp   (!) 35.6 °C (96.1 °F)    Pulse   82   Resp   18    SpO2   97 %      Anesthesia Post Evaluation    Patient location during evaluation: PACU  Patient participation: complete - patient participated  Level of consciousness: awake and alert  Pain score: 5    Airway patency: patent  Anesthetic complications: no  Cardiovascular status: hemodynamically stable  Respiratory status: acceptable  Hydration status: euvolemic    PONV: none          There were no known notable events for this encounter.     Nurse Pain Score: 0 (NPRS)

## 2024-12-27 NOTE — DISCHARGE INSTRUCTIONS
Surgical Oncology Discharge Instructions  Dr. Lisa Kern   Age: 59 y.o.   : 1965    During this admission you have been treated for:  BREAST CANCER  Patient Active Problem List    Diagnosis Date Noted    Carcinomatosis (HCC) 2024    Advanced care planning/counseling discussion 2024    Acute cystitis with hematuria 2024    High cholesterol 2024    Hemorrhoids 2024    Restless leg syndrome 2024    Elevated LFTs 2023    Insomnia due to medical condition 2023    Weakness 2023    Breast cancer, stage 4, left (HCC) 10/25/2023    Chronic respiratory failure with hypoxia (HCC) 10/13/2023    Leukocytosis 10/06/2023    Chronic back pain 2023    Large pleural effusion 2023    Mixed stress and urge urinary incontinence 2023    Cyst (solitary) of breast 2022    Hepatic steatosis 2022    History of COVID-19 07/15/2022    Vertigo 2022    Achilles tendon mass 2022    Cigarette nicotine dependence in remission 12/15/2021    Carcinoma of upper-outer quadrant of left breast in female, estrogen receptor positive (HCC) 10/18/2021    Neuropathy 09/15/2021    Type 2 diabetes mellitus with diabetic neuropathy, without long-term current use of insulin (HCC)     Postnasal drip 10/22/2019    Essential hypertension 2019    Vitamin D deficiency 2019    Class 2 severe obesity with serious comorbidity in adult (HCC) 2017    YOANA (obstructive sleep apnea) 2017    Chronic midline low back pain with right-sided sciatica 2017    Neck pain 2017    Multiple allergies 2017    Psoriasis 2017       Activity:   Resume light to normal activity as tolerated.  (ie - ok to walk, climb stairs, ect)  Do not engage in strenuous activity that may place stress on the operative site for 7-10 days.   Do not lift more than 10 pounds for the next 4-6 weeks.   It is important that you are up walking  several times a day to decrease the chance of getting a blood clot     Diet:  Resume regular diet as tolerated. To help prevent post operative nausea and vomiting avoid spicy or greasy foods; eat small, frequent meals and maintain adequate water/fluid intake.    Medication(s):   Current Discharge Medication List        START taking these medications    Details   !! prochlorperazine (COMPAZINE) 10 MG Tab Take 1 Tablet by mouth every 6 hours as needed for Nausea/Vomiting for up to 2 days.  Qty: 8 Tablet, Refills: 0    Associated Diagnoses: Carcinomatosis (HCC); Breast cancer, stage 4, left (HCC)       !! - Potential duplicate medications found. Please discuss with provider.        CONTINUE these medications which have NOT CHANGED    Details   metFORMIN ER (GLUCOPHAGE XR) 500 MG TABLET SR 24 HR Take 1 Tablet by mouth every day.  Qty: 200 Tablet, Refills: 1      Ribociclib Succ, 600 MG Dose, 200 MG Tablet Therapy Pack Take 600 mg by mouth see administration instructions. Take 600 mg by mouth daily for 3 weeks (21 days), off for 1 week (7 days)  Qty: 63 Each, Refills: 0    Associated Diagnoses: Carcinoma of upper-outer quadrant of left breast in female, estrogen receptor positive (HCC); Malignant neoplasm of upper-outer quadrant of left breast in female, estrogen receptor positive (HCC)      lidocaine (LIDODERM) 5 % Patch PLACE 1 PATCH ON THE SKIN EVERY 24 HOURS. PLACE PATCH TO AFFECTED AREA IN AM AND REMOVE AT BEDTIME.  Qty: 30 Patch, Refills: 0    Associated Diagnoses: Chronic midline low back pain with right-sided sciatica      !! prochlorperazine (COMPAZINE) 10 MG Tab Take 1 Tablet by mouth every 6 hours as needed for Nausea/Vomiting.  Qty: 30 Tablet, Refills: 3    Associated Diagnoses: Bilious vomiting with nausea      simethicone (MYLICON) 125 MG chewable tablet Chew 250 mg every 6 hours as needed for Flatulence. 2 tablets= 250mg      zolpidem (AMBIEN) 10 MG Tab Take 10 mg by mouth at bedtime as needed for Sleep.       atorvastatin (LIPITOR) 20 MG Tab TAKE 1 TABLET BY MOUTH EVERY DAY IN THE EVENING  Qty: 100 Tablet, Refills: 3      Semaglutide, 2 MG/DOSE, (OZEMPIC, 2 MG/DOSE,) 8 MG/3ML Solution Pen-injector Inject 2 mg under the skin every 7 days.  Qty: 9 mL, Refills: 0      albuterol 108 (90 Base) MCG/ACT Aero Soln inhalation aerosol Inhale 2 Puffs every 6 hours as needed for Shortness of Breath.  Qty: 8 Each, Refills: 3      ondansetron (ZOFRAN ODT) 4 MG TABLET DISPERSIBLE Take 1 Tablet by mouth every 8 hours as needed for Nausea/Vomiting.  Qty: 10 Tablet, Refills: 0    Associated Diagnoses: Vertigo      denosumab (XGEVA) 120 MG/1.7ML injection Inject 120 mg under the skin every 28 days.      fulvestrant (FASLODEX) 250 MG/5ML Solution Prefilled Syringe injection Inject 250 mg into the shoulder, thigh, or buttocks every 90 days.      cyclobenzaprine (FLEXERIL) 10 mg Tab TAKE 1 TABLET BY MOUTH THREE TIMES A DAY AS NEEDED FOR MUSCLE SPASM  Qty: 90 Tablet, Refills: 0    Associated Diagnoses: Chronic midline low back pain with right-sided sciatica      lisinopril (PRINIVIL) 2.5 MG Tab Take 1 Tablet by mouth every day.  Qty: 90 Tablet, Refills: 1    Associated Diagnoses: Essential hypertension       !! - Potential duplicate medications found. Please discuss with provider.         See prescription(s); take as directed.   You do NOT have to take all of your prescription pain medication.  Take only as needed if pain not controlled with over the counter medications (ex: tylenol, ibuprofen, ect).    If you are taking narcotic pain medication be sure to take a stool softener (available over the counter - ex: miralax, magnesium citrate, dulcolax/bisacoidyl, ect)  to prevent constipation.    Additionally make sure that you are taking in enough fluids.  Do NOT drive or make any important decision while taking prescription pain medication.    Wound Care:     Your incisions has been covered with: internal absorbable sutures. There is also a  layer of surgical glue, this will law off in about a week.     Do NOT apply any creams, lotions, ect to the incision unless you have been specifically instructed to do so by your surgical team.    It is normal to have a small amount of clear/yellow/pink drainage from you incision as it heals.    Please call the surgical clinic if you have:              -increasing drainage from incision              -change in the color of drainage from you incision              -redness surrounding the incision which extends more than 1 fingerbreadth (1 centimeter) from the incision edge  Avoid exposing your incision to the sun      Bathing/Showering:  Please shower daily starting in 24 hours. You may wash over incisions with regular soap and water and pat dry.  Ok to allow water from the shower to run over you incision.  Avoid submerging you incision under water (ie - no baths, swimming or hot tubs) until fully healed.        Suggestions for post discharge nausea and/or vomiting:  Limit your mobility, take slow, deep breaths.      Restrictions:  No smoking  No alcohol while taking prescription pain medications  No strenuous activity until cleared by surgery clinic  No heavy lifting (more than 10 lbs) for at least the next 4-6 weeks  No driving while taking prescription pain medication  No driving until you have the mobility to be a safe     Additional Instructions:  If you experience chest pain or shortness of breath, or have an acute emergency - please call 911    Please call clinic or seek evaluation at the ER if you have any of the following:  -Fever >101.5     -Wound infection (increasing redness, swelling, drainage, pus)     -Severe pain not controlled with oral medications     -Severe nausea or vomiting, inability to tolerate PO intake     -Bleeding that does not stop with holding pressure to the area       -Yellowing of the skin or eyes     -Change in mental status (confusion or lethargy)      -New numbness or  weakness      -Any other concerns.    Follow-up:    Please follow-up in Dr. Kline's clinic   Please keep all follow-up appointments    Disposition:  home    HOME CARE INSTRUCTIONS    ACTIVITY: Rest and take it easy for the first 24 hours.  A responsible adult is recommended to remain with you during that time.  It is normal to feel sleepy.  We encourage you to not do anything that requires balance, judgment or coordination.    FOR 24 HOURS DO NOT:  Drive, operate machinery or run household appliances.  Drink beer or alcoholic beverages.  Make important decisions or sign legal documents.        MEDICATIONS: Resume taking daily medication.  Take prescribed pain medication with food.  If no medication is prescribed, you may take non-aspirin pain medication if needed.  PAIN MEDICATION CAN BE VERY CONSTIPATING.  Take a stool softener or laxative such as senokot, pericolace, or milk of magnesia if needed.    Prescription given for compazine at [facility] pharmacy .       A follow-up appointment should be arranged with your doctor in    [phone]   ; call to schedule.    You should CALL YOUR PHYSICIAN if you develop:  Fever greater than 101 degrees F.  Pain not relieved by medication, or persistent nausea or vomiting.  Excessive bleeding (blood soaking through dressing) or unexpected drainage from the wound.  Extreme redness or swelling around the incision site, drainage of pus or foul smelling drainage.  Inability to urinate or empty your bladder within 8 hours.  Problems with breathing or chest pain.    You should call 911 if you develop problems with breathing or chest pain.  If you are unable to contact your doctor or surgical center, you should go to the nearest emergency room or urgent care center.  Physician's telephone #:    [phone]       MILD FLU-LIKE SYMPTOMS ARE NORMAL.  YOU MAY EXPERIENCE GENERALIZED MUSCLE ACHES, THROAT IRRITATION, HEADACHE AND/OR SOME NAUSEA.    If any questions arise, call your  doctor.  If your doctor is not available, please feel free to call the Surgical Center at [phone].  The Center is open Monday through Friday from 7AM to 7PM.      A registered nurse may call you a few days after your surgery to see how you are doing after your procedure.    You may also receive a survey in the mail within the next two weeks and we ask that you take a few moments to complete the survey and return it to us.  Our goal is to provide you with very good care and we value your comments.     Depression / Suicide Risk    As you are discharged from this [facility] facility, it is important to learn how to keep safe from harming yourself.    Recognize the warning signs:  Abrupt changes in personality, positive or negative- including increase in energy   Giving away possessions  Change in eating patterns- significant weight changes-  positive or negative  Change in sleeping patterns- unable to sleep or sleeping all the time   Unwillingness or inability to communicate  Depression  Unusual sadness, discouragement and loneliness  Talk of wanting to die  Neglect of personal appearance   Rebelliousness- reckless behavior  Withdrawal from people/activities they love  Confusion- inability to concentrate     If you or a loved one observes any of these behaviors or has concerns about self-harm, here's what you can do:  Talk about it- your feelings and reasons for harming yourself  Remove any means that you might use to hurt yourself (examples: pills, rope, extension cords, firearm)  Get professional help from the community (Mental Health, Substance Abuse, psychological counseling)  Do not be alone:Call your Safe Contact- someone whom you trust who will be there for you.  Call your local CRISIS HOTLINE 135-8952 or 843-715-6394  Call your local Children's Mobile Crisis Response Team [place] (870) 997-1062 or www.Microtask  Call the toll free National Suicide Prevention Hotlines   National Suicide  Prevention Lifeline 559-012-CTHB (4120)  [facility] Line Network 800-SUICIDE (127-9821)    I acknowledge receipt and understanding of these Home Care instructions.

## 2024-12-27 NOTE — OR NURSING
1403 Pt arrived to PACU with Anesthesiologist and OR RN. AAOx4. Even, unlabored respirations. VSS. No pain. No nausea. Abd dressing CDI. Ice pack applied.     1420 POC update given to Kayleen over the phone. All questions answered.     1445 Pt meets phase II criteria. Report called to JAMAICA Conde.     1503 Pt transported to phase II with RN. Chart and glasses with patient. VSS.

## 2024-12-27 NOTE — ANESTHESIA PREPROCEDURE EVALUATION
Case: [id number] Date/Time: 12/27/24 1317    Procedure: DIAGNOSTIC LAPAROSCOPY    Pre-op diagnosis: BREAST CANCER    Location: [facility] OR 14 / SURGERY [facility]    Surgeons: Tasneem Gonzalez M.D.            Relevant Problems   ANESTHESIA   (positive) YOANA (obstructive sleep apnea)      CARDIAC   (positive) Essential hypertension   (positive) Hemorrhoids         (positive) Hepatic steatosis      ENDO   (positive) Type 2 diabetes mellitus with diabetic neuropathy, without long-term current use of insulin (HCC)       Physical Exam    Airway   Mallampati: II  TM distance: >3 FB  Neck ROM: full       Cardiovascular - normal exam  Rhythm: regular  Rate: normal  (-) murmur     Dental - normal exam           Pulmonary - normal exam  Breath sounds clear to auscultation     Abdominal    Neurological - normal exam                   Anesthesia Plan    ASA 3       Plan - general       Airway plan will be ETT          Induction: intravenous    Postoperative Plan: Postoperative administration of opioids is intended.    Pertinent diagnostic labs and testing reviewed    Informed Consent:    Anesthetic plan and risks discussed with patient.    Use of blood products discussed with: patient whom consented to blood products.

## 2024-12-27 NOTE — ANESTHESIA PROCEDURE NOTES
Airway    Date/Time: 12/27/2024 1:26 PM    Performed by: Rory Morrison M.D.  Authorized by: oRry Morrison M.D.    Location:  OR  Urgency:  Elective  Indications for Airway Management:  Anesthesia      Spontaneous Ventilation: absent    Sedation Level:  Deep  Preoxygenated: Yes    Patient Position:  Sniffing  Final Airway Type:  Endotracheal airway  Final Endotracheal Airway:  ETT  Cuffed: Yes    Technique Used for Successful ETT Placement:  Direct laryngoscopy    Insertion Site:  Oral  Blade Type:  Zulema  Laryngoscope Blade/Videolaryngoscope Blade Size:  3  ETT Size (mm):  7.0  Measured from:  Teeth  ETT to Teeth (cm):  23  Placement Verified by: auscultation and capnometry    Cormack-Lehane Classification:  Grade IIb - view of arytenoids or posterior of glottis only  Number of Attempts at Approach:  1

## 2024-12-27 NOTE — OP REPORT
Date of Operation: 12/27/24    Preoperative Diagnosis: metastatic breast cancer     Postoperative Diagnosis: same    Operative Procedure:  diagnostic laparoscopy with biopsy    Intent of Operation:  Diagnostic    Wound class: 1    Surgeon: Tasneem Gonzalez    Assistant:  none    Anesthesia: general     Estimated Blood Loss:  5ml    Specimens: peritoneal biopsy x3, falciform biopsy x1    Findings: Diffuse involvement of peritoneum and bowel serosa with white plaques, omental caking adherent to anterior abdominal wall.      Counts: Sponge, needle, and instrument counts were reported correct at the conclusion of the operation x2.     Indication: 59-year-old woman with metastatic ER positive breast cancer, now presenting with abdominal pain and distention.  New findings on CT abdomen pelvis suggest omental/peritoneal thickening.  This thickening is not PET avid.  In order to obtain diagnosis, Dr. Kline has requested consideration for diagnostic laparoscopy.  I think this is reasonable as it could reinitiate systemic therapy for this patient.     DESCRIPTION OF PROCEDURE   Patient was brought to the operating room laid in supine position underwent general tracheal anesthesia. The arms were positioned in the opposition. Pressure points were all padded. Venodyne's were placed on the lower extremities. Patient received preoperative antibiotics. The abdomen was prepped and draped in a normal sterile fashion. Timeout was performed.     A 5 mm supraumbilical incision was made.  This was carried down to the fascia with blunt dissection.  The abdomen was entered using the Veress technique.  The laparoscope was inserted and the abdomen surveyed.  Innumerable white plaques were noted on the peritoneal surfaces throughout the abdomen, the serosa of the small bowel and the falciform ligament.  Pictures were taken for the patient's chart. The omentum was noted to be adherent to the anterior abdominal wall and appeared  thickened.  A second 5 mm port was placed in the left lateral abdominal wall.  Peritoneal biopsies were obtained from the right mid abdomen peritoneum the right upper peritoneum the left upper peritoneum and the falciform ligament.  Hemostasis was achieved.  The laparoscope was then withdrawn and the abdomen deflated.  The skin was closed with 4-0 Monocryl and covered with Dermabond.    The patient was then awakened and transferred to PACU in stable condition.     Disposition: home    Tasneem Gonzalez MD, MS  December 27, 2024, 2:01 PM

## 2024-12-27 NOTE — ANESTHESIA TIME REPORT
Anesthesia Start and Stop Event Times       Date Time Event    12/27/2024 1244 Ready for Procedure     1321 Anesthesia Start     1408 Anesthesia Stop          Responsible Staff  12/27/24      Name Role Begin End    Rory Morrison M.D. Anesth 1321 1408          Overtime Reason:  no overtime (within assigned shift)    Comments:

## 2024-12-28 LAB — CANCER AG27-29 SERPL-ACNC: 446.2 U/ML

## 2024-12-30 ENCOUNTER — APPOINTMENT (OUTPATIENT)
Dept: PHYSICAL THERAPY | Facility: REHABILITATION | Age: 59
End: 2024-12-30
Attending: FAMILY MEDICINE
Payer: MEDICARE

## 2024-12-31 ENCOUNTER — APPOINTMENT (OUTPATIENT)
Dept: ONCOLOGY | Facility: MEDICAL CENTER | Age: 59
End: 2024-12-31
Attending: NURSE PRACTITIONER
Payer: MEDICARE

## 2025-01-02 ENCOUNTER — APPOINTMENT (OUTPATIENT)
Dept: PHYSICAL THERAPY | Facility: REHABILITATION | Age: 60
End: 2025-01-02
Payer: MEDICARE

## 2025-01-03 ENCOUNTER — HOSPITAL ENCOUNTER (OUTPATIENT)
Dept: HEMATOLOGY ONCOLOGY | Facility: MEDICAL CENTER | Age: 60
End: 2025-01-03
Attending: INTERNAL MEDICINE
Payer: MEDICARE

## 2025-01-03 ENCOUNTER — APPOINTMENT (OUTPATIENT)
Dept: PHYSICAL THERAPY | Facility: REHABILITATION | Age: 60
End: 2025-01-03
Attending: FAMILY MEDICINE
Payer: MEDICARE

## 2025-01-03 VITALS
DIASTOLIC BLOOD PRESSURE: 80 MMHG | OXYGEN SATURATION: 92 % | SYSTOLIC BLOOD PRESSURE: 132 MMHG | WEIGHT: 218.26 LBS | HEART RATE: 115 BPM | BODY MASS INDEX: 36.36 KG/M2 | TEMPERATURE: 97.7 F | HEIGHT: 65 IN

## 2025-01-03 DIAGNOSIS — C50.412 CARCINOMA OF UPPER-OUTER QUADRANT OF LEFT BREAST IN FEMALE, ESTROGEN RECEPTOR POSITIVE (HCC): ICD-10-CM

## 2025-01-03 DIAGNOSIS — Z17.0 CARCINOMA OF UPPER-OUTER QUADRANT OF LEFT BREAST IN FEMALE, ESTROGEN RECEPTOR POSITIVE (HCC): ICD-10-CM

## 2025-01-03 PROCEDURE — 99212 OFFICE O/P EST SF 10 MIN: CPT | Performed by: INTERNAL MEDICINE

## 2025-01-03 PROCEDURE — 99214 OFFICE O/P EST MOD 30 MIN: CPT | Performed by: INTERNAL MEDICINE

## 2025-01-03 ASSESSMENT — FIBROSIS 4 INDEX: FIB4 SCORE: 1.07

## 2025-01-03 ASSESSMENT — ENCOUNTER SYMPTOMS
DIZZINESS: 0
CHILLS: 0
HEADACHES: 0
VOMITING: 0
MYALGIAS: 0
WEIGHT LOSS: 0
DIARRHEA: 0
PALPITATIONS: 0
NAUSEA: 0
GASTROINTESTINAL NEGATIVE: 1
SHORTNESS OF BREATH: 1
FEVER: 0
NEUROLOGICAL NEGATIVE: 1
EYES NEGATIVE: 1
CONSTIPATION: 0
COUGH: 0
PSYCHIATRIC NEGATIVE: 1
CARDIOVASCULAR NEGATIVE: 1
MUSCULOSKELETAL NEGATIVE: 1
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
## 2025-01-06 ENCOUNTER — HOSPITAL ENCOUNTER (OUTPATIENT)
Dept: HEMATOLOGY ONCOLOGY | Facility: MEDICAL CENTER | Age: 60
End: 2025-01-06
Attending: FAMILY MEDICINE
Payer: MEDICARE

## 2025-01-06 VITALS
DIASTOLIC BLOOD PRESSURE: 84 MMHG | SYSTOLIC BLOOD PRESSURE: 132 MMHG | TEMPERATURE: 97.9 F | BODY MASS INDEX: 36.32 KG/M2 | WEIGHT: 218 LBS | OXYGEN SATURATION: 90 % | HEART RATE: 104 BPM | HEIGHT: 65 IN

## 2025-01-06 DIAGNOSIS — F51.01 PRIMARY INSOMNIA: ICD-10-CM

## 2025-01-06 DIAGNOSIS — C50.912 BREAST CANCER, STAGE 4, LEFT (HCC): ICD-10-CM

## 2025-01-06 PROCEDURE — 99215 OFFICE O/P EST HI 40 MIN: CPT | Performed by: FAMILY MEDICINE

## 2025-01-06 PROCEDURE — 99212 OFFICE O/P EST SF 10 MIN: CPT | Performed by: FAMILY MEDICINE

## 2025-01-06 RX ORDER — ZOLPIDEM TARTRATE 10 MG/1
10 TABLET ORAL NIGHTLY PRN
Qty: 30 TABLET | Refills: 2 | Status: SHIPPED | OUTPATIENT
Start: 2025-01-06 | End: 2025-04-06

## 2025-01-06 ASSESSMENT — FIBROSIS 4 INDEX: FIB4 SCORE: 1.07

## 2025-01-09 NOTE — OP THERAPY DAILY TREATMENT
"  Outpatient Physical Therapy  DAILY TREATMENT     [facility] [place] [address] [facility], [address]  [place] [address]  Phone:  [phone]    Date: 01/13/2025  Patient: Sol Kern  YOB: 1965  MRN: [id number]   Time Calculation           Chief Complaint: No chief complaint on file.  Visit #: 30    SUBJECTIVE: ***  Notes that a couple days of on and off abdominal symptoms. She is going to have a surgery for a biopsy this Thursday.     Sxs: central lower back pain that spreads to B glute region, anterolateral tingling from both legs all the way down to the toes and whole foot (some days of the week and notes been going on for a long time; and then comes back)     Aggs: sidelying at night, sitting or standing for too long (a couple hrs), roadtrips     OBJECTIVE: ***  Current objective measures:   AROM  Flexion to shins WFL  Extension WFL  RSB; central back  LSB; central back    Slump (-negative 12/1/24) L numb, R numb at pad of feet   Global TTP    Painful transitional sit to supine with stiffness; tingling once supine    Hip ext MMT 4-/5ea     Sitffness R>>L hip with sukhdev sidelying         Therapeutic Exercises (CPT 11307):     1. 1. UPOC 2/10/25 SCP, Nu step seat 20-21, x10' L0    2. modified piriformis stretch, x1'ea, limited by abdominal pain; with MHP today    3. LTR, x2', with MHP today    4. sciatic n glide, 2x1', with MHP today; skipped    7. paloff press, 2x10, purpTB    8. seated ab brace, 2x30\"H    9. standing hip extension, 2x10, in plank position on high table    10. next seated dead/sts      Therapeutic Exercise Summary: HEP:  Mod pir stretch x30\"ea  Sciatic n glider 2x15        Next---> Check MMT hip in standing or sidelying, heat and trunk strength, massage (ask what position), NPE with fucntion over pain graph      Therapeutic Treatments and Modalities:     1. Manual Therapy (CPT 94449), STW to paraspinals and QL  Time-based treatments/modalities:     ASSESSMENT: " *** pt lying supine/with pressure on abdomen still a limiting factor to session. She is having this biopsied; edu to ask for precautions with movement and PT following. Will add more tolerance in as able.    PLAN/RECOMMENDATIONS:   Plan for treatment: therapy treatment to continue next visit.  Planned interventions for next visit: continue with current treatment.

## 2025-01-13 ENCOUNTER — APPOINTMENT (OUTPATIENT)
Dept: PHYSICAL THERAPY | Facility: REHABILITATION | Age: 60
End: 2025-01-13
Attending: FAMILY MEDICINE
Payer: MEDICARE

## 2025-01-13 PROBLEM — F51.01 PRIMARY INSOMNIA: Status: ACTIVE | Noted: 2025-01-13

## 2025-01-13 ASSESSMENT — ENCOUNTER SYMPTOMS
HEADACHES: 0
NAUSEA: 0
DEPRESSION: 0
FEVER: 0
INSOMNIA: 1
BLURRED VISION: 0
SHORTNESS OF BREATH: 0
COUGH: 0
BRUISES/BLEEDS EASILY: 0
VOMITING: 0
PALPITATIONS: 0
BACK PAIN: 0
CONSTIPATION: 0
ABDOMINAL PAIN: 1
DIARRHEA: 0
NERVOUS/ANXIOUS: 0
WEIGHT LOSS: 0
CHILLS: 0

## 2025-01-13 NOTE — ASSESSMENT & PLAN NOTE
Patient with recent progression of her disease and now with carcinomatosis.  Is awaiting biopsy results and discussion with medical oncology on potential other targeted treatments.  Will plan more in-depth goals of care conversations pending outcome of her biopsy and discussion with medical oncology.  At this time her current goals of care are to pursue treatment options if available.

## 2025-01-13 NOTE — ASSESSMENT & PLAN NOTE
Stable.  Will refill Ambien 10 mg as needed nightly.  PDMP reviewed, no signs of diversion or abuse, risk and benefits of medication discussed, controlled subs agreement on file.

## 2025-01-13 NOTE — PROGRESS NOTES
"Subjective:     Chief Complaint   Patient presents with    New Patient      dieringer/ scp/ PALLIATIVE CARE     Sol Kern is a 59 y.o. female referred by Dr. Lyn for symptom management and goals of care here today accompanied by family.  The role of palliative care was discussed and she was open to a conversation.    Oncological history: Per medical oncology \"Recurrent/metastatic ER positive breast cancer malignant right pleural effusion from breast cancer, ER positive, PA slightly positive, HER2 negative.  PET scan shows extensive bony metastases and pleural metastases and mediastinal metastases.  Dramatic near complete clinical remission by PET CT scan March 2024.  Ongoing response PET September 2024.  Now with symptomatic abdominal pain omental thickening and peritoneal thickening consistent with carcinomatosis but negative PET scan and undetectable CT DNA but with persistent elevated CA 27-29.  Progressive disease with extensive peritoneal carcinomatosis documented 12/27/2024 at laparoscopy.\"     Symptoms: Patient reports symptoms related to her metastatic breast cancer including worsening abdominal fullness.  She also reports difficulty with insomnia and does use Ambien several times throughout the week.  She did have some concern about long-term use of these medications and we discussed trials of decreasing her dosing but also discussed the benefits of sleep and that the risk of dependence likely does not outweigh that.    Goals of care: Patient has had progression of her disease now with carcinomatosis.  She is awaiting biopsy and determination if she would qualify for additional targeted therapies.  We did briefly discussed concern about long-term prognosis but that in the conversation with more detail would have to await the next steps in her cancer treatment.  We concluded the visit with a plan to follow-up on further goals of care discussions once we have a better picture of her future treatment " regimen.    Problem   Primary Insomnia   Breast Cancer, Stage 4, Left (Hcc)        Current medicines (including changes today)  Current Outpatient Medications   Medication Sig Dispense Refill    zolpidem (AMBIEN) 10 MG Tab Take 1 Tablet by mouth at bedtime as needed for Sleep for up to 90 days. 30 Tablet 2    Ribociclib Succ, 600 MG Dose, 200 MG Tablet Therapy Pack Take 600 mg by mouth see administration instructions. Take 600 mg by mouth daily for 3 weeks (21 days), off for 1 week (7 days) 63 Each 0    lidocaine (LIDODERM) 5 % Patch PLACE 1 PATCH ON THE SKIN EVERY 24 HOURS. PLACE PATCH TO AFFECTED AREA IN AM AND REMOVE AT BEDTIME. 30 Patch 0    prochlorperazine (COMPAZINE) 10 MG Tab Take 1 Tablet by mouth every 6 hours as needed for Nausea/Vomiting. 30 Tablet 3    simethicone (MYLICON) 125 MG chewable tablet Chew 250 mg every 6 hours as needed for Flatulence. 2 tablets= 250mg      zolpidem (AMBIEN) 10 MG Tab Take 10 mg by mouth at bedtime as needed for Sleep.      atorvastatin (LIPITOR) 20 MG Tab TAKE 1 TABLET BY MOUTH EVERY DAY IN THE EVENING 100 Tablet 3    Semaglutide, 2 MG/DOSE, (OZEMPIC, 2 MG/DOSE,) 8 MG/3ML Solution Pen-injector Inject 2 mg under the skin every 7 days. 9 mL 0    albuterol 108 (90 Base) MCG/ACT Aero Soln inhalation aerosol Inhale 2 Puffs every 6 hours as needed for Shortness of Breath. 8 Each 3    ondansetron (ZOFRAN ODT) 4 MG TABLET DISPERSIBLE Take 1 Tablet by mouth every 8 hours as needed for Nausea/Vomiting. 10 Tablet 0    denosumab (XGEVA) 120 MG/1.7ML injection Inject 120 mg under the skin every 28 days.      fulvestrant (FASLODEX) 250 MG/5ML Solution Prefilled Syringe injection Inject 250 mg into the shoulder, thigh, or buttocks every 90 days.      cyclobenzaprine (FLEXERIL) 10 mg Tab TAKE 1 TABLET BY MOUTH THREE TIMES A DAY AS NEEDED FOR MUSCLE SPASM (Patient taking differently: Take 10 mg by mouth 3 times a day as needed for Muscle Spasms (As needed).) 90 Tablet 0    metFORMIN ER  (GLUCOPHAGE XR) 500 MG TABLET SR 24 HR Take 1 Tablet by mouth every day. (Patient taking differently: Take 1,000 mg by mouth every evening.) 200 Tablet 1    lisinopril (PRINIVIL) 2.5 MG Tab Take 1 Tablet by mouth every day. 90 Tablet 1     No current facility-administered medications for this encounter.       Social History     Tobacco Use    Smoking status: Former     Current packs/day: 0.25     Average packs/day: 0.3 packs/day for 20.0 years (5.0 ttl pk-yrs)     Types: Cigarettes    Smokeless tobacco: Never    Tobacco comments:     working on quitting    Vaping Use    Vaping status: Never Used   Substance Use Topics    Alcohol use: Not Currently     Comment: rarely    Drug use: Not Currently     Types: Marijuana, Oral     Comment: gummies     Past Medical History:   Diagnosis Date    Allergy     Back pain     Cancer (HCC) 2021    breast    Diabetes (HCC)     diet    GERD (gastroesophageal reflux disease)     High cholesterol     not medicated    Hypertension     Malignant neoplasm of upper-outer quadrant of left female breast (HCC)     Neck pain     Obesity     Sleep apnea     no cpap    Snoring       Family History   Problem Relation Age of Onset    Cancer Mother         melanoma    Hypertension Mother     Other Mother         THYROID    Heart Disease Mother         valvular disease    Ovarian Cancer Mother     Heart Disease Father         Heart Attack    Heart Attack Father     Other Father         psoriasis    Hyperlipidemia Brother     Other Brother         psoriasis    Heart Disease Brother         MI    Heart Attack Brother         massive heart attack     Heart Disease Maternal Grandfather         MI    Breast Cancer Other     Other Other         psoriasis    Diabetes Neg Hx     Alcohol/Drug Neg Hx     Tubal Cancer Neg Hx     Peritoneal Cancer Neg Hx     Colorectal Cancer Neg Hx          Objective:     Vitals:    01/06/25 1431   BP: 132/84   Pulse: (!) 104   Temp: 36.6 °C (97.9 °F)   TempSrc: Temporal  "  SpO2: 90%   Weight: 98.9 kg (218 lb)   Height: 1.651 m (5' 5\")     Body mass index is 36.28 kg/m².     Review of Systems   Constitutional:  Positive for malaise/fatigue. Negative for chills, fever and weight loss.   HENT:  Negative for congestion and hearing loss.    Eyes:  Negative for blurred vision.   Respiratory:  Negative for cough and shortness of breath.    Cardiovascular:  Negative for chest pain and palpitations.   Gastrointestinal:  Positive for abdominal pain. Negative for constipation, diarrhea, nausea and vomiting.   Musculoskeletal:  Negative for back pain and joint pain.   Skin:  Negative for rash.   Neurological:  Negative for headaches.   Endo/Heme/Allergies:  Does not bruise/bleed easily.   Psychiatric/Behavioral:  Negative for depression. The patient has insomnia. The patient is not nervous/anxious.      Physical Exam:   Gen: No acute distress.   Skin: Pink, warm, and dry  HEENT: conjunctiva non-injected, sclera non-icteric. EOMs intact.   Nasal mucosa without edema nor erythema.   Pinna normal.    Neck: Supple, trachea midline. No adenopathy or masses in the neck or supraclavicular regions.  Lungs: Effort is normal.   CV: regular rate and rhythm  Abdomen: Flat  Ext: no edema, color normal, vascularity normal, temperature normal  Alert and oriented Eye contact is good, speech goal directed, affect calm    Assessment and Plan:   Primary insomnia  Stable.  Will refill Ambien 10 mg as needed nightly.  PDMP reviewed, no signs of diversion or abuse, risk and benefits of medication discussed, controlled subs agreement on file.    Breast cancer, stage 4, left (HCC)  Patient with recent progression of her disease and now with carcinomatosis.  Is awaiting biopsy results and discussion with medical oncology on potential other targeted treatments.  Will plan more in-depth goals of care conversations pending outcome of her biopsy and discussion with medical oncology.  At this time her current goals of care " are to pursue treatment options if available.      43 minutes in total including chart review and consultation with patients oncological providers.     Followup: Return in about 7 weeks (around 2/24/2025).    Mariusz Peterson M.D.

## 2025-01-15 ENCOUNTER — APPOINTMENT (OUTPATIENT)
Dept: RADIOLOGY | Facility: MEDICAL CENTER | Age: 60
End: 2025-01-15
Attending: INTERNAL MEDICINE
Payer: MEDICARE

## 2025-01-15 DIAGNOSIS — Z17.0 CARCINOMA OF UPPER-OUTER QUADRANT OF LEFT BREAST IN FEMALE, ESTROGEN RECEPTOR POSITIVE (HCC): ICD-10-CM

## 2025-01-15 DIAGNOSIS — C50.412 CARCINOMA OF UPPER-OUTER QUADRANT OF LEFT BREAST IN FEMALE, ESTROGEN RECEPTOR POSITIVE (HCC): ICD-10-CM

## 2025-01-15 PROCEDURE — 71260 CT THORAX DX C+: CPT

## 2025-01-15 PROCEDURE — 700117 HCHG RX CONTRAST REV CODE 255: Performed by: INTERNAL MEDICINE

## 2025-01-15 RX ADMIN — IOHEXOL 100 ML: 350 INJECTION, SOLUTION INTRAVENOUS at 12:00

## 2025-01-15 RX ADMIN — IOHEXOL 25 ML: 240 INJECTION, SOLUTION INTRATHECAL; INTRAVASCULAR; INTRAVENOUS; ORAL at 11:00

## 2025-01-16 ENCOUNTER — PATIENT OUTREACH (OUTPATIENT)
Dept: HEALTH INFORMATION MANAGEMENT | Facility: OTHER | Age: 60
End: 2025-01-16

## 2025-01-16 ENCOUNTER — APPOINTMENT (OUTPATIENT)
Dept: MEDICAL GROUP | Facility: PHYSICIAN GROUP | Age: 60
End: 2025-01-16
Payer: MEDICARE

## 2025-01-16 ENCOUNTER — TELEPHONE (OUTPATIENT)
Dept: MEDICAL GROUP | Facility: PHYSICIAN GROUP | Age: 60
End: 2025-01-16

## 2025-01-16 VITALS
BODY MASS INDEX: 36.42 KG/M2 | HEART RATE: 100 BPM | DIASTOLIC BLOOD PRESSURE: 80 MMHG | TEMPERATURE: 96.6 F | OXYGEN SATURATION: 94 % | WEIGHT: 218.6 LBS | SYSTOLIC BLOOD PRESSURE: 132 MMHG | HEIGHT: 65 IN

## 2025-01-16 DIAGNOSIS — J96.11 CHRONIC RESPIRATORY FAILURE WITH HYPOXIA (HCC): ICD-10-CM

## 2025-01-16 DIAGNOSIS — E66.01 SEVERE OBESITY (HCC): ICD-10-CM

## 2025-01-16 DIAGNOSIS — I10 ESSENTIAL HYPERTENSION: ICD-10-CM

## 2025-01-16 DIAGNOSIS — G47.33 OSA (OBSTRUCTIVE SLEEP APNEA): ICD-10-CM

## 2025-01-16 DIAGNOSIS — C80.0 CARCINOMATOSIS (HCC): Primary | ICD-10-CM

## 2025-01-16 DIAGNOSIS — E11.40 TYPE 2 DIABETES MELLITUS WITH DIABETIC NEUROPATHY, WITHOUT LONG-TERM CURRENT USE OF INSULIN (HCC): ICD-10-CM

## 2025-01-16 PROCEDURE — 3079F DIAST BP 80-89 MM HG: CPT | Performed by: FAMILY MEDICINE

## 2025-01-16 PROCEDURE — 99214 OFFICE O/P EST MOD 30 MIN: CPT | Performed by: FAMILY MEDICINE

## 2025-01-16 PROCEDURE — 3075F SYST BP GE 130 - 139MM HG: CPT | Performed by: FAMILY MEDICINE

## 2025-01-16 RX ORDER — SEMAGLUTIDE 0.68 MG/ML
INJECTION, SOLUTION SUBCUTANEOUS
Qty: 6 ML | Refills: 0 | Status: SHIPPED | OUTPATIENT
Start: 2025-01-16 | End: 2025-03-13

## 2025-01-16 ASSESSMENT — FIBROSIS 4 INDEX: FIB4 SCORE: 1.07

## 2025-01-16 ASSESSMENT — PATIENT HEALTH QUESTIONNAIRE - PHQ9: CLINICAL INTERPRETATION OF PHQ2 SCORE: 0

## 2025-01-16 NOTE — PROGRESS NOTES
Verbal consent was acquired by the patient to use [employer] ambient listening note generation during this visit     Subjective:     HPI:   History of Present Illness  The patient presents for evaluation of diabetes, abdominal bloating, and sleep apnea.    Diabetes  - She reports a sensation of hypoglycemia when she abstains from breakfast.  - She has been adhering to a healthier diet with reduced intake.  - Her last retinal examination was conducted during her previous visit to this clinic, followed by an appointment with her ophthalmologist on 12/31/2024.  - Approximately 6 weeks ago, she discontinued the use of Ozempic due to gastrointestinal disturbances.  - She is seeking a prescription for a lower dose of Ozempic to manage her blood glucose levels.  - She continues to take metformin.    Abdominal Bloating  - She has been experiencing abdominal bloating and plans to incorporate daily MiraLAX into her regimen.  - Despite discontinuing Ozempic, her gastrointestinal symptoms have not significantly improved.  - She has consulted with palliative care, who have recommended MiraLAX as a temporary solution until further imaging is obtained.  - A CT scan was performed yesterday, and she has a follow-up appointment with Dr. Kline on 01/22/2025.  - She reports no recent fevers, chills, chest pain, or respiratory distress.    Sleep Apnea  - She recently completed an overnight sleep apnea test and is awaiting the results.  - She continues to use oxygen therapy at night, with a flow rate of 2 to 4 liters per minute.    Supplemental information: She is hopeful that the follow-up appointment with Dr. lKine will provide more clarity on her condition and potential treatment options.    SOCIAL HISTORY  She does not use drugs.    MEDICATIONS  Current: Metformin, MiraLAX  Discontinued: Ozempic    Health Maintenance: Completed    Objective:     Exam:  /80 (BP Location: Right arm, Patient Position: Sitting, BP  "Cuff Size: Adult)   Pulse 100   Temp 35.9 °C (96.6 °F) (Temporal)   Ht 1.651 m (5' 5\")   Wt 99.2 kg (218 lb 9.6 oz)   SpO2 94%   BMI 36.38 kg/m²  Body mass index is 36.38 kg/m².    Physical Exam  Constitutional:       Appearance: Normal appearance.   Cardiovascular:      Rate and Rhythm: Normal rate and regular rhythm.      Heart sounds: Normal heart sounds.   Pulmonary:      Effort: Pulmonary effort is normal.      Breath sounds: Normal breath sounds.   Musculoskeletal:      Cervical back: Normal range of motion and neck supple.   Lymphadenopathy:      Cervical: No cervical adenopathy.   Neurological:      Mental Status: She is alert.             Results  Laboratory Studies  A1c was 7.0% on 11/22/2024.    Assessment & Plan:     1. Type 2 diabetes mellitus with diabetic neuropathy, without long-term current use of insulin ([facility])            Assessment & Plan  1. Diabetes mellitus - Chronic. A1c level improved from 8.9% to 7.0% as of 11/22/2024. But, she has been off Ozempic for a month due to stomach issues, which didn't really improve with cessation of ozempic.   - Restart Ozempic at 0.25 mg every 7 days for 28 days, then increase to 0.5 mg every 7 days for the next 28 days, and finally increase to 1 mg every 7 days starting on 03/12/2025  - Continue metformin extended release 500 mg once a day  - A1c test to be conducted during next visit in 3 months to assess effectiveness of adjusted Ozempic dosage  - Patient consents to medication regimen    2. Carcinomatosis - Chronic. Secondary to breast cancer. Persistent stomach issues likely related to this.  - Use MiraLAX daily as recommended by palliative care  - Recent CT scan performed  - Upcoming appointment with Dr. Kline on 01/22/2025 to discuss further management    3. Sleep apnea - Completed overnight sleep apnea test, awaiting results.  - Will be notified via [employer] once results are available  - Continues to use 2-4 liters of oxygen per minute " overnight    HCC Gap Form    Diagnosis: E66.01 - Severe obesity (HCC)  Z68.36 - Body mass index (BMI) of 36.0-36.9 in adult  Comorbidity Diagnosis: Type 2 diabetes mellitus with diabetic neuropathy, without long-term current use of insulin (HCC)  The current BMI is 36.38 kg/m² as of 01/16/25.    Past BMI Values:  01/16/25 : 36.38 kg/m². 01/06/25 : 36.28 kg/m². 01/03/25 : 36.32 kg/m².   Assessment and plan: Chronic, stable. Encouraged healthy diet and physical activity changes with a goal of weight loss. Follow up at least annually. The comorbid condition is stable based on today's assessment. Continue current treatment plan. Follow up at least yearly.  Diagnosis to address: J96.11 - Chronic respiratory failure with hypoxia (HCC)  Assessment and plan: Chronic, stable. Continue with current defined treatment plan: 2-4L/min overnight. Follow-up at least annually.  Diagnosis: E11.40 - Type 2 diabetes mellitus with diabetic neuropathy, without long-term current use of insulin (HCC)  Assessment and plan: Chronic, stable. Possible side effects from ozempic. New plan: Restart ozempic at 0.25 mg every 7 days and ramp up to 1 mg every 7 days, continue metformin  mg daily. Follow-up at least annually.  Last edited 01/16/25 09:42 PST by Veronica Lyn M.D.         Referral for genetic research was offered. Patient is a participant.    Return in about 3 months (around 4/16/2025) for f/u DM, get A1c.    Please note that this dictation was created using voice recognition software. I have made every reasonable attempt to correct obvious errors, but I expect that there are errors of grammar and possibly content that I did not discover before finalizing the note.

## 2025-01-16 NOTE — CARE PLAN
Problem: Patient Stated Problem: Confidently preventing crises  Description: Multiple providers, complex set of health concerns  Goal: Patient Stated Goal: The patient will have the opportunity to regularly ask questions and learn about and review her current and future health concerns.   Outcome: Progressing  Note: The patient is managing her complex medical care and communicating with her healthcare team appropriately.      Problem: Patient barriers to managing diabetes - Long Term  Goal: Identify patient barriers to managing diabetes  Outcome: Progressing  Note: Patient understands current barriers with regard to managing her diabetes. She is re starting her Ozempic.      Problem: Recognize current health habits that may contribute to hypertension -Long Term  Goal: Identify which modifiable health habits can be modifed  Outcome: Progressing  Note: Patient has verbalized knowledge of nutrition and hydration principles to benefit her hydration.

## 2025-01-16 NOTE — PROGRESS NOTES
"Reason for Follow-Up:    I spoke to the patient today while she was in clinic for a PCP appointment. We conducted her monthly PCM follow up.     Current Health Status:    The patient's current health status is a waiting game. She is off of her cancer medications at this time to prepare for a new course of treatment that includes GI metastases. The patient states that \"The 22nd can't come soon enough.\"    Medical Updates:  The patient had a CT scan yesterday. She is clearing her system at this point for her next round of treatment. The patient     Medication Updates:  The patient will re start her Ozempic at the lowest starting dose per her PCP.     Symptoms:  The patient is suffering from decreased appetite and feelings of stomach pain bloating. She voices having the same symptoms that she had before going on [place] the first time including fatigue and having to brace herself to rise.     Plan of Care Goals and Progress:    Goal 1. Management and treatment of Diabetes Mellitus.      Progress:  The patient is up to date on her Diabetes related health screenings. Her A1C       Barriers:  Reduced appetite, stomach bloating.      Interventions:  The patient will re start Ozempic. The patient      Education:  The patient and I discussed eating several smaller meals throughout the day instead of 3 big meals and options such as soups that can be nutritious and appetizing.     Goal 2. Management and treatment of hypertension.      Progress:  The patient's blood pressures remain well controlled. The patient endorses no new cardiac symptoms.       Barriers:  The patient is dealing with multiple acute and chronic health concerns. Her nutrition and hydration are not normal for her as she has a good deal of GI discomfort related to her cancer diagnosis.      Interventions:  The patient will continue to take applicable medications as prescribed. The patient will engage in activity as appropriate. The patient will manage " nutrition and sodium as well as she can.      Education:  The impact of hydration and sodium on hypertension discusse.d       Patient's Concerns and Feedback (Self Management of Care):     The patient voices that she feels that she is doing as well as she can given her GI symptoms and fatigue. She is looking forward to getting her new plan of care for her GI metastases underway.     Next Steps:     Follow-Up Plan:  The patient's next PCM follow up will be on or near 2/16 25.       Appointments:  The patient's appointments were reviewed. The patient is aware and intends to participate as scheduled.      Contact Information:  Call [phone] with any questions or concerns.

## 2025-01-17 ENCOUNTER — TELEPHONE (OUTPATIENT)
Dept: MEDICAL GROUP | Facility: PHYSICIAN GROUP | Age: 60
End: 2025-01-17
Payer: MEDICARE

## 2025-01-17 NOTE — TELEPHONE ENCOUNTER
DOCUMENTATION OF PAR STATUS:    1. Name of Medication & Dose: Ozempic (0.25 or 0.5 MG/DOSE) 2MG/3ML pen-injectors     2. Name of Prescription Coverage Company & phone #: [employer]    3. Date Prior Auth Submitted: 01/16/2025    4. What information was given to obtain insurance decision? Last office visit     5. Prior Auth Status? Pending    6. Patient Notified: N\A

## 2025-01-17 NOTE — TELEPHONE ENCOUNTER
FINAL PRIOR AUTHORIZATION STATUS:    1.  Name of Medication & Dose: Ozempic (0.25 or 0.5 MG/DOSE) 2MG/3ML pen-injectors     2. Prior Auth Status: Approved through 01/16/2026     3. Action Taken: Pharmacy Notified: no Patient Notified: yes

## 2025-01-22 ENCOUNTER — HOSPITAL ENCOUNTER (OUTPATIENT)
Dept: HEMATOLOGY ONCOLOGY | Facility: MEDICAL CENTER | Age: 60
End: 2025-01-22
Attending: INTERNAL MEDICINE
Payer: MEDICARE

## 2025-01-22 VITALS
HEART RATE: 105 BPM | TEMPERATURE: 97.2 F | HEIGHT: 65 IN | OXYGEN SATURATION: 90 % | DIASTOLIC BLOOD PRESSURE: 80 MMHG | WEIGHT: 222.22 LBS | SYSTOLIC BLOOD PRESSURE: 126 MMHG | BODY MASS INDEX: 37.02 KG/M2

## 2025-01-22 DIAGNOSIS — Z17.0 CARCINOMA OF UPPER-OUTER QUADRANT OF LEFT BREAST IN FEMALE, ESTROGEN RECEPTOR POSITIVE (HCC): ICD-10-CM

## 2025-01-22 DIAGNOSIS — Z17.0 MALIGNANT NEOPLASM OF UPPER-OUTER QUADRANT OF LEFT BREAST IN FEMALE, ESTROGEN RECEPTOR POSITIVE (HCC): ICD-10-CM

## 2025-01-22 DIAGNOSIS — C50.412 CARCINOMA OF UPPER-OUTER QUADRANT OF LEFT BREAST IN FEMALE, ESTROGEN RECEPTOR POSITIVE (HCC): ICD-10-CM

## 2025-01-22 DIAGNOSIS — C80.0 CARCINOMATOSIS (HCC): ICD-10-CM

## 2025-01-22 DIAGNOSIS — C50.912 BREAST CANCER, STAGE 4, LEFT (HCC): ICD-10-CM

## 2025-01-22 DIAGNOSIS — C50.412 MALIGNANT NEOPLASM OF UPPER-OUTER QUADRANT OF LEFT BREAST IN FEMALE, ESTROGEN RECEPTOR POSITIVE (HCC): ICD-10-CM

## 2025-01-22 PROCEDURE — 99214 OFFICE O/P EST MOD 30 MIN: CPT | Performed by: INTERNAL MEDICINE

## 2025-01-22 PROCEDURE — 99212 OFFICE O/P EST SF 10 MIN: CPT | Performed by: INTERNAL MEDICINE

## 2025-01-22 PROCEDURE — RXMED WILLOW AMBULATORY MEDICATION CHARGE: Performed by: INTERNAL MEDICINE

## 2025-01-22 RX ORDER — CAPECITABINE 500 MG/1
1500 TABLET, FILM COATED ORAL 2 TIMES DAILY
Qty: 84 TABLET | Refills: 3 | Status: SHIPPED | OUTPATIENT
Start: 2025-01-22

## 2025-01-22 ASSESSMENT — ENCOUNTER SYMPTOMS
COUGH: 0
PSYCHIATRIC NEGATIVE: 1
HEADACHES: 0
FEVER: 0
EYES NEGATIVE: 1
WEIGHT LOSS: 0
CONSTIPATION: 0
NEUROLOGICAL NEGATIVE: 1
SHORTNESS OF BREATH: 1
GASTROINTESTINAL NEGATIVE: 1
DIZZINESS: 0
MYALGIAS: 0
PALPITATIONS: 0
MUSCULOSKELETAL NEGATIVE: 1
NAUSEA: 0
VOMITING: 0
CARDIOVASCULAR NEGATIVE: 1
DIARRHEA: 0
CHILLS: 0

## 2025-01-22 ASSESSMENT — FIBROSIS 4 INDEX: FIB4 SCORE: 1.07
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
## 2025-01-22 NOTE — ADDENDUM NOTE
Encounter addended by: Erich Cardoso, Med Ass't on: 1/22/2025 2:55 PM   Actions taken: Charge Capture section accepted

## 2025-01-23 ENCOUNTER — PHARMACY VISIT (OUTPATIENT)
Dept: PHARMACY | Facility: MEDICAL CENTER | Age: 60
End: 2025-01-23
Payer: COMMERCIAL

## 2025-01-23 ENCOUNTER — DOCUMENTATION (OUTPATIENT)
Dept: PHARMACY | Facility: MEDICAL CENTER | Age: 60
End: 2025-01-23
Payer: MEDICARE

## 2025-01-23 NOTE — PROGRESS NOTES
PHARMACIST CLINICAL ONBOARDING - Clinical Onboarding -   Result = Complete, Next card status: New Start    Therapeutic Category: Oncology  ICD10: C50.412, Z170  Diagnosis Details: Carcinoma of upper-outer quadrant of left breast in female, estrogen receptor positive [C50.412, Z17.0]; HER2-  Medication(s) associated with Therapeutic Category: Capecitabine Tablet 500 MG  Medication(s) prescribed for FDA approved indication?   -Capecitabine Tablet 500 MG: Yes  Full drug therapy: Capecitabine 500mg tablets #3 tablets (1500mg) by mouth twice daily on days 1-14 out of each 21 day cycle.     Dose Appropriateness (Y/N): Y; provider noted starting with dose of 1500mg/dose  Past treatments: surgical resection, radiation, Kisqali, Anastrozole, Xgeva, Faslodex   Goals of therapy: Promote or maintain optimal therapy administration and adherence, Mitigation of side effects, Reduce progression of disease (POD) and/or improve patient quality of life, Reduce tumor biomarkers    Regimen Appropriateness Review: Oncology  Any concerning drug and/or non-drug allergies? No  Any concerning drug interactions (drug-drug or dietary)? Yes, Explanation: CatD: Zolpidem + Cyclobenzaprine, Prochlorperazine = may enhance CNS depression. Medications used as needed. Review with patient during initial assessment.   CatC: Capecitabine + Ondansetron = may enhance QTc prolongation. Last QTc of 447 from 11/25/24.   Any concern with prescribed dose (appropriateness, renal, and hepatic adjustments needed)? No  Any comorbidities, past medical history, precautions, or contraindications that pose a medication safety concern? No  Any relevant lab work needed that affects the initial start date? No  Any issues with patient's ability to self-administer medication? No      Initial Assessment **NEW**  Dx: Carcinoma of upper-outer quadrant of left breast in female, estrogen receptor positive [C50.412, Z17.0]; HER2-       Tx prescribed: Capecitabine 500mg tablets #3  tablets (1500mg) by mouth twice daily on days 1-14 out of each 21 day cycle.     Administration: reviewed dosing and cycle days as noted above; advised to swallow tablets whole; take within 30 minutes of a meal at the same time each day; space 12 hours apart     Proper Handling/Disposal: patient handles medication herself; reviewed proper handwashings; advised to dispose of through drug through take back program; do not throw away in trash and do not flush down the sink or toilet    Storage/Excursion: Patient will keep at room temperature in provided packaging. Protect from moisture/light/heat. Keep out of the reach of children and pets.     Emetogenic Potential: minimal to low     Duration of therapy: until disease progression or unacceptable toxicity        Adherence & Potential Barriers: adherence predictor score = 0; no adherence barriers identified; she uses phone alarms to help remain adherent     Missed dose mgmt: Reviewed if you miss a dose of Capecitabine, do not take an extra dose or two doses at one time. Simply take your next dose at the regularly scheduled time. Be sure to write down if you miss a dose and let your care provider know about any missed doses.      List Common, Serious SE & Mitigation Reviewed:     Decreased appetite/weight loss: Reach out to provider if there has been any unintentional and/or rapid weight loss (more than 5 lbs/week); and/or unable to eat/drink more than 24 hrs     Dermatitis/Rash: topical cortisone; Benadryl cream/gel; oral anti-histamines     Diarrhea: Imodium; BRAT diet; maintaining adequate hydration; reach out to provider if more than 4 - 6x/day or for any loose stools with dizziness/fever     Fatigue: sleep hygiene; prioritizing plans; daily naps no longer than 1 hr     Hand foot syndrome: presenting after 4 - 6 weeks on med to soles of feet/palms of hand as: redness, tingling, pain, peeling/chafing, irritation. Can be prevented by applying heavy emollient cream 1 to  2 times per day (e.g. O'Chavo's working hands; Bag Balm, Udderly Smooth, Cerave moisturizing cream) and limiting prolonged exposure to heat, friction, and/or water (i.e. the perfect conditions to create a blister)     Mouth sores: Preventative: salt water rinses (swish and spit) between meals; maintaining oral hygiene (brushing twice a day). If active sores: orajel medicated gel or antiseptic rinse; reach out to provider if prevents eating/drinking     Nausea (low emetic potential): small frequent meals; ginger ale/candies/tea; if symptomatic to avoid foods that are greasy, fatty, acidic, and/or spicy   List Precautions Reviewed:     Hepatotoxicity risk/elevated liver enzymes: monitor for and report any signs/symptoms of any new/worsening: abdominal pain, fatigue, anorexia, flu-like symptoms, dark urine; continue to attend all routine labs.     Infection risk: hand hygiene; social distancing/mask wearing; disinfecting of common surfaces; monitoring for any signs/symptoms of infection (fever, flu, chills, persistent congestion, productive cough)     When to call provider: Shortness of breath; chest pain; sudden onset of dizziness, fatigue, fainting; irregular heart beat; fever; blood in stools/urine; persistent nausea/vomiting and/or unable to eat or drink x 24 hrs or more; more than 4-6 episodes of diarrhea/24hrs; mental status changes  List Current SE Reviewed (if applicable): N/A as medication not yet started     Mitigation/mgmt: N/A as medication not yet started       S/Sx: fatigue, bloating, shortness of breath    Clinically Relevant, Abnormal Labs from 12/27/24 unless otherwise noted:     CBC: WBC 3.8 (L), RBC 4.1 (L), .5 (H), MCH 33.9 (H), MPV 8.8 (L)    Chem7: Glucose 106 (H)    CrCl/GFR: eGFR 101mL/min    LFTs from 11/26/24: WNL     BP/HR: WNL (1/22/25)    Drug Specific Labs:     ? Magnesium from 11/26/24: 2    ? Phosphorous from 11/26/24: 3.8     Cancer Specific Biomarkers:     ? CA 27.29: 446.2    ?  CEA: 4.5       Wellness/Lifestyle Counseling: eating smaller meals with bloating; using Miralax every evening to alleviate constipation    Immunizations: received yearly flu on 12/4/24; declined COVID booster; up to date on others       Med Rec/Updated drug list:  EMR inaccurate, medication changes reviewed with patient:     (-) Kisqali, Faslodex, Xgeva     (+) Miralax daily   DI Check:      CatD: Zolpidem + Cyclobenzaprine, Prochlorperazine = may enhance CNS depression. Medications used as needed. Review with patient during initial assessment.     CatC: Capecitabine + Ondansetron = may enhance QTc prolongation. Last QTc of 447 from 11/25/24.   Supportive PPx Meds: Prochlorperazine, Ondansetron  Common DI & Dietary Avoidance:     Reviewed to not start any new meds/herbs/OTCs/supplements without speaking to clinic/pharmacist to check for DDIs     Avoid live vaccines      Goals of Therapy:     Promote or maintain optimal therapy administration and adherence    Mitigation of side effects    Reduce progression of disease (POD) and/or improve patient quality of life      Reduce tumor biomarkers  Patient has agreed/understands to goals of therapy during education/counseling    Additional: I reached Sol for new initial counseling on planned start of Capecitabine. Counseling points reviewed in detail as noted above and Sol was engaged asking appropriate questions during the call. Sol shared her bloating is uncomfortable when bending over to tie shoes where she sits on an elevated bench/chair for convenience. She has also slowed down with housework with the bloating/SOB symptoms but does use a grabber tool to help pickup items from the ground level. She asked for extra labeled vial to use as a traveler if away from her medication where she doesn't need to bring her entire medication supply if she is out at dinner/running errands. I sent message to liaison asking if this could be included. She does need to have a tooth  extracted in the future where advised working with her dentist and oncologist for planning/possible med holds with infection risk. She verbalized understanding and will give both offices time to communicate. She plans to start Capecitabine on 1/24/25 and welcomes first cycle call from pharmacy next week. She has the clinical pharmacist phone number saved and was encouraged to call for any questions/concerns.

## 2025-01-24 NOTE — OP THERAPY DAILY TREATMENT
"  Outpatient Physical Therapy  DAILY TREATMENT   [facility] Therapy [place] [id number] [facility], [address]  [place] [address]  Phone:  [phone]  Date: 01/28/2025  Patient: Sol Kern  YOB: 1965  MRN: [id number]   Time Calculation           Chief Complaint: No chief complaint on file.  Visit #: 30    SUBJECTIVE: ***  Notes that a couple days of on and off abdominal symptoms. She is going to have a surgery for a biopsy this Thursday.     Sxs: central lower back pain that spreads to B glute region, anterolateral tingling from both legs all the way down to the toes and whole foot (some days of the week and notes been going on for a long time; and then comes back)     Aggs: sidelying at night, sitting or standing for too long (a couple hrs), roadtrips     OBJECTIVE: ***  Current objective measures:   AROM  Flexion to shins WFL  Extension WFL  RSB; central back  LSB; central back    Slump (-negative 12/1/24) L numb, R numb at pad of feet   Global TTP    Painful transitional sit to supine with stiffness; tingling once supine    Hip ext MMT 4-/5ea     Sitffness R>>L hip with sukhdev sidelying         Therapeutic Exercises (CPT 16366):     1. 1. UPOC 2/10/25 SCP, Nu step seat 20-21, x10' L0    2. modified piriformis stretch, x1'ea, limited by abdominal pain; with MHP today    3. LTR, x2', with MHP today    4. sciatic n glide, 2x1', with MHP today; skipped    7. paloff press, 2x10, purpTB    8. seated ab brace, 2x30\"H    9. standing hip extension, 2x10, in plank position on high table    10. next seated dead/sts      Therapeutic Exercise Summary: HEP:  Mod pir stretch x30\"ea  Sciatic n glider 2x15        Next---> Check MMT hip in standing or sidelying, heat and trunk strength, massage (ask what position), NPE with fucntion over pain graph      Therapeutic Treatments and Modalities:     1. Manual Therapy (CPT 67085), STW to paraspinals and QL  Time-based treatments/modalities:     ASSESSMENT: *** " pt lying supine/with pressure on abdomen still a limiting factor to session. She is having this biopsied; edu to ask for precautions with movement and PT following. Will add more tolerance in as able.    PLAN/RECOMMENDATIONS:   Plan for treatment: therapy treatment to continue next visit.  Planned interventions for next visit: continue with current treatment.

## 2025-01-28 ENCOUNTER — APPOINTMENT (OUTPATIENT)
Dept: PHYSICAL THERAPY | Facility: REHABILITATION | Age: 60
End: 2025-01-28
Attending: FAMILY MEDICINE
Payer: MEDICARE

## 2025-01-31 ENCOUNTER — DOCUMENTATION (OUTPATIENT)
Dept: PHARMACY | Facility: MEDICAL CENTER | Age: 60
End: 2025-01-31
Payer: MEDICARE

## 2025-01-31 DIAGNOSIS — J90 LARGE PLEURAL EFFUSION: ICD-10-CM

## 2025-01-31 DIAGNOSIS — C50.412 MALIGNANT NEOPLASM OF UPPER-OUTER QUADRANT OF LEFT BREAST IN FEMALE, ESTROGEN RECEPTOR POSITIVE (HCC): ICD-10-CM

## 2025-01-31 DIAGNOSIS — C50.912 BREAST CANCER, STAGE 4, LEFT (HCC): ICD-10-CM

## 2025-01-31 DIAGNOSIS — Z79.899 ENCOUNTER FOR LONG-TERM (CURRENT) USE OF HIGH-RISK MEDICATION: ICD-10-CM

## 2025-01-31 DIAGNOSIS — Z17.0 MALIGNANT NEOPLASM OF UPPER-OUTER QUADRANT OF LEFT BREAST IN FEMALE, ESTROGEN RECEPTOR POSITIVE (HCC): ICD-10-CM

## 2025-01-31 NOTE — ED NOTES
"1/31/2025      Mansi Zapata DNP, APRN  [address] [place] [address]      Michael Issa  1964    Chief Complaint   Patient presents with    Chronic venous hypertension with inflammation involving bot    Bilateral carotid artery stenos     Testing here this am. No complaints , denies stroke symptoms       Dear Mansi Zapata DNP, APRN       HPI  I had the pleasure of seeing your patient Michael Issa in the office today.   As you recall, Michael Issa is a 60 y.o.  male who you are currently following for routine health maintenance.  We are following for carotid disease and venous insufficiency to his left lower extremity.  Currently he is doing well and denies any strokelike symptoms.  He did undergo aortic root replacement with Dr. Gtz about a year ago.  He is maintained on aspirin and Crestor.  He did have noninvasive testing performed today, which I did review in office.        Review of Systems   Constitutional: Negative.    HENT: Negative.     Eyes: Negative.    Respiratory: Negative.     Cardiovascular: Negative.    Gastrointestinal: Negative.    Endocrine: Negative.    Genitourinary: Negative.    Musculoskeletal: Negative.    Skin: Negative.    Allergic/Immunologic: Negative.    Neurological: Negative.    Hematological: Negative.    Psychiatric/Behavioral: Negative.     All other systems reviewed and are negative.      /74   Pulse 75   Ht 185.4 cm (73\")   Wt 94.8 kg (209 lb)   SpO2 96%   BMI 27.57 kg/m²       Physical Exam  Vitals and nursing note reviewed.   Constitutional:       General: He is not in acute distress.     Appearance: Normal appearance. He is well-developed, well-groomed and overweight. He is not diaphoretic.   HENT:      Head: Normocephalic and atraumatic.   Neck:      Vascular: No carotid bruit or JVD.   Cardiovascular:      Rate and Rhythm: Normal rate and regular rhythm.      Pulses: Normal pulses.           Femoral pulses are 2+ on the right " Pt medicated per MAR. Educated on meds. Allergies verified. Verbalized understanding g   side and 2+ on the left side.       Popliteal pulses are 2+ on the right side and 2+ on the left side.        Dorsalis pedis pulses are 2+ on the right side and 2+ on the left side.        Posterior tibial pulses are 2+ on the right side and 2+ on the left side.      Heart sounds: Normal heart sounds, S1 normal and S2 normal. No murmur heard.     No friction rub. No gallop.   Pulmonary:      Effort: Pulmonary effort is normal.      Breath sounds: Normal breath sounds.   Abdominal:      General: Bowel sounds are normal. There is no abdominal bruit.      Palpations: Abdomen is soft.      Tenderness: There is no abdominal tenderness.   Musculoskeletal:         General: Normal range of motion.   Skin:     General: Skin is warm and dry.      Comments: Hyperpigmentation   Neurological:      Mental Status: He is alert and oriented to person, place, and time.      Cranial Nerves: No cranial nerve deficit.   Psychiatric:         Behavior: Behavior normal. Behavior is cooperative.         Thought Content: Thought content normal.         Judgment: Judgment normal.         DIAGNOSTIC DATA:  Noninvasive test including a carotid duplex shows less than 50% carotid stenosis bilaterally with bilateral antegrade vertebral flow.    Patient Active Problem List   Diagnosis    Hypertension    Thyroid nodule    Former smoker    Family history of colonic polyps    Erectile dysfunction    Calcified granuloma of lung    Aorta aneurysm    Chronic seasonal allergic rhinitis due to pollen    Chest pressure    History of colon polyps    HUMPHRIES (dyspnea on exertion)    Thoracic aortic aneurysm without rupture    Class 1 obesity due to excess calories with body mass index (BMI) of 30.0 to 30.9 in adult    Aortic root aneurysm    Venous insufficiency (chronic) (peripheral)    Venous ulcer of right leg    Non-occlusive coronary artery disease    Mixed hyperlipidemia    Epidermal inclusion cyst         ICD-10-CM ICD-9-CM   1. Bilateral carotid artery  stenosis  I65.23 433.10     433.30   2. Primary hypertension  I10 401.9   3. Chronic venous hypertension with inflammation involving both sides  I87.323 459.32   4. Mixed hyperlipidemia  E78.2 272.2             Plan: After thoroughly evaluating Michael Issa, I believe the best course of action is to remain conservative from vascular surgery standpoint.  Currently he is doing well and denies any strokelike symptoms.  I did review his testing which shows less than 50% carotid stenosis bilaterally.  We will see him back in 1 year with repeat noninvasive testing including a carotid duplex for continued surveillance.  I did discuss vascular risk factors as they pertain to the progression of vascular disease including controlling his hypertension and hyperlipidemia.  His blood pressure stable on his current medications.  He should continue his aspirin 81 mg daily and Crestor 5 mg daily in addition to his other medications.  Body mass index is 27.57 kg/m².    This was all discussed in full with complete understanding.    Thank you for allowing me to participate in the care of your patient.  Please do not hesitate with any questions or concerns.  I will keep you aware of any further encounters with Michael Issa.        Sincerely yours,         JUAN JOSE Allen

## 2025-01-31 NOTE — PROGRESS NOTES
First Cycle   Dx: Carcinoma of upper-outer quadrant of left breast in female, estrogen receptor positive [C50.412, Z17.0]; HER2-       Txt review: Capecitabine 500mg tablets #3 tablets (1500mg) by mouth twice daily on days 1-14 out of each 21 day cycle.     Administration: taking Capecitabine #3 tablets by mouth in the morning between 8-9am and #3 tablets by mouth in the evening between 8-9pm; swallowing tablets whole without difficulty; confirmed taking with food but sometimes with small amounts of food with reduced appetite    Adherence: no missed doses; following routine based adherence     Missed dose mgmt: N/A as no missed doses   Current SE: none     Mitigation/Mgmt: N/A as no reported side effects       List Changes to Allergies, Diagnoses: sleep apnea       Clinically Relevant, Abnormal Labs: no new labs      Med Rec/Updated drug list:   EMR inaccurate, medication changes reviewed with patient/caregiver:     (+) Tums as needed       Additional: I reached Sol for check-in following her recent start of Capecitabine. She confirmed starting on 1/24/25 with reported no missed doses or side effects. She verbalized understanding with cycle dosing where she will start her 1 week off period on 2/7/25. She has missed a few PT sessions from her abdominal discomfort/bloating but has not had any interreference from breast cancer treatment. Provided oSl with liaison's phone number for refills and reviewed refill process. All patient questions were answered during the call.  **Pharmacist discussion**  Sol asked if she needed to avoid grapefruit, pomegranate, and Blackduck oranges while on Capecitabine therapy. Reviewed prescribing information noting medication labeling does not specifically indicate she needs to avoid those fruits as she had been doing with Yesenia. Sol inquired on any dental precautions with a future tooth extraction where reviewed prior discussion from 1/23/25 to notify dentist/oncologist for any  dental plans with infection risk. She read online concerns with Folic acid and Capecitabine where we discussed articles published on increased risks of toxicities with excessive folate/folic acid intake. She is currently not taking a Multivitamin or supplementing with Folate/folic acid where she thanked me for the review. Article citation for reference discussion:  Lokesh CORREA, Lokesh AWH, Mo F, Ma BBY, Beau KCW, Marbin D, Angela FST, Lokesh ATC, Angela T, Lokesh LOMBARDOA. Association Between Serum Folate Level and Toxicity of Capecitabine During Treatment for Colorectal Cancer. Oncologist. 2018 Dec;23(12):1229-5365. doi: [email or web address].[email or web address]. Epub 2018 May 25. PMID: [id number]; PMCID: [id number].

## 2025-02-01 NOTE — PROGRESS NOTES
Called Pt r/t heart-burn and SOB. After discussing with APRN, Educating the Pt to try Pepsid or Omeprezole along with GasX for the belching and heartburn.      Also, reported that Angie looked at her CT of her chest and does not see an immediate concern but that we will put in a Chest X-ray and she can get it either this weekend of next week depending on how she is feeling. If anything gets mush worse, to please head to the ED.  Pt verbalized understanding.    Pt also was diagnosed with severe sleep apnea and will be getting a Bi-Pap machine and will be using O2 with it.     Pt did state that she does feel a little better with each passing day.

## 2025-02-03 ENCOUNTER — HOSPITAL ENCOUNTER (OUTPATIENT)
Dept: RADIOLOGY | Facility: MEDICAL CENTER | Age: 60
End: 2025-02-03
Attending: NURSE PRACTITIONER
Payer: MEDICARE

## 2025-02-03 ENCOUNTER — TELEPHONE (OUTPATIENT)
Dept: HEMATOLOGY ONCOLOGY | Facility: MEDICAL CENTER | Age: 60
End: 2025-02-03
Payer: MEDICARE

## 2025-02-03 DIAGNOSIS — C50.912 BREAST CANCER, STAGE 4, LEFT (HCC): ICD-10-CM

## 2025-02-03 DIAGNOSIS — C50.412 MALIGNANT NEOPLASM OF UPPER-OUTER QUADRANT OF LEFT BREAST IN FEMALE, ESTROGEN RECEPTOR POSITIVE (HCC): ICD-10-CM

## 2025-02-03 DIAGNOSIS — Z17.0 MALIGNANT NEOPLASM OF UPPER-OUTER QUADRANT OF LEFT BREAST IN FEMALE, ESTROGEN RECEPTOR POSITIVE (HCC): ICD-10-CM

## 2025-02-03 DIAGNOSIS — Z79.899 ENCOUNTER FOR LONG-TERM (CURRENT) USE OF HIGH-RISK MEDICATION: ICD-10-CM

## 2025-02-03 DIAGNOSIS — J90 LARGE PLEURAL EFFUSION: ICD-10-CM

## 2025-02-03 PROCEDURE — 71046 X-RAY EXAM CHEST 2 VIEWS: CPT

## 2025-02-03 NOTE — TELEPHONE ENCOUNTER
Spoke with Sol over the phone.  Reports SOB and MORROW and worsening.  CXR with moderate bilateral pleural effusions which is an acute change from CT scans on 1/15/24.  Recommended she go to ED for evaluation for thoracentesis but she states she does not want to stay overnight in the ED.  Asked if a throacentesis can be arranged outpatient and put in a referral to IR.  Instructed her that if SOB is worse or unbearable at home she should present to the ED for evaluation.  She verbalized understanding.     Gabby Joel DO.

## 2025-02-04 ENCOUNTER — APPOINTMENT (OUTPATIENT)
Dept: PHYSICAL THERAPY | Facility: REHABILITATION | Age: 60
End: 2025-02-04
Attending: FAMILY MEDICINE
Payer: MEDICARE

## 2025-02-05 ENCOUNTER — HOSPITAL ENCOUNTER (OUTPATIENT)
Dept: RADIOLOGY | Facility: MEDICAL CENTER | Age: 60
End: 2025-02-05
Attending: STUDENT IN AN ORGANIZED HEALTH CARE EDUCATION/TRAINING PROGRAM
Payer: MEDICARE

## 2025-02-05 DIAGNOSIS — J90 LARGE PLEURAL EFFUSION: ICD-10-CM

## 2025-02-05 DIAGNOSIS — C50.912 BREAST CANCER, STAGE 4, LEFT (HCC): ICD-10-CM

## 2025-02-05 PROCEDURE — 76604 US EXAM CHEST: CPT

## 2025-02-06 DIAGNOSIS — M54.41 CHRONIC MIDLINE LOW BACK PAIN WITH RIGHT-SIDED SCIATICA: ICD-10-CM

## 2025-02-06 DIAGNOSIS — G89.29 CHRONIC MIDLINE LOW BACK PAIN WITH RIGHT-SIDED SCIATICA: ICD-10-CM

## 2025-02-06 RX ORDER — LIDOCAINE 50 MG/G
1 PATCH TOPICAL EVERY 24 HOURS
Qty: 30 PATCH | Refills: 0 | Status: SHIPPED | OUTPATIENT
Start: 2025-02-06

## 2025-02-06 NOTE — TELEPHONE ENCOUNTER
Received request via: Patient    Was the patient seen in the last year in this department? Yes    Does the patient have an active prescription (recently filled or refills available) for medication(s) requested? No    Pharmacy Name: [facility]/pharmacy #[id number] - [place] - [phone]      Does the patient have longterm Plus and need 100-day supply? (This applies to ALL medications) Patient does not have SCP

## 2025-02-07 ENCOUNTER — DOCUMENTATION (OUTPATIENT)
Dept: PHARMACY | Facility: MEDICAL CENTER | Age: 60
End: 2025-02-07
Payer: MEDICARE

## 2025-02-07 DIAGNOSIS — J90 PLEURAL EFFUSION: ICD-10-CM

## 2025-02-07 PROCEDURE — RXMED WILLOW AMBULATORY MEDICATION CHARGE: Performed by: INTERNAL MEDICINE

## 2025-02-07 RX ORDER — FUROSEMIDE 40 MG/1
40 TABLET ORAL DAILY
Qty: 3 TABLET | Refills: 0 | Status: SHIPPED | OUTPATIENT
Start: 2025-02-07 | End: 2025-02-12 | Stop reason: SDUPTHER

## 2025-02-07 NOTE — PROGRESS NOTES
**Pharmacist discussion**  Reached Sol to review questions she had during Capecitabine refill call related to 'drying up fluids/diuretic effect with Capecitabine'. Reviewed medication labeling where Capecitabine can cause edema/fluid retention and discussed Capecitabine would not typically exert any diuretic effect. She had chest XRAY 2/3/25 which revealed pleural effusion and she did share some SOB upon exertion. Clinic tried for thoracentesis on 2/5/25 however no safe access. She is working with clinic for possible diuretic to help with symptoms and is awaiting their response. She started her 1 week medication break today, 2/7/25. She did report nausea lasting one day on one occasion when taking Capecitabine as she took her morning dose on an empty stomach. Reviewed medication should be taken with a meal/food as this can help limit the rapid absoprtion when taken on an empty stomach. She understands to take with food and encouraged if she cannot have a meal to try consuming a snack high in protein and/or fat. She thanked me for the phone call and will avoid taking Capecitabine on an empty stomach.

## 2025-02-08 NOTE — PROGRESS NOTES
Patient called [facility] Oncology to report they were unable to drain her pleural effusion on Wednesday.  Pt reports shortness of breath and inquired about taking lasix to help her symptoms.  Discussed with [facility] Vel[facility]quez, and received orders for a short course of lasix 40mg daily x 3 days with repeat chest xray on Monday.  RN to follow up with patient on Monday afternoon.  Pt in agreement with POC.

## 2025-02-10 ENCOUNTER — PHARMACY VISIT (OUTPATIENT)
Dept: PHARMACY | Facility: MEDICAL CENTER | Age: 60
End: 2025-02-10
Payer: COMMERCIAL

## 2025-02-11 ENCOUNTER — APPOINTMENT (OUTPATIENT)
Dept: PHYSICAL THERAPY | Facility: REHABILITATION | Age: 60
End: 2025-02-11
Attending: FAMILY MEDICINE
Payer: MEDICARE

## 2025-02-11 ENCOUNTER — TELEPHONE (OUTPATIENT)
Dept: HEMATOLOGY ONCOLOGY | Facility: MEDICAL CENTER | Age: 60
End: 2025-02-11
Payer: MEDICARE

## 2025-02-11 NOTE — TELEPHONE ENCOUNTER
"This RN spoke to pt re: bloating and dypnea. Pt recently had CXR (+) for pleural effusion, on lasix x3 days. Pt c/o of peritoneal bloating described as \"uncomfortable\" and dyspnea while supine at night. Pt experiences nocturnal hypoxemia (baseline), supplements with 2L O2 every evening. Pt denies stomach pain and constipation (last BM yesterday AM). Pt advised to continue O2 supplementation qd evening and PRN during the day and schedule f/v CXR for further assessment. Pt is amenable to plan and will call clinic for any additional questions. Advised to seek medical services via the ED for worsening symptoms, pt verbalized understanding.  "

## 2025-02-12 ENCOUNTER — OFFICE VISIT (OUTPATIENT)
Dept: MEDICAL GROUP | Facility: PHYSICIAN GROUP | Age: 60
End: 2025-02-12
Payer: MEDICARE

## 2025-02-12 ENCOUNTER — HOSPITAL ENCOUNTER (OUTPATIENT)
Dept: LAB | Facility: MEDICAL CENTER | Age: 60
End: 2025-02-12
Attending: INTERNAL MEDICINE
Payer: MEDICARE

## 2025-02-12 ENCOUNTER — PATIENT OUTREACH (OUTPATIENT)
Dept: HEALTH INFORMATION MANAGEMENT | Facility: OTHER | Age: 60
End: 2025-02-12

## 2025-02-12 ENCOUNTER — TELEPHONE (OUTPATIENT)
Dept: HEMATOLOGY ONCOLOGY | Facility: MEDICAL CENTER | Age: 60
End: 2025-02-12

## 2025-02-12 VITALS
SYSTOLIC BLOOD PRESSURE: 138 MMHG | WEIGHT: 219.8 LBS | DIASTOLIC BLOOD PRESSURE: 72 MMHG | TEMPERATURE: 97.9 F | HEIGHT: 65 IN | HEART RATE: 105 BPM | OXYGEN SATURATION: 93 % | BODY MASS INDEX: 36.62 KG/M2

## 2025-02-12 DIAGNOSIS — G47.33 OSA (OBSTRUCTIVE SLEEP APNEA): ICD-10-CM

## 2025-02-12 DIAGNOSIS — J90 PLEURAL EFFUSION: ICD-10-CM

## 2025-02-12 DIAGNOSIS — E11.40 TYPE 2 DIABETES MELLITUS WITH DIABETIC NEUROPATHY, WITHOUT LONG-TERM CURRENT USE OF INSULIN (HCC): ICD-10-CM

## 2025-02-12 DIAGNOSIS — J96.11 CHRONIC RESPIRATORY FAILURE WITH HYPOXIA (HCC): ICD-10-CM

## 2025-02-12 DIAGNOSIS — C50.912 BREAST CANCER, STAGE 4, LEFT (HCC): ICD-10-CM

## 2025-02-12 DIAGNOSIS — G47.33 OSA (OBSTRUCTIVE SLEEP APNEA): Primary | ICD-10-CM

## 2025-02-12 DIAGNOSIS — C80.0 CARCINOMATOSIS (HCC): ICD-10-CM

## 2025-02-12 DIAGNOSIS — I10 ESSENTIAL HYPERTENSION: ICD-10-CM

## 2025-02-12 LAB
BASOPHILS # BLD AUTO: 0.4 % (ref 0–1.8)
BASOPHILS # BLD: 0.03 K/UL (ref 0–0.12)
EOSINOPHIL # BLD AUTO: 0.13 K/UL (ref 0–0.51)
EOSINOPHIL NFR BLD: 1.5 % (ref 0–6.9)
ERYTHROCYTE [DISTWIDTH] IN BLOOD BY AUTOMATED COUNT: 53.7 FL (ref 35.9–50)
HCT VFR BLD AUTO: 49.2 % (ref 37–47)
HGB BLD-MCNC: 16.1 G/DL (ref 12–16)
IMM GRANULOCYTES # BLD AUTO: 0.02 K/UL (ref 0–0.11)
IMM GRANULOCYTES NFR BLD AUTO: 0.2 % (ref 0–0.9)
LYMPHOCYTES # BLD AUTO: 0.86 K/UL (ref 1–4.8)
LYMPHOCYTES NFR BLD: 10.1 % (ref 22–41)
MCH RBC QN AUTO: 32.6 PG (ref 27–33)
MCHC RBC AUTO-ENTMCNC: 32.7 G/DL (ref 32.2–35.5)
MCV RBC AUTO: 99.6 FL (ref 81.4–97.8)
MONOCYTES # BLD AUTO: 0.7 K/UL (ref 0–0.85)
MONOCYTES NFR BLD AUTO: 8.2 % (ref 0–13.4)
NEUTROPHILS # BLD AUTO: 6.79 K/UL (ref 1.82–7.42)
NEUTROPHILS NFR BLD: 79.6 % (ref 44–72)
NRBC # BLD AUTO: 0 K/UL
NRBC BLD-RTO: 0 /100 WBC (ref 0–0.2)
PLATELET # BLD AUTO: 332 K/UL (ref 164–446)
PMV BLD AUTO: 9.3 FL (ref 9–12.9)
RBC # BLD AUTO: 4.94 M/UL (ref 4.2–5.4)
WBC # BLD AUTO: 8.5 K/UL (ref 4.8–10.8)

## 2025-02-12 PROCEDURE — 86300 IMMUNOASSAY TUMOR CA 15-3: CPT

## 2025-02-12 PROCEDURE — 80053 COMPREHEN METABOLIC PANEL: CPT

## 2025-02-12 PROCEDURE — 99214 OFFICE O/P EST MOD 30 MIN: CPT | Performed by: FAMILY MEDICINE

## 2025-02-12 PROCEDURE — 3078F DIAST BP <80 MM HG: CPT | Performed by: FAMILY MEDICINE

## 2025-02-12 PROCEDURE — 82378 CARCINOEMBRYONIC ANTIGEN: CPT

## 2025-02-12 PROCEDURE — 36415 COLL VENOUS BLD VENIPUNCTURE: CPT

## 2025-02-12 PROCEDURE — 85025 COMPLETE CBC W/AUTO DIFF WBC: CPT

## 2025-02-12 PROCEDURE — 3075F SYST BP GE 130 - 139MM HG: CPT | Performed by: FAMILY MEDICINE

## 2025-02-12 RX ORDER — FUROSEMIDE 40 MG/1
40 TABLET ORAL DAILY
Qty: 7 TABLET | Refills: 0 | Status: SHIPPED | OUTPATIENT
Start: 2025-02-12 | End: 2025-02-19 | Stop reason: SDUPTHER

## 2025-02-12 ASSESSMENT — FIBROSIS 4 INDEX: FIB4 SCORE: 1.07

## 2025-02-12 NOTE — PROGRESS NOTES
Encounter opened in anticipation of seeing the patient while she was in clinic for a PCP appointment today. Unfortunately her PCP released the patient before I could speak with her. I will attempt again later this week.

## 2025-02-12 NOTE — PROGRESS NOTES
Verbal consent was acquired by the patient to use [employer] ambient listening note generation during this visit     Subjective:     HPI:   History of Present Illness  The patient presents for evaluation of obstructive sleep apnea, pleural effusion, and chronic respiratory failure.    Obstructive Sleep Apnea  - She has been experiencing severe obstructive sleep apnea, as confirmed by a sleep study conducted on 01/14/2025.  - She had an online visit with the sleep doctor and discussed everything in detail.  - She was prescribed an AutoPAP instead of an Auto BiPAP.  - She requires a hose to connect her oxygen.  - She has been in communication with her sleep medicine specialist regarding the need for an in-lab sleep study.  - Her insurance, [employer], will not allow them to do the sleep study and order from them also, it has to be , a sleep study and then a separate entity for the equipment.  - She is requesting a referral to a sleep medicine doctor.  - She was informed that she would most likely need to go in for another study.  - She sleeps with oxygen and sometimes uses it during the day if needed.  - She lies down on her left side all night long without moving with her oxygen and then sits up.  - If she has to go to the bathroom, she sits up on the side of the bed for a minute or whatever and then takes her oxygen off and then goes to the bathroom.  - If she goes into the kitchen or something which is right next door to her bedroom, she has labored breathing when she is upright.  - She notices it almost seems like because her body is maybe more tolerant lying down on her side for some strange reason and then she gets upright and then that is when she starts feeling a little bit of pressure.  - She feels a lot of pressure already.  - She feels pressure in her stomach and sometimes on the sides in her back on each side.  - This is when she is lying down and that is when she has to find a position and  then it is all fine.  - She was advised to see a cardiologist for a cardiopulmonary evaluation.  - She was advised to go to the emergency room last week, but she declined as she has oxygen at home.    Pleural Effusion  - She attempted to undergo thoracentesis last Wednesday, but the procedure was not performed due to insufficient pocket size.  - She was advised to try diuretics instead.  - She contacted her oncologist on Wednesday and Friday but did not receive a response.  - She was prescribed Lasix 40 mg once daily for 3 days, which she found beneficial.  - She has completed 2 days of the medication and plans to take the final dose today.  - She reports experiencing frequent urination approximately an hour after taking the medication.  - She has noticed a difference in her stomach and believes she has a lot of fluid in her abdomen.  - She was informed that an x-ray would not show the fluid, but an ultrasound or CT scan would.  - She had a CT scan about a month ago, which showed fluid in her abdomen.  - She was informed that it is easier to get the stomach fluid out than it is with the chest.  - She has an appointment with Dr. Jerez next week and with Dr. Singh the following week.  - She is considering asking for a few more days of Lasix.  - She reports that her  is assisting her with daily activities.  - She describes herself as a light breather and reports no chest pain associated with her shortness of breath.  - She reports pressure in the upper abdomen and lower chest area but no central chest discomfort.    Chronic Respiratory Failure  - She has been experiencing difficulty breathing since her stomach became bloated approximately 2 months ago.  - She uses oxygen at night and occasionally during the day when necessary.  - She has been on Lasix for 2 days, which she reports has been helpful.  - She plans to have a chest x-ray on Friday.  - She has a home device to measure her oxygen  "levels.    Supplemental Information: None.    MEDICATIONS  Current: furosemide, Xeloda    Health Maintenance: Completed    Objective:     Exam:  /72 (BP Location: Left arm, Patient Position: Sitting, BP Cuff Size: Adult)   Pulse (!) 105   Temp 36.6 °C (97.9 °F) (Temporal)   Ht 1.651 m (5' 5\")   Wt 99.7 kg (219 lb 12.8 oz)   SpO2 93% Comment: 1L O2  BMI 36.58 kg/m²  Body mass index is 36.58 kg/m².    Physical Exam  Constitutional:       Appearance: Normal appearance.   Cardiovascular:      Rate and Rhythm: Normal rate and regular rhythm.      Heart sounds: Normal heart sounds.   Pulmonary:      Effort: Pulmonary effort is normal.      Breath sounds: Normal breath sounds.      Comments: Breath sounds diminished in right base  Musculoskeletal:      Cervical back: Normal range of motion and neck supple.   Lymphadenopathy:      Cervical: No cervical adenopathy.   Neurological:      Mental Status: She is alert.             Results  Imaging  Sleep study showed severe obstructive sleep apnea. CT scan on January 15 showed a small amount of fluid in the abdomen.    Assessment & Plan:     1. YOANA (obstructive sleep apnea)  Referral to Pulmonary and Sleep Medicine      2. Pleural effusion        3. Chronic respiratory failure with hypoxia (HCC)            Assessment & Plan  1. Obstructive sleep apnea: Chronic, uncontrolled.  - Sleep study on 01/14/2025 showed severe obstructive sleep apnea. BiPAP recommended.  - Urgent referral to a sleep medicine specialist for BiPAP initiation.  - Possible in-lab sleep study to determine BiPAP settings.  - Await further instructions from the sleep medicine specialist.    2. Pleural effusion.  - On furosemide 40 mg once daily for 3 days, with the final dose today.  - Chest x-ray scheduled for Friday to assess pleural effusion.  - Continue follow-up with the oncology team.  - Additional doses of Lasix may be considered if no improvement.    3. Chronic respiratory failure: Chronic, " stable. O2 sat in office was 86%, resolved with 1L O2.  - Continue using oxygen therapy and monitor oxygen levels at home.  - Increase oxygen intake if saturation falls below 88%.  - Use 2 L of oxygen while walking and 1 L at rest may be appropriate based on today.  - Use oxygen during exertion to prevent drops in oxygen levels.      Referral for genetic research was offered. Patient is a participant.      Return if symptoms worsen or fail to improve.    Please note that this dictation was created using voice recognition software. I have made every reasonable attempt to correct obvious errors, but I expect that there are errors of grammar and possibly content that I did not discover before finalizing the note.

## 2025-02-13 LAB
ALBUMIN SERPL BCP-MCNC: 4 G/DL (ref 3.2–4.9)
ALBUMIN/GLOB SERPL: 1 G/DL
ALP SERPL-CCNC: 89 U/L (ref 30–99)
ALT SERPL-CCNC: 22 U/L (ref 2–50)
ANION GAP SERPL CALC-SCNC: 13 MMOL/L (ref 7–16)
AST SERPL-CCNC: 40 U/L (ref 12–45)
BILIRUB SERPL-MCNC: 0.5 MG/DL (ref 0.1–1.5)
BUN SERPL-MCNC: 8 MG/DL (ref 8–22)
CALCIUM ALBUM COR SERPL-MCNC: 10.5 MG/DL (ref 8.5–10.5)
CALCIUM SERPL-MCNC: 10.5 MG/DL (ref 8.5–10.5)
CEA SERPL-MCNC: 5.7 NG/ML (ref 0–3)
CHLORIDE SERPL-SCNC: 98 MMOL/L (ref 96–112)
CO2 SERPL-SCNC: 27 MMOL/L (ref 20–33)
CREAT SERPL-MCNC: 0.82 MG/DL (ref 0.5–1.4)
GFR SERPLBLD CREATININE-BSD FMLA CKD-EPI: 81 ML/MIN/1.73 M 2
GLOBULIN SER CALC-MCNC: 4 G/DL (ref 1.9–3.5)
GLUCOSE SERPL-MCNC: 132 MG/DL (ref 65–99)
POTASSIUM SERPL-SCNC: 4 MMOL/L (ref 3.6–5.5)
PROT SERPL-MCNC: 8 G/DL (ref 6–8.2)
SODIUM SERPL-SCNC: 138 MMOL/L (ref 135–145)

## 2025-02-13 NOTE — TELEPHONE ENCOUNTER
Spoke to patient in regard to how patient is doing, patient is doing okay overall. Patient has been using more oxygen. Patient is requesting more lasix as it seemed to help with the breathing. Updated Dr. Kline, darieal to order 40mg lasix daily x7days. Patient states she is tolerating the Xeloda medication overall. Patient verbalizes that she understands when to report to ER if there is increased shortness of breath, worsening symptoms or chest pain. Patient scheduled to see Dr. Kline next week Tuesday. No other questions or concerns at this time.

## 2025-02-14 LAB — CANCER AG27-29 SERPL-ACNC: 898.7 U/ML

## 2025-02-18 ENCOUNTER — RESULTS FOLLOW-UP (OUTPATIENT)
Dept: HEMATOLOGY ONCOLOGY | Facility: MEDICAL CENTER | Age: 60
End: 2025-02-18

## 2025-02-18 ENCOUNTER — HOSPITAL ENCOUNTER (OUTPATIENT)
Dept: RADIOLOGY | Facility: MEDICAL CENTER | Age: 60
End: 2025-02-18
Attending: INTERNAL MEDICINE
Payer: MEDICARE

## 2025-02-18 ENCOUNTER — HOSPITAL ENCOUNTER (OUTPATIENT)
Dept: RADIOLOGY | Facility: MEDICAL CENTER | Age: 60
End: 2025-02-18
Attending: NURSE PRACTITIONER
Payer: MEDICARE

## 2025-02-18 ENCOUNTER — APPOINTMENT (OUTPATIENT)
Dept: PHYSICAL THERAPY | Facility: REHABILITATION | Age: 60
End: 2025-02-18
Attending: FAMILY MEDICINE
Payer: MEDICARE

## 2025-02-18 ENCOUNTER — HOSPITAL ENCOUNTER (OUTPATIENT)
Dept: HEMATOLOGY ONCOLOGY | Facility: MEDICAL CENTER | Age: 60
End: 2025-02-18
Attending: INTERNAL MEDICINE
Payer: MEDICARE

## 2025-02-18 VITALS
RESPIRATION RATE: 15 BRPM | OXYGEN SATURATION: 2 % | SYSTOLIC BLOOD PRESSURE: 159 MMHG | DIASTOLIC BLOOD PRESSURE: 90 MMHG | WEIGHT: 219.8 LBS | TEMPERATURE: 97.3 F | HEART RATE: 103 BPM | HEIGHT: 65 IN | BODY MASS INDEX: 36.62 KG/M2

## 2025-02-18 DIAGNOSIS — Z17.0 MALIGNANT NEOPLASM OF UPPER-OUTER QUADRANT OF LEFT BREAST IN FEMALE, ESTROGEN RECEPTOR POSITIVE (HCC): ICD-10-CM

## 2025-02-18 DIAGNOSIS — C50.912 BREAST CANCER, STAGE 4, LEFT (HCC): ICD-10-CM

## 2025-02-18 DIAGNOSIS — C50.412 MALIGNANT NEOPLASM OF UPPER-OUTER QUADRANT OF LEFT BREAST IN FEMALE, ESTROGEN RECEPTOR POSITIVE (HCC): ICD-10-CM

## 2025-02-18 DIAGNOSIS — Z17.0 CARCINOMA OF UPPER-OUTER QUADRANT OF LEFT BREAST IN FEMALE, ESTROGEN RECEPTOR POSITIVE (HCC): ICD-10-CM

## 2025-02-18 DIAGNOSIS — C50.412 CARCINOMA OF UPPER-OUTER QUADRANT OF LEFT BREAST IN FEMALE, ESTROGEN RECEPTOR POSITIVE (HCC): ICD-10-CM

## 2025-02-18 DIAGNOSIS — J90 PLEURAL EFFUSION: ICD-10-CM

## 2025-02-18 PROCEDURE — 99212 OFFICE O/P EST SF 10 MIN: CPT | Performed by: INTERNAL MEDICINE

## 2025-02-18 PROCEDURE — 74177 CT ABD & PELVIS W/CONTRAST: CPT

## 2025-02-18 PROCEDURE — 700117 HCHG RX CONTRAST REV CODE 255: Performed by: INTERNAL MEDICINE

## 2025-02-18 PROCEDURE — 99214 OFFICE O/P EST MOD 30 MIN: CPT | Performed by: INTERNAL MEDICINE

## 2025-02-18 PROCEDURE — 71046 X-RAY EXAM CHEST 2 VIEWS: CPT

## 2025-02-18 RX ADMIN — IOHEXOL 100 ML: 350 INJECTION, SOLUTION INTRAVENOUS at 18:00

## 2025-02-18 ASSESSMENT — ENCOUNTER SYMPTOMS
PSYCHIATRIC NEGATIVE: 1
SHORTNESS OF BREATH: 1
PALPITATIONS: 0
DIZZINESS: 0
CONSTIPATION: 0
DIARRHEA: 0
MUSCULOSKELETAL NEGATIVE: 1
CHILLS: 0
FEVER: 0
WEIGHT LOSS: 0
COUGH: 0
NAUSEA: 0
MYALGIAS: 0
NEUROLOGICAL NEGATIVE: 1
EYES NEGATIVE: 1
GASTROINTESTINAL NEGATIVE: 1
HEADACHES: 0
CARDIOVASCULAR NEGATIVE: 1
VOMITING: 0

## 2025-02-18 ASSESSMENT — FIBROSIS 4 INDEX: FIB4 SCORE: 1.57

## 2025-02-18 ASSESSMENT — PAIN SCALES - GENERAL: PAINLEVEL_OUTOF10: NO PAIN
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
## 2025-02-19 ENCOUNTER — TELEPHONE (OUTPATIENT)
Dept: MEDICAL GROUP | Facility: PHYSICIAN GROUP | Age: 60
End: 2025-02-19
Payer: MEDICARE

## 2025-02-19 DIAGNOSIS — J90 PLEURAL EFFUSION: ICD-10-CM

## 2025-02-19 DIAGNOSIS — C50.912 BREAST CANCER, STAGE 4, LEFT (HCC): ICD-10-CM

## 2025-02-19 DIAGNOSIS — R18.8 ABDOMINAL FLUID COLLECTION: ICD-10-CM

## 2025-02-19 RX ORDER — FUROSEMIDE 40 MG/1
40 TABLET ORAL DAILY
Qty: 30 TABLET | Refills: 0 | Status: SHIPPED | OUTPATIENT
Start: 2025-02-19 | End: 2025-03-21

## 2025-02-19 NOTE — TELEPHONE ENCOUNTER
"Caller Name: Sol Shauna Dion    Call Back Number:   How would the patient prefer to be contacted with a response: Phone call do NOT leave a detailed message    Is requesting referral be changed to Urgent for sleep medicine or she is going to have to wait until July for the next appointment.  Patient said \" Was supposed to lorenzo it urgent\" she can't breath.   "

## 2025-02-19 NOTE — TELEPHONE ENCOUNTER
The referral was ordered as urgent. It was reviewed by a sleep medicine doctor regarding the urgency. So, we have a couple of options. You can get on a cancellation list or we can request the referral goes outside of [facility] to see if you can get in quicker somewhere else.

## 2025-02-20 NOTE — PROGRESS NOTES
Contacted patient via phone in regard to CT scan and plan of care. Per Dr. Kline, patient needs a paracentesis. Stat Paracentesis ordered. Updated IR scheduling. Patient had questions regarding diuretics and pulmonology referral. Spoke with dariela Villa for patient to have refill of furosemide 40mg x30 day supply, hold until after paracentesis. Advised to wait on pulmonology referral. Will update patient via LOC&ALL message.

## 2025-02-21 ENCOUNTER — HOSPITAL ENCOUNTER (OUTPATIENT)
Dept: HEMATOLOGY ONCOLOGY | Facility: MEDICAL CENTER | Age: 60
End: 2025-02-21
Attending: INTERNAL MEDICINE
Payer: MEDICARE

## 2025-02-21 ENCOUNTER — HOSPITAL ENCOUNTER (OUTPATIENT)
Dept: RADIOLOGY | Facility: MEDICAL CENTER | Age: 60
End: 2025-02-21
Attending: INTERNAL MEDICINE
Payer: MEDICARE

## 2025-02-21 VITALS
SYSTOLIC BLOOD PRESSURE: 118 MMHG | HEART RATE: 100 BPM | WEIGHT: 212.74 LBS | DIASTOLIC BLOOD PRESSURE: 78 MMHG | BODY MASS INDEX: 35.45 KG/M2 | OXYGEN SATURATION: 85 % | HEIGHT: 65 IN | TEMPERATURE: 97.6 F

## 2025-02-21 VITALS — DIASTOLIC BLOOD PRESSURE: 73 MMHG | OXYGEN SATURATION: 96 % | SYSTOLIC BLOOD PRESSURE: 139 MMHG | HEART RATE: 93 BPM

## 2025-02-21 DIAGNOSIS — C80.0 CARCINOMATOSIS (HCC): ICD-10-CM

## 2025-02-21 DIAGNOSIS — C50.912 BREAST CANCER, STAGE 4, LEFT (HCC): ICD-10-CM

## 2025-02-21 DIAGNOSIS — R18.8 ABDOMINAL FLUID COLLECTION: ICD-10-CM

## 2025-02-21 PROCEDURE — 49083 ABD PARACENTESIS W/IMAGING: CPT

## 2025-02-21 PROCEDURE — 99212 OFFICE O/P EST SF 10 MIN: CPT | Performed by: INTERNAL MEDICINE

## 2025-02-21 PROCEDURE — 99214 OFFICE O/P EST MOD 30 MIN: CPT | Performed by: INTERNAL MEDICINE

## 2025-02-21 RX ORDER — ATROPINE SULFATE 1 MG/ML
0.5 INJECTION, SOLUTION INTRAVENOUS PRN
OUTPATIENT
Start: 2025-03-06

## 2025-02-21 RX ORDER — DEXAMETHASONE SODIUM PHOSPHATE 4 MG/ML
12 INJECTION, SOLUTION INTRA-ARTICULAR; INTRALESIONAL; INTRAMUSCULAR; INTRAVENOUS; SOFT TISSUE ONCE
OUTPATIENT
Start: 2025-02-27 | End: 2025-02-27

## 2025-02-21 RX ORDER — 0.9 % SODIUM CHLORIDE 0.9 %
3 VIAL (ML) INJECTION PRN
OUTPATIENT
Start: 2025-02-26

## 2025-02-21 RX ORDER — DIPHENHYDRAMINE HYDROCHLORIDE 50 MG/ML
25 INJECTION INTRAMUSCULAR; INTRAVENOUS ONCE
OUTPATIENT
Start: 2025-03-06 | End: 2025-03-06

## 2025-02-21 RX ORDER — LOPERAMIDE HYDROCHLORIDE 2 MG/1
2 CAPSULE ORAL
OUTPATIENT
Start: 2025-02-27

## 2025-02-21 RX ORDER — ACETAMINOPHEN 325 MG/1
650 TABLET ORAL ONCE
OUTPATIENT
Start: 2025-02-27

## 2025-02-21 RX ORDER — LOPERAMIDE HYDROCHLORIDE 2 MG/1
4 CAPSULE ORAL PRN
OUTPATIENT
Start: 2025-03-06

## 2025-02-21 RX ORDER — ONDANSETRON 2 MG/ML
4 INJECTION INTRAMUSCULAR; INTRAVENOUS PRN
OUTPATIENT
Start: 2025-02-27

## 2025-02-21 RX ORDER — PALONOSETRON 0.05 MG/ML
0.25 INJECTION, SOLUTION INTRAVENOUS ONCE
OUTPATIENT
Start: 2025-02-27 | End: 2025-02-27

## 2025-02-21 RX ORDER — ACETAMINOPHEN 325 MG/1
650 TABLET ORAL ONCE
OUTPATIENT
Start: 2025-03-06

## 2025-02-21 RX ORDER — 0.9 % SODIUM CHLORIDE 0.9 %
VIAL (ML) INJECTION PRN
OUTPATIENT
Start: 2025-02-26

## 2025-02-21 RX ORDER — LOPERAMIDE HYDROCHLORIDE 2 MG/1
4 CAPSULE ORAL PRN
OUTPATIENT
Start: 2025-02-27

## 2025-02-21 RX ORDER — ATROPINE SULFATE 1 MG/ML
0.5 INJECTION, SOLUTION INTRAVENOUS PRN
OUTPATIENT
Start: 2025-02-27

## 2025-02-21 RX ORDER — ONDANSETRON 8 MG/1
8 TABLET, ORALLY DISINTEGRATING ORAL PRN
OUTPATIENT
Start: 2025-02-27

## 2025-02-21 RX ORDER — DIPHENHYDRAMINE HYDROCHLORIDE 50 MG/ML
50 INJECTION INTRAMUSCULAR; INTRAVENOUS PRN
OUTPATIENT
Start: 2025-02-27

## 2025-02-21 RX ORDER — DEXAMETHASONE SODIUM PHOSPHATE 4 MG/ML
12 INJECTION, SOLUTION INTRA-ARTICULAR; INTRALESIONAL; INTRAMUSCULAR; INTRAVENOUS; SOFT TISSUE ONCE
OUTPATIENT
Start: 2025-03-06 | End: 2025-03-06

## 2025-02-21 RX ORDER — LOPERAMIDE HYDROCHLORIDE 2 MG/1
2 CAPSULE ORAL
OUTPATIENT
Start: 2025-03-06

## 2025-02-21 RX ORDER — 0.9 % SODIUM CHLORIDE 0.9 %
10 VIAL (ML) INJECTION PRN
OUTPATIENT
Start: 2025-02-27

## 2025-02-21 RX ORDER — ONDANSETRON 2 MG/ML
4 INJECTION INTRAMUSCULAR; INTRAVENOUS PRN
OUTPATIENT
Start: 2025-03-06

## 2025-02-21 RX ORDER — SODIUM CHLORIDE 9 MG/ML
INJECTION, SOLUTION INTRAVENOUS CONTINUOUS
OUTPATIENT
Start: 2025-03-06

## 2025-02-21 RX ORDER — METHYLPREDNISOLONE SODIUM SUCCINATE 125 MG/2ML
125 INJECTION, POWDER, LYOPHILIZED, FOR SOLUTION INTRAMUSCULAR; INTRAVENOUS PRN
OUTPATIENT
Start: 2025-02-27

## 2025-02-21 RX ORDER — 0.9 % SODIUM CHLORIDE 0.9 %
3 VIAL (ML) INJECTION PRN
OUTPATIENT
Start: 2025-02-27

## 2025-02-21 RX ORDER — 0.9 % SODIUM CHLORIDE 0.9 %
3 VIAL (ML) INJECTION PRN
OUTPATIENT
Start: 2025-03-06

## 2025-02-21 RX ORDER — 0.9 % SODIUM CHLORIDE 0.9 %
VIAL (ML) INJECTION PRN
OUTPATIENT
Start: 2025-02-27

## 2025-02-21 RX ORDER — PROCHLORPERAZINE MALEATE 10 MG
10 TABLET ORAL EVERY 6 HOURS PRN
OUTPATIENT
Start: 2025-03-06

## 2025-02-21 RX ORDER — 0.9 % SODIUM CHLORIDE 0.9 %
10 VIAL (ML) INJECTION PRN
OUTPATIENT
Start: 2025-02-26

## 2025-02-21 RX ORDER — DIPHENHYDRAMINE HYDROCHLORIDE 50 MG/ML
25 INJECTION INTRAMUSCULAR; INTRAVENOUS ONCE
OUTPATIENT
Start: 2025-02-27 | End: 2025-02-27

## 2025-02-21 RX ORDER — EPINEPHRINE 1 MG/ML(1)
0.5 AMPUL (ML) INJECTION PRN
OUTPATIENT
Start: 2025-03-06

## 2025-02-21 RX ORDER — SODIUM CHLORIDE 9 MG/ML
INJECTION, SOLUTION INTRAVENOUS CONTINUOUS
OUTPATIENT
Start: 2025-02-27

## 2025-02-21 RX ORDER — PALONOSETRON 0.05 MG/ML
0.25 INJECTION, SOLUTION INTRAVENOUS ONCE
OUTPATIENT
Start: 2025-03-06 | End: 2025-03-06

## 2025-02-21 RX ORDER — ONDANSETRON 8 MG/1
8 TABLET, ORALLY DISINTEGRATING ORAL PRN
OUTPATIENT
Start: 2025-03-06

## 2025-02-21 RX ORDER — EPINEPHRINE 1 MG/ML(1)
0.5 AMPUL (ML) INJECTION PRN
OUTPATIENT
Start: 2025-02-27

## 2025-02-21 RX ORDER — 0.9 % SODIUM CHLORIDE 0.9 %
10 VIAL (ML) INJECTION PRN
OUTPATIENT
Start: 2025-03-06

## 2025-02-21 RX ORDER — 0.9 % SODIUM CHLORIDE 0.9 %
VIAL (ML) INJECTION PRN
OUTPATIENT
Start: 2025-03-06

## 2025-02-21 RX ORDER — DIPHENHYDRAMINE HYDROCHLORIDE 50 MG/ML
50 INJECTION INTRAMUSCULAR; INTRAVENOUS PRN
OUTPATIENT
Start: 2025-03-06

## 2025-02-21 RX ORDER — PROCHLORPERAZINE MALEATE 10 MG
10 TABLET ORAL EVERY 6 HOURS PRN
OUTPATIENT
Start: 2025-02-27

## 2025-02-21 RX ORDER — METHYLPREDNISOLONE SODIUM SUCCINATE 125 MG/2ML
125 INJECTION, POWDER, LYOPHILIZED, FOR SOLUTION INTRAMUSCULAR; INTRAVENOUS PRN
OUTPATIENT
Start: 2025-03-06

## 2025-02-21 ASSESSMENT — ENCOUNTER SYMPTOMS
NEUROLOGICAL NEGATIVE: 1
MUSCULOSKELETAL NEGATIVE: 1
GASTROINTESTINAL NEGATIVE: 1
CONSTIPATION: 0
WEIGHT LOSS: 0
CARDIOVASCULAR NEGATIVE: 1
PSYCHIATRIC NEGATIVE: 1
DIZZINESS: 0
HEADACHES: 0
PALPITATIONS: 0
VOMITING: 0
CHILLS: 0
NAUSEA: 0
MYALGIAS: 0
EYES NEGATIVE: 1
COUGH: 0
FEVER: 0
DIARRHEA: 0
SHORTNESS OF BREATH: 1

## 2025-02-21 ASSESSMENT — FIBROSIS 4 INDEX: FIB4 SCORE: 1.57

## 2025-02-21 ASSESSMENT — PAIN SCALES - GENERAL: PAINLEVEL_OUTOF10: 8=MODERATE-SEVERE PAIN
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
## 2025-02-21 NOTE — PROGRESS NOTES
US guided therapeutic paracentesis done by Dr. Montes De Oca;(no H&P required as this is a NON SEDATION procedure) LLQ access site; dressing CDI; 3500 mL obtained and 2250 discarded. 1250 mL sent to lab. Pt tolerated the procedure well; pt hemodynamically stable pre/intra/post procedure. All questions and concerns answered prior to d/c. Patient provided with all appropriate education for procedure. Pt d/c home.

## 2025-02-22 NOTE — ADDENDUM NOTE
Encounter addended by: Erich Cardoso, Med Ass't on: 2/21/2025 4:18 PM   Actions taken: Charge Capture section accepted

## 2025-02-24 LAB — CYTOLOGY REG CYTOL: NORMAL

## 2025-02-25 ENCOUNTER — HOSPITAL ENCOUNTER (OUTPATIENT)
Dept: HEMATOLOGY ONCOLOGY | Facility: MEDICAL CENTER | Age: 60
End: 2025-02-25
Attending: FAMILY MEDICINE
Payer: MEDICARE

## 2025-02-25 VITALS
OXYGEN SATURATION: 87 % | DIASTOLIC BLOOD PRESSURE: 84 MMHG | HEART RATE: 111 BPM | SYSTOLIC BLOOD PRESSURE: 142 MMHG | WEIGHT: 213.8 LBS | HEIGHT: 65 IN | TEMPERATURE: 97.7 F | BODY MASS INDEX: 35.62 KG/M2

## 2025-02-25 DIAGNOSIS — F51.01 PRIMARY INSOMNIA: ICD-10-CM

## 2025-02-25 DIAGNOSIS — C80.0 CARCINOMATOSIS (HCC): ICD-10-CM

## 2025-02-25 DIAGNOSIS — F41.9 ANXIETY IN CANCER PATIENT: ICD-10-CM

## 2025-02-25 DIAGNOSIS — C50.412 CARCINOMA OF UPPER-OUTER QUADRANT OF LEFT BREAST IN FEMALE, ESTROGEN RECEPTOR POSITIVE (HCC): ICD-10-CM

## 2025-02-25 DIAGNOSIS — R18.0 MALIGNANT ASCITES: ICD-10-CM

## 2025-02-25 DIAGNOSIS — Z17.0 CARCINOMA OF UPPER-OUTER QUADRANT OF LEFT BREAST IN FEMALE, ESTROGEN RECEPTOR POSITIVE (HCC): ICD-10-CM

## 2025-02-25 PROCEDURE — 99214 OFFICE O/P EST MOD 30 MIN: CPT | Performed by: FAMILY MEDICINE

## 2025-02-25 PROCEDURE — 99212 OFFICE O/P EST SF 10 MIN: CPT | Performed by: FAMILY MEDICINE

## 2025-02-25 RX ORDER — LORAZEPAM 0.5 MG/1
0.5 TABLET ORAL EVERY 8 HOURS PRN
Qty: 9 TABLET | Refills: 0 | Status: SHIPPED | OUTPATIENT
Start: 2025-02-25 | End: 2025-02-28

## 2025-02-25 ASSESSMENT — FIBROSIS 4 INDEX: FIB4 SCORE: 1.57

## 2025-02-28 NOTE — PROGRESS NOTES
Reason for Follow-Up:    I spoke to the patient this afternoon by phone in order to complete her monthly PCM follow up    Current Health Status:    The patient is followed by PCM based on diagnoses of HTN, DM2, and chronic respiratory failure. The patient's medical history is also significant for breast cancer that has metastasized. The patient reports no chest pain, palpitations, headache, or burning sensation. The patient's blood pressures have been in the 120s-130s systolic over her last clinic visits. The patient's A1C was 7.0 in November. The patient voices understanding of current nutrition and hydration recommendations. The patient is reporting her oxygen levels go into the 50s at points during the night. She has been referred to [facility] pulmonology and sleep medicine but they are scheduling out until July. Chart routed to provider for outside referral. The patient also has recent ascites and pulmonary effusion that have an impact on her breathing as well.     Medical Updates:  The [facility] regarding medications, ascites, and the nocturnal oxygenation issues. The patient has also been seen and assessed by primary care since our last conversation.     Medication Updates:  Furosemide    Symptoms:  Intermittent shortness of breath during the day, fatigue, Fluid accumulation with routine order for paracentesis.     Plan of Care Goals and Progress:    Goal 1. Over the next month the patient will communicate her symptoms to her medical team and attend to ascites with standing order as prescribed.      Progress:  The patient has had one paracentesis that she states removed a gallon of fluid. The patient is aware of symptoms indicating fluid evaluation.       Barriers:  The patient is undergoing cancer treatment and is awaiting the start of a new medication that her oncologist states may help with fluid accumulation     Interventions:  The patient will communicate  with her oncology team and utilize her standing orders according to their parameters.      Education:  Indications of edema and ascites reviewed.     Goal 2. Over the next month the patient will practice healthy nutrition, hydration, and activity as able in order to maintain Diabetes control.      Progress:  The patient's nutrition has enabled her A1C to be at 7 her last check.       Barriers:  The patient is undergoing very vigorous oncological treatment.      Interventions:  The patient will receive education on nutrition during cancer treatment.      Education:  Article on nutrition during cancer treatment sent by home.       Patient's Concerns and Feedback (Self Management of Care):     The patient is concerned about getting a new sleep evaluation done. The patient's PCP is aware and is considering outside referral at the patient's request. The patient has been advised to follow up with Dr. Kline et fabian regarding parameters and indications for when the standing order for paracentesis would most effectively be utilized.     Next Steps:     Follow-Up Plan:  The patient's next PCM follow up will be in approximately 4 weeks.       Appointments:  3/5/25(9 am, [facility]), 3/10/25(8am, [facility]), 3/12/25(9 am, [facility] [place] [facility]), 3/12/25(11 am, PT)     Contact Information:  Call [phone] with any questions or concerns.

## 2025-03-01 NOTE — CARE PLAN
Problem: Knowledge deficit of factors contributing to hypertension- Long term  Description: Article on Hypertension and attendant factors sent by mail  Goal: Demonstrate factors that can contribute to high blood pressure  Outcome: Progressing  Note: Patient has verbalized understanding of factors contributing to high blood pressure including stress and even mild physical activity.   Intervention: Educate patient on role of physical activity  Description: Refer to community wellness programs  Intervention: Educate patient on role of stress/anxiety     Problem: Patient Stated Problem: Confidently preventing crises  Description: Multiple providers, complex set of health concerns  Goal: Patient Stated Goal: The patient will have the opportunity to regularly ask questions and learn about and review her current and future health concerns.   Outcome: Progressing  Note: The patient is utilizing their medical team appropriately.      Problem: Recognize current health habits that may contribute to hypertension -Long Term  Goal: Identify which modifiable health habits can be modifed     Problem: Patient barriers to managing diabetes - Long Term  Goal: Identify patient barriers to managing diabetes  Note: The patient is undergoing strenuous cancer therapies that can impact blood sugars.

## 2025-03-01 NOTE — Clinical Note
REFERRAL APPROVAL NOTICE         Sent on February 28, 2025                   Sol Kern  [address] [facility]  [address]  [place] [address]                   Dear Ms. Kern,    After a careful review of the medical information and benefit coverage, [facility] has processed your referral. See below for additional details.    If applicable, you must be actively enrolled with your insurance for coverage of the authorized service. If you have any questions regarding your coverage, please contact your insurance directly.    REFERRAL INFORMATION   Referral #:  [id number]  Referred-To Provider    Referred-By Provider:  Sleep Medicine    LINH Purcell STEVE K      [address]  [facility] [facility] [id number] [phone] [address] [facility] [address]  [place] [id number] [phone]    Referral Start Date:  02/28/2025  Referral End Date:   02/28/2026             SCHEDULING  If you do not already have an appointment, please call [phone] to make an appointment.     MORE INFORMATION  If you do not already have a [employer] account, sign up at: [email or web address]  You can access your medical information, make appointments, see lab results, billing information, and more.  If you have questions regarding this referral, please contact  the [facility] Referrals department at:             [phone]. Monday - Friday 8:00AM - 5:00PM.     Sincerely,    [facility]

## 2025-03-03 PROBLEM — F41.9 ANXIETY IN CANCER PATIENT: Status: ACTIVE | Noted: 2025-03-03

## 2025-03-03 PROBLEM — R18.0 MALIGNANT ASCITES: Status: ACTIVE | Noted: 2025-03-03

## 2025-03-03 ASSESSMENT — ENCOUNTER SYMPTOMS
FEVER: 0
SHORTNESS OF BREATH: 0
PALPITATIONS: 0
ABDOMINAL PAIN: 1
CHILLS: 0
NAUSEA: 0
VOMITING: 0
COUGH: 0
CONSTIPATION: 0
DIARRHEA: 0

## 2025-03-03 NOTE — ASSESSMENT & PLAN NOTE
Secondary to peritoneal carcinomatosis.  Did have recent paracentesis.  Will place order for standing therapeutic paracentesis.  Will continue to monitor and if she begins new treatment regimen.

## 2025-03-03 NOTE — PROGRESS NOTES
Subjective:     Chief Complaint   Patient presents with    Cancer      dieringer/ scp/ PALLIATIVE CARE/ 2 mo fv     Sol Kern is a 59 y.o. female presenting to palliative care for follow-up on symptom management for oncological pain.  Patient reports since last visit she has visited with her oncology team and they are going to begin a new round of treatment.  She did also have a recent paracentesis for which she did obtain some relief in her abdominal fullness and pain.  She does also feel that it has began reaccumulating.  Through shared decision making I told her I would place a standing order for paracentesis.  Ambien has helping with sleep.  Has had some struggles with anxiety and was open to a trial of lorazepam.  Does not endorse any significant nausea vomiting or constipation or diarrhea.    Problem   Anxiety in Cancer Patient   Malignant Ascites   Primary Insomnia        Current medicines (including changes today)  Current Outpatient Medications   Medication Sig Dispense Refill    furosemide (LASIX) 40 MG Tab Take 1 Tablet by mouth every day for 30 days. (Patient not taking: Reported on 2/21/2025) 30 Tablet 0    lidocaine (LIDODERM) 5 % Patch PLACE 1 PATCH ON THE SKIN EVERY 24 HOURS. PLACE PATCH TO AFFECTED AREA IN AM AND REMOVE AT BEDTIME. 30 Patch 0    capecitabine (XELODA) 500 MG tablet Take 3 tablets by mouth 2 times a day on days 1-14 of 21 day cycle (2 weeks on, 1 week off) 84 Tablet 3    Semaglutide,0.25 or 0.5MG/DOS, (OZEMPIC, 0.25 OR 0.5 MG/DOSE,) 2 MG/3ML Solution Pen-injector Inject 0.25 mg under the skin every 7 days for 28 days, THEN 0.5 mg every 7 days for 28 days. 6 mL 0    [START ON 3/12/2025] Semaglutide, 1 MG/DOSE, 4 MG/3ML Solution Pen-injector Inject 1 mg under the skin every 7 days. 3 mL 0    zolpidem (AMBIEN) 10 MG Tab Take 1 Tablet by mouth at bedtime as needed for Sleep for up to 90 days. 30 Tablet 2    Ribociclib Succ, 600 MG Dose, 200 MG Tablet Therapy Pack Take 600 mg by  mouth see administration instructions. Take 600 mg by mouth daily for 3 weeks (21 days), off for 1 week (7 days) 63 Each 0    prochlorperazine (COMPAZINE) 10 MG Tab Take 1 Tablet by mouth every 6 hours as needed for Nausea/Vomiting. 30 Tablet 3    simethicone (MYLICON) 125 MG chewable tablet Chew 250 mg every 6 hours as needed for Flatulence. 2 tablets= 250mg      atorvastatin (LIPITOR) 20 MG Tab TAKE 1 TABLET BY MOUTH EVERY DAY IN THE EVENING 100 Tablet 3    albuterol 108 (90 Base) MCG/ACT Aero Soln inhalation aerosol Inhale 2 Puffs every 6 hours as needed for Shortness of Breath. 8 Each 3    ondansetron (ZOFRAN ODT) 4 MG TABLET DISPERSIBLE Take 1 Tablet by mouth every 8 hours as needed for Nausea/Vomiting. 10 Tablet 0    denosumab (XGEVA) 120 MG/1.7ML injection Inject 120 mg under the skin every 28 days.      fulvestrant (FASLODEX) 250 MG/5ML Solution Prefilled Syringe injection Inject 250 mg into the shoulder, thigh, or buttocks every 90 days.      cyclobenzaprine (FLEXERIL) 10 mg Tab TAKE 1 TABLET BY MOUTH THREE TIMES A DAY AS NEEDED FOR MUSCLE SPASM (Patient taking differently: Take 10 mg by mouth 3 times a day as needed for Muscle Spasms (As needed).) 90 Tablet 0    metFORMIN ER (GLUCOPHAGE XR) 500 MG TABLET SR 24 HR Take 1 Tablet by mouth every day. (Patient taking differently: Take 1,000 mg by mouth every evening.) 200 Tablet 1    lisinopril (PRINIVIL) 2.5 MG Tab Take 1 Tablet by mouth every day. 90 Tablet 1     No current facility-administered medications for this encounter.       Social History     Tobacco Use    Smoking status: Former     Current packs/day: 0.25     Average packs/day: 0.3 packs/day for 20.0 years (5.0 ttl pk-yrs)     Types: Cigarettes    Smokeless tobacco: Never    Tobacco comments:     working on quitting    Vaping Use    Vaping status: Never Used   Substance Use Topics    Alcohol use: Not Currently     Comment: rarely    Drug use: Not Currently     Types: Marijuana, Oral     Comment:  "gummies     Past Medical History:   Diagnosis Date    Allergy     Back pain     Cancer (HCC) 2021    breast    Diabetes (HCC)     diet    GERD (gastroesophageal reflux disease)     High cholesterol     not medicated    Hypertension     Malignant neoplasm of upper-outer quadrant of left female breast (HCC)     Neck pain     Obesity     Sleep apnea     no cpap    Snoring       Family History   Problem Relation Age of Onset    Cancer Mother         melanoma    Hypertension Mother     Other Mother         THYROID    Heart Disease Mother         valvular disease    Ovarian Cancer Mother     Heart Disease Father         Heart Attack    Heart Attack Father     Other Father         psoriasis    Hyperlipidemia Brother     Other Brother         psoriasis    Heart Disease Brother         MI    Heart Attack Brother         massive heart attack     Heart Disease Maternal Grandfather         MI    Breast Cancer Other     Other Other         psoriasis    Diabetes Neg Hx     Alcohol/Drug Neg Hx     Tubal Cancer Neg Hx     Peritoneal Cancer Neg Hx     Colorectal Cancer Neg Hx          Objective:     Vitals:    02/25/25 1125   BP: (!) 142/84   Pulse: (!) 111   Temp: 36.5 °C (97.7 °F)   TempSrc: Temporal   SpO2: (!) 87%   Weight: 97 kg (213 lb 12.8 oz)   Height: 1.651 m (5' 5\")     Body mass index is 35.58 kg/m².     Review of Systems   Constitutional:  Positive for malaise/fatigue. Negative for chills and fever.   Respiratory:  Negative for cough and shortness of breath.    Cardiovascular:  Negative for chest pain and palpitations.   Gastrointestinal:  Positive for abdominal pain. Negative for constipation, diarrhea, nausea and vomiting.     Physical Exam:   Gen: No acute distress.   Skin: Pink, warm, and dry  HEENT: conjunctiva non-injected, sclera non-icteric. EOMs intact.   Nasal mucosa without edema nor erythema.   Pinna normal.    Neck: Supple, trachea midline. No adenopathy or masses in the neck or supraclavicular " regions.  Lungs: Effort is normal.   CV: regular rate and rhythm  Abdomen: Flat  Ext: no edema, color normal, vascularity normal, temperature normal  Alert and oriented Eye contact is good, speech goal directed, affect calm    Assessment and Plan:   Malignant ascites  Secondary to peritoneal carcinomatosis.  Did have recent paracentesis.  Will place order for standing therapeutic paracentesis.  Will continue to monitor and if she begins new treatment regimen.    Anxiety in cancer patient  Will trial lorazepam.  PDMP reviewed, no signs of diversion or abuse, risk and benefits of medication discussed, controlled subs agreement on file.    Primary insomnia  Improved with use of Ambien.  Not needed for refill today.  Continue as needed use.      36 minutes in total including chart review and consultation with patients oncological providers.     Followup: Return if symptoms worsen or fail to improve.    Mariusz Peterson M.D.

## 2025-03-03 NOTE — ASSESSMENT & PLAN NOTE
Will trial lorazepam.  PDMP reviewed, no signs of diversion or abuse, risk and benefits of medication discussed, controlled subs agreement on file.

## 2025-03-04 NOTE — PROGRESS NOTES
"Pharmacy Chemotherapy Verification Note:    Patient Name: Sol Kern      Dx: Breast Cancer      Protocol: Sacituzumab Govitecan       *Dosing Reference*  Sacituzumab govitecan-hziy 10 mg/kg IV on Days 1 and 8  21-day cycle until disease progression or unacceptable toxicity     1. NCCNGuidelines® for Breast Cancer V.2.2024.   2. Staci FITZPATRICK et al. N Engl J Med. 2019;380(8):741-751.a   3. Andre et al. J Clin Oncol. 2022;40(17_suppl):PSA7988.a    Allergies:  Codeine, Hydrocodone, and Other drug     BP (!) 147/84   Pulse (!) 105   Temp 36.2 °C (97.2 °F) (Temporal)   Resp 18   Ht 1.6 m (5' 2.99\")   Wt 94 kg (207 lb 3.7 oz)   SpO2 97%   BMI 36.72 kg/m²  Body surface area is 2.04 meters squared.  Date 3/10/25 :  ANC = ~ 6200  Plt= 367k   Hgb= 14.3    Drug Order   (Drug name, dose, route, IV Fluid & volume, frequency, number of doses) Cycle: 1      Previous treatment: Capecitabine x 2C, Ribociclib + Faslodex     Medication = Sacituzumab govitecan  Base Dose = 10 mg/kg  Calc Dose: Base Dose x 94 kg = 940 mg  Final Dose = 900 mg  Route = IV  Fluid & Volume =  mL  Admin Duration = Over 3 hours          <10% difference, rounded to vial size (with 10%) per dose rounding protocol, OK to treat with final dose       By my signature below, I confirm this process was performed independently with the BSA and all final chemotherapy dosing calculations congruent. I have reviewed the above chemotherapy order and that my calculation of the final dose and BSA (when applicable) corroborate those calculations of the  pharmacist.     Christine Thompson, PharmD  "

## 2025-03-05 ENCOUNTER — HOSPITAL ENCOUNTER (OUTPATIENT)
Dept: HEMATOLOGY ONCOLOGY | Facility: MEDICAL CENTER | Age: 60
End: 2025-03-05
Attending: INTERNAL MEDICINE
Payer: MEDICARE

## 2025-03-05 ENCOUNTER — HOSPITAL ENCOUNTER (OUTPATIENT)
Dept: RADIOLOGY | Facility: MEDICAL CENTER | Age: 60
End: 2025-03-05
Attending: INTERNAL MEDICINE
Payer: MEDICARE

## 2025-03-05 ENCOUNTER — HOSPITAL ENCOUNTER (OUTPATIENT)
Dept: RADIOLOGY | Facility: MEDICAL CENTER | Age: 60
End: 2025-03-05
Attending: FAMILY MEDICINE
Payer: MEDICARE

## 2025-03-05 VITALS
TEMPERATURE: 97.4 F | WEIGHT: 214.84 LBS | DIASTOLIC BLOOD PRESSURE: 78 MMHG | HEART RATE: 108 BPM | SYSTOLIC BLOOD PRESSURE: 118 MMHG | HEIGHT: 65 IN | BODY MASS INDEX: 35.79 KG/M2 | OXYGEN SATURATION: 90 %

## 2025-03-05 VITALS
DIASTOLIC BLOOD PRESSURE: 78 MMHG | HEART RATE: 100 BPM | TEMPERATURE: 98.1 F | OXYGEN SATURATION: 94 % | SYSTOLIC BLOOD PRESSURE: 139 MMHG

## 2025-03-05 DIAGNOSIS — Z17.0 MALIGNANT NEOPLASM OF UPPER-OUTER QUADRANT OF LEFT BREAST IN FEMALE, ESTROGEN RECEPTOR POSITIVE (HCC): ICD-10-CM

## 2025-03-05 DIAGNOSIS — J90 PLEURAL EFFUSION: ICD-10-CM

## 2025-03-05 DIAGNOSIS — C50.912 BREAST CANCER, STAGE 4, LEFT (HCC): ICD-10-CM

## 2025-03-05 DIAGNOSIS — R18.0 MALIGNANT ASCITES: ICD-10-CM

## 2025-03-05 DIAGNOSIS — C50.412 MALIGNANT NEOPLASM OF UPPER-OUTER QUADRANT OF LEFT BREAST IN FEMALE, ESTROGEN RECEPTOR POSITIVE (HCC): ICD-10-CM

## 2025-03-05 DIAGNOSIS — C80.0 CARCINOMATOSIS (HCC): ICD-10-CM

## 2025-03-05 PROCEDURE — C1729 CATH, DRAINAGE: HCPCS

## 2025-03-05 PROCEDURE — 76604 US EXAM CHEST: CPT

## 2025-03-05 PROCEDURE — 99212 OFFICE O/P EST SF 10 MIN: CPT | Performed by: INTERNAL MEDICINE

## 2025-03-05 PROCEDURE — 99214 OFFICE O/P EST MOD 30 MIN: CPT | Performed by: INTERNAL MEDICINE

## 2025-03-05 ASSESSMENT — ENCOUNTER SYMPTOMS
VOMITING: 0
HEADACHES: 0
NEUROLOGICAL NEGATIVE: 1
PSYCHIATRIC NEGATIVE: 1
MYALGIAS: 0
SHORTNESS OF BREATH: 1
COUGH: 0
CHILLS: 0
PALPITATIONS: 0
MUSCULOSKELETAL NEGATIVE: 1
CONSTIPATION: 0
CARDIOVASCULAR NEGATIVE: 1
NAUSEA: 0
EYES NEGATIVE: 1
GASTROINTESTINAL NEGATIVE: 1
WEIGHT LOSS: 0
FEVER: 0
DIARRHEA: 0
DIZZINESS: 0

## 2025-03-05 ASSESSMENT — FIBROSIS 4 INDEX: FIB4 SCORE: 1.57
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
## 2025-03-05 NOTE — ADDENDUM NOTE
Encounter addended by: Erich Cardoso, Med Ass't on: 3/5/2025 10:10 AM   Actions taken: Charge Capture section accepted

## 2025-03-05 NOTE — PROGRESS NOTES
US guided therapeutic paracentesis done by Kamini GIBSON;(no H&P required as this is a NON SEDATION procedure) LLQ access site; dressing CDI; 2200 mL obtained and 2200 mL discarded. Pt tolerated the procedure well; pt hemodynamically stable pre/intra/post procedure. Guaze and Tegaderm applied upon completion of paracentesis All questions and concerns answered prior to d/c. Patient provided with all appropriate education for procedure. Pt d/c home.

## 2025-03-05 NOTE — PROGRESS NOTES
Per Dr. Kline, orders for a chest ultrasound to determine amount of fluid that has built up in patient, a thoracentesis (laterality to be determined based on ultrasound chest), and for a port placement.

## 2025-03-06 ENCOUNTER — HOSPITAL ENCOUNTER (OUTPATIENT)
Dept: RADIOLOGY | Facility: MEDICAL CENTER | Age: 60
End: 2025-03-06
Attending: INTERNAL MEDICINE
Payer: MEDICARE

## 2025-03-06 ENCOUNTER — HOSPITAL ENCOUNTER (OUTPATIENT)
Facility: MEDICAL CENTER | Age: 60
End: 2025-03-06
Attending: INTERNAL MEDICINE | Admitting: INTERNAL MEDICINE
Payer: MEDICARE

## 2025-03-06 DIAGNOSIS — C50.412 MALIGNANT NEOPLASM OF UPPER-OUTER QUADRANT OF LEFT BREAST IN FEMALE, ESTROGEN RECEPTOR POSITIVE (HCC): ICD-10-CM

## 2025-03-06 DIAGNOSIS — J90 PLEURAL EFFUSION: ICD-10-CM

## 2025-03-06 DIAGNOSIS — Z17.0 MALIGNANT NEOPLASM OF UPPER-OUTER QUADRANT OF LEFT BREAST IN FEMALE, ESTROGEN RECEPTOR POSITIVE (HCC): ICD-10-CM

## 2025-03-06 PROCEDURE — C1729 CATH, DRAINAGE: HCPCS

## 2025-03-06 PROCEDURE — 71045 X-RAY EXAM CHEST 1 VIEW: CPT

## 2025-03-10 ENCOUNTER — OUTPATIENT INFUSION SERVICES (OUTPATIENT)
Dept: ONCOLOGY | Facility: MEDICAL CENTER | Age: 60
End: 2025-03-10
Attending: INTERNAL MEDICINE
Payer: MEDICARE

## 2025-03-10 ENCOUNTER — DOCUMENTATION (OUTPATIENT)
Dept: ONCOLOGY | Facility: MEDICAL CENTER | Age: 60
End: 2025-03-10

## 2025-03-10 VITALS
HEIGHT: 63 IN | DIASTOLIC BLOOD PRESSURE: 79 MMHG | OXYGEN SATURATION: 91 % | SYSTOLIC BLOOD PRESSURE: 134 MMHG | TEMPERATURE: 97.2 F | BODY MASS INDEX: 36.72 KG/M2 | HEART RATE: 87 BPM | WEIGHT: 207.23 LBS | RESPIRATION RATE: 16 BRPM

## 2025-03-10 DIAGNOSIS — R18.0 MALIGNANT ASCITES: ICD-10-CM

## 2025-03-10 DIAGNOSIS — C50.412 MALIGNANT NEOPLASM OF UPPER-OUTER QUADRANT OF LEFT BREAST IN FEMALE, ESTROGEN RECEPTOR POSITIVE (HCC): ICD-10-CM

## 2025-03-10 DIAGNOSIS — C50.912 BREAST CANCER, STAGE 4, LEFT (HCC): ICD-10-CM

## 2025-03-10 DIAGNOSIS — F41.9 ANXIETY IN CANCER PATIENT: ICD-10-CM

## 2025-03-10 DIAGNOSIS — Z17.0 MALIGNANT NEOPLASM OF UPPER-OUTER QUADRANT OF LEFT BREAST IN FEMALE, ESTROGEN RECEPTOR POSITIVE (HCC): ICD-10-CM

## 2025-03-10 LAB
ALBUMIN SERPL BCP-MCNC: 3.3 G/DL (ref 3.2–4.9)
ALBUMIN/GLOB SERPL: 0.9 G/DL
ALP SERPL-CCNC: 81 U/L (ref 30–99)
ALT SERPL-CCNC: 17 U/L (ref 2–50)
ANION GAP SERPL CALC-SCNC: 11 MMOL/L (ref 7–16)
AST SERPL-CCNC: 40 U/L (ref 12–45)
BASOPHILS # BLD AUTO: 0.5 % (ref 0–1.8)
BASOPHILS # BLD: 0.04 K/UL (ref 0–0.12)
BILIRUB SERPL-MCNC: 0.3 MG/DL (ref 0.1–1.5)
BUN SERPL-MCNC: 11 MG/DL (ref 8–22)
CALCIUM ALBUM COR SERPL-MCNC: 10.8 MG/DL (ref 8.5–10.5)
CALCIUM SERPL-MCNC: 10.2 MG/DL (ref 8.5–10.5)
CHLORIDE SERPL-SCNC: 102 MMOL/L (ref 96–112)
CO2 SERPL-SCNC: 26 MMOL/L (ref 20–33)
CREAT SERPL-MCNC: 0.72 MG/DL (ref 0.5–1.4)
EOSINOPHIL # BLD AUTO: 0.1 K/UL (ref 0–0.51)
EOSINOPHIL NFR BLD: 1.3 % (ref 0–6.9)
ERYTHROCYTE [DISTWIDTH] IN BLOOD BY AUTOMATED COUNT: 51.2 FL (ref 35.9–50)
GFR SERPLBLD CREATININE-BSD FMLA CKD-EPI: 94 ML/MIN/1.73 M 2
GLOBULIN SER CALC-MCNC: 3.7 G/DL (ref 1.9–3.5)
GLUCOSE SERPL-MCNC: 179 MG/DL (ref 65–99)
HCT VFR BLD AUTO: 43.8 % (ref 37–47)
HGB BLD-MCNC: 14.3 G/DL (ref 12–16)
IMM GRANULOCYTES # BLD AUTO: 0.05 K/UL (ref 0–0.11)
IMM GRANULOCYTES NFR BLD AUTO: 0.7 % (ref 0–0.9)
LYMPHOCYTES # BLD AUTO: 0.52 K/UL (ref 1–4.8)
LYMPHOCYTES NFR BLD: 7 % (ref 22–41)
MCH RBC QN AUTO: 31.1 PG (ref 27–33)
MCHC RBC AUTO-ENTMCNC: 32.6 G/DL (ref 32.2–35.5)
MCV RBC AUTO: 95.2 FL (ref 81.4–97.8)
MONOCYTES # BLD AUTO: 0.55 K/UL (ref 0–0.85)
MONOCYTES NFR BLD AUTO: 7.4 % (ref 0–13.4)
NEUTROPHILS # BLD AUTO: 6.2 K/UL (ref 1.82–7.42)
NEUTROPHILS NFR BLD: 83.1 % (ref 44–72)
NRBC # BLD AUTO: 0 K/UL
NRBC BLD-RTO: 0 /100 WBC (ref 0–0.2)
OUTPT INFUS CBC COMMENT OICOM: ABNORMAL
PLATELET # BLD AUTO: 367 K/UL (ref 164–446)
PMV BLD AUTO: 9 FL (ref 9–12.9)
POTASSIUM SERPL-SCNC: 4.1 MMOL/L (ref 3.6–5.5)
PROT SERPL-MCNC: 7 G/DL (ref 6–8.2)
RBC # BLD AUTO: 4.6 M/UL (ref 4.2–5.4)
SODIUM SERPL-SCNC: 139 MMOL/L (ref 135–145)
WBC # BLD AUTO: 7.5 K/UL (ref 4.8–10.8)

## 2025-03-10 PROCEDURE — 700111 HCHG RX REV CODE 636 W/ 250 OVERRIDE (IP): Mod: JZ,TB | Performed by: INTERNAL MEDICINE

## 2025-03-10 PROCEDURE — 96367 TX/PROPH/DG ADDL SEQ IV INF: CPT

## 2025-03-10 PROCEDURE — 700105 HCHG RX REV CODE 258: Performed by: INTERNAL MEDICINE

## 2025-03-10 PROCEDURE — 96413 CHEMO IV INFUSION 1 HR: CPT

## 2025-03-10 PROCEDURE — 85025 COMPLETE CBC W/AUTO DIFF WBC: CPT

## 2025-03-10 PROCEDURE — 96375 TX/PRO/DX INJ NEW DRUG ADDON: CPT

## 2025-03-10 PROCEDURE — 96415 CHEMO IV INFUSION ADDL HR: CPT

## 2025-03-10 PROCEDURE — A9270 NON-COVERED ITEM OR SERVICE: HCPCS | Performed by: INTERNAL MEDICINE

## 2025-03-10 PROCEDURE — 700102 HCHG RX REV CODE 250 W/ 637 OVERRIDE(OP): Performed by: INTERNAL MEDICINE

## 2025-03-10 PROCEDURE — 80053 COMPREHEN METABOLIC PANEL: CPT

## 2025-03-10 RX ORDER — ACETAMINOPHEN 325 MG/1
650 TABLET ORAL ONCE
Status: COMPLETED | OUTPATIENT
Start: 2025-03-10 | End: 2025-03-10

## 2025-03-10 RX ORDER — ONDANSETRON 4 MG/1
4 TABLET, FILM COATED ORAL EVERY 4 HOURS PRN
Qty: 30 TABLET | Refills: 6 | Status: SHIPPED | OUTPATIENT
Start: 2025-03-10

## 2025-03-10 RX ORDER — DIPHENHYDRAMINE HYDROCHLORIDE 50 MG/ML
50 INJECTION, SOLUTION INTRAMUSCULAR; INTRAVENOUS PRN
Status: DISCONTINUED | OUTPATIENT
Start: 2025-03-10 | End: 2025-03-10 | Stop reason: HOSPADM

## 2025-03-10 RX ORDER — DIAZEPAM 10 MG/1
10 TABLET ORAL PRN
COMMUNITY
End: 2025-03-10 | Stop reason: SDUPTHER

## 2025-03-10 RX ORDER — LOPERAMIDE HYDROCHLORIDE 2 MG/1
2 CAPSULE ORAL SEE ADMIN INSTRUCTIONS
Qty: 30 CAPSULE | Refills: 6 | Status: SHIPPED | OUTPATIENT
Start: 2025-03-10

## 2025-03-10 RX ORDER — DIAZEPAM 10 MG/1
10 TABLET ORAL PRN
Qty: 21 TABLET | Refills: 0 | Status: SHIPPED | OUTPATIENT
Start: 2025-03-10 | End: 2025-03-17

## 2025-03-10 RX ORDER — OLANZAPINE 5 MG/1
5 TABLET ORAL NIGHTLY
Qty: 24 TABLET | Refills: 0 | Status: SHIPPED | OUTPATIENT
Start: 2025-03-10 | End: 2025-03-31

## 2025-03-10 RX ORDER — DEXAMETHASONE SODIUM PHOSPHATE 4 MG/ML
12 INJECTION, SOLUTION INTRA-ARTICULAR; INTRALESIONAL; INTRAMUSCULAR; INTRAVENOUS; SOFT TISSUE ONCE
Status: COMPLETED | OUTPATIENT
Start: 2025-03-10 | End: 2025-03-10

## 2025-03-10 RX ORDER — PROCHLORPERAZINE MALEATE 10 MG
10 TABLET ORAL EVERY 6 HOURS PRN
Status: DISCONTINUED | OUTPATIENT
Start: 2025-03-10 | End: 2025-03-10 | Stop reason: HOSPADM

## 2025-03-10 RX ORDER — METHYLPREDNISOLONE SODIUM SUCCINATE 125 MG/2ML
125 INJECTION, POWDER, LYOPHILIZED, FOR SOLUTION INTRAMUSCULAR; INTRAVENOUS PRN
Status: COMPLETED | OUTPATIENT
Start: 2025-03-10 | End: 2025-03-10

## 2025-03-10 RX ORDER — DIPHENHYDRAMINE HYDROCHLORIDE 50 MG/ML
25 INJECTION, SOLUTION INTRAMUSCULAR; INTRAVENOUS ONCE
Status: COMPLETED | OUTPATIENT
Start: 2025-03-10 | End: 2025-03-10

## 2025-03-10 RX ORDER — EPINEPHRINE 1 MG/ML(1)
0.5 AMPUL (ML) INJECTION PRN
Status: DISCONTINUED | OUTPATIENT
Start: 2025-03-10 | End: 2025-03-10 | Stop reason: HOSPADM

## 2025-03-10 RX ORDER — PROCHLORPERAZINE MALEATE 10 MG
10 TABLET ORAL EVERY 6 HOURS PRN
Qty: 30 TABLET | Refills: 6 | Status: SHIPPED | OUTPATIENT
Start: 2025-03-10

## 2025-03-10 RX ORDER — ATROPINE SULFATE 1 MG/ML
0.5 INJECTION, SOLUTION INTRAVENOUS PRN
Status: DISCONTINUED | OUTPATIENT
Start: 2025-03-10 | End: 2025-03-10 | Stop reason: HOSPADM

## 2025-03-10 RX ORDER — PALONOSETRON 0.05 MG/ML
0.25 INJECTION, SOLUTION INTRAVENOUS ONCE
Status: COMPLETED | OUTPATIENT
Start: 2025-03-10 | End: 2025-03-10

## 2025-03-10 RX ADMIN — DIPHENHYDRAMINE HYDROCHLORIDE 25 MG: 50 INJECTION INTRAMUSCULAR; INTRAVENOUS at 09:54

## 2025-03-10 RX ADMIN — DEXAMETHASONE SODIUM PHOSPHATE 12 MG: 4 INJECTION INTRA-ARTICULAR; INTRALESIONAL; INTRAMUSCULAR; INTRAVENOUS; SOFT TISSUE at 09:37

## 2025-03-10 RX ADMIN — SACITUZUMAB GOVITECAN 900 MG: 180 POWDER, FOR SOLUTION INTRAVENOUS at 10:54

## 2025-03-10 RX ADMIN — METHYLPREDNISOLONE SODIUM SUCCINATE 125 MG: 125 INJECTION, POWDER, FOR SOLUTION INTRAMUSCULAR; INTRAVENOUS at 13:10

## 2025-03-10 RX ADMIN — FAMOTIDINE 20 MG: 10 INJECTION INTRAVENOUS at 09:27

## 2025-03-10 RX ADMIN — ACETAMINOPHEN 650 MG: 325 TABLET ORAL at 09:27

## 2025-03-10 RX ADMIN — FOSAPREPITANT 150 MG: 150 INJECTION, POWDER, LYOPHILIZED, FOR SOLUTION INTRAVENOUS at 10:04

## 2025-03-10 RX ADMIN — PALONOSETRON HYDROCHLORIDE 0.25 MG: 0.25 INJECTION INTRAVENOUS at 09:27

## 2025-03-10 RX ADMIN — PROCHLORPERAZINE MALEATE 10 MG: 10 TABLET ORAL at 14:37

## 2025-03-10 ASSESSMENT — FIBROSIS 4 INDEX: FIB4 SCORE: 1.57

## 2025-03-10 NOTE — PROGRESS NOTES
"Pharmacy Chemotherapy calculation:    DX: breast cancer    Cycle 1    Day 1  Previous treatment = XRT, anastrazole, capecitabine    Regimen: sacituzumab govitecan-hziy  sacituzumab govitecan-hziy 10mg/kg IV on days 1 and 8   q21 days until Dp/UT   NCCN Guidelines for Breast Cancer V.2.2024  shayy Beltran - N Engl J Med. 2019 Feb 21;380(9):741-981.   shayy Powell - Journal of Clinical OncologyVolume 40, Number 17_suppl     BP (!) 147/84   Pulse (!) 105   Temp 36.2 °C (97.2 °F) (Temporal)   Resp 18   Ht 1.6 m (5' 2.99\")   Wt 94 kg (207 lb 3.7 oz)   SpO2 97%   BMI 36.72 kg/m²   Body surface area is 2.04 meters squared.    All lab results 3/10/25 within treatment parameters.       sacituzumab govitecan-hziy 10mg/kg x 94 kg = 940 mg   <10% difference, ok to treat with final dose = 900 mg IV      ELIZABETH Barron Pharm.D.    "

## 2025-03-10 NOTE — PROGRESS NOTES
Chemotherapy Verification - SECONDARY RN       Height = 160 cm  Weight = 94 kg  BSA = 2.04 m2       Medication: Trodelvy  Dose: 10 mg/kg  Calculated Dose: 940 mg                             (In mg/m2, AUC, mg/kg)       I confirm that this process was performed independently.

## 2025-03-10 NOTE — PROGRESS NOTES
Sol presents to infusion for cycle 1/ day 1 of sacituzumab govitecan (Trodelvy) for breast cancer. Pt reports feeling well today with no major complaints..     Education provided to patient on side effects of Trodelvy as well as neutropenic precautions, chemo precautions to take at home and s/s to report to MD/seek medical treatment for. Patient verbalized understanding, educational handout provided to patient on Trodelvy.     PIV started with positive blood return and labs drawn off of IV start.     Labs reviewed by RN, within parameters for treatment today.      Pre-treatment meds given as ordered.      Trodelvy given over 3 hours, patient did report some flushing/feeling warm midway through infusion so Trodelvy was stopped and PRN Solumedrol administered. Patient otherwise asymptomatic and flushing improved so Trodelvy infusion finished and patient observed 30 minutes post infusion. Patient reports mild nausea at beginning of observation period, PRN Compazine administered. At completion of observation period VSS, patient reports improvement of symptoms. RN educated on seeking immediate medical care if patient develops increased flushing, fever, itching, SOB, n/v or dizziness, patient verbalized understanding.      PIV flushed post infusion with positive blood return and d/aron with tip intact.     Prescriptions for anti-emetics were released, confirmed with patient that they were ready at her pharmacy and educated on how to use prescribed anti-emetics.     Patient left in stable condition, knows when to return for day 8.

## 2025-03-10 NOTE — PROGRESS NOTES
Chemotherapy Verification - PRIMARY RN      Height = 1.6m  Weight = 94kg  BSA = 2.04m2       Medication: sacituzumab govitecan  Dose: 10mg/kg  Calculated Dose: 940 mg                             (In mg/m2, AUC, mg/kg)         I confirm this process was performed independently with the BSA and all final chemotherapy dosing calculations congruent.  Any discrepancies of 10% or greater have been addressed with the chemotherapy pharmacist. The resolution of the discrepancy has been documented in the EPIC progress notes.

## 2025-03-11 NOTE — PROGRESS NOTES
Nutrition Services: RD Consultation   Sol Kern is a 59 y.o. female with diagnosis of Infiltrating ductal carcinoma of the left breast, grade 2       Nutrition Assessment:      RD received message from [facility] RN that pt has questions regarding supplements. RD able to introduce self and nutrition services chairside in [facility]. Pt presents with daughter, Kayleen.  Pt mentions aiming or a lot f protein and complex carbohydrates. States was advised to trial low FODMAP diet as has had significant GI distress which appeared to resolve when pt was mindful about diet. States is also managing diabetes. Previously taking ozempic, though discontinued this with approval from PCP given her GI distress. States is likely going to resume at a low dose. Asks about vitamins and minerals and if should consider herbal supplements.  Pt did not express any further nutrition-related questions or concerns at this time.     Food/ Nutrition-related History:  No food allergies or specific intolerances. Does include dairy, has not noticed a specific sensitivity to lactose.     Past Medical History:   Diagnosis Date    Allergy     Back pain     Cancer (HCC) 2021    breast    Diabetes (HCC)     diet    GERD (gastroesophageal reflux disease)     High cholesterol     not medicated    Hypertension     Malignant neoplasm of upper-outer quadrant of left female breast (HCC)     Neck pain     Obesity     Sleep apnea     no cpap    Snoring        Past Surgical History:   Procedure Laterality Date    CA LAP,DIAGNOSTIC ABDOMEN N/A 12/27/2024    Procedure: DIAGNOSTIC LAPAROSCOPY;  Surgeon: Tasneem Gonzalez M.D.;  Location: SURGERY [facility];  Service: General    PB MASTECTOMY, PARTIAL Left 11/12/2021    Procedure: MASTECTOMY, PARTIAL - SAMARA LOCALIZED;  Surgeon: Kayleen Sykes M.D.;  Location: SURGERY SAME DAY [facility];  Service: General    NODE BIOPSY SENTINEL Left 11/12/2021    Procedure: BIOPSY, LYMPH NODE, SENTINEL;  Surgeon: Kayleen Sykes  "M.D.;  Location: SURGERY SAME DAY [facility];  Service: General    CARPAL TUNNEL RELEASE Right     ENDOMETRIAL ABLATION      FOOT SURGERY Right     KNEE ARTHROSCOPY      PRIMARY C SECTION      2     TUBAL COAGULATION LAPAROSCOPIC BILATERAL         Biochemical/ Anthropometrics  Treatment Regimen: OP Breast Sacituzumab Govitecan   Pertinent Labs: Glucose 179, A1c 7% on 24  Pertinent Meds: zofran, compazine, olanzapine, lasix  Wt Readings from Last 1 Encounters:   03/10/25 94 kg (207 lb 3.7 oz)      Ht Readings from Last 1 Encounters:   03/10/25 1.6 m (5' 2.99\")      BMI Readings from Last 1 Encounters:   03/10/25 36.72 kg/m²   BMI Classification: Obesity class II- may be skewed due to ascites; paracentesis completed 3/5: 2200mL discarded  Nutrition-Focused Physical Exam: No muscle wasting observed or subcutaneous fat loss     Weight History:  Wt Readings from Last 6 Encounters:   03/10/25 94 kg (207 lb 3.7 oz)   25 97.4 kg (214 lb 13.4 oz)   25 97 kg (213 lb 12.8 oz)   25 96.5 kg (212 lb 11.9 oz)   25 99.7 kg (219 lb 12.8 oz)   25 99.7 kg (219 lb 12.8 oz)     Weight Change/Malnutrition Risk: weight loss beyond 3/5 likely partially as a result of paracentesis      Nutrition Diagnosis:      Altered GI function related to active treatment for breast cancer as evidenced by GI distress.          Nutrition Interventions:      Introduced self and discussed role of dietitian throughout treatment process   Discussed consideration of multivitamin with adequate vitamin D, ensure discuss with MD. Discussed third party testing for certain supplement brands.   Discussed FODMAP diet and keeping food diary to identify foods that may be sensitive to. Advised against complete elimination diet at this time, especially since symptoms have mostly resolved. Provided detailed information regarding high FODMAP foods as well as 7-day recipes.   Discussed mindfulness regarding sodium as can partially " contribute to fluid accumulation in extremities, pt does currently follow low sodium diet.   Pt dislikes Fairlife drinks and most protein shakes. RD provided samples of LiquaCel to trial for added protein.Advised small snacks frequently throughout the day to meet nutrition demands.  Discussed potential fluctuations in glucose related to steroids in premedications.   RD provided contact information. Encouraged pt to reach out as questions/concerns arise.     Nutrition Monitoring and Evaluation:     Dietary pattern as prescribed by RD  LBM preservation throughout treatment  Appropriate management of side effects through medication management and nutrition modification as warranted    Pt reports understanding and was receptive to information provided.   RD available PRN   [phone]

## 2025-03-12 ENCOUNTER — APPOINTMENT (OUTPATIENT)
Dept: PHYSICAL THERAPY | Facility: REHABILITATION | Age: 60
End: 2025-03-12
Attending: FAMILY MEDICINE
Payer: MEDICARE

## 2025-03-12 ENCOUNTER — PATIENT OUTREACH (OUTPATIENT)
Dept: HEALTH INFORMATION MANAGEMENT | Facility: OTHER | Age: 60
End: 2025-03-12
Payer: MEDICARE

## 2025-03-12 DIAGNOSIS — E11.40 TYPE 2 DIABETES MELLITUS WITH DIABETIC NEUROPATHY, WITHOUT LONG-TERM CURRENT USE OF INSULIN (HCC): ICD-10-CM

## 2025-03-12 DIAGNOSIS — I10 ESSENTIAL HYPERTENSION: ICD-10-CM

## 2025-03-12 DIAGNOSIS — G47.33 OSA (OBSTRUCTIVE SLEEP APNEA): ICD-10-CM

## 2025-03-12 DIAGNOSIS — J96.11 CHRONIC RESPIRATORY FAILURE WITH HYPOXIA (HCC): ICD-10-CM

## 2025-03-12 NOTE — PROGRESS NOTES
Reason for Follow-Up:    I spoke to the patient this morning when she called in to the PCM main line to update me on developments in her plan of care. The patient and I completed her monthly and quarterly PCM follow ups.     Current Health Status:    The patient is followed by PCM based on diagnoses of HTN, DM2, and chronic respiratory failure.The patient denies any chest pain or palpitations. Her blood pressures have been in the 130s -140s over her last 3 clinic visits. She has an A1C and monofilament examination due. She is due in to see her PCP April 23rd and this has been added to the appointment notes. She states that her blood sugars have been under 150 with few exceptions. The patient denies any exacerbated or worsening respiratory symptoms today. She has been having fluid regularly drained from pleural effusion. She reports taking medications as prescribed. The patient follows regularly with pulmonology.     Medical Updates:  Since our last conversation the patient has multiple imaging appointments including CT scans, x rays, and ultrasounds in order to help monitor her ascites, pleural effusions, and response to cancer treatment. The patient has had thoracentesis and paracentesis which she reports removed a gallon and a half of fluid total. The patient has arranged with her providers for orders for these procedures weekly just in case. She is hoping not to go every week or have to go for a protracted duration. The patient wasn't able to make contact with the sleep specialist she was referred to. She was able to secure an appointment with [facility].     Medication Updates:  The patient had Zofran, Compazine, Olanzapine, Loperamide, and Diazepam called in to help manage Chemo    Symptoms:  Decreased appetite, intermittent shortness of breath due to pleural effusion, intermittent discomfort from ascites.     Plan of Care Goals and Progress:    Goal 1.  Over the next month the patient  will develop strategies for managing nutrition and diabetes during chemo     Progress:  The patient states that she already eats smaller meals throughout the day. She is already experienced with nutrition principles for diabetes.       Barriers:  The patient is undergoing chemotherapy and her pathophysiology involves her stomach. She is having significant ascites.      Interventions:  The patient will maintain nutrition and hydration to the best of her ability     Education:  Article on nutrition during cancer treatment sent by mail.     Goal 2. Over the next month the patient will utilize her resources effectively.      Progress:  The patient participates in Personal Care Management in order to expedite communication. The patient communicates with her healthcare team appropriately      Barriers:  The patient's care is complex and involves multiple providers from multiple disciplines.      Interventions:  The patient will update me with needs or concerns and I can assist in communication between providers.      Education:  The patient affirms that she has our contact information and demonstrates ability to contact us as needed.       Patient's Concerns and Feedback (Self Management of Care):     The patient's primary concern is keeping everyone on the same page at this point. She has had one chemo infusion and tolerated well. Her future appointments were reviewed and she states she plans to attend as scheduled.     Next Steps:     Follow-Up Plan:  The patient's next PCM follow up will be in approximately 4 weeks.       Appointments:  3/17/25( 8:00 am, Onc INF), 3/18/25(100 pm, Hem Onc), 3/18/25(5 pm, [facility]), 3/20/25(2 pm, Pulmonary and Sleep Medicine     Contact Information:  Call [phone] with any questions or concerns.

## 2025-03-12 NOTE — CARE PLAN
Problem: Knowledge of diabetes - Long Term  Goal: Patient educated about diabetes  Outcome: Progressing  Note: Diabetes management during cancer treatment discussed.   Intervention: Patient will verbalize understanding of what diabetes is  Intervention: Patient will, In conjunction with their medical provider, identify goal A1c  Intervention: Patient will verbalize understanding of harmful effects of diabetes     Problem: Patient barriers to managing diabetes - Long Term  Goal: Identify patient barriers to managing diabetes  Outcome: Progressing  Intervention: Identify with patient what are the barriers to self-management of diabetes  Note: Cancer treatment.      Problem: Patient Stated Problem: Confidently preventing crises  Description: Multiple providers, complex set of health concerns  Goal: Patient Stated Goal: The patient will have the opportunity to regularly ask questions and learn about and review her current and future health concerns.   Outcome: Progressing  Note: The patient is utilizing PCM services and her medical team appropriately.

## 2025-03-14 DIAGNOSIS — J90 PLEURAL EFFUSION: ICD-10-CM

## 2025-03-14 NOTE — PROGRESS NOTES
"Pharmacy Chemotherapy Verification Note:    Patient Name: Sol Kern      Dx: Breast Cancer      Protocol: Sacituzumab Govitecan       *Dosing Reference*  Sacituzumab govitecan-hziy 10 mg/kg IV on Days 1 and 8  21-day cycle until disease progression or unacceptable toxicity     1. NCCNGuidelines® for Breast Cancer V.2.2024.   2. Staci FITZPATRICK et al. N Engl J Med. 2019;380(8):741-751.a   3. Andre et al. J Clin Oncol. 2022;40(17_suppl):CDP5834.a    Allergies:  Codeine, Hydrocodone, and Other drug     BP (!) 145/82   Pulse 89   Temp 35.9 °C (96.7 °F) (Temporal)   Resp 17   Ht 1.6 m (5' 2.99\")   Wt 95.4 kg (210 lb 5.1 oz)   SpO2 95%   BMI 37.27 kg/m²  Body surface area is 2.06 meters squared.    Labs 3/17/25:  ANC~ 4140 Plt = 313 k   Hgb = 13.2       Date 3/10/25 :  SCr = 0.72 mg/dL CrCl ~ 122 mL/min   AST/ALT/AP = WNL TBili = 0.3       Drug Order   (Drug name, dose, route, IV Fluid & volume, frequency, number of doses) Cycle: 1 Day 8      Previous treatment: C1D1 on 3/10/25; s/p Capecitabine x 2C, Ribociclib + Faslodex     Medication = Sacituzumab govitecan  Base Dose = 10 mg/kg  Calc Dose: Base Dose x 95.4 kg = 940 mg  Final Dose = 900 mg  Route = IV  Fluid & Volume =  mL  Admin Duration = Over 1-2 hours          <10% difference, okay to treat with final dose       By my signature below, I confirm this process was performed independently with the BSA and all final chemotherapy dosing calculations congruent. I have reviewed the above chemotherapy order and that my calculation of the final dose and BSA (when applicable) corroborate those calculations of the  pharmacist.     Susy Talley, PharmD  "

## 2025-03-14 NOTE — PROGRESS NOTES
"Pharmacy Chemotherapy calculation:    DX: breast cancer    Cycle 1    Day 8  Previous treatment = C1C1 3/10/25; XRT, anastrazole, capecitabine    Regimen: sacituzumab govitecan-hziy  sacituzumab govitecan-hziy 10mg/kg IV on days 1 and 8   q21 days until Dp/UT   NCCN Guidelines for Breast Cancer V.2.2024  shayy Beltran - N Engl J Med. 2019 Feb 21;380(4):251-708.   shayy Powell - Journal of Clinical OncologyVolume 40, Number 17_suppl     BP (!) 145/82   Pulse 89   Temp 35.9 °C (96.7 °F) (Temporal)   Resp 17   Ht 1.6 m (5' 2.99\")   Wt 95.4 kg (210 lb 5.1 oz)   SpO2 95%   BMI 37.27 kg/m²   Body surface area is 2.06 meters squared.    Date 3/17/25 :  ANC = ~ 4140  Plt= 313k   Hgb= 13.2        sacituzumab govitecan-hziy 10mg/kg x 95.4 kg = 954 mg   <10% difference, ok to treat with final dose = 900 mg IV      Christine Thompson, PharmD      "

## 2025-03-17 ENCOUNTER — OUTPATIENT INFUSION SERVICES (OUTPATIENT)
Dept: ONCOLOGY | Facility: MEDICAL CENTER | Age: 60
End: 2025-03-17
Attending: INTERNAL MEDICINE
Payer: MEDICARE

## 2025-03-17 ENCOUNTER — TELEPHONE (OUTPATIENT)
Dept: PHYSICAL THERAPY | Facility: REHABILITATION | Age: 60
End: 2025-03-17
Payer: MEDICARE

## 2025-03-17 VITALS
HEIGHT: 63 IN | BODY MASS INDEX: 37.27 KG/M2 | WEIGHT: 210.32 LBS | DIASTOLIC BLOOD PRESSURE: 82 MMHG | TEMPERATURE: 96.7 F | OXYGEN SATURATION: 95 % | SYSTOLIC BLOOD PRESSURE: 145 MMHG | RESPIRATION RATE: 17 BRPM | HEART RATE: 89 BPM

## 2025-03-17 DIAGNOSIS — C50.912 BREAST CANCER, STAGE 4, LEFT (HCC): ICD-10-CM

## 2025-03-17 LAB
BASOPHILS # BLD AUTO: 0.6 % (ref 0–1.8)
BASOPHILS # BLD: 0.03 K/UL (ref 0–0.12)
EOSINOPHIL # BLD AUTO: 0.13 K/UL (ref 0–0.51)
EOSINOPHIL NFR BLD: 2.4 % (ref 0–6.9)
ERYTHROCYTE [DISTWIDTH] IN BLOOD BY AUTOMATED COUNT: 52.9 FL (ref 35.9–50)
HCT VFR BLD AUTO: 41.4 % (ref 37–47)
HGB BLD-MCNC: 13.2 G/DL (ref 12–16)
IMM GRANULOCYTES # BLD AUTO: 0.05 K/UL (ref 0–0.11)
IMM GRANULOCYTES NFR BLD AUTO: 0.9 % (ref 0–0.9)
LYMPHOCYTES # BLD AUTO: 0.53 K/UL (ref 1–4.8)
LYMPHOCYTES NFR BLD: 9.9 % (ref 22–41)
MCH RBC QN AUTO: 30.9 PG (ref 27–33)
MCHC RBC AUTO-ENTMCNC: 31.9 G/DL (ref 32.2–35.5)
MCV RBC AUTO: 97 FL (ref 81.4–97.8)
MONOCYTES # BLD AUTO: 0.46 K/UL (ref 0–0.85)
MONOCYTES NFR BLD AUTO: 8.6 % (ref 0–13.4)
NEUTROPHILS # BLD AUTO: 4.14 K/UL (ref 1.82–7.42)
NEUTROPHILS NFR BLD: 77.6 % (ref 44–72)
NRBC # BLD AUTO: 0 K/UL
NRBC BLD-RTO: 0 /100 WBC (ref 0–0.2)
OUTPT INFUS CBC COMMENT OICOM: ABNORMAL
PLATELET # BLD AUTO: 313 K/UL (ref 164–446)
PMV BLD AUTO: 9.4 FL (ref 9–12.9)
RBC # BLD AUTO: 4.27 M/UL (ref 4.2–5.4)
WBC # BLD AUTO: 5.3 K/UL (ref 4.8–10.8)

## 2025-03-17 PROCEDURE — A9270 NON-COVERED ITEM OR SERVICE: HCPCS | Performed by: INTERNAL MEDICINE

## 2025-03-17 PROCEDURE — 96415 CHEMO IV INFUSION ADDL HR: CPT

## 2025-03-17 PROCEDURE — 96375 TX/PRO/DX INJ NEW DRUG ADDON: CPT

## 2025-03-17 PROCEDURE — 96413 CHEMO IV INFUSION 1 HR: CPT

## 2025-03-17 PROCEDURE — 85025 COMPLETE CBC W/AUTO DIFF WBC: CPT

## 2025-03-17 PROCEDURE — 700102 HCHG RX REV CODE 250 W/ 637 OVERRIDE(OP): Performed by: INTERNAL MEDICINE

## 2025-03-17 PROCEDURE — 96367 TX/PROPH/DG ADDL SEQ IV INF: CPT

## 2025-03-17 PROCEDURE — 700111 HCHG RX REV CODE 636 W/ 250 OVERRIDE (IP): Mod: JZ | Performed by: INTERNAL MEDICINE

## 2025-03-17 PROCEDURE — 700105 HCHG RX REV CODE 258: Performed by: INTERNAL MEDICINE

## 2025-03-17 RX ORDER — PROCHLORPERAZINE MALEATE 10 MG
10 TABLET ORAL EVERY 6 HOURS PRN
Status: DISCONTINUED | OUTPATIENT
Start: 2025-03-17 | End: 2025-03-17 | Stop reason: HOSPADM

## 2025-03-17 RX ORDER — DEXAMETHASONE SODIUM PHOSPHATE 4 MG/ML
12 INJECTION, SOLUTION INTRA-ARTICULAR; INTRALESIONAL; INTRAMUSCULAR; INTRAVENOUS; SOFT TISSUE ONCE
Status: COMPLETED | OUTPATIENT
Start: 2025-03-17 | End: 2025-03-17

## 2025-03-17 RX ORDER — DIPHENHYDRAMINE HYDROCHLORIDE 50 MG/ML
50 INJECTION, SOLUTION INTRAMUSCULAR; INTRAVENOUS PRN
Status: DISCONTINUED | OUTPATIENT
Start: 2025-03-17 | End: 2025-03-17 | Stop reason: HOSPADM

## 2025-03-17 RX ORDER — EPINEPHRINE 1 MG/ML(1)
0.5 AMPUL (ML) INJECTION PRN
Status: DISCONTINUED | OUTPATIENT
Start: 2025-03-17 | End: 2025-03-17 | Stop reason: HOSPADM

## 2025-03-17 RX ORDER — METHYLPREDNISOLONE SODIUM SUCCINATE 125 MG/2ML
125 INJECTION, POWDER, LYOPHILIZED, FOR SOLUTION INTRAMUSCULAR; INTRAVENOUS PRN
Status: DISCONTINUED | OUTPATIENT
Start: 2025-03-17 | End: 2025-03-17 | Stop reason: HOSPADM

## 2025-03-17 RX ORDER — DIPHENHYDRAMINE HYDROCHLORIDE 50 MG/ML
25 INJECTION, SOLUTION INTRAMUSCULAR; INTRAVENOUS ONCE
Status: COMPLETED | OUTPATIENT
Start: 2025-03-17 | End: 2025-03-17

## 2025-03-17 RX ORDER — ACETAMINOPHEN 325 MG/1
650 TABLET ORAL ONCE
Status: COMPLETED | OUTPATIENT
Start: 2025-03-17 | End: 2025-03-17

## 2025-03-17 RX ORDER — PALONOSETRON 0.05 MG/ML
0.25 INJECTION, SOLUTION INTRAVENOUS ONCE
Status: COMPLETED | OUTPATIENT
Start: 2025-03-17 | End: 2025-03-17

## 2025-03-17 RX ADMIN — ACETAMINOPHEN 650 MG: 325 TABLET ORAL at 09:20

## 2025-03-17 RX ADMIN — PALONOSETRON HYDROCHLORIDE 0.25 MG: 0.25 INJECTION INTRAVENOUS at 09:20

## 2025-03-17 RX ADMIN — DEXAMETHASONE SODIUM PHOSPHATE 12 MG: 4 INJECTION INTRA-ARTICULAR; INTRALESIONAL; INTRAMUSCULAR; INTRAVENOUS; SOFT TISSUE at 09:32

## 2025-03-17 RX ADMIN — FOSAPREPITANT 150 MG: 150 INJECTION, POWDER, LYOPHILIZED, FOR SOLUTION INTRAVENOUS at 09:54

## 2025-03-17 RX ADMIN — SACITUZUMAB GOVITECAN 900 MG: 180 POWDER, FOR SOLUTION INTRAVENOUS at 10:45

## 2025-03-17 RX ADMIN — DIPHENHYDRAMINE HYDROCHLORIDE 25 MG: 50 INJECTION INTRAMUSCULAR; INTRAVENOUS at 09:43

## 2025-03-17 RX ADMIN — FAMOTIDINE 20 MG: 10 INJECTION INTRAVENOUS at 09:21

## 2025-03-17 ASSESSMENT — FIBROSIS 4 INDEX: FIB4 SCORE: 1.64

## 2025-03-17 NOTE — OP THERAPY DISCHARGE SUMMARY
Outpatient Physical Therapy  DISCHARGE SUMMARY NOTE      [facility] [address], [address]  [place] [address]  Phone:  [phone]    Date of Visit: 03/17/2025    Patient: Sol Kern  YOB: 1965  MRN: [id number]     Referring Provider: Veronica Lyn MD   Referring Diagnosis No admission diagnoses are documented for this encounter.         Functional Assessment Used        Your patient is being discharged from Physical Therapy with the following comments:   Patient called to cancel this episode for OPPT. She notes too many medical things going on. Understands will need a new referral should she continue PT.    Comments:  Has HEP; recommend PCP f/u for ongoing issues.    Braeden Garvin, PT    Date: 3/17/2025

## 2025-03-17 NOTE — PROGRESS NOTES
Chemotherapy Verification - PRIMARY RN      Height = 1.6m  Weight = 95.4kg  BSA = 2.06m2       Medication: sacituzumab govitecan  Dose: 10mg/kg  Calculated Dose: 954 mg                             (In mg/m2, AUC, mg/kg)           I confirm this process was performed independently with the BSA and all final chemotherapy dosing calculations congruent.  Any discrepancies of 10% or greater have been addressed with the chemotherapy pharmacist. The resolution of the discrepancy has been documented in the EPIC progress notes.

## 2025-03-17 NOTE — PROGRESS NOTES
Chemotherapy Verification - SECONDARY RN       Height = 160 cm  Weight = 95.4 kg  BSA = 2.06 m2       Medication: Trodelvy  Dose: 10 mg/kg  Calculated Dose: 954 mg                             (In mg/m2, AUC, mg/kg)     I confirm that this process was performed independently.

## 2025-03-17 NOTE — PROGRESS NOTES
Sol presents to infusion for cycle 1/ day 8 of sacituzumab govitecan (Trodelvy) for breast cancer. Patient reports tolerating first infusion well aside from some fatigue, does report that her work of breathing has increased and thinks she may be due for another thoracentesis, will be discussing with Dr. Kline tomorrow to get orders, knows to go to ER if breathing worsens or oxygen needs increase further.     PIV started with ultrasound and labs drawn off of IV start.    Labs reviewed by RN, within parameters for treatment today.     Pre-treatment meds given as ordered.     Trodelvy started at 167ml/hr and rate increased to 250ml/hr after 90 minutes for remainder of infusion. Patient observed 30 minutes post infusion for s/s of reaction, patient tolerated all well with no adverse effects.     PIV flushed post infusion with positive blood return, d/aron with tip intact.    Patient left in stable condition, knows when to return for Neulasta tomorrow.

## 2025-03-18 ENCOUNTER — OUTPATIENT INFUSION SERVICES (OUTPATIENT)
Dept: ONCOLOGY | Facility: MEDICAL CENTER | Age: 60
End: 2025-03-18
Attending: INTERNAL MEDICINE
Payer: MEDICARE

## 2025-03-18 ENCOUNTER — HOSPITAL ENCOUNTER (OUTPATIENT)
Dept: HEMATOLOGY ONCOLOGY | Facility: MEDICAL CENTER | Age: 60
End: 2025-03-18
Attending: NURSE PRACTITIONER
Payer: MEDICARE

## 2025-03-18 VITALS
DIASTOLIC BLOOD PRESSURE: 86 MMHG | HEIGHT: 63 IN | SYSTOLIC BLOOD PRESSURE: 156 MMHG | TEMPERATURE: 98 F | BODY MASS INDEX: 37.5 KG/M2 | HEART RATE: 98 BPM | OXYGEN SATURATION: 91 % | WEIGHT: 211.64 LBS | RESPIRATION RATE: 18 BRPM

## 2025-03-18 VITALS
SYSTOLIC BLOOD PRESSURE: 126 MMHG | DIASTOLIC BLOOD PRESSURE: 64 MMHG | WEIGHT: 211.2 LBS | HEART RATE: 103 BPM | OXYGEN SATURATION: 92 % | RESPIRATION RATE: 19 BRPM | BODY MASS INDEX: 37.42 KG/M2 | HEIGHT: 63 IN | TEMPERATURE: 97.3 F

## 2025-03-18 DIAGNOSIS — K13.79 MOUTH SORE: ICD-10-CM

## 2025-03-18 DIAGNOSIS — C50.912 BREAST CANCER, STAGE 4, LEFT (HCC): ICD-10-CM

## 2025-03-18 DIAGNOSIS — Z17.0 MALIGNANT NEOPLASM OF UPPER-OUTER QUADRANT OF LEFT BREAST IN FEMALE, ESTROGEN RECEPTOR POSITIVE (HCC): ICD-10-CM

## 2025-03-18 DIAGNOSIS — C80.0 CARCINOMATOSIS (HCC): ICD-10-CM

## 2025-03-18 DIAGNOSIS — C50.412 MALIGNANT NEOPLASM OF UPPER-OUTER QUADRANT OF LEFT BREAST IN FEMALE, ESTROGEN RECEPTOR POSITIVE (HCC): ICD-10-CM

## 2025-03-18 DIAGNOSIS — R18.0 MALIGNANT ASCITES: ICD-10-CM

## 2025-03-18 DIAGNOSIS — J90 PLEURAL EFFUSION: ICD-10-CM

## 2025-03-18 DIAGNOSIS — Z79.899 ENCOUNTER FOR LONG-TERM (CURRENT) USE OF HIGH-RISK MEDICATION: ICD-10-CM

## 2025-03-18 PROCEDURE — 99212 OFFICE O/P EST SF 10 MIN: CPT | Performed by: NURSE PRACTITIONER

## 2025-03-18 PROCEDURE — 99214 OFFICE O/P EST MOD 30 MIN: CPT | Performed by: NURSE PRACTITIONER

## 2025-03-18 PROCEDURE — 700111 HCHG RX REV CODE 636 W/ 250 OVERRIDE (IP): Mod: JZ,TB | Performed by: INTERNAL MEDICINE

## 2025-03-18 PROCEDURE — 96372 THER/PROPH/DIAG INJ SC/IM: CPT

## 2025-03-18 RX ORDER — FUROSEMIDE 40 MG/1
40 TABLET ORAL
Qty: 90 TABLET | Refills: 1 | OUTPATIENT
Start: 2025-03-18

## 2025-03-18 RX ORDER — LIDOCAINE HYDROCHLORIDE 20 MG/ML
SOLUTION OROPHARYNGEAL
Qty: 100 ML | Refills: 1 | Status: SHIPPED | OUTPATIENT
Start: 2025-03-18

## 2025-03-18 RX ORDER — FUROSEMIDE 40 MG/1
40 TABLET ORAL DAILY
Qty: 30 TABLET | Refills: 0 | Status: SHIPPED | OUTPATIENT
Start: 2025-03-18 | End: 2025-04-17

## 2025-03-18 RX ADMIN — PEGFILGRASTIM 6 MG: 6 INJECTION SUBCUTANEOUS at 14:18

## 2025-03-18 ASSESSMENT — ENCOUNTER SYMPTOMS
DIARRHEA: 0
CONSTIPATION: 0
NAUSEA: 0
SHORTNESS OF BREATH: 1
VOMITING: 0
FEVER: 0
SPUTUM PRODUCTION: 0
WEIGHT LOSS: 0
COUGH: 0
PALPITATIONS: 0
CHILLS: 0

## 2025-03-18 ASSESSMENT — FIBROSIS 4 INDEX
FIB4 SCORE: 1.92
FIB4 SCORE: 1.92

## 2025-03-18 ASSESSMENT — PAIN SCALES - GENERAL: PAINLEVEL_OUTOF10: NO PAIN

## 2025-03-18 NOTE — Clinical Note
[facility]  [address] [address]  [place] [address]    [id number]    Sol Kern  [id number]  [place] [address]    March 18, 2025    Member Name: Sol Kern   Member Number: [id number]   Reference Number: [id number]   Approved Services: Outpatient Surgery   Approved Service Dates: 03/18/2025 - 07/16/2025   Requesting Provider: Lorraine Persaud   Requested Provider: [facility]     Dear Sol Kern:    The following medical service(s) requested by Lorraine Persaud have been approved:    Procedure Code Procedure Code Name Requested Quantity Approved Quantity Status   65840 (CPT®) GA THORACENTESIS NEEDLE/CATH PLEURA W/IMAGING 4 4 Authorized       Approved Quantity means the number of visits approved for medication treatments and/or medical services.    The services should be provided by [facility] no later than 07/16/2025. Please contact the provider listed below with any questions.     Provider Information:  [facility]  [phone]    Your plan benefit may require a deductible, co-payment or coinsurance for these services. This authorization does not guarantee [facility] will pay the claim for services that you receive. Payment by [facility] for these services is subject to the terms of your Evidence of Coverage, your eligibility at the time of service, and confirmation of benefit coverage.    For any questions or additional information, please contact Customer Service:    [facility] Toll Free: [phone]  TTY users dial: [phone]   Call Center Hours:  Oct 1 - Mar 31, Mon - Fri 7 AM to 8 PM PST  Oct 1 - Mar 31, Sat - Sun 8 AM to 8 PM PST  Apr 1 - Sep 30, Mon - Fri 7 AM to 8 PM PST   Office Hours: Mon - Fri 8 AM to 5 PM PST   E-mail: [email or web address]   Website:  [email or web address]      This information is available for free in other languages. Please contact Customer  Service at the phone number above for more information. [facility] complies with applicable Federal civil rights laws and does not discriminate on the basis of race, color, national origin, age, disability or sex.    Sincerely,     Healthcare Utilization Management Department     Cc: [facility]   Lorraine Persaud    Multi-Language Insert  Multi- Services  English: We have free  services to answer any questions you may have about our health or drug plan.  To get an , just call us at [phone].  Someone who speaks English/Language can help you.  This is a free service.  Hungarian: Tenemos servicios de intérprete sin costo alguno  para responder cualquier pregunta que pueda tener sobre nuestro plan de french o medicamentos. Para hablar con un intérprete, por favor llame al [phone]. Alguien que hable español le podrá ayudar. Juany es un servicio gratuito.  Chinese Mandarin: ?????????????????????????????? ???????????????? [phone]????????????????? ?????????  Chinese Cantonese: ?????????????????????????????? ????????????? [phone]???????????????????? ????????  Tagalog:  Mello mitchell serbisyo sa candelariosavivek galeanoa kenyatta novakngalvaro olivarez o panggamot.  carline Diaz  [phone]. Shon mendozata ng Tagalog.  Regis hughes.  Kinyarwanda:  Nous proposons marlene services gratuits d'interprétation pour répondre à toutes schuyler questions relatives à notre régime de santé ou d'assurance-médicaments. Pour accéder au service d'interprétation, il vous suffit de nous appeler au [phone]. Un interlocuteur parMidwest Orthopedic Specialty Hospitalrefugio [facility] pourra vous aider. Ce service est gratuit.  Belarusian:  Nik ricardoi có d?ch v? thông d?ch mi?n phí ð? tr? l?i các câu h?i v? chýõng s?c kh?e và chýõng trìn thu?c men. N?u quí v?  c?n thông d?ch viên alejandro g?i [phone] s? có nhân viên nói ti?ng Vi?t giúp ð? quí v?. Ðây là d?ch v? mi?n phí .  Belizean:  Unser kostenser Dolmetscherservice beantwortet Ihren Fragen zu unserem Gesundheits- und Arzneimittelplan. Unsere Dolmetscher erreichen Sie [phone]. Man wird Ihnen kandi auf [facility]. Dieser Service ist [facility].  Welsh:  ??? ?? ?? ?? ?? ??? ?? ??? ?? ???? ?? ?? ???? ???? ????. ?? ???? ????? ?? [phone] ??? ??? ????.  ???? ?? ???? ?? ?? ????. ? ???? ??? ?????.   Citizen of the [place]: Åñëè ó âàñ âîçíèêíóò âîïðîñû îòíîñèòåëüíî ñòðàõîâîãî èëè ìåäèêàìåíòíîãî ïëàíà, âû ìîæåòå âîñïîëüçîâàòüñÿ íàøèìè áåñïëàòíûìè óñëóãàìè ïåðåâîä÷èêîâ. ×òîáû âîñïîëüçîâàòüñÿ óñëóãàìè ïåðåâîä÷èêà, ïîçâîíèòå íàì ïî òåëåôîíó [phone]. Âàì îêàæåò ïîìîùü ñîòðóäíèê, êîòîðûé ãîâîðèò ïî-póññêè. Äàííàÿ óñëóãà áåñïëàòíàÿ.  Maltese: ÅääÇ äÞÏã ÎÏãÇÊ ÇáãÊÑÌã ÇáÝæÑí ÇáãÌÇäíÉ ááÅÌÇÈÉ Úä Ãí ÃÓÆáÉ ÊÊÚáÞ ÈÇáÕÍÉ Ãæ ÌÏæá ÇáÃÏæíÉ áÏíäÇ. ááÍÕæá Úáì ãÊÑÌã ÝæÑí¡ áíÓ Úáíß Óæì ÇáÇÊÕÇá ÈäÇ Úáì [phone] . ÓíÞæã ÔÎÕ ãÇ íÊÍÏË ÇáÚÑÈíÉ ÈãÓÇÚÏÊß. åÐå ÎÏãÉ ãÌÇäíÉ.  Mesha: ????? ????????? ?? ??? ?? ????? ?? ???? ??? ???? ???? ?? ?????? ?? ???? ???? ?? ??? ????? ??? ????? ???????? ?????? ?????? ???. ?? ???????? ??????? ???? ?? ???, ?? ???? [phone] ?? ??? ????. ??? ??????? ?? ?????? ????? ?? ???? ??? ?? ???? ??. ?? ?? ????? ???? ??.   Belarusian:  È disponibile un servizio di interpretariato gratuito per rispondere a eventuali domande sul nostro piano sanitario e farmaceutico. Per un interprete, contattare il makenzie [phone]. Un nostro incaricato jonn parla Italianovi fornirà l'assistenza necessaria. È un servizio gratuito.  Portugués:  Dispomos de serviços de interpretação gratuitos para responder a qualquer questão que tenha acerca do nosso plano de saúde ou de medicação. Para obter um intérprete, contacte-nos através do número [phone]. Irá encontrar alguém que fale o idioma  Português para o ajudar. Juany  serviço é gratuito.  Turkmen Creole:  Nou genyen sèvis entèprèt gratis juana reponn tout kesyon ou ta genyen konsènan plan medikal oswa dwòg nou an.  Juana jwenn yon entèprèt, jis rele nou nan [phone]. Yon moun ki pale Kreyòl kapab waldo w.  Sa a se yon sèvis ki gratis.  Polish:  Umo¿liwiamy bezp³atne skorzystanie z us³ug t³umacza ustnego, który pomo¿e w uzyskaniu odpowiedzi na temat planu zdrowotnego lub dawporter villa. Rosalinda skorzystaæ z pomocy t³umacza znaj¹cego fransisco porter¿y zadzwoniæ pod numer [phone]. Ta us³uga jest bezp³atna.  Brazilian: ????? ??????? ????????????????????? ??????????????????????????????????[phone] ???????????????? ? ????????????????? ?????

## 2025-03-18 NOTE — PROGRESS NOTES
Pt presented to Infusion for Neulasta injection. Pt arrived on home oxygen. POC discussed and pt verbalized understanding. Pt denies pain or s/s of acute illness today. Neulasta injection administered per MAR, band-aid applied to site and pt tolerated well. No s/s of reactions or complications noted.  Future appts confirmed,  and pt confirmed MyChart access to view appts. Pt discharged to self care in [facility], accompanied by her daughter. Pt using her home oxygen at discharge.

## 2025-03-18 NOTE — Clinical Note
[facility]  [address]  [place] [address]    [id number]    Sol Kern  [address]  [id number]  [place] [address]    March 18, 2025    Member Name: Sol Kern   Member Number: [id number]   Reference Number: [id number]   Approved Services: Outpatient Surgery   Approved Service Dates: 03/10/2025 - 07/08/2025   Requesting Provider: Mariusz Peterson   Requested Provider: [facility]     Dear Sol Shauna Erlin:    The following medical service(s) requested by Mariusz Peterson have been approved:    Procedure Code Procedure Code Name Requested Quantity Approved Quantity Status   46116 (CPT®) NE ABDOM PARACENTESIS DX/THER W IMAGING GUIDANCE 24 24 Authorized       Approved Quantity means the number of visits approved for medication treatments and/or medical services.    The services should be provided by [facility] no later than 07/08/2025. Please contact the provider listed below with any questions.     Provider Information:  [facility]  [phone]    Your plan benefit may require a deductible, co-payment or coinsurance for these services. This authorization does not guarantee [facility] will pay the claim for services that you receive. Payment by [facility] for these services is subject to the terms of your Evidence of Coverage, your eligibility at the time of service, and confirmation of benefit coverage.    For any questions or additional information, please contact Customer Service:    [facility] Toll Free: [phone]  TTY users dial: [phone]   Call Center Hours:  Oct 1 - Mar 31, Mon - Fri 7 AM to 8 PM PST  Oct 1 - Mar 31, Sat - Sun 8 AM to 8 PM PST  Apr 1 - Sep 30, Mon - Fri 7 AM to 8 PM PST   Office Hours: Mon - Fri 8 AM to 5 PM PST   E-mail: [email or web address]   Website:  [email or web address]      This information is available for free in other languages. Please contact Customer  Service at the phone number above for more information. [facility] complies with applicable Federal civil rights laws and does not discriminate on the basis of race, color, national origin, age, disability or sex.    Sincerely,     Healthcare Utilization Management Department     Cc: [facility]   Mariusz Peterson    Multi-Language Insert  Multi- Services  English: We have free  services to answer any questions you may have about our health or drug plan.  To get an , just call us at [phone].  Someone who speaks English/Language can help you.  This is a free service.  Kyrgyz: Tenemos servicios de intérprete sin costo alguno  para responder cualquier pregunta que pueda tener sobre nuestro plan de french o medicamentos. Para hablar con un intérprete, por favor llame al [phone]. Alguien que hable español le podrá ayudar. Juany es un servicio gratuito.  Chinese Mandarin: ?????????????????????????????? ???????????????? [phone]????????????????? ?????????  Chinese Cantonese: ?????????????????????????????? ????????????? [phone]???????????????????? ????????  Tagalog:  Mello mitchell serbisyo sa candelariosavivek galeanoa kenyatta novakngalvaro olivarez o panggamot.  carline Diaz  [phone]. Maaari kayong zo Flores.  Regis hughes.  Swedish:  Nous proposons marlene services gratuits d'interprétation pour répondre à toutes schuyler questions relatives à notre régime de santé ou d'assurance-médicaments. Pour accéder au service d'interprétation, il vous suffit de nous appeler au [phone]. Un interlocuteur parFormerly named [facility] Centerrefugio Françs pourra vous aider. Ce service est gratuit.  Greenlandic:  Nik ricardoi có d?ch v? thông d?ch mi?n phí ð? tr? l?i các câu h?i v? chýõng s?c kh?e và chýõng trình thu?c men. N?u quí v?  c?n thông d?ch viên alejandro g?i [phone] s? có nhân viên nói ti?ng Vi?t giúp ð? quí v?. Ðây là d?ch v? mi?n phí .  Dutch:  Unser kostenser Dolmetscherservice beantwortet Ihren Fragen zu unserem Gesundheits- und Arzneimittelplan. Unsere Dolmetscher erreichen Sie [phone]. Man wird Ihnen kandi auf [facility]. Dieser Service ist teraJordan [facility] Medical Center.  Slovak:  ??? ?? ?? ?? ?? ??? ?? ??? ?? ???? ?? ?? ???? ???? ????. ?? ???? ????? ?? [phone] ??? ??? ????.  ???? ?? ???? ?? ?? ????. ? ???? ??? ?????.   Singaporean: Åñëè ó âàñ âîçíèêíóò âîïðîñû îòíîñèòåëüíî ñòðàõîâîãî èëè ìåäèêàìåíòíîãî ïëàíà, âû ìîæåòå âîñïîëüçîâàòüñÿ íàøèìè áåñïëàòíûìè óñëóãàìè ïåðåâîä÷èêîâ. ×òîáû âîñïîëüçîâàòüñÿ óñëóãàìè ïåðåâîä÷èêà, ïîçâîíèòå íàì ïî òåëåôîíó [phone]. Âàì îêàæåò ïîìîùü ñîòðóäíèê, êîòîðûé ãîâîðèò ïî-póññêè. Äàííàÿ óñëóãà áåñïëàòíàÿ.  Kyrgyz: ÅääÇ äÞÏã ÎÏãÇÊ ÇáãÊÑÌã ÇáÝæÑí ÇáãÌÇäíÉ ááÅÌÇÈÉ Úä Ãí ÃÓÆáÉ ÊÊÚáÞ ÈÇáÕÍÉ Ãæ ÌÏæá ÇáÃÏæíÉ áÏíäÇ. ááÍÕæá Úáì ãÊÑÌã ÝæÑí¡ áíÓ Úáíß Óæì ÇáÇÊÕÇá ÈäÇ Úáì [phone] . ÓíÞæã ÔÎÕ ãÇ íÊÍÏË ÇáÚÑÈíÉ ÈãÓÇÚÏÊß. åÐå ÎÏãÉ ãÌÇäíÉ.  Mesha: ????? ????????? ?? ??? ?? ????? ?? ???? ??? ???? ???? ?? ?????? ?? ???? ???? ?? ??? ????? ??? ????? ???????? ?????? ?????? ???. ?? ???????? ??????? ???? ?? ???, ?? ???? [phone] ?? ??? ????. ??? ??????? ?? ?????? ????? ?? ???? ??? ?? ???? ??. ?? ?? ????? ???? ??.   Upper sorbian:  È disponibile un servizio di interpretariato gratuito per rispondere a eventuali domande sul nostro piano sanitario e farmaceutico. Per un interprete, contattare il makenzie [phone]. Un nostro incaricato jonn parla Italianovi fornirà l'assistenza necessaria. È un servizio gratuito.  Portugués:  Dispomos de serviços de interpretação gratuitos para responder a qualquer questão que tenha acerca do nosso plano de saúde ou de medicação. Para obter um intérprete, contacte-nos através do número [phone]. Irá encontrar alguém que fale o idioma  Português para o ajudar. Juany  serviço é gratuito.  Setswana Creole:  Nou genyen sèvis entèprèt gratis juana reponn tout kesyon ou ta genyen konsènan plan medikal oswa dwòg nou an.  Juana jwenn yon entèprèt, jis rele nou nan [phone]. Yon moun ki pale Kreyòl kapab waldo w.  Sa a se yon sèvis ki gratis.  Polish:  Umo¿liwiamy bezp³atne skorzystanie z us³ug t³umacza ustnego, który pomo¿e w uzyskaniu odpowiedzi na temat planu zdrowotnego lub dawporter villa. Rosalinda skorzystaæ z pomocy t³umacza znaj¹cego fransisco porter¿y zadzwoniæ pod numer [phone]. Ta us³uga jest bezp³atna.  Nigerien: ????? ??????? ????????????????????? ??????????????????????????????????[phone] ???????????????? ? ????????????????? ?????

## 2025-03-18 NOTE — PROGRESS NOTES
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 tolerated her first cycle of sacituzumab govitecan.  She will return to the clinic prior to cycle 2.    She is currently on weekly paracentesis scheduled for tomorrow.  She is having worsening shortness of breath and believes that she may need another thoracentesis.  I went ahead and ordered a thoracentesis and requesting this to be weekly as well.  We will continue to monitor both and hopefully she will have improvement on treatment and no longer need both these procedures.    Patient has decided that she does not want a port placement and therefore is requested to cancel the procedure scheduled for this Friday, 3/21/2025.    Refill provided to patient for her Lasix.  Lidocaine solution given to patient for her to do for mouth sores.  Provided patient with instructions on how to mix with antacid.    Patient scheduled to follow-up with Dr. Kline next week prior to cycle 2.      Please note that this dictation was created using voice recognition software. I have made every reasonable attempt to correct obvious errors, but I expect that there are errors of grammar and possibly content that I did not discover before finalizing the note.

## 2025-03-19 ENCOUNTER — APPOINTMENT (OUTPATIENT)
Dept: PHYSICAL THERAPY | Facility: REHABILITATION | Age: 60
End: 2025-03-19
Attending: FAMILY MEDICINE
Payer: MEDICARE

## 2025-03-19 ENCOUNTER — HOSPITAL ENCOUNTER (OUTPATIENT)
Dept: RADIOLOGY | Facility: MEDICAL CENTER | Age: 60
End: 2025-03-19
Attending: FAMILY MEDICINE
Payer: MEDICARE

## 2025-03-19 VITALS
DIASTOLIC BLOOD PRESSURE: 63 MMHG | OXYGEN SATURATION: 96 % | HEART RATE: 106 BPM | RESPIRATION RATE: 19 BRPM | SYSTOLIC BLOOD PRESSURE: 119 MMHG

## 2025-03-19 DIAGNOSIS — Z17.0 MALIGNANT NEOPLASM OF UPPER-OUTER QUADRANT OF LEFT BREAST IN FEMALE, ESTROGEN RECEPTOR POSITIVE (HCC): ICD-10-CM

## 2025-03-19 DIAGNOSIS — C50.412 MALIGNANT NEOPLASM OF UPPER-OUTER QUADRANT OF LEFT BREAST IN FEMALE, ESTROGEN RECEPTOR POSITIVE (HCC): ICD-10-CM

## 2025-03-19 DIAGNOSIS — R18.0 MALIGNANT ASCITES: ICD-10-CM

## 2025-03-19 PROCEDURE — C1729 CATH, DRAINAGE: HCPCS

## 2025-03-19 NOTE — PROGRESS NOTES
US guided therapeutic paracentesis done by HARIKA calderon;(no H&P required as this is a NON SEDATION procedure) LLQ access site; dressing CDI; 1500mL obtained and 1500ml discarded. Pt tolerated the procedure well; pt hemodynamically stable pre/intra/post procedure.  All questions and concerns answered prior to d/c. Patient provided with all appropriate education for procedure. Pt d/c home.

## 2025-03-20 ENCOUNTER — PATIENT MESSAGE (OUTPATIENT)
Dept: SLEEP MEDICINE | Facility: MEDICAL CENTER | Age: 60
End: 2025-03-20

## 2025-03-20 ENCOUNTER — OFFICE VISIT (OUTPATIENT)
Dept: SLEEP MEDICINE | Facility: MEDICAL CENTER | Age: 60
End: 2025-03-20
Payer: MEDICARE

## 2025-03-20 VITALS
BODY MASS INDEX: 37.21 KG/M2 | HEIGHT: 63 IN | OXYGEN SATURATION: 92 % | HEART RATE: 102 BPM | DIASTOLIC BLOOD PRESSURE: 84 MMHG | SYSTOLIC BLOOD PRESSURE: 128 MMHG | RESPIRATION RATE: 16 BRPM | WEIGHT: 210 LBS

## 2025-03-20 DIAGNOSIS — G47.33 OSA (OBSTRUCTIVE SLEEP APNEA): ICD-10-CM

## 2025-03-20 DIAGNOSIS — G47.34 NOCTURNAL HYPOXIA: ICD-10-CM

## 2025-03-20 PROCEDURE — 99212 OFFICE O/P EST SF 10 MIN: CPT

## 2025-03-20 PROCEDURE — 99204 OFFICE O/P NEW MOD 45 MIN: CPT

## 2025-03-20 ASSESSMENT — FIBROSIS 4 INDEX: FIB4 SCORE: 1.92

## 2025-03-20 NOTE — PROGRESS NOTES
[facility] Consult Note     Date: 3/20/2025 / Time: 1:41 PM      Thank you for requesting a sleep medicine consultation on Sol Kern at the sleep center. Presents today with the chief complaints of excessive daytime sleepiness and previous diagnosis of YOANA. She is referred for evaluation and treatment of sleep disorder breathing.     HISTORY OF PRESENT ILLNESS:     Sol Kern is a 60 y.o. female with history of stage 4 breast cancer with mets, HTN, insomnia, DM2.  Sol presents to Sleep Clinic for evaluation and treatment of sleep disorder breathing.     Patient was diagnosed with YOANA in around 2010 but never was started on therapy . Patient recently completed HST which showed AHI 80 with lasha O2 58%, mean O2 83% and 541 min with O2 < 88%. Based on these findings, it is recommended that the patient return for in lab titration.    Patient is actively being treated for breast cancer.  Patient is receiving chemotherapy and is also going in on an almost weekly basis due to fluid accumulations in the stomach and lungs which have required drainage.  Due to persistent pleural effusions patient was started on supplemental oxygen which she wears 24/7 at 3 to 4 L nasal cannula.    Patient reports trouble falling asleep and uses Ambien 10 mg a few times a week.  Patient goes to bed recently around 7 PM getting up between 7 and 9 AM and sometimes napping during the day.  Patient has had increased sleepiness and fatigue related to treatment for breast cancer.    As per supplemental questionnaire to be scanned or imported into chart:    New Site Sleepiness Score: 13    Sleep Schedule  Bedtime: 7PM   Wake time: 7-9AM  Sleep-onset latency: 30-60min. Ambien PRN   Awakenings from sleep: 3-4  Difficulty falling back asleep: sometimes   Bedroom partner: yes  Naps: yes, most days     DAYTIME SYMPTOMS:   Excessive daytime sleepiness: yes  Daytime fatigue: yes  Difficulty concentrating: sometimes  Memory  "problems: sometimes  Irritability:no  Work/school performance issues: no  Sleepiness with driving: no  Caffeine/stimulant use: yes  Alcohol use:No     SLEEP RELATED SYMPTOMS  Snoring: some, not as much as she used to   Witnessed apnea or gasping/choking: No   Dry mouth or mouth breathing: sometimes  Sweating: no  Teeth grinding/biting: no  Morning headaches: sometimes  Refreshed Upon Awakening: no     SLEEP RELATED BEHAVIORS:  Parasomnias (walking, talking, eating, violence): No   Leg kicking: no  Restless legs - \"urge to move\": no  Nightmares: no Recurrent: No   Dream enactment: No      NARCOLEPSY:  Cataplexy: No   Sleep paralysis: No   Sleep attacks: No   Hypnagogic/hypnopompic hallucinations: No     MEDICAL HISTORY  Past Medical History:   Diagnosis Date    Allergy     Back pain     Cancer (HCC) 2021    breast    Daytime sleepiness     Diabetes (HCC)     diet    Fatigue     Frequent headaches     GERD (gastroesophageal reflux disease)     Heartburn     High cholesterol     not medicated    Hypertension     Insomnia     Malignant neoplasm of upper-outer quadrant of left female breast (HCC)     Morning headache     Nasal drainage     Neck pain     Obesity     Pulmonary hypertension (HCC)     Rhinitis     Sleep apnea     no cpap    Snoring     Weakness     Wears glasses         SURGICAL HISTORY  Past Surgical History:   Procedure Laterality Date    KY LAP,DIAGNOSTIC ABDOMEN N/A 12/27/2024    Procedure: DIAGNOSTIC LAPAROSCOPY;  Surgeon: Tasneem Gonzalez M.D.;  Location: SURGERY [facility];  Service: General    PB MASTECTOMY, PARTIAL Left 11/12/2021    Procedure: MASTECTOMY, PARTIAL - SAMARA LOCALIZED;  Surgeon: Kayleen Sykes M.D.;  Location: SURGERY SAME DAY [facility];  Service: General    NODE BIOPSY SENTINEL Left 11/12/2021    Procedure: BIOPSY, LYMPH NODE, SENTINEL;  Surgeon: Kayleen Sykes M.D.;  Location: SURGERY SAME DAY [facility];  Service: General    CARPAL TUNNEL RELEASE Right     ENDOMETRIAL ABLATION      " FOOT SURGERY Right     KNEE ARTHROSCOPY      PRIMARY C SECTION      2     TUBAL COAGULATION LAPAROSCOPIC BILATERAL          FAMILY HISTORY  Family History   Problem Relation Age of Onset    Cancer Mother         melanoma    Hypertension Mother     Other Mother         THYROID    Heart Disease Mother         valvular disease    Ovarian Cancer Mother     Heart Disease Father         Heart Attack    Heart Attack Father     Other Father         psoriasis    Hyperlipidemia Brother     Other Brother         psoriasis    Heart Disease Brother         MI    Heart Attack Brother         massive heart attack     Heart Disease Maternal Grandfather         MI    Breast Cancer Other     Other Other         psoriasis    Diabetes Neg Hx     Alcohol/Drug Neg Hx     Tubal Cancer Neg Hx     Peritoneal Cancer Neg Hx     Colorectal Cancer Neg Hx        SOCIAL HISTORY  Social History     Socioeconomic History    Marital status:     Highest education level: Associate degree: occupational, technical, or vocational program   Tobacco Use    Smoking status: Former     Current packs/day: 0.25     Average packs/day: 0.3 packs/day for 20.0 years (5.0 ttl pk-yrs)     Types: Cigarettes    Smokeless tobacco: Never    Tobacco comments:     working on quitting    Vaping Use    Vaping status: Never Used   Substance and Sexual Activity    Alcohol use: Not Currently     Comment: rarely    Drug use: Not Currently     Types: Marijuana, Oral     Comment: gummies    Sexual activity: Yes     Partners: Male     Comment:      Social Drivers of Health     Financial Resource Strain: Low Risk  (3/20/2023)    Overall Financial Resource Strain (CARDIA)     Difficulty of Paying Living Expenses: Not very hard   Food Insecurity: No Food Insecurity (2024)    Hunger Vital Sign     Worried About Running Out of Food in the Last Year: Never true     Ran Out of Food in the Last Year: Never true   Transportation Needs: No Transportation Needs  (11/25/2024)    PRAPARE - Transportation     Lack of Transportation (Medical): No     Lack of Transportation (Non-Medical): No   Physical Activity: Inactive (3/20/2023)    Exercise Vital Sign     Days of Exercise per Week: 0 days     Minutes of Exercise per Session: 0 min   Stress: Stress Concern Present (3/20/2023)    [employer] - Occupational Stress Questionnaire     Feeling of Stress : To some extent   Social Connections: Moderately Integrated (3/20/2023)    Social Connection and Isolation Panel [NHANES]     Frequency of Communication with Friends and Family: More than three times a week     Frequency of Social Gatherings with Friends and Family: Once a week     Attends [employer] Services: 1 to 4 times per year     Active Member of Clubs or Organizations: No     Attends Club or Organization Meetings: Never     Marital Status:    Intimate Partner Violence: Not At Risk (11/25/2024)    Humiliation, Afraid, Rape, and Kick questionnaire     Fear of Current or Ex-Partner: No     Emotionally Abused: No     Physically Abused: No     Sexually Abused: No   Housing Stability: Low Risk  (11/25/2024)    Housing Stability Vital Sign     Unable to Pay for Housing in the Last Year: No     Number of Times Moved in the Last Year: 0     Homeless in the Last Year: No        Occupation: disabled     CURRENT MEDICATIONS  Current Outpatient Medications   Medication Sig    furosemide (LASIX) 40 MG Tab Take 1 Tablet by mouth every day for 30 days.    lidocaine (XYLOCAINE) 2 % Solution Okay to mix 1:1 mylanta/maalox - use 5 ml every 1 hour prn for oral discomfort    ondansetron (ZOFRAN) 4 MG Tab tablet Take 1 Tablet by mouth every four hours as needed for Nausea/Vomiting (for nausea, vomiting).    prochlorperazine (COMPAZINE) 10 MG Tab Take 1 Tablet by mouth every 6 hours as needed (for nausea, vomiting).    OLANZapine (ZYPREXA) 5 MG Tab Take 1 Tablet by mouth every evening. To be taken nightly on  first day of chemotherapy for a total of 4 days, or as directed by your provider.    loperamide (IMODIUM) 2 MG Cap Take 1 Capsule by mouth see administration instructions.    lidocaine (LIDODERM) 5 % Patch PLACE 1 PATCH ON THE SKIN EVERY 24 HOURS. PLACE PATCH TO AFFECTED AREA IN AM AND REMOVE AT BEDTIME.    Semaglutide, 1 MG/DOSE, 4 MG/3ML Solution Pen-injector Inject 1 mg under the skin every 7 days.    zolpidem (AMBIEN) 10 MG Tab Take 1 Tablet by mouth at bedtime as needed for Sleep for up to 90 days.    prochlorperazine (COMPAZINE) 10 MG Tab Take 1 Tablet by mouth every 6 hours as needed for Nausea/Vomiting.    simethicone (MYLICON) 125 MG chewable tablet Chew 250 mg every 6 hours as needed for Flatulence. 2 tablets= 250mg    atorvastatin (LIPITOR) 20 MG Tab TAKE 1 TABLET BY MOUTH EVERY DAY IN THE EVENING    albuterol 108 (90 Base) MCG/ACT Aero Soln inhalation aerosol Inhale 2 Puffs every 6 hours as needed for Shortness of Breath.    ondansetron (ZOFRAN ODT) 4 MG TABLET DISPERSIBLE Take 1 Tablet by mouth every 8 hours as needed for Nausea/Vomiting.    denosumab (XGEVA) 120 MG/1.7ML injection Inject 120 mg under the skin every 28 days.    metFORMIN ER (GLUCOPHAGE XR) 500 MG TABLET SR 24 HR Take 1 Tablet by mouth every day. (Patient taking differently: Take 1,000 mg by mouth every evening.)    lisinopril (PRINIVIL) 2.5 MG Tab Take 1 Tablet by mouth every day.    cyclobenzaprine (FLEXERIL) 10 mg Tab TAKE 1 TABLET BY MOUTH THREE TIMES A DAY AS NEEDED FOR MUSCLE SPASM (Patient not taking: Reported on 3/20/2025)       REVIEW OF SYSTEMS  Constitutional: Denies fevers, Denies weight changes  Ears/Nose/Throat/Mouth: Denies nasal congestion or sore throat   Cardiovascular: Denies chest pain  Respiratory: Denies shortness of breath, Denies cough  Gastrointestinal/Hepatic: Denies nausea, vomiting  Sleep: see HPI    Physical Examination:  Vitals/ General Appearance:   Weight/BMI: Body mass index is 37.2 kg/m².  /84 (BP  "Location: Left arm, Patient Position: Sitting, BP Cuff Size: Adult)   Pulse (!) 102   Resp 16   Ht 1.6 m (5' 3\")   Wt 95.3 kg (210 lb)   SpO2 92%   Vitals:    03/20/25 1337   BP: 128/84   BP Location: Left arm   Patient Position: Sitting   BP Cuff Size: Adult   Pulse: (!) 102   Resp: 16   SpO2: 92%   Weight: 95.3 kg (210 lb)   Height: 1.6 m (5' 3\")       Pt. is alert and oriented to time, place and person. Cooperative and in no apparent distress.     Constitutional: Alert, no distress, well-groomed.  Skin: No rashes in visible areas.  Eye: Round. Conjunctiva clear, lids normal. No icterus.   ENT EXAM  Nasal alae/valves collapsible: Yes  Nasal septum deviation: No   Nasal turbinate hypertrophy: Left: Grade 2   Right: Grade 2  Hard palate narrow: No   Hard palate high: Yes  Soft palate/uvula (Mallampati score): 2  Tongue Scalloping: No   Retrognathia: No   Micrognathia: No   Cardiovascular:normal S1 and S2 heart sounds, regular rate and rhythm  Pulmonary:diminished lung sounds, particularly RUL  Neurologic:Muscle tone normal, Awake, alert and oriented x 3  Extremities: No clubbing, cyanosis, or edema     Bicarb:   Lab Results   Component Value Date/Time    CO2 26 03/10/2025 0830    CO2 27 02/12/2025 1126    CO2 22 12/27/2024 1155     TSH:   Lab Results   Component Value Date/Time    TSHULTRASEN 1.000 09/13/2017 1244     CREATININE:   Lab Results   Component Value Date/Time    CREATININE 0.72 03/10/2025 0830     VIT D:   Lab Results   Component Value Date/Time    25HYDROXY 10 (L) 03/19/2019 1212     H/H:  Lab Results   Component Value Date/Time    HEMOGLOBIN 13.2 03/17/2025 08:44 AM         Medical Decision Making   Assessment and Plan  The medical record was reviewed.    Obstructive sleep apnea  - home sleep apnea tests reviewed. Based on results, it is recommended that patient return for in lab titration study. Patient is agreeable.  - Patients with YOANA are at increased risk of cardiovascular disease including " coronary artery disease, systemic arterial hypertension, pulmonary arterial hypertension, cardiac arrythmias, and stroke. Positive airway pressure will favorably impact many of the adverse conditions and effects provoked by YOANA. Avoid driving a motor vehicle when drowsy  - Order placed for titration study  - Discussed ordering machine based on settings recommended during study. Patient has selected [employer] as her DME company.   - Clean equipment frequently, and get new mask and supplies as allowed by insurance and DME. Advised to let DME company know in the first 30 days if any issues with mask fit arise so that new mask can be tried.   - Discussed compliance requirements for insurance purposes as well as ultimate clinical goal of wearing and tolerating PAP device nightly for full night of sleep to achieve optimal symptomatic and prophylactic benefit.   - Advised to reach out via [employer] with questions       Return in about 4 months (around 7/20/2025) for for initial compliance after using mahcine for 30 days .   - Recommend an earlier appointment, if significant treatment barriers develop.  - Patient to follow up with the appropriate healthcare practitioners for all other medical problems and issues.    Please note portions of this record was created using voice recognition software. I have made every reasonable attempt to correct obvious errors, but I expect that there are errors of grammar and possibly content I did not discover before finalizing the note.

## 2025-03-21 ENCOUNTER — SLEEP STUDY (OUTPATIENT)
Dept: SLEEP MEDICINE | Facility: MEDICAL CENTER | Age: 60
End: 2025-03-21
Payer: MEDICARE

## 2025-03-21 ENCOUNTER — HOSPITAL ENCOUNTER (OUTPATIENT)
Dept: RADIOLOGY | Facility: MEDICAL CENTER | Age: 60
End: 2025-03-21
Attending: NURSE PRACTITIONER
Payer: MEDICARE

## 2025-03-21 ENCOUNTER — APPOINTMENT (OUTPATIENT)
Dept: RADIOLOGY | Facility: MEDICAL CENTER | Age: 60
End: 2025-03-21
Attending: INTERNAL MEDICINE
Payer: MEDICARE

## 2025-03-21 DIAGNOSIS — G47.33 OSA (OBSTRUCTIVE SLEEP APNEA): ICD-10-CM

## 2025-03-21 DIAGNOSIS — C50.412 MALIGNANT NEOPLASM OF UPPER-OUTER QUADRANT OF LEFT BREAST IN FEMALE, ESTROGEN RECEPTOR POSITIVE (HCC): ICD-10-CM

## 2025-03-21 DIAGNOSIS — J90 PLEURAL EFFUSION: ICD-10-CM

## 2025-03-21 DIAGNOSIS — Z17.0 MALIGNANT NEOPLASM OF UPPER-OUTER QUADRANT OF LEFT BREAST IN FEMALE, ESTROGEN RECEPTOR POSITIVE (HCC): ICD-10-CM

## 2025-03-21 DIAGNOSIS — G47.34 NOCTURNAL HYPOXIA: ICD-10-CM

## 2025-03-21 PROCEDURE — 71045 X-RAY EXAM CHEST 1 VIEW: CPT

## 2025-03-21 PROCEDURE — C1729 CATH, DRAINAGE: HCPCS

## 2025-03-24 NOTE — PROCEDURES
Patient: BRITNEY URBAN  ID: [id number] Date: 3/21/2025   MONTAGE: Standard  STUDY TYPE: Treatment  RECORDING TECHNIQUE:   After the scalp was prepared, gold plated electrodes were applied to the scalp according to the International 10-20 System. EEG (electroencephalogram) was continuously monitored from the O1-M2, O2-M1, C3-M2, C4-M1, F3-M2, and F4-M1. EOGs (electrooculograms) were monitored by electrodes placed at the left and right outer canthi. Chin EMG (electromyogram) was monitored by electrodes placed on the mentalis and sub-mentalis muscles. Nasal and oral airflow were monitored using a triple port thermocouple as well as oronasal pressure transducer. Respiratory effort was measured by inductive plethysmography technology employing abdominal and thoracic belts. Blood oxygen saturation and pulse were monitored by pulse oximetry. Heart rhythm was monitored by surface electrocardiogram. Leg EMG was studied using surface electrodes placed on left and right anterior tibialis. A microphone was used to monitor tracheal sounds and snoring. Body position was monitored and documented by technician observation.   SCORING CRITERIA:   A modification of the AASM manual for scoring of sleep and associated events was used. Obstructive apneas were scored by cessation of airflow for at least 10 seconds with continuing respiratory effort. Central apneas were scored by cessation of airflow for at least 10 seconds with no respiratory effort. Hypopneas were scored by a 30% or more reduction in airflow for at least 10 seconds accompanied by arterial oxygen desaturation of 3% or an arousal. For CMS (Medicare) patients, per AASM rule 1B, hypopneas are scored by 30% with mild reduction in airflow for at least 10 seconds accompanied by arterial saturation decreased at 4%.    Study start time was 10:51:36 PM. Diagnostic recording time was 7h 12.0m with a total sleep time of 6h 33.0m resulting in a sleep efficiency of 90.97%%. Sleep latency  from the start of the study was 01 minutes and the latency from sleep to REM was 77 minutes. In total,67 arousals were scored for an arousal index of 10.2.  Respiratory:  There were a total of 1 apneas consisting of 1 obstructive apneas, 0 mixed apneas, and 0 central apneas. A total of 35 hypopneas were scored. The apnea index was 0.15 per hour and the hypopnea index was 5.34 per hour resulting in an overall 4% AHI of 5.50. AHI during REM was 21.3 and AHI while supine was 0.00.  Oximetry:  There was a mean oxygen saturation of 85.0%. The minimum oxygen saturation in NREM was 63.0 % and in REM was 63.0%. The patient spent 241.2 minutes of TST with SaO2 <88%.  Cardiac:  The highest heart rate seen while awake was 106 BPM while the highest heart rate during sleep was 105 BPM with an average sleeping heart rate of 93 BPM.  Limb Movements:  There were a total of 12 PLMs during sleep which resulted in a PLMS index of 1.8. Of these, 25 were associated with arousals which resulted in a PLMS arousal index of 3.8.  Titration: CPAP was tried from 5-11cm H2O. Bipap was tried from 9/5-14/10cm H2O. Oxygen was used from 0-3LPM.  This was a fully attended sleep study. This test was technically adequate. This patient was titrated on CPAP starting at 5 cm of water pressure. Patient was titrated up to BiPAP 14/10 cm of water pressure. Patient did best at 11/7 cm of water pressure. Patient spent 46 minutes at that pressure and the AHI was 1.4 which is considered controlled obstructive sleep apnea.     Impression:  1.  Patient used CPAP and BiPAP during study  2.  Supplemental oxygen was used with PAP therapy between 1 L/min and 3 L/min  3.  Patient had difficulty tolerating higher pressures and woke up multiple times requesting pressure to be decreased  4.  No supine REM sleep seen  5.  Respiratory events worse during REM sleep  6.  Despite improvement in respiratory events with PAP therapy patient did require supplemental  oxygen    Recommendations:  I recommend auto BiPAP EPAP 5 IPAP 13 pressure support 5 cmH2O with bleed in supplemental oxygen 3 L/min.  Patient used Extra Small P10 mask during night of study. Patient also tried N20 for portions of study.    I also recommend 30 day compliance download to assess the efficacy to the recommended pressure, measure leak, apnea hypopnea index and compliance for further outpatient monitoring and management of PAP therapy. In some cases alternative treatment options may be proven effective in resolving sleep apnea. These options include upper airway surgery, the use of a dental orthotic, weight loss, or positional therapy. Clinical correlation is required. In general patients with sleep apnea are advised to avoid alcohol, sedatives and not to operate a motor vehicle while drowsy.  Untreated sleep apnea increases the risk for cardiovascular and neurovascular disease.

## 2025-03-25 RX ORDER — 0.9 % SODIUM CHLORIDE 0.9 %
10 VIAL (ML) INJECTION PRN
Status: CANCELLED | OUTPATIENT
Start: 2025-03-30

## 2025-03-25 RX ORDER — 0.9 % SODIUM CHLORIDE 0.9 %
VIAL (ML) INJECTION PRN
OUTPATIENT
Start: 2025-04-07

## 2025-03-25 RX ORDER — 0.9 % SODIUM CHLORIDE 0.9 %
3 VIAL (ML) INJECTION PRN
Status: CANCELLED | OUTPATIENT
Start: 2025-03-31

## 2025-03-25 RX ORDER — EPINEPHRINE 1 MG/ML(1)
0.5 AMPUL (ML) INJECTION PRN
Status: CANCELLED | OUTPATIENT
Start: 2025-03-31

## 2025-03-25 RX ORDER — SODIUM CHLORIDE 9 MG/ML
INJECTION, SOLUTION INTRAVENOUS CONTINUOUS
OUTPATIENT
Start: 2025-04-07

## 2025-03-25 RX ORDER — 0.9 % SODIUM CHLORIDE 0.9 %
10 VIAL (ML) INJECTION PRN
OUTPATIENT
Start: 2025-04-07

## 2025-03-25 RX ORDER — 0.9 % SODIUM CHLORIDE 0.9 %
3 VIAL (ML) INJECTION PRN
Status: CANCELLED | OUTPATIENT
Start: 2025-03-30

## 2025-03-25 RX ORDER — ATROPINE SULFATE 1 MG/ML
0.5 INJECTION, SOLUTION INTRAVENOUS PRN
Status: CANCELLED | OUTPATIENT
Start: 2025-03-31

## 2025-03-25 RX ORDER — ACETAMINOPHEN 325 MG/1
650 TABLET ORAL ONCE
OUTPATIENT
Start: 2025-04-07

## 2025-03-25 RX ORDER — PROCHLORPERAZINE MALEATE 10 MG
10 TABLET ORAL EVERY 6 HOURS PRN
OUTPATIENT
Start: 2025-04-07

## 2025-03-25 RX ORDER — DIPHENHYDRAMINE HYDROCHLORIDE 50 MG/ML
50 INJECTION, SOLUTION INTRAMUSCULAR; INTRAVENOUS PRN
OUTPATIENT
Start: 2025-04-07

## 2025-03-25 RX ORDER — METHYLPREDNISOLONE SODIUM SUCCINATE 125 MG/2ML
125 INJECTION, POWDER, LYOPHILIZED, FOR SOLUTION INTRAMUSCULAR; INTRAVENOUS PRN
Status: CANCELLED | OUTPATIENT
Start: 2025-03-31

## 2025-03-25 RX ORDER — SODIUM CHLORIDE 9 MG/ML
INJECTION, SOLUTION INTRAVENOUS CONTINUOUS
Status: CANCELLED | OUTPATIENT
Start: 2025-03-31

## 2025-03-25 RX ORDER — DIPHENHYDRAMINE HYDROCHLORIDE 50 MG/ML
25 INJECTION, SOLUTION INTRAMUSCULAR; INTRAVENOUS ONCE
Status: CANCELLED | OUTPATIENT
Start: 2025-03-31 | End: 2025-03-31

## 2025-03-25 RX ORDER — LOPERAMIDE HYDROCHLORIDE 2 MG/1
4 CAPSULE ORAL PRN
Status: CANCELLED | OUTPATIENT
Start: 2025-03-31

## 2025-03-25 RX ORDER — 0.9 % SODIUM CHLORIDE 0.9 %
VIAL (ML) INJECTION PRN
Status: CANCELLED | OUTPATIENT
Start: 2025-03-30

## 2025-03-25 RX ORDER — DEXAMETHASONE SODIUM PHOSPHATE 4 MG/ML
12 INJECTION, SOLUTION INTRA-ARTICULAR; INTRALESIONAL; INTRAMUSCULAR; INTRAVENOUS; SOFT TISSUE ONCE
OUTPATIENT
Start: 2025-04-07 | End: 2025-04-07

## 2025-03-25 RX ORDER — DIPHENHYDRAMINE HYDROCHLORIDE 50 MG/ML
25 INJECTION, SOLUTION INTRAMUSCULAR; INTRAVENOUS ONCE
OUTPATIENT
Start: 2025-04-07 | End: 2025-04-07

## 2025-03-25 RX ORDER — PALONOSETRON 0.05 MG/ML
0.25 INJECTION, SOLUTION INTRAVENOUS ONCE
Status: CANCELLED | OUTPATIENT
Start: 2025-03-31 | End: 2025-03-31

## 2025-03-25 RX ORDER — ACETAMINOPHEN 325 MG/1
650 TABLET ORAL ONCE
Status: CANCELLED | OUTPATIENT
Start: 2025-03-31

## 2025-03-25 RX ORDER — LOPERAMIDE HYDROCHLORIDE 2 MG/1
2 CAPSULE ORAL
Status: CANCELLED | OUTPATIENT
Start: 2025-03-31

## 2025-03-25 RX ORDER — PROCHLORPERAZINE MALEATE 10 MG
10 TABLET ORAL EVERY 6 HOURS PRN
Status: CANCELLED | OUTPATIENT
Start: 2025-03-31

## 2025-03-25 RX ORDER — DIPHENHYDRAMINE HYDROCHLORIDE 50 MG/ML
50 INJECTION, SOLUTION INTRAMUSCULAR; INTRAVENOUS PRN
Status: CANCELLED | OUTPATIENT
Start: 2025-03-31

## 2025-03-25 RX ORDER — ONDANSETRON 2 MG/ML
4 INJECTION INTRAMUSCULAR; INTRAVENOUS PRN
Status: CANCELLED | OUTPATIENT
Start: 2025-03-31

## 2025-03-25 RX ORDER — ONDANSETRON 8 MG/1
8 TABLET, ORALLY DISINTEGRATING ORAL PRN
OUTPATIENT
Start: 2025-04-07

## 2025-03-25 RX ORDER — 0.9 % SODIUM CHLORIDE 0.9 %
10 VIAL (ML) INJECTION PRN
Status: CANCELLED | OUTPATIENT
Start: 2025-03-31

## 2025-03-25 RX ORDER — EPINEPHRINE 1 MG/ML(1)
0.5 AMPUL (ML) INJECTION PRN
OUTPATIENT
Start: 2025-04-07

## 2025-03-25 RX ORDER — LOPERAMIDE HYDROCHLORIDE 2 MG/1
4 CAPSULE ORAL PRN
OUTPATIENT
Start: 2025-04-07

## 2025-03-25 RX ORDER — ONDANSETRON 8 MG/1
8 TABLET, ORALLY DISINTEGRATING ORAL PRN
Status: CANCELLED | OUTPATIENT
Start: 2025-03-31

## 2025-03-25 RX ORDER — ATROPINE SULFATE 1 MG/ML
0.5 INJECTION, SOLUTION INTRAVENOUS PRN
OUTPATIENT
Start: 2025-04-07

## 2025-03-25 RX ORDER — DEXAMETHASONE SODIUM PHOSPHATE 4 MG/ML
12 INJECTION, SOLUTION INTRA-ARTICULAR; INTRALESIONAL; INTRAMUSCULAR; INTRAVENOUS; SOFT TISSUE ONCE
Status: CANCELLED | OUTPATIENT
Start: 2025-03-31 | End: 2025-03-31

## 2025-03-25 RX ORDER — METHYLPREDNISOLONE SODIUM SUCCINATE 125 MG/2ML
125 INJECTION, POWDER, LYOPHILIZED, FOR SOLUTION INTRAMUSCULAR; INTRAVENOUS PRN
OUTPATIENT
Start: 2025-04-07

## 2025-03-25 RX ORDER — 0.9 % SODIUM CHLORIDE 0.9 %
3 VIAL (ML) INJECTION PRN
OUTPATIENT
Start: 2025-04-07

## 2025-03-25 RX ORDER — 0.9 % SODIUM CHLORIDE 0.9 %
VIAL (ML) INJECTION PRN
Status: CANCELLED | OUTPATIENT
Start: 2025-03-31

## 2025-03-25 RX ORDER — PALONOSETRON 0.05 MG/ML
0.25 INJECTION, SOLUTION INTRAVENOUS ONCE
OUTPATIENT
Start: 2025-04-07 | End: 2025-04-07

## 2025-03-25 RX ORDER — ONDANSETRON 2 MG/ML
4 INJECTION INTRAMUSCULAR; INTRAVENOUS PRN
OUTPATIENT
Start: 2025-04-07

## 2025-03-25 RX ORDER — LOPERAMIDE HYDROCHLORIDE 2 MG/1
2 CAPSULE ORAL
OUTPATIENT
Start: 2025-04-07

## 2025-03-26 ENCOUNTER — HOSPITAL ENCOUNTER (OUTPATIENT)
Dept: RADIOLOGY | Facility: MEDICAL CENTER | Age: 60
End: 2025-03-26
Attending: FAMILY MEDICINE
Payer: MEDICARE

## 2025-03-26 ENCOUNTER — HOSPITAL ENCOUNTER (OUTPATIENT)
Dept: HEMATOLOGY ONCOLOGY | Facility: MEDICAL CENTER | Age: 60
End: 2025-03-26
Attending: INTERNAL MEDICINE
Payer: MEDICARE

## 2025-03-26 VITALS
WEIGHT: 204.15 LBS | BODY MASS INDEX: 36.17 KG/M2 | DIASTOLIC BLOOD PRESSURE: 72 MMHG | TEMPERATURE: 98.9 F | SYSTOLIC BLOOD PRESSURE: 120 MMHG | HEIGHT: 63 IN | HEART RATE: 90 BPM | OXYGEN SATURATION: 90 %

## 2025-03-26 DIAGNOSIS — C80.0 CARCINOMATOSIS (HCC): ICD-10-CM

## 2025-03-26 DIAGNOSIS — Z17.0 CARCINOMA OF UPPER-OUTER QUADRANT OF LEFT BREAST IN FEMALE, ESTROGEN RECEPTOR POSITIVE (HCC): ICD-10-CM

## 2025-03-26 DIAGNOSIS — C50.412 CARCINOMA OF UPPER-OUTER QUADRANT OF LEFT BREAST IN FEMALE, ESTROGEN RECEPTOR POSITIVE (HCC): ICD-10-CM

## 2025-03-26 DIAGNOSIS — R18.0 MALIGNANT ASCITES: ICD-10-CM

## 2025-03-26 PROCEDURE — 99212 OFFICE O/P EST SF 10 MIN: CPT | Performed by: INTERNAL MEDICINE

## 2025-03-26 PROCEDURE — 99214 OFFICE O/P EST MOD 30 MIN: CPT | Performed by: INTERNAL MEDICINE

## 2025-03-26 PROCEDURE — 76705 ECHO EXAM OF ABDOMEN: CPT

## 2025-03-26 ASSESSMENT — ENCOUNTER SYMPTOMS
SHORTNESS OF BREATH: 1
DIARRHEA: 0
NAUSEA: 0
CHILLS: 0
VOMITING: 0
SPUTUM PRODUCTION: 0
PALPITATIONS: 0
COUGH: 0
FEVER: 0
WEIGHT LOSS: 0
CONSTIPATION: 0

## 2025-03-26 ASSESSMENT — FIBROSIS 4 INDEX: FIB4 SCORE: 1.92

## 2025-03-26 ASSESSMENT — PAIN SCALES - GENERAL: PAINLEVEL_OUTOF10: NO PAIN

## 2025-03-26 NOTE — ADDENDUM NOTE
Encounter addended by: Erich Cardoso, Med Ass't on: 3/26/2025 12:00 PM   Actions taken: Charge Capture section accepted

## 2025-03-26 NOTE — PROGRESS NOTES
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 to reimage her after cycle 3.  I will see her back in 3 weeks.      Please note that this dictation was created using voice recognition software. I have made every reasonable attempt to correct obvious errors, but I expect that there are errors of grammar and possibly content that I did not discover before finalizing the note.

## 2025-03-29 NOTE — PROGRESS NOTES
"Pharmacy Chemotherapy Verification Note:    Patient Name: Sol Kern      Dx: Breast Cancer      Protocol: Sacituzumab Govitecan       *Dosing Reference*  Sacituzumab govitecan-hziy 10 mg/kg IV on Days 1 and 8  21-day cycle until disease progression or unacceptable toxicity     1. NCCNGuidelines® for Breast Cancer V.2.2024.   2. Staci FITZPATRICK et al. N Engl J Med. 2019;380(8):741-751.a   3. Andre et al. J Clin Oncol. 2022;40(17_suppl):VFZ9108.a    Allergies:  Codeine, Hydrocodone, and Other drug     /80   Pulse 88   Temp 35.9 °C (96.7 °F) (Temporal)   Resp 18   Ht 1.6 m (5' 2.99\")   Wt 92.9 kg (204 lb 12.9 oz)   SpO2 91%   BMI 36.29 kg/m²  Body surface area is 2.03 meters squared.    Labs 3/31/25:  ANC~ 5330 Plt = 329k   Hgb = 13.5         Drug Order   (Drug name, dose, route, IV Fluid & volume, frequency, number of doses) Cycle: 2 Day 1      Previous treatment: C1D8 on 3/17/25; s/p Capecitabine x 2C, Ribociclib + Faslodex     Medication = Sacituzumab govitecan  Base Dose = 10 mg/kg  Calc Dose: Base Dose x 92.9 kg = 929 mg  Final Dose = 900 mg  Route = IV  Fluid & Volume =  mL  Admin Duration = Over 1-2 hours          <10% difference, okay to treat with final dose       By my signature below, I confirm this process was performed independently with the BSA and all final chemotherapy dosing calculations congruent. I have reviewed the above chemotherapy order and that my calculation of the final dose and BSA (when applicable) corroborate those calculations of the  pharmacist.     Christine Thompson, PharmD    "

## 2025-03-30 NOTE — PROGRESS NOTES
"Pharmacy Chemotherapy calculation:    DX: breast cancer    Cycle 2, Day 1  Previous treatment = C1C8 3/17/25; XRT, anastrazole, capecitabine    Regimen: sacituzumab govitecan-hziy  sacituzumab govitecan-hziy 10mg/kg IV on days 1 and 8   q21 days until Dp/UT   NCCN Guidelines for Breast Cancer V.2.2024  Staci FITZPATIRCK et al - N Engl J Med. 2019 Feb 21;380(1):756-020.   shayy Powell - Journal of Clinical OncologyVolume 40, Number 17_suppl     /80   Pulse 88   Temp 35.9 °C (96.7 °F) (Temporal)   Resp 18   Ht 1.6 m (5' 2.99\")   Wt 92.9 kg (204 lb 12.9 oz)   SpO2 91%   BMI 36.29 kg/m²   Body surface area is 2.03 meters squared.    Labs 3/31/25:  ANC~ 5330 Plt = 329k   Hgb = 13.5    Xgeva plan in [place], continue to hold today per Lorraine Persaud    Sacituzumab govitecan-hziy 10 mg/kg x 92.9 kg = 929 mg   <10% difference, ok to treat with final dose = 900 mg IV    Braeden Carmona, PharmD  "

## 2025-03-31 ENCOUNTER — OUTPATIENT INFUSION SERVICES (OUTPATIENT)
Dept: ONCOLOGY | Facility: MEDICAL CENTER | Age: 60
End: 2025-03-31
Attending: INTERNAL MEDICINE
Payer: MEDICARE

## 2025-03-31 ENCOUNTER — PATIENT MESSAGE (OUTPATIENT)
Dept: SLEEP MEDICINE | Facility: MEDICAL CENTER | Age: 60
End: 2025-03-31

## 2025-03-31 VITALS
OXYGEN SATURATION: 91 % | HEIGHT: 63 IN | WEIGHT: 204.81 LBS | DIASTOLIC BLOOD PRESSURE: 80 MMHG | SYSTOLIC BLOOD PRESSURE: 139 MMHG | RESPIRATION RATE: 18 BRPM | TEMPERATURE: 96.7 F | BODY MASS INDEX: 36.29 KG/M2 | HEART RATE: 88 BPM

## 2025-03-31 DIAGNOSIS — G47.33 OSA (OBSTRUCTIVE SLEEP APNEA): ICD-10-CM

## 2025-03-31 DIAGNOSIS — G47.34 NOCTURNAL HYPOXIA: ICD-10-CM

## 2025-03-31 DIAGNOSIS — C50.912 BREAST CANCER, STAGE 4, LEFT (HCC): ICD-10-CM

## 2025-03-31 LAB
ALBUMIN SERPL BCP-MCNC: 3.5 G/DL (ref 3.2–4.9)
ALBUMIN/GLOB SERPL: 1.1 G/DL
ALP SERPL-CCNC: 133 U/L (ref 30–99)
ALT SERPL-CCNC: 22 U/L (ref 2–50)
ANION GAP SERPL CALC-SCNC: 11 MMOL/L (ref 7–16)
AST SERPL-CCNC: 34 U/L (ref 12–45)
BASOPHILS # BLD AUTO: 1 % (ref 0–1.8)
BASOPHILS # BLD: 0.07 K/UL (ref 0–0.12)
BILIRUB SERPL-MCNC: 0.4 MG/DL (ref 0.1–1.5)
BUN SERPL-MCNC: 7 MG/DL (ref 8–22)
CALCIUM ALBUM COR SERPL-MCNC: 9.7 MG/DL (ref 8.5–10.5)
CALCIUM SERPL-MCNC: 9.3 MG/DL (ref 8.5–10.5)
CHLORIDE SERPL-SCNC: 103 MMOL/L (ref 96–112)
CO2 SERPL-SCNC: 26 MMOL/L (ref 20–33)
CREAT SERPL-MCNC: 0.65 MG/DL (ref 0.5–1.4)
EOSINOPHIL # BLD AUTO: 0.15 K/UL (ref 0–0.51)
EOSINOPHIL NFR BLD: 2.1 % (ref 0–6.9)
ERYTHROCYTE [DISTWIDTH] IN BLOOD BY AUTOMATED COUNT: 58.1 FL (ref 35.9–50)
GFR SERPLBLD CREATININE-BSD FMLA CKD-EPI: 99 ML/MIN/1.73 M 2
GLOBULIN SER CALC-MCNC: 3.1 G/DL (ref 1.9–3.5)
GLUCOSE SERPL-MCNC: 120 MG/DL (ref 65–99)
HCT VFR BLD AUTO: 42.7 % (ref 37–47)
HGB BLD-MCNC: 13.5 G/DL (ref 12–16)
IMM GRANULOCYTES # BLD AUTO: 0.11 K/UL (ref 0–0.11)
IMM GRANULOCYTES NFR BLD AUTO: 1.6 % (ref 0–0.9)
LYMPHOCYTES # BLD AUTO: 0.81 K/UL (ref 1–4.8)
LYMPHOCYTES NFR BLD: 11.4 % (ref 22–41)
MCH RBC QN AUTO: 31 PG (ref 27–33)
MCHC RBC AUTO-ENTMCNC: 31.6 G/DL (ref 32.2–35.5)
MCV RBC AUTO: 98.2 FL (ref 81.4–97.8)
MONOCYTES # BLD AUTO: 0.62 K/UL (ref 0–0.85)
MONOCYTES NFR BLD AUTO: 8.7 % (ref 0–13.4)
NEUTROPHILS # BLD AUTO: 5.33 K/UL (ref 1.82–7.42)
NEUTROPHILS NFR BLD: 75.2 % (ref 44–72)
NRBC # BLD AUTO: 0 K/UL
NRBC BLD-RTO: 0 /100 WBC (ref 0–0.2)
OUTPT INFUS CBC COMMENT OICOM: ABNORMAL
PLATELET # BLD AUTO: 329 K/UL (ref 164–446)
PMV BLD AUTO: 8.9 FL (ref 9–12.9)
POTASSIUM SERPL-SCNC: 4.6 MMOL/L (ref 3.6–5.5)
PROT SERPL-MCNC: 6.6 G/DL (ref 6–8.2)
RBC # BLD AUTO: 4.35 M/UL (ref 4.2–5.4)
SODIUM SERPL-SCNC: 140 MMOL/L (ref 135–145)
WBC # BLD AUTO: 7.1 K/UL (ref 4.8–10.8)

## 2025-03-31 PROCEDURE — 96413 CHEMO IV INFUSION 1 HR: CPT

## 2025-03-31 PROCEDURE — 96367 TX/PROPH/DG ADDL SEQ IV INF: CPT

## 2025-03-31 PROCEDURE — 700105 HCHG RX REV CODE 258: Performed by: NURSE PRACTITIONER

## 2025-03-31 PROCEDURE — 700102 HCHG RX REV CODE 250 W/ 637 OVERRIDE(OP): Performed by: NURSE PRACTITIONER

## 2025-03-31 PROCEDURE — 96375 TX/PRO/DX INJ NEW DRUG ADDON: CPT

## 2025-03-31 PROCEDURE — 700111 HCHG RX REV CODE 636 W/ 250 OVERRIDE (IP): Mod: JZ | Performed by: NURSE PRACTITIONER

## 2025-03-31 PROCEDURE — 96415 CHEMO IV INFUSION ADDL HR: CPT

## 2025-03-31 PROCEDURE — A9270 NON-COVERED ITEM OR SERVICE: HCPCS | Performed by: NURSE PRACTITIONER

## 2025-03-31 RX ORDER — PALONOSETRON 0.05 MG/ML
0.25 INJECTION, SOLUTION INTRAVENOUS ONCE
Status: COMPLETED | OUTPATIENT
Start: 2025-03-31 | End: 2025-03-31

## 2025-03-31 RX ORDER — ACETAMINOPHEN 325 MG/1
650 TABLET ORAL ONCE
Status: COMPLETED | OUTPATIENT
Start: 2025-03-31 | End: 2025-03-31

## 2025-03-31 RX ORDER — DIPHENHYDRAMINE HYDROCHLORIDE 50 MG/ML
50 INJECTION, SOLUTION INTRAMUSCULAR; INTRAVENOUS PRN
Status: DISCONTINUED | OUTPATIENT
Start: 2025-03-31 | End: 2025-03-31 | Stop reason: HOSPADM

## 2025-03-31 RX ORDER — METHYLPREDNISOLONE SODIUM SUCCINATE 125 MG/2ML
125 INJECTION, POWDER, LYOPHILIZED, FOR SOLUTION INTRAMUSCULAR; INTRAVENOUS PRN
Status: DISCONTINUED | OUTPATIENT
Start: 2025-03-31 | End: 2025-03-31 | Stop reason: HOSPADM

## 2025-03-31 RX ORDER — DEXAMETHASONE SODIUM PHOSPHATE 4 MG/ML
12 INJECTION, SOLUTION INTRA-ARTICULAR; INTRALESIONAL; INTRAMUSCULAR; INTRAVENOUS; SOFT TISSUE ONCE
Status: COMPLETED | OUTPATIENT
Start: 2025-03-31 | End: 2025-03-31

## 2025-03-31 RX ORDER — EPINEPHRINE 1 MG/ML(1)
0.5 AMPUL (ML) INJECTION PRN
Status: DISCONTINUED | OUTPATIENT
Start: 2025-03-31 | End: 2025-03-31 | Stop reason: HOSPADM

## 2025-03-31 RX ORDER — DIPHENHYDRAMINE HYDROCHLORIDE 50 MG/ML
25 INJECTION, SOLUTION INTRAMUSCULAR; INTRAVENOUS ONCE
Status: COMPLETED | OUTPATIENT
Start: 2025-03-31 | End: 2025-03-31

## 2025-03-31 RX ORDER — OLANZAPINE 5 MG/1
5 TABLET ORAL NIGHTLY
Qty: 24 TABLET | Refills: 0 | Status: SHIPPED | OUTPATIENT
Start: 2025-03-31

## 2025-03-31 RX ADMIN — ACETAMINOPHEN 650 MG: 325 TABLET ORAL at 08:59

## 2025-03-31 RX ADMIN — FAMOTIDINE 20 MG: 10 INJECTION INTRAVENOUS at 09:00

## 2025-03-31 RX ADMIN — FOSAPREPITANT 150 MG: 150 INJECTION, POWDER, LYOPHILIZED, FOR SOLUTION INTRAVENOUS at 09:17

## 2025-03-31 RX ADMIN — DEXAMETHASONE SODIUM PHOSPHATE 12 MG: 4 INJECTION INTRA-ARTICULAR; INTRALESIONAL; INTRAMUSCULAR; INTRAVENOUS; SOFT TISSUE at 09:10

## 2025-03-31 RX ADMIN — DIPHENHYDRAMINE HYDROCHLORIDE 25 MG: 50 INJECTION INTRAMUSCULAR; INTRAVENOUS at 09:03

## 2025-03-31 RX ADMIN — SACITUZUMAB GOVITECAN 900 MG: 180 POWDER, FOR SOLUTION INTRAVENOUS at 09:55

## 2025-03-31 RX ADMIN — PALONOSETRON HYDROCHLORIDE 0.25 MG: 0.25 INJECTION INTRAVENOUS at 09:07

## 2025-03-31 ASSESSMENT — FIBROSIS 4 INDEX: FIB4 SCORE: 1.92

## 2025-03-31 NOTE — PROGRESS NOTES
Chemotherapy Verification - SECONDARY RN       Height = 160 cm  Weight = 92.9 kg  BSA = 2.03 m2       Medication: Sacituzumab govitecan  Dose: 10 mg/kg  Calculated Dose: 929 mg                             (In mg/m2, AUC, mg/kg)       I confirm that this process was performed independently.

## 2025-03-31 NOTE — PROGRESS NOTES
Chemotherapy Verification - PRIMARY RN    C2 D1    Height = 160 cm  Weight = 92.9 kg  BSA = 2.03 m^2       Medication: sacituzumab govitecan (Trodelvy)  Dose: 10 mg/kg  Calculated Dose: 929 mg                             (In mg/m2, AUC, mg/kg)     I confirm this process was performed independently with the BSA and all final chemotherapy dosing calculations congruent.  Any discrepancies of 10% or greater have been addressed with the chemotherapy pharmacist. The resolution of the discrepancy has been documented in the EPIC progress notes.

## 2025-03-31 NOTE — PROGRESS NOTES
Pt arrives to [facility] for cycle 2 day 1 of Trodelvy.  Discussed plan of care with pt.  Pt denies s/sx of infection or other complaints today.  PIV established to R-FA and labs drawn.  Labs reviewed and results meet parameters for treatment.  Pre-medications given.  Trodelvy infused over 2 hours without adverse reaction.  30 minutes observation completed.  PIV flushed with NS and site was removed intact.  Site covered with gauze/tape.  Confirmed next appt on 4/07/2025 with pt.  Pt dc home to self care.

## 2025-03-31 NOTE — PATIENT COMMUNICATION
ROSSY Mejia. to Me   LS    3/31/25  9:55 AM  Can you send the bipap order to [facility] please? Ty!      [facility] the [facility]

## 2025-04-01 ENCOUNTER — OFFICE VISIT (OUTPATIENT)
Dept: SLEEP MEDICINE | Facility: MEDICAL CENTER | Age: 60
End: 2025-04-01
Payer: MEDICARE

## 2025-04-01 VITALS
RESPIRATION RATE: 16 BRPM | HEIGHT: 64 IN | SYSTOLIC BLOOD PRESSURE: 128 MMHG | OXYGEN SATURATION: 87 % | HEART RATE: 94 BPM | DIASTOLIC BLOOD PRESSURE: 84 MMHG | BODY MASS INDEX: 34.49 KG/M2 | WEIGHT: 202 LBS

## 2025-04-01 DIAGNOSIS — G47.33 OSA (OBSTRUCTIVE SLEEP APNEA): ICD-10-CM

## 2025-04-01 DIAGNOSIS — G47.34 NOCTURNAL HYPOXIA: ICD-10-CM

## 2025-04-01 DIAGNOSIS — F41.9 ANXIETY IN CANCER PATIENT: ICD-10-CM

## 2025-04-01 PROCEDURE — 3074F SYST BP LT 130 MM HG: CPT

## 2025-04-01 PROCEDURE — 3079F DIAST BP 80-89 MM HG: CPT

## 2025-04-01 PROCEDURE — 99212 OFFICE O/P EST SF 10 MIN: CPT

## 2025-04-01 PROCEDURE — 99214 OFFICE O/P EST MOD 30 MIN: CPT

## 2025-04-01 RX ORDER — DIAZEPAM 10 MG/1
10 TABLET ORAL EVERY 8 HOURS PRN
Qty: 21 TABLET | Refills: 0 | Status: SHIPPED | OUTPATIENT
Start: 2025-04-01 | End: 2025-04-08

## 2025-04-01 ASSESSMENT — FIBROSIS 4 INDEX: FIB4 SCORE: 1.37

## 2025-04-01 NOTE — PROGRESS NOTES
[facility] Follow-up Visit    CC:   Chief Complaint   Patient presents with    Follow-Up     Last Office Visit 3/20/2025 with Shiela ROOT     Study Complete on 3/21/2025          HPI:  Sol Kern is a 60 y.o.female present today to discuss sleep study results. Patient was seen by sleep medicine 3/20/25 by myself for initial consultation for symptoms consistent with sleep apnea. Initial home testing showed AHI 80 with lasha O2 58%, mean O2 83% and 541 min with O2 < 88%. Based on these findings, in lab titration was completed.  Patient completed in lab titration study . CPAP was tried from 5-11cm H2O. Bipap was tried from 9/5-14/10cm H2O. Oxygen was used from 0-3LPM.  Recommendations were made for BiPAP 13/5/5 .     PMH: stage 4 breast cancer with mets (current treatment with chemo and frequent thoracenteses and paracenteses), HTN, insomnia, DM2.     Sleep History  1/14/25 - HST AHI 80, O2 lasha 58%, mean O2 83% and 541 min with O2 < 88%  3/21/25 - PSG titration, CPAP was tried from 5-11cm H2O. Bipap was tried from 9/5-14/10cm H2O. Oxygen was used from 0-3LPM. Patient had difficulty tolerating higher pressures and woke up multiple times requesting pressure to be decreased. Rec: auto BiPAP EPAP 5 IPAP 13 pressure support 5 cmH2O with bleed in supplemental oxygen 3 L/min.     Patient Active Problem List    Diagnosis Date Noted    Anxiety in cancer patient 03/03/2025    Malignant ascites 03/03/2025    Primary insomnia 01/13/2025    Carcinomatosis (HCC) 12/04/2024    Advanced care planning/counseling discussion 11/25/2024    Acute cystitis with hematuria 11/25/2024    High cholesterol 06/14/2024    Hemorrhoids 06/14/2024    Restless leg syndrome 04/17/2024    Elevated LFTs 11/30/2023    Insomnia due to medical condition 11/20/2023    Weakness 11/03/2023    Breast cancer, stage 4, left (HCC) 10/25/2023    Chronic respiratory failure with hypoxia (HCC) 10/13/2023    Leukocytosis 10/06/2023    Chronic  back pain 09/29/2023    Large pleural effusion 09/28/2023    Mixed stress and urge urinary incontinence 03/20/2023    Cyst (solitary) of breast 12/09/2022    Hepatic steatosis 12/09/2022    History of COVID-19 07/15/2022    Vertigo 04/21/2022    Achilles tendon mass 01/31/2022    Cigarette nicotine dependence in remission 12/15/2021    Carcinoma of upper-outer quadrant of left breast in female, estrogen receptor positive (HCC) 10/18/2021    Neuropathy 09/15/2021    Type 2 diabetes mellitus with diabetic neuropathy, without long-term current use of insulin (HCC)     Postnasal drip 10/22/2019    Essential hypertension 03/19/2019    Vitamin D deficiency 03/05/2019    Class 2 severe obesity with serious comorbidity in adult (HCC) 08/28/2017    YOANA (obstructive sleep apnea) 08/28/2017    Chronic midline low back pain with right-sided sciatica 08/28/2017    Neck pain 08/28/2017    Multiple allergies 08/28/2017    Psoriasis 08/28/2017       Past Medical History:   Diagnosis Date    Allergy     Back pain     Cancer (HCC) 2021    breast    Daytime sleepiness     Diabetes (HCC)     diet    Fatigue     Frequent headaches     GERD (gastroesophageal reflux disease)     Heartburn     High cholesterol     not medicated    Hypertension     Insomnia     Malignant neoplasm of upper-outer quadrant of left female breast (HCC)     Morning headache     Nasal drainage     Neck pain     Obesity     Pulmonary hypertension (HCC)     Rhinitis     Sleep apnea     no cpap    Snoring     Weakness     Wears glasses         Past Surgical History:   Procedure Laterality Date    MI LAP,DIAGNOSTIC ABDOMEN N/A 12/27/2024    Procedure: DIAGNOSTIC LAPAROSCOPY;  Surgeon: Tasneem Gonzalez M.D.;  Location: SURGERY [facility];  Service: General    PB MASTECTOMY, PARTIAL Left 11/12/2021    Procedure: MASTECTOMY, PARTIAL - SAMARA LOCALIZED;  Surgeon: Kayleen Sykes M.D.;  Location: SURGERY SAME DAY [facility];  Service: General    NODE BIOPSY SENTINEL Left  2021    Procedure: BIOPSY, LYMPH NODE, SENTINEL;  Surgeon: Kayleen Sykes M.D.;  Location: SURGERY SAME DAY [facility];  Service: General    CARPAL TUNNEL RELEASE Right     ENDOMETRIAL ABLATION      FOOT SURGERY Right     KNEE ARTHROSCOPY      PRIMARY C SECTION      2     TUBAL COAGULATION LAPAROSCOPIC BILATERAL         Family History   Problem Relation Age of Onset    Cancer Mother         melanoma    Hypertension Mother     Other Mother         THYROID    Heart Disease Mother         valvular disease    Ovarian Cancer Mother     Heart Disease Father         Heart Attack    Heart Attack Father     Other Father         psoriasis    Hyperlipidemia Brother     Other Brother         psoriasis    Heart Disease Brother         MI    Heart Attack Brother         massive heart attack     Heart Disease Maternal Grandfather         MI    Breast Cancer Other     Other Other         psoriasis    Diabetes Neg Hx     Alcohol/Drug Neg Hx     Tubal Cancer Neg Hx     Peritoneal Cancer Neg Hx     Colorectal Cancer Neg Hx        Social History     Socioeconomic History    Marital status:      Spouse name: Not on file    Number of children: Not on file    Years of education: Not on file    Highest education level: Associate degree: occupational, technical, or vocational program   Occupational History    Not on file   Tobacco Use    Smoking status: Former     Current packs/day: 0.25     Average packs/day: 0.3 packs/day for 20.0 years (5.0 ttl pk-yrs)     Types: Cigarettes    Smokeless tobacco: Never    Tobacco comments:     working on quitting    Vaping Use    Vaping status: Never Used   Substance and Sexual Activity    Alcohol use: Not Currently     Comment: rarely    Drug use: Not Currently     Types: Marijuana, Oral     Comment: gummies    Sexual activity: Yes     Partners: Male     Comment:    Other Topics Concern    Not on file   Social History Narrative    Not on file     Social Drivers of Health      Financial Resource Strain: Low Risk  (3/20/2023)    Overall Financial Resource Strain (CARDIA)     Difficulty of Paying Living Expenses: Not very hard   Food Insecurity: No Food Insecurity (11/25/2024)    Hunger Vital Sign     Worried About Running Out of Food in the Last Year: Never true     Ran Out of Food in the Last Year: Never true   Transportation Needs: No Transportation Needs (11/25/2024)    PRAPARE - Transportation     Lack of Transportation (Medical): No     Lack of Transportation (Non-Medical): No   Physical Activity: Inactive (3/20/2023)    Exercise Vital Sign     Days of Exercise per Week: 0 days     Minutes of Exercise per Session: 0 min   Stress: Stress Concern Present (3/20/2023)    [place] [employer] - Occupational Stress Questionnaire     Feeling of Stress : To some extent   Social Connections: Moderately Integrated (3/20/2023)    Social Connection and Isolation Panel [NHANES]     Frequency of Communication with Friends and Family: More than three times a week     Frequency of Social Gatherings with Friends and Family: Once a week     Attends Latter-day Services: 1 to 4 times per year     Active Member of Clubs or Organizations: No     Attends Club or Organization Meetings: Never     Marital Status:    Intimate Partner Violence: Not At Risk (11/25/2024)    Humiliation, Afraid, Rape, and Kick questionnaire     Fear of Current or Ex-Partner: No     Emotionally Abused: No     Physically Abused: No     Sexually Abused: No   Housing Stability: Low Risk  (11/25/2024)    Housing Stability Vital Sign     Unable to Pay for Housing in the Last Year: No     Number of Times Moved in the Last Year: 0     Homeless in the Last Year: No       Current Outpatient Medications   Medication Sig Dispense Refill    diazepam (VALIUM) 10 MG tablet Take 1 Tablet by mouth every 8 hours as needed for Anxiety or Sleep for up to 7 days. 21 Tablet 0    OLANZapine (ZYPREXA) 5 MG Tab TAKE 1 TABLET  BY MOUTH EVERY EVENING. TO BE TAKEN NIGHTLY ON FIRST DAY OF CHEMOTHERAPY FOR A TOTAL OF 4 DAYS, OR AS DIRECTED BY YOUR PROVIDER. 24 Tablet 0    furosemide (LASIX) 40 MG Tab Take 1 Tablet by mouth every day for 30 days. 30 Tablet 0    lidocaine (XYLOCAINE) 2 % Solution Okay to mix 1:1 mylanta/maalox - use 5 ml every 1 hour prn for oral discomfort 100 mL 1    ondansetron (ZOFRAN) 4 MG Tab tablet Take 1 Tablet by mouth every four hours as needed for Nausea/Vomiting (for nausea, vomiting). 30 Tablet 6    prochlorperazine (COMPAZINE) 10 MG Tab Take 1 Tablet by mouth every 6 hours as needed (for nausea, vomiting). 30 Tablet 6    loperamide (IMODIUM) 2 MG Cap Take 1 Capsule by mouth see administration instructions. 30 Capsule 6    lidocaine (LIDODERM) 5 % Patch PLACE 1 PATCH ON THE SKIN EVERY 24 HOURS. PLACE PATCH TO AFFECTED AREA IN AM AND REMOVE AT BEDTIME. 30 Patch 0    Semaglutide, 1 MG/DOSE, 4 MG/3ML Solution Pen-injector Inject 1 mg under the skin every 7 days. 3 mL 0    zolpidem (AMBIEN) 10 MG Tab Take 1 Tablet by mouth at bedtime as needed for Sleep for up to 90 days. 30 Tablet 2    prochlorperazine (COMPAZINE) 10 MG Tab Take 1 Tablet by mouth every 6 hours as needed for Nausea/Vomiting. 30 Tablet 3    simethicone (MYLICON) 125 MG chewable tablet Chew 250 mg every 6 hours as needed for Flatulence. 2 tablets= 250mg      atorvastatin (LIPITOR) 20 MG Tab TAKE 1 TABLET BY MOUTH EVERY DAY IN THE EVENING 100 Tablet 3    albuterol 108 (90 Base) MCG/ACT Aero Soln inhalation aerosol Inhale 2 Puffs every 6 hours as needed for Shortness of Breath. 8 Each 3    ondansetron (ZOFRAN ODT) 4 MG TABLET DISPERSIBLE Take 1 Tablet by mouth every 8 hours as needed for Nausea/Vomiting. 10 Tablet 0    denosumab (XGEVA) 120 MG/1.7ML injection Inject 120 mg under the skin every 28 days.      cyclobenzaprine (FLEXERIL) 10 mg Tab TAKE 1 TABLET BY MOUTH THREE TIMES A DAY AS NEEDED FOR MUSCLE SPASM 90 Tablet 0    metFORMIN ER (GLUCOPHAGE XR)  "500 MG TABLET SR 24 HR Take 1 Tablet by mouth every day. (Patient taking differently: Take 1,000 mg by mouth every evening.) 200 Tablet 1    lisinopril (PRINIVIL) 2.5 MG Tab Take 1 Tablet by mouth every day. 90 Tablet 1     No current facility-administered medications for this visit.        ALLERGIES: Codeine, Hydrocodone, and Other drug    ROS  Constitutional: Denies fevers, Denies weight changes  Ears/Nose/Throat/Mouth: Denies nasal congestion or sore throat   Cardiovascular: Denies chest pain  Respiratory: Denies shortness of breath, Denies cough  Gastrointestinal/Hepatic: Denies nausea, vomiting  Sleep: see HPI      PHYSICAL EXAM  /84 (BP Location: Left arm, Patient Position: Sitting, BP Cuff Size: Adult)   Pulse 94   Resp 16   Ht 1.626 m (5' 4\")   Wt 91.6 kg (202 lb) Comment: per patient  SpO2 (!) 87% Comment: patient took off oxygen and offered a tank while in office patient declined at the time and will use her own oxygen if needed  BMI 34.67 kg/m²   Constitutional: Alert, no distress, well-groomed.  Skin: Warm, dry, good turgor, no rashes in visible areas.  Eye: Equal, round and reactive, conjunctiva clear, lids normal.  ENMT: Lips without lesions, good dentition, moist mucous membranes.  Neck: Trachea midline, no masses, no thyromegaly.  Respiratory: Unlabored respiratory effort, no cough.  MSK: Normal gait, moves all extremities.  Neuro: Grossly non-focal.   Psych: Alert and oriented x3, normal affect and mood.      Medical Decision Making   Assessment and Plan  The medical record was reviewed.    Obstructive sleep apnea  - Diagnostic and titration nocturnal polysomnogram's  and home sleep apnea tests reviewed. Based on results, it is recommended that patient start autoBiPAP with 3L O2 bleed in. Patient is agreeable.  - Patients with YOANA are at increased risk of cardiovascular disease including coronary artery disease, systemic arterial hypertension, pulmonary arterial hypertension, cardiac " arrythmias, and stroke. Positive airway pressure will favorably impact many of the adverse conditions and effects provoked by YOANA. Avoid driving a motor vehicle when drowsy  - Order placed for DME other as patient would like to be able to change out filters more frequently and get UV  due to current immunocompromised state.  - Clean equipment frequently, and get new mask and supplies as allowed by insurance and DME. Advised to let DME company know in the first 30 days if any issues with mask fit arise so that new mask can be tried.   - Discussed compliance requirements for insurance purposes as well as ultimate clinical goal of wearing and tolerating PAP device nightly for full night of sleep to achieve optimal symptomatic and prophylactic benefit.   - Advised to reach out via [employer] with questions       Return in about 2 months (around 6/1/2025) for initial compliance .   - Recommend an earlier appointment, if significant treatment barriers develop.  - Patient to follow up with the appropriate healthcare practitioners for all other medical problems and issues.    Please note portions of this record was created using voice recognition software. I have made every reasonable attempt to correct obvious errors, but I expect that there are errors of grammar and possibly content I did not discover before finalizing the note.    Total time care for the patient this date was 36 minutes. Time spent includes chart review, lab review, discussion with myself. This time was spent separate from device analysis /programming on this date.

## 2025-04-02 ENCOUNTER — TELEPHONE (OUTPATIENT)
Dept: HEMATOLOGY ONCOLOGY | Facility: MEDICAL CENTER | Age: 60
End: 2025-04-02
Payer: MEDICARE

## 2025-04-02 NOTE — TELEPHONE ENCOUNTER
Nutrition Services: Telephone Encounter   Sol Kern is a 60 y.o. female with diagnosis of OP Breast  .     RD attempted to call pt to provide nutrition services follow up. Pt did not answer, though RD able to leave brief voice message with contact information for callback.     RD available PRN  [phone]

## 2025-04-03 ENCOUNTER — DOCUMENTATION (OUTPATIENT)
Dept: HEALTH INFORMATION MANAGEMENT | Facility: OTHER | Age: 60
End: 2025-04-03
Payer: MEDICARE

## 2025-04-03 NOTE — PROGRESS NOTES
"Pharmacy Chemotherapy calculation:    DX: breast cancer    Cycle 2, Day 8  Previous treatment = C2C1 3/31/25; XRT, anastrazole, capecitabine    Regimen: sacituzumab govitecan-hziy  sacituzumab govitecan-hziy 10mg/kg IV on days 1 and 8   q21 days until Dp/UT   NCCN Guidelines for Breast Cancer V.2.2024  helena Beltran al - N Engl J Med. 2019 Feb 21;380):981-469.   shayy Powell - Journal of Clinical OncologyVolume 40, Number 17_suppl     BP (!) 145/80   Pulse 87   Temp 36.2 °C (97.2 °F) (Temporal)   Resp 18   Ht 1.6 m (5' 2.99\")   Wt 90.3 kg (199 lb 1.2 oz)   SpO2 93%   BMI 35.27 kg/m²   Body surface area is 2 meters squared.    Labs 4/7/25:  ANC~ 2810 Plt = 347k   Hgb = 13.2       Xgeva plan in [place], continue to hold for C2 per Lorraine Persaud    Sacituzumab govitecan-hziy 10 mg/kg x 90.3 kg = 903 mg   <10% difference, ok to treat with final dose = 900 mg IV    Braeden Carmona, PharmD  "

## 2025-04-04 ENCOUNTER — HOSPITAL ENCOUNTER (OUTPATIENT)
Dept: RADIOLOGY | Facility: MEDICAL CENTER | Age: 60
End: 2025-04-04
Attending: FAMILY MEDICINE
Payer: MEDICARE

## 2025-04-04 VITALS — SYSTOLIC BLOOD PRESSURE: 168 MMHG | DIASTOLIC BLOOD PRESSURE: 85 MMHG | OXYGEN SATURATION: 98 % | HEART RATE: 88 BPM

## 2025-04-04 DIAGNOSIS — C50.412 MALIGNANT NEOPLASM OF UPPER-OUTER QUADRANT OF LEFT BREAST IN FEMALE, ESTROGEN RECEPTOR POSITIVE (HCC): ICD-10-CM

## 2025-04-04 DIAGNOSIS — R18.0 MALIGNANT ASCITES: ICD-10-CM

## 2025-04-04 DIAGNOSIS — Z17.0 MALIGNANT NEOPLASM OF UPPER-OUTER QUADRANT OF LEFT BREAST IN FEMALE, ESTROGEN RECEPTOR POSITIVE (HCC): ICD-10-CM

## 2025-04-04 PROCEDURE — 36500 INSERTION OF CATHETER VEIN: CPT

## 2025-04-04 NOTE — PROGRESS NOTES
Paracentesis not preformed. Pt had limited fluid, per US. Pt educated and opt ed to not get paracentesis today. Pt discharged  in stable condition.

## 2025-04-07 ENCOUNTER — OUTPATIENT INFUSION SERVICES (OUTPATIENT)
Dept: ONCOLOGY | Facility: MEDICAL CENTER | Age: 60
End: 2025-04-07
Attending: INTERNAL MEDICINE
Payer: MEDICARE

## 2025-04-07 VITALS
OXYGEN SATURATION: 93 % | DIASTOLIC BLOOD PRESSURE: 80 MMHG | SYSTOLIC BLOOD PRESSURE: 145 MMHG | TEMPERATURE: 97.2 F | HEIGHT: 63 IN | BODY MASS INDEX: 35.27 KG/M2 | HEART RATE: 87 BPM | WEIGHT: 199.08 LBS | RESPIRATION RATE: 18 BRPM

## 2025-04-07 DIAGNOSIS — C50.912 BREAST CANCER, STAGE 4, LEFT (HCC): ICD-10-CM

## 2025-04-07 LAB
BASOPHILS # BLD AUTO: 0.6 % (ref 0–1.8)
BASOPHILS # BLD: 0.03 K/UL (ref 0–0.12)
EOSINOPHIL # BLD AUTO: 0.19 K/UL (ref 0–0.51)
EOSINOPHIL NFR BLD: 4.1 % (ref 0–6.9)
ERYTHROCYTE [DISTWIDTH] IN BLOOD BY AUTOMATED COUNT: 54.1 FL (ref 35.9–50)
HCT VFR BLD AUTO: 41.5 % (ref 37–47)
HGB BLD-MCNC: 13.2 G/DL (ref 12–16)
IMM GRANULOCYTES # BLD AUTO: 0.04 K/UL (ref 0–0.11)
IMM GRANULOCYTES NFR BLD AUTO: 0.9 % (ref 0–0.9)
LYMPHOCYTES # BLD AUTO: 0.83 K/UL (ref 1–4.8)
LYMPHOCYTES NFR BLD: 17.9 % (ref 22–41)
MCH RBC QN AUTO: 30.1 PG (ref 27–33)
MCHC RBC AUTO-ENTMCNC: 31.8 G/DL (ref 32.2–35.5)
MCV RBC AUTO: 94.7 FL (ref 81.4–97.8)
MONOCYTES # BLD AUTO: 0.74 K/UL (ref 0–0.85)
MONOCYTES NFR BLD AUTO: 15.9 % (ref 0–13.4)
NEUTROPHILS # BLD AUTO: 2.81 K/UL (ref 1.82–7.42)
NEUTROPHILS NFR BLD: 60.6 % (ref 44–72)
NRBC # BLD AUTO: 0 K/UL
NRBC BLD-RTO: 0 /100 WBC (ref 0–0.2)
OUTPT INFUS CBC COMMENT OICOM: ABNORMAL
PLATELET # BLD AUTO: 347 K/UL (ref 164–446)
PMV BLD AUTO: 8.9 FL (ref 9–12.9)
RBC # BLD AUTO: 4.38 M/UL (ref 4.2–5.4)
WBC # BLD AUTO: 4.6 K/UL (ref 4.8–10.8)

## 2025-04-07 PROCEDURE — 96367 TX/PROPH/DG ADDL SEQ IV INF: CPT

## 2025-04-07 PROCEDURE — 96375 TX/PRO/DX INJ NEW DRUG ADDON: CPT

## 2025-04-07 PROCEDURE — A9270 NON-COVERED ITEM OR SERVICE: HCPCS | Performed by: NURSE PRACTITIONER

## 2025-04-07 PROCEDURE — 700102 HCHG RX REV CODE 250 W/ 637 OVERRIDE(OP): Performed by: NURSE PRACTITIONER

## 2025-04-07 PROCEDURE — 700111 HCHG RX REV CODE 636 W/ 250 OVERRIDE (IP): Performed by: NURSE PRACTITIONER

## 2025-04-07 PROCEDURE — 700105 HCHG RX REV CODE 258: Performed by: NURSE PRACTITIONER

## 2025-04-07 PROCEDURE — 96413 CHEMO IV INFUSION 1 HR: CPT

## 2025-04-07 PROCEDURE — 85025 COMPLETE CBC W/AUTO DIFF WBC: CPT

## 2025-04-07 PROCEDURE — 96415 CHEMO IV INFUSION ADDL HR: CPT

## 2025-04-07 RX ORDER — ACETAMINOPHEN 325 MG/1
650 TABLET ORAL ONCE
Status: COMPLETED | OUTPATIENT
Start: 2025-04-07 | End: 2025-04-07

## 2025-04-07 RX ORDER — DIPHENHYDRAMINE HYDROCHLORIDE 50 MG/ML
25 INJECTION, SOLUTION INTRAMUSCULAR; INTRAVENOUS ONCE
Status: COMPLETED | OUTPATIENT
Start: 2025-04-07 | End: 2025-04-07

## 2025-04-07 RX ORDER — PALONOSETRON 0.05 MG/ML
0.25 INJECTION, SOLUTION INTRAVENOUS ONCE
Status: COMPLETED | OUTPATIENT
Start: 2025-04-07 | End: 2025-04-07

## 2025-04-07 RX ORDER — DEXAMETHASONE SODIUM PHOSPHATE 4 MG/ML
12 INJECTION, SOLUTION INTRA-ARTICULAR; INTRALESIONAL; INTRAMUSCULAR; INTRAVENOUS; SOFT TISSUE ONCE
Status: COMPLETED | OUTPATIENT
Start: 2025-04-07 | End: 2025-04-07

## 2025-04-07 RX ADMIN — PALONOSETRON HYDROCHLORIDE 0.25 MG: 0.25 INJECTION INTRAVENOUS at 09:43

## 2025-04-07 RX ADMIN — FAMOTIDINE 20 MG: 10 INJECTION INTRAVENOUS at 09:44

## 2025-04-07 RX ADMIN — ACETAMINOPHEN 650 MG: 325 TABLET ORAL at 09:43

## 2025-04-07 RX ADMIN — SACITUZUMAB GOVITECAN 900 MG: 180 POWDER, FOR SOLUTION INTRAVENOUS at 10:46

## 2025-04-07 RX ADMIN — DIPHENHYDRAMINE HYDROCHLORIDE 25 MG: 50 INJECTION INTRAMUSCULAR; INTRAVENOUS at 09:44

## 2025-04-07 RX ADMIN — DEXAMETHASONE SODIUM PHOSPHATE 12 MG: 4 INJECTION INTRA-ARTICULAR; INTRALESIONAL; INTRAMUSCULAR; INTRAVENOUS; SOFT TISSUE at 09:43

## 2025-04-07 RX ADMIN — FOSAPREPITANT DIMEGLUMINE 150 MG: 150 INJECTION, POWDER, LYOPHILIZED, FOR SOLUTION INTRAVENOUS at 09:44

## 2025-04-07 ASSESSMENT — FIBROSIS 4 INDEX: FIB4 SCORE: 1.37

## 2025-04-07 NOTE — PROGRESS NOTES
"Pharmacy Chemotherapy Verification Note:    Patient Name: Sol Kern      Dx: Breast Cancer      Protocol: Sacituzumab Govitecan       *Dosing Reference*  Sacituzumab govitecan-hziy 10 mg/kg IV on Days 1 and 8  21-day cycle until disease progression or unacceptable toxicity     1. NCCNGuidelines® for Breast Cancer V.2.2024.   2. Staci FITZPATRICK et al. N Engl J Med. 2019;380(8):741-751.a   3. Andre et al. J Clin Oncol. 2022;40(17_suppl):VZS7243.a    Allergies:  Codeine, Hydrocodone, and Other drug     BP (!) 145/80   Pulse 87   Temp 36.2 °C (97.2 °F) (Temporal)   Resp 18   Ht 1.6 m (5' 2.99\")   Wt 90.3 kg (199 lb 1.2 oz)   SpO2 93%   BMI 35.27 kg/m²  Body surface area is 2 meters squared.    Labs 4/7/25:  ANC~ 2810 Plt = 347 k   Hgb = 13.2       Drug Order   (Drug name, dose, route, IV Fluid & volume, frequency, number of doses) Cycle: 2 Day 8      Previous treatment: C2D1 on 3/31/25; s/p Capecitabine x 2C, Ribociclib + Faslodex     Medication = Sacituzumab govitecan  Base Dose = 10 mg/kg  Calc Dose: Base Dose x 90.3 kg = 903 mg  Final Dose = 900 mg  Route = IV  Fluid & Volume =  mL  Admin Duration = Over 1-2 hours          <10% difference, okay to treat with final dose       By my signature below, I confirm this process was performed independently with the BSA and all final chemotherapy dosing calculations congruent. I have reviewed the above chemotherapy order and that my calculation of the final dose and BSA (when applicable) corroborate those calculations of the  pharmacist.     Susy Talley, PharmD    "

## 2025-04-07 NOTE — PROGRESS NOTES
Chemotherapy Verification - SECONDARY RN       Height = 160 cm  Weight = 90.3 kg  BSA = 2 m2       Medication: Trodelvy  Dose: 10 mg/kg  Calculated Dose: 903 mg                             (In mg/m2, AUC, mg/kg)     I confirm that this process was performed independently.

## 2025-04-07 NOTE — PROGRESS NOTES
Chemotherapy Verification - PRIMARY RN      Height = 1.6m  Weight = 90.3kg  BSA = 2m2       Medication: sacituzumab govitecan (Trodelvy)  Dose: 10mg/kg  Calculated Dose: 903mg                             (In mg/m2, AUC, mg/kg)       I confirm this process was performed independently with the BSA and all final chemotherapy dosing calculations congruent.  Any discrepancies of 10% or greater have been addressed with the chemotherapy pharmacist. The resolution of the discrepancy has been documented in the EPIC progress notes.

## 2025-04-07 NOTE — PROGRESS NOTES
Patient came into infusion with family member. Orders and vitals reviewed, assessment done. PIV established with blood return noted. Labs collected and reviewed, patient meets parameters to proceed with treatment today. Cycle 2 day 8 of Trodelvy treatment given as ordered over 90 minuets with no adverse events. Patient observed for 30 minuets with no signs or symptoms of adverse reaction. PIV flushed and removed with tip intact. Patient left in stable condition with next appointment confirmed.

## 2025-04-08 ENCOUNTER — OUTPATIENT INFUSION SERVICES (OUTPATIENT)
Dept: ONCOLOGY | Facility: MEDICAL CENTER | Age: 60
End: 2025-04-08
Attending: INTERNAL MEDICINE
Payer: MEDICARE

## 2025-04-08 VITALS
SYSTOLIC BLOOD PRESSURE: 135 MMHG | TEMPERATURE: 98 F | OXYGEN SATURATION: 96 % | WEIGHT: 198.41 LBS | HEIGHT: 63 IN | DIASTOLIC BLOOD PRESSURE: 76 MMHG | BODY MASS INDEX: 35.16 KG/M2 | RESPIRATION RATE: 18 BRPM | HEART RATE: 88 BPM

## 2025-04-08 DIAGNOSIS — C50.912 BREAST CANCER, STAGE 4, LEFT (HCC): ICD-10-CM

## 2025-04-08 PROCEDURE — 700111 HCHG RX REV CODE 636 W/ 250 OVERRIDE (IP): Mod: JZ,TB | Performed by: NURSE PRACTITIONER

## 2025-04-08 PROCEDURE — 96372 THER/PROPH/DIAG INJ SC/IM: CPT

## 2025-04-08 RX ORDER — LOPERAMIDE HYDROCHLORIDE 2 MG/1
2 CAPSULE ORAL
Status: CANCELLED | OUTPATIENT
Start: 2025-04-21

## 2025-04-08 RX ORDER — DIPHENHYDRAMINE HYDROCHLORIDE 50 MG/ML
50 INJECTION, SOLUTION INTRAMUSCULAR; INTRAVENOUS PRN
Status: CANCELLED | OUTPATIENT
Start: 2025-04-21

## 2025-04-08 RX ORDER — ONDANSETRON 2 MG/ML
4 INJECTION INTRAMUSCULAR; INTRAVENOUS PRN
Status: CANCELLED | OUTPATIENT
Start: 2025-04-21

## 2025-04-08 RX ORDER — ONDANSETRON 2 MG/ML
4 INJECTION INTRAMUSCULAR; INTRAVENOUS PRN
Status: CANCELLED | OUTPATIENT
Start: 2025-04-28

## 2025-04-08 RX ORDER — DIPHENHYDRAMINE HYDROCHLORIDE 50 MG/ML
50 INJECTION, SOLUTION INTRAMUSCULAR; INTRAVENOUS PRN
Status: CANCELLED | OUTPATIENT
Start: 2025-04-28

## 2025-04-08 RX ORDER — 0.9 % SODIUM CHLORIDE 0.9 %
VIAL (ML) INJECTION PRN
Status: CANCELLED | OUTPATIENT
Start: 2025-04-21

## 2025-04-08 RX ORDER — PROCHLORPERAZINE MALEATE 10 MG
10 TABLET ORAL EVERY 6 HOURS PRN
Status: CANCELLED | OUTPATIENT
Start: 2025-04-21

## 2025-04-08 RX ORDER — LOPERAMIDE HYDROCHLORIDE 2 MG/1
4 CAPSULE ORAL PRN
Status: CANCELLED | OUTPATIENT
Start: 2025-04-21

## 2025-04-08 RX ORDER — PROCHLORPERAZINE MALEATE 10 MG
10 TABLET ORAL EVERY 6 HOURS PRN
Status: CANCELLED | OUTPATIENT
Start: 2025-04-28

## 2025-04-08 RX ORDER — SODIUM CHLORIDE 9 MG/ML
INJECTION, SOLUTION INTRAVENOUS CONTINUOUS
Status: CANCELLED | OUTPATIENT
Start: 2025-04-28

## 2025-04-08 RX ORDER — 0.9 % SODIUM CHLORIDE 0.9 %
3 VIAL (ML) INJECTION PRN
Status: CANCELLED | OUTPATIENT
Start: 2025-04-20

## 2025-04-08 RX ORDER — ONDANSETRON 8 MG/1
8 TABLET, ORALLY DISINTEGRATING ORAL PRN
Status: CANCELLED | OUTPATIENT
Start: 2025-04-21

## 2025-04-08 RX ORDER — 0.9 % SODIUM CHLORIDE 0.9 %
10 VIAL (ML) INJECTION PRN
Status: CANCELLED | OUTPATIENT
Start: 2025-04-21

## 2025-04-08 RX ORDER — 0.9 % SODIUM CHLORIDE 0.9 %
10 VIAL (ML) INJECTION PRN
Status: CANCELLED | OUTPATIENT
Start: 2025-04-28

## 2025-04-08 RX ORDER — 0.9 % SODIUM CHLORIDE 0.9 %
3 VIAL (ML) INJECTION PRN
Status: CANCELLED | OUTPATIENT
Start: 2025-04-21

## 2025-04-08 RX ORDER — LOPERAMIDE HYDROCHLORIDE 2 MG/1
2 CAPSULE ORAL
Status: CANCELLED | OUTPATIENT
Start: 2025-04-28

## 2025-04-08 RX ORDER — ACETAMINOPHEN 325 MG/1
650 TABLET ORAL ONCE
Status: CANCELLED | OUTPATIENT
Start: 2025-04-21

## 2025-04-08 RX ORDER — 0.9 % SODIUM CHLORIDE 0.9 %
10 VIAL (ML) INJECTION PRN
Status: CANCELLED | OUTPATIENT
Start: 2025-04-20

## 2025-04-08 RX ORDER — PALONOSETRON 0.05 MG/ML
0.25 INJECTION, SOLUTION INTRAVENOUS ONCE
Status: CANCELLED | OUTPATIENT
Start: 2025-04-21 | End: 2025-04-21

## 2025-04-08 RX ORDER — DIPHENHYDRAMINE HYDROCHLORIDE 50 MG/ML
25 INJECTION, SOLUTION INTRAMUSCULAR; INTRAVENOUS ONCE
Status: CANCELLED | OUTPATIENT
Start: 2025-04-21 | End: 2025-04-21

## 2025-04-08 RX ORDER — ONDANSETRON 8 MG/1
8 TABLET, ORALLY DISINTEGRATING ORAL PRN
Status: CANCELLED | OUTPATIENT
Start: 2025-04-28

## 2025-04-08 RX ORDER — PALONOSETRON 0.05 MG/ML
0.25 INJECTION, SOLUTION INTRAVENOUS ONCE
Status: CANCELLED | OUTPATIENT
Start: 2025-04-28 | End: 2025-04-28

## 2025-04-08 RX ORDER — METHYLPREDNISOLONE SODIUM SUCCINATE 125 MG/2ML
125 INJECTION, POWDER, LYOPHILIZED, FOR SOLUTION INTRAMUSCULAR; INTRAVENOUS PRN
Status: CANCELLED | OUTPATIENT
Start: 2025-04-28

## 2025-04-08 RX ORDER — ATROPINE SULFATE 1 MG/ML
0.5 INJECTION, SOLUTION INTRAVENOUS PRN
Status: CANCELLED | OUTPATIENT
Start: 2025-04-28

## 2025-04-08 RX ORDER — EPINEPHRINE 1 MG/ML(1)
0.5 AMPUL (ML) INJECTION PRN
Status: CANCELLED | OUTPATIENT
Start: 2025-04-21

## 2025-04-08 RX ORDER — 0.9 % SODIUM CHLORIDE 0.9 %
VIAL (ML) INJECTION PRN
Status: CANCELLED | OUTPATIENT
Start: 2025-04-28

## 2025-04-08 RX ORDER — METHYLPREDNISOLONE SODIUM SUCCINATE 125 MG/2ML
125 INJECTION, POWDER, LYOPHILIZED, FOR SOLUTION INTRAMUSCULAR; INTRAVENOUS PRN
Status: CANCELLED | OUTPATIENT
Start: 2025-04-21

## 2025-04-08 RX ORDER — LOPERAMIDE HYDROCHLORIDE 2 MG/1
4 CAPSULE ORAL PRN
Status: CANCELLED | OUTPATIENT
Start: 2025-04-28

## 2025-04-08 RX ORDER — EPINEPHRINE 1 MG/ML(1)
0.5 AMPUL (ML) INJECTION PRN
Status: CANCELLED | OUTPATIENT
Start: 2025-04-28

## 2025-04-08 RX ORDER — ACETAMINOPHEN 325 MG/1
650 TABLET ORAL ONCE
Status: CANCELLED | OUTPATIENT
Start: 2025-04-28

## 2025-04-08 RX ORDER — SODIUM CHLORIDE 9 MG/ML
INJECTION, SOLUTION INTRAVENOUS CONTINUOUS
Status: CANCELLED | OUTPATIENT
Start: 2025-04-21

## 2025-04-08 RX ORDER — 0.9 % SODIUM CHLORIDE 0.9 %
3 VIAL (ML) INJECTION PRN
Status: CANCELLED | OUTPATIENT
Start: 2025-04-28

## 2025-04-08 RX ORDER — DIPHENHYDRAMINE HYDROCHLORIDE 50 MG/ML
25 INJECTION, SOLUTION INTRAMUSCULAR; INTRAVENOUS ONCE
Status: CANCELLED | OUTPATIENT
Start: 2025-04-28 | End: 2025-04-28

## 2025-04-08 RX ORDER — 0.9 % SODIUM CHLORIDE 0.9 %
VIAL (ML) INJECTION PRN
Status: CANCELLED | OUTPATIENT
Start: 2025-04-20

## 2025-04-08 RX ORDER — DEXAMETHASONE SODIUM PHOSPHATE 4 MG/ML
12 INJECTION, SOLUTION INTRA-ARTICULAR; INTRALESIONAL; INTRAMUSCULAR; INTRAVENOUS; SOFT TISSUE ONCE
Status: CANCELLED | OUTPATIENT
Start: 2025-04-21 | End: 2025-04-21

## 2025-04-08 RX ORDER — DEXAMETHASONE SODIUM PHOSPHATE 4 MG/ML
12 INJECTION, SOLUTION INTRA-ARTICULAR; INTRALESIONAL; INTRAMUSCULAR; INTRAVENOUS; SOFT TISSUE ONCE
Status: CANCELLED | OUTPATIENT
Start: 2025-04-28 | End: 2025-04-28

## 2025-04-08 RX ORDER — ATROPINE SULFATE 1 MG/ML
0.5 INJECTION, SOLUTION INTRAVENOUS PRN
Status: CANCELLED | OUTPATIENT
Start: 2025-04-21

## 2025-04-08 RX ADMIN — PEGFILGRASTIM 6 MG: 6 INJECTION SUBCUTANEOUS at 14:54

## 2025-04-08 ASSESSMENT — FIBROSIS 4 INDEX: FIB4 SCORE: 1.3

## 2025-04-09 ENCOUNTER — APPOINTMENT (OUTPATIENT)
Dept: PHYSICAL THERAPY | Facility: REHABILITATION | Age: 60
End: 2025-04-09
Payer: MEDICARE

## 2025-04-09 NOTE — PROGRESS NOTES
Sol presents to infusion for Neulasta injection. Patient reports feeling well today with no major complaints aside from fatigue.      Confirmed that at least 24 hours has passed since completion of chemotherapy prior to giving injection.     Udenyca given SQ in [place], patient tolerated well with no adverse effects.      Patient left in stable condition, knows when to return for next appt.

## 2025-04-10 ENCOUNTER — TELEPHONE (OUTPATIENT)
Dept: HEMATOLOGY ONCOLOGY | Facility: MEDICAL CENTER | Age: 60
End: 2025-04-10
Payer: MEDICARE

## 2025-04-10 NOTE — TELEPHONE ENCOUNTER
Contacted patient in regard to refill request of lasix and if patient has been taking it. Per patient, she has plenty at home and has not been requiring the diuretic as much. Refill was declined at this time due to patient not needing the medication.

## 2025-04-11 ENCOUNTER — PATIENT OUTREACH (OUTPATIENT)
Dept: HEALTH INFORMATION MANAGEMENT | Facility: OTHER | Age: 60
End: 2025-04-11
Payer: MEDICARE

## 2025-04-11 DIAGNOSIS — J96.11 CHRONIC RESPIRATORY FAILURE WITH HYPOXIA (HCC): ICD-10-CM

## 2025-04-11 DIAGNOSIS — I10 ESSENTIAL HYPERTENSION: ICD-10-CM

## 2025-04-11 DIAGNOSIS — E11.40 TYPE 2 DIABETES MELLITUS WITH DIABETIC NEUROPATHY, WITHOUT LONG-TERM CURRENT USE OF INSULIN (HCC): ICD-10-CM

## 2025-04-11 DIAGNOSIS — G47.33 OSA (OBSTRUCTIVE SLEEP APNEA): ICD-10-CM

## 2025-04-11 NOTE — PROGRESS NOTES
The patient reports being in [place] visiting her daughter. She is agreeable to a call back on 4/21/25.

## 2025-04-14 ENCOUNTER — HOSPITAL ENCOUNTER (OUTPATIENT)
Dept: HEMATOLOGY ONCOLOGY | Facility: MEDICAL CENTER | Age: 60
End: 2025-04-14
Attending: INTERNAL MEDICINE
Payer: MEDICARE

## 2025-04-14 VITALS
SYSTOLIC BLOOD PRESSURE: 98 MMHG | WEIGHT: 200.18 LBS | DIASTOLIC BLOOD PRESSURE: 72 MMHG | HEART RATE: 97 BPM | RESPIRATION RATE: 14 BRPM | TEMPERATURE: 98.5 F | BODY MASS INDEX: 36.84 KG/M2 | OXYGEN SATURATION: 90 % | HEIGHT: 62 IN

## 2025-04-14 DIAGNOSIS — Z17.0 CARCINOMA OF UPPER-OUTER QUADRANT OF LEFT BREAST IN FEMALE, ESTROGEN RECEPTOR POSITIVE (HCC): ICD-10-CM

## 2025-04-14 DIAGNOSIS — R18.0 MALIGNANT ASCITES: ICD-10-CM

## 2025-04-14 DIAGNOSIS — C50.912 BREAST CANCER, STAGE 4, LEFT (HCC): ICD-10-CM

## 2025-04-14 DIAGNOSIS — C50.412 CARCINOMA OF UPPER-OUTER QUADRANT OF LEFT BREAST IN FEMALE, ESTROGEN RECEPTOR POSITIVE (HCC): ICD-10-CM

## 2025-04-14 PROCEDURE — 99214 OFFICE O/P EST MOD 30 MIN: CPT | Performed by: INTERNAL MEDICINE

## 2025-04-14 PROCEDURE — 99212 OFFICE O/P EST SF 10 MIN: CPT | Performed by: INTERNAL MEDICINE

## 2025-04-14 ASSESSMENT — ENCOUNTER SYMPTOMS
COUGH: 0
SHORTNESS OF BREATH: 1
FEVER: 0
PALPITATIONS: 0
NAUSEA: 0
CONSTIPATION: 0
VOMITING: 0
CHILLS: 0
WEIGHT LOSS: 0
DIARRHEA: 0
SPUTUM PRODUCTION: 0

## 2025-04-14 ASSESSMENT — PAIN SCALES - GENERAL: PAINLEVEL_OUTOF10: NO PAIN

## 2025-04-14 ASSESSMENT — FIBROSIS 4 INDEX: FIB4 SCORE: 1.3

## 2025-04-14 NOTE — PROGRESS NOTES
"Ordering Provider: Dr. Kline    Pharmacy Chemotherapy Verification Note:    Patient Name: Sol Kern      Dx: Breast Cancer      Protocol: Sacituzumab Govitecan       *Dosing Reference*  Sacituzumab govitecan-hziy 10 mg/kg IV on Days 1 and 8  21-day cycle until disease progression or unacceptable toxicity     1. NCCNGuidelines® for Breast Cancer V.2.2024.   2. Staci FITZPATRICK , et al. N Engl J Med. 2019;380(8):741-751.a   3. Andre et al. J Clin Oncol. 2022;40(17_suppl):NHK5103.a    Allergies:  Codeine, Hydrocodone, and Other drug     BP (!) 142/81   Pulse 80   Temp 36.7 °C (98 °F) (Temporal)   Resp 18   Ht 1.6 m (5' 2.99\")   Wt 92 kg (202 lb 13.2 oz)   SpO2 98%   BMI 35.94 kg/m²  Body surface area is 2.02 meters squared.    Labs 4/21/25:  ANC~ 4740 Plt = 292k   Hgb = 12.8         Drug Order   (Drug name, dose, route, IV Fluid & volume, frequency, number of doses) Cycle: 3 Day 1      Previous treatment: C2D8 on 4/7/25; s/p Capecitabine x 2C, Ribociclib + Faslodex     Medication = Sacituzumab govitecan  Base Dose = 10 mg/kg  Calc Dose: Base Dose x 92 kg = 920 mg  Final Dose = 900 mg  Route = IV  Fluid & Volume =  mL  Admin Duration = Over 1-2 hours          <10% difference, okay to treat with final dose       By my signature below, I confirm this process was performed independently with the BSA and all final chemotherapy dosing calculations congruent. I have reviewed the above chemotherapy order and that my calculation of the final dose and BSA (when applicable) corroborate those calculations of the  pharmacist.     Susy Talley, PharmD    "

## 2025-04-14 NOTE — PROGRESS NOTES
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 hypoxemia.  She had 1 phlebotomy required and after that her hemoglobin was back to normal at 15.2.  We will continue to monitor this.  Slightly higher but no need yet for repeat phlebotomy.  Stable to improved  8.  Somatic genomic profile negative for actionable genes.  ESR 1 negative T p53 detected  9.  Marked distention of the abdomen occurring over the last couple of weeks.  Onset of increased volume of ascites now requiring additional tap, last completed 3/6/25. Scheduled for weekly procedures for now.  Volume seem to be decreasing.  Much improved      Plan:  She will proceed with cycle 3 of Sacituzumab.  She will have a thoracentesis next week if needed.  Will repeat scans before her next visit in 3 weeks.  Labs will also be repeated including tumor markers.  Overall we are pleased with her response to date.    Please note that this dictation was created using voice recognition software. I have made every reasonable attempt to correct obvious errors, but I expect that there are errors of grammar and possibly content that I did not discover before finalizing the note.

## 2025-04-17 ENCOUNTER — PATIENT OUTREACH (OUTPATIENT)
Dept: ONCOLOGY | Facility: MEDICAL CENTER | Age: 60
End: 2025-04-17
Payer: MEDICARE

## 2025-04-18 ENCOUNTER — HOSPITAL ENCOUNTER (OUTPATIENT)
Dept: RADIOLOGY | Facility: MEDICAL CENTER | Age: 60
End: 2025-04-18
Attending: NURSE PRACTITIONER
Payer: MEDICARE

## 2025-04-18 VITALS — SYSTOLIC BLOOD PRESSURE: 123 MMHG | HEART RATE: 82 BPM | DIASTOLIC BLOOD PRESSURE: 72 MMHG | OXYGEN SATURATION: 100 %

## 2025-04-18 DIAGNOSIS — Z17.0 MALIGNANT NEOPLASM OF UPPER-OUTER QUADRANT OF LEFT BREAST IN FEMALE, ESTROGEN RECEPTOR POSITIVE (HCC): ICD-10-CM

## 2025-04-18 DIAGNOSIS — C50.412 MALIGNANT NEOPLASM OF UPPER-OUTER QUADRANT OF LEFT BREAST IN FEMALE, ESTROGEN RECEPTOR POSITIVE (HCC): ICD-10-CM

## 2025-04-18 DIAGNOSIS — J90 PLEURAL EFFUSION: ICD-10-CM

## 2025-04-18 PROCEDURE — 32555 ASPIRATE PLEURA W/ IMAGING: CPT | Mod: LT

## 2025-04-18 NOTE — PROGRESS NOTES
Patient presented for left therapeutic thoracentesis. Dr. Grady performed limited bedside chest US and not enough fluid to perform procedure. Patient verbalized understanding and D/C'd home.

## 2025-04-21 ENCOUNTER — OUTPATIENT INFUSION SERVICES (OUTPATIENT)
Dept: ONCOLOGY | Facility: MEDICAL CENTER | Age: 60
End: 2025-04-21
Attending: INTERNAL MEDICINE
Payer: MEDICARE

## 2025-04-21 ENCOUNTER — DOCUMENTATION (OUTPATIENT)
Dept: ONCOLOGY | Facility: MEDICAL CENTER | Age: 60
End: 2025-04-21
Payer: MEDICARE

## 2025-04-21 VITALS
RESPIRATION RATE: 18 BRPM | WEIGHT: 202.82 LBS | SYSTOLIC BLOOD PRESSURE: 142 MMHG | BODY MASS INDEX: 35.94 KG/M2 | HEART RATE: 80 BPM | OXYGEN SATURATION: 98 % | TEMPERATURE: 98 F | DIASTOLIC BLOOD PRESSURE: 81 MMHG | HEIGHT: 63 IN

## 2025-04-21 DIAGNOSIS — Z17.0 CARCINOMA OF UPPER-OUTER QUADRANT OF LEFT BREAST IN FEMALE, ESTROGEN RECEPTOR POSITIVE (HCC): ICD-10-CM

## 2025-04-21 DIAGNOSIS — C50.912 BREAST CANCER, STAGE 4, LEFT (HCC): ICD-10-CM

## 2025-04-21 DIAGNOSIS — R18.0 MALIGNANT ASCITES: ICD-10-CM

## 2025-04-21 DIAGNOSIS — C50.412 CARCINOMA OF UPPER-OUTER QUADRANT OF LEFT BREAST IN FEMALE, ESTROGEN RECEPTOR POSITIVE (HCC): ICD-10-CM

## 2025-04-21 LAB
ALBUMIN SERPL BCP-MCNC: 3.3 G/DL (ref 3.2–4.9)
ALBUMIN/GLOB SERPL: 1.1 G/DL
ALP SERPL-CCNC: 230 U/L (ref 30–99)
ALT SERPL-CCNC: 22 U/L (ref 2–50)
ANION GAP SERPL CALC-SCNC: 12 MMOL/L (ref 7–16)
AST SERPL-CCNC: 30 U/L (ref 12–45)
BASOPHILS # BLD AUTO: 0.9 % (ref 0–1.8)
BASOPHILS # BLD: 0.06 K/UL (ref 0–0.12)
BILIRUB SERPL-MCNC: 0.3 MG/DL (ref 0.1–1.5)
BUN SERPL-MCNC: 7 MG/DL (ref 8–22)
CALCIUM ALBUM COR SERPL-MCNC: 9.8 MG/DL (ref 8.5–10.5)
CALCIUM SERPL-MCNC: 9.2 MG/DL (ref 8.5–10.5)
CEA SERPL-MCNC: 4.1 NG/ML (ref 0–3)
CHLORIDE SERPL-SCNC: 105 MMOL/L (ref 96–112)
CO2 SERPL-SCNC: 22 MMOL/L (ref 20–33)
CREAT SERPL-MCNC: 0.52 MG/DL (ref 0.5–1.4)
EOSINOPHIL # BLD AUTO: 0.19 K/UL (ref 0–0.51)
EOSINOPHIL NFR BLD: 2.9 % (ref 0–6.9)
ERYTHROCYTE [DISTWIDTH] IN BLOOD BY AUTOMATED COUNT: 58.6 FL (ref 35.9–50)
GFR SERPLBLD CREATININE-BSD FMLA CKD-EPI: 105 ML/MIN/1.73 M 2
GLOBULIN SER CALC-MCNC: 3.1 G/DL (ref 1.9–3.5)
GLUCOSE SERPL-MCNC: 129 MG/DL (ref 65–99)
HCT VFR BLD AUTO: 39.2 % (ref 37–47)
HGB BLD-MCNC: 12.8 G/DL (ref 12–16)
IMM GRANULOCYTES # BLD AUTO: 0.05 K/UL (ref 0–0.11)
IMM GRANULOCYTES NFR BLD AUTO: 0.8 % (ref 0–0.9)
LYMPHOCYTES # BLD AUTO: 0.81 K/UL (ref 1–4.8)
LYMPHOCYTES NFR BLD: 12.5 % (ref 22–41)
MCH RBC QN AUTO: 31.1 PG (ref 27–33)
MCHC RBC AUTO-ENTMCNC: 32.7 G/DL (ref 32.2–35.5)
MCV RBC AUTO: 95.4 FL (ref 81.4–97.8)
MONOCYTES # BLD AUTO: 0.63 K/UL (ref 0–0.85)
MONOCYTES NFR BLD AUTO: 9.7 % (ref 0–13.4)
NEUTROPHILS # BLD AUTO: 4.74 K/UL (ref 1.82–7.42)
NEUTROPHILS NFR BLD: 73.2 % (ref 44–72)
NRBC # BLD AUTO: 0 K/UL
NRBC BLD-RTO: 0 /100 WBC (ref 0–0.2)
OUTPT INFUS CBC COMMENT OICOM: ABNORMAL
PLATELET # BLD AUTO: 292 K/UL (ref 164–446)
PMV BLD AUTO: 9 FL (ref 9–12.9)
POTASSIUM SERPL-SCNC: 3.9 MMOL/L (ref 3.6–5.5)
PROT SERPL-MCNC: 6.4 G/DL (ref 6–8.2)
RBC # BLD AUTO: 4.11 M/UL (ref 4.2–5.4)
SODIUM SERPL-SCNC: 139 MMOL/L (ref 135–145)
WBC # BLD AUTO: 6.5 K/UL (ref 4.8–10.8)

## 2025-04-21 PROCEDURE — 700102 HCHG RX REV CODE 250 W/ 637 OVERRIDE(OP): Performed by: NURSE PRACTITIONER

## 2025-04-21 PROCEDURE — 85025 COMPLETE CBC W/AUTO DIFF WBC: CPT

## 2025-04-21 PROCEDURE — 700111 HCHG RX REV CODE 636 W/ 250 OVERRIDE (IP): Performed by: NURSE PRACTITIONER

## 2025-04-21 PROCEDURE — 96413 CHEMO IV INFUSION 1 HR: CPT

## 2025-04-21 PROCEDURE — 700105 HCHG RX REV CODE 258: Performed by: NURSE PRACTITIONER

## 2025-04-21 PROCEDURE — A9270 NON-COVERED ITEM OR SERVICE: HCPCS | Performed by: NURSE PRACTITIONER

## 2025-04-21 PROCEDURE — 86300 IMMUNOASSAY TUMOR CA 15-3: CPT

## 2025-04-21 PROCEDURE — 96375 TX/PRO/DX INJ NEW DRUG ADDON: CPT

## 2025-04-21 PROCEDURE — 80053 COMPREHEN METABOLIC PANEL: CPT

## 2025-04-21 PROCEDURE — 82378 CARCINOEMBRYONIC ANTIGEN: CPT

## 2025-04-21 PROCEDURE — 96415 CHEMO IV INFUSION ADDL HR: CPT

## 2025-04-21 PROCEDURE — 96367 TX/PROPH/DG ADDL SEQ IV INF: CPT

## 2025-04-21 RX ORDER — SODIUM CHLORIDE 9 MG/ML
INJECTION, SOLUTION INTRAVENOUS CONTINUOUS
Status: DISCONTINUED | OUTPATIENT
Start: 2025-04-21 | End: 2025-04-21 | Stop reason: HOSPADM

## 2025-04-21 RX ORDER — DEXAMETHASONE SODIUM PHOSPHATE 4 MG/ML
12 INJECTION, SOLUTION INTRA-ARTICULAR; INTRALESIONAL; INTRAMUSCULAR; INTRAVENOUS; SOFT TISSUE ONCE
Status: COMPLETED | OUTPATIENT
Start: 2025-04-21 | End: 2025-04-21

## 2025-04-21 RX ORDER — DIPHENHYDRAMINE HYDROCHLORIDE 50 MG/ML
25 INJECTION, SOLUTION INTRAMUSCULAR; INTRAVENOUS ONCE
Status: COMPLETED | OUTPATIENT
Start: 2025-04-21 | End: 2025-04-21

## 2025-04-21 RX ORDER — ACETAMINOPHEN 325 MG/1
650 TABLET ORAL ONCE
Status: COMPLETED | OUTPATIENT
Start: 2025-04-21 | End: 2025-04-21

## 2025-04-21 RX ORDER — PALONOSETRON 0.05 MG/ML
0.25 INJECTION, SOLUTION INTRAVENOUS ONCE
Status: COMPLETED | OUTPATIENT
Start: 2025-04-21 | End: 2025-04-21

## 2025-04-21 RX ADMIN — DIPHENHYDRAMINE HYDROCHLORIDE 25 MG: 50 INJECTION INTRAMUSCULAR; INTRAVENOUS at 09:26

## 2025-04-21 RX ADMIN — SODIUM CHLORIDE: 9 INJECTION, SOLUTION INTRAVENOUS at 09:09

## 2025-04-21 RX ADMIN — SACITUZUMAB GOVITECAN 900 MG: 180 POWDER, FOR SOLUTION INTRAVENOUS at 10:07

## 2025-04-21 RX ADMIN — FOSAPREPITANT 150 MG: 150 INJECTION, POWDER, LYOPHILIZED, FOR SOLUTION INTRAVENOUS at 09:28

## 2025-04-21 RX ADMIN — PALONOSETRON HYDROCHLORIDE 0.25 MG: 0.25 INJECTION INTRAVENOUS at 09:15

## 2025-04-21 RX ADMIN — ACETAMINOPHEN 650 MG: 325 TABLET ORAL at 09:09

## 2025-04-21 RX ADMIN — DEXAMETHASONE SODIUM PHOSPHATE 12 MG: 4 INJECTION INTRA-ARTICULAR; INTRALESIONAL; INTRAMUSCULAR; INTRAVENOUS; SOFT TISSUE at 09:18

## 2025-04-21 RX ADMIN — FAMOTIDINE 20 MG: 10 INJECTION, SOLUTION INTRAVENOUS at 09:15

## 2025-04-21 ASSESSMENT — FIBROSIS 4 INDEX: FIB4 SCORE: 1.3

## 2025-04-21 NOTE — PROGRESS NOTES
Pt arrived ambulatory to [facility] for D1C3 Trodelvy treatment for breast cancer. POC discussed with pt and she agrees with plan.     PIV established, brisk blood return noted, labs drawn as ordered. Results reviewed, ok to proceed with treatment. Pt medicated per MAR.    Pre-meds given.  Trodelvy infused over 90 minutes.     Pt obs x 30 minutes post Trodelvy infusion. Pt tolerated treatment without s/s adverse reaction. PIV dc'd catheter tip intact, gauze and coban dressing applied.     Pt discharged to [facility] care, [facility]. Pt's next appointment confirmed 4/28/2025.

## 2025-04-21 NOTE — PROGRESS NOTES
Nutrition Services: Brief Update      Sol Kern is a 60 y.o. female with diagnosis of Breast cancer, stage 4, left     Wt Readings from Last 7 Encounters:   04/21/25 92 kg (202 lb 13.2 oz)   04/14/25 90.8 kg (200 lb 2.8 oz)   04/08/25 90 kg (198 lb 6.6 oz)   04/07/25 90.3 kg (199 lb 1.2 oz)   04/01/25 91.6 kg (202 lb)   03/31/25 92.9 kg (204 lb 12.9 oz)   03/26/25 92.6 kg (204 lb 2.3 oz)   3/10/25: 94 kg/ 207 lbs     Weight Change: wt appears to be stabilizing, a 2.1% loss within past > 1 month does not meet criteria for significance.     Pertinent Recent Lab work (4/21/25): Glucose 129, BUN 7, alklaine phosphatase 230    RD able to visit pt and pt's daughter chairside in [facility]. Pt states feeling quite fatigued, though appetite has been better and is eating better than before. Mentions having tacos the day before and made herself a taco station with refried beans. States did consume the liquaCel and has some additional in the event appetite falters.Mentions is having diarrhea 3x a week with multiple loose stools per day. Mentions taking imodium and this does help. States did not have to undergo paracentesis the last 2 appointments and there was not enough fluid observed during thoracentesis appointment to remove this fluid either. Asks what can be done through nutrition with the elevated alkaline phosphatase.     Plan/Recommend:  RD supported continuation of dietary regimen as described. Encouraged having liquaCel on hand in the event it can be helpful.   RD discussed elevated alkaline phosphatase likely related to the trodelvy, as this appears to be a potential side effect. Discussed encouraging adequate vitamin D can be important to be mindful of. Encouraged fortified products in the diet.   RD provided samples of banatorl, in the event this helps reduce the diarrheal episodes. Advised to take one packet at a time for up to 3x per day. Encouraged mixing with applesauce or yogurt. Discussed continuing imodium  PRN.     Pt verbalizes understanding and is receptive to information provided.   RD available PRN and will make further recommendations as indicated within policy.    [phone]

## 2025-04-21 NOTE — PROGRESS NOTES
Chemotherapy Verification - PRIMARY RN    D1C3    Height = 160cm  Weight = 92kg  BSA = 2.02m^2       Medication: Trodelvy  Dose: 10mg/kg  Calculated Dose: 920 mg                                   I confirm this process was performed independently with the BSA and all final chemotherapy dosing calculations congruent.  Any discrepancies of 10% or greater have been addressed with the chemotherapy pharmacist. The resolution of the discrepancy has been documented in the EPIC progress notes.

## 2025-04-21 NOTE — PROGRESS NOTES
Chemotherapy Verification - SECONDARY RN       Height = 1.6 m  Weight = 92 kg  BSA = 2.02 m2       Medication: trodelvy  Dose: 10mg/kg  Calculated Dose: 920 mg                             (In mg/m2, AUC, mg/kg)     I confirm that this process was performed independently.

## 2025-04-21 NOTE — PROGRESS NOTES
"Pharmacy Chemotherapy calculation:    DX: breast cancer    Cycle 3, Day 1  Previous treatment = C2D8 4/7/25    Regimen: sacituzumab govitecan-hziy  sacituzumab govitecan-hziy 10mg/kg IV on days 1 and 8   q21 days until Dp/UT   NCCN Guidelines for Breast Cancer V.2.2024  shayy Beltran - N Engl J Med. 2019 Feb 21;380(9):821-324.   shayy Powell - Journal of Clinical OncologyVolume 40, Number 17_suppl     BP (!) 142/81   Pulse 80   Temp 36.7 °C (98 °F) (Temporal)   Resp 18   Ht 1.6 m (5' 2.99\")   Wt 92 kg (202 lb 13.2 oz)   SpO2 98%   BMI 35.94 kg/m²   Body surface area is 2.02 meters squared.    All lab results 4/21/25 within treatment parameters.     Xgeva plan in [place], continue to hold for C3 as per [place]    Sacituzumab govitecan-hziy 10 mg/kg x 92 kg = 920 mg   <10% difference, ok to treat with final dose = 900 mg IV    Xander Barron, PharmD  "

## 2025-04-22 ENCOUNTER — HOSPITAL ENCOUNTER (OUTPATIENT)
Dept: HEMATOLOGY ONCOLOGY | Facility: MEDICAL CENTER | Age: 60
End: 2025-04-22
Attending: FAMILY MEDICINE
Payer: MEDICARE

## 2025-04-22 VITALS
TEMPERATURE: 97 F | DIASTOLIC BLOOD PRESSURE: 68 MMHG | OXYGEN SATURATION: 92 % | WEIGHT: 203 LBS | HEIGHT: 63 IN | BODY MASS INDEX: 35.97 KG/M2 | HEART RATE: 85 BPM | RESPIRATION RATE: 20 BRPM | SYSTOLIC BLOOD PRESSURE: 120 MMHG

## 2025-04-22 DIAGNOSIS — R18.0 MALIGNANT ASCITES: ICD-10-CM

## 2025-04-22 DIAGNOSIS — F41.9 ANXIETY IN CANCER PATIENT: ICD-10-CM

## 2025-04-22 LAB — CANCER AG27-29 SERPL-ACNC: 666 U/ML

## 2025-04-22 PROCEDURE — 99213 OFFICE O/P EST LOW 20 MIN: CPT | Performed by: FAMILY MEDICINE

## 2025-04-22 PROCEDURE — 99212 OFFICE O/P EST SF 10 MIN: CPT | Performed by: FAMILY MEDICINE

## 2025-04-22 RX ORDER — LORAZEPAM 0.5 MG/1
0.5 TABLET ORAL EVERY 8 HOURS PRN
Qty: 21 TABLET | Refills: 0 | Status: SHIPPED | OUTPATIENT
Start: 2025-04-22 | End: 2025-04-29

## 2025-04-22 ASSESSMENT — FIBROSIS 4 INDEX: FIB4 SCORE: 1.36

## 2025-04-23 ENCOUNTER — OFFICE VISIT (OUTPATIENT)
Dept: MEDICAL GROUP | Facility: PHYSICIAN GROUP | Age: 60
End: 2025-04-23
Payer: MEDICARE

## 2025-04-23 VITALS
OXYGEN SATURATION: 93 % | DIASTOLIC BLOOD PRESSURE: 68 MMHG | WEIGHT: 205.4 LBS | HEART RATE: 84 BPM | HEIGHT: 63 IN | SYSTOLIC BLOOD PRESSURE: 106 MMHG | BODY MASS INDEX: 36.39 KG/M2 | TEMPERATURE: 97.6 F

## 2025-04-23 DIAGNOSIS — G47.33 OSA (OBSTRUCTIVE SLEEP APNEA): ICD-10-CM

## 2025-04-23 DIAGNOSIS — E11.40 TYPE 2 DIABETES MELLITUS WITH DIABETIC NEUROPATHY, WITHOUT LONG-TERM CURRENT USE OF INSULIN (HCC): Primary | ICD-10-CM

## 2025-04-23 DIAGNOSIS — C80.0 CARCINOMATOSIS (HCC): ICD-10-CM

## 2025-04-23 DIAGNOSIS — C50.912 BREAST CANCER, STAGE 4, LEFT (HCC): ICD-10-CM

## 2025-04-23 PROBLEM — N30.01 ACUTE CYSTITIS WITH HEMATURIA: Status: RESOLVED | Noted: 2024-11-25 | Resolved: 2025-04-23

## 2025-04-23 LAB
HBA1C MFR BLD: 7.1 % (ref ?–5.8)
POCT INT CON NEG: NEGATIVE
POCT INT CON POS: POSITIVE

## 2025-04-23 PROCEDURE — 83036 HEMOGLOBIN GLYCOSYLATED A1C: CPT | Performed by: FAMILY MEDICINE

## 2025-04-23 PROCEDURE — 99214 OFFICE O/P EST MOD 30 MIN: CPT | Performed by: FAMILY MEDICINE

## 2025-04-23 PROCEDURE — 3078F DIAST BP <80 MM HG: CPT | Performed by: FAMILY MEDICINE

## 2025-04-23 PROCEDURE — 3074F SYST BP LT 130 MM HG: CPT | Performed by: FAMILY MEDICINE

## 2025-04-23 RX ORDER — SEMAGLUTIDE 0.68 MG/ML
INJECTION, SOLUTION SUBCUTANEOUS
Qty: 6 ML | Refills: 0 | Status: SHIPPED | OUTPATIENT
Start: 2025-04-23 | End: 2025-06-18

## 2025-04-23 RX ORDER — SEMAGLUTIDE 1.34 MG/ML
1 INJECTION, SOLUTION SUBCUTANEOUS
Qty: 3 ML | Refills: 0 | Status: SHIPPED | OUTPATIENT
Start: 2025-06-12

## 2025-04-23 ASSESSMENT — FIBROSIS 4 INDEX: FIB4 SCORE: 1.36

## 2025-04-23 NOTE — PROGRESS NOTES
"Verbal consent was acquired by the patient to use [employer] ambient listening note generation during this visit     Subjective:     HPI:   History of Present Illness  The patient presents for evaluation of type 2 diabetes with diabetic neuropathy, stage 4 breast cancer with carcinomatosis, and sleep apnea.    Type 2 Diabetes Management  - Ozempic was discontinued approximately a month ago due to weight loss associated with chemotherapy treatment.  - Despite her weight exceeding 200 pounds, concern about excessive weight loss prompted the discontinuation.  - She now expresses a desire to resume Ozempic as weight gain has been observed.  - Currently, she is on a regimen of metformin extended release 1000 mg, which is well tolerated.  - An increase in A1c to 7.1% was noted since stopping Ozempic.  - No hematuria, fevers, or chills are reported.  - Fluid drainage from the lungs or abdomen has not been required during the last three visits.    Stage 4 Breast Cancer with Carcinomatosis  - The patient is undergoing chemotherapy and recently consulted with Dr. Ratliff.  - A CT scan is scheduled for early 05/2025, prior to an appointment with Dr. Kline on 05/08/2025.  - Cancer markers have decreased from over 900 to 666, indicating some improvement.    Sleep Apnea  - She initiated the use of a BiPAP machine last night, connected to her oxygen supply.  - The correct tubing for the oxygen was not provided, leading to an improvised solution by cutting a small piece to fit into the port.  - Oxygen levels were monitored using a pulse oximeter, which indicated suboptimal functioning.  - Typically, 1.5 liters of oxygen is used at home.    Supplemental information: None.    Health Maintenance: Completed    Objective:     Exam:  /68 (BP Location: Right arm, Patient Position: Sitting, BP Cuff Size: Large adult)   Pulse 84   Temp 36.4 °C (97.6 °F) (Temporal)   Ht 1.6 m (5' 3\")   Wt 93.2 kg (205 lb 6.4 oz)   SpO2 " 93%   BMI 36.38 kg/m²  Body mass index is 36.38 kg/m².    Physical Exam  Constitutional:       Appearance: Normal appearance.   Cardiovascular:      Rate and Rhythm: Normal rate and regular rhythm.      Heart sounds: Normal heart sounds.   Pulmonary:      Effort: Pulmonary effort is normal.      Breath sounds: Normal breath sounds.   Musculoskeletal:      Cervical back: Normal range of motion and neck supple.   Feet:      Comments: Diabetic foot exam: No lesions or calluses noted. 2+ pedal pulses. Sensation intact with 10 out of 10 on monofilament test.  Lymphadenopathy:      Cervical: No cervical adenopathy.   Neurological:      Mental Status: She is alert.             Results  Labs   - A1c: 7.1%   - A1c: 11/2024, 7.0%    Assessment & Plan:     1. Type 2 diabetes mellitus with diabetic neuropathy, without long-term current use of insulin ([facility])  POCT Hemoglobin A1C    Diabetic Monofilament Lower Extremity Exam    Lipid Profile      2. Breast cancer, stage 4, left ([facility])        3. Carcinomatosis (HCC)        4. YOANA (obstructive sleep apnea)            Assessment & Plan  1. Type 2 diabetes with diabetic neuropathy: Chronic and uncontrolled. A1c has increased to 7.1%, likely secondary to stopping the Ozempic a month ago.  - Continue metformin extended release 1000 mg daily  - Restart Ozempic at 0.25 mg every 7 days for the first month, then 0.5 mg every 7 days for the next month, and then 1 mg every 7 days  - Reevaluation of A1c in 3 months    2. Breast cancer stage IV with carcinomatosis: Chronic and unstable. Initially responded well to immunotherapy, but metastases appeared approximately 9 months later. Currently on a chemotherapy regimen with some early signs of improvement. An upcoming CT scan is scheduled in early May to double-check on the progress.  - Follow up with oncology as directed    3. Sleep apnea.  - Contact [employer] to get the correct tubing for BiPAP to bleed in nocturnal oxygen    4. Health  maintenance.  - Order a cholesterol panel  - Fasting for the labs  - Coordinate with Dr. Kline for lab work        Referral for genetic research was offered. Patient is a participant.    Return in about 3 months (around 7/23/2025) for f/u DM, get A1c.    Please note that this dictation was created using voice recognition software. I have made every reasonable attempt to correct obvious errors, but I expect that there are errors of grammar and possibly content that I did not discover before finalizing the note.

## 2025-04-24 NOTE — PROGRESS NOTES
"Ordering Provider: Dr. Kline    Pharmacy Chemotherapy Verification Note:    Patient Name: Sol Kern      Dx: Breast Cancer      Protocol: Sacituzumab Govitecan       *Dosing Reference*  Sacituzumab govitecan-hziy 10 mg/kg IV on Days 1 and 8  21-day cycle until disease progression or unacceptable toxicity     1. NCCNGuidelines® for Breast Cancer V.2.2024.   2. Staci FITZPATRICK , et al. N Engl J Med. 2019;380(8):741-751.a   3. Andre et al. J Clin Oncol. 2022;40(17_suppl):GTY1830.a    Allergies:  Codeine, Hydrocodone, and Other drug     /76   Pulse 84   Temp 36.1 °C (97 °F) (Temporal)   Resp 18   Ht 1.6 m (5' 2.99\")   Wt 89 kg (196 lb 3.4 oz)   SpO2 96%   BMI 34.77 kg/m²  Body surface area is 1.99 meters squared.    Labs 4/28/25:  ANC~ 4470 Plt = 416 k   Hgb = 13.4           Drug Order   (Drug name, dose, route, IV Fluid & volume, frequency, number of doses) Cycle: 3 Day 8      Previous treatment: C3D1 on 4/21/25; s/p Capecitabine x 2C, Ribociclib + Faslodex     Medication = Sacituzumab govitecan  Base Dose = 10 mg/kg  Calc Dose: Base Dose x 89 kg = 890 mg  Final Dose = 900 mg  Route = IV  Fluid & Volume =  mL  Admin Duration = Over 1-2 hours          <10% difference, okay to treat with final dose       By my signature below, I confirm this process was performed independently with the BSA and all final chemotherapy dosing calculations congruent. I have reviewed the above chemotherapy order and that my calculation of the final dose and BSA (when applicable) corroborate those calculations of the  pharmacist.     Susy Talley, PharmD    "

## 2025-04-25 NOTE — PROGRESS NOTES
Reason for Follow-Up:    I spoke to the patient this afternoon by phone. We completed her monthly PCM follow up.     Current Health Status:    The patient is followed by PCM based on diagnoses of HTN, DM2, and chronic respiratory failure. The patient reports no chest pain, pressure, or palpitations at this time. The patient's blood pressures have been in the 130s and 140s systolic over her last 2 clinic visits. The patient reports taking all medications as indicated in her chart. The patient had an A1C of 7.1 when she was in clinic a couple of days ago and was advised to re-start her Ozempic and be mindful of nutrition. The patient has had some struggles with her new Bipap machine over the last couple of days and had a point last night where she had desaturated to 79% and had to just take off the BiPap and use her regular oxygen concentrator. She also says that it just doesn't feel like she is getting oxygen the same way that it does on the other machine. She also was concerned about using the humidifier on the Bipap given the fluid accumulation issues she has been having. Pulmonology and Oncology were routed this information due to the varying factors that could be in play here.     Medical Updates:  Since our last conversation the patient has followed up with IR, Oncology, Pulmonology, her PCP, and infusion.     Medication Updates:  The patient is on about week 4 of her new Cancer treatment. She is also trying to adjust to a Bi Pap machine. She reports all other medications as being taken as indicated in her chart.     Symptoms:  Nocturnal hypoxia. Fatigue.     Plan of Care Goals and Progress:    Goal 1. Over the next month the patient will take concrete and consistent steps to improve and maintain diabetes control      Progress:  The patient is aware of the medications and nutritional principles involved in managing diabetes.       Barriers:  The patient is also managing cancer and the side effects of her  medications.      Interventions:  The patient is advised to take medications as prescribed and discuss side effects and needs with providers. Patient advised to be mindful of nutrition.      Education:  Importance of good blood glucose control during cancer treatment discussed.     Goal 2. Over the next month the patient will take consistent and concrete steps towards optimizing pulmonary health.      Progress:  The patient is compliant with her oxygen and is working with her Bipap machine. The patient has a pulse oximeter and is aware of how to use it and what the values mean.       Barriers:  The patient is having some issues with her Bipap machine. [facility] and Pulmonology/Oncology asked for insight.      Interventions:  The patient will follow up with [facility] on any potential equipment issues. We will communicate about any information received from specialties.      Education:  Article on breathing exercises sent by mail.       Patient's Concerns and Feedback (Self Management of Care):     The patient's primary concern today is navigating her plan of care going forward and making sure that everything is good to go with her new Bipap Machine. She is going to follow up with [facility] to consult on new Bipap machine as there are questions as to whether it is functioning appropriately. We will also communicate with regard to any responses from her specialists and providers.     Next Steps:     Follow-Up Plan:  The patient's next PCM Follow up will be in approximately 4 weeks.       Appointments:  4/28/25 (8:00 am, [facility] infusion), 4/29/25(2:30 pm, Onc), 4/30/25 (4:30 pm, PT), 5/6/25 (9:00 am, Radiology), 5/8/25 (11:30 am, [facility]), 5/12/25 (8:00 am, Infusion), 5/13/25 (10:40, Pulmonology), 5/19/25 (8:00 am, Infusion), 5/20/25(4:30, Onc Inf), 5/28/25 ( 3:00 pm, PT)     Contact Information:  Call [phone] with any questions or concerns.

## 2025-04-25 NOTE — CARE PLAN
Problem: Recognizing current health habits that may contribute to diabetes - Long Term  Goal: In conjunction with patient, identify which health habits can be modified  Outcome: Progressing  Note: Patient is juggling diabetic nutrition and medication and cancer treatment.   Intervention: Evaluate and identify modifiable health habits and readiness     Problem: Patient barriers to managing diabetes - Long Term  Goal: Identify patient barriers to managing diabetes  Outcome: Progressing  Note: Patient voices understanding of the challenges of managing diabetes medications and their side effects on top of the side effects of chemotherapy. The patient voices willingness to communicate these things with her health care team.   Intervention: Patient will vocalize awareness of barriers

## 2025-04-25 NOTE — Clinical Note
[facility]  [address]  [place] [address]    [id number]    Sol Kern  [id number]  [place] [address]    April 25, 2025    Member Name: Sol Kern   Member Number: [id number]   Reference Number: [id number]   Approved Services: MRI/CAT Scan   Approved Service Dates: 05/02/2025 - 08/23/2025   Requesting Provider: Magdi Kline   Requested Provider: [facility]     Dear Sol Kern:    The following medical service(s) requested by Magdi Kline have been approved:    Procedure Code Procedure Code Name Requested Quantity Approved Quantity Status   19462 (CPT®) CHG DIAGNOSTIC COMPUTED TOMOGRAPHY THORAX W/CONTRAST 1 1 Authorized       Approved Quantity means the number of visits approved for medication treatments and/or medical services.    The services should be provided by [facility] no later than 08/23/2025. Please contact the provider listed below with any questions.     Provider Information:  [facility]  [phone]    Your plan benefit may require a deductible, co-payment or coinsurance for these services. This authorization does not guarantee [facility] will pay the claim for services that you receive. Payment by [facility] for these services is subject to the terms of your Evidence of Coverage, your eligibility at the time of service, and confirmation of benefit coverage.    For any questions or additional information, please contact Customer Service:    [facility] Toll Free: [phone]  TTY users dial: [phone]   Call Center Hours:  Oct 1 - Mar 31, Mon - Fri 7 AM to 8 PM PST  Oct 1 - Mar 31, Sat - Sun 8 AM to 8 PM PST  Apr 1 - Sep 30, Mon - Fri 7 AM to 8 PM PST   Office Hours: Mon - Fri 8 AM to 5 PM PST   E-mail: [email or web address]   Website:  [email or web address]      This information is available for free in other languages. Please  contact Customer Service at the phone number above for more information. [facility] complies with applicable Federal civil rights laws and does not discriminate on the basis of race, color, national origin, age, disability or sex.    Sincerely,     Healthcare Utilization Management Department     Cc: [facility]   Magdi S Raisa    Multi-Language Insert  Multi- Services  English: We have free  services to answer any questions you may have about our health or drug plan.  To get an , just call us at [phone].  Someone who speaks English/Language can help you.  This is a free service.  Malay: Tenemos servicios de intérprete sin costo alguno  para responder cualquier pregunta que pueda tener sobre nuestro plan de french o medicamentos. Para hablar con un intérprete, por favor llame al [phone]. Alguien que hable español le podrá ayudar. Juany es un servicio gratuito.  Chinese Mandarin: ?????????????????????????????? ???????????????? [phone]????????????????? ?????????  Chinese Cantonese: ?????????????????????????????? ????????????? [phone]???????????????????? ????????  Tagalog:  Mayroon kaming libreng serbisyo sa candelariosavivek cuencagholly o panggamot.  carline Diaz  [phone]. Maaacarlie Flores.  Regis hughes.  English:  Nous proposons marlene services gratuits d'interprétation pour répondre à toutes schuyler questions relatives à notre régime de santé ou d'assurance-médicaments. Pour accéder au service d'interprétation, il vous suffit de nous appeler au [phone]. Un interlocuteur parcecilio Françclifford pourra vous aider. Ce service est gratuit.  Afghan:  Nik nair có d?ch v? thông d?ch mi?n phí ð? tr? l?i các câu h?i v? chýõng s?c kh?e và raduýõng [facility]  thu?c men. N?u quí v? c?n thông d?ch viên alejandro g?i [phone] s? có nhân viên nói ti?ng Vi?t giúp ð? quí v?. Ðây là d?ch v? mi?n phí .  Syriac:  Unser kostenloser Dolmetscherservice beantwortet Ihren Fragen zu unserem Gesundheits- und Arzneimittelplan. Unsere Dolmetscher erreichen Sie [phone]. Man wird Ihnen kandi auf [facility]. Dieser Service ist [facility].  Japanese:  ??? ?? ?? ?? ?? ??? ?? ??? ?? ???? ?? ?? ???? ???? ????. ?? ???? ????? ?? [phone] ??? ??? ????.  ???? ?? ???? ?? ?? ????. ? ???? ??? ?????.   Cambodian: Åñëè ó âàñ âîçíèêíóò âîïðîñû îòíîñèòåëüíî ñòðàõîâîãî èëè ìåäèêàìåíòíîãî ïëàíà, âû ìîæåòå âîñïîëüçîâàòüñÿ íàøèìè áåñïëàòíûìè óñëóãàìè ïåðåâîä÷èêîâ. ×òîáû âîñïîëüçîâàòüñÿ óñëóãàìè ïåðåâîä÷èêà, ïîçâîíèòå íàì ïî òåëåôîíó [phone]. Âàì îêàæåò ïîìîùü ñîòðóäíèê, êîòîðûé ãîâîðèò ïî-póññêè. Äàííàÿ óñëóãà áåñïëàòíàÿ.  Chinese: ÅääÇ äÞÏã ÎÏãÇÊ ÇáãÊÑÌã ÇáÝæÑí ÇáãÌÇäíÉ ááÅÌÇÈÉ Úä Ãí ÃÓÆáÉ ÊÊÚáÞ ÈÇáÕÍÉ Ãæ ÌÏæá ÇáÃÏæíÉ áÏíäÇ. ááÍÕæá Úáì ãÊÑÌã ÝæÑí¡ áíÓ Úáíß Óæì ÇáÇÊÕÇá ÈäÇ Úáì [phone] . ÓíÞæã ÔÎÕ ãÇ íÊÍÏË ÇáÚÑÈíÉ ÈãÓÇÚÏÊß. åÐå ÎÏãÉ ãÌÇäíÉ.  Mesha: ????? ????????? ?? ??? ?? ????? ?? ???? ??? ???? ???? ?? ?????? ?? ???? ???? ?? ??? ????? ??? ????? ???????? ?????? ?????? ???. ?? ???????? ??????? ???? ?? ???, ?? ???? [phone] ?? ??? ????. ??? ??????? ?? ?????? ????? ?? ???? ??? ?? ???? ??. ?? ?? ????? ???? ??.   [place] citizen:  È disponibile un servizio di interpretariato gratuito per rispondere a eventuali domande sul nostro piano sanitario e farmaceutico. Per un interprete, contattare il makenzie [phone]. Un nostro incaricato jonn parla Italianovi fornirà l'assistenza necessaria. È un servizio gratuito.  Portugués:  Dispomos de serviços de interpretação gratuitos para responder a qualquer questão que tenha acerca do nosso plano de saúde ou de medicação. Para obter um intérprete, contacte-nos através do número [phone]. Irá encontrar alguém que fale o idioma  Português para  o ajudar. Juany serviço é gratuito.  Togolese Creole:  Nou genyen sèvis entèprèt gratis juana reponn tout kesyon ou ta genyen konsènan plan medikal oswa dwòg nou an.  Juana jwenn yon entèprèt, jis rele nou nan [phone]. Yon moun ki pale Kreyòl kapab waldo w.  Sa a se yon sèvis ki gratis.  Polish:  Umo¿liwiamy bezp³atne skorzystanie z us³ug t³umacza ustnego, który pomo¿e w uzyskaniu odpowiedzi na temat planu zdrowotnego lub dawkowania legregoriow. Rosalinda skorzystaæ z pomocy t³umacza znaj¹cego fransisco porter¿y zadzwoniæ pod numer [phone]. Ta us³uga jest bezp³atna.  Macedonian: ????? ??????? ????????????????????? ??????????????????????????????????[phone] ???????????????? ? ????????????????? ?????

## 2025-04-27 ASSESSMENT — ENCOUNTER SYMPTOMS
COUGH: 0
ABDOMINAL PAIN: 1
DIARRHEA: 0
SHORTNESS OF BREATH: 0
PALPITATIONS: 0
CHILLS: 0
FEVER: 0
CONSTIPATION: 0
NAUSEA: 0
VOMITING: 0

## 2025-04-27 NOTE — ASSESSMENT & PLAN NOTE
Stable.  Does well with the option to use lorazepam or diazepam based on time of day and significance of symptoms.  PDMP reviewed, no signs of diversion or abuse, risk and benefits of medication discussed, controlled subs agreement on file.  Will continue to use Ativan during the day and diazepam at night.  Refill sent to pharmacy.

## 2025-04-27 NOTE — PROGRESS NOTES
"Pharmacy Chemotherapy calculation:    DX: breast cancer    Cycle 3, Day 8  Previous treatment = C3D1 4/21/25    Regimen: sacituzumab govitecan-hziy  sacituzumab govitecan-hziy 10mg/kg IV on days 1 and 8   q21 days until Dp/UT   NCCN Guidelines for Breast Cancer V.2.2024  shayy Beltran - N Engl J Med. 2019 Feb 21;380(2):741-726.   Andre et al - Journal of Clinical OncologyVolume 40, Number 17_suppl     /76   Pulse 84   Temp 36.1 °C (97 °F) (Temporal)   Resp 18   Ht 1.6 m (5' 2.99\")   Wt 89 kg (196 lb 3.4 oz)   SpO2 96%   BMI 34.77 kg/m²   Body surface area is 1.99 meters squared.    Labs 4/28/25:  ANC~ 4470 Plt = 416k   Hgb = 13.4       Xgeva plan in [place], continue to hold for C3 as per [place]    Sacituzumab govitecan-hziy 10 mg/kg x 89 kg = 890 mg   <10% difference, ok to treat with final dose = 900 mg IV    Braeden Carmona, PharmD  "

## 2025-04-27 NOTE — ASSESSMENT & PLAN NOTE
Improved.  Has not required recent paracentesis or thoracentesis.  Clinically she is improving which hopefully is indicative of an improvement of her cancer following the transition to an treatment regimen.  Will continue to follow-up as needed.

## 2025-04-27 NOTE — PROGRESS NOTES
Subjective:     Chief Complaint   Patient presents with    Cancer     FV     Sol Kern is a 60 y.o. female presenting to palliative care for follow-up on symptom management for oncological pain.  Patient reports since last visit her need for Thora and paracentesis have decreased which she hopes is an encouraging sign that her new treatment regimen is working on her cancer.  She was also able to recently take a trip to [facility] and visit [facility] which she was happy to be able to do.  Has been using her lorazepam and diazepam for anxiety and insomnia.  Feels that the lorazepam works better during the day as it appears to be shorter acting.  Through shared decision making we agree that we could use both lorazepam and diazepam based on the time of day.  Does not endorse any significant nausea vomiting or constipation or diarrhea.    Problem   Anxiety in Cancer Patient   Malignant Ascites        Current medicines (including changes today)  Current Outpatient Medications   Medication Sig Dispense Refill    LORazepam (ATIVAN) 0.5 MG Tab Take 1 Tablet by mouth every 8 hours as needed for Anxiety for up to 7 days. 21 Tablet 0    Semaglutide,0.25 or 0.5MG/DOS, (OZEMPIC, 0.25 OR 0.5 MG/DOSE,) 2 MG/3ML Solution Pen-injector Inject 0.25 mg under the skin every 7 days for 28 days, THEN 0.5 mg every 7 days for 28 days. 6 mL 0    [START ON 6/12/2025] Semaglutide, 1 MG/DOSE, (OZEMPIC, 1 MG/DOSE,) 4 MG/3ML Solution Pen-injector Inject 1 mg under the skin every 7 days. 3 mL 0    OLANZapine (ZYPREXA) 5 MG Tab TAKE 1 TABLET BY MOUTH EVERY EVENING. TO BE TAKEN NIGHTLY ON FIRST DAY OF CHEMOTHERAPY FOR A TOTAL OF 4 DAYS, OR AS DIRECTED BY YOUR PROVIDER. 24 Tablet 0    lidocaine (XYLOCAINE) 2 % Solution Okay to mix 1:1 mylanta/maalox - use 5 ml every 1 hour prn for oral discomfort 100 mL 1    ondansetron (ZOFRAN) 4 MG Tab tablet Take 1 Tablet by mouth every four hours as needed for Nausea/Vomiting (for nausea, vomiting).  30 Tablet 6    prochlorperazine (COMPAZINE) 10 MG Tab Take 1 Tablet by mouth every 6 hours as needed (for nausea, vomiting). 30 Tablet 6    loperamide (IMODIUM) 2 MG Cap Take 1 Capsule by mouth see administration instructions. 30 Capsule 6    lidocaine (LIDODERM) 5 % Patch PLACE 1 PATCH ON THE SKIN EVERY 24 HOURS. PLACE PATCH TO AFFECTED AREA IN AM AND REMOVE AT BEDTIME. 30 Patch 0    prochlorperazine (COMPAZINE) 10 MG Tab Take 1 Tablet by mouth every 6 hours as needed for Nausea/Vomiting. 30 Tablet 3    simethicone (MYLICON) 125 MG chewable tablet Chew 250 mg every 6 hours as needed for Flatulence. 2 tablets= 250mg      atorvastatin (LIPITOR) 20 MG Tab TAKE 1 TABLET BY MOUTH EVERY DAY IN THE EVENING 100 Tablet 3    albuterol 108 (90 Base) MCG/ACT Aero Soln inhalation aerosol Inhale 2 Puffs every 6 hours as needed for Shortness of Breath. 8 Each 3    ondansetron (ZOFRAN ODT) 4 MG TABLET DISPERSIBLE Take 1 Tablet by mouth every 8 hours as needed for Nausea/Vomiting. 10 Tablet 0    denosumab (XGEVA) 120 MG/1.7ML injection Inject 120 mg under the skin every 28 days.      cyclobenzaprine (FLEXERIL) 10 mg Tab TAKE 1 TABLET BY MOUTH THREE TIMES A DAY AS NEEDED FOR MUSCLE SPASM 90 Tablet 0    metFORMIN ER (GLUCOPHAGE XR) 500 MG TABLET SR 24 HR Take 1 Tablet by mouth every day. (Patient taking differently: Take 1,000 mg by mouth every evening.) 200 Tablet 1    lisinopril (PRINIVIL) 2.5 MG Tab Take 1 Tablet by mouth every day. 90 Tablet 1     No current facility-administered medications for this encounter.       Social History     Tobacco Use    Smoking status: Former     Current packs/day: 0.25     Average packs/day: 0.3 packs/day for 20.0 years (5.0 ttl pk-yrs)     Types: Cigarettes    Smokeless tobacco: Never    Tobacco comments:     working on quitting    Vaping Use    Vaping status: Never Used   Substance Use Topics    Alcohol use: Not Currently     Comment: rarely    Drug use: Not Currently     Types: Marijuana, Oral  "    Comment: gummies     Past Medical History:   Diagnosis Date    Allergy     Back pain     Cancer (HCC) 2021    breast    Daytime sleepiness     Diabetes (HCC)     diet    Fatigue     Frequent headaches     GERD (gastroesophageal reflux disease)     Heartburn     High cholesterol     not medicated    Hypertension     Insomnia     Malignant neoplasm of upper-outer quadrant of left female breast (HCC)     Morning headache     Nasal drainage     Neck pain     Obesity     Pulmonary hypertension (HCC)     Rhinitis     Sleep apnea     no cpap    Snoring     Weakness     Wears glasses       Family History   Problem Relation Age of Onset    Cancer Mother         melanoma    Hypertension Mother     Other Mother         THYROID    Heart Disease Mother         valvular disease    Ovarian Cancer Mother     Heart Disease Father         Heart Attack    Heart Attack Father     Other Father         psoriasis    Hyperlipidemia Brother     Other Brother         psoriasis    Heart Disease Brother         MI    Heart Attack Brother         massive heart attack     Heart Disease Maternal Grandfather         MI    Breast Cancer Other     Other Other         psoriasis    Diabetes Neg Hx     Alcohol/Drug Neg Hx     Tubal Cancer Neg Hx     Peritoneal Cancer Neg Hx     Colorectal Cancer Neg Hx          Objective:     Vitals:    04/22/25 1116   BP: 120/68   Pulse: 85   Resp: 20   Temp: 36.1 °C (97 °F)   TempSrc: Temporal   SpO2: 92%   Weight: 92.1 kg (203 lb)   Height: 1.6 m (5' 2.99\")     Body mass index is 35.97 kg/m².     Review of Systems   Constitutional:  Positive for malaise/fatigue. Negative for chills and fever.   Respiratory:  Negative for cough and shortness of breath.    Cardiovascular:  Negative for chest pain and palpitations.   Gastrointestinal:  Positive for abdominal pain. Negative for constipation, diarrhea, nausea and vomiting.     Physical Exam:   Gen: No acute distress.   Skin: Pink, warm, and dry  HEENT: conjunctiva " non-injected, sclera non-icteric. EOMs intact.   Nasal mucosa without edema nor erythema.   Pinna normal.    Oral mucous membranes moist  Neck: Supple, trachea midline. No adenopathy or masses in the neck or supraclavicular regions.  Lungs: Effort is normal.   CV: regular rate and rhythm  Abdomen: Flat  Ext: no edema, color normal, vascularity normal, temperature normal  Alert and oriented Eye contact is good, speech goal directed, affect calm    Assessment and Plan:   Anxiety in cancer patient  Stable.  Does well with the option to use lorazepam or diazepam based on time of day and significance of symptoms.  PDMP reviewed, no signs of diversion or abuse, risk and benefits of medication discussed, controlled subs agreement on file.  Will continue to use Ativan during the day and diazepam at night.  Refill sent to pharmacy.    Malignant ascites  Improved.  Has not required recent paracentesis or thoracentesis.  Clinically she is improving which hopefully is indicative of an improvement of her cancer following the transition to an treatment regimen.  Will continue to follow-up as needed.      28 minutes in total including chart review and consultation with patients oncological providers.     Followup: Return if symptoms worsen or fail to improve.    Mariusz Peterson M.D.

## 2025-04-28 ENCOUNTER — TELEPHONE (OUTPATIENT)
Dept: HEALTH INFORMATION MANAGEMENT | Facility: OTHER | Age: 60
End: 2025-04-28
Payer: MEDICARE

## 2025-04-28 ENCOUNTER — OUTPATIENT INFUSION SERVICES (OUTPATIENT)
Dept: ONCOLOGY | Facility: MEDICAL CENTER | Age: 60
End: 2025-04-28
Attending: INTERNAL MEDICINE
Payer: MEDICARE

## 2025-04-28 VITALS
DIASTOLIC BLOOD PRESSURE: 76 MMHG | RESPIRATION RATE: 18 BRPM | WEIGHT: 196.21 LBS | HEIGHT: 63 IN | OXYGEN SATURATION: 96 % | HEART RATE: 84 BPM | SYSTOLIC BLOOD PRESSURE: 138 MMHG | BODY MASS INDEX: 34.77 KG/M2 | TEMPERATURE: 97 F

## 2025-04-28 DIAGNOSIS — C50.912 BREAST CANCER, STAGE 4, LEFT (HCC): ICD-10-CM

## 2025-04-28 DIAGNOSIS — E11.40 TYPE 2 DIABETES MELLITUS WITH DIABETIC NEUROPATHY, WITHOUT LONG-TERM CURRENT USE OF INSULIN (HCC): ICD-10-CM

## 2025-04-28 LAB
BASOPHILS # BLD AUTO: 0.8 % (ref 0–1.8)
BASOPHILS # BLD: 0.05 K/UL (ref 0–0.12)
EOSINOPHIL # BLD AUTO: 0.23 K/UL (ref 0–0.51)
EOSINOPHIL NFR BLD: 3.6 % (ref 0–6.9)
ERYTHROCYTE [DISTWIDTH] IN BLOOD BY AUTOMATED COUNT: 58.3 FL (ref 35.9–50)
HCT VFR BLD AUTO: 41 % (ref 37–47)
HGB BLD-MCNC: 13.4 G/DL (ref 12–16)
IMM GRANULOCYTES # BLD AUTO: 0.04 K/UL (ref 0–0.11)
IMM GRANULOCYTES NFR BLD AUTO: 0.6 % (ref 0–0.9)
LYMPHOCYTES # BLD AUTO: 0.83 K/UL (ref 1–4.8)
LYMPHOCYTES NFR BLD: 12.9 % (ref 22–41)
MCH RBC QN AUTO: 31.5 PG (ref 27–33)
MCHC RBC AUTO-ENTMCNC: 32.7 G/DL (ref 32.2–35.5)
MCV RBC AUTO: 96.5 FL (ref 81.4–97.8)
MONOCYTES # BLD AUTO: 0.82 K/UL (ref 0–0.85)
MONOCYTES NFR BLD AUTO: 12.7 % (ref 0–13.4)
NEUTROPHILS # BLD AUTO: 4.47 K/UL (ref 1.82–7.42)
NEUTROPHILS NFR BLD: 69.4 % (ref 44–72)
NRBC # BLD AUTO: 0 K/UL
NRBC BLD-RTO: 0 /100 WBC (ref 0–0.2)
OUTPT INFUS CBC COMMENT OICOM: ABNORMAL
PLATELET # BLD AUTO: 416 K/UL (ref 164–446)
PMV BLD AUTO: 9 FL (ref 9–12.9)
RBC # BLD AUTO: 4.25 M/UL (ref 4.2–5.4)
WBC # BLD AUTO: 6.4 K/UL (ref 4.8–10.8)

## 2025-04-28 PROCEDURE — A9270 NON-COVERED ITEM OR SERVICE: HCPCS | Performed by: NURSE PRACTITIONER

## 2025-04-28 PROCEDURE — 96415 CHEMO IV INFUSION ADDL HR: CPT

## 2025-04-28 PROCEDURE — 96375 TX/PRO/DX INJ NEW DRUG ADDON: CPT

## 2025-04-28 PROCEDURE — 96413 CHEMO IV INFUSION 1 HR: CPT

## 2025-04-28 PROCEDURE — 700111 HCHG RX REV CODE 636 W/ 250 OVERRIDE (IP): Performed by: NURSE PRACTITIONER

## 2025-04-28 PROCEDURE — 700102 HCHG RX REV CODE 250 W/ 637 OVERRIDE(OP): Performed by: NURSE PRACTITIONER

## 2025-04-28 PROCEDURE — 96367 TX/PROPH/DG ADDL SEQ IV INF: CPT

## 2025-04-28 PROCEDURE — 85025 COMPLETE CBC W/AUTO DIFF WBC: CPT

## 2025-04-28 PROCEDURE — 700105 HCHG RX REV CODE 258: Performed by: NURSE PRACTITIONER

## 2025-04-28 RX ORDER — SODIUM CHLORIDE 9 MG/ML
INJECTION, SOLUTION INTRAVENOUS CONTINUOUS
Status: DISCONTINUED | OUTPATIENT
Start: 2025-04-28 | End: 2025-04-28 | Stop reason: HOSPADM

## 2025-04-28 RX ORDER — PALONOSETRON 0.05 MG/ML
0.25 INJECTION, SOLUTION INTRAVENOUS ONCE
Status: COMPLETED | OUTPATIENT
Start: 2025-04-28 | End: 2025-04-28

## 2025-04-28 RX ORDER — EPINEPHRINE 1 MG/ML(1)
0.5 AMPUL (ML) INJECTION PRN
Status: DISCONTINUED | OUTPATIENT
Start: 2025-04-28 | End: 2025-04-28 | Stop reason: HOSPADM

## 2025-04-28 RX ORDER — DIPHENHYDRAMINE HYDROCHLORIDE 50 MG/ML
25 INJECTION, SOLUTION INTRAMUSCULAR; INTRAVENOUS ONCE
Status: COMPLETED | OUTPATIENT
Start: 2025-04-28 | End: 2025-04-28

## 2025-04-28 RX ORDER — METHYLPREDNISOLONE SODIUM SUCCINATE 125 MG/2ML
125 INJECTION, POWDER, LYOPHILIZED, FOR SOLUTION INTRAMUSCULAR; INTRAVENOUS PRN
Status: DISCONTINUED | OUTPATIENT
Start: 2025-04-28 | End: 2025-04-28 | Stop reason: HOSPADM

## 2025-04-28 RX ORDER — DIPHENHYDRAMINE HYDROCHLORIDE 50 MG/ML
50 INJECTION, SOLUTION INTRAMUSCULAR; INTRAVENOUS PRN
Status: DISCONTINUED | OUTPATIENT
Start: 2025-04-28 | End: 2025-04-28 | Stop reason: HOSPADM

## 2025-04-28 RX ORDER — ACETAMINOPHEN 325 MG/1
650 TABLET ORAL ONCE
Status: COMPLETED | OUTPATIENT
Start: 2025-04-28 | End: 2025-04-28

## 2025-04-28 RX ORDER — DEXAMETHASONE SODIUM PHOSPHATE 4 MG/ML
12 INJECTION, SOLUTION INTRA-ARTICULAR; INTRALESIONAL; INTRAMUSCULAR; INTRAVENOUS; SOFT TISSUE ONCE
Status: COMPLETED | OUTPATIENT
Start: 2025-04-28 | End: 2025-04-28

## 2025-04-28 RX ADMIN — ACETAMINOPHEN 650 MG: 325 TABLET ORAL at 09:51

## 2025-04-28 RX ADMIN — DIPHENHYDRAMINE HYDROCHLORIDE 25 MG: 50 INJECTION INTRAMUSCULAR; INTRAVENOUS at 10:11

## 2025-04-28 RX ADMIN — SODIUM CHLORIDE: 9 INJECTION, SOLUTION INTRAVENOUS at 09:54

## 2025-04-28 RX ADMIN — FOSAPREPITANT 150 MG: 150 INJECTION, POWDER, LYOPHILIZED, FOR SOLUTION INTRAVENOUS at 10:17

## 2025-04-28 RX ADMIN — DEXAMETHASONE SODIUM PHOSPHATE 12 MG: 4 INJECTION INTRA-ARTICULAR; INTRALESIONAL; INTRAMUSCULAR; INTRAVENOUS; SOFT TISSUE at 09:59

## 2025-04-28 RX ADMIN — SACITUZUMAB GOVITECAN 900 MG: 180 POWDER, FOR SOLUTION INTRAVENOUS at 10:56

## 2025-04-28 RX ADMIN — PALONOSETRON HYDROCHLORIDE 0.25 MG: 0.25 INJECTION INTRAVENOUS at 09:55

## 2025-04-28 RX ADMIN — FAMOTIDINE 20 MG: 10 INJECTION INTRAVENOUS at 10:07

## 2025-04-28 ASSESSMENT — FIBROSIS 4 INDEX: FIB4 SCORE: 1.36

## 2025-04-28 NOTE — TELEPHONE ENCOUNTER
"I called to update the patient on the following message from her Pulmonology.    \"The BiPAP should have a bleed in connector for O2 at 3L in addition to the pressurized air. As long as this is hooked up, it might be just a different sensation. I can also order an overnight oximetry for her to check in with the BiPAP and 3L bleed in O2 to ensure that her sats aren't dropping despite both of these. I do not think that the humidification would worsen her pleural effusions, but if she is concerned about it she could certainly turn off the humidification and use other strategies to mitigate dry mouth which might develop (Xylimelt tablets, biotene mouthwash).     Kind regards,     Shiela Mosley, A.P.R.N.\"    The patient voices understanding and states that things went much better over the weekend with reduced humidification.   "

## 2025-04-28 NOTE — PROGRESS NOTES
Chemotherapy Verification - PRIMARY RN      Height = 160 cm  Weight = 89 kg  BSA = 1.99 m^2       Medication: sactizumab govitecan (Trodelvy)  Dose: 10 mg/kg  Calculated Dose: 890 mg                             (In mg/m2, AUC, mg/kg)     I confirm this process was performed independently with the BSA and all final chemotherapy dosing calculations congruent.  Any discrepancies of 10% or greater have been addressed with the chemotherapy pharmacist. The resolution of the discrepancy has been documented in the EPIC progress notes.

## 2025-04-28 NOTE — PROGRESS NOTES
Chemotherapy Verification - SECONDARY RN       Height = 160 cm  Weight = 89 kg  BSA = 1.99 m2       Medication: Trodelvy  Dose: 10 mg/kg  Calculated Dose: 890 mg                             (In mg/m2, AUC, mg/kg)     I confirm that this process was performed independently.

## 2025-04-28 NOTE — PROGRESS NOTES
Sol is here for Day 8, Cycle 3 Trodelvy. PIV established; labs drawn as ordered. Labs reviewed and within parameters to proceed with treatment. Pre-medications given per MAR. Trodelvy given per MAR over 90 minutes. No adverse reaction noted during 30 minute post-Trodelvy infusion. PIV removed; gauze/coban applied to site. Next appointment scheduled. Discharged to self care; no apparent distress noted.

## 2025-04-29 ENCOUNTER — OUTPATIENT INFUSION SERVICES (OUTPATIENT)
Dept: ONCOLOGY | Facility: MEDICAL CENTER | Age: 60
End: 2025-04-29
Attending: INTERNAL MEDICINE
Payer: MEDICARE

## 2025-04-29 VITALS
HEART RATE: 92 BPM | BODY MASS INDEX: 35.31 KG/M2 | HEIGHT: 63 IN | SYSTOLIC BLOOD PRESSURE: 134 MMHG | OXYGEN SATURATION: 93 % | TEMPERATURE: 97.5 F | RESPIRATION RATE: 18 BRPM | WEIGHT: 199.3 LBS | DIASTOLIC BLOOD PRESSURE: 62 MMHG

## 2025-04-29 DIAGNOSIS — C50.912 BREAST CANCER, STAGE 4, LEFT (HCC): ICD-10-CM

## 2025-04-29 PROCEDURE — 96372 THER/PROPH/DIAG INJ SC/IM: CPT

## 2025-04-29 PROCEDURE — 700111 HCHG RX REV CODE 636 W/ 250 OVERRIDE (IP): Mod: JZ,TB | Performed by: NURSE PRACTITIONER

## 2025-04-29 RX ADMIN — PEGFILGRASTIM 6 MG: 6 INJECTION SUBCUTANEOUS at 14:36

## 2025-04-29 ASSESSMENT — FIBROSIS 4 INDEX: FIB4 SCORE: 0.95

## 2025-04-29 NOTE — PROGRESS NOTES
Sol is here for Neulasta. Sol afebrile and denied S/S of infection at this time. Neulasta given as prescribed in back of right arm, adhesive bandage applied to site. Education done regarding possible side effect of bone pain, Sol verbalized understanding. Next appointment scheduled. Discharged to self care; no apparent distress noted.

## 2025-04-30 ENCOUNTER — APPOINTMENT (OUTPATIENT)
Dept: PHYSICAL THERAPY | Facility: REHABILITATION | Age: 60
End: 2025-04-30
Attending: FAMILY MEDICINE
Payer: MEDICARE

## 2025-05-04 NOTE — PROGRESS NOTES
"Pharmacy Chemotherapy calculation:    DX: breast cancer    Cycle 4, Day 1  Previous treatment = C3D8 4/28/25    Regimen: sacituzumab govitecan-hziy  sacituzumab govitecan-hziy 10mg/kg IV on days 1 and 8   q21 days until Dp/UT   NCCN Guidelines for Breast Cancer V.2.2024  shayy Beltran - N Engl J Med. 2019 Feb 21;380(4):301-796.   shayy Powell - Journal of Clinical OncologyVolume 40, Number 17_suppl     /73   Pulse 86   Temp 35.9 °C (96.6 °F) (Temporal)   Resp 18   Ht 1.6 m (5' 2.99\")   Wt 91.2 kg (201 lb 1 oz)   SpO2 92%   BMI 35.63 kg/m²   Body surface area is 2.01 meters squared.    Date 5/12/25 :  ANC = ~ 5650  Plt= 255k   Hgb= 13.2      Xgeva plan in [place], continue to hold for C4 as per [place]    Sacituzumab govitecan-hziy 10 mg/kg x 91.2kg = 912 mg   <10% difference, ok to treat with final dose = 900 mg IV    Christine Thompson, PharmD  "

## 2025-05-06 ENCOUNTER — HOSPITAL ENCOUNTER (OUTPATIENT)
Dept: RADIOLOGY | Facility: MEDICAL CENTER | Age: 60
End: 2025-05-06
Attending: INTERNAL MEDICINE
Payer: MEDICARE

## 2025-05-06 DIAGNOSIS — C50.412 CARCINOMA OF UPPER-OUTER QUADRANT OF LEFT BREAST IN FEMALE, ESTROGEN RECEPTOR POSITIVE (HCC): ICD-10-CM

## 2025-05-06 DIAGNOSIS — R18.0 MALIGNANT ASCITES: ICD-10-CM

## 2025-05-06 DIAGNOSIS — Z17.0 CARCINOMA OF UPPER-OUTER QUADRANT OF LEFT BREAST IN FEMALE, ESTROGEN RECEPTOR POSITIVE (HCC): ICD-10-CM

## 2025-05-06 PROCEDURE — 71260 CT THORAX DX C+: CPT

## 2025-05-06 PROCEDURE — 700117 HCHG RX CONTRAST REV CODE 255: Performed by: INTERNAL MEDICINE

## 2025-05-06 RX ORDER — EPINEPHRINE 1 MG/ML(1)
0.5 AMPUL (ML) INJECTION PRN
Status: CANCELLED | OUTPATIENT
Start: 2025-05-12

## 2025-05-06 RX ORDER — PROCHLORPERAZINE MALEATE 10 MG
10 TABLET ORAL EVERY 6 HOURS PRN
Status: CANCELLED | OUTPATIENT
Start: 2025-05-19

## 2025-05-06 RX ORDER — DEXAMETHASONE SODIUM PHOSPHATE 4 MG/ML
12 INJECTION, SOLUTION INTRA-ARTICULAR; INTRALESIONAL; INTRAMUSCULAR; INTRAVENOUS; SOFT TISSUE ONCE
Status: CANCELLED | OUTPATIENT
Start: 2025-05-12 | End: 2025-05-12

## 2025-05-06 RX ORDER — ONDANSETRON 2 MG/ML
4 INJECTION INTRAMUSCULAR; INTRAVENOUS PRN
Status: CANCELLED | OUTPATIENT
Start: 2025-05-12

## 2025-05-06 RX ORDER — 0.9 % SODIUM CHLORIDE 0.9 %
VIAL (ML) INJECTION PRN
Status: CANCELLED | OUTPATIENT
Start: 2025-05-19

## 2025-05-06 RX ORDER — LOPERAMIDE HYDROCHLORIDE 2 MG/1
2 CAPSULE ORAL
Status: CANCELLED | OUTPATIENT
Start: 2025-05-12

## 2025-05-06 RX ORDER — 0.9 % SODIUM CHLORIDE 0.9 %
VIAL (ML) INJECTION PRN
Status: CANCELLED | OUTPATIENT
Start: 2025-05-12

## 2025-05-06 RX ORDER — LOPERAMIDE HYDROCHLORIDE 2 MG/1
4 CAPSULE ORAL PRN
Status: CANCELLED | OUTPATIENT
Start: 2025-05-12

## 2025-05-06 RX ORDER — DIPHENHYDRAMINE HYDROCHLORIDE 50 MG/ML
50 INJECTION, SOLUTION INTRAMUSCULAR; INTRAVENOUS PRN
Status: CANCELLED | OUTPATIENT
Start: 2025-05-19

## 2025-05-06 RX ORDER — 0.9 % SODIUM CHLORIDE 0.9 %
10 VIAL (ML) INJECTION PRN
Status: CANCELLED | OUTPATIENT
Start: 2025-05-19

## 2025-05-06 RX ORDER — ACETAMINOPHEN 325 MG/1
650 TABLET ORAL ONCE
Status: CANCELLED | OUTPATIENT
Start: 2025-05-12

## 2025-05-06 RX ORDER — ACETAMINOPHEN 325 MG/1
650 TABLET ORAL ONCE
Status: CANCELLED | OUTPATIENT
Start: 2025-05-19

## 2025-05-06 RX ORDER — DIPHENHYDRAMINE HYDROCHLORIDE 50 MG/ML
50 INJECTION, SOLUTION INTRAMUSCULAR; INTRAVENOUS PRN
Status: CANCELLED | OUTPATIENT
Start: 2025-05-12

## 2025-05-06 RX ORDER — PROCHLORPERAZINE MALEATE 10 MG
10 TABLET ORAL EVERY 6 HOURS PRN
Status: CANCELLED | OUTPATIENT
Start: 2025-05-12

## 2025-05-06 RX ORDER — 0.9 % SODIUM CHLORIDE 0.9 %
3 VIAL (ML) INJECTION PRN
Status: CANCELLED | OUTPATIENT
Start: 2025-05-12

## 2025-05-06 RX ORDER — DIPHENHYDRAMINE HYDROCHLORIDE 50 MG/ML
25 INJECTION, SOLUTION INTRAMUSCULAR; INTRAVENOUS ONCE
Status: CANCELLED | OUTPATIENT
Start: 2025-05-19 | End: 2025-05-19

## 2025-05-06 RX ORDER — PALONOSETRON 0.05 MG/ML
0.25 INJECTION, SOLUTION INTRAVENOUS ONCE
Status: CANCELLED | OUTPATIENT
Start: 2025-05-12 | End: 2025-05-12

## 2025-05-06 RX ORDER — DEXAMETHASONE SODIUM PHOSPHATE 4 MG/ML
12 INJECTION, SOLUTION INTRA-ARTICULAR; INTRALESIONAL; INTRAMUSCULAR; INTRAVENOUS; SOFT TISSUE ONCE
Status: CANCELLED | OUTPATIENT
Start: 2025-05-19 | End: 2025-05-19

## 2025-05-06 RX ORDER — ATROPINE SULFATE 1 MG/ML
0.5 INJECTION, SOLUTION INTRAVENOUS PRN
Status: CANCELLED | OUTPATIENT
Start: 2025-05-19

## 2025-05-06 RX ORDER — 0.9 % SODIUM CHLORIDE 0.9 %
10 VIAL (ML) INJECTION PRN
Status: CANCELLED | OUTPATIENT
Start: 2025-05-12

## 2025-05-06 RX ORDER — 0.9 % SODIUM CHLORIDE 0.9 %
10 VIAL (ML) INJECTION PRN
Status: CANCELLED | OUTPATIENT
Start: 2025-05-11

## 2025-05-06 RX ORDER — SODIUM CHLORIDE 9 MG/ML
INJECTION, SOLUTION INTRAVENOUS CONTINUOUS
Status: CANCELLED | OUTPATIENT
Start: 2025-05-19

## 2025-05-06 RX ORDER — SODIUM CHLORIDE 9 MG/ML
INJECTION, SOLUTION INTRAVENOUS CONTINUOUS
Status: CANCELLED | OUTPATIENT
Start: 2025-05-12

## 2025-05-06 RX ORDER — METHYLPREDNISOLONE SODIUM SUCCINATE 125 MG/2ML
125 INJECTION, POWDER, LYOPHILIZED, FOR SOLUTION INTRAMUSCULAR; INTRAVENOUS PRN
Status: CANCELLED | OUTPATIENT
Start: 2025-05-12

## 2025-05-06 RX ORDER — LOPERAMIDE HYDROCHLORIDE 2 MG/1
2 CAPSULE ORAL
Status: CANCELLED | OUTPATIENT
Start: 2025-05-19

## 2025-05-06 RX ORDER — ONDANSETRON 8 MG/1
8 TABLET, ORALLY DISINTEGRATING ORAL PRN
Status: CANCELLED | OUTPATIENT
Start: 2025-05-19

## 2025-05-06 RX ORDER — 0.9 % SODIUM CHLORIDE 0.9 %
VIAL (ML) INJECTION PRN
Status: CANCELLED | OUTPATIENT
Start: 2025-05-11

## 2025-05-06 RX ORDER — ONDANSETRON 8 MG/1
8 TABLET, ORALLY DISINTEGRATING ORAL PRN
Status: CANCELLED | OUTPATIENT
Start: 2025-05-12

## 2025-05-06 RX ORDER — LOPERAMIDE HYDROCHLORIDE 2 MG/1
4 CAPSULE ORAL PRN
Status: CANCELLED | OUTPATIENT
Start: 2025-05-19

## 2025-05-06 RX ORDER — 0.9 % SODIUM CHLORIDE 0.9 %
3 VIAL (ML) INJECTION PRN
Status: CANCELLED | OUTPATIENT
Start: 2025-05-19

## 2025-05-06 RX ORDER — DIPHENHYDRAMINE HYDROCHLORIDE 50 MG/ML
25 INJECTION, SOLUTION INTRAMUSCULAR; INTRAVENOUS ONCE
Status: CANCELLED | OUTPATIENT
Start: 2025-05-12 | End: 2025-05-12

## 2025-05-06 RX ORDER — ATROPINE SULFATE 1 MG/ML
0.5 INJECTION, SOLUTION INTRAVENOUS PRN
Status: CANCELLED | OUTPATIENT
Start: 2025-05-12

## 2025-05-06 RX ORDER — ONDANSETRON 2 MG/ML
4 INJECTION INTRAMUSCULAR; INTRAVENOUS PRN
Status: CANCELLED | OUTPATIENT
Start: 2025-05-19

## 2025-05-06 RX ORDER — EPINEPHRINE 1 MG/ML(1)
0.5 AMPUL (ML) INJECTION PRN
Status: CANCELLED | OUTPATIENT
Start: 2025-05-19

## 2025-05-06 RX ORDER — METHYLPREDNISOLONE SODIUM SUCCINATE 125 MG/2ML
125 INJECTION, POWDER, LYOPHILIZED, FOR SOLUTION INTRAMUSCULAR; INTRAVENOUS PRN
Status: CANCELLED | OUTPATIENT
Start: 2025-05-19

## 2025-05-06 RX ORDER — 0.9 % SODIUM CHLORIDE 0.9 %
3 VIAL (ML) INJECTION PRN
Status: CANCELLED | OUTPATIENT
Start: 2025-05-11

## 2025-05-06 RX ORDER — PALONOSETRON 0.05 MG/ML
0.25 INJECTION, SOLUTION INTRAVENOUS ONCE
Status: CANCELLED | OUTPATIENT
Start: 2025-05-19 | End: 2025-05-19

## 2025-05-06 RX ADMIN — IOHEXOL 100 ML: 350 INJECTION, SOLUTION INTRAVENOUS at 09:39

## 2025-05-06 RX ADMIN — IOHEXOL 25 ML: 240 INJECTION, SOLUTION INTRATHECAL; INTRAVASCULAR; INTRAVENOUS; ORAL at 09:39

## 2025-05-07 DIAGNOSIS — G89.29 CHRONIC MIDLINE LOW BACK PAIN WITH RIGHT-SIDED SCIATICA: ICD-10-CM

## 2025-05-07 DIAGNOSIS — M54.41 CHRONIC MIDLINE LOW BACK PAIN WITH RIGHT-SIDED SCIATICA: ICD-10-CM

## 2025-05-07 RX ORDER — LIDOCAINE 50 MG/G
1 PATCH TOPICAL EVERY 24 HOURS
Qty: 30 PATCH | Refills: 0 | Status: SHIPPED | OUTPATIENT
Start: 2025-05-07

## 2025-05-07 NOTE — TELEPHONE ENCOUNTER
Drug interaction between Lorazepam and Zolpidem.  Per [facility], Rx for Lorazepam was only for 2 pills but insurance is kicking it back for drug interaction   Received request via: Pharmacy    Was the patient seen in the last year in this department? Yes    Does the patient have an active prescription (recently filled or refills available) for medication(s) requested? No    [facility]    Does the patient have long-term Plus and need 100-day supply? (This applies to ALL medications) Patient does not have SCP

## 2025-05-08 ENCOUNTER — HOSPITAL ENCOUNTER (OUTPATIENT)
Dept: HEMATOLOGY ONCOLOGY | Facility: MEDICAL CENTER | Age: 60
End: 2025-05-08
Attending: INTERNAL MEDICINE
Payer: MEDICARE

## 2025-05-08 VITALS
HEART RATE: 89 BPM | BODY MASS INDEX: 36.59 KG/M2 | HEIGHT: 62 IN | TEMPERATURE: 98 F | WEIGHT: 198.85 LBS | OXYGEN SATURATION: 92 % | DIASTOLIC BLOOD PRESSURE: 66 MMHG | SYSTOLIC BLOOD PRESSURE: 102 MMHG

## 2025-05-08 DIAGNOSIS — Z17.0 CARCINOMA OF UPPER-OUTER QUADRANT OF LEFT BREAST IN FEMALE, ESTROGEN RECEPTOR POSITIVE (HCC): ICD-10-CM

## 2025-05-08 DIAGNOSIS — C50.412 CARCINOMA OF UPPER-OUTER QUADRANT OF LEFT BREAST IN FEMALE, ESTROGEN RECEPTOR POSITIVE (HCC): ICD-10-CM

## 2025-05-08 PROCEDURE — 99212 OFFICE O/P EST SF 10 MIN: CPT | Performed by: INTERNAL MEDICINE

## 2025-05-08 PROCEDURE — 99214 OFFICE O/P EST MOD 30 MIN: CPT | Performed by: INTERNAL MEDICINE

## 2025-05-08 ASSESSMENT — ENCOUNTER SYMPTOMS
DIARRHEA: 0
VOMITING: 0
NAUSEA: 0
FEVER: 0
CONSTIPATION: 0
WEIGHT LOSS: 0
PALPITATIONS: 0
CHILLS: 0
COUGH: 0
SHORTNESS OF BREATH: 1
SPUTUM PRODUCTION: 0

## 2025-05-08 ASSESSMENT — PAIN SCALES - GENERAL: PAINLEVEL_OUTOF10: NO PAIN

## 2025-05-08 ASSESSMENT — FIBROSIS 4 INDEX: FIB4 SCORE: 0.95

## 2025-05-08 NOTE — ADDENDUM NOTE
Encounter addended by: Erich Cardoso, Med Ass't on: 5/8/2025 1:54 PM   Actions taken: Charge Capture section accepted

## 2025-05-08 NOTE — PROGRESS NOTES
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 persistent elevated CA 27-29.  Progressive disease with extensive peritoneal carcinomatosis documented 12/27/2024 at laparoscopy.  On capecitabine with progression of disease.  Now on sacituzumab govitecan March 2025 with good tolerance and evidence of response clinically.  7.  Longstanding polycythemia dating back at least 5-year.  Status post phlebotomy with genetic work-up negative for myeloproliferative disorder.  Likely secondary to chronic hypoxemia.  She had 1 phlebotomy required and after that her hemoglobin was back to normal at 15.2.  We will continue to monitor this.  Slightly higher but no need yet for repeat phlebotomy.  Stable to improved  8.  Somatic genomic profile negative for actionable genes.  ESR 1 negative T p53 detected  9.  Marked distention of the abdomen occurring over the last couple of weeks.  Onset of increased volume of ascites now requiring additional tap, last completed 3/6/25. Scheduled for weekly procedures for now.  Volume seem to be decreasing.  Much improved as of May 2025      Plan:  She will proceed with cycle 4 of Sacituzumab.  We will discontinue G-CSF for this cycle and see what her counts do.  Will see her back in 3 weeks for routine follow-up.  Please note that this dictation was created using voice recognition software. I have made every reasonable attempt to correct obvious  errors, but I expect that there are errors of grammar and possibly content that I did not discover before finalizing the note.

## 2025-05-09 NOTE — PROGRESS NOTES
"Ordering Provider: Dr. Kline    Pharmacy Chemotherapy Verification Note:    Patient Name: Sol Kern      Dx: Breast Cancer      Protocol: Sacituzumab Govitecan       *Dosing Reference*  Sacituzumab govitecan-hziy 10 mg/kg IV on Days 1 and 8  21-day cycle until disease progression or unacceptable toxicity     1. NCCNGuidelines® for Breast Cancer V.2.2024.   2. Staci FITZPATRICK , et al. N Engl J Med. 2019;380(8):741-751.a   3. Andre et al. J Clin Oncol. 2022;40(17_suppl):DZM6296.a    Allergies:  Codeine, Hydrocodone, and Other drug     /73   Pulse 86   Temp 35.9 °C (96.6 °F) (Temporal)   Resp 18   Ht 1.6 m (5' 2.99\")   Wt 91.2 kg (201 lb 1 oz)   SpO2 92%   BMI 35.63 kg/m²  Body surface area is 2.01 meters squared.    Labs 5/12/25:  ANC~ 5650 Plt = 255 k   Hgb = 13.2     SCr = 0.64 mg/dL CrCl ~ 120 mL/min (min SCr 0.7 used)  AST/ALT/AP = 27/23/155 TBili = 0.4   K+ = 3.9    Drug Order   (Drug name, dose, route, IV Fluid & volume, frequency, number of doses) Cycle: 4 Day 1      Previous treatment: C3D8 on 4/28/25; s/p Capecitabine x 2C, Ribociclib + Faslodex     Medication = Sacituzumab govitecan  Base Dose = 10 mg/kg  Calc Dose: Base Dose x 91.2 kg = 912 mg  Final Dose = 900 mg  Route = IV  Fluid & Volume =  mL  Admin Duration = Over 1-2 hours          <10% difference, okay to treat with final dose       By my signature below, I confirm this process was performed independently with the BSA and all final chemotherapy dosing calculations congruent. I have reviewed the above chemotherapy order and that my calculation of the final dose and BSA (when applicable) corroborate those calculations of the  pharmacist.     Susy Talley, PharmD    "

## 2025-05-12 ENCOUNTER — DOCUMENTATION (OUTPATIENT)
Dept: ONCOLOGY | Facility: MEDICAL CENTER | Age: 60
End: 2025-05-12
Payer: MEDICARE

## 2025-05-12 ENCOUNTER — OUTPATIENT INFUSION SERVICES (OUTPATIENT)
Dept: ONCOLOGY | Facility: MEDICAL CENTER | Age: 60
End: 2025-05-12
Attending: INTERNAL MEDICINE
Payer: MEDICARE

## 2025-05-12 VITALS
WEIGHT: 201.06 LBS | TEMPERATURE: 96.6 F | BODY MASS INDEX: 35.62 KG/M2 | HEIGHT: 63 IN | OXYGEN SATURATION: 92 % | DIASTOLIC BLOOD PRESSURE: 73 MMHG | SYSTOLIC BLOOD PRESSURE: 137 MMHG | HEART RATE: 86 BPM | RESPIRATION RATE: 18 BRPM

## 2025-05-12 DIAGNOSIS — E11.40 TYPE 2 DIABETES MELLITUS WITH DIABETIC NEUROPATHY, WITHOUT LONG-TERM CURRENT USE OF INSULIN (HCC): ICD-10-CM

## 2025-05-12 DIAGNOSIS — C50.912 BREAST CANCER, STAGE 4, LEFT (HCC): ICD-10-CM

## 2025-05-12 LAB
ALBUMIN SERPL BCP-MCNC: 3.7 G/DL (ref 3.2–4.9)
ALBUMIN/GLOB SERPL: 1.2 G/DL
ALP SERPL-CCNC: 155 U/L (ref 30–99)
ALT SERPL-CCNC: 23 U/L (ref 2–50)
ANION GAP SERPL CALC-SCNC: 9 MMOL/L (ref 7–16)
AST SERPL-CCNC: 27 U/L (ref 12–45)
BASOPHILS # BLD AUTO: 0.8 % (ref 0–1.8)
BASOPHILS # BLD: 0.06 K/UL (ref 0–0.12)
BILIRUB SERPL-MCNC: 0.4 MG/DL (ref 0.1–1.5)
BUN SERPL-MCNC: 8 MG/DL (ref 8–22)
CALCIUM ALBUM COR SERPL-MCNC: 10 MG/DL (ref 8.5–10.5)
CALCIUM SERPL-MCNC: 9.8 MG/DL (ref 8.5–10.5)
CHLORIDE SERPL-SCNC: 103 MMOL/L (ref 96–112)
CHOLEST SERPL-MCNC: 195 MG/DL (ref 100–199)
CO2 SERPL-SCNC: 25 MMOL/L (ref 20–33)
CREAT SERPL-MCNC: 0.64 MG/DL (ref 0.5–1.4)
EOSINOPHIL # BLD AUTO: 0.15 K/UL (ref 0–0.51)
EOSINOPHIL NFR BLD: 2 % (ref 0–6.9)
ERYTHROCYTE [DISTWIDTH] IN BLOOD BY AUTOMATED COUNT: 58.3 FL (ref 35.9–50)
GFR SERPLBLD CREATININE-BSD FMLA CKD-EPI: 100 ML/MIN/1.73 M 2
GLOBULIN SER CALC-MCNC: 3.1 G/DL (ref 1.9–3.5)
GLUCOSE SERPL-MCNC: 169 MG/DL (ref 65–99)
HCT VFR BLD AUTO: 40.2 % (ref 37–47)
HDLC SERPL-MCNC: 39 MG/DL
HGB BLD-MCNC: 13.2 G/DL (ref 12–16)
IMM GRANULOCYTES # BLD AUTO: 0.09 K/UL (ref 0–0.11)
IMM GRANULOCYTES NFR BLD AUTO: 1.2 % (ref 0–0.9)
LDLC SERPL CALC-MCNC: 121 MG/DL
LYMPHOCYTES # BLD AUTO: 0.65 K/UL (ref 1–4.8)
LYMPHOCYTES NFR BLD: 8.8 % (ref 22–41)
MCH RBC QN AUTO: 31.4 PG (ref 27–33)
MCHC RBC AUTO-ENTMCNC: 32.8 G/DL (ref 32.2–35.5)
MCV RBC AUTO: 95.7 FL (ref 81.4–97.8)
MONOCYTES # BLD AUTO: 0.81 K/UL (ref 0–0.85)
MONOCYTES NFR BLD AUTO: 10.9 % (ref 0–13.4)
NEUTROPHILS # BLD AUTO: 5.65 K/UL (ref 1.82–7.42)
NEUTROPHILS NFR BLD: 76.3 % (ref 44–72)
NRBC # BLD AUTO: 0 K/UL
NRBC BLD-RTO: 0 /100 WBC (ref 0–0.2)
OUTPT INFUS CBC COMMENT OICOM: ABNORMAL
PLATELET # BLD AUTO: 255 K/UL (ref 164–446)
PMV BLD AUTO: 9.4 FL (ref 9–12.9)
POTASSIUM SERPL-SCNC: 3.9 MMOL/L (ref 3.6–5.5)
PROT SERPL-MCNC: 6.8 G/DL (ref 6–8.2)
RBC # BLD AUTO: 4.2 M/UL (ref 4.2–5.4)
SODIUM SERPL-SCNC: 137 MMOL/L (ref 135–145)
TRIGL SERPL-MCNC: 176 MG/DL (ref 0–149)
WBC # BLD AUTO: 7.4 K/UL (ref 4.8–10.8)

## 2025-05-12 PROCEDURE — 80053 COMPREHEN METABOLIC PANEL: CPT

## 2025-05-12 PROCEDURE — 80061 LIPID PANEL: CPT

## 2025-05-12 PROCEDURE — 96415 CHEMO IV INFUSION ADDL HR: CPT

## 2025-05-12 PROCEDURE — 96413 CHEMO IV INFUSION 1 HR: CPT

## 2025-05-12 PROCEDURE — 700102 HCHG RX REV CODE 250 W/ 637 OVERRIDE(OP): Performed by: NURSE PRACTITIONER

## 2025-05-12 PROCEDURE — 700111 HCHG RX REV CODE 636 W/ 250 OVERRIDE (IP): Performed by: NURSE PRACTITIONER

## 2025-05-12 PROCEDURE — 96367 TX/PROPH/DG ADDL SEQ IV INF: CPT

## 2025-05-12 PROCEDURE — A9270 NON-COVERED ITEM OR SERVICE: HCPCS | Performed by: NURSE PRACTITIONER

## 2025-05-12 PROCEDURE — 96375 TX/PRO/DX INJ NEW DRUG ADDON: CPT

## 2025-05-12 PROCEDURE — 85025 COMPLETE CBC W/AUTO DIFF WBC: CPT

## 2025-05-12 PROCEDURE — 700105 HCHG RX REV CODE 258: Performed by: NURSE PRACTITIONER

## 2025-05-12 RX ORDER — SODIUM CHLORIDE 9 MG/ML
INJECTION, SOLUTION INTRAVENOUS CONTINUOUS
Status: DISCONTINUED | OUTPATIENT
Start: 2025-05-12 | End: 2025-05-12 | Stop reason: HOSPADM

## 2025-05-12 RX ORDER — ACETAMINOPHEN 325 MG/1
650 TABLET ORAL ONCE
Status: COMPLETED | OUTPATIENT
Start: 2025-05-12 | End: 2025-05-12

## 2025-05-12 RX ORDER — DIPHENHYDRAMINE HYDROCHLORIDE 50 MG/ML
25 INJECTION, SOLUTION INTRAMUSCULAR; INTRAVENOUS ONCE
Status: COMPLETED | OUTPATIENT
Start: 2025-05-12 | End: 2025-05-12

## 2025-05-12 RX ORDER — PALONOSETRON 0.05 MG/ML
0.25 INJECTION, SOLUTION INTRAVENOUS ONCE
Status: COMPLETED | OUTPATIENT
Start: 2025-05-12 | End: 2025-05-12

## 2025-05-12 RX ORDER — DEXAMETHASONE SODIUM PHOSPHATE 4 MG/ML
12 INJECTION, SOLUTION INTRA-ARTICULAR; INTRALESIONAL; INTRAMUSCULAR; INTRAVENOUS; SOFT TISSUE ONCE
Status: COMPLETED | OUTPATIENT
Start: 2025-05-12 | End: 2025-05-12

## 2025-05-12 RX ADMIN — FAMOTIDINE 20 MG: 10 INJECTION INTRAVENOUS at 09:15

## 2025-05-12 RX ADMIN — ACETAMINOPHEN 650 MG: 325 TABLET ORAL at 09:15

## 2025-05-12 RX ADMIN — PALONOSETRON HYDROCHLORIDE 0.25 MG: 0.25 INJECTION INTRAVENOUS at 09:14

## 2025-05-12 RX ADMIN — SACITUZUMAB GOVITECAN 900 MG: 180 POWDER, FOR SOLUTION INTRAVENOUS at 10:32

## 2025-05-12 RX ADMIN — FOSAPREPITANT 150 MG: 150 INJECTION, POWDER, LYOPHILIZED, FOR SOLUTION INTRAVENOUS at 09:33

## 2025-05-12 RX ADMIN — DIPHENHYDRAMINE HYDROCHLORIDE 25 MG: 50 INJECTION INTRAMUSCULAR; INTRAVENOUS at 09:14

## 2025-05-12 RX ADMIN — DEXAMETHASONE SODIUM PHOSPHATE 12 MG: 4 INJECTION INTRA-ARTICULAR; INTRALESIONAL; INTRAMUSCULAR; INTRAVENOUS; SOFT TISSUE at 09:15

## 2025-05-12 RX ADMIN — SODIUM CHLORIDE: 9 INJECTION, SOLUTION INTRAVENOUS at 09:15

## 2025-05-12 ASSESSMENT — FIBROSIS 4 INDEX: FIB4 SCORE: 0.95

## 2025-05-12 NOTE — PROGRESS NOTES
Chemotherapy Verification - PRIMARY RN      Height = 1.6m  Weight = 91.2kg  BSA = 2.01m2       Medication: sacituzumab govitecan  Dose: 10 mg/kg  Calculated Dose: 912 mg                             (In mg/m2, AUC, mg/kg)         I confirm this process was performed independently with the BSA and all final chemotherapy dosing calculations congruent.  Any discrepancies of 10% or greater have been addressed with the chemotherapy pharmacist. The resolution of the discrepancy has been documented in the EPIC progress notes.

## 2025-05-12 NOTE — PROGRESS NOTES
Nutrition Services: Brief Update    Sol Kern is a 60 y.o. female with diagnosis of Breast cancer, stage 4, left     Wt Readings from Last 7 Encounters:   05/12/25 91.2 kg (201 lb 1 oz)   05/08/25 90.2 kg (198 lb 13.7 oz)   04/29/25 90.4 kg (199 lb 4.7 oz)   04/28/25 89 kg (196 lb 3.4 oz)   04/23/25 93.2 kg (205 lb 6.4 oz)   04/22/25 92.1 kg (203 lb)   04/21/25 92 kg (202 lb 13.2 oz)     Weight Change: wt remaining stable     Weight remaining desirably stable. No nutrition needs requested or indicated at this time as a result of weight stability. RD will remain available PRN. Pt previously provided RD information at prior visits for patient to reach out as desired. Please re-consult RD as needed per pt preferences or if nutrition status changes.      Please contact [phone]          [facility]   Physical Therapy Treatment Note/Discharge Summary     Name: Renee Vargas  Clinic Number: [id number]  Visit Date: 4/25/2023  Therapy Diagnosis: Back Pain   Physician: Yamileth Castrejon FNP     Physician Orders: PT Eval and Treat   Medical Diagnosis from Referral: Thoracic and Low Back Pain  Evaluation Date: 3/10/2023  Authorization Period Expiration: 2/27/2024  Plan of Care Expiration: 5/5/2023    Visit # / Visits authorized: 7/ 20  PTA Visit #: 3/5     Time In: 4:50 pm  Time Out: 5:25 pm  Total Billable Time:  38  minutes    SUBJECTIVE     Pt reports: Pain around the same    She was compliant with home exercise program.  Response to previous treatment: slight muscle soreness after the last treatment session   Functional change: None    Pain: 4/10  Location: bilateral back      Prior Level of Function: Able to work and move without pain   Current Level of Function: Pain with mobility     OBJECTIVE     Objective Measures updated at progress report unless specified.     Treatment     Renee received the treatments listed below:      therapeutic exercises to develop strength, endurance, ROM, flexibility, and posture for 25 minutes including:     Bike/NuStep  5 minutes bike        Calf Stretch  on slantboard 3 x 20 second hold    Hamstring Stretch  3x20 seconds (bilateral)   Swiss ball rollouts forward left and right  10 x each 5 second hold    Cybex rows hi / lo  3 plates 30x each   Cybex Lat Pull Down  2 plates 30 x each   Cybex Back Extension  3 plates 30 x each   Cybex hip abduction bilateral  2 x 10 each 1 plate   Cybex abdominal  1 plate 2 x 10   Cybex Knee extension  3 plates 3 x 10    Cybex Leg Press 6 plates 3 x 10        manual therapy techniques: Joint mobilizations, Manual traction, and Myofacial release were applied to the: hips/back for 0 minutes, including:  DKTC stretch  LTR manual OP   LLD assessment     neuromuscular re-education activities to  improve: Balance, Coordination, Kinesthetic, and Posture for 15 minutes. The following activities were included:         therapeutic activities to improve functional performance for 0  minutes, including:    Patient Education and Home Exercises     Home Exercises Provided and Patient Education Provided     Education provided:   - educated Patient on correct form and mechanics with exercises and reiterated importance of home exercise program.     Written Home Exercises Provided: . Exercises were reviewed and Renee was able to demonstrate them prior to the end of the session.  Renee demonstrated fair  understanding of the education provided. See EMR under Patient Instructions for exercises provided 3/21/2023.    ASSESSMENT   Supervisory visit with Debby Matta DPT PT prior to initial PTA visit.   Patient tolerated the addition of all exercises with no complaints of pain. Patient tolerated new strengthening interventions well . Patient is progressing well. Continue to progress patient's POC as tolerated.       PMH:  Renee is a 32 y.o. female referred to outpatient Physical Therapy with a medical diagnosis of Low Back and T Spine Pain. Renee reports: She has had low back pain for years. The upper back pain started February 11, 2023. She was brushing her teeth and a sharp pain hit between her shoulder blades that caused her to hit her knees. She reports intense pain for approx 1 hour. The pain began to ease up a little after that. She reports this happens off and on and is completely random. At times she can just be sitting still and it will happen. The low back pain is constant but the Thoracic pain is intermittent. Her low back pain worsens with lying down and she has trouble rolling over. The low back pain is a little better when she is up and moving.   Patient presents with Poor Core, Upper Extremity, and Lower Extremity Strength. Patient appears to have lax ligaments and is double jointed in several joints.  Feel this is contributing to her pain and instability. SI was assessed as most of her Low Back pain seems to be centered here. It appears she has an Anterior Rotation on the Right. Right PSIS is more painful and is hypomobile. Right leg was slightly longer in supine. Attempted an MET but was unsuccessful. Will continue to assess. During the T Spine assessment, Patient presents with forward head and rounded shoulders. Shoulder Strength is grossly 4-/5. Tone and spasms were noted in Upper Traps and Rhomboids. Patient is generally weak overall. The weak muscles and the lax ligaments together are causing instability of her joints. She will require an overall strengthening program to help stabilize her joints.       Renee Is progressing towards her goals.   Pt prognosis is Good.     Pt will continue to benefit from skilled outpatient physical therapy to address the deficits listed in the problem list box on initial evaluation, provide pt/family education and to maximize pt's level of independence in the home and community environment.     Pt's spiritual, cultural and educational needs considered and pt agreeable to plan of care and goals.     Anticipated barriers to physical therapy: Chronic low back issue    Goals: Short Term Goals: 4 weeks   1. Patient will be Independent with Home Exercise Program   2. Patient will demonstrate with improved Posture and Body Mechanics  3. Patient will increase Lumbosacral Range of Motion by 25%   4. Patient will increase Lower Extremity, Upper Extremity, and Core Strength to grossly 4+/5  5. Patient will decrease complaints of pain with activity to Less than or Equal to 5/10      Long Term Goals: 8 weeks   1. Patient will tolerate 30 minutes of activity with complaints of Pain at Less Than or Equal to 4/10         PLAN     Plan of care Certification: 3/10/2023 to 5/5/2023.     Outpatient Physical Therapy 2 times weekly for 8 weeks to include the following interventions:  Cervical/Lumbar Traction, Electrical Stimulation to decrease pain and inflammation as needed, Manual Therapy, Moist Heat/ Ice, Neuromuscular Re-ed, Patient Education, Therapeutic Activities, and Therapeutic Exercise.     Jayjay Funes, PT  04/25/2023       Patient did not return to Therapy following this treatment on 4/25/2023. Therefore patient is discharged from Physical Therapy services at current level of function as listed above.     MARCO GOLDEN, PT, DPT

## 2025-05-12 NOTE — PROGRESS NOTES
Sol presents to infusion for cycle 4/ day 1 of Trodelvy for breast cancer. Pt denies having any new or acute complaints today, reports tolerating past treatments well, has more energy.     PIV started with positive return and labs drawn off of IV start   Labs reviewed by RN, within parameters for treatment today.     Pre-treatment meds given as ordered.     Trodelvy given over 90 minutes, with 30 minutes post observation.    Patient tolerated all well with no adverse effects.     Per MD, skipping this cycle's neulasta to see how she does without it. Sol understands when to call the doctor or to present to the ED.    PIV flushed post infusion with positive blood return, d/aron with tip intact. Patient left in stable condition, knows when to return for her next appt.

## 2025-05-12 NOTE — PROGRESS NOTES
"Ordering Provider: Dr. Kline    Pharmacy Chemotherapy Verification Note:    Patient Name: Sol Kern      Dx: Breast Cancer      Protocol: Sacituzumab Govitecan       *Dosing Reference*  Sacituzumab govitecan-hziy 10 mg/kg IV on Days 1 and 8  21-day cycle until disease progression or unacceptable toxicity     1. NCCNGuidelines® for Breast Cancer V.2.2024.   2. Staci FITZPATRICK , et al. N Engl J Med. 2019;380(8):741-751.a   3. Andre et al. J Clin Oncol. 2022;40(17_suppl):OJX7797.a    Allergies:  Codeine, Hydrocodone, and Other drug     BP (!) 145/83   Pulse 80   Temp 36.1 °C (97 °F) (Temporal)   Resp 18   Ht 1.6 m (5' 2.99\")   Wt 91.9 kg (202 lb 9.6 oz)   SpO2 93%   BMI 35.90 kg/m²  Body surface area is 2.02 meters squared.    Labs 5/19/25:  ANC~ 4070 Plt = 374 k   Hgb = 12.9       Labs 5/12/25:     SCr = 0.64 mg/dL CrCl ~ 120 mL/min (min SCr 0.7 used)  AST/ALT/AP = 27/23/155 TBili = 0.4   K+ = 3.9    Drug Order   (Drug name, dose, route, IV Fluid & volume, frequency, number of doses) Cycle: 4 Day 8  Previous treatment: C4D1 on 5/12/25; s/p Capecitabine x 2C, Ribociclib + Faslodex     Medication = Sacituzumab govitecan  Base Dose = 10 mg/kg  Calc Dose: Base Dose x 91.9 kg = 919 mg  Final Dose = 900 mg  Route = IV  Fluid & Volume =  mL  Admin Duration = Over 1-2 hours          <10% difference, okay to treat with final dose       By my signature below, I confirm this process was performed independently with the BSA and all final chemotherapy dosing calculations congruent. I have reviewed the above chemotherapy order and that my calculation of the final dose and BSA (when applicable) corroborate those calculations of the  pharmacist.     Susy Talley, PharmD    "

## 2025-05-12 NOTE — PROGRESS NOTES
Chemotherapy Verification - PRIMARY RN      Height = 1.6m   Weight = 91.2kg  BSA = 2.01m2       Medication: sacituzumab govitecan  Dose: 10mg/kg  Calculated Dose: 912 mg                             (In mg/m2, AUC, mg/kg)         I confirm this process was performed independently with the BSA and all final chemotherapy dosing calculations congruent.  Any discrepancies of 10% or greater have been addressed with the chemotherapy pharmacist. The resolution of the discrepancy has been documented in the EPIC progress notes.

## 2025-05-13 ENCOUNTER — APPOINTMENT (OUTPATIENT)
Dept: SLEEP MEDICINE | Facility: MEDICAL CENTER | Age: 60
End: 2025-05-13
Payer: MEDICARE

## 2025-05-18 NOTE — PROGRESS NOTES
"Pharmacy Chemotherapy calculation:    DX: breast cancer    Cycle 4, Day 8  Previous treatment = C4D1 5/12/25    Regimen: sacituzumab govitecan-hziy  sacituzumab govitecan-hziy 10mg/kg IV on days 1 and 8   q21 days until Dp/UT   NCCN Guidelines for Breast Cancer V.2.2024  shayy Beltran - N Engl J Med. 2019 Feb 21;380(5):741-075.   Andre et al - Journal of Clinical OncologyVolume 40, Number 17_suppl     BP (!) 145/83   Pulse 80   Temp 36.1 °C (97 °F) (Temporal)   Resp 18   Ht 1.6 m (5' 2.99\")   Wt 91.9 kg (202 lb 9.6 oz)   SpO2 93%   BMI 35.90 kg/m²   Body surface area is 2.02 meters squared.    Labs 5/19/25 :  ANC = ~ 4070  Plt= 374k   Hgb= 12.9        Xgeva plan in [place], continue to hold for C4 as per [place]    Sacituzumab govitecan-hziy 10 mg/kg x 91.9 kg = 919 mg   <10% difference, ok to treat with final dose = 900 mg IV    Christine Thompson, PharmD  "

## 2025-05-19 ENCOUNTER — PATIENT OUTREACH (OUTPATIENT)
Dept: HEALTH INFORMATION MANAGEMENT | Facility: OTHER | Age: 60
End: 2025-05-19

## 2025-05-19 ENCOUNTER — OUTPATIENT INFUSION SERVICES (OUTPATIENT)
Dept: ONCOLOGY | Facility: MEDICAL CENTER | Age: 60
End: 2025-05-19
Attending: INTERNAL MEDICINE
Payer: MEDICARE

## 2025-05-19 VITALS
RESPIRATION RATE: 18 BRPM | HEART RATE: 80 BPM | SYSTOLIC BLOOD PRESSURE: 145 MMHG | HEIGHT: 63 IN | BODY MASS INDEX: 35.9 KG/M2 | WEIGHT: 202.6 LBS | OXYGEN SATURATION: 93 % | TEMPERATURE: 97 F | DIASTOLIC BLOOD PRESSURE: 83 MMHG

## 2025-05-19 DIAGNOSIS — I10 ESSENTIAL HYPERTENSION: ICD-10-CM

## 2025-05-19 DIAGNOSIS — C50.912 BREAST CANCER, STAGE 4, LEFT (HCC): Primary | ICD-10-CM

## 2025-05-19 DIAGNOSIS — E11.40 TYPE 2 DIABETES MELLITUS WITH DIABETIC NEUROPATHY, WITHOUT LONG-TERM CURRENT USE OF INSULIN (HCC): Primary | ICD-10-CM

## 2025-05-19 DIAGNOSIS — J96.11 CHRONIC RESPIRATORY FAILURE WITH HYPOXIA (HCC): ICD-10-CM

## 2025-05-19 LAB
BASOPHILS # BLD AUTO: 0.7 % (ref 0–1.8)
BASOPHILS # BLD: 0.04 K/UL (ref 0–0.12)
EOSINOPHIL # BLD AUTO: 0.22 K/UL (ref 0–0.51)
EOSINOPHIL NFR BLD: 3.7 % (ref 0–6.9)
ERYTHROCYTE [DISTWIDTH] IN BLOOD BY AUTOMATED COUNT: 55.9 FL (ref 35.9–50)
HCT VFR BLD AUTO: 41 % (ref 37–47)
HGB BLD-MCNC: 12.9 G/DL (ref 12–16)
IMM GRANULOCYTES # BLD AUTO: 0.04 K/UL (ref 0–0.11)
IMM GRANULOCYTES NFR BLD AUTO: 0.7 % (ref 0–0.9)
LYMPHOCYTES # BLD AUTO: 0.79 K/UL (ref 1–4.8)
LYMPHOCYTES NFR BLD: 13.3 % (ref 22–41)
MCH RBC QN AUTO: 31 PG (ref 27–33)
MCHC RBC AUTO-ENTMCNC: 31.5 G/DL (ref 32.2–35.5)
MCV RBC AUTO: 98.6 FL (ref 81.4–97.8)
MONOCYTES # BLD AUTO: 0.8 K/UL (ref 0–0.85)
MONOCYTES NFR BLD AUTO: 13.4 % (ref 0–13.4)
NEUTROPHILS # BLD AUTO: 4.07 K/UL (ref 1.82–7.42)
NEUTROPHILS NFR BLD: 68.2 % (ref 44–72)
NRBC # BLD AUTO: 0 K/UL
NRBC BLD-RTO: 0 /100 WBC (ref 0–0.2)
OUTPT INFUS CBC COMMENT OICOM: ABNORMAL
PLATELET # BLD AUTO: 374 K/UL (ref 164–446)
PMV BLD AUTO: 8.8 FL (ref 9–12.9)
RBC # BLD AUTO: 4.16 M/UL (ref 4.2–5.4)
WBC # BLD AUTO: 6 K/UL (ref 4.8–10.8)

## 2025-05-19 PROCEDURE — A9270 NON-COVERED ITEM OR SERVICE: HCPCS | Performed by: NURSE PRACTITIONER

## 2025-05-19 PROCEDURE — 700102 HCHG RX REV CODE 250 W/ 637 OVERRIDE(OP): Performed by: NURSE PRACTITIONER

## 2025-05-19 PROCEDURE — 96415 CHEMO IV INFUSION ADDL HR: CPT

## 2025-05-19 PROCEDURE — 96367 TX/PROPH/DG ADDL SEQ IV INF: CPT

## 2025-05-19 PROCEDURE — 700105 HCHG RX REV CODE 258: Performed by: NURSE PRACTITIONER

## 2025-05-19 PROCEDURE — 85025 COMPLETE CBC W/AUTO DIFF WBC: CPT

## 2025-05-19 PROCEDURE — 96375 TX/PRO/DX INJ NEW DRUG ADDON: CPT

## 2025-05-19 PROCEDURE — 96413 CHEMO IV INFUSION 1 HR: CPT

## 2025-05-19 PROCEDURE — 700111 HCHG RX REV CODE 636 W/ 250 OVERRIDE (IP): Mod: JZ | Performed by: NURSE PRACTITIONER

## 2025-05-19 RX ORDER — PALONOSETRON 0.05 MG/ML
0.25 INJECTION, SOLUTION INTRAVENOUS ONCE
Status: COMPLETED | OUTPATIENT
Start: 2025-05-19 | End: 2025-05-19

## 2025-05-19 RX ORDER — DEXAMETHASONE SODIUM PHOSPHATE 4 MG/ML
12 INJECTION, SOLUTION INTRA-ARTICULAR; INTRALESIONAL; INTRAMUSCULAR; INTRAVENOUS; SOFT TISSUE ONCE
Status: COMPLETED | OUTPATIENT
Start: 2025-05-19 | End: 2025-05-19

## 2025-05-19 RX ORDER — ACETAMINOPHEN 325 MG/1
650 TABLET ORAL ONCE
Status: COMPLETED | OUTPATIENT
Start: 2025-05-19 | End: 2025-05-19

## 2025-05-19 RX ORDER — DIPHENHYDRAMINE HYDROCHLORIDE 50 MG/ML
25 INJECTION, SOLUTION INTRAMUSCULAR; INTRAVENOUS ONCE
Status: COMPLETED | OUTPATIENT
Start: 2025-05-19 | End: 2025-05-19

## 2025-05-19 RX ORDER — METFORMIN HYDROCHLORIDE 500 MG/1
1000 TABLET, EXTENDED RELEASE ORAL DAILY
Qty: 200 TABLET | Refills: 1 | Status: SHIPPED | OUTPATIENT
Start: 2025-05-19

## 2025-05-19 RX ORDER — SODIUM CHLORIDE 9 MG/ML
INJECTION, SOLUTION INTRAVENOUS CONTINUOUS
Status: DISCONTINUED | OUTPATIENT
Start: 2025-05-19 | End: 2025-05-19 | Stop reason: HOSPADM

## 2025-05-19 RX ADMIN — ACETAMINOPHEN 650 MG: 325 TABLET ORAL at 08:58

## 2025-05-19 RX ADMIN — FOSAPREPITANT 150 MG: 150 INJECTION, POWDER, LYOPHILIZED, FOR SOLUTION INTRAVENOUS at 09:13

## 2025-05-19 RX ADMIN — SACITUZUMAB GOVITECAN 900 MG: 180 POWDER, FOR SOLUTION INTRAVENOUS at 09:53

## 2025-05-19 RX ADMIN — SODIUM CHLORIDE: 9 INJECTION, SOLUTION INTRAVENOUS at 08:58

## 2025-05-19 RX ADMIN — PALONOSETRON HYDROCHLORIDE 0.25 MG: 0.25 INJECTION INTRAVENOUS at 09:03

## 2025-05-19 RX ADMIN — DEXAMETHASONE SODIUM PHOSPHATE 12 MG: 4 INJECTION INTRA-ARTICULAR; INTRALESIONAL; INTRAMUSCULAR; INTRAVENOUS; SOFT TISSUE at 09:07

## 2025-05-19 RX ADMIN — DIPHENHYDRAMINE HYDROCHLORIDE 25 MG: 50 INJECTION INTRAMUSCULAR; INTRAVENOUS at 09:11

## 2025-05-19 RX ADMIN — FAMOTIDINE 20 MG: 10 INJECTION INTRAVENOUS at 09:00

## 2025-05-19 ASSESSMENT — FIBROSIS 4 INDEX: FIB4 SCORE: 1.37

## 2025-05-19 NOTE — CARE PLAN
Problem: Knowledge of hypertension- Long Term  Goal: Demonstrate Knowledge of Hypertension  Outcome: Not Progressing  Note: The patient states understanding of hypertension and blood pressure readings. She is aware of the effects that prolonged hypertension might have.   Intervention: Define hypertension in lay terms  Intervention: Educate patient on understanding what the blood pressure reading means  Description: provide handout on understanding blood pressure readings  Intervention: Educate patient on harmful effects of high blood pressure

## 2025-05-19 NOTE — PROGRESS NOTES
Pt arrived ambulatory to \[facility]\"" for D8C4 Trodelvy treatment for breast cancer. POC discussed with pt and she agrees with plan.      PIV established, brisk blood return noted, labs drawn as ordered. Results reviewed, ok to proceed with treatment. Pt medicated per MAR.     Pre-meds given.  Trodelvy infused over 90 minutes.      Pt obs x 30 minutes post Trodelvy infusion. Pt tolerated treatment without s/s adverse reaction. PIV dc'd catheter tip intact, gauze and coban dressing applied.      Pt discharged to [facility] care, [facility]. Pt's next appointment confirmed 6/2/2025.

## 2025-05-19 NOTE — PROGRESS NOTES
Chemotherapy Verification - SECONDARY RN       Height = 1.6 m  Weight = 91.9  kg  BSA = 2.02 m2       Medication: Trodelvy  Dose:  10mg/kg Calculated Dose: 919 mg                             (In mg/m2, AUC, mg/kg)       I confirm that this process was performed independently.

## 2025-05-19 NOTE — PROGRESS NOTES
Reason for Follow-Up:    I spoke to the patient today by phone in order to complete her monthly PCM follow up.     Current Health Status:    The patient is followed by PCM based on diagnoses of HTN, DM2, and chronic respiratory failure.The patient's blood pressures have been elevated to the 130s and 140s over her last 3 clinic visits. She denies any related symptoms. She reports taking all medications as indicated. The patient reports restarting Ozempic and 1000 mg of Metformin once a day. Last A1C 7.1. She has been having significant loss of appetite on Chemo and has been instructed to eat whatever she can get herself to eat by Oncology. She is aware of her upcoming urine protein screening and is up to date on all other related screening. She reports that her Bipap has really been working well for her and has gotten a travel device for trips.     Medical Updates:  Since our last conversation the patient has followed up with Oncology, had imaging and labs done, seen her PCP, had her Chemo treatments, and has been communicating with her medical team via phone and [facility]. The patient denies any falls or injuries since our last conversation. She states full understanding of her plan of care.     Medication Updates:  The patient states current dosage of Metformin is 1000 Mg once per day. This has been routed to her PCP for refill.     Symptoms:  Loss of appetite, fatigue    Plan of Care Goals and Progress:    Goal 1. Over the next 4 weeks the patient will take concrete and consistent steps towards healthy nutrition and hydration.      Progress:  The patient states full understanding of the importance of providing her body with proper fuel for the fight that it is waging.       Barriers:  The patient is reporting significant loss of appetite on current cancer treatment regimen.      Interventions:  the patient will begin with what sounds good at all and will consider smaller meals through out the day instead of 3 larger  meals. The patient will monitor for times of day when her appetite tends to be more active.      Education: Strategies for nutrition and hydration discussed and reinforced.     Goal 2. Over the next month the patient will increase her knowledge of factors related to cholesterol with a particular emphasis on triglycerides.      Progress:  The patient is up to date on cholesterol related testing.       Barriers:  The patient's nutrition is influenced by loss of appetite from cancer treatment at this time.      Interventions:   the patient will begin with what sounds good at all and will consider smaller meals through out the day instead of 3 larger meals. The patient will monitor for times of day when her appetite tends to be more active.      Education:   Strategies for nutrition and hydration discussed and reinforced. Article on triglycerides sent by mail.       Patient's Concerns and Feedback (Self Management of Care):     The patient's primary concern today is optimizing her cancer treatment. The patient is aware of her future appointments and reports regularly checking MyChart. She is well versed in her plan of care, reports having a lot of support from her daughter, and is confident in managing things with the guidance of her healthcare team.     Next Steps:     Follow-Up Plan:  The patient's next PCM follow up will be in approximately 4 weeks.       Appointments:  6/2/25 (7:00 am, Hem/Onc), 6/2/25 (7:30 am, [facility]), 6/3/25 (10:40 am, Pulmonology), 6/4/25 (3:00 pm, PT), 6/9/25 (9:30 am, [facility]), 6/18/25 (3:00 PM, PT), 6/23/25(10:30 am, [facility]), 6/24/25 (9:00 am, [facility]), 6/30/25 (10:00 am, [facility])     Contact Information:  Call [phone] with any questions or concerns.

## 2025-05-19 NOTE — PROGRESS NOTES
Chemotherapy Verification - PRIMARY RN    D8C4    Height = 160cm  Weight = 91.9kg  BSA = 2.02m^2       Medication: Trodelvy  Dose: 10mg/kg  Calculated Dose:   919 mg                                  I confirm this process was performed independently with the BSA and all final chemotherapy dosing calculations congruent.  Any discrepancies of 10% or greater have been addressed with the chemotherapy pharmacist. The resolution of the discrepancy has been documented in the EPIC progress notes.

## 2025-05-21 RX ORDER — EPINEPHRINE 1 MG/ML(1)
0.5 AMPUL (ML) INJECTION PRN
OUTPATIENT
Start: 2025-06-02

## 2025-05-21 RX ORDER — 0.9 % SODIUM CHLORIDE 0.9 %
10 VIAL (ML) INJECTION PRN
OUTPATIENT
Start: 2025-06-01

## 2025-05-21 RX ORDER — ATROPINE SULFATE 1 MG/ML
0.5 INJECTION, SOLUTION INTRAVENOUS PRN
OUTPATIENT
Start: 2025-06-09

## 2025-05-21 RX ORDER — PROCHLORPERAZINE MALEATE 10 MG
10 TABLET ORAL EVERY 6 HOURS PRN
OUTPATIENT
Start: 2025-06-02

## 2025-05-21 RX ORDER — DIPHENHYDRAMINE HYDROCHLORIDE 50 MG/ML
25 INJECTION, SOLUTION INTRAMUSCULAR; INTRAVENOUS ONCE
OUTPATIENT
Start: 2025-06-02 | End: 2025-06-02

## 2025-05-21 RX ORDER — PALONOSETRON 0.05 MG/ML
0.25 INJECTION, SOLUTION INTRAVENOUS ONCE
OUTPATIENT
Start: 2025-06-09 | End: 2025-06-09

## 2025-05-21 RX ORDER — 0.9 % SODIUM CHLORIDE 0.9 %
10 VIAL (ML) INJECTION PRN
OUTPATIENT
Start: 2025-06-09

## 2025-05-21 RX ORDER — 0.9 % SODIUM CHLORIDE 0.9 %
3 VIAL (ML) INJECTION PRN
OUTPATIENT
Start: 2025-06-09

## 2025-05-21 RX ORDER — SODIUM CHLORIDE 9 MG/ML
INJECTION, SOLUTION INTRAVENOUS CONTINUOUS
OUTPATIENT
Start: 2025-06-02

## 2025-05-21 RX ORDER — 0.9 % SODIUM CHLORIDE 0.9 %
VIAL (ML) INJECTION PRN
OUTPATIENT
Start: 2025-06-09

## 2025-05-21 RX ORDER — DIPHENHYDRAMINE HYDROCHLORIDE 50 MG/ML
25 INJECTION, SOLUTION INTRAMUSCULAR; INTRAVENOUS ONCE
OUTPATIENT
Start: 2025-06-09 | End: 2025-06-09

## 2025-05-21 RX ORDER — DIPHENHYDRAMINE HYDROCHLORIDE 50 MG/ML
50 INJECTION, SOLUTION INTRAMUSCULAR; INTRAVENOUS PRN
OUTPATIENT
Start: 2025-06-09

## 2025-05-21 RX ORDER — LOPERAMIDE HYDROCHLORIDE 2 MG/1
4 CAPSULE ORAL PRN
OUTPATIENT
Start: 2025-06-09

## 2025-05-21 RX ORDER — ONDANSETRON 8 MG/1
8 TABLET, ORALLY DISINTEGRATING ORAL PRN
OUTPATIENT
Start: 2025-06-09

## 2025-05-21 RX ORDER — ATROPINE SULFATE 1 MG/ML
0.5 INJECTION, SOLUTION INTRAVENOUS PRN
OUTPATIENT
Start: 2025-06-02

## 2025-05-21 RX ORDER — 0.9 % SODIUM CHLORIDE 0.9 %
3 VIAL (ML) INJECTION PRN
OUTPATIENT
Start: 2025-06-02

## 2025-05-21 RX ORDER — LOPERAMIDE HYDROCHLORIDE 2 MG/1
2 CAPSULE ORAL
OUTPATIENT
Start: 2025-06-09

## 2025-05-21 RX ORDER — METHYLPREDNISOLONE SODIUM SUCCINATE 125 MG/2ML
125 INJECTION, POWDER, LYOPHILIZED, FOR SOLUTION INTRAMUSCULAR; INTRAVENOUS PRN
OUTPATIENT
Start: 2025-06-09

## 2025-05-21 RX ORDER — 0.9 % SODIUM CHLORIDE 0.9 %
10 VIAL (ML) INJECTION PRN
OUTPATIENT
Start: 2025-06-02

## 2025-05-21 RX ORDER — ONDANSETRON 2 MG/ML
4 INJECTION INTRAMUSCULAR; INTRAVENOUS PRN
OUTPATIENT
Start: 2025-06-02

## 2025-05-21 RX ORDER — ACETAMINOPHEN 325 MG/1
650 TABLET ORAL ONCE
OUTPATIENT
Start: 2025-06-02

## 2025-05-21 RX ORDER — EPINEPHRINE 1 MG/ML(1)
0.5 AMPUL (ML) INJECTION PRN
OUTPATIENT
Start: 2025-06-09

## 2025-05-21 RX ORDER — SODIUM CHLORIDE 9 MG/ML
INJECTION, SOLUTION INTRAVENOUS CONTINUOUS
OUTPATIENT
Start: 2025-06-09

## 2025-05-21 RX ORDER — METHYLPREDNISOLONE SODIUM SUCCINATE 125 MG/2ML
125 INJECTION, POWDER, LYOPHILIZED, FOR SOLUTION INTRAMUSCULAR; INTRAVENOUS PRN
OUTPATIENT
Start: 2025-06-02

## 2025-05-21 RX ORDER — DEXAMETHASONE SODIUM PHOSPHATE 4 MG/ML
12 INJECTION, SOLUTION INTRA-ARTICULAR; INTRALESIONAL; INTRAMUSCULAR; INTRAVENOUS; SOFT TISSUE ONCE
Status: CANCELLED | OUTPATIENT
Start: 2025-06-09 | End: 2025-06-09

## 2025-05-21 RX ORDER — 0.9 % SODIUM CHLORIDE 0.9 %
3 VIAL (ML) INJECTION PRN
OUTPATIENT
Start: 2025-06-01

## 2025-05-21 RX ORDER — 0.9 % SODIUM CHLORIDE 0.9 %
VIAL (ML) INJECTION PRN
OUTPATIENT
Start: 2025-06-02

## 2025-05-21 RX ORDER — ONDANSETRON 2 MG/ML
4 INJECTION INTRAMUSCULAR; INTRAVENOUS PRN
OUTPATIENT
Start: 2025-06-09

## 2025-05-21 RX ORDER — 0.9 % SODIUM CHLORIDE 0.9 %
VIAL (ML) INJECTION PRN
OUTPATIENT
Start: 2025-06-01

## 2025-05-21 RX ORDER — DIPHENHYDRAMINE HYDROCHLORIDE 50 MG/ML
50 INJECTION, SOLUTION INTRAMUSCULAR; INTRAVENOUS PRN
OUTPATIENT
Start: 2025-06-02

## 2025-05-21 RX ORDER — LOPERAMIDE HYDROCHLORIDE 2 MG/1
4 CAPSULE ORAL PRN
OUTPATIENT
Start: 2025-06-02

## 2025-05-21 RX ORDER — PALONOSETRON 0.05 MG/ML
0.25 INJECTION, SOLUTION INTRAVENOUS ONCE
OUTPATIENT
Start: 2025-06-02 | End: 2025-06-02

## 2025-05-21 RX ORDER — DEXAMETHASONE SODIUM PHOSPHATE 4 MG/ML
12 INJECTION, SOLUTION INTRA-ARTICULAR; INTRALESIONAL; INTRAMUSCULAR; INTRAVENOUS; SOFT TISSUE ONCE
Status: CANCELLED | OUTPATIENT
Start: 2025-06-02 | End: 2025-06-02

## 2025-05-21 RX ORDER — LOPERAMIDE HYDROCHLORIDE 2 MG/1
2 CAPSULE ORAL
OUTPATIENT
Start: 2025-06-02

## 2025-05-21 RX ORDER — ACETAMINOPHEN 325 MG/1
650 TABLET ORAL ONCE
OUTPATIENT
Start: 2025-06-09

## 2025-05-21 RX ORDER — ONDANSETRON 8 MG/1
8 TABLET, ORALLY DISINTEGRATING ORAL PRN
OUTPATIENT
Start: 2025-06-02

## 2025-05-21 RX ORDER — PROCHLORPERAZINE MALEATE 10 MG
10 TABLET ORAL EVERY 6 HOURS PRN
OUTPATIENT
Start: 2025-06-09

## 2025-05-28 ENCOUNTER — APPOINTMENT (OUTPATIENT)
Dept: PHYSICAL THERAPY | Facility: REHABILITATION | Age: 60
End: 2025-05-28
Attending: FAMILY MEDICINE
Payer: MEDICARE

## 2025-06-02 ENCOUNTER — OUTPATIENT INFUSION SERVICES (OUTPATIENT)
Facility: MEDICAL CENTER | Age: 60
End: 2025-06-02
Attending: INTERNAL MEDICINE
Payer: MEDICARE

## 2025-06-02 ENCOUNTER — HOSPITAL ENCOUNTER (OUTPATIENT)
Facility: MEDICAL CENTER | Age: 60
End: 2025-06-02
Attending: NURSE PRACTITIONER
Payer: MEDICARE

## 2025-06-02 VITALS
TEMPERATURE: 98.4 F | OXYGEN SATURATION: 94 % | HEART RATE: 81 BPM | SYSTOLIC BLOOD PRESSURE: 155 MMHG | WEIGHT: 204.37 LBS | DIASTOLIC BLOOD PRESSURE: 82 MMHG | RESPIRATION RATE: 18 BRPM | BODY MASS INDEX: 36.21 KG/M2 | HEIGHT: 63 IN

## 2025-06-02 VITALS
HEIGHT: 63 IN | HEART RATE: 90 BPM | TEMPERATURE: 98.4 F | DIASTOLIC BLOOD PRESSURE: 80 MMHG | SYSTOLIC BLOOD PRESSURE: 156 MMHG | RESPIRATION RATE: 18 BRPM | BODY MASS INDEX: 36.19 KG/M2 | WEIGHT: 204.26 LBS | OXYGEN SATURATION: 93 %

## 2025-06-02 DIAGNOSIS — C50.912 BREAST CANCER, STAGE 4, LEFT (HCC): Primary | ICD-10-CM

## 2025-06-02 DIAGNOSIS — C50.412 CARCINOMA OF UPPER-OUTER QUADRANT OF LEFT BREAST IN FEMALE, ESTROGEN RECEPTOR POSITIVE (HCC): Primary | ICD-10-CM

## 2025-06-02 DIAGNOSIS — Z17.0 CARCINOMA OF UPPER-OUTER QUADRANT OF LEFT BREAST IN FEMALE, ESTROGEN RECEPTOR POSITIVE (HCC): Primary | ICD-10-CM

## 2025-06-02 DIAGNOSIS — Z79.899 ENCOUNTER FOR LONG-TERM (CURRENT) USE OF HIGH-RISK MEDICATION: ICD-10-CM

## 2025-06-02 LAB
ALBUMIN SERPL BCP-MCNC: 3.9 G/DL (ref 3.2–4.9)
ALBUMIN/GLOB SERPL: 1.1 G/DL
ALP SERPL-CCNC: 131 U/L (ref 30–99)
ALT SERPL-CCNC: 29 U/L (ref 2–50)
ANION GAP SERPL CALC-SCNC: 12 MMOL/L (ref 7–16)
AST SERPL-CCNC: 28 U/L (ref 12–45)
BASOPHILS # BLD AUTO: 0.3 % (ref 0–1.8)
BASOPHILS # BLD: 0.02 K/UL (ref 0–0.12)
BILIRUB SERPL-MCNC: 0.3 MG/DL (ref 0.1–1.5)
BUN SERPL-MCNC: 7 MG/DL (ref 8–22)
CALCIUM ALBUM COR SERPL-MCNC: 10.2 MG/DL (ref 8.5–10.5)
CALCIUM SERPL-MCNC: 10.1 MG/DL (ref 8.5–10.5)
CEA SERPL-MCNC: 2.5 NG/ML (ref 0–3)
CHLORIDE SERPL-SCNC: 105 MMOL/L (ref 96–112)
CO2 SERPL-SCNC: 24 MMOL/L (ref 20–33)
CREAT SERPL-MCNC: 0.61 MG/DL (ref 0.5–1.4)
EOSINOPHIL # BLD AUTO: 0.16 K/UL (ref 0–0.51)
EOSINOPHIL NFR BLD: 2.4 % (ref 0–6.9)
ERYTHROCYTE [DISTWIDTH] IN BLOOD BY AUTOMATED COUNT: 54.8 FL (ref 35.9–50)
GFR SERPLBLD CREATININE-BSD FMLA CKD-EPI: 101 ML/MIN/1.73 M 2
GLOBULIN SER CALC-MCNC: 3.5 G/DL (ref 1.9–3.5)
GLUCOSE SERPL-MCNC: 140 MG/DL (ref 65–99)
HCT VFR BLD AUTO: 41.9 % (ref 37–47)
HGB BLD-MCNC: 13.5 G/DL (ref 12–16)
IMM GRANULOCYTES # BLD AUTO: 0.02 K/UL (ref 0–0.11)
IMM GRANULOCYTES NFR BLD AUTO: 0.3 % (ref 0–0.9)
LYMPHOCYTES # BLD AUTO: 0.74 K/UL (ref 1–4.8)
LYMPHOCYTES NFR BLD: 11 % (ref 22–41)
MCH RBC QN AUTO: 31.8 PG (ref 27–33)
MCHC RBC AUTO-ENTMCNC: 32.2 G/DL (ref 32.2–35.5)
MCV RBC AUTO: 98.6 FL (ref 81.4–97.8)
MONOCYTES # BLD AUTO: 0.62 K/UL (ref 0–0.85)
MONOCYTES NFR BLD AUTO: 9.3 % (ref 0–13.4)
NEUTROPHILS # BLD AUTO: 5.14 K/UL (ref 1.82–7.42)
NEUTROPHILS NFR BLD: 76.7 % (ref 44–72)
NRBC # BLD AUTO: 0 K/UL
NRBC BLD-RTO: 0 /100 WBC (ref 0–0.2)
OUTPT INFUS CBC COMMENT OICOM: ABNORMAL
PLATELET # BLD AUTO: 300 K/UL (ref 164–446)
PMV BLD AUTO: 9.1 FL (ref 9–12.9)
POTASSIUM SERPL-SCNC: 3.8 MMOL/L (ref 3.6–5.5)
PROT SERPL-MCNC: 7.4 G/DL (ref 6–8.2)
RBC # BLD AUTO: 4.25 M/UL (ref 4.2–5.4)
SODIUM SERPL-SCNC: 141 MMOL/L (ref 135–145)
WBC # BLD AUTO: 6.7 K/UL (ref 4.8–10.8)

## 2025-06-02 PROCEDURE — A9270 NON-COVERED ITEM OR SERVICE: HCPCS | Performed by: NURSE PRACTITIONER

## 2025-06-02 PROCEDURE — 96413 CHEMO IV INFUSION 1 HR: CPT

## 2025-06-02 PROCEDURE — 96375 TX/PRO/DX INJ NEW DRUG ADDON: CPT

## 2025-06-02 PROCEDURE — 99214 OFFICE O/P EST MOD 30 MIN: CPT | Performed by: NURSE PRACTITIONER

## 2025-06-02 PROCEDURE — 86300 IMMUNOASSAY TUMOR CA 15-3: CPT

## 2025-06-02 PROCEDURE — 96367 TX/PROPH/DG ADDL SEQ IV INF: CPT

## 2025-06-02 PROCEDURE — 99212 OFFICE O/P EST SF 10 MIN: CPT | Performed by: NURSE PRACTITIONER

## 2025-06-02 PROCEDURE — 700111 HCHG RX REV CODE 636 W/ 250 OVERRIDE (IP): Performed by: NURSE PRACTITIONER

## 2025-06-02 PROCEDURE — 85025 COMPLETE CBC W/AUTO DIFF WBC: CPT

## 2025-06-02 PROCEDURE — 82378 CARCINOEMBRYONIC ANTIGEN: CPT

## 2025-06-02 PROCEDURE — A4212 NON CORING NEEDLE OR STYLET: HCPCS

## 2025-06-02 PROCEDURE — 96415 CHEMO IV INFUSION ADDL HR: CPT

## 2025-06-02 PROCEDURE — 700105 HCHG RX REV CODE 258: Performed by: NURSE PRACTITIONER

## 2025-06-02 PROCEDURE — 700102 HCHG RX REV CODE 250 W/ 637 OVERRIDE(OP): Performed by: NURSE PRACTITIONER

## 2025-06-02 PROCEDURE — 80053 COMPREHEN METABOLIC PANEL: CPT

## 2025-06-02 RX ORDER — PROCHLORPERAZINE MALEATE 10 MG
10 TABLET ORAL EVERY 6 HOURS PRN
Status: DISCONTINUED | OUTPATIENT
Start: 2025-06-02 | End: 2025-06-02 | Stop reason: HOSPADM

## 2025-06-02 RX ORDER — ATROPINE SULFATE 1 MG/ML
0.5 INJECTION, SOLUTION INTRAVENOUS PRN
Status: DISCONTINUED | OUTPATIENT
Start: 2025-06-02 | End: 2025-06-02 | Stop reason: HOSPADM

## 2025-06-02 RX ORDER — DIPHENHYDRAMINE HYDROCHLORIDE 50 MG/ML
25 INJECTION, SOLUTION INTRAMUSCULAR; INTRAVENOUS ONCE
Status: COMPLETED | OUTPATIENT
Start: 2025-06-02 | End: 2025-06-02

## 2025-06-02 RX ORDER — SODIUM CHLORIDE 9 MG/ML
INJECTION, SOLUTION INTRAVENOUS CONTINUOUS
Status: DISCONTINUED | OUTPATIENT
Start: 2025-06-02 | End: 2025-06-02 | Stop reason: HOSPADM

## 2025-06-02 RX ORDER — ACETAMINOPHEN 325 MG/1
650 TABLET ORAL ONCE
Status: COMPLETED | OUTPATIENT
Start: 2025-06-02 | End: 2025-06-02

## 2025-06-02 RX ORDER — ONDANSETRON 8 MG/1
8 TABLET, ORALLY DISINTEGRATING ORAL PRN
Status: DISCONTINUED | OUTPATIENT
Start: 2025-06-02 | End: 2025-06-02 | Stop reason: HOSPADM

## 2025-06-02 RX ORDER — PALONOSETRON 0.05 MG/ML
0.25 INJECTION, SOLUTION INTRAVENOUS ONCE
Status: COMPLETED | OUTPATIENT
Start: 2025-06-02 | End: 2025-06-02

## 2025-06-02 RX ORDER — LOPERAMIDE HYDROCHLORIDE 2 MG/1
2 CAPSULE ORAL
Status: DISCONTINUED | OUTPATIENT
Start: 2025-06-02 | End: 2025-06-02 | Stop reason: HOSPADM

## 2025-06-02 RX ORDER — METHYLPREDNISOLONE SODIUM SUCCINATE 125 MG/2ML
125 INJECTION, POWDER, LYOPHILIZED, FOR SOLUTION INTRAMUSCULAR; INTRAVENOUS PRN
Status: DISCONTINUED | OUTPATIENT
Start: 2025-06-02 | End: 2025-06-02 | Stop reason: HOSPADM

## 2025-06-02 RX ORDER — LOPERAMIDE HYDROCHLORIDE 2 MG/1
4 CAPSULE ORAL PRN
Status: DISCONTINUED | OUTPATIENT
Start: 2025-06-02 | End: 2025-06-02 | Stop reason: HOSPADM

## 2025-06-02 RX ORDER — ONDANSETRON 2 MG/ML
4 INJECTION INTRAMUSCULAR; INTRAVENOUS PRN
Status: DISCONTINUED | OUTPATIENT
Start: 2025-06-02 | End: 2025-06-02 | Stop reason: HOSPADM

## 2025-06-02 RX ORDER — DIPHENHYDRAMINE HYDROCHLORIDE 50 MG/ML
50 INJECTION, SOLUTION INTRAMUSCULAR; INTRAVENOUS PRN
Status: DISCONTINUED | OUTPATIENT
Start: 2025-06-02 | End: 2025-06-02 | Stop reason: HOSPADM

## 2025-06-02 RX ORDER — EPINEPHRINE 1 MG/ML(1)
0.5 AMPUL (ML) INJECTION PRN
Status: DISCONTINUED | OUTPATIENT
Start: 2025-06-02 | End: 2025-06-02 | Stop reason: HOSPADM

## 2025-06-02 RX ADMIN — DEXAMETHASONE SODIUM PHOSPHATE 12 MG: 4 INJECTION, SOLUTION INTRA-ARTICULAR; INTRALESIONAL; INTRAMUSCULAR; INTRAVENOUS; SOFT TISSUE at 08:55

## 2025-06-02 RX ADMIN — ACETAMINOPHEN 650 MG: 325 TABLET ORAL at 08:50

## 2025-06-02 RX ADMIN — FOSAPREPITANT 150 MG: 150 INJECTION, POWDER, LYOPHILIZED, FOR SOLUTION INTRAVENOUS at 09:05

## 2025-06-02 RX ADMIN — PALONOSETRON HYDROCHLORIDE 0.25 MG: 0.25 INJECTION INTRAVENOUS at 08:54

## 2025-06-02 RX ADMIN — SODIUM CHLORIDE: 9 INJECTION, SOLUTION INTRAVENOUS at 08:50

## 2025-06-02 RX ADMIN — DIPHENHYDRAMINE HYDROCHLORIDE 25 MG: 50 INJECTION INTRAMUSCULAR; INTRAVENOUS at 08:52

## 2025-06-02 RX ADMIN — SACITUZUMAB GOVITECAN 900 MG: 180 POWDER, FOR SOLUTION INTRAVENOUS at 09:55

## 2025-06-02 RX ADMIN — FAMOTIDINE 20 MG: 10 INJECTION INTRAVENOUS at 08:50

## 2025-06-02 ASSESSMENT — ENCOUNTER SYMPTOMS
COUGH: 0
PALPITATIONS: 0
SHORTNESS OF BREATH: 0
FEVER: 0
WEIGHT LOSS: 0
CHILLS: 0
DIARRHEA: 0
NAUSEA: 0
HEADACHES: 0
CONSTIPATION: 0
VOMITING: 0
DIZZINESS: 0

## 2025-06-02 ASSESSMENT — FIBROSIS 4 INDEX
FIB4 SCORE: 0.93
FIB4 SCORE: 0.93

## 2025-06-02 ASSESSMENT — PAIN SCALES - GENERAL: PAINLEVEL_OUTOF10: NO PAIN

## 2025-06-02 NOTE — PROGRESS NOTES
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 1 TABLET BY MOUTH EVERY EVENING. TO BE TAKEN NIGHTLY ON FIRST DAY OF CHEMOTHERAPY FOR A TOTAL OF 4 DAYS, OR AS DIRECTED BY YOUR PROVIDER. 24 Tablet 0    lidocaine (XYLOCAINE) 2 % Solution Okay to mix 1:1 mylanta/maalox - use 5 ml every 1 hour prn for oral discomfort 100 mL 1    ondansetron (ZOFRAN) 4 MG Tab tablet Take 1 Tablet by mouth every four hours as needed for Nausea/Vomiting (for nausea, vomiting). 30 Tablet 6    prochlorperazine (COMPAZINE) 10 MG Tab Take 1 Tablet by mouth every 6 hours as needed (for nausea, vomiting). 30 Tablet 6    loperamide (IMODIUM) 2 MG Cap Take 1 Capsule by mouth see administration instructions. 30 Capsule 6    prochlorperazine (COMPAZINE) 10 MG Tab Take 1 Tablet by mouth every 6 hours as needed for Nausea/Vomiting. 30 Tablet 3    simethicone (MYLICON) 125 MG chewable tablet Chew 250 mg every 6 hours as needed for Flatulence. 2 tablets= 250mg      atorvastatin (LIPITOR) 20 MG Tab TAKE 1 TABLET BY MOUTH EVERY DAY IN THE EVENING 100 Tablet 3    albuterol 108 (90 Base) MCG/ACT Aero Soln inhalation aerosol Inhale 2 Puffs every 6 hours as needed for Shortness of Breath. 8 Each 3    ondansetron (ZOFRAN ODT) 4 MG TABLET DISPERSIBLE Take 1 Tablet by mouth every 8 hours as needed for Nausea/Vomiting. 10 Tablet 0    denosumab (XGEVA) 120 MG/1.7ML injection Inject 120 mg under the skin every 28 days.      cyclobenzaprine  (FLEXERIL) 10 mg Tab TAKE 1 TABLET BY MOUTH THREE TIMES A DAY AS NEEDED FOR MUSCLE SPASM 90 Tablet 0    lisinopril (PRINIVIL) 2.5 MG Tab Take 1 Tablet by mouth every day. 90 Tablet 1     No current facility-administered medications on file prior to visit.

## 2025-06-02 NOTE — PROGRESS NOTES
"Pharmacy Chemotherapy Verification    DX: breast cancer    Cycle 5, Day 1  Previous treatment = C4D8 5/19/25    Regimen: sacituzumab govitecan-hziy  Sacituzumab govitecan-hziy 10 mg/kg IV on Days 1 and 8   q21 days until disease progression or unacceptable toxicity  NCCN Guidelines for Breast Cancer V.2.2024  shayy Beltran - N Engl J Med. 2019 Feb 21;380(4):401-351.   shayy Powell - Journal of Clinical OncologyVolume 40, Number 17_suppl     Allergies:Codeine, Hydrocodone, and Other drug  BP (!) 155/82   Pulse 81   Resp 18   Ht 1.6 m (5' 3\")   Wt 92.7 kg (204 lb 5.9 oz)   SpO2 94%   BMI 36.20 kg/m²   Body surface area is 2.03 meters squared.    Labs 6/2/25  ANC 5140 Hgb 13.5 Plt 300k    Xgeva plan remains on hold    Sacituzumab govitecan-hziy 10 mg/kg x 92.7 kg = 927 mg   <10% difference, ok to treat with final dose = 900 mg IV    Dilia Wade, PharmD, BCOP  "

## 2025-06-02 NOTE — PROGRESS NOTES
Chemotherapy Verification - PRIMARY RN      Height = 160cm  Weight = 92.7kg  BSA = 2.03m2       Medication: Trodelvy  Dose: 10mg/kg  Calculated Dose: 927                             (In mg/m2, AUC, mg/kg)       I confirm this process was performed independently with the BSA and all final chemotherapy dosing calculations congruent.  Any discrepancies of 10% or greater have been addressed with the chemotherapy pharmacist. The resolution of the discrepancy has been documented in the EPIC progress notes.

## 2025-06-02 NOTE — PROGRESS NOTES
[facility] C5D1 Trodelvy. Pt is ambulatory and accompanied by her daughter, Kayleen. Assessment completed, POC discussed. Established PIV in RFA; brisk blood return observed and pt tolerated well. Labs drawn and within treatable parameters. Pre meds administered per MAR. Trodelvy infused using in-line filter with no s/s of adverse reactions. Pt completed an uneventful 30-minute observation following Trodelvy completion, treatment tolerated well. PIV flushed and brisk blood return observed before removing; sterile gauze/coban dressing applied. Next appointment confirmed and education provided. Pt discharged to self care with all personal belongings and in NAD.

## 2025-06-02 NOTE — PROGRESS NOTES
Chemotherapy Verification - PRIMARY RN      Height = 160 cm  Weight = 92.7 kg  BSA = 2.03 m2     Medication: sacituzumab govitecan  Dose: 10 mg/kg  Calculated Dose: 927 mg                             (In mg/m2, AUC, mg/kg)     I confirm this process was performed independently with the BSA and all final chemotherapy dosing calculations congruent.  Any discrepancies of 10% or greater have been addressed with the chemotherapy pharmacist. The resolution of the discrepancy has been documented in the EPIC progress notes.

## 2025-06-02 NOTE — ADDENDUM NOTE
Encounter addended by: Kelsie Gifford, Med Ass't on: 6/2/2025 8:17 AM   Actions taken: Charge Capture section accepted

## 2025-06-02 NOTE — PROGRESS NOTES
"Pharmacy Chemotherapy Verification Note:    Dx: met Breast Cancer (ER+, HER2-)       Protocol: Sacituzumab Govitecan (Trodelvy)     Sacituzumab govitecan-hziy 10 mg/kg IV on Days 1 and 8  21-day cycle until disease progression or unacceptable toxicity   1. NCCNGuidelines® for Breast Cancer V.2.2024.   2. Staci FITZPATRICK et al. N Engl J Med. 2019;380(8):741-751.a   3. Andre et al. J Clin Oncol. 2022;40(17_suppl):SDL1975.a    Allergies:  Codeine, Hydrocodone, and Other drug     BP (!) 155/82   Pulse 81   Temp 36.9 °C (98.4 °F) (Temporal)   Resp 18   Ht 1.6 m (5' 3\")   Wt 92.7 kg (204 lb 5.9 oz)   SpO2 94%   BMI 36.20 kg/m²  Body surface area is 2.03 meters squared.    Labs from 6/2/25 reviewed - all within treatment parameters.    Drug Order   (Drug name, dose, route, IV Fluid & volume, frequency, number of doses) Cycle: 5 Day 1  Previous treatment: C4D8 on 5/19/25; s/p Capecitabine x 2C, Ribociclib + Faslodex     Medication = Sacituzumab govitecan  Base Dose = 10 mg/kg  Calc Dose: Base Dose x 92.7 kg = 927 mg  Final Dose = 900 mg  Route = IV  Fluid & Volume =  mL  Admin Duration = Over 1-2 hours          <10% difference, okay to treat with final dose       By my signature below, I confirm this process was performed independently with the BSA and all final chemotherapy dosing calculations congruent. I have reviewed the above chemotherapy order and that my calculation of the final dose and BSA (when applicable) corroborate those calculations of the  pharmacist.     Zena Gore, PharmD, BCOP    "

## 2025-06-02 NOTE — PROGRESS NOTES
Chemotherapy Verification - SECONDARY RN       Height = 160 cm  Weight = 92.7 kg  BSA = 2.03 m2       Medication: Trodelvy  Dose: 10 mg/kg  Calculated Dose: 927 mg                             (In mg/m2, AUC, mg/kg)     I confirm that this process was performed independently.

## 2025-06-03 ENCOUNTER — OFFICE VISIT (OUTPATIENT)
Dept: SLEEP MEDICINE | Facility: MEDICAL CENTER | Age: 60
End: 2025-06-03
Payer: MEDICARE

## 2025-06-03 VITALS
HEIGHT: 64 IN | DIASTOLIC BLOOD PRESSURE: 62 MMHG | HEART RATE: 89 BPM | BODY MASS INDEX: 34.15 KG/M2 | SYSTOLIC BLOOD PRESSURE: 124 MMHG | WEIGHT: 200 LBS | RESPIRATION RATE: 16 BRPM | OXYGEN SATURATION: 91 %

## 2025-06-03 DIAGNOSIS — G47.33 OSA (OBSTRUCTIVE SLEEP APNEA): Primary | ICD-10-CM

## 2025-06-03 DIAGNOSIS — G47.34 NOCTURNAL HYPOXIA: ICD-10-CM

## 2025-06-03 LAB — CANCER AG27-29 SERPL-ACNC: 446.1 U/ML

## 2025-06-03 PROCEDURE — 99214 OFFICE O/P EST MOD 30 MIN: CPT

## 2025-06-03 PROCEDURE — 3078F DIAST BP <80 MM HG: CPT

## 2025-06-03 PROCEDURE — 3074F SYST BP LT 130 MM HG: CPT

## 2025-06-03 PROCEDURE — 99213 OFFICE O/P EST LOW 20 MIN: CPT

## 2025-06-03 ASSESSMENT — FIBROSIS 4 INDEX: FIB4 SCORE: 1.07

## 2025-06-03 NOTE — PROGRESS NOTES
[facility] Follow-up Visit    CC:   Chief Complaint   Patient presents with    Follow-Up     Last Office Visit 4/1/2025 with Shiela ROOT   Auto bipap ipap 13 epap 5 ps 5 o2 bleed in 3        HPI:    Sol Kern is a 60 y.o.female present today for ongoing management of YOANA recently started on BiPAP with 3L bleed in O2. Patient was last seen by sleep medicine 4/1/25 by myself. The primary reason for today's visit is first compliance visit after initiating PAP therapy. Patient uses the machine most nights and reports great benefit -- she is no longer needing to nap during the day. Patient reports nose dryness, congestion, nose bleeds, occasional dry mouth. This caused her to not use it for a few days.  Patient denies aerophagia, residual snoring, skin irritation , and excessive leak . Generally sleep is restorative and quality is good - patient looks forward to using the machine when she sleeps.      Currently being treated with chemotherapy which is going well. They eliminated a post chemo injection and since then she has been feeling better. She was previously needing almost weekly paracenteses and thoracenteses but has not needed this recently in the last month.      DME provider: [employer]   Device: aircurve 11   Settings: BiPAP 13/5/5  Mask: nasal   Chin strap/lip strap: no        Sleep History  1/14/25 - HST AHI 80, O2 lasha 58%, mean O2 83% and 541 min with O2 < 88%  3/21/25 - PSG titration, CPAP was tried from 5-11cm H2O. Bipap was tried from 9/5-14/10cm H2O. Oxygen was used from 0-3LPM. Patient had difficulty tolerating higher pressures and woke up multiple times requesting pressure to be decreased. Rec: auto BiPAP EPAP 5 IPAP 13 pressure support 5 cmH2O with bleed in supplemental oxygen 3 L/min.     Patient Active Problem List    Diagnosis Date Noted    Anxiety in cancer patient 03/03/2025    Malignant ascites 03/03/2025    Primary insomnia 01/13/2025    Carcinomatosis (HCC) 12/04/2024     Advanced care planning/counseling discussion 11/25/2024    High cholesterol 06/14/2024    Hemorrhoids 06/14/2024    Restless leg syndrome 04/17/2024    Elevated LFTs 11/30/2023    Insomnia due to medical condition 11/20/2023    Weakness 11/03/2023    Breast cancer, stage 4, left (HCC) 10/25/2023    Chronic respiratory failure with hypoxia (HCC) 10/13/2023    Leukocytosis 10/06/2023    Chronic back pain 09/29/2023    Large pleural effusion 09/28/2023    Mixed stress and urge urinary incontinence 03/20/2023    Cyst (solitary) of breast 12/09/2022    Hepatic steatosis 12/09/2022    History of COVID-19 07/15/2022    Vertigo 04/21/2022    Achilles tendon mass 01/31/2022    Cigarette nicotine dependence in remission 12/15/2021    Carcinoma of upper-outer quadrant of left breast in female, estrogen receptor positive (HCC) 10/18/2021    Neuropathy 09/15/2021    Type 2 diabetes mellitus with diabetic neuropathy, without long-term current use of insulin (HCC)     Postnasal drip 10/22/2019    Essential hypertension 03/19/2019    Vitamin D deficiency 03/05/2019    Class 2 severe obesity with serious comorbidity in adult (HCC) 08/28/2017    YOANA (obstructive sleep apnea) 08/28/2017    Chronic midline low back pain with right-sided sciatica 08/28/2017    Neck pain 08/28/2017    Multiple allergies 08/28/2017    Psoriasis 08/28/2017       Past Medical History[1]     Past Surgical History[2]    Family History   Problem Relation Age of Onset    Cancer Mother         melanoma    Hypertension Mother     Other Mother         THYROID    Heart Disease Mother         valvular disease    Ovarian Cancer Mother     Heart Disease Father         Heart Attack    Heart Attack Father     Other Father         psoriasis    Hyperlipidemia Brother     Other Brother         psoriasis    Heart Disease Brother         MI    Heart Attack Brother         massive heart attack     Heart Disease Maternal Grandfather         MI    Breast Cancer Other     Other  "Other         psoriasis    Diabetes Neg Hx     Alcohol/Drug Neg Hx     Tubal Cancer Neg Hx     Peritoneal Cancer Neg Hx     Colorectal Cancer Neg Hx        Current Medications[3]     ALLERGIES: Codeine, Hydrocodone, and Other drug    ROS  Constitutional: Denies fevers, Denies weight changes  Ears/Nose/Throat/Mouth: Denies nasal congestion or sore throat   Cardiovascular: Denies chest pain  Respiratory: Denies shortness of breath, Denies cough  Gastrointestinal/Hepatic: Denies nausea, vomiting  Sleep: see HPI      PHYSICAL EXAM  /62 (BP Location: Right arm, Patient Position: Sitting, BP Cuff Size: Adult)   Pulse 89   Resp 16   Ht 1.613 m (5' 3.5\") Comment: per patient  Wt 90.7 kg (200 lb) Comment: per patient  SpO2 91%   BMI 34.87 kg/m²   Constitutional: Alert, no distress, well-groomed.  Skin: Warm, dry, good turgor, no rashes in visible areas.  Eye: Equal, round and reactive, conjunctiva clear, lids normal.  ENMT: Lips without lesions, good dentition, moist mucous membranes.  Neck: Trachea midline, no masses, no thyromegaly.  Respiratory: Unlabored respiratory effort, no cough.  MSK: Normal gait, moves all extremities.  Neuro: Grossly non-focal.   Psych: Alert and oriented x3, normal affect and mood.      Medical Decision Making   Assessment and Plan  The medical record was reviewed including Diagnostic and titration nocturnal polysomnogram's , home sleep apnea tests, and compliance reports .    Obstructive sleep apnea  Nocturnal hypoxia  - Compliance data reviewed showing 73% usage > 4hours in last 30 days. Average AHI 0.1 events/hour.  Leak 95th percentile 13.5. Pressure 95% 11.7/6.7  - Current Settings BiPAP 13/5/5. Recommend continuing current settings.   - Pt continues to use and benefit from machine.   - Orders placed for mask and supplies   - Advised to reach out via MyChart with questions     YOANA/PAP EDUCATION:  - Clean equipment frequently, and get new mask and supplies as allowed by insurance " and DME.   - Avoid driving a motor vehicle when drowsy  - Patients with YOANA are at increased risk of cardiovascular disease including coronary artery disease, systemic arterial hypertension, pulmonary arterial hypertension, cardiac arrythmias, and stroke. Positive airway pressure will favorably impact many of the adverse conditions and effects provoked by YOANA.       Return in about 1 year (around 6/3/2026), or if symptoms worsen or fail to improve.   - Recommend an earlier appointment, if significant treatment barriers develop.  - Patient to follow up with the appropriate healthcare practitioners for all other medical problems and issues.    Please note portions of this record was created using voice recognition software. I have made every reasonable attempt to correct obvious errors, but I expect that there are errors of grammar and possibly content I did not discover before finalizing the note.           [1]   Past Medical History:  Diagnosis Date    Allergy     Back pain     Cancer (HCC) 2021    breast    Daytime sleepiness     Diabetes (HCC)     diet    Fatigue     Frequent headaches     GERD (gastroesophageal reflux disease)     Heartburn     High cholesterol     not medicated    Hypertension     Insomnia     Malignant neoplasm of upper-outer quadrant of left female breast (HCC)     Morning headache     Nasal drainage     Neck pain     Obesity     Pulmonary hypertension (HCC)     Rhinitis     Sleep apnea     no cpap    Snoring     Weakness     Wears glasses    [2]   Past Surgical History:  Procedure Laterality Date    IA LAP,DIAGNOSTIC ABDOMEN N/A 12/27/2024    Procedure: DIAGNOSTIC LAPAROSCOPY;  Surgeon: Tasneem Gonzalez M.D.;  Location: SURGERY [facility];  Service: General    PB MASTECTOMY, PARTIAL Left 11/12/2021    Procedure: MASTECTOMY, PARTIAL - SAMARA LOCALIZED;  Surgeon: Kayleen Sykes M.D.;  Location: SURGERY SAME DAY [facility];  Service: General    NODE BIOPSY SENTINEL Left 11/12/2021    Procedure:  BIOPSY, LYMPH NODE, SENTINEL;  Surgeon: Kayleen Sykes M.D.;  Location: SURGERY SAME DAY [facility];  Service: General    CARPAL TUNNEL RELEASE Right     ENDOMETRIAL ABLATION      FOOT SURGERY Right     KNEE ARTHROSCOPY      PRIMARY C SECTION      2     TUBAL COAGULATION LAPAROSCOPIC BILATERAL     [3]   Current Outpatient Medications   Medication Sig Dispense Refill    metFORMIN ER (GLUCOPHAGE XR) 500 MG TABLET SR 24 HR Take 2 Tablets by mouth every day. 200 Tablet 1    lidocaine (LIDODERM) 5 % Patch PLACE 1 PATCH ON THE SKIN EVERY 24 HOURS. PLACE PATCH TO AFFECTED AREA IN AM AND REMOVE AT BEDTIME. 30 Patch 0    Semaglutide,0.25 or 0.5MG/DOS, (OZEMPIC, 0.25 OR 0.5 MG/DOSE,) 2 MG/3ML Solution Pen-injector Inject 0.25 mg under the skin every 7 days for 28 days, THEN 0.5 mg every 7 days for 28 days. 6 mL 0    [START ON 2025] Semaglutide, 1 MG/DOSE, (OZEMPIC, 1 MG/DOSE,) 4 MG/3ML Solution Pen-injector Inject 1 mg under the skin every 7 days. 3 mL 0    OLANZapine (ZYPREXA) 5 MG Tab TAKE 1 TABLET BY MOUTH EVERY EVENING. TO BE TAKEN NIGHTLY ON FIRST DAY OF CHEMOTHERAPY FOR A TOTAL OF 4 DAYS, OR AS DIRECTED BY YOUR PROVIDER. 24 Tablet 0    lidocaine (XYLOCAINE) 2 % Solution Okay to mix 1:1 mylanta/maalox - use 5 ml every 1 hour prn for oral discomfort 100 mL 1    ondansetron (ZOFRAN) 4 MG Tab tablet Take 1 Tablet by mouth every four hours as needed for Nausea/Vomiting (for nausea, vomiting). 30 Tablet 6    prochlorperazine (COMPAZINE) 10 MG Tab Take 1 Tablet by mouth every 6 hours as needed (for nausea, vomiting). 30 Tablet 6    loperamide (IMODIUM) 2 MG Cap Take 1 Capsule by mouth see administration instructions. 30 Capsule 6    prochlorperazine (COMPAZINE) 10 MG Tab Take 1 Tablet by mouth every 6 hours as needed for Nausea/Vomiting. 30 Tablet 3    simethicone (MYLICON) 125 MG chewable tablet Chew 250 mg every 6 hours as needed for Flatulence. 2 tablets= 250mg      atorvastatin (LIPITOR) 20 MG Tab TAKE 1  TABLET BY MOUTH EVERY DAY IN THE EVENING 100 Tablet 3    albuterol 108 (90 Base) MCG/ACT Aero Soln inhalation aerosol Inhale 2 Puffs every 6 hours as needed for Shortness of Breath. 8 Each 3    ondansetron (ZOFRAN ODT) 4 MG TABLET DISPERSIBLE Take 1 Tablet by mouth every 8 hours as needed for Nausea/Vomiting. 10 Tablet 0    denosumab (XGEVA) 120 MG/1.7ML injection Inject 120 mg under the skin every 28 days.      cyclobenzaprine (FLEXERIL) 10 mg Tab TAKE 1 TABLET BY MOUTH THREE TIMES A DAY AS NEEDED FOR MUSCLE SPASM 90 Tablet 0    lisinopril (PRINIVIL) 2.5 MG Tab Take 1 Tablet by mouth every day. 90 Tablet 1     No current facility-administered medications for this visit.

## 2025-06-04 ENCOUNTER — APPOINTMENT (OUTPATIENT)
Dept: PHYSICAL THERAPY | Facility: REHABILITATION | Age: 60
End: 2025-06-04
Attending: FAMILY MEDICINE
Payer: MEDICARE

## 2025-06-05 DIAGNOSIS — M54.41 CHRONIC MIDLINE LOW BACK PAIN WITH RIGHT-SIDED SCIATICA: ICD-10-CM

## 2025-06-05 DIAGNOSIS — G89.29 CHRONIC MIDLINE LOW BACK PAIN WITH RIGHT-SIDED SCIATICA: ICD-10-CM

## 2025-06-05 RX ORDER — LIDOCAINE 50 MG/G
1 PATCH TOPICAL EVERY 24 HOURS
Qty: 30 PATCH | Refills: 0 | Status: SHIPPED | OUTPATIENT
Start: 2025-06-05

## 2025-06-08 NOTE — PROGRESS NOTES
"Pharmacy Chemotherapy Verification    DX: breast cancer    Cycle 5, Day 8  Previous treatment = C5D1 6/2/25    Regimen: sacituzumab govitecan-hziy  Sacituzumab govitecan-hziy 10 mg/kg IV on Days 1 and 8   q21 days until disease progression or unacceptable toxicity  NCCN Guidelines for Breast Cancer V.2.2024  shayy Beltran - N Engl J Med. 2019 Feb 21;380(1):191-271.   Andre et al - Journal of Clinical OncologyVolume 40, Number 17_suppl     Allergies:Codeine, Hydrocodone, and Other drug  BP (!) 143/74   Pulse 83   Temp (!) 35.4 °C (95.8 °F) (Temporal)   Resp 18   Ht 1.6 m (5' 2.99\")   Wt 91.9 kg (202 lb 9.6 oz)   SpO2 92%   BMI 35.90 kg/m²   Body surface area is 2.02 meters squared.    Labs 6/9/25:  ANC~ 3750 Plt = 313k   Hgb = 13.0       Xgeva plan remains on hold    Not receiving Neulasta at this time for tolerance    Sacituzumab govitecan-hziy 10 mg/kg x 91.9 kg = 919 mg   <10% difference, ok to treat with final dose = 900 mg IV    Braeden Carmona, PharmD  "

## 2025-06-09 ENCOUNTER — OUTPATIENT INFUSION SERVICES (OUTPATIENT)
Facility: MEDICAL CENTER | Age: 60
End: 2025-06-09
Attending: INTERNAL MEDICINE
Payer: MEDICARE

## 2025-06-09 VITALS
OXYGEN SATURATION: 92 % | WEIGHT: 202.6 LBS | SYSTOLIC BLOOD PRESSURE: 143 MMHG | HEIGHT: 63 IN | HEART RATE: 83 BPM | BODY MASS INDEX: 35.9 KG/M2 | TEMPERATURE: 95.8 F | RESPIRATION RATE: 18 BRPM | DIASTOLIC BLOOD PRESSURE: 74 MMHG

## 2025-06-09 DIAGNOSIS — C50.912 BREAST CANCER, STAGE 4, LEFT (HCC): Primary | ICD-10-CM

## 2025-06-09 LAB
BASOPHILS # BLD AUTO: 0.7 % (ref 0–1.8)
BASOPHILS # BLD: 0.04 K/UL (ref 0–0.12)
EOSINOPHIL # BLD AUTO: 0.15 K/UL (ref 0–0.51)
EOSINOPHIL NFR BLD: 2.8 % (ref 0–6.9)
ERYTHROCYTE [DISTWIDTH] IN BLOOD BY AUTOMATED COUNT: 54.7 FL (ref 35.9–50)
HCT VFR BLD AUTO: 39.7 % (ref 37–47)
HGB BLD-MCNC: 13 G/DL (ref 12–16)
IMM GRANULOCYTES # BLD AUTO: 0.04 K/UL (ref 0–0.11)
IMM GRANULOCYTES NFR BLD AUTO: 0.7 % (ref 0–0.9)
LYMPHOCYTES # BLD AUTO: 0.8 K/UL (ref 1–4.8)
LYMPHOCYTES NFR BLD: 14.7 % (ref 22–41)
MCH RBC QN AUTO: 32.3 PG (ref 27–33)
MCHC RBC AUTO-ENTMCNC: 32.7 G/DL (ref 32.2–35.5)
MCV RBC AUTO: 98.8 FL (ref 81.4–97.8)
MONOCYTES # BLD AUTO: 0.65 K/UL (ref 0–0.85)
MONOCYTES NFR BLD AUTO: 12 % (ref 0–13.4)
NEUTROPHILS # BLD AUTO: 3.75 K/UL (ref 1.82–7.42)
NEUTROPHILS NFR BLD: 69.1 % (ref 44–72)
NRBC # BLD AUTO: 0 K/UL
NRBC BLD-RTO: 0 /100 WBC (ref 0–0.2)
OUTPT INFUS CBC COMMENT OICOM: ABNORMAL
PLATELET # BLD AUTO: 313 K/UL (ref 164–446)
PMV BLD AUTO: 9 FL (ref 9–12.9)
RBC # BLD AUTO: 4.02 M/UL (ref 4.2–5.4)
WBC # BLD AUTO: 5.4 K/UL (ref 4.8–10.8)

## 2025-06-09 PROCEDURE — 85025 COMPLETE CBC W/AUTO DIFF WBC: CPT

## 2025-06-09 PROCEDURE — 700111 HCHG RX REV CODE 636 W/ 250 OVERRIDE (IP): Performed by: NURSE PRACTITIONER

## 2025-06-09 PROCEDURE — 700102 HCHG RX REV CODE 250 W/ 637 OVERRIDE(OP): Performed by: NURSE PRACTITIONER

## 2025-06-09 PROCEDURE — A9270 NON-COVERED ITEM OR SERVICE: HCPCS | Performed by: NURSE PRACTITIONER

## 2025-06-09 PROCEDURE — 96413 CHEMO IV INFUSION 1 HR: CPT

## 2025-06-09 PROCEDURE — 700105 HCHG RX REV CODE 258: Performed by: NURSE PRACTITIONER

## 2025-06-09 PROCEDURE — 96367 TX/PROPH/DG ADDL SEQ IV INF: CPT

## 2025-06-09 PROCEDURE — 96375 TX/PRO/DX INJ NEW DRUG ADDON: CPT

## 2025-06-09 RX ORDER — PALONOSETRON 0.05 MG/ML
0.25 INJECTION, SOLUTION INTRAVENOUS ONCE
Status: COMPLETED | OUTPATIENT
Start: 2025-06-09 | End: 2025-06-09

## 2025-06-09 RX ORDER — DIPHENHYDRAMINE HYDROCHLORIDE 50 MG/ML
50 INJECTION, SOLUTION INTRAMUSCULAR; INTRAVENOUS PRN
Status: DISCONTINUED | OUTPATIENT
Start: 2025-06-09 | End: 2025-06-09 | Stop reason: HOSPADM

## 2025-06-09 RX ORDER — LOPERAMIDE HYDROCHLORIDE 2 MG/1
2 CAPSULE ORAL
Status: DISCONTINUED | OUTPATIENT
Start: 2025-06-09 | End: 2025-06-09 | Stop reason: HOSPADM

## 2025-06-09 RX ORDER — METHYLPREDNISOLONE SODIUM SUCCINATE 125 MG/2ML
125 INJECTION, POWDER, LYOPHILIZED, FOR SOLUTION INTRAMUSCULAR; INTRAVENOUS PRN
Status: DISCONTINUED | OUTPATIENT
Start: 2025-06-09 | End: 2025-06-09 | Stop reason: HOSPADM

## 2025-06-09 RX ORDER — EPINEPHRINE 1 MG/ML(1)
0.5 AMPUL (ML) INJECTION PRN
Status: DISCONTINUED | OUTPATIENT
Start: 2025-06-09 | End: 2025-06-09 | Stop reason: HOSPADM

## 2025-06-09 RX ORDER — ONDANSETRON 8 MG/1
8 TABLET, ORALLY DISINTEGRATING ORAL PRN
Status: DISCONTINUED | OUTPATIENT
Start: 2025-06-09 | End: 2025-06-09 | Stop reason: HOSPADM

## 2025-06-09 RX ORDER — DIPHENHYDRAMINE HYDROCHLORIDE 50 MG/ML
25 INJECTION, SOLUTION INTRAMUSCULAR; INTRAVENOUS ONCE
Status: COMPLETED | OUTPATIENT
Start: 2025-06-09 | End: 2025-06-09

## 2025-06-09 RX ORDER — ONDANSETRON 2 MG/ML
4 INJECTION INTRAMUSCULAR; INTRAVENOUS PRN
Status: DISCONTINUED | OUTPATIENT
Start: 2025-06-09 | End: 2025-06-09 | Stop reason: HOSPADM

## 2025-06-09 RX ORDER — ACETAMINOPHEN 325 MG/1
650 TABLET ORAL ONCE
Status: COMPLETED | OUTPATIENT
Start: 2025-06-09 | End: 2025-06-09

## 2025-06-09 RX ORDER — PROCHLORPERAZINE MALEATE 10 MG
10 TABLET ORAL EVERY 6 HOURS PRN
Status: DISCONTINUED | OUTPATIENT
Start: 2025-06-09 | End: 2025-06-09 | Stop reason: HOSPADM

## 2025-06-09 RX ORDER — ATROPINE SULFATE 1 MG/ML
0.5 INJECTION, SOLUTION INTRAVENOUS PRN
Status: DISCONTINUED | OUTPATIENT
Start: 2025-06-09 | End: 2025-06-09 | Stop reason: HOSPADM

## 2025-06-09 RX ORDER — SODIUM CHLORIDE 9 MG/ML
INJECTION, SOLUTION INTRAVENOUS CONTINUOUS
Status: DISCONTINUED | OUTPATIENT
Start: 2025-06-09 | End: 2025-06-09 | Stop reason: HOSPADM

## 2025-06-09 RX ORDER — LOPERAMIDE HYDROCHLORIDE 2 MG/1
4 CAPSULE ORAL PRN
Status: DISCONTINUED | OUTPATIENT
Start: 2025-06-09 | End: 2025-06-09 | Stop reason: HOSPADM

## 2025-06-09 RX ADMIN — DEXAMETHASONE SODIUM PHOSPHATE 12 MG: 4 INJECTION, SOLUTION INTRAMUSCULAR; INTRAVENOUS at 08:36

## 2025-06-09 RX ADMIN — ACETAMINOPHEN 650 MG: 325 TABLET ORAL at 08:27

## 2025-06-09 RX ADMIN — FOSAPREPITANT 150 MG: 150 INJECTION, POWDER, LYOPHILIZED, FOR SOLUTION INTRAVENOUS at 08:55

## 2025-06-09 RX ADMIN — SACITUZUMAB GOVITECAN 900 MG: 180 POWDER, FOR SOLUTION INTRAVENOUS at 09:39

## 2025-06-09 RX ADMIN — FAMOTIDINE 20 MG: 10 INJECTION INTRAVENOUS at 08:37

## 2025-06-09 RX ADMIN — PALONOSETRON HYDROCHLORIDE 0.25 MG: 0.25 INJECTION INTRAVENOUS at 08:40

## 2025-06-09 RX ADMIN — DIPHENHYDRAMINE HYDROCHLORIDE 25 MG: 50 INJECTION INTRAMUSCULAR; INTRAVENOUS at 08:43

## 2025-06-09 RX ADMIN — SODIUM CHLORIDE: 9 INJECTION, SOLUTION INTRAVENOUS at 08:27

## 2025-06-09 ASSESSMENT — FIBROSIS 4 INDEX: FIB4 SCORE: 1.07

## 2025-06-09 NOTE — PROGRESS NOTES
Chemotherapy Verification - PRIMARY RN      Height = 160cm  Weight = 91.9kg  BSA = 2.02m2       Medication: Trodelvy  Dose: 10mg/kg  Calculated Dose: 919mg                             (In mg/m2, AUC, mg/kg)       I confirm this process was performed independently with the BSA and all final chemotherapy dosing calculations congruent.  Any discrepancies of 10% or greater have been addressed with the chemotherapy pharmacist. The resolution of the discrepancy has been documented in the EPIC progress notes.

## 2025-06-09 NOTE — PROGRESS NOTES
Chemotherapy Verification - SECONDARY RN       Height = 160 cm  Weight = 91.9 kg  BSA = 2.02 m2       Medication: Trodelvy  Dose: 10 mg/kg  Calculated Dose:   919 mg                                I confirm that this process was performed independently.

## 2025-06-09 NOTE — PROGRESS NOTES
Pt presents to [facility] for C5D8 Trodelvy. Pt is ambulatory and accompanied by her daughter, Kayleen. Assessment completed, POC discussed. Established PIV in R ventral wrist; brisk blood return observed and pt tolerated well. Labs drawn and within treatable parameters. Pre meds administered per MAR. Trodelvy infused using in-line filter with no s/s of adverse reactions. Pt completed an uneventful 30-minute observation following Trodelvy completion, treatment tolerated well. PIV flushed and brisk blood return observed before removing; sterile gauze/coban dressing applied. Next appointment confirmed and education provided. Pt discharged to self care with all personal belongings and in NAD.

## 2025-06-11 ENCOUNTER — PATIENT OUTREACH (OUTPATIENT)
Dept: ONCOLOGY | Facility: MEDICAL CENTER | Age: 60
End: 2025-06-11
Payer: MEDICARE

## 2025-06-11 NOTE — PROGRESS NOTES
Oncology Nurse Navigation  C5 completed at [facility].  Phoned for follow up.  No answer, left message.

## 2025-06-17 ENCOUNTER — PATIENT OUTREACH (OUTPATIENT)
Dept: HEALTH INFORMATION MANAGEMENT | Facility: OTHER | Age: 60
End: 2025-06-17
Payer: MEDICARE

## 2025-06-17 DIAGNOSIS — E11.40 TYPE 2 DIABETES MELLITUS WITH DIABETIC NEUROPATHY, WITHOUT LONG-TERM CURRENT USE OF INSULIN (HCC): Primary | ICD-10-CM

## 2025-06-17 DIAGNOSIS — F41.9 ANXIETY IN CANCER PATIENT: ICD-10-CM

## 2025-06-17 DIAGNOSIS — I10 ESSENTIAL HYPERTENSION: ICD-10-CM

## 2025-06-17 DIAGNOSIS — J96.11 CHRONIC RESPIRATORY FAILURE WITH HYPOXIA (HCC): ICD-10-CM

## 2025-06-17 NOTE — CARE PLAN
Problem: Knowledge of diabetes - Long Term  Goal: Patient educated about diabetes  Note: The patient is adherent with her medications.   Intervention: Educate patient on the importance of medication adherence     Problem: Recognizing current health habits that may contribute to diabetes - Long Term  Goal: In conjunction with patient, identify which health habits can be modified  Outcome: Progressing  Note: The patient is aware of health habits that can impact diabetes and is very reasonable about taking into account her body's needs during cancer treatment.   Intervention: Provide education and support about changing health habits

## 2025-06-17 NOTE — PROGRESS NOTES
Reason for Follow-Up:    I spoke with Sol this afternoon by phone in order to complete her monthly PCM follow up.     Current Health Status:    The patient is followed by PCM based on diagnoses of DM, HTN, and Hyperlipidemia.     Medical Updates:  Since our last conversation the patient has communicated with her medical team by Tia and followed up with her oncology and pulmonology providers.     Medication Updates:  The patient states that there is a shot that Dr. Kline has decided to hold at this point to see if it helps her energy levels.     Symptoms:  Resolving stomach complaints from last week     Plan of Care Goals and Progress:    Goal 1. Over the next 4 weeks the patient will take concrete and consistent measures towards optimizing cardiovascular health.      Progress:  The patient reports being asymptomatic on current plan of care. The patient reports taking all medications as indicated in her chart.       Barriers:  The patient has chronic cardiovascular concerns that will require ongoing management.      Interventions:  The patient will be mindful of sodium, saturated/trans fats, and cholesterol in foods. The patient will collaborate with her providers on needs specific to her cancer treatment.     Education:  Heart healthy nutrition reinforced.     Goal 2. Over the next 4 weeks the patient will take concrete and consistent measures towards optimizing diabetic control.      Progress:  The patient is well versed in principles of diabetic nutrition.       Barriers:  The patient is currently undergoing cancer treatment which has dietary repercussions.      Interventions:  The patient is advised to emphasize lean proteins and veggies and modify as needed to best support her cancer therapy.      Education: Education provided on nutrition during cancer, article sent by mail.       Patient's Concerns and Feedback (Self Management of Care):     The patient's primary concern at this point is getting  through her oncology treatments. She states full understanding of her plan of care and recommended care measures. The patient is aware of her future appointment dates and times and plans to attend as scheduled.     Next Steps:     Follow-Up Plan:  The patient and I will touch base on her diabetes and cardiovascular goals in 4 weeks.       Appointments:  6/19/25 (9:30, Hem/Onc), 6/23/25 (7:30, Onc/Inf), 6/30/25 (7:30, Onc/Inf), 7/14/25 (7:00, Hem/Onc), 7/21/25 (7:30, Onc/Inf), 8/13/25 (1:25 Arrival, [place])     Contact Information:  Call [phone] with any questions or concerns.

## 2025-06-18 ENCOUNTER — TELEPHONE (OUTPATIENT)
Dept: HEALTH INFORMATION MANAGEMENT | Facility: OTHER | Age: 60
End: 2025-06-18
Payer: MEDICARE

## 2025-06-18 ENCOUNTER — APPOINTMENT (OUTPATIENT)
Dept: PHYSICAL THERAPY | Facility: REHABILITATION | Age: 60
End: 2025-06-18
Attending: FAMILY MEDICINE
Payer: MEDICARE

## 2025-06-18 RX ORDER — SEMAGLUTIDE 1.34 MG/ML
1 INJECTION, SOLUTION SUBCUTANEOUS
Qty: 3 ML | Refills: 0 | Status: SHIPPED | OUTPATIENT
Start: 2025-06-18

## 2025-06-18 RX ORDER — LORAZEPAM 0.5 MG/1
0.5 TABLET ORAL EVERY 8 HOURS PRN
Qty: 21 TABLET | Refills: 0 | Status: SHIPPED | OUTPATIENT
Start: 2025-06-18 | End: 2025-06-30

## 2025-06-18 NOTE — TELEPHONE ENCOUNTER
I spoke to the patient this afternoon to update her that her PCP has extended her 1 mg Ozempic until her appointment in August.The patient states understanding.

## 2025-06-19 ENCOUNTER — HOSPITAL ENCOUNTER (OUTPATIENT)
Facility: MEDICAL CENTER | Age: 60
End: 2025-06-19
Attending: INTERNAL MEDICINE
Payer: MEDICARE

## 2025-06-19 VITALS
HEART RATE: 88 BPM | WEIGHT: 203 LBS | OXYGEN SATURATION: 95 % | SYSTOLIC BLOOD PRESSURE: 150 MMHG | DIASTOLIC BLOOD PRESSURE: 80 MMHG | RESPIRATION RATE: 16 BRPM | BODY MASS INDEX: 35.97 KG/M2 | HEIGHT: 63 IN | TEMPERATURE: 97.3 F

## 2025-06-19 DIAGNOSIS — C50.912 BREAST CANCER, STAGE 4, LEFT (HCC): Primary | ICD-10-CM

## 2025-06-19 DIAGNOSIS — C80.0 CARCINOMATOSIS (HCC): ICD-10-CM

## 2025-06-19 PROCEDURE — 99214 OFFICE O/P EST MOD 30 MIN: CPT | Performed by: INTERNAL MEDICINE

## 2025-06-19 PROCEDURE — 99212 OFFICE O/P EST SF 10 MIN: CPT | Performed by: INTERNAL MEDICINE

## 2025-06-19 RX ORDER — 0.9 % SODIUM CHLORIDE 0.9 %
3 VIAL (ML) INJECTION PRN
Status: CANCELLED | OUTPATIENT
Start: 2025-06-22

## 2025-06-19 RX ORDER — 0.9 % SODIUM CHLORIDE 0.9 %
VIAL (ML) INJECTION PRN
Status: CANCELLED | OUTPATIENT
Start: 2025-06-22

## 2025-06-19 RX ORDER — EPINEPHRINE 1 MG/ML(1)
0.5 AMPUL (ML) INJECTION PRN
Status: CANCELLED | OUTPATIENT
Start: 2025-06-30

## 2025-06-19 RX ORDER — SODIUM CHLORIDE 9 MG/ML
INJECTION, SOLUTION INTRAVENOUS CONTINUOUS
Status: CANCELLED | OUTPATIENT
Start: 2025-06-23

## 2025-06-19 RX ORDER — DIPHENHYDRAMINE HYDROCHLORIDE 50 MG/ML
25 INJECTION, SOLUTION INTRAMUSCULAR; INTRAVENOUS ONCE
Status: CANCELLED | OUTPATIENT
Start: 2025-06-30 | End: 2025-06-30

## 2025-06-19 RX ORDER — 0.9 % SODIUM CHLORIDE 0.9 %
10 VIAL (ML) INJECTION PRN
Status: CANCELLED | OUTPATIENT
Start: 2025-06-23

## 2025-06-19 RX ORDER — SODIUM CHLORIDE 9 MG/ML
INJECTION, SOLUTION INTRAVENOUS CONTINUOUS
Status: CANCELLED | OUTPATIENT
Start: 2025-06-30

## 2025-06-19 RX ORDER — ATROPINE SULFATE 1 MG/ML
0.5 INJECTION, SOLUTION INTRAVENOUS PRN
Status: CANCELLED | OUTPATIENT
Start: 2025-06-23

## 2025-06-19 RX ORDER — 0.9 % SODIUM CHLORIDE 0.9 %
VIAL (ML) INJECTION PRN
Status: CANCELLED | OUTPATIENT
Start: 2025-06-30

## 2025-06-19 RX ORDER — ACETAMINOPHEN 325 MG/1
650 TABLET ORAL ONCE
Status: CANCELLED | OUTPATIENT
Start: 2025-06-23

## 2025-06-19 RX ORDER — DIPHENHYDRAMINE HYDROCHLORIDE 50 MG/ML
50 INJECTION, SOLUTION INTRAMUSCULAR; INTRAVENOUS PRN
Status: CANCELLED | OUTPATIENT
Start: 2025-06-23

## 2025-06-19 RX ORDER — ACETAMINOPHEN 325 MG/1
650 TABLET ORAL ONCE
Status: CANCELLED | OUTPATIENT
Start: 2025-06-30

## 2025-06-19 RX ORDER — LOPERAMIDE HYDROCHLORIDE 2 MG/1
2 CAPSULE ORAL
Status: CANCELLED | OUTPATIENT
Start: 2025-06-23

## 2025-06-19 RX ORDER — ONDANSETRON 2 MG/ML
4 INJECTION INTRAMUSCULAR; INTRAVENOUS PRN
Status: CANCELLED | OUTPATIENT
Start: 2025-06-23

## 2025-06-19 RX ORDER — 0.9 % SODIUM CHLORIDE 0.9 %
10 VIAL (ML) INJECTION PRN
Status: CANCELLED | OUTPATIENT
Start: 2025-06-30

## 2025-06-19 RX ORDER — PALONOSETRON 0.05 MG/ML
0.25 INJECTION, SOLUTION INTRAVENOUS ONCE
Status: CANCELLED | OUTPATIENT
Start: 2025-06-23 | End: 2025-06-23

## 2025-06-19 RX ORDER — 0.9 % SODIUM CHLORIDE 0.9 %
VIAL (ML) INJECTION PRN
Status: CANCELLED | OUTPATIENT
Start: 2025-06-23

## 2025-06-19 RX ORDER — METHYLPREDNISOLONE SODIUM SUCCINATE 125 MG/2ML
125 INJECTION, POWDER, LYOPHILIZED, FOR SOLUTION INTRAMUSCULAR; INTRAVENOUS PRN
Status: CANCELLED | OUTPATIENT
Start: 2025-06-30

## 2025-06-19 RX ORDER — EPINEPHRINE 1 MG/ML(1)
0.5 AMPUL (ML) INJECTION PRN
Status: CANCELLED | OUTPATIENT
Start: 2025-06-23

## 2025-06-19 RX ORDER — 0.9 % SODIUM CHLORIDE 0.9 %
3 VIAL (ML) INJECTION PRN
Status: CANCELLED | OUTPATIENT
Start: 2025-06-23

## 2025-06-19 RX ORDER — METHYLPREDNISOLONE SODIUM SUCCINATE 125 MG/2ML
125 INJECTION, POWDER, LYOPHILIZED, FOR SOLUTION INTRAMUSCULAR; INTRAVENOUS PRN
Status: CANCELLED | OUTPATIENT
Start: 2025-06-23

## 2025-06-19 RX ORDER — ATROPINE SULFATE 1 MG/ML
0.5 INJECTION, SOLUTION INTRAVENOUS PRN
Status: CANCELLED | OUTPATIENT
Start: 2025-06-30

## 2025-06-19 RX ORDER — ONDANSETRON 2 MG/ML
4 INJECTION INTRAMUSCULAR; INTRAVENOUS PRN
Status: CANCELLED | OUTPATIENT
Start: 2025-06-30

## 2025-06-19 RX ORDER — 0.9 % SODIUM CHLORIDE 0.9 %
10 VIAL (ML) INJECTION PRN
Status: CANCELLED | OUTPATIENT
Start: 2025-06-22

## 2025-06-19 RX ORDER — LOPERAMIDE HYDROCHLORIDE 2 MG/1
4 CAPSULE ORAL PRN
Status: CANCELLED | OUTPATIENT
Start: 2025-06-30

## 2025-06-19 RX ORDER — LOPERAMIDE HYDROCHLORIDE 2 MG/1
4 CAPSULE ORAL PRN
Status: CANCELLED | OUTPATIENT
Start: 2025-06-23

## 2025-06-19 RX ORDER — DIPHENHYDRAMINE HYDROCHLORIDE 50 MG/ML
25 INJECTION, SOLUTION INTRAMUSCULAR; INTRAVENOUS ONCE
Status: CANCELLED | OUTPATIENT
Start: 2025-06-23 | End: 2025-06-23

## 2025-06-19 RX ORDER — PROCHLORPERAZINE MALEATE 10 MG
10 TABLET ORAL EVERY 6 HOURS PRN
Status: CANCELLED | OUTPATIENT
Start: 2025-06-23

## 2025-06-19 RX ORDER — ONDANSETRON 8 MG/1
8 TABLET, ORALLY DISINTEGRATING ORAL PRN
Status: CANCELLED | OUTPATIENT
Start: 2025-06-23

## 2025-06-19 RX ORDER — 0.9 % SODIUM CHLORIDE 0.9 %
3 VIAL (ML) INJECTION PRN
Status: CANCELLED | OUTPATIENT
Start: 2025-06-30

## 2025-06-19 RX ORDER — LOPERAMIDE HYDROCHLORIDE 2 MG/1
2 CAPSULE ORAL
Status: CANCELLED | OUTPATIENT
Start: 2025-06-30

## 2025-06-19 RX ORDER — PALONOSETRON 0.05 MG/ML
0.25 INJECTION, SOLUTION INTRAVENOUS ONCE
Status: CANCELLED | OUTPATIENT
Start: 2025-06-30 | End: 2025-06-30

## 2025-06-19 RX ORDER — PROCHLORPERAZINE MALEATE 10 MG
10 TABLET ORAL EVERY 6 HOURS PRN
Status: CANCELLED | OUTPATIENT
Start: 2025-06-30

## 2025-06-19 RX ORDER — ONDANSETRON 8 MG/1
8 TABLET, ORALLY DISINTEGRATING ORAL PRN
Status: CANCELLED | OUTPATIENT
Start: 2025-06-30

## 2025-06-19 RX ORDER — DIPHENHYDRAMINE HYDROCHLORIDE 50 MG/ML
50 INJECTION, SOLUTION INTRAMUSCULAR; INTRAVENOUS PRN
Status: CANCELLED | OUTPATIENT
Start: 2025-06-30

## 2025-06-19 ASSESSMENT — ENCOUNTER SYMPTOMS
WEIGHT LOSS: 0
CHILLS: 0
DIARRHEA: 0
NAUSEA: 0
FEVER: 0
PALPITATIONS: 0
COUGH: 0
SHORTNESS OF BREATH: 1
VOMITING: 0
CONSTIPATION: 0
SPUTUM PRODUCTION: 0

## 2025-06-19 ASSESSMENT — FIBROSIS 4 INDEX: FIB4 SCORE: 1.03

## 2025-06-19 NOTE — PROGRESS NOTES
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 effusion from breast cancer, ER positive, ID slightly positive, HER2 negative.  PET scan shows extensive bony metastases and pleural metastases and mediastinal metastases.  Dramatic near complete clinical remission by PET CT scan March 2024.  Ongoing response PET September 2024.  Now with symptomatic abdominal pain omental thickening and peritoneal thickening consistent with carcinomatosis but negative PET scan and undetectable CT DNA but with persistent elevated CA 27-29.  Progressive disease with extensive peritoneal carcinomatosis documented 12/27/2024 at laparoscopy.  On capecitabine with progression of disease.  Now on sacituzumab govitecan since March 2025 with good tolerance and evidence of response clinically and by protein biomarker..  7.  Longstanding polycythemia dating back at least 5-year.  Status post phlebotomy with genetic work-up negative for myeloproliferative disorder.  Likely secondary to chronic hypoxemia.  She had 1 phlebotomy required and after that her hemoglobin was back to normal at 15.2.  We will continue to monitor this.  Slightly higher but no need yet for repeat phlebotomy.  Stable to improved  8.  Somatic genomic profile negative for actionable genes.  ESR 1 negative T p53 detected  9.  Marked distention of the abdomen occurring over the last couple of weeks.  Onset  of increased volume of ascites now requiring additional tap, last completed 3/6/25. Scheduled for weekly procedures for now.  Volume seem to be decreasing.  Much improved as of May 2025      Plan:  Continue sacituzumab govitecan.  She does not appear to need growth factor support.  We will see her back in 6 weeks with repeat CT scan of the chest abdomen pelvis and tumor markers.  Please note that this dictation was created using voice recognition software. I have made every reasonable attempt to correct obvious errors, but I expect that there are errors of grammar and possibly content that I did not discover before finalizing the note.

## 2025-06-20 NOTE — PROGRESS NOTES
"Pharmacy Chemotherapy Verification Note:    Dx: met Breast Cancer (ER+, HER2-)       Protocol: Sacituzumab Govitecan (Trodelvy)     Sacituzumab govitecan-hziy 10 mg/kg IV on Days 1 and 8  21-day cycle until disease progression or unacceptable toxicity   1. NCCNGuidelines® for Breast Cancer V.2.2024.   2. Staci FITZPATRICK et al. N Engl J Med. 2019;380(8):741-751.a   3. Andre et al. J Clin Oncol. 2022;40(17_suppl):CEM1295.a    Allergies:  Codeine, Hydrocodone, and Other drug     /75   Pulse 80   Temp 36.1 °C (96.9 °F) (Temporal)   Resp 18   Ht 1.6 m (5' 2.99\")   Wt 93 kg (205 lb 0.4 oz)   SpO2 96%   BMI 36.33 kg/m²  Body surface area is 2.03 meters squared.    Labs from 6/23/25 reviewed - all within treatment parameters.    Drug Order   (Drug name, dose, route, IV Fluid & volume, frequency, number of doses) Cycle: 6 Day 1  Previous treatment: C5D8 on 6/9/25; s/p Capecitabine x 2C, Ribociclib + Faslodex     Medication = Sacituzumab govitecan  Base Dose = 10 mg/kg  Calc Dose: Base Dose x 93 kg = 930 mg  Final Dose = 900 mg  Route = IV  Fluid & Volume =  mL  Admin Duration = Over 1-2 hours          <10% difference, okay to treat with final dose       By my signature below, I confirm this process was performed independently with the BSA and all final chemotherapy dosing calculations congruent. I have reviewed the above chemotherapy order and that my calculation of the final dose and BSA (when applicable) corroborate those calculations of the  pharmacist.     Zena Gore, PharmD, BCOP    "

## 2025-06-23 ENCOUNTER — OUTPATIENT INFUSION SERVICES (OUTPATIENT)
Facility: MEDICAL CENTER | Age: 60
End: 2025-06-23
Attending: INTERNAL MEDICINE
Payer: MEDICARE

## 2025-06-23 VITALS
DIASTOLIC BLOOD PRESSURE: 75 MMHG | RESPIRATION RATE: 18 BRPM | TEMPERATURE: 96.9 F | HEIGHT: 63 IN | BODY MASS INDEX: 36.33 KG/M2 | HEART RATE: 80 BPM | WEIGHT: 205.03 LBS | SYSTOLIC BLOOD PRESSURE: 120 MMHG | OXYGEN SATURATION: 96 %

## 2025-06-23 DIAGNOSIS — C50.912 BREAST CANCER, STAGE 4, LEFT (HCC): Primary | ICD-10-CM

## 2025-06-23 DIAGNOSIS — R30.0 DYSURIA: ICD-10-CM

## 2025-06-23 LAB
ALBUMIN SERPL BCP-MCNC: 3.8 G/DL (ref 3.2–4.9)
ALBUMIN/GLOB SERPL: 1.2 G/DL
ALP SERPL-CCNC: 136 U/L (ref 30–99)
ALT SERPL-CCNC: 33 U/L (ref 2–50)
ANION GAP SERPL CALC-SCNC: 14 MMOL/L (ref 7–16)
APPEARANCE UR: CLEAR
AST SERPL-CCNC: 29 U/L (ref 12–45)
BACTERIA #/AREA URNS HPF: ABNORMAL /HPF
BASOPHILS # BLD AUTO: 0.7 % (ref 0–1.8)
BASOPHILS # BLD: 0.04 K/UL (ref 0–0.12)
BILIRUB SERPL-MCNC: 0.3 MG/DL (ref 0.1–1.5)
BILIRUB UR QL STRIP.AUTO: NEGATIVE
BUN SERPL-MCNC: 11 MG/DL (ref 8–22)
CALCIUM ALBUM COR SERPL-MCNC: 10.1 MG/DL (ref 8.5–10.5)
CALCIUM SERPL-MCNC: 9.9 MG/DL (ref 8.5–10.5)
CASTS URNS QL MICRO: ABNORMAL /LPF (ref 0–2)
CHLORIDE SERPL-SCNC: 103 MMOL/L (ref 96–112)
CO2 SERPL-SCNC: 22 MMOL/L (ref 20–33)
COLOR UR: YELLOW
CREAT SERPL-MCNC: 0.61 MG/DL (ref 0.5–1.4)
EOSINOPHIL # BLD AUTO: 0.14 K/UL (ref 0–0.51)
EOSINOPHIL NFR BLD: 2.3 % (ref 0–6.9)
EPITHELIAL CELLS 1715: ABNORMAL /HPF (ref 0–5)
ERYTHROCYTE [DISTWIDTH] IN BLOOD BY AUTOMATED COUNT: 54.5 FL (ref 35.9–50)
GFR SERPLBLD CREATININE-BSD FMLA CKD-EPI: 101 ML/MIN/1.73 M 2
GLOBULIN SER CALC-MCNC: 3.2 G/DL (ref 1.9–3.5)
GLUCOSE SERPL-MCNC: 153 MG/DL (ref 65–99)
GLUCOSE UR STRIP.AUTO-MCNC: NEGATIVE MG/DL
HCT VFR BLD AUTO: 40.3 % (ref 37–47)
HGB BLD-MCNC: 13.2 G/DL (ref 12–16)
IMM GRANULOCYTES # BLD AUTO: 0.02 K/UL (ref 0–0.11)
IMM GRANULOCYTES NFR BLD AUTO: 0.3 % (ref 0–0.9)
KETONES UR STRIP.AUTO-MCNC: NEGATIVE MG/DL
LEUKOCYTE ESTERASE UR QL STRIP.AUTO: ABNORMAL
LYMPHOCYTES # BLD AUTO: 0.85 K/UL (ref 1–4.8)
LYMPHOCYTES NFR BLD: 14.2 % (ref 22–41)
MCH RBC QN AUTO: 32.2 PG (ref 27–33)
MCHC RBC AUTO-ENTMCNC: 32.8 G/DL (ref 32.2–35.5)
MCV RBC AUTO: 98.3 FL (ref 81.4–97.8)
MICRO URNS: ABNORMAL
MONOCYTES # BLD AUTO: 0.75 K/UL (ref 0–0.85)
MONOCYTES NFR BLD AUTO: 12.5 % (ref 0–13.4)
NEUTROPHILS # BLD AUTO: 4.19 K/UL (ref 1.82–7.42)
NEUTROPHILS NFR BLD: 70 % (ref 44–72)
NITRITE UR QL STRIP.AUTO: NEGATIVE
NRBC # BLD AUTO: 0 K/UL
NRBC BLD-RTO: 0 /100 WBC (ref 0–0.2)
OUTPT INFUS CBC COMMENT OICOM: ABNORMAL
PH UR STRIP.AUTO: 5.5 [PH] (ref 5–8)
PLATELET # BLD AUTO: 285 K/UL (ref 164–446)
PMV BLD AUTO: 9.2 FL (ref 9–12.9)
POTASSIUM SERPL-SCNC: 4 MMOL/L (ref 3.6–5.5)
PROT SERPL-MCNC: 7 G/DL (ref 6–8.2)
PROT UR QL STRIP: NEGATIVE MG/DL
RBC # BLD AUTO: 4.1 M/UL (ref 4.2–5.4)
RBC # URNS HPF: ABNORMAL /HPF
RBC UR QL AUTO: NEGATIVE
SODIUM SERPL-SCNC: 139 MMOL/L (ref 135–145)
SP GR UR STRIP.AUTO: 1.02
UROBILINOGEN UR STRIP.AUTO-MCNC: 0.2 EU/DL
WBC # BLD AUTO: 6 K/UL (ref 4.8–10.8)
WBC #/AREA URNS HPF: ABNORMAL /HPF

## 2025-06-23 PROCEDURE — 96415 CHEMO IV INFUSION ADDL HR: CPT

## 2025-06-23 PROCEDURE — 96413 CHEMO IV INFUSION 1 HR: CPT

## 2025-06-23 PROCEDURE — 700105 HCHG RX REV CODE 258: Performed by: NURSE PRACTITIONER

## 2025-06-23 PROCEDURE — 700102 HCHG RX REV CODE 250 W/ 637 OVERRIDE(OP): Performed by: NURSE PRACTITIONER

## 2025-06-23 PROCEDURE — 700111 HCHG RX REV CODE 636 W/ 250 OVERRIDE (IP): Performed by: NURSE PRACTITIONER

## 2025-06-23 PROCEDURE — 85025 COMPLETE CBC W/AUTO DIFF WBC: CPT

## 2025-06-23 PROCEDURE — 96367 TX/PROPH/DG ADDL SEQ IV INF: CPT

## 2025-06-23 PROCEDURE — 81001 URINALYSIS AUTO W/SCOPE: CPT

## 2025-06-23 PROCEDURE — 80053 COMPREHEN METABOLIC PANEL: CPT

## 2025-06-23 PROCEDURE — A9270 NON-COVERED ITEM OR SERVICE: HCPCS | Performed by: NURSE PRACTITIONER

## 2025-06-23 PROCEDURE — 96375 TX/PRO/DX INJ NEW DRUG ADDON: CPT

## 2025-06-23 RX ORDER — DIPHENHYDRAMINE HYDROCHLORIDE 50 MG/ML
25 INJECTION, SOLUTION INTRAMUSCULAR; INTRAVENOUS ONCE
Status: COMPLETED | OUTPATIENT
Start: 2025-06-23 | End: 2025-06-23

## 2025-06-23 RX ORDER — NITROFURANTOIN 25; 75 MG/1; MG/1
100 CAPSULE ORAL 2 TIMES DAILY
Qty: 10 CAPSULE | Refills: 0 | Status: SHIPPED | OUTPATIENT
Start: 2025-06-23 | End: 2025-06-30

## 2025-06-23 RX ORDER — ACETAMINOPHEN 325 MG/1
650 TABLET ORAL ONCE
Status: COMPLETED | OUTPATIENT
Start: 2025-06-23 | End: 2025-06-23

## 2025-06-23 RX ORDER — PALONOSETRON 0.05 MG/ML
0.25 INJECTION, SOLUTION INTRAVENOUS ONCE
Status: COMPLETED | OUTPATIENT
Start: 2025-06-23 | End: 2025-06-23

## 2025-06-23 RX ADMIN — DIPHENHYDRAMINE HYDROCHLORIDE 25 MG: 50 INJECTION INTRAMUSCULAR; INTRAVENOUS at 09:13

## 2025-06-23 RX ADMIN — FAMOTIDINE 20 MG: 10 INJECTION INTRAVENOUS at 09:15

## 2025-06-23 RX ADMIN — ACETAMINOPHEN 650 MG: 325 TABLET ORAL at 09:13

## 2025-06-23 RX ADMIN — DEXAMETHASONE SODIUM PHOSPHATE 12 MG: 4 INJECTION, SOLUTION INTRA-ARTICULAR; INTRALESIONAL; INTRAMUSCULAR; INTRAVENOUS; SOFT TISSUE at 10:03

## 2025-06-23 RX ADMIN — SACITUZUMAB GOVITECAN 900 MG: 180 POWDER, FOR SOLUTION INTRAVENOUS at 11:03

## 2025-06-23 RX ADMIN — FOSAPREPITANT 150 MG: 150 INJECTION, POWDER, LYOPHILIZED, FOR SOLUTION INTRAVENOUS at 10:17

## 2025-06-23 RX ADMIN — PALONOSETRON HYDROCHLORIDE 0.25 MG: 0.25 INJECTION INTRAVENOUS at 09:17

## 2025-06-23 ASSESSMENT — PAIN DESCRIPTION - PAIN TYPE: TYPE: ACUTE PAIN

## 2025-06-23 ASSESSMENT — FIBROSIS 4 INDEX: FIB4 SCORE: 1.03

## 2025-06-23 NOTE — PROGRESS NOTES
Chemotherapy Verification - PRIMARY RN      Height = 160cm  Weight = 93kg  BSA = 2.03m2       Medication: sacituzumab govitecan (Trodelvy)  Dose: 10mg/kg  Calculated Dose: 930mg                             (In mg/m2, AUC, mg/kg)         I confirm this process was performed independently with the BSA and all final chemotherapy dosing calculations congruent.  Any discrepancies of 10% or greater have been addressed with the chemotherapy pharmacist. The resolution of the discrepancy has been documented in the EPIC progress notes.

## 2025-06-23 NOTE — PROGRESS NOTES
Sol arrived to the [facility] for Day 1, Cycle 6 of Trodelvy. Plan of care reviewed, assessment completed, patient reports tolerating past treatments well. Patient is reporting some intermittent burning with urination as well as intermittent frequency. IVETT Canas notified, urine sample collected per order.   PIV established ultrasound by JAMAICA Larkin, flushed briskly, brisk blood return noted, labs drawn as ordered.   Labs reviewed, patient is within set parameters for treatment today.   With Pre-meds, patient reported pain at the IV site. PIV infiltrated, PIV removed.   New IV established with ultrasound, flushed briskly, brisk blood return noted.   Pre-medications completed without incident.   Trodelvy administered per the MAR, patient tolerated well. 30 minute observation completed with no signs of adverse reaction observed or reported.   Urine analysis reviewed by Lorraine ROOT, prescription placed by provider, patient notified and verbalized understanding of plan of care.   PIV flushed post chemotherapy, positive blood return noted. PIV removed with tip intact, site dressed with gauze and coban.   Next appointment was confirmed with the patient and she was then discharged home in stable condition with her daughter Kayleen.

## 2025-06-23 NOTE — PROGRESS NOTES
Chemotherapy Verification - SECONDARY RN       Height = 1.6 m  Weight = 93 kg  BSA = 2.03 m2       Medication: Trodelvy  Dose: 10 mg/kg  Calculated Dose: 930 mg                             (In mg/m2, AUC, mg/kg)     I confirm that this process was performed independently.

## 2025-06-23 NOTE — PROGRESS NOTES
"Pharmacy Chemotherapy Verification    DX: breast cancer    Cycle 6, Day 1  Previous treatment = C5D8 6/9/25    Regimen: sacituzumab govitecan-hziy  Sacituzumab govitecan-hziy 10 mg/kg IV on Days 1 and 8   q21 days until disease progression or unacceptable toxicity  NCCN Guidelines for Breast Cancer V.2.2024  shayy Beltran - N Engl J Med. 2019 Feb 21;380(0):381-769.   shayy Powell - Journal of Clinical OncologyVolume 40, Number 17_suppl     Allergies:Codeine, Hydrocodone, and Other drug  /75   Pulse 80   Temp 36.1 °C (96.9 °F) (Temporal)   Resp 18   Ht 1.6 m (5' 2.99\")   Wt 93 kg (205 lb 0.4 oz)   SpO2 96%   BMI 36.33 kg/m²   Body surface area is 2.03 meters squared.    Labs 6/23/25 :  ANC = ~ 4190  Plt= 285k   Hgb= 13.2      Xgeva plan remains on hold    Not receiving Neulasta at this time for tolerance    Sacituzumab govitecan-hziy 10 mg/kg x 93 kg = 930 mg   <10% difference, ok to treat with final dose = 900 mg IV    Christine Thompson, PharmD  "

## 2025-06-23 NOTE — PROGRESS NOTES
Patient noted to have intermittent burning with urination as well as increased frequent urination.  UA completed which shows few bacteria, trace leukocytes.  Patient in active treatment for her metastatic breast cancer.  As she is symptomatic I will go ahead and treat her with a course of Macrobid 100 mg p.o. twice daily x 5 days.

## 2025-06-24 ENCOUNTER — APPOINTMENT (OUTPATIENT)
Facility: MEDICAL CENTER | Age: 60
End: 2025-06-24
Attending: INTERNAL MEDICINE
Payer: MEDICARE

## 2025-06-26 NOTE — PROGRESS NOTES
"Pharmacy Chemotherapy Verification Note:    Dx: met Breast Cancer (ER+, HER2-)       Protocol: Sacituzumab Govitecan (Trodelvy)     Sacituzumab govitecan-hziy 10 mg/kg IV on Days 1 and 8  21-day cycle until disease progression or unacceptable toxicity   1. NCCNGuidelines® for Breast Cancer V.2.2024.   2. Staci FITZPATRICK et al. N Engl J Med. 2019;380(8):741-751.a   3. Andre et al. J Clin Oncol. 2022;40(17_suppl):YAE1428.a    Allergies:  Codeine, Hydrocodone, and Other drug     BP (!) 159/85   Pulse 88   Temp 35.8 °C (96.5 °F) (Temporal)   Resp 18   Ht 1.6 m (5' 2.99\")   Wt 93.2 kg (205 lb 7.5 oz)   SpO2 92%   BMI 36.41 kg/m²  Body surface area is 2.04 meters squared.    Labs from 6/30/25 reviewed - all within treatment parameters.    Drug Order   (Drug name, dose, route, IV Fluid & volume, frequency, number of doses) Cycle: 6 Day 8  Previous treatment: C6D1 on 6/23/25; s/p Capecitabine x 2C, Ribociclib + Faslodex     Medication = Sacituzumab govitecan  Base Dose = 10 mg/kg  Calc Dose: Base Dose x 93.2 kg = 932 mg  Final Dose = 900 mg  Route = IV  Fluid & Volume =  mL  Admin Duration = Over 1-2 hours          <10% difference, okay to treat with final dose       By my signature below, I confirm this process was performed independently with the BSA and all final chemotherapy dosing calculations congruent. I have reviewed the above chemotherapy order and that my calculation of the final dose and BSA (when applicable) corroborate those calculations of the  pharmacist.     Zena Gore, PharmD, BCOP    "

## 2025-06-30 ENCOUNTER — OUTPATIENT INFUSION SERVICES (OUTPATIENT)
Facility: MEDICAL CENTER | Age: 60
End: 2025-06-30
Attending: INTERNAL MEDICINE
Payer: MEDICARE

## 2025-06-30 VITALS
BODY MASS INDEX: 36.41 KG/M2 | HEIGHT: 63 IN | HEART RATE: 88 BPM | DIASTOLIC BLOOD PRESSURE: 85 MMHG | WEIGHT: 205.47 LBS | RESPIRATION RATE: 18 BRPM | OXYGEN SATURATION: 92 % | TEMPERATURE: 96.5 F | SYSTOLIC BLOOD PRESSURE: 159 MMHG

## 2025-06-30 DIAGNOSIS — C50.912 BREAST CANCER, STAGE 4, LEFT (HCC): Primary | ICD-10-CM

## 2025-06-30 LAB
BASOPHILS # BLD AUTO: 0.5 % (ref 0–1.8)
BASOPHILS # BLD: 0.03 K/UL (ref 0–0.12)
EOSINOPHIL # BLD AUTO: 0.13 K/UL (ref 0–0.51)
EOSINOPHIL NFR BLD: 2 % (ref 0–6.9)
ERYTHROCYTE [DISTWIDTH] IN BLOOD BY AUTOMATED COUNT: 53.4 FL (ref 35.9–50)
HCT VFR BLD AUTO: 41.3 % (ref 37–47)
HGB BLD-MCNC: 13.6 G/DL (ref 12–16)
IMM GRANULOCYTES # BLD AUTO: 0.04 K/UL (ref 0–0.11)
IMM GRANULOCYTES NFR BLD AUTO: 0.6 % (ref 0–0.9)
LYMPHOCYTES # BLD AUTO: 1.03 K/UL (ref 1–4.8)
LYMPHOCYTES NFR BLD: 16.2 % (ref 22–41)
MCH RBC QN AUTO: 32.5 PG (ref 27–33)
MCHC RBC AUTO-ENTMCNC: 32.9 G/DL (ref 32.2–35.5)
MCV RBC AUTO: 98.6 FL (ref 81.4–97.8)
MONOCYTES # BLD AUTO: 0.78 K/UL (ref 0–0.85)
MONOCYTES NFR BLD AUTO: 12.2 % (ref 0–13.4)
NEUTROPHILS # BLD AUTO: 4.36 K/UL (ref 1.82–7.42)
NEUTROPHILS NFR BLD: 68.5 % (ref 44–72)
NRBC # BLD AUTO: 0 K/UL
NRBC BLD-RTO: 0 /100 WBC (ref 0–0.2)
OUTPT INFUS CBC COMMENT OICOM: ABNORMAL
PLATELET # BLD AUTO: 318 K/UL (ref 164–446)
PMV BLD AUTO: 9.2 FL (ref 9–12.9)
RBC # BLD AUTO: 4.19 M/UL (ref 4.2–5.4)
WBC # BLD AUTO: 6.4 K/UL (ref 4.8–10.8)

## 2025-06-30 PROCEDURE — 96375 TX/PRO/DX INJ NEW DRUG ADDON: CPT

## 2025-06-30 PROCEDURE — 700111 HCHG RX REV CODE 636 W/ 250 OVERRIDE (IP): Mod: JZ | Performed by: NURSE PRACTITIONER

## 2025-06-30 PROCEDURE — A9270 NON-COVERED ITEM OR SERVICE: HCPCS | Performed by: NURSE PRACTITIONER

## 2025-06-30 PROCEDURE — 36415 COLL VENOUS BLD VENIPUNCTURE: CPT

## 2025-06-30 PROCEDURE — 96413 CHEMO IV INFUSION 1 HR: CPT

## 2025-06-30 PROCEDURE — 700102 HCHG RX REV CODE 250 W/ 637 OVERRIDE(OP): Performed by: STUDENT IN AN ORGANIZED HEALTH CARE EDUCATION/TRAINING PROGRAM

## 2025-06-30 PROCEDURE — 700105 HCHG RX REV CODE 258: Performed by: NURSE PRACTITIONER

## 2025-06-30 PROCEDURE — 96367 TX/PROPH/DG ADDL SEQ IV INF: CPT

## 2025-06-30 PROCEDURE — 700102 HCHG RX REV CODE 250 W/ 637 OVERRIDE(OP): Performed by: NURSE PRACTITIONER

## 2025-06-30 PROCEDURE — A9270 NON-COVERED ITEM OR SERVICE: HCPCS | Performed by: STUDENT IN AN ORGANIZED HEALTH CARE EDUCATION/TRAINING PROGRAM

## 2025-06-30 RX ORDER — PALONOSETRON 0.05 MG/ML
0.25 INJECTION, SOLUTION INTRAVENOUS ONCE
Status: COMPLETED | OUTPATIENT
Start: 2025-06-30 | End: 2025-06-30

## 2025-06-30 RX ORDER — SODIUM CHLORIDE 9 MG/ML
INJECTION, SOLUTION INTRAVENOUS CONTINUOUS
Status: DISCONTINUED | OUTPATIENT
Start: 2025-06-30 | End: 2025-06-30 | Stop reason: HOSPADM

## 2025-06-30 RX ORDER — ONDANSETRON 8 MG/1
8 TABLET, ORALLY DISINTEGRATING ORAL PRN
Status: DISCONTINUED | OUTPATIENT
Start: 2025-06-30 | End: 2025-06-30 | Stop reason: HOSPADM

## 2025-06-30 RX ORDER — ACETAMINOPHEN 325 MG/1
650 TABLET ORAL ONCE
Status: COMPLETED | OUTPATIENT
Start: 2025-06-30 | End: 2025-06-30

## 2025-06-30 RX ORDER — ONDANSETRON 2 MG/ML
4 INJECTION INTRAMUSCULAR; INTRAVENOUS PRN
Status: DISCONTINUED | OUTPATIENT
Start: 2025-06-30 | End: 2025-06-30 | Stop reason: HOSPADM

## 2025-06-30 RX ORDER — EPINEPHRINE 1 MG/ML(1)
0.5 AMPUL (ML) INJECTION PRN
Status: DISCONTINUED | OUTPATIENT
Start: 2025-06-30 | End: 2025-06-30 | Stop reason: HOSPADM

## 2025-06-30 RX ORDER — LORAZEPAM 1 MG/1
0.5 TABLET ORAL ONCE
Status: COMPLETED | OUTPATIENT
Start: 2025-06-30 | End: 2025-06-30

## 2025-06-30 RX ORDER — DIPHENHYDRAMINE HYDROCHLORIDE 50 MG/ML
25 INJECTION, SOLUTION INTRAMUSCULAR; INTRAVENOUS ONCE
Status: COMPLETED | OUTPATIENT
Start: 2025-06-30 | End: 2025-06-30

## 2025-06-30 RX ORDER — DIPHENHYDRAMINE HYDROCHLORIDE 50 MG/ML
50 INJECTION, SOLUTION INTRAMUSCULAR; INTRAVENOUS PRN
Status: DISCONTINUED | OUTPATIENT
Start: 2025-06-30 | End: 2025-06-30 | Stop reason: HOSPADM

## 2025-06-30 RX ORDER — LOPERAMIDE HYDROCHLORIDE 2 MG/1
2 CAPSULE ORAL
Status: DISCONTINUED | OUTPATIENT
Start: 2025-06-30 | End: 2025-06-30 | Stop reason: HOSPADM

## 2025-06-30 RX ORDER — LOPERAMIDE HYDROCHLORIDE 2 MG/1
4 CAPSULE ORAL PRN
Status: DISCONTINUED | OUTPATIENT
Start: 2025-06-30 | End: 2025-06-30 | Stop reason: HOSPADM

## 2025-06-30 RX ORDER — METHYLPREDNISOLONE SODIUM SUCCINATE 125 MG/2ML
125 INJECTION, POWDER, LYOPHILIZED, FOR SOLUTION INTRAMUSCULAR; INTRAVENOUS PRN
Status: DISCONTINUED | OUTPATIENT
Start: 2025-06-30 | End: 2025-06-30 | Stop reason: HOSPADM

## 2025-06-30 RX ORDER — ATROPINE SULFATE 1 MG/ML
0.5 INJECTION, SOLUTION INTRAVENOUS PRN
Status: DISCONTINUED | OUTPATIENT
Start: 2025-06-30 | End: 2025-06-30 | Stop reason: HOSPADM

## 2025-06-30 RX ORDER — PROCHLORPERAZINE MALEATE 10 MG
10 TABLET ORAL EVERY 6 HOURS PRN
Status: DISCONTINUED | OUTPATIENT
Start: 2025-06-30 | End: 2025-06-30 | Stop reason: HOSPADM

## 2025-06-30 RX ADMIN — DIPHENHYDRAMINE HYDROCHLORIDE 25 MG: 50 INJECTION INTRAMUSCULAR; INTRAVENOUS at 10:28

## 2025-06-30 RX ADMIN — ACETAMINOPHEN 650 MG: 325 TABLET ORAL at 10:23

## 2025-06-30 RX ADMIN — PALONOSETRON HYDROCHLORIDE 0.25 MG: 0.25 INJECTION INTRAVENOUS at 10:23

## 2025-06-30 RX ADMIN — SODIUM CHLORIDE 900 MG: 9 INJECTION, SOLUTION INTRAVENOUS at 12:12

## 2025-06-30 RX ADMIN — SODIUM CHLORIDE: 9 INJECTION, SOLUTION INTRAVENOUS at 10:05

## 2025-06-30 RX ADMIN — FOSAPREPITANT 150 MG: 150 INJECTION, POWDER, LYOPHILIZED, FOR SOLUTION INTRAVENOUS at 10:18

## 2025-06-30 RX ADMIN — FAMOTIDINE 20 MG: 10 INJECTION INTRAVENOUS at 10:25

## 2025-06-30 RX ADMIN — LORAZEPAM 0.5 MG: 1 TABLET ORAL at 11:57

## 2025-06-30 RX ADMIN — DEXAMETHASONE SODIUM PHOSPHATE 12 MG: 4 INJECTION, SOLUTION INTRA-ARTICULAR; INTRALESIONAL; INTRAMUSCULAR; INTRAVENOUS; SOFT TISSUE at 10:07

## 2025-06-30 ASSESSMENT — FIBROSIS 4 INDEX: FIB4 SCORE: 1.1

## 2025-06-30 NOTE — PROGRESS NOTES
"Pharmacy Chemotherapy Calculations Note:    Patient Name: Sol Kern    Treatment Plan Provider: Raisa      Dx: Breast CA, Stage IV  Cycle:  6, Day 8 Previous treatment: C6D1 = 6/23/25     Protocol: sacituzumab govitecan-hziy  Sacituzumab govitecan-hziy 10 mg/kg IV on Days 1 and 8   21-day cycle until disease progression or unacceptable toxicity    NCCN Guidelines for Breast Cancer V.2.2024.  Staci FITZPATRICK et al. N Engl J Med. 2019 Feb 21;380(8):741-751.   Andre et al. Journal of Clinical OncologyVolume 40, Number 17_suppl.       BP (!) 159/85   Pulse 88   Temp 35.8 °C (96.5 °F) (Temporal)   Resp 18   Ht 1.6 m (5' 2.99\")   Wt 93.2 kg (205 lb 7.5 oz)   SpO2 92%   BMI 36.41 kg/m²  Body surface area is 2.04 meters squared.  ANC~ 4360  Plt = 318 k   Labs from 6/23/25:  SCr = 0.61 mg/dL CrCl = 122 mL/min (using min SCr 0.7 mg/dL and current weight)  LFT's = WNL  TBili = 0.3    Sacituzumab govitecan-hziy 10 mg/kg x 93.2 kg = 932 mg   <10% difference, ok to treat with final dose = 900 mg IV    Anish Caal, PharmD, BCPS  "

## 2025-06-30 NOTE — PROGRESS NOTES
Chemotherapy Verification - SECONDARY RN       Height = 160 cm  Weight = 93.2 kg  BSA = 2.04 m2     Medication: sacituzumab govitecan  Dose: 10 mg/kg  Calculated Dose: 932 mg                             (In mg/m2, AUC, mg/kg)     I confirm that this process was performed independently.

## 2025-06-30 NOTE — PROGRESS NOTES
Chemotherapy Verification - PRIMARY RN      Height = 160cm  Weight = 93.2kg  BSA = 2.04m2       Medication: Trodelvy  Dose: 10mg/kg  Calculated Dose: 932mg                             (In mg/m2, AUC, mg/kg)       I confirm this process was performed independently with the BSA and all final chemotherapy dosing calculations congruent.  Any discrepancies of 10% or greater have been addressed with the chemotherapy pharmacist. The resolution of the discrepancy has been documented in the EPIC progress notes.

## 2025-07-01 NOTE — PROGRESS NOTES
Pt presents to [facility] for C6D8 Trodelvy. Pt is ambulatory and accompanied by her sister-in-law. Assessment completed, POC discussed. Established PIV x2 attempts in R thumb proximal joint; brisk blood return observed and pt tolerated well. Labs drawn, H/H critically low per lab, believed by RN's to be fallacious, second sample obtained and delivered to lab and resulted within treatable parameters. Pre meds administered per MAR. PIV infiltrated upon post-Emend flush. Multiple attempts by 3 RN's to establish new PIV, even using US guidance, ultimately obtained permission from provider to use left arm for PIV. Additionally, although pt verbalized understanding re: difficulty in obtaining PIV, one-time order for prn Ativan was obtained and administered, due to pt's report of anxiety. Emotional support and caring concern provided. Trodelvy infused using in-line filter with no s/s of adverse reactions. Pt completed an uneventful 30-minute observation following Trodelvy completion, treatment tolerated well. PIV flushed and brisk blood return observed before removing; sterile gauze/coban dressing applied. Next appointment confirmed and education provided re: s/sx to report to provider, or defer to ER evaluation and treatment. Pt discharged to self care with all personal belongings and in NAD.

## 2025-07-01 NOTE — PROCEDURE: TREATMENT REGIMEN
No
Detail Level: Zone
Action 3: Continue
Initiate Regimen: Triamcinolone BID x 2 wks as needed for flares.\\nGentle skin care regimen.\\nDiscussed possible Ddx of eczema, but psoriasis more consistent with clinical presentation today. \\nF/u in event of persistent or worsening sx.

## 2025-07-02 RX ORDER — METHYLPREDNISOLONE SODIUM SUCCINATE 125 MG/2ML
125 INJECTION, POWDER, LYOPHILIZED, FOR SOLUTION INTRAMUSCULAR; INTRAVENOUS PRN
Status: CANCELLED | OUTPATIENT
Start: 2025-07-14

## 2025-07-02 RX ORDER — LORAZEPAM 0.5 MG/1
0.5 TABLET ORAL EVERY 4 HOURS PRN
Status: CANCELLED
Start: 2025-07-14

## 2025-07-02 RX ORDER — 0.9 % SODIUM CHLORIDE 0.9 %
10 VIAL (ML) INJECTION PRN
Status: CANCELLED | OUTPATIENT
Start: 2025-07-14

## 2025-07-02 RX ORDER — LORAZEPAM 0.5 MG/1
0.5 TABLET ORAL EVERY 4 HOURS PRN
Status: CANCELLED
Start: 2025-07-13

## 2025-07-02 RX ORDER — PROCHLORPERAZINE MALEATE 10 MG
10 TABLET ORAL EVERY 6 HOURS PRN
Status: CANCELLED | OUTPATIENT
Start: 2025-07-21

## 2025-07-02 RX ORDER — LOPERAMIDE HYDROCHLORIDE 2 MG/1
4 CAPSULE ORAL PRN
Status: CANCELLED | OUTPATIENT
Start: 2025-07-14

## 2025-07-02 RX ORDER — 0.9 % SODIUM CHLORIDE 0.9 %
10 VIAL (ML) INJECTION PRN
Status: CANCELLED | OUTPATIENT
Start: 2025-07-13

## 2025-07-02 RX ORDER — 0.9 % SODIUM CHLORIDE 0.9 %
VIAL (ML) INJECTION PRN
Status: CANCELLED | OUTPATIENT
Start: 2025-07-21

## 2025-07-02 RX ORDER — ATROPINE SULFATE 1 MG/ML
0.5 INJECTION, SOLUTION INTRAVENOUS PRN
Status: CANCELLED | OUTPATIENT
Start: 2025-07-21

## 2025-07-02 RX ORDER — METHYLPREDNISOLONE SODIUM SUCCINATE 125 MG/2ML
125 INJECTION, POWDER, LYOPHILIZED, FOR SOLUTION INTRAMUSCULAR; INTRAVENOUS PRN
Status: CANCELLED | OUTPATIENT
Start: 2025-07-21

## 2025-07-02 RX ORDER — PALONOSETRON 0.05 MG/ML
0.25 INJECTION, SOLUTION INTRAVENOUS ONCE
Status: CANCELLED | OUTPATIENT
Start: 2025-07-21 | End: 2025-07-21

## 2025-07-02 RX ORDER — ONDANSETRON 8 MG/1
8 TABLET, ORALLY DISINTEGRATING ORAL PRN
Status: CANCELLED | OUTPATIENT
Start: 2025-07-21

## 2025-07-02 RX ORDER — DIPHENHYDRAMINE HYDROCHLORIDE 50 MG/ML
25 INJECTION, SOLUTION INTRAMUSCULAR; INTRAVENOUS ONCE
Status: CANCELLED | OUTPATIENT
Start: 2025-07-14 | End: 2025-07-14

## 2025-07-02 RX ORDER — ONDANSETRON 2 MG/ML
4 INJECTION INTRAMUSCULAR; INTRAVENOUS PRN
Status: CANCELLED | OUTPATIENT
Start: 2025-07-14

## 2025-07-02 RX ORDER — ACETAMINOPHEN 325 MG/1
650 TABLET ORAL ONCE
Status: CANCELLED | OUTPATIENT
Start: 2025-07-14

## 2025-07-02 RX ORDER — 0.9 % SODIUM CHLORIDE 0.9 %
3 VIAL (ML) INJECTION PRN
Status: CANCELLED | OUTPATIENT
Start: 2025-07-21

## 2025-07-02 RX ORDER — LOPERAMIDE HYDROCHLORIDE 2 MG/1
2 CAPSULE ORAL
Status: CANCELLED | OUTPATIENT
Start: 2025-07-14

## 2025-07-02 RX ORDER — SODIUM CHLORIDE 9 MG/ML
INJECTION, SOLUTION INTRAVENOUS CONTINUOUS
Status: CANCELLED | OUTPATIENT
Start: 2025-07-21

## 2025-07-02 RX ORDER — 0.9 % SODIUM CHLORIDE 0.9 %
10 VIAL (ML) INJECTION PRN
Status: CANCELLED | OUTPATIENT
Start: 2025-07-21

## 2025-07-02 RX ORDER — SODIUM CHLORIDE 9 MG/ML
INJECTION, SOLUTION INTRAVENOUS CONTINUOUS
Status: CANCELLED | OUTPATIENT
Start: 2025-07-14

## 2025-07-02 RX ORDER — LORAZEPAM 0.5 MG/1
0.5 TABLET ORAL EVERY 4 HOURS PRN
Status: CANCELLED
Start: 2025-07-21

## 2025-07-02 RX ORDER — EPINEPHRINE 1 MG/ML(1)
0.5 AMPUL (ML) INJECTION PRN
Status: CANCELLED | OUTPATIENT
Start: 2025-07-14

## 2025-07-02 RX ORDER — ONDANSETRON 2 MG/ML
4 INJECTION INTRAMUSCULAR; INTRAVENOUS PRN
Status: CANCELLED | OUTPATIENT
Start: 2025-07-21

## 2025-07-02 RX ORDER — ATROPINE SULFATE 1 MG/ML
0.5 INJECTION, SOLUTION INTRAVENOUS PRN
Status: CANCELLED | OUTPATIENT
Start: 2025-07-14

## 2025-07-02 RX ORDER — ONDANSETRON 8 MG/1
8 TABLET, ORALLY DISINTEGRATING ORAL PRN
Status: CANCELLED | OUTPATIENT
Start: 2025-07-14

## 2025-07-02 RX ORDER — 0.9 % SODIUM CHLORIDE 0.9 %
3 VIAL (ML) INJECTION PRN
Status: CANCELLED | OUTPATIENT
Start: 2025-07-14

## 2025-07-02 RX ORDER — PALONOSETRON 0.05 MG/ML
0.25 INJECTION, SOLUTION INTRAVENOUS ONCE
Status: CANCELLED | OUTPATIENT
Start: 2025-07-14 | End: 2025-07-14

## 2025-07-02 RX ORDER — 0.9 % SODIUM CHLORIDE 0.9 %
VIAL (ML) INJECTION PRN
Status: CANCELLED | OUTPATIENT
Start: 2025-07-13

## 2025-07-02 RX ORDER — DIPHENHYDRAMINE HYDROCHLORIDE 50 MG/ML
50 INJECTION, SOLUTION INTRAMUSCULAR; INTRAVENOUS PRN
Status: CANCELLED | OUTPATIENT
Start: 2025-07-14

## 2025-07-02 RX ORDER — 0.9 % SODIUM CHLORIDE 0.9 %
3 VIAL (ML) INJECTION PRN
Status: CANCELLED | OUTPATIENT
Start: 2025-07-13

## 2025-07-02 RX ORDER — EPINEPHRINE 1 MG/ML(1)
0.5 AMPUL (ML) INJECTION PRN
Status: CANCELLED | OUTPATIENT
Start: 2025-07-21

## 2025-07-02 RX ORDER — 0.9 % SODIUM CHLORIDE 0.9 %
VIAL (ML) INJECTION PRN
Status: CANCELLED | OUTPATIENT
Start: 2025-07-14

## 2025-07-02 RX ORDER — DIPHENHYDRAMINE HYDROCHLORIDE 50 MG/ML
50 INJECTION, SOLUTION INTRAMUSCULAR; INTRAVENOUS PRN
Status: CANCELLED | OUTPATIENT
Start: 2025-07-21

## 2025-07-02 RX ORDER — DIPHENHYDRAMINE HYDROCHLORIDE 50 MG/ML
25 INJECTION, SOLUTION INTRAMUSCULAR; INTRAVENOUS ONCE
Status: CANCELLED | OUTPATIENT
Start: 2025-07-21 | End: 2025-07-21

## 2025-07-02 RX ORDER — LOPERAMIDE HYDROCHLORIDE 2 MG/1
2 CAPSULE ORAL
Status: CANCELLED | OUTPATIENT
Start: 2025-07-21

## 2025-07-02 RX ORDER — PROCHLORPERAZINE MALEATE 10 MG
10 TABLET ORAL EVERY 6 HOURS PRN
Status: CANCELLED | OUTPATIENT
Start: 2025-07-14

## 2025-07-02 RX ORDER — ACETAMINOPHEN 325 MG/1
650 TABLET ORAL ONCE
Status: CANCELLED | OUTPATIENT
Start: 2025-07-21

## 2025-07-02 RX ORDER — LOPERAMIDE HYDROCHLORIDE 2 MG/1
4 CAPSULE ORAL PRN
Status: CANCELLED | OUTPATIENT
Start: 2025-07-21

## 2025-07-07 NOTE — PROGRESS NOTES
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 placed for upper extremity edema.    She does have some increased phlegm noted which she is concerned possible pleural effusion.  I ordered a chest x-ray for patient to complete when she returns to [place] later this week.    Patient will continue with day 8 next week as planned.  She will return in 3 weeks for her cycle 8 followed by imaging.    Patient is scheduled to be seen in the clinic with Dr. lKine in 6 weeks from now, or sooner if needed.      Please note that this dictation was created using voice recognition software. I have made every reasonable attempt to correct obvious errors, but I expect that there are errors of grammar and possibly content that I did not discover before finalizing the note.

## 2025-07-11 ASSESSMENT — FIBROSIS 4 INDEX: FIB4 SCORE: 0.98

## 2025-07-11 NOTE — PROGRESS NOTES
"Pharmacy Chemotherapy Verification Note:    Dx: met Breast Cancer (ER+, HER2-)       Protocol: Sacituzumab Govitecan (Trodelvy)     Sacituzumab govitecan-hziy 10 mg/kg IV on Days 1 and 8  21-day cycle until disease progression or unacceptable toxicity   1. NCCNGuidelines® for Breast Cancer V.2.2024.   2. Staci FITZPATRICK et al. N Engl J Med. 2019;380(8):741-751.a   3. Andre et al. J Clin Oncol. 2022;40(17_suppl):NCT6963.a    Allergies:  Codeine, Hydrocodone, and Other drug     BP (!) 153/89   Pulse 78   Temp 36 °C (96.8 °F) (Temporal)   Resp 18   Ht 1.6 m (5' 2.99\")   Wt 93 kg (205 lb 0.4 oz)   SpO2 97%   BMI 36.33 kg/m²  Body surface area is 2.03 meters squared.    Labs from 7/14/25 reviewed - all within treatment parameters.    Drug Order   (Drug name, dose, route, IV Fluid & volume, frequency, number of doses) Cycle: 7 Day 1  Previous treatment: C6D8 on 6/30/25; s/p Capecitabine x 2C, Ribociclib + Faslodex     Medication = Sacituzumab govitecan  Base Dose = 10 mg/kg  Calc Dose: Base Dose x 93 kg = 930 mg  Final Dose = 900 mg  Route = IV  Fluid & Volume =  mL  Admin Duration = Over 1-2 hours          <10% difference, okay to treat with final dose       By my signature below, I confirm this process was performed independently with the BSA and all final chemotherapy dosing calculations congruent. I have reviewed the above chemotherapy order and that my calculation of the final dose and BSA (when applicable) corroborate those calculations of the  pharmacist.     Zena Gore, PharmD, BCOP    "

## 2025-07-11 NOTE — PROGRESS NOTES
"Pharmacy Chemotherapy Verification    DX: breast cancer    Cycle 7, Day 1  Previous treatment = C6D8 6/30/25    Regimen: sacituzumab govitecan-hziy  Sacituzumab govitecan-hziy 10 mg/kg IV on Days 1 and 8   q21 days until disease progression or unacceptable toxicity  NCCN Guidelines for Breast Cancer V.2.2024  helena Beltran al - N Engl J Med. 2019 Feb 21;380(2):891-362.   Andre et al - Journal of Clinical OncologyVolume 40, Number 17_suppl     Allergies:Codeine, Hydrocodone, and Other drug  BP (!) 153/89   Pulse 78   Temp 36 °C (96.8 °F) (Temporal)   Resp 18   Ht 1.6 m (5' 2.99\")   Wt 93 kg (205 lb 0.4 oz)   SpO2 97%   BMI 36.33 kg/m²   Body surface area is 2.03 meters squared.    Labs 7/14/25:  ANC~ 3900 Plt = 286k   Hgb = 14.1         Xgeva plan remains on hold    Not receiving Neulasta at this time for tolerance    Sacituzumab govitecan-hziy 10 mg/kg x 93.2 kg = 932 mg   <10% difference, ok to treat with final dose = 900 mg IV    Any Shaw, PharmD  "

## 2025-07-14 ENCOUNTER — OUTPATIENT INFUSION SERVICES (OUTPATIENT)
Facility: MEDICAL CENTER | Age: 60
End: 2025-07-14
Attending: INTERNAL MEDICINE
Payer: MEDICARE

## 2025-07-14 ENCOUNTER — PATIENT OUTREACH (OUTPATIENT)
Dept: ONCOLOGY | Facility: MEDICAL CENTER | Age: 60
End: 2025-07-14

## 2025-07-14 ENCOUNTER — HOSPITAL ENCOUNTER (OUTPATIENT)
Facility: MEDICAL CENTER | Age: 60
End: 2025-07-14
Attending: NURSE PRACTITIONER
Payer: MEDICARE

## 2025-07-14 VITALS
TEMPERATURE: 97.3 F | HEIGHT: 63 IN | RESPIRATION RATE: 19 BRPM | BODY MASS INDEX: 36.17 KG/M2 | SYSTOLIC BLOOD PRESSURE: 152 MMHG | HEART RATE: 84 BPM | DIASTOLIC BLOOD PRESSURE: 74 MMHG | WEIGHT: 204.15 LBS | OXYGEN SATURATION: 94 %

## 2025-07-14 VITALS
TEMPERATURE: 96.8 F | WEIGHT: 205.03 LBS | SYSTOLIC BLOOD PRESSURE: 153 MMHG | DIASTOLIC BLOOD PRESSURE: 89 MMHG | HEART RATE: 78 BPM | RESPIRATION RATE: 18 BRPM | BODY MASS INDEX: 36.33 KG/M2 | OXYGEN SATURATION: 97 % | HEIGHT: 63 IN

## 2025-07-14 DIAGNOSIS — Z17.0 CARCINOMA OF UPPER-OUTER QUADRANT OF LEFT BREAST IN FEMALE, ESTROGEN RECEPTOR POSITIVE (HCC): Primary | ICD-10-CM

## 2025-07-14 DIAGNOSIS — C50.912 BREAST CANCER, STAGE 4, LEFT (HCC): ICD-10-CM

## 2025-07-14 DIAGNOSIS — C50.912 BREAST CANCER, STAGE 4, LEFT (HCC): Primary | ICD-10-CM

## 2025-07-14 DIAGNOSIS — Z79.899 ENCOUNTER FOR LONG-TERM (CURRENT) USE OF HIGH-RISK MEDICATION: ICD-10-CM

## 2025-07-14 DIAGNOSIS — C50.412 CARCINOMA OF UPPER-OUTER QUADRANT OF LEFT BREAST IN FEMALE, ESTROGEN RECEPTOR POSITIVE (HCC): Primary | ICD-10-CM

## 2025-07-14 LAB
ALBUMIN SERPL BCP-MCNC: 4.1 G/DL (ref 3.2–4.9)
ALBUMIN/GLOB SERPL: 1.4 G/DL
ALP SERPL-CCNC: 145 U/L (ref 30–99)
ALT SERPL-CCNC: 41 U/L (ref 2–50)
ANION GAP SERPL CALC-SCNC: 13 MMOL/L (ref 7–16)
AST SERPL-CCNC: 33 U/L (ref 12–45)
BASOPHILS # BLD AUTO: 0.4 % (ref 0–1.8)
BASOPHILS # BLD: 0.02 K/UL (ref 0–0.12)
BILIRUB SERPL-MCNC: 0.3 MG/DL (ref 0.1–1.5)
BUN SERPL-MCNC: 8 MG/DL (ref 8–22)
CALCIUM ALBUM COR SERPL-MCNC: 9.9 MG/DL (ref 8.5–10.5)
CALCIUM SERPL-MCNC: 10 MG/DL (ref 8.5–10.5)
CHLORIDE SERPL-SCNC: 102 MMOL/L (ref 96–112)
CO2 SERPL-SCNC: 24 MMOL/L (ref 20–33)
CREAT SERPL-MCNC: 0.64 MG/DL (ref 0.5–1.4)
EOSINOPHIL # BLD AUTO: 0.12 K/UL (ref 0–0.51)
EOSINOPHIL NFR BLD: 2.1 % (ref 0–6.9)
ERYTHROCYTE [DISTWIDTH] IN BLOOD BY AUTOMATED COUNT: 52.8 FL (ref 35.9–50)
GFR SERPLBLD CREATININE-BSD FMLA CKD-EPI: 100 ML/MIN/1.73 M 2
GLOBULIN SER CALC-MCNC: 2.9 G/DL (ref 1.9–3.5)
GLUCOSE SERPL-MCNC: 156 MG/DL (ref 65–99)
HCT VFR BLD AUTO: 43.6 % (ref 37–47)
HGB BLD-MCNC: 14.1 G/DL (ref 12–16)
IMM GRANULOCYTES # BLD AUTO: 0.02 K/UL (ref 0–0.11)
IMM GRANULOCYTES NFR BLD AUTO: 0.4 % (ref 0–0.9)
LYMPHOCYTES # BLD AUTO: 0.85 K/UL (ref 1–4.8)
LYMPHOCYTES NFR BLD: 15.2 % (ref 22–41)
MCH RBC QN AUTO: 32.2 PG (ref 27–33)
MCHC RBC AUTO-ENTMCNC: 32.3 G/DL (ref 32.2–35.5)
MCV RBC AUTO: 99.5 FL (ref 81.4–97.8)
MONOCYTES # BLD AUTO: 0.69 K/UL (ref 0–0.85)
MONOCYTES NFR BLD AUTO: 12.3 % (ref 0–13.4)
NEUTROPHILS # BLD AUTO: 3.9 K/UL (ref 1.82–7.42)
NEUTROPHILS NFR BLD: 69.6 % (ref 44–72)
NRBC # BLD AUTO: 0 K/UL
NRBC BLD-RTO: 0 /100 WBC (ref 0–0.2)
OUTPT INFUS CBC COMMENT OICOM: ABNORMAL
PLATELET # BLD AUTO: 286 K/UL (ref 164–446)
PMV BLD AUTO: 9.1 FL (ref 9–12.9)
POTASSIUM SERPL-SCNC: 4 MMOL/L (ref 3.6–5.5)
PROT SERPL-MCNC: 7 G/DL (ref 6–8.2)
RBC # BLD AUTO: 4.38 M/UL (ref 4.2–5.4)
SODIUM SERPL-SCNC: 139 MMOL/L (ref 135–145)
WBC # BLD AUTO: 5.6 K/UL (ref 4.8–10.8)

## 2025-07-14 PROCEDURE — 99212 OFFICE O/P EST SF 10 MIN: CPT | Performed by: NURSE PRACTITIONER

## 2025-07-14 PROCEDURE — 700111 HCHG RX REV CODE 636 W/ 250 OVERRIDE (IP): Performed by: INTERNAL MEDICINE

## 2025-07-14 PROCEDURE — 700102 HCHG RX REV CODE 250 W/ 637 OVERRIDE(OP): Performed by: PHYSICIAN ASSISTANT

## 2025-07-14 PROCEDURE — 80053 COMPREHEN METABOLIC PANEL: CPT

## 2025-07-14 PROCEDURE — 85025 COMPLETE CBC W/AUTO DIFF WBC: CPT

## 2025-07-14 PROCEDURE — 700111 HCHG RX REV CODE 636 W/ 250 OVERRIDE (IP): Mod: JZ | Performed by: PHYSICIAN ASSISTANT

## 2025-07-14 PROCEDURE — 96367 TX/PROPH/DG ADDL SEQ IV INF: CPT

## 2025-07-14 PROCEDURE — 96375 TX/PRO/DX INJ NEW DRUG ADDON: CPT

## 2025-07-14 PROCEDURE — 96413 CHEMO IV INFUSION 1 HR: CPT

## 2025-07-14 PROCEDURE — 700105 HCHG RX REV CODE 258: Performed by: INTERNAL MEDICINE

## 2025-07-14 PROCEDURE — 99214 OFFICE O/P EST MOD 30 MIN: CPT | Performed by: NURSE PRACTITIONER

## 2025-07-14 PROCEDURE — A9270 NON-COVERED ITEM OR SERVICE: HCPCS | Performed by: PHYSICIAN ASSISTANT

## 2025-07-14 PROCEDURE — 700105 HCHG RX REV CODE 258: Performed by: PHYSICIAN ASSISTANT

## 2025-07-14 RX ORDER — EPINEPHRINE 1 MG/ML(1)
0.5 AMPUL (ML) INJECTION PRN
Status: DISCONTINUED | OUTPATIENT
Start: 2025-07-14 | End: 2025-07-14 | Stop reason: HOSPADM

## 2025-07-14 RX ORDER — METHYLPREDNISOLONE SODIUM SUCCINATE 125 MG/2ML
125 INJECTION, POWDER, LYOPHILIZED, FOR SOLUTION INTRAMUSCULAR; INTRAVENOUS PRN
Status: DISCONTINUED | OUTPATIENT
Start: 2025-07-14 | End: 2025-07-14 | Stop reason: HOSPADM

## 2025-07-14 RX ORDER — ZOLPIDEM TARTRATE 10 MG/1
10 TABLET ORAL
COMMUNITY
Start: 2025-05-07 | End: 2025-07-22

## 2025-07-14 RX ORDER — DIPHENHYDRAMINE HYDROCHLORIDE 50 MG/ML
50 INJECTION, SOLUTION INTRAMUSCULAR; INTRAVENOUS PRN
Status: DISCONTINUED | OUTPATIENT
Start: 2025-07-14 | End: 2025-07-14 | Stop reason: HOSPADM

## 2025-07-14 RX ORDER — DIPHENHYDRAMINE HYDROCHLORIDE 50 MG/ML
25 INJECTION, SOLUTION INTRAMUSCULAR; INTRAVENOUS ONCE
Status: COMPLETED | OUTPATIENT
Start: 2025-07-14 | End: 2025-07-14

## 2025-07-14 RX ORDER — ACETAMINOPHEN 325 MG/1
650 TABLET ORAL ONCE
Status: COMPLETED | OUTPATIENT
Start: 2025-07-14 | End: 2025-07-14

## 2025-07-14 RX ORDER — PALONOSETRON 0.05 MG/ML
0.25 INJECTION, SOLUTION INTRAVENOUS ONCE
Status: COMPLETED | OUTPATIENT
Start: 2025-07-14 | End: 2025-07-14

## 2025-07-14 RX ADMIN — FOSAPREPITANT 150 MG: 150 INJECTION, POWDER, LYOPHILIZED, FOR SOLUTION INTRAVENOUS at 09:16

## 2025-07-14 RX ADMIN — DIPHENHYDRAMINE HYDROCHLORIDE 25 MG: 50 INJECTION INTRAMUSCULAR; INTRAVENOUS at 08:51

## 2025-07-14 RX ADMIN — DEXAMETHASONE SODIUM PHOSPHATE 12 MG: 4 INJECTION INTRA-ARTICULAR; INTRALESIONAL; INTRAMUSCULAR; INTRAVENOUS; SOFT TISSUE at 09:06

## 2025-07-14 RX ADMIN — SACITUZUMAB GOVITECAN 900 MG: 180 POWDER, FOR SOLUTION INTRAVENOUS at 09:57

## 2025-07-14 RX ADMIN — PALONOSETRON HYDROCHLORIDE 0.25 MG: 0.25 INJECTION INTRAVENOUS at 08:53

## 2025-07-14 RX ADMIN — ACETAMINOPHEN 650 MG: 325 TABLET ORAL at 08:50

## 2025-07-14 RX ADMIN — FAMOTIDINE 20 MG: 10 INJECTION INTRAVENOUS at 08:55

## 2025-07-14 ASSESSMENT — ENCOUNTER SYMPTOMS
PALPITATIONS: 0
WEIGHT LOSS: 0
DIZZINESS: 1
SHORTNESS OF BREATH: 0
NAUSEA: 0
HEADACHES: 0
SPUTUM PRODUCTION: 1
COUGH: 0
CONSTIPATION: 0
DIARRHEA: 1
CHILLS: 0
FEVER: 0
MYALGIAS: 0
VOMITING: 0

## 2025-07-14 ASSESSMENT — FIBROSIS 4 INDEX
FIB4 SCORE: 0.98
FIB4 SCORE: 0.98

## 2025-07-14 ASSESSMENT — PAIN SCALES - GENERAL: PAINLEVEL_OUTOF10: NO PAIN

## 2025-07-14 NOTE — PROGRESS NOTES
Oncology Nurse Navigation Assessment  Met with pt and daughter at chairside.  Pt states she is tolerating treatment overall.  Pt and daughter are optimistic that treatment is working.  Her daughter shares her tumor markers are down and it has been four months since she has needed a paracentesis.  She reports her diarrhea is well controled with Imodium.  She has a CT scheduled in August.      DX: stage IV breast cancer    POC: Trodelvy    FAMHX: Cancer-related family history includes Cancer in her mother; Ovarian Cancer in her mother.             BARRIERS ASSESSMENT:    TRANSPORTATION: denies barriers.  Family assists.  EMPLOYMENT:  disabled   FINANCIAL:  denies barriers  INSURANCE:  [employer]  SUPPORT SYSTEM:  good support in family.  PSYCHOLOGICAL: denies barriers  COMMUNICATION: no barrier noted    FAMILY CARE:  denies barriers  SELF CARE:  independent in her ADLs.  denies barriers         INTERVENTION:  Provided contact information, Fabienne Duran, [facility] metastatic support group

## 2025-07-14 NOTE — PROGRESS NOTES
Chemotherapy Verification - PRIMARY RN      Height = 160 cm  Weight = 93 kg  BSA = 2.03 m2     Medication: Sacituzumab govitecan  Dose: 10 mg/kg  Calculated Dose: 930 mg                             (In mg/m2, AUC, mg/kg)     I confirm this process was performed independently with the BSA and all final chemotherapy dosing calculations congruent.  Any discrepancies of 10% or greater have been addressed with the chemotherapy pharmacist. The resolution of the discrepancy has been documented in the EPIC progress notes.

## 2025-07-14 NOTE — PROGRESS NOTES
Sol presents to infusion for cycle 7/ day 1 of Trodelvy for Breast Ca. Pt denies having any new or acute complaints today, reports tolerating past treatments well. PIV started with positive return and labs drawn off of IV start.    Labs reviewed by RN, within parameters for treatment today.     Pre-treatment meds given as ordered.     Trodelvy given over 90min, per patient request. Monitored for 30 min post infusion, Patient tolerated all well with no adverse effects.     (PIV flushed post infusion with positive blood return, d/aron with tip intact. Patient left in stable condition with daughter, knows when to return for next appt.

## 2025-07-14 NOTE — ADDENDUM NOTE
Encounter addended by: Kelsie Gifford, Med Ass't on: 7/14/2025 9:13 AM   Actions taken: Charge Capture section accepted

## 2025-07-14 NOTE — PROGRESS NOTES
Chemotherapy Verification - SECONDARY RN       Height = 160 cm  Weight = 93 kg  BSA = 2.03 m2       Medication: Trodelvy  Dose: 10 mg/kg  Calculated Dose: 930 mg                             (In mg/m2, AUC, mg/kg)     I confirm that this process was performed independently.

## 2025-07-18 NOTE — PROGRESS NOTES
"Pharmacy Chemotherapy Verification Note:    Dx: met Breast Cancer (ER+, HER2-)       Protocol: Sacituzumab Govitecan (Trodelvy)     Sacituzumab govitecan-hziy 10 mg/kg IV on Days 1 and 8  21-day cycle until disease progression or unacceptable toxicity   1. NCCNGuidelines® for Breast Cancer V.2.2024.   2. Staci FITZPATRICK et al. N Engl J Med. 2019;380(8):741-751.a   3. Andre et al. J Clin Oncol. 2022;40(17_suppl):LBA0718.a    Allergies:  Codeine, Hydrocodone, and Other drug     BP (!) 168/94   Pulse 86   Temp 36 °C (96.8 °F) (Temporal)   Resp 17   Ht 1.626 m (5' 4\")   Wt 92.8 kg (204 lb 9.4 oz)   SpO2 92%   BMI 35.12 kg/m²  Body surface area is 2.05 meters squared.    Labs from 7/21/25 reviewed - all within treatment parameters.    Drug Order   (Drug name, dose, route, IV Fluid & volume, frequency, number of doses) Cycle: 7 Day 8  Previous treatment: C7D1 on 7/14/25; s/p Capecitabine x 2C, Ribociclib + Faslodex     Medication = Sacituzumab govitecan  Base Dose = 10 mg/kg  Calc Dose: Base Dose x 92.8 kg = 928 mg  Final Dose = 900 mg  Route = IV  Fluid & Volume =  mL  Admin Duration = Over 1-2 hours          <10% difference, okay to treat with final dose       By my signature below, I confirm this process was performed independently with the BSA and all final chemotherapy dosing calculations congruent. I have reviewed the above chemotherapy order and that my calculation of the final dose and BSA (when applicable) corroborate those calculations of the  pharmacist.     Zena Gore, PharmD, BCOP    "

## 2025-07-20 DIAGNOSIS — F51.01 PRIMARY INSOMNIA: Primary | ICD-10-CM

## 2025-07-20 NOTE — PROGRESS NOTES
"Pharmacy Chemotherapy Verification    DX: breast cancer    Cycle 7, Day 8  Previous treatment = C7D1 7/14/25    Regimen: sacituzumab govitecan-hziy  Sacituzumab govitecan-hziy 10 mg/kg IV on Days 1 and 8   q21 days until disease progression or unacceptable toxicity  NCCN Guidelines for Breast Cancer V.2.2024  helena Beltran al - N Engl J Med. 2019 Feb 21;380(5):041-406.   Andre et al - Journal of Clinical OncologyVolume 40, Number 17_suppl     Allergies:Codeine, Hydrocodone, and Other drug  BP (!) 168/94   Pulse 86   Temp 36 °C (96.8 °F) (Temporal)   Resp 17   Ht 1.626 m (5' 4\")   Wt 92.8 kg (204 lb 9.4 oz)   SpO2 92%   BMI 35.12 kg/m²   Body surface area is 2.05 meters squared.    Labs 7/21/25:  ANC~ 4360 Plt = 308k   Hgb = 14.1       Xgeva plan remains on hold    Not receiving Neulasta at this time for tolerance    Sacituzumab govitecan-hziy 10 mg/kg x 92.8 kg = 928 mg   <10% difference, ok to treat with final dose = 900 mg IV    Braeden Carmona, PharmD  "

## 2025-07-21 ENCOUNTER — OUTPATIENT INFUSION SERVICES (OUTPATIENT)
Facility: MEDICAL CENTER | Age: 60
End: 2025-07-21
Attending: INTERNAL MEDICINE
Payer: MEDICARE

## 2025-07-21 VITALS
RESPIRATION RATE: 17 BRPM | HEIGHT: 64 IN | DIASTOLIC BLOOD PRESSURE: 94 MMHG | OXYGEN SATURATION: 92 % | SYSTOLIC BLOOD PRESSURE: 168 MMHG | TEMPERATURE: 96.8 F | WEIGHT: 204.59 LBS | BODY MASS INDEX: 34.93 KG/M2 | HEART RATE: 86 BPM

## 2025-07-21 DIAGNOSIS — C50.912 BREAST CANCER, STAGE 4, LEFT (HCC): Primary | ICD-10-CM

## 2025-07-21 LAB
BASOPHILS # BLD AUTO: 0.3 % (ref 0–1.8)
BASOPHILS # BLD: 0.02 K/UL (ref 0–0.12)
EOSINOPHIL # BLD AUTO: 0.1 K/UL (ref 0–0.51)
EOSINOPHIL NFR BLD: 1.6 % (ref 0–6.9)
ERYTHROCYTE [DISTWIDTH] IN BLOOD BY AUTOMATED COUNT: 50.4 FL (ref 35.9–50)
HCT VFR BLD AUTO: 43.3 % (ref 37–47)
HGB BLD-MCNC: 14.1 G/DL (ref 12–16)
IMM GRANULOCYTES # BLD AUTO: 0.08 K/UL (ref 0–0.11)
IMM GRANULOCYTES NFR BLD AUTO: 1.3 % (ref 0–0.9)
LYMPHOCYTES # BLD AUTO: 0.92 K/UL (ref 1–4.8)
LYMPHOCYTES NFR BLD: 15.1 % (ref 22–41)
MCH RBC QN AUTO: 31.8 PG (ref 27–33)
MCHC RBC AUTO-ENTMCNC: 32.6 G/DL (ref 32.2–35.5)
MCV RBC AUTO: 97.7 FL (ref 81.4–97.8)
MONOCYTES # BLD AUTO: 0.61 K/UL (ref 0–0.85)
MONOCYTES NFR BLD AUTO: 10 % (ref 0–13.4)
NEUTROPHILS # BLD AUTO: 4.36 K/UL (ref 1.82–7.42)
NEUTROPHILS NFR BLD: 71.7 % (ref 44–72)
NRBC # BLD AUTO: 0 K/UL
NRBC BLD-RTO: 0 /100 WBC (ref 0–0.2)
OUTPT INFUS CBC COMMENT OICOM: ABNORMAL
PLATELET # BLD AUTO: 308 K/UL (ref 164–446)
PMV BLD AUTO: 9.3 FL (ref 9–12.9)
RBC # BLD AUTO: 4.43 M/UL (ref 4.2–5.4)
WBC # BLD AUTO: 6.1 K/UL (ref 4.8–10.8)

## 2025-07-21 PROCEDURE — 96415 CHEMO IV INFUSION ADDL HR: CPT

## 2025-07-21 PROCEDURE — 85025 COMPLETE CBC W/AUTO DIFF WBC: CPT

## 2025-07-21 PROCEDURE — 700111 HCHG RX REV CODE 636 W/ 250 OVERRIDE (IP): Mod: JZ | Performed by: PHYSICIAN ASSISTANT

## 2025-07-21 PROCEDURE — A9270 NON-COVERED ITEM OR SERVICE: HCPCS | Performed by: PHYSICIAN ASSISTANT

## 2025-07-21 PROCEDURE — 96367 TX/PROPH/DG ADDL SEQ IV INF: CPT

## 2025-07-21 PROCEDURE — 700105 HCHG RX REV CODE 258: Performed by: PHYSICIAN ASSISTANT

## 2025-07-21 PROCEDURE — 700105 HCHG RX REV CODE 258: Performed by: INTERNAL MEDICINE

## 2025-07-21 PROCEDURE — 700102 HCHG RX REV CODE 250 W/ 637 OVERRIDE(OP): Performed by: PHYSICIAN ASSISTANT

## 2025-07-21 PROCEDURE — 96375 TX/PRO/DX INJ NEW DRUG ADDON: CPT

## 2025-07-21 PROCEDURE — 700111 HCHG RX REV CODE 636 W/ 250 OVERRIDE (IP): Performed by: INTERNAL MEDICINE

## 2025-07-21 PROCEDURE — 96413 CHEMO IV INFUSION 1 HR: CPT

## 2025-07-21 RX ORDER — DIPHENHYDRAMINE HYDROCHLORIDE 50 MG/ML
25 INJECTION, SOLUTION INTRAMUSCULAR; INTRAVENOUS ONCE
Status: COMPLETED | OUTPATIENT
Start: 2025-07-21 | End: 2025-07-21

## 2025-07-21 RX ORDER — SODIUM CHLORIDE 9 MG/ML
INJECTION, SOLUTION INTRAVENOUS CONTINUOUS
Status: DISCONTINUED | OUTPATIENT
Start: 2025-07-21 | End: 2025-07-21 | Stop reason: HOSPADM

## 2025-07-21 RX ORDER — ACETAMINOPHEN 325 MG/1
650 TABLET ORAL ONCE
Status: COMPLETED | OUTPATIENT
Start: 2025-07-21 | End: 2025-07-21

## 2025-07-21 RX ORDER — PALONOSETRON 0.05 MG/ML
0.25 INJECTION, SOLUTION INTRAVENOUS ONCE
Status: COMPLETED | OUTPATIENT
Start: 2025-07-21 | End: 2025-07-21

## 2025-07-21 RX ADMIN — SACITUZUMAB GOVITECAN 900 MG: 180 POWDER, FOR SOLUTION INTRAVENOUS at 09:42

## 2025-07-21 RX ADMIN — FAMOTIDINE 20 MG: 10 INJECTION INTRAVENOUS at 08:45

## 2025-07-21 RX ADMIN — PALONOSETRON HYDROCHLORIDE 0.25 MG: 0.25 INJECTION INTRAVENOUS at 08:52

## 2025-07-21 RX ADMIN — FOSAPREPITANT 150 MG: 150 INJECTION, POWDER, LYOPHILIZED, FOR SOLUTION INTRAVENOUS at 08:56

## 2025-07-21 RX ADMIN — SODIUM CHLORIDE: 9 INJECTION, SOLUTION INTRAVENOUS at 08:13

## 2025-07-21 RX ADMIN — ACETAMINOPHEN 650 MG: 325 TABLET ORAL at 08:27

## 2025-07-21 RX ADMIN — DEXAMETHASONE SODIUM PHOSPHATE 12 MG: 4 INJECTION, SOLUTION INTRA-ARTICULAR; INTRALESIONAL; INTRAMUSCULAR; INTRAVENOUS; SOFT TISSUE at 08:27

## 2025-07-21 RX ADMIN — DIPHENHYDRAMINE HYDROCHLORIDE 25 MG: 50 INJECTION INTRAMUSCULAR; INTRAVENOUS at 08:49

## 2025-07-21 ASSESSMENT — FIBROSIS 4 INDEX: FIB4 SCORE: 1.12

## 2025-07-21 NOTE — PROGRESS NOTES
Chemotherapy Verification - SECONDARY RN       Height = 1.626m  Weight = 92.8kg  BSA = 2.05m2       Medication: sacituzumab govitecan (Trodelvy)  Dose: 10mg/kg  Calculated Dose: 928mg                             (In mg/m2, AUC, mg/kg)       I confirm that this process was performed independently.

## 2025-07-21 NOTE — PROGRESS NOTES
Sol into [facility] for Day 8/ Cycle 7 of Trodelvy for L breast Ca. Pt denied having any new or acute complaints today, besides runny nose, reports tolerating past treatments well. POC reviewed.     PIV started in L hand x 1 attempt, brisk blood return noted, labs drawn off line, Pt tolerated well. IVF TKO.    Lab results reviewed and within parameters for treatment.     Premeds given per MD order, Pt tolerated well. Pt given Trodelvy as prescribed over 90 minutes, Sol tolerated well, denied having any complaints during or after infusion. Pt monitored for 30 minutes post infusion, no s/sx of reaction noted.     PIV discontinued, bleeding controlled with gauze and coban. Confirmed next appointment and Sol was discharged home with daughter Kayleen in no acute distress.

## 2025-07-21 NOTE — PROGRESS NOTES
Chemotherapy Verification - PRIMARY RN      Height = 162.6 cm    Weight = 92.8 kg    BSA =  2.05 m^2      Medication:  Trodelvy  Dose:  10 mg/kg   Calculated Dose:  928 mg                            (In mg/m2, AUC, mg/kg)           I confirm this process was performed independently with the BSA and all final chemotherapy dosing calculations congruent.  Any discrepancies of 10% or greater have been addressed with the chemotherapy pharmacist. The resolution of the discrepancy has been documented in the EPIC progress notes.

## 2025-07-22 RX ORDER — ZOLPIDEM TARTRATE 10 MG/1
10 TABLET ORAL
Qty: 30 TABLET | Refills: 0 | Status: SHIPPED | OUTPATIENT
Start: 2025-07-22 | End: 2025-08-21

## 2025-07-24 ENCOUNTER — PATIENT OUTREACH (OUTPATIENT)
Dept: HEALTH INFORMATION MANAGEMENT | Facility: OTHER | Age: 60
End: 2025-07-24
Payer: MEDICARE

## 2025-07-24 DIAGNOSIS — E11.40 TYPE 2 DIABETES MELLITUS WITH DIABETIC NEUROPATHY, WITHOUT LONG-TERM CURRENT USE OF INSULIN (HCC): Primary | ICD-10-CM

## 2025-07-24 DIAGNOSIS — I10 ESSENTIAL HYPERTENSION: ICD-10-CM

## 2025-07-24 NOTE — PROGRESS NOTES
I spoke briefly with the patient this morning as she was in [facility] visiting her daughter and new grandson. She reports no acute concerns or issues with implementing her complex plan of care at this point. She does report some recurrent R arm Lymphedema for which Oncology has put in a new PT order. She states she has been going back and forth to [place] quite a bit. I will discuss her diabetes and cardiovascular health goals in more detail at her PCP appointment in August 2025.

## 2025-07-29 RX ORDER — EPINEPHRINE 1 MG/ML(1)
0.5 AMPUL (ML) INJECTION PRN
OUTPATIENT
Start: 2025-08-11

## 2025-07-29 RX ORDER — METHYLPREDNISOLONE SODIUM SUCCINATE 125 MG/2ML
125 INJECTION, POWDER, LYOPHILIZED, FOR SOLUTION INTRAMUSCULAR; INTRAVENOUS PRN
OUTPATIENT
Start: 2025-08-11

## 2025-07-29 RX ORDER — 0.9 % SODIUM CHLORIDE 0.9 %
3 VIAL (ML) INJECTION PRN
OUTPATIENT
Start: 2025-08-11

## 2025-07-29 RX ORDER — ATROPINE SULFATE 1 MG/ML
0.5 INJECTION, SOLUTION INTRAVENOUS PRN
OUTPATIENT
Start: 2025-08-11

## 2025-07-29 RX ORDER — ACETAMINOPHEN 325 MG/1
650 TABLET ORAL ONCE
OUTPATIENT
Start: 2025-08-04

## 2025-07-29 RX ORDER — LOPERAMIDE HYDROCHLORIDE 2 MG/1
4 CAPSULE ORAL PRN
OUTPATIENT
Start: 2025-08-04

## 2025-07-29 RX ORDER — 0.9 % SODIUM CHLORIDE 0.9 %
10 VIAL (ML) INJECTION PRN
OUTPATIENT
Start: 2025-08-11

## 2025-07-29 RX ORDER — 0.9 % SODIUM CHLORIDE 0.9 %
3 VIAL (ML) INJECTION PRN
OUTPATIENT
Start: 2025-08-03

## 2025-07-29 RX ORDER — PROCHLORPERAZINE MALEATE 10 MG
10 TABLET ORAL EVERY 6 HOURS PRN
OUTPATIENT
Start: 2025-08-11

## 2025-07-29 RX ORDER — 0.9 % SODIUM CHLORIDE 0.9 %
10 VIAL (ML) INJECTION PRN
OUTPATIENT
Start: 2025-08-04

## 2025-07-29 RX ORDER — LOPERAMIDE HYDROCHLORIDE 2 MG/1
2 CAPSULE ORAL
OUTPATIENT
Start: 2025-08-11

## 2025-07-29 RX ORDER — PALONOSETRON 0.05 MG/ML
0.25 INJECTION, SOLUTION INTRAVENOUS ONCE
OUTPATIENT
Start: 2025-08-11 | End: 2025-08-11

## 2025-07-29 RX ORDER — LORAZEPAM 0.5 MG/1
0.5 TABLET ORAL EVERY 4 HOURS PRN
Start: 2025-08-04

## 2025-07-29 RX ORDER — ATROPINE SULFATE 1 MG/ML
0.5 INJECTION, SOLUTION INTRAVENOUS PRN
OUTPATIENT
Start: 2025-08-04

## 2025-07-29 RX ORDER — 0.9 % SODIUM CHLORIDE 0.9 %
3 VIAL (ML) INJECTION PRN
OUTPATIENT
Start: 2025-08-04

## 2025-07-29 RX ORDER — 0.9 % SODIUM CHLORIDE 0.9 %
10 VIAL (ML) INJECTION PRN
OUTPATIENT
Start: 2025-08-03

## 2025-07-29 RX ORDER — SODIUM CHLORIDE 9 MG/ML
INJECTION, SOLUTION INTRAVENOUS CONTINUOUS
OUTPATIENT
Start: 2025-08-04

## 2025-07-29 RX ORDER — LOPERAMIDE HYDROCHLORIDE 2 MG/1
2 CAPSULE ORAL
OUTPATIENT
Start: 2025-08-04

## 2025-07-29 RX ORDER — ONDANSETRON 2 MG/ML
4 INJECTION INTRAMUSCULAR; INTRAVENOUS PRN
OUTPATIENT
Start: 2025-08-04

## 2025-07-29 RX ORDER — DIPHENHYDRAMINE HYDROCHLORIDE 50 MG/ML
25 INJECTION, SOLUTION INTRAMUSCULAR; INTRAVENOUS ONCE
OUTPATIENT
Start: 2025-08-11 | End: 2025-08-11

## 2025-07-29 RX ORDER — ONDANSETRON 8 MG/1
8 TABLET, ORALLY DISINTEGRATING ORAL PRN
OUTPATIENT
Start: 2025-08-04

## 2025-07-29 RX ORDER — LORAZEPAM 0.5 MG/1
0.5 TABLET ORAL EVERY 4 HOURS PRN
Start: 2025-08-11

## 2025-07-29 RX ORDER — METHYLPREDNISOLONE SODIUM SUCCINATE 125 MG/2ML
125 INJECTION, POWDER, LYOPHILIZED, FOR SOLUTION INTRAMUSCULAR; INTRAVENOUS PRN
OUTPATIENT
Start: 2025-08-04

## 2025-07-29 RX ORDER — DIPHENHYDRAMINE HYDROCHLORIDE 50 MG/ML
50 INJECTION, SOLUTION INTRAMUSCULAR; INTRAVENOUS PRN
OUTPATIENT
Start: 2025-08-11

## 2025-07-29 RX ORDER — SODIUM CHLORIDE 9 MG/ML
INJECTION, SOLUTION INTRAVENOUS CONTINUOUS
OUTPATIENT
Start: 2025-08-11

## 2025-07-29 RX ORDER — DIPHENHYDRAMINE HYDROCHLORIDE 50 MG/ML
50 INJECTION, SOLUTION INTRAMUSCULAR; INTRAVENOUS PRN
OUTPATIENT
Start: 2025-08-04

## 2025-07-29 RX ORDER — PALONOSETRON 0.05 MG/ML
0.25 INJECTION, SOLUTION INTRAVENOUS ONCE
OUTPATIENT
Start: 2025-08-04 | End: 2025-08-04

## 2025-07-29 RX ORDER — LOPERAMIDE HYDROCHLORIDE 2 MG/1
4 CAPSULE ORAL PRN
OUTPATIENT
Start: 2025-08-11

## 2025-07-29 RX ORDER — ONDANSETRON 2 MG/ML
4 INJECTION INTRAMUSCULAR; INTRAVENOUS PRN
OUTPATIENT
Start: 2025-08-11

## 2025-07-29 RX ORDER — DIPHENHYDRAMINE HYDROCHLORIDE 50 MG/ML
25 INJECTION, SOLUTION INTRAMUSCULAR; INTRAVENOUS ONCE
OUTPATIENT
Start: 2025-08-04 | End: 2025-08-04

## 2025-07-29 RX ORDER — LORAZEPAM 0.5 MG/1
0.5 TABLET ORAL EVERY 4 HOURS PRN
Start: 2025-08-03

## 2025-07-29 RX ORDER — PROCHLORPERAZINE MALEATE 10 MG
10 TABLET ORAL EVERY 6 HOURS PRN
OUTPATIENT
Start: 2025-08-04

## 2025-07-29 RX ORDER — EPINEPHRINE 1 MG/ML(1)
0.5 AMPUL (ML) INJECTION PRN
OUTPATIENT
Start: 2025-08-04

## 2025-07-29 RX ORDER — ONDANSETRON 8 MG/1
8 TABLET, ORALLY DISINTEGRATING ORAL PRN
OUTPATIENT
Start: 2025-08-11

## 2025-07-29 RX ORDER — ACETAMINOPHEN 325 MG/1
650 TABLET ORAL ONCE
OUTPATIENT
Start: 2025-08-11

## 2025-07-29 RX ORDER — 0.9 % SODIUM CHLORIDE 0.9 %
VIAL (ML) INJECTION PRN
OUTPATIENT
Start: 2025-08-04

## 2025-07-29 RX ORDER — 0.9 % SODIUM CHLORIDE 0.9 %
VIAL (ML) INJECTION PRN
OUTPATIENT
Start: 2025-08-03

## 2025-07-29 RX ORDER — 0.9 % SODIUM CHLORIDE 0.9 %
VIAL (ML) INJECTION PRN
OUTPATIENT
Start: 2025-08-11

## 2025-08-04 ENCOUNTER — OUTPATIENT INFUSION SERVICES (OUTPATIENT)
Facility: MEDICAL CENTER | Age: 60
End: 2025-08-04
Attending: INTERNAL MEDICINE
Payer: MEDICARE

## 2025-08-04 VITALS
TEMPERATURE: 97.5 F | HEART RATE: 85 BPM | OXYGEN SATURATION: 92 % | DIASTOLIC BLOOD PRESSURE: 85 MMHG | SYSTOLIC BLOOD PRESSURE: 148 MMHG | WEIGHT: 203.71 LBS | HEIGHT: 64 IN | RESPIRATION RATE: 18 BRPM | BODY MASS INDEX: 34.78 KG/M2

## 2025-08-04 DIAGNOSIS — C50.912 BREAST CANCER, STAGE 4, LEFT (HCC): Primary | ICD-10-CM

## 2025-08-04 LAB
ALBUMIN SERPL BCP-MCNC: 4 G/DL (ref 3.2–4.9)
ALBUMIN/GLOB SERPL: 1.4 G/DL
ALP SERPL-CCNC: 144 U/L (ref 30–99)
ALT SERPL-CCNC: 41 U/L (ref 2–50)
ANION GAP SERPL CALC-SCNC: 15 MMOL/L (ref 7–16)
AST SERPL-CCNC: 32 U/L (ref 12–45)
BASOPHILS # BLD AUTO: 1 % (ref 0–1.8)
BASOPHILS # BLD: 0.06 K/UL (ref 0–0.12)
BILIRUB SERPL-MCNC: 0.4 MG/DL (ref 0.1–1.5)
BUN SERPL-MCNC: 11 MG/DL (ref 8–22)
CALCIUM ALBUM COR SERPL-MCNC: 10 MG/DL (ref 8.5–10.5)
CALCIUM SERPL-MCNC: 10 MG/DL (ref 8.5–10.5)
CHLORIDE SERPL-SCNC: 102 MMOL/L (ref 96–112)
CO2 SERPL-SCNC: 21 MMOL/L (ref 20–33)
CREAT SERPL-MCNC: 0.65 MG/DL (ref 0.5–1.4)
EOSINOPHIL # BLD AUTO: 0.13 K/UL (ref 0–0.51)
EOSINOPHIL NFR BLD: 2.1 % (ref 0–6.9)
ERYTHROCYTE [DISTWIDTH] IN BLOOD BY AUTOMATED COUNT: 53.1 FL (ref 35.9–50)
GFR SERPLBLD CREATININE-BSD FMLA CKD-EPI: 99 ML/MIN/1.73 M 2
GLOBULIN SER CALC-MCNC: 2.9 G/DL (ref 1.9–3.5)
GLUCOSE SERPL-MCNC: 156 MG/DL (ref 65–99)
HCT VFR BLD AUTO: 44.2 % (ref 37–47)
HGB BLD-MCNC: 14.4 G/DL (ref 12–16)
IMM GRANULOCYTES # BLD AUTO: 0.03 K/UL (ref 0–0.11)
IMM GRANULOCYTES NFR BLD AUTO: 0.5 % (ref 0–0.9)
LYMPHOCYTES # BLD AUTO: 0.84 K/UL (ref 1–4.8)
LYMPHOCYTES NFR BLD: 13.4 % (ref 22–41)
MCH RBC QN AUTO: 32.4 PG (ref 27–33)
MCHC RBC AUTO-ENTMCNC: 32.6 G/DL (ref 32.2–35.5)
MCV RBC AUTO: 99.5 FL (ref 81.4–97.8)
MONOCYTES # BLD AUTO: 0.78 K/UL (ref 0–0.85)
MONOCYTES NFR BLD AUTO: 12.5 % (ref 0–13.4)
NEUTROPHILS # BLD AUTO: 4.42 K/UL (ref 1.82–7.42)
NEUTROPHILS NFR BLD: 70.5 % (ref 44–72)
NRBC # BLD AUTO: 0 K/UL
NRBC BLD-RTO: 0 /100 WBC (ref 0–0.2)
OUTPT INFUS CBC COMMENT OICOM: ABNORMAL
PLATELET # BLD AUTO: 288 K/UL (ref 164–446)
PMV BLD AUTO: 9.2 FL (ref 9–12.9)
POTASSIUM SERPL-SCNC: 3.8 MMOL/L (ref 3.6–5.5)
PROT SERPL-MCNC: 6.9 G/DL (ref 6–8.2)
RBC # BLD AUTO: 4.44 M/UL (ref 4.2–5.4)
SODIUM SERPL-SCNC: 138 MMOL/L (ref 135–145)
WBC # BLD AUTO: 6.3 K/UL (ref 4.8–10.8)

## 2025-08-04 PROCEDURE — 96413 CHEMO IV INFUSION 1 HR: CPT

## 2025-08-04 PROCEDURE — 700102 HCHG RX REV CODE 250 W/ 637 OVERRIDE(OP): Performed by: NURSE PRACTITIONER

## 2025-08-04 PROCEDURE — 700111 HCHG RX REV CODE 636 W/ 250 OVERRIDE (IP): Mod: JZ | Performed by: NURSE PRACTITIONER

## 2025-08-04 PROCEDURE — A9270 NON-COVERED ITEM OR SERVICE: HCPCS | Performed by: NURSE PRACTITIONER

## 2025-08-04 PROCEDURE — 96375 TX/PRO/DX INJ NEW DRUG ADDON: CPT

## 2025-08-04 PROCEDURE — 80053 COMPREHEN METABOLIC PANEL: CPT

## 2025-08-04 PROCEDURE — 85025 COMPLETE CBC W/AUTO DIFF WBC: CPT

## 2025-08-04 PROCEDURE — 96367 TX/PROPH/DG ADDL SEQ IV INF: CPT

## 2025-08-04 PROCEDURE — 700105 HCHG RX REV CODE 258: Performed by: NURSE PRACTITIONER

## 2025-08-04 PROCEDURE — 96415 CHEMO IV INFUSION ADDL HR: CPT

## 2025-08-04 RX ORDER — ACETAMINOPHEN 325 MG/1
650 TABLET ORAL ONCE
Status: COMPLETED | OUTPATIENT
Start: 2025-08-04 | End: 2025-08-04

## 2025-08-04 RX ORDER — PALONOSETRON 0.05 MG/ML
0.25 INJECTION, SOLUTION INTRAVENOUS ONCE
Status: COMPLETED | OUTPATIENT
Start: 2025-08-04 | End: 2025-08-04

## 2025-08-04 RX ORDER — SODIUM CHLORIDE 9 MG/ML
INJECTION, SOLUTION INTRAVENOUS CONTINUOUS
Status: DISCONTINUED | OUTPATIENT
Start: 2025-08-04 | End: 2025-08-04 | Stop reason: HOSPADM

## 2025-08-04 RX ORDER — DIPHENHYDRAMINE HYDROCHLORIDE 50 MG/ML
25 INJECTION, SOLUTION INTRAMUSCULAR; INTRAVENOUS ONCE
Status: COMPLETED | OUTPATIENT
Start: 2025-08-04 | End: 2025-08-04

## 2025-08-04 RX ADMIN — ACETAMINOPHEN 650 MG: 325 TABLET ORAL at 08:39

## 2025-08-04 RX ADMIN — FOSAPREPITANT 150 MG: 150 INJECTION, POWDER, LYOPHILIZED, FOR SOLUTION INTRAVENOUS at 09:02

## 2025-08-04 RX ADMIN — DIPHENHYDRAMINE HYDROCHLORIDE 25 MG: 50 INJECTION INTRAMUSCULAR; INTRAVENOUS at 08:45

## 2025-08-04 RX ADMIN — FAMOTIDINE 20 MG: 10 INJECTION INTRAVENOUS at 08:43

## 2025-08-04 RX ADMIN — DEXAMETHASONE SODIUM PHOSPHATE 12 MG: 4 INJECTION, SOLUTION INTRA-ARTICULAR; INTRALESIONAL; INTRAMUSCULAR; INTRAVENOUS; SOFT TISSUE at 08:48

## 2025-08-04 RX ADMIN — SODIUM CHLORIDE: 9 INJECTION, SOLUTION INTRAVENOUS at 08:38

## 2025-08-04 RX ADMIN — PALONOSETRON HYDROCHLORIDE 0.25 MG: 0.25 INJECTION INTRAVENOUS at 08:40

## 2025-08-04 RX ADMIN — SACITUZUMAB GOVITECAN 900 MG: 180 POWDER, FOR SOLUTION INTRAVENOUS at 09:41

## 2025-08-04 ASSESSMENT — FIBROSIS 4 INDEX: FIB4 SCORE: 1.04

## 2025-08-11 ENCOUNTER — OUTPATIENT INFUSION SERVICES (OUTPATIENT)
Facility: MEDICAL CENTER | Age: 60
End: 2025-08-11
Attending: INTERNAL MEDICINE
Payer: MEDICARE

## 2025-08-11 VITALS
TEMPERATURE: 97.2 F | SYSTOLIC BLOOD PRESSURE: 157 MMHG | DIASTOLIC BLOOD PRESSURE: 87 MMHG | WEIGHT: 204.15 LBS | RESPIRATION RATE: 18 BRPM | OXYGEN SATURATION: 95 % | BODY MASS INDEX: 34.85 KG/M2 | HEART RATE: 83 BPM | HEIGHT: 64 IN

## 2025-08-11 DIAGNOSIS — C50.912 BREAST CANCER, STAGE 4, LEFT (HCC): Primary | ICD-10-CM

## 2025-08-11 LAB
BASOPHILS # BLD AUTO: 0.6 % (ref 0–1.8)
BASOPHILS # BLD: 0.04 K/UL (ref 0–0.12)
CEA SERPL-MCNC: 2.1 NG/ML (ref 0–3)
EOSINOPHIL # BLD AUTO: 0.13 K/UL (ref 0–0.51)
EOSINOPHIL NFR BLD: 2.1 % (ref 0–6.9)
ERYTHROCYTE [DISTWIDTH] IN BLOOD BY AUTOMATED COUNT: 50.4 FL (ref 35.9–50)
HCT VFR BLD AUTO: 45.3 % (ref 37–47)
HGB BLD-MCNC: 15 G/DL (ref 12–16)
IMM GRANULOCYTES # BLD AUTO: 0.06 K/UL (ref 0–0.11)
IMM GRANULOCYTES NFR BLD AUTO: 1 % (ref 0–0.9)
LYMPHOCYTES # BLD AUTO: 0.94 K/UL (ref 1–4.8)
LYMPHOCYTES NFR BLD: 14.9 % (ref 22–41)
MCH RBC QN AUTO: 32.4 PG (ref 27–33)
MCHC RBC AUTO-ENTMCNC: 33.1 G/DL (ref 32.2–35.5)
MCV RBC AUTO: 97.8 FL (ref 81.4–97.8)
MONOCYTES # BLD AUTO: 0.71 K/UL (ref 0–0.85)
MONOCYTES NFR BLD AUTO: 11.3 % (ref 0–13.4)
NEUTROPHILS # BLD AUTO: 4.43 K/UL (ref 1.82–7.42)
NEUTROPHILS NFR BLD: 70.1 % (ref 44–72)
NRBC # BLD AUTO: 0 K/UL
NRBC BLD-RTO: 0 /100 WBC (ref 0–0.2)
OUTPT INFUS CBC COMMENT OICOM: ABNORMAL
PLATELET # BLD AUTO: 335 K/UL (ref 164–446)
PMV BLD AUTO: 9.3 FL (ref 9–12.9)
RBC # BLD AUTO: 4.63 M/UL (ref 4.2–5.4)
WBC # BLD AUTO: 6.3 K/UL (ref 4.8–10.8)

## 2025-08-11 PROCEDURE — 96367 TX/PROPH/DG ADDL SEQ IV INF: CPT

## 2025-08-11 PROCEDURE — A9270 NON-COVERED ITEM OR SERVICE: HCPCS | Performed by: NURSE PRACTITIONER

## 2025-08-11 PROCEDURE — 82378 CARCINOEMBRYONIC ANTIGEN: CPT

## 2025-08-11 PROCEDURE — 96375 TX/PRO/DX INJ NEW DRUG ADDON: CPT

## 2025-08-11 PROCEDURE — 86300 IMMUNOASSAY TUMOR CA 15-3: CPT

## 2025-08-11 PROCEDURE — 700102 HCHG RX REV CODE 250 W/ 637 OVERRIDE(OP): Performed by: NURSE PRACTITIONER

## 2025-08-11 PROCEDURE — 700111 HCHG RX REV CODE 636 W/ 250 OVERRIDE (IP): Mod: JZ | Performed by: NURSE PRACTITIONER

## 2025-08-11 PROCEDURE — 700105 HCHG RX REV CODE 258: Performed by: NURSE PRACTITIONER

## 2025-08-11 PROCEDURE — 96415 CHEMO IV INFUSION ADDL HR: CPT

## 2025-08-11 PROCEDURE — 85025 COMPLETE CBC W/AUTO DIFF WBC: CPT

## 2025-08-11 PROCEDURE — 96413 CHEMO IV INFUSION 1 HR: CPT

## 2025-08-11 RX ORDER — PALONOSETRON 0.05 MG/ML
0.25 INJECTION, SOLUTION INTRAVENOUS ONCE
Status: COMPLETED | OUTPATIENT
Start: 2025-08-11 | End: 2025-08-11

## 2025-08-11 RX ORDER — ACETAMINOPHEN 325 MG/1
650 TABLET ORAL ONCE
Status: COMPLETED | OUTPATIENT
Start: 2025-08-11 | End: 2025-08-11

## 2025-08-11 RX ORDER — DIPHENHYDRAMINE HYDROCHLORIDE 50 MG/ML
25 INJECTION, SOLUTION INTRAMUSCULAR; INTRAVENOUS ONCE
Status: COMPLETED | OUTPATIENT
Start: 2025-08-11 | End: 2025-08-11

## 2025-08-11 RX ORDER — SODIUM CHLORIDE 9 MG/ML
INJECTION, SOLUTION INTRAVENOUS CONTINUOUS
Status: DISCONTINUED | OUTPATIENT
Start: 2025-08-11 | End: 2025-08-11 | Stop reason: HOSPADM

## 2025-08-11 RX ADMIN — SACITUZUMAB GOVITECAN 900 MG: 180 POWDER, FOR SOLUTION INTRAVENOUS at 09:30

## 2025-08-11 RX ADMIN — PALONOSETRON HYDROCHLORIDE 0.25 MG: 0.25 INJECTION INTRAVENOUS at 08:22

## 2025-08-11 RX ADMIN — SODIUM CHLORIDE: 9 INJECTION, SOLUTION INTRAVENOUS at 08:30

## 2025-08-11 RX ADMIN — DIPHENHYDRAMINE HYDROCHLORIDE 25 MG: 50 INJECTION INTRAMUSCULAR; INTRAVENOUS at 08:30

## 2025-08-11 RX ADMIN — ACETAMINOPHEN 650 MG: 325 TABLET ORAL at 08:21

## 2025-08-11 RX ADMIN — DEXAMETHASONE SODIUM PHOSPHATE 12 MG: 4 INJECTION, SOLUTION INTRA-ARTICULAR; INTRALESIONAL; INTRAMUSCULAR; INTRAVENOUS; SOFT TISSUE at 08:35

## 2025-08-11 RX ADMIN — FAMOTIDINE 20 MG: 10 INJECTION INTRAVENOUS at 08:26

## 2025-08-11 RX ADMIN — FOSAPREPITANT 150 MG: 150 INJECTION, POWDER, LYOPHILIZED, FOR SOLUTION INTRAVENOUS at 08:48

## 2025-08-11 ASSESSMENT — FIBROSIS 4 INDEX: FIB4 SCORE: 1.08

## 2025-08-11 ASSESSMENT — PAIN DESCRIPTION - PAIN TYPE: TYPE: ACUTE PAIN

## 2025-08-12 ENCOUNTER — PHYSICAL THERAPY (OUTPATIENT)
Dept: PHYSICAL THERAPY | Facility: REHABILITATION | Age: 60
End: 2025-08-12
Attending: NURSE PRACTITIONER
Payer: MEDICARE

## 2025-08-12 ENCOUNTER — HOSPITAL ENCOUNTER (OUTPATIENT)
Dept: RADIOLOGY | Facility: MEDICAL CENTER | Age: 60
End: 2025-08-12
Attending: INTERNAL MEDICINE
Payer: MEDICARE

## 2025-08-12 DIAGNOSIS — C80.0 CARCINOMATOSIS (HCC): ICD-10-CM

## 2025-08-12 DIAGNOSIS — R29.898 UPPER EXTREMITY WEAKNESS: Primary | ICD-10-CM

## 2025-08-12 DIAGNOSIS — R60.9 SWELLING: ICD-10-CM

## 2025-08-12 LAB — CANCER AG27-29 SERPL-ACNC: 431.2 U/ML

## 2025-08-12 PROCEDURE — 700117 HCHG RX CONTRAST REV CODE 255: Performed by: INTERNAL MEDICINE

## 2025-08-12 PROCEDURE — 71260 CT THORAX DX C+: CPT

## 2025-08-12 PROCEDURE — 97163 PT EVAL HIGH COMPLEX 45 MIN: CPT

## 2025-08-12 RX ADMIN — IOHEXOL 25 ML: 240 INJECTION, SOLUTION INTRATHECAL; INTRAVASCULAR; INTRAVENOUS; ORAL at 10:40

## 2025-08-12 RX ADMIN — IOHEXOL 100 ML: 350 INJECTION, SOLUTION INTRAVENOUS at 10:40

## 2025-08-18 RX ORDER — METHYLPREDNISOLONE SODIUM SUCCINATE 125 MG/2ML
125 INJECTION, POWDER, LYOPHILIZED, FOR SOLUTION INTRAMUSCULAR; INTRAVENOUS PRN
OUTPATIENT
Start: 2025-09-01

## 2025-08-18 RX ORDER — EPINEPHRINE 1 MG/ML(1)
0.5 AMPUL (ML) INJECTION PRN
Status: CANCELLED | OUTPATIENT
Start: 2025-08-25

## 2025-08-18 RX ORDER — LORAZEPAM 0.5 MG/1
0.5 TABLET ORAL EVERY 4 HOURS PRN
Start: 2025-09-01

## 2025-08-18 RX ORDER — 0.9 % SODIUM CHLORIDE 0.9 %
10 VIAL (ML) INJECTION PRN
Status: CANCELLED | OUTPATIENT
Start: 2025-08-24

## 2025-08-18 RX ORDER — DIPHENHYDRAMINE HYDROCHLORIDE 50 MG/ML
25 INJECTION, SOLUTION INTRAMUSCULAR; INTRAVENOUS ONCE
Status: CANCELLED | OUTPATIENT
Start: 2025-08-25 | End: 2025-08-25

## 2025-08-18 RX ORDER — 0.9 % SODIUM CHLORIDE 0.9 %
10 VIAL (ML) INJECTION PRN
Status: CANCELLED | OUTPATIENT
Start: 2025-08-25

## 2025-08-18 RX ORDER — ONDANSETRON 2 MG/ML
4 INJECTION INTRAMUSCULAR; INTRAVENOUS PRN
OUTPATIENT
Start: 2025-09-01

## 2025-08-18 RX ORDER — DIPHENHYDRAMINE HYDROCHLORIDE 50 MG/ML
50 INJECTION, SOLUTION INTRAMUSCULAR; INTRAVENOUS PRN
OUTPATIENT
Start: 2025-09-01

## 2025-08-18 RX ORDER — ONDANSETRON 8 MG/1
8 TABLET, ORALLY DISINTEGRATING ORAL PRN
Status: CANCELLED | OUTPATIENT
Start: 2025-08-25

## 2025-08-18 RX ORDER — SODIUM CHLORIDE 9 MG/ML
INJECTION, SOLUTION INTRAVENOUS CONTINUOUS
Status: CANCELLED | OUTPATIENT
Start: 2025-08-25

## 2025-08-18 RX ORDER — LOPERAMIDE HYDROCHLORIDE 2 MG/1
2 CAPSULE ORAL
OUTPATIENT
Start: 2025-09-01

## 2025-08-18 RX ORDER — LORAZEPAM 0.5 MG/1
0.5 TABLET ORAL EVERY 4 HOURS PRN
Status: CANCELLED
Start: 2025-08-25

## 2025-08-18 RX ORDER — EPINEPHRINE 1 MG/ML(1)
0.5 AMPUL (ML) INJECTION PRN
OUTPATIENT
Start: 2025-09-01

## 2025-08-18 RX ORDER — PROCHLORPERAZINE MALEATE 10 MG
10 TABLET ORAL EVERY 6 HOURS PRN
Status: CANCELLED | OUTPATIENT
Start: 2025-08-25

## 2025-08-18 RX ORDER — 0.9 % SODIUM CHLORIDE 0.9 %
3 VIAL (ML) INJECTION PRN
Status: CANCELLED | OUTPATIENT
Start: 2025-08-25

## 2025-08-18 RX ORDER — ACETAMINOPHEN 325 MG/1
650 TABLET ORAL ONCE
OUTPATIENT
Start: 2025-09-01

## 2025-08-18 RX ORDER — LOPERAMIDE HYDROCHLORIDE 2 MG/1
2 CAPSULE ORAL
Status: CANCELLED | OUTPATIENT
Start: 2025-08-25

## 2025-08-18 RX ORDER — PALONOSETRON 0.05 MG/ML
0.25 INJECTION, SOLUTION INTRAVENOUS ONCE
OUTPATIENT
Start: 2025-09-01 | End: 2025-09-01

## 2025-08-18 RX ORDER — METHYLPREDNISOLONE SODIUM SUCCINATE 125 MG/2ML
125 INJECTION, POWDER, LYOPHILIZED, FOR SOLUTION INTRAMUSCULAR; INTRAVENOUS PRN
Status: CANCELLED | OUTPATIENT
Start: 2025-08-25

## 2025-08-18 RX ORDER — PALONOSETRON 0.05 MG/ML
0.25 INJECTION, SOLUTION INTRAVENOUS ONCE
Status: CANCELLED | OUTPATIENT
Start: 2025-08-25 | End: 2025-08-25

## 2025-08-18 RX ORDER — 0.9 % SODIUM CHLORIDE 0.9 %
VIAL (ML) INJECTION PRN
OUTPATIENT
Start: 2025-09-01

## 2025-08-18 RX ORDER — LORAZEPAM 0.5 MG/1
0.5 TABLET ORAL EVERY 4 HOURS PRN
Status: CANCELLED
Start: 2025-08-24

## 2025-08-18 RX ORDER — SODIUM CHLORIDE 9 MG/ML
INJECTION, SOLUTION INTRAVENOUS CONTINUOUS
OUTPATIENT
Start: 2025-09-01

## 2025-08-18 RX ORDER — 0.9 % SODIUM CHLORIDE 0.9 %
10 VIAL (ML) INJECTION PRN
OUTPATIENT
Start: 2025-09-01

## 2025-08-18 RX ORDER — ONDANSETRON 8 MG/1
8 TABLET, ORALLY DISINTEGRATING ORAL PRN
OUTPATIENT
Start: 2025-09-01

## 2025-08-18 RX ORDER — DIPHENHYDRAMINE HYDROCHLORIDE 50 MG/ML
25 INJECTION, SOLUTION INTRAMUSCULAR; INTRAVENOUS ONCE
OUTPATIENT
Start: 2025-09-01 | End: 2025-09-01

## 2025-08-18 RX ORDER — ATROPINE SULFATE 1 MG/ML
0.5 INJECTION, SOLUTION INTRAVENOUS PRN
Status: CANCELLED | OUTPATIENT
Start: 2025-08-25

## 2025-08-18 RX ORDER — 0.9 % SODIUM CHLORIDE 0.9 %
3 VIAL (ML) INJECTION PRN
OUTPATIENT
Start: 2025-09-01

## 2025-08-18 RX ORDER — DIPHENHYDRAMINE HYDROCHLORIDE 50 MG/ML
50 INJECTION, SOLUTION INTRAMUSCULAR; INTRAVENOUS PRN
Status: CANCELLED | OUTPATIENT
Start: 2025-08-25

## 2025-08-18 RX ORDER — ACETAMINOPHEN 325 MG/1
650 TABLET ORAL ONCE
Status: CANCELLED | OUTPATIENT
Start: 2025-08-25

## 2025-08-18 RX ORDER — 0.9 % SODIUM CHLORIDE 0.9 %
VIAL (ML) INJECTION PRN
Status: CANCELLED | OUTPATIENT
Start: 2025-08-25

## 2025-08-18 RX ORDER — 0.9 % SODIUM CHLORIDE 0.9 %
3 VIAL (ML) INJECTION PRN
Status: CANCELLED | OUTPATIENT
Start: 2025-08-24

## 2025-08-18 RX ORDER — LOPERAMIDE HYDROCHLORIDE 2 MG/1
4 CAPSULE ORAL PRN
Status: CANCELLED | OUTPATIENT
Start: 2025-08-25

## 2025-08-18 RX ORDER — 0.9 % SODIUM CHLORIDE 0.9 %
VIAL (ML) INJECTION PRN
Status: CANCELLED | OUTPATIENT
Start: 2025-08-24

## 2025-08-18 RX ORDER — ONDANSETRON 2 MG/ML
4 INJECTION INTRAMUSCULAR; INTRAVENOUS PRN
Status: CANCELLED | OUTPATIENT
Start: 2025-08-25

## 2025-08-18 RX ORDER — ATROPINE SULFATE 1 MG/ML
0.5 INJECTION, SOLUTION INTRAVENOUS PRN
OUTPATIENT
Start: 2025-09-01

## 2025-08-18 RX ORDER — PROCHLORPERAZINE MALEATE 10 MG
10 TABLET ORAL EVERY 6 HOURS PRN
OUTPATIENT
Start: 2025-09-01

## 2025-08-18 RX ORDER — LOPERAMIDE HYDROCHLORIDE 2 MG/1
4 CAPSULE ORAL PRN
OUTPATIENT
Start: 2025-09-01

## 2025-08-20 ENCOUNTER — PATIENT OUTREACH (OUTPATIENT)
Dept: HEALTH INFORMATION MANAGEMENT | Facility: OTHER | Age: 60
End: 2025-08-20

## 2025-08-20 ENCOUNTER — HOSPITAL ENCOUNTER (OUTPATIENT)
Facility: MEDICAL CENTER | Age: 60
End: 2025-08-20
Attending: INTERNAL MEDICINE
Payer: MEDICARE

## 2025-08-20 VITALS
WEIGHT: 202 LBS | DIASTOLIC BLOOD PRESSURE: 78 MMHG | OXYGEN SATURATION: 98 % | BODY MASS INDEX: 34.49 KG/M2 | TEMPERATURE: 99.1 F | HEIGHT: 64 IN | HEART RATE: 90 BPM | SYSTOLIC BLOOD PRESSURE: 130 MMHG

## 2025-08-20 DIAGNOSIS — I10 ESSENTIAL HYPERTENSION: ICD-10-CM

## 2025-08-20 DIAGNOSIS — J96.11 CHRONIC RESPIRATORY FAILURE WITH HYPOXIA (HCC): ICD-10-CM

## 2025-08-20 DIAGNOSIS — C50.912 BREAST CANCER, STAGE 4, LEFT (HCC): ICD-10-CM

## 2025-08-20 DIAGNOSIS — C50.912 BREAST CANCER, STAGE 4, LEFT (HCC): Primary | ICD-10-CM

## 2025-08-20 DIAGNOSIS — E11.40 TYPE 2 DIABETES MELLITUS WITH DIABETIC NEUROPATHY, WITHOUT LONG-TERM CURRENT USE OF INSULIN (HCC): Primary | ICD-10-CM

## 2025-08-20 PROCEDURE — 99212 OFFICE O/P EST SF 10 MIN: CPT | Performed by: INTERNAL MEDICINE

## 2025-08-20 PROCEDURE — 99214 OFFICE O/P EST MOD 30 MIN: CPT | Performed by: INTERNAL MEDICINE

## 2025-08-20 RX ORDER — AMOXICILLIN 500 MG/1
CAPSULE ORAL
COMMUNITY
Start: 2025-08-19 | End: 2025-08-27

## 2025-08-20 RX ORDER — DIAZEPAM 2 MG/1
2 TABLET ORAL EVERY 6 HOURS PRN
COMMUNITY

## 2025-08-20 RX ORDER — CHLORHEXIDINE GLUCONATE ORAL RINSE 1.2 MG/ML
SOLUTION DENTAL
COMMUNITY
Start: 2025-08-19

## 2025-08-20 ASSESSMENT — PAIN SCALES - GENERAL: PAINLEVEL_OUTOF10: 3=SLIGHT PAIN

## 2025-08-20 ASSESSMENT — FIBROSIS 4 INDEX: FIB4 SCORE: 0.92

## 2025-08-20 ASSESSMENT — ENCOUNTER SYMPTOMS
HEADACHES: 0
FEVER: 0
VOMITING: 0
PALPITATIONS: 0
CONSTIPATION: 0
SPUTUM PRODUCTION: 1
MYALGIAS: 0
NAUSEA: 0
COUGH: 0
DIARRHEA: 1
CHILLS: 0
SHORTNESS OF BREATH: 0
DIZZINESS: 1
WEIGHT LOSS: 0

## 2025-08-25 ENCOUNTER — OUTPATIENT INFUSION SERVICES (OUTPATIENT)
Facility: MEDICAL CENTER | Age: 60
End: 2025-08-25
Attending: INTERNAL MEDICINE
Payer: MEDICARE

## 2025-08-25 ENCOUNTER — PHYSICAL THERAPY (OUTPATIENT)
Dept: PHYSICAL THERAPY | Facility: REHABILITATION | Age: 60
End: 2025-08-25
Attending: NURSE PRACTITIONER
Payer: MEDICARE

## 2025-08-25 VITALS
HEIGHT: 64 IN | RESPIRATION RATE: 18 BRPM | BODY MASS INDEX: 35 KG/M2 | OXYGEN SATURATION: 91 % | DIASTOLIC BLOOD PRESSURE: 85 MMHG | SYSTOLIC BLOOD PRESSURE: 158 MMHG | WEIGHT: 205.03 LBS | HEART RATE: 85 BPM | TEMPERATURE: 97 F

## 2025-08-25 DIAGNOSIS — C50.912 BREAST CANCER, STAGE 4, LEFT (HCC): Primary | ICD-10-CM

## 2025-08-25 DIAGNOSIS — R29.898 UPPER EXTREMITY WEAKNESS: Primary | ICD-10-CM

## 2025-08-25 DIAGNOSIS — R60.9 SWELLING: ICD-10-CM

## 2025-08-25 LAB
ALBUMIN SERPL BCP-MCNC: 3.9 G/DL (ref 3.2–4.9)
ALBUMIN/GLOB SERPL: 1.3 G/DL
ALP SERPL-CCNC: 130 U/L (ref 30–99)
ALT SERPL-CCNC: 31 U/L (ref 2–50)
ANION GAP SERPL CALC-SCNC: 12 MMOL/L (ref 7–16)
AST SERPL-CCNC: 27 U/L (ref 12–45)
BASOPHILS # BLD AUTO: 0.5 % (ref 0–1.8)
BASOPHILS # BLD: 0.03 K/UL (ref 0–0.12)
BILIRUB SERPL-MCNC: 0.4 MG/DL (ref 0.1–1.5)
BUN SERPL-MCNC: 11 MG/DL (ref 8–22)
CALCIUM ALBUM COR SERPL-MCNC: 10.1 MG/DL (ref 8.5–10.5)
CALCIUM SERPL-MCNC: 10 MG/DL (ref 8.5–10.5)
CHLORIDE SERPL-SCNC: 104 MMOL/L (ref 96–112)
CO2 SERPL-SCNC: 23 MMOL/L (ref 20–33)
CREAT SERPL-MCNC: 0.69 MG/DL (ref 0.5–1.4)
EOSINOPHIL # BLD AUTO: 0.11 K/UL (ref 0–0.51)
EOSINOPHIL NFR BLD: 1.7 % (ref 0–6.9)
ERYTHROCYTE [DISTWIDTH] IN BLOOD BY AUTOMATED COUNT: 51.6 FL (ref 35.9–50)
GFR SERPLBLD CREATININE-BSD FMLA CKD-EPI: 98 ML/MIN/1.73 M 2
GLOBULIN SER CALC-MCNC: 3 G/DL (ref 1.9–3.5)
GLUCOSE SERPL-MCNC: 156 MG/DL (ref 65–99)
HCT VFR BLD AUTO: 41.8 % (ref 37–47)
HGB BLD-MCNC: 13.8 G/DL (ref 12–16)
IMM GRANULOCYTES # BLD AUTO: 0.03 K/UL (ref 0–0.11)
IMM GRANULOCYTES NFR BLD AUTO: 0.5 % (ref 0–0.9)
LYMPHOCYTES # BLD AUTO: 0.88 K/UL (ref 1–4.8)
LYMPHOCYTES NFR BLD: 13.9 % (ref 22–41)
MCH RBC QN AUTO: 32.2 PG (ref 27–33)
MCHC RBC AUTO-ENTMCNC: 33 G/DL (ref 32.2–35.5)
MCV RBC AUTO: 97.4 FL (ref 81.4–97.8)
MONOCYTES # BLD AUTO: 0.85 K/UL (ref 0–0.85)
MONOCYTES NFR BLD AUTO: 13.4 % (ref 0–13.4)
NEUTROPHILS # BLD AUTO: 4.44 K/UL (ref 1.82–7.42)
NEUTROPHILS NFR BLD: 70 % (ref 44–72)
NRBC # BLD AUTO: 0 K/UL
NRBC BLD-RTO: 0 /100 WBC (ref 0–0.2)
OUTPT INFUS CBC COMMENT OICOM: ABNORMAL
PLATELET # BLD AUTO: 296 K/UL (ref 164–446)
PMV BLD AUTO: 9.1 FL (ref 9–12.9)
POTASSIUM SERPL-SCNC: 4.1 MMOL/L (ref 3.6–5.5)
PROT SERPL-MCNC: 6.9 G/DL (ref 6–8.2)
RBC # BLD AUTO: 4.29 M/UL (ref 4.2–5.4)
SODIUM SERPL-SCNC: 139 MMOL/L (ref 135–145)
WBC # BLD AUTO: 6.3 K/UL (ref 4.8–10.8)

## 2025-08-25 PROCEDURE — 700105 HCHG RX REV CODE 258: Performed by: NURSE PRACTITIONER

## 2025-08-25 PROCEDURE — 85025 COMPLETE CBC W/AUTO DIFF WBC: CPT

## 2025-08-25 PROCEDURE — 700111 HCHG RX REV CODE 636 W/ 250 OVERRIDE (IP): Mod: JZ | Performed by: NURSE PRACTITIONER

## 2025-08-25 PROCEDURE — 97140 MANUAL THERAPY 1/> REGIONS: CPT

## 2025-08-25 PROCEDURE — 97110 THERAPEUTIC EXERCISES: CPT

## 2025-08-25 PROCEDURE — 96375 TX/PRO/DX INJ NEW DRUG ADDON: CPT

## 2025-08-25 PROCEDURE — A9270 NON-COVERED ITEM OR SERVICE: HCPCS | Performed by: NURSE PRACTITIONER

## 2025-08-25 PROCEDURE — 700102 HCHG RX REV CODE 250 W/ 637 OVERRIDE(OP): Performed by: NURSE PRACTITIONER

## 2025-08-25 PROCEDURE — 80053 COMPREHEN METABOLIC PANEL: CPT

## 2025-08-25 PROCEDURE — 96367 TX/PROPH/DG ADDL SEQ IV INF: CPT

## 2025-08-25 PROCEDURE — 96413 CHEMO IV INFUSION 1 HR: CPT

## 2025-08-25 RX ORDER — PALONOSETRON 0.05 MG/ML
0.25 INJECTION, SOLUTION INTRAVENOUS ONCE
Status: COMPLETED | OUTPATIENT
Start: 2025-08-25 | End: 2025-08-25

## 2025-08-25 RX ORDER — DIPHENHYDRAMINE HYDROCHLORIDE 50 MG/ML
25 INJECTION, SOLUTION INTRAMUSCULAR; INTRAVENOUS ONCE
Status: COMPLETED | OUTPATIENT
Start: 2025-08-25 | End: 2025-08-25

## 2025-08-25 RX ORDER — ACETAMINOPHEN 325 MG/1
650 TABLET ORAL ONCE
Status: COMPLETED | OUTPATIENT
Start: 2025-08-25 | End: 2025-08-25

## 2025-08-25 RX ADMIN — DEXAMETHASONE SODIUM PHOSPHATE 12 MG: 4 INJECTION, SOLUTION INTRA-ARTICULAR; INTRALESIONAL; INTRAMUSCULAR; INTRAVENOUS; SOFT TISSUE at 09:16

## 2025-08-25 RX ADMIN — FOSAPREPITANT 150 MG: 150 INJECTION, POWDER, LYOPHILIZED, FOR SOLUTION INTRAVENOUS at 08:45

## 2025-08-25 RX ADMIN — DIPHENHYDRAMINE HYDROCHLORIDE 25 MG: 50 INJECTION INTRAMUSCULAR; INTRAVENOUS at 08:31

## 2025-08-25 RX ADMIN — SACITUZUMAB GOVITECAN 900 MG: 180 POWDER, FOR SOLUTION INTRAVENOUS at 09:57

## 2025-08-25 RX ADMIN — FAMOTIDINE 20 MG: 10 INJECTION INTRAVENOUS at 08:31

## 2025-08-25 RX ADMIN — PALONOSETRON HYDROCHLORIDE 0.25 MG: 0.25 INJECTION INTRAVENOUS at 08:31

## 2025-08-25 RX ADMIN — ACETAMINOPHEN 650 MG: 325 TABLET ORAL at 08:31

## 2025-08-25 ASSESSMENT — FIBROSIS 4 INDEX: FIB4 SCORE: 0.92

## 2025-08-27 ENCOUNTER — RESULTS FOLLOW-UP (OUTPATIENT)
Dept: MEDICAL GROUP | Facility: PHYSICIAN GROUP | Age: 60
End: 2025-08-27

## 2025-08-27 ENCOUNTER — OFFICE VISIT (OUTPATIENT)
Dept: MEDICAL GROUP | Facility: PHYSICIAN GROUP | Age: 60
End: 2025-08-27
Payer: MEDICARE

## 2025-08-27 ENCOUNTER — HOSPITAL ENCOUNTER (OUTPATIENT)
Facility: MEDICAL CENTER | Age: 60
End: 2025-08-27
Attending: FAMILY MEDICINE
Payer: MEDICARE

## 2025-08-27 ENCOUNTER — PHYSICAL THERAPY (OUTPATIENT)
Dept: PHYSICAL THERAPY | Facility: REHABILITATION | Age: 60
End: 2025-08-27
Attending: NURSE PRACTITIONER
Payer: MEDICARE

## 2025-08-27 VITALS
OXYGEN SATURATION: 90 % | HEART RATE: 85 BPM | DIASTOLIC BLOOD PRESSURE: 78 MMHG | BODY MASS INDEX: 35.22 KG/M2 | SYSTOLIC BLOOD PRESSURE: 132 MMHG | TEMPERATURE: 97.6 F | HEIGHT: 64 IN | WEIGHT: 206.3 LBS

## 2025-08-27 DIAGNOSIS — E11.40 TYPE 2 DIABETES MELLITUS WITH DIABETIC NEUROPATHY, WITHOUT LONG-TERM CURRENT USE OF INSULIN (HCC): ICD-10-CM

## 2025-08-27 DIAGNOSIS — C80.0 CARCINOMATOSIS (HCC): ICD-10-CM

## 2025-08-27 DIAGNOSIS — R60.9 SWELLING: ICD-10-CM

## 2025-08-27 DIAGNOSIS — R29.898 UPPER EXTREMITY WEAKNESS: Primary | ICD-10-CM

## 2025-08-27 DIAGNOSIS — N89.8 VAGINAL DISCHARGE: ICD-10-CM

## 2025-08-27 DIAGNOSIS — G47.33 OSA (OBSTRUCTIVE SLEEP APNEA): ICD-10-CM

## 2025-08-27 DIAGNOSIS — C50.412 CARCINOMA OF UPPER-OUTER QUADRANT OF LEFT BREAST IN FEMALE, ESTROGEN RECEPTOR POSITIVE (HCC): ICD-10-CM

## 2025-08-27 DIAGNOSIS — E11.40 TYPE 2 DIABETES MELLITUS WITH DIABETIC NEUROPATHY, WITHOUT LONG-TERM CURRENT USE OF INSULIN (HCC): Primary | ICD-10-CM

## 2025-08-27 DIAGNOSIS — Z17.0 CARCINOMA OF UPPER-OUTER QUADRANT OF LEFT BREAST IN FEMALE, ESTROGEN RECEPTOR POSITIVE (HCC): ICD-10-CM

## 2025-08-27 DIAGNOSIS — Z12.31 ENCOUNTER FOR SCREENING MAMMOGRAM FOR MALIGNANT NEOPLASM OF BREAST: ICD-10-CM

## 2025-08-27 DIAGNOSIS — C50.912 BREAST CANCER, STAGE 4, LEFT (HCC): ICD-10-CM

## 2025-08-27 LAB
CANDIDA DNA VAG QL PROBE+SIG AMP: NEGATIVE
CREAT UR-MCNC: 90.6 MG/DL
G VAGINALIS DNA VAG QL PROBE+SIG AMP: NEGATIVE
HBA1C MFR BLD: 7.3 % (ref ?–5.8)
MICROALBUMIN UR-MCNC: <1.2 MG/DL
MICROALBUMIN/CREAT UR: NORMAL MG/G (ref 0–30)
POCT INT CON NEG: NEGATIVE
POCT INT CON POS: POSITIVE
T VAGINALIS DNA VAG QL PROBE+SIG AMP: NEGATIVE

## 2025-08-27 PROCEDURE — 97140 MANUAL THERAPY 1/> REGIONS: CPT

## 2025-08-27 PROCEDURE — 82570 ASSAY OF URINE CREATININE: CPT

## 2025-08-27 PROCEDURE — 87660 TRICHOMONAS VAGIN DIR PROBE: CPT

## 2025-08-27 PROCEDURE — 87510 GARDNER VAG DNA DIR PROBE: CPT

## 2025-08-27 PROCEDURE — 82043 UR ALBUMIN QUANTITATIVE: CPT

## 2025-08-27 PROCEDURE — 87480 CANDIDA DNA DIR PROBE: CPT

## 2025-08-27 RX ORDER — SEMAGLUTIDE 2.68 MG/ML
2 INJECTION, SOLUTION SUBCUTANEOUS
Qty: 9 ML | Refills: 0 | Status: SHIPPED | OUTPATIENT
Start: 2025-08-27

## 2025-08-27 RX ORDER — SEMAGLUTIDE 2.68 MG/ML
2 INJECTION, SOLUTION SUBCUTANEOUS
Qty: 9 ML | Refills: 0 | Status: SHIPPED | OUTPATIENT
Start: 2025-08-27 | End: 2025-08-27 | Stop reason: SDUPTHER

## 2025-08-27 ASSESSMENT — LIFESTYLE VARIABLES
HOW OFTEN DO YOU HAVE SIX OR MORE DRINKS ON ONE OCCASION: NEVER
SKIP TO QUESTIONS 9-10: 1
AUDIT-C TOTAL SCORE: 0
HOW OFTEN DO YOU HAVE A DRINK CONTAINING ALCOHOL: NEVER
HOW MANY STANDARD DRINKS CONTAINING ALCOHOL DO YOU HAVE ON A TYPICAL DAY: PATIENT DOES NOT DRINK

## 2025-08-27 ASSESSMENT — FIBROSIS 4 INDEX: FIB4 SCORE: 1.02

## (undated) DEVICE — SENSOR OXIMETER ADULT SPO2 RD SET (20EA/BX)

## (undated) DEVICE — HEAD HOLDER JUNIOR/ADULT

## (undated) DEVICE — GOWN SURGEONS X-LARGE - DISP. (30/CA)

## (undated) DEVICE — SET LEADWIRE 5 LEAD BEDSIDE DISPOSABLE ECG (1SET OF 5/EA)

## (undated) DEVICE — SUCTION INSTRUMENT YANKAUER BULBOUS TIP W/O VENT (50EA/CA)

## (undated) DEVICE — SYRINGE ASEPTO - (50EA/CA

## (undated) DEVICE — SUTURE 0 VICRYL PLUS UR-6 - 27 INCH (36/BX)

## (undated) DEVICE — STAPLER SKIN DISP - (6/BX 10BX/CA) VISISTAT

## (undated) DEVICE — PORT ACCESS SINGLE SITE GELPOINT (1/EA)

## (undated) DEVICE — GLOVE BIOGEL SZ 6 PF LATEX - (50EA/BX 4BX/CA)

## (undated) DEVICE — CANISTER SUCTION 3000ML MECHANICAL FILTER AUTO SHUTOFF MEDI-VAC NONSTERILE LF DISP (40EA/CA)

## (undated) DEVICE — GLOVE BIOGEL PI INDICATOR SZ 7.0 SURGICAL PF LF - (50/BX 4BX/CA)

## (undated) DEVICE — GLOVE BIOGEL SZ 7.5 SURGICAL PF LTX - (50PR/BX 4BX/CA)

## (undated) DEVICE — STAPLER 60MM ARTICULATING (3EA/BX)

## (undated) DEVICE — GLOVE SZ 8 BIOGEL PI MICRO - PF LF (50PR/BX)

## (undated) DEVICE — PACK LAP CHOLE OR - (2EA/CA)

## (undated) DEVICE — GOWN WARMING STANDARD FLEX - (30/CA)

## (undated) DEVICE — MANIFOLD NEPTUNE 1 PORT (20/PK)

## (undated) DEVICE — SUTURE 3-0 VICRYL PLUS SH - 8X 18 INCH (12/BX)

## (undated) DEVICE — KIT ANESTHESIA W/CIRCUIT & 3/LT BAG W/FILTER (20EA/CA)

## (undated) DEVICE — PROTECTOR ULNA NERVE - (36PR/CA)

## (undated) DEVICE — BLADE SURGICAL #15 - (50/BX 3BX/CA)

## (undated) DEVICE — CHLORAPREP 26 ML APPLICATOR - ORANGE TINT(25/CA)

## (undated) DEVICE — CLOSURE SKIN STRIP 1/2 X 4 IN - (STERI STRIP) (50/BX 4BX/CA)

## (undated) DEVICE — ELECTRODE 850 FOAM ADHESIVE - HYDROGEL RADIOTRNSPRNT (50/PK)

## (undated) DEVICE — COVER LIGHT HANDLE ALC PLUS DISP (18EA/BX)

## (undated) DEVICE — SUTURE 2-0 PDS II CT-2 - (36/BX)

## (undated) DEVICE — KIT  I.V. START (100EA/CA)

## (undated) DEVICE — TROCAR 5X100 NON BLADED Z-TH - READ KII (6/BX)

## (undated) DEVICE — SUTURE 0 COATED VICRYL 6-18IN - (12PK/BX)

## (undated) DEVICE — BLADE ELECTRODE COATED EDGE (50EA/PK)

## (undated) DEVICE — GLOVE BIOGEL INDICATOR SZ 6.5 SURGICAL PF LTX - (50PR/BX 4BX/CA)

## (undated) DEVICE — SLEEVE VASO DVT COMPRESSION CALF MED - (10PR/CA)

## (undated) DEVICE — SUTURE GENERAL

## (undated) DEVICE — SPONGE GAUZESTER. 2X2 4-PL - (2/PK 50PK/BX 30BX/CS)

## (undated) DEVICE — CANISTER SUCTION RIGID RED 1500CC (40EA/CA)

## (undated) DEVICE — GLOVE SZ 6 BIOGEL PI MICRO - PF LF (50PR/BX 4BX/CA)

## (undated) DEVICE — TUBE CONNECTING SUCTION - CLEAR PLASTIC STERILE 72 IN (50EA/CA)

## (undated) DEVICE — GLOVE SZ 6.5 BIOGEL PI MICRO - PF LF (50PR/BX)

## (undated) DEVICE — SLEEVE, VASO, THIGH, MED

## (undated) DEVICE — CANNULA W/SEAL 5X100 Z-THRE - ADED KII (12/BX)

## (undated) DEVICE — DERMABOND ADVANCED - (12EA/BX)

## (undated) DEVICE — PACK MINOR BASIN - (2EA/CA)

## (undated) DEVICE — SENSOR SPO2 NEO LNCS ADHESIVE (20/BX) SEE USER NOTES

## (undated) DEVICE — COVER CIV-FLEX TRANSDUCER - (24/BX)

## (undated) DEVICE — DRESSING TRANSPARENT FILM TEGADERM 2.375 X 2.75" (100EA/BX)"

## (undated) DEVICE — TOWEL STOP TIMEOUT SAFETY FLAG (40EA/CA)

## (undated) DEVICE — LACTATED RINGERS INJ 1000 ML - (14EA/CA 60CA/PF)

## (undated) DEVICE — DRESSING NON-ADHERING 8 X 3 - (50/BX)

## (undated) DEVICE — TROCAR Z THREAD11MM OPTICAL - NON BLADED(6/BX)

## (undated) DEVICE — WATER IRRIGATION STERILE 1000ML (12EA/CA)

## (undated) DEVICE — ELECTRODE DUAL RETURN W/ CORD - (50/PK)

## (undated) DEVICE — PAD MAGNETIC INSTRUMENT FOAM HOLDER (200/CA)

## (undated) DEVICE — DRAPE IOBAN II INCISE 23X17 - (10EA/BX 4BX/CA)

## (undated) DEVICE — TUBING CLEARLINK DUO-VENT - C-FLO (48EA/CA)

## (undated) DEVICE — NEEDLE INSFL 120MM 14GA VRRS - (20/BX)

## (undated) DEVICE — SUTURE 4-0 MONOCRYL PLUS PS-2 - 27 INCH (36/BX)

## (undated) DEVICE — SODIUM CHL IRRIGATION 0.9% 1000ML (12EA/CA)

## (undated) DEVICE — SET EXTENSION WITH 2 PORTS (48EA/CA) ***PART #2C8610 IS A SUBSTITUTE*****

## (undated) DEVICE — GLOVE BIOGEL PI INDICATOR SZ 7.5 SURGICAL PF LF -(50/BX 4BX/CA)

## (undated) DEVICE — CATHETER IV 20 GA X 1-1/4 ---SURG.& SDS ONLY--- (50EA/BX)

## (undated) DEVICE — MASK ANESTHESIA ADULT  - (100/CA)

## (undated) DEVICE — SHEET TRANSVERSE LAP - (12EA/CA)

## (undated) DEVICE — TROCAR LAPSCP 100MM 12MM NTHRD - (6/BX)

## (undated) DEVICE — CANISTER SUCTION 3000ML MECHANICAL FILTER AUTO SHUTOFF MEDI-VAC NONSTERILE LF DISP  (40EA/CA)

## (undated) DEVICE — GLOVE SZ 7.5 BIOGEL PI MICRO - PF LF (50PR/BX)

## (undated) DEVICE — GLOVE BIOGEL PI INDICATOR SZ 6.5 SURGICAL PF LF - (50/BX 4BX/CA)

## (undated) DEVICE — GOWN SURGEONS LARGE - (32/CA)

## (undated) DEVICE — VESSEL DIVIDER SEAL LAP LIGASURE 37CM (6EA/BX)